# Patient Record
Sex: MALE | Race: WHITE | NOT HISPANIC OR LATINO | URBAN - METROPOLITAN AREA
[De-identification: names, ages, dates, MRNs, and addresses within clinical notes are randomized per-mention and may not be internally consistent; named-entity substitution may affect disease eponyms.]

---

## 2017-01-12 ENCOUNTER — EMERGENCY (EMERGENCY)
Facility: HOSPITAL | Age: 82
LOS: 0 days | Discharge: HOME | End: 2017-01-12
Attending: EMERGENCY MEDICINE
Payer: MEDICARE

## 2017-01-12 VITALS
HEART RATE: 74 BPM | OXYGEN SATURATION: 98 % | TEMPERATURE: 97 F | DIASTOLIC BLOOD PRESSURE: 77 MMHG | SYSTOLIC BLOOD PRESSURE: 137 MMHG | RESPIRATION RATE: 18 BRPM

## 2017-01-12 VITALS
RESPIRATION RATE: 16 BRPM | HEART RATE: 91 BPM | SYSTOLIC BLOOD PRESSURE: 163 MMHG | OXYGEN SATURATION: 98 % | WEIGHT: 190.04 LBS | DIASTOLIC BLOOD PRESSURE: 77 MMHG | HEIGHT: 67 IN | TEMPERATURE: 97 F
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
## 2017-01-12 DIAGNOSIS — S49.91XA UNSPECIFIED INJURY OF RIGHT SHOULDER AND UPPER ARM, INITIAL ENCOUNTER: ICD-10-CM

## 2017-01-12 DIAGNOSIS — S42.291A OTHER DISPLACED FRACTURE OF UPPER END OF RIGHT HUMERUS, INITIAL ENCOUNTER FOR CLOSED FRACTURE: ICD-10-CM

## 2017-01-12 DIAGNOSIS — Z85.46 PERSONAL HISTORY OF MALIGNANT NEOPLASM OF PROSTATE: ICD-10-CM
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
## 2017-01-12 DIAGNOSIS — C61 MALIGNANT NEOPLASM OF PROSTATE: Chronic | ICD-10-CM

## 2017-01-12 DIAGNOSIS — E87.6 HYPOKALEMIA: ICD-10-CM

## 2017-01-12 PROCEDURE — 73030 X-RAY EXAM OF SHOULDER: CPT | Mod: 26,RT

## 2017-01-12 PROCEDURE — 23600 CLTX PROX HUMRL FX W/O MNPJ: CPT | Mod: 54

## 2017-01-12 PROCEDURE — 73060 X-RAY EXAM OF HUMERUS: CPT | Mod: 26,RT

## 2017-01-12 PROCEDURE — 99284 EMERGENCY DEPT VISIT MOD MDM: CPT | Mod: 57,25

## 2017-01-12 RX ORDER — IBUPROFEN 200 MG
1 TABLET ORAL
Qty: 9 | Refills: 0
Start: 2017-01-12 | End: 2017-01-15

## 2017-01-12 RX ORDER — IBUPROFEN 200 MG
600 TABLET ORAL ONCE
Qty: 0 | Refills: 0 | Status: COMPLETED | OUTPATIENT
Start: 2017-01-12 | End: 2017-01-12

## 2017-01-12 RX ORDER — ONDANSETRON 8 MG/1
4 TABLET, FILM COATED ORAL ONCE
Qty: 0 | Refills: 0 | Status: COMPLETED | OUTPATIENT
Start: 2017-01-12 | End: 2017-01-12

## 2017-01-12 RX ADMIN — ONDANSETRON 4 MILLIGRAM(S): 8 TABLET, FILM COATED ORAL at 09:10

## 2017-01-12 RX ADMIN — Medication 600 MILLIGRAM(S): at 08:05

## 2017-01-12 NOTE — ED PROVIDER NOTE - EYES, MLM
Clear bilaterally, pupils equal, round and reactive to light. No photophobia no nystagmus bilaterally

## 2017-01-12 NOTE — ED PROVIDER NOTE - PROGRESS NOTE DETAILS
Ortho is notified right arm sling is placed, pt's neuro-vascular is intact right upper extremity. Ortho is notified sts they are in the OR will eval pt ortho Dr. Wyman was here eval and cleared pt sts pt can be discharged with an arm sling and follow up with Dr. Bhatti orthopedist. Pt and wife are given and explained the report of right shoulder xray and advised to follow up with orthopedist. Pt's right upper extremity's neuro-vascular are intact.

## 2017-01-12 NOTE — ED PROCEDURE NOTE - CPROC ED POST PROC CARE GUIDE1
Verbal/written post procedure instructions were given to patient/caregiver./Instructed patient/caregiver to follow-up with primary care physician./Elevate the injured extremity as instructed.

## 2017-01-12 NOTE — ED PROVIDER NOTE - UPPER EXTREMITY EXAM, RIGHT
LIMITED ROM/increases tender to abduct the right shoulder but able actively flex and extend right elbow and right wrist/TENDERNESS

## 2017-01-12 NOTE — ED ADULT TRIAGE NOTE - CHIEF COMPLAINT QUOTE
trip and fall pain to right shoulder denies loc  small abrasion to bridge of nose and left hand second digit trip and fall pain to right shoulder denies loc, unable to lift arm   small abrasion to bridge of nose and left hand second digit

## 2017-01-12 NOTE — ED PROVIDER NOTE - CONSTITUTIONAL, MLM
normal... Well appearing, well nourished, awake, alert, oriented to person, place, time/situation and in no apparent distress. Speaking in clear full sentences sitting up at the edge of the stretcher appears comfortable in a bright light room

## 2017-01-12 NOTE — ED ADULT NURSE NOTE - OBJECTIVE STATEMENT
" [place] the mall,I tripped on something and fell.I have pain in my right shoulder and elbow"

## 2017-01-12 NOTE — CONSULT NOTE ADULT - SUBJECTIVE AND OBJECTIVE BOX
82M presents to S ED c/o R shoulder pain s.p mechanical fall. RHD. Denies HS/LOC, Numbness, tingling, apresthesia, loss of distal motor/sensation. Denies any other injuries at this time.     VS: Avss  Gen: NAD  RUE: no gross deformity, skin intact, no ecchymosis/edema/erythema, AIN/PIN/R/U/M/A intact, SILT C5-T1, +RP, CR Brisk, no ttp elbow wrist, ROM limited 2/2 pain.  LUE/BLLE: no ttp bony prominences, SILT, palpable pulses, full/painless ROM, able to SLR BL

## 2017-01-12 NOTE — ED ADULT NURSE NOTE - CHIEF COMPLAINT QUOTE
trip and fall pain to right shoulder denies loc, unable to lift arm   small abrasion to bridge of nose and left hand second digit

## 2017-01-12 NOTE — ED PROVIDER NOTE - OBJECTIVE STATEMENT
82 years old male walked in with his wife c/o pain 7/10 to right shoulder after he slipped and fell fore [place] this morning. Pt denies headache, loc, neck/back/hips pain, blurred visions, light sensitivities, focal/distal weakness or numbness, cough, sob, chest pain, nausea, vomiting, fever, chills, abd pain. Pt sts the pain in right shoulder increases with lifting right arm.

## 2017-01-12 NOTE — ED PROCEDURE NOTE - NS ED PERI VASCULAR NEG
no paresthesia/no swelling/fingers/toes warm to touch/capillary refill time < 2 seconds/no cyanosis of extremity

## 2017-01-12 NOTE — ED PROCEDURE NOTE - NS ED PERI NEURO NEG
Post-application: Motor, sensory, and vascular responses intact in the injured extremity./The patient/caregiver verbalized understanding of how to care for the injured extremity with splint/Pre-application: Motor, sensory, and vascular responses intact in the injured extremity.

## 2017-01-12 NOTE — CONSULT NOTE ADULT - ASSESSMENT
A/P 82M with right proximal humerus fracture  pain control  nwb in sling  dw pt no need for acute orthopedi surgical intervention  fu w Dr. Monroe 1-2 weeks  [place]

## 2017-02-09 ENCOUNTER — APPOINTMENT (OUTPATIENT)
Dept: FAMILY MEDICINE | Facility: CLINIC | Age: 82
End: 2017-02-09

## 2017-06-26 ENCOUNTER — NON-APPOINTMENT (OUTPATIENT)
Age: 82
End: 2017-06-26

## 2017-06-26 ENCOUNTER — APPOINTMENT (OUTPATIENT)
Dept: FAMILY MEDICINE | Facility: CLINIC | Age: 82
End: 2017-06-26

## 2017-06-26 VITALS
BODY MASS INDEX: 29.82 KG/M2 | WEIGHT: 190 LBS | OXYGEN SATURATION: 97 % | HEIGHT: 67 IN | RESPIRATION RATE: 15 BRPM | SYSTOLIC BLOOD PRESSURE: 140 MMHG | DIASTOLIC BLOOD PRESSURE: 80 MMHG | TEMPERATURE: 97.7 F | HEART RATE: 92 BPM

## 2017-06-26 DIAGNOSIS — G56.01 CARPAL TUNNEL SYNDROME, RIGHT UPPER LIMB: ICD-10-CM

## 2017-06-26 DIAGNOSIS — Z01.818 ENCOUNTER FOR OTHER PREPROCEDURAL EXAMINATION: ICD-10-CM

## 2017-06-26 RX ORDER — ASPIRIN 81 MG/1
81 TABLET ORAL
Qty: 30 | Refills: 0 | Status: ACTIVE | COMMUNITY
Start: 2017-06-26

## 2017-07-05 DIAGNOSIS — N39.0 URINARY TRACT INFECTION, SITE NOT SPECIFIED: ICD-10-CM

## 2017-07-05 LAB
ALBUMIN SERPL ELPH-MCNC: 4.4 G/DL
ALP BLD-CCNC: 103 U/L
ALT SERPL-CCNC: 19 U/L
ANION GAP SERPL CALC-SCNC: 18 MMOL/L
APTT BLD: 36.2 SEC
AST SERPL-CCNC: 24 U/L
BASOPHILS # BLD AUTO: 0.07 K/UL
BASOPHILS NFR BLD AUTO: 0.5 %
BILIRUB SERPL-MCNC: 0.6 MG/DL
BUN SERPL-MCNC: 26 MG/DL
CALCIUM SERPL-MCNC: 10.3 MG/DL
CHLORIDE SERPL-SCNC: 98 MMOL/L
CO2 SERPL-SCNC: 25 MMOL/L
CREAT SERPL-MCNC: 1.36 MG/DL
EOSINOPHIL # BLD AUTO: 0.29 K/UL
EOSINOPHIL NFR BLD AUTO: 2.2 %
GLUCOSE SERPL-MCNC: 101 MG/DL
HCT VFR BLD CALC: 46.5 %
HGB BLD-MCNC: 15 G/DL
IMM GRANULOCYTES NFR BLD AUTO: 0.2 %
INR PPP: 1.04 RATIO
LYMPHOCYTES # BLD AUTO: 3.05 K/UL
LYMPHOCYTES NFR BLD AUTO: 23.3 %
MAN DIFF?: NORMAL
MCHC RBC-ENTMCNC: 29.4 PG
MCHC RBC-ENTMCNC: 32.3 GM/DL
MCV RBC AUTO: 91 FL
MONOCYTES # BLD AUTO: 0.94 K/UL
MONOCYTES NFR BLD AUTO: 7.2 %
NEUTROPHILS # BLD AUTO: 8.71 K/UL
NEUTROPHILS NFR BLD AUTO: 66.6 %
PLATELET # BLD AUTO: 292 K/UL
POTASSIUM SERPL-SCNC: 4.7 MMOL/L
PROT SERPL-MCNC: 8.5 G/DL
PT BLD: 11.8 SEC
RBC # BLD: 5.11 M/UL
RBC # FLD: 13.6 %
SODIUM SERPL-SCNC: 141 MMOL/L
WBC # FLD AUTO: 13.09 K/UL

## 2017-07-17 ENCOUNTER — NON-APPOINTMENT (OUTPATIENT)
Age: 82
End: 2017-07-17

## 2017-07-17 ENCOUNTER — APPOINTMENT (OUTPATIENT)
Dept: FAMILY MEDICINE | Facility: CLINIC | Age: 82
End: 2017-07-17

## 2017-07-17 VITALS
HEIGHT: 67 IN | SYSTOLIC BLOOD PRESSURE: 140 MMHG | RESPIRATION RATE: 14 BRPM | BODY MASS INDEX: 29.35 KG/M2 | HEART RATE: 92 BPM | DIASTOLIC BLOOD PRESSURE: 80 MMHG | TEMPERATURE: 97.9 F | OXYGEN SATURATION: 97 % | WEIGHT: 187 LBS

## 2017-07-17 DIAGNOSIS — D72.829 ELEVATED WHITE BLOOD CELL COUNT, UNSPECIFIED: ICD-10-CM

## 2017-07-17 DIAGNOSIS — E78.5 HYPERLIPIDEMIA, UNSPECIFIED: ICD-10-CM

## 2017-07-17 DIAGNOSIS — R82.71 BACTERIURIA: ICD-10-CM

## 2017-11-09 ENCOUNTER — APPOINTMENT (OUTPATIENT)
Dept: FAMILY MEDICINE | Facility: CLINIC | Age: 82
End: 2017-11-09
Payer: MEDICARE

## 2017-11-09 VITALS — SYSTOLIC BLOOD PRESSURE: 134 MMHG | DIASTOLIC BLOOD PRESSURE: 80 MMHG | TEMPERATURE: 98.4 F

## 2017-11-09 PROCEDURE — 99213 OFFICE O/P EST LOW 20 MIN: CPT

## 2018-09-20 ENCOUNTER — NON-APPOINTMENT (OUTPATIENT)
Age: 83
End: 2018-09-20

## 2018-09-20 ENCOUNTER — APPOINTMENT (OUTPATIENT)
Dept: FAMILY MEDICINE | Facility: CLINIC | Age: 83
End: 2018-09-20
Payer: MEDICARE

## 2018-09-20 VITALS
TEMPERATURE: 98.6 F | OXYGEN SATURATION: 97 % | WEIGHT: 189 LBS | HEART RATE: 75 BPM | DIASTOLIC BLOOD PRESSURE: 80 MMHG | RESPIRATION RATE: 14 BRPM | SYSTOLIC BLOOD PRESSURE: 132 MMHG | BODY MASS INDEX: 29.66 KG/M2 | HEIGHT: 67 IN

## 2018-09-20 DIAGNOSIS — I10 ESSENTIAL (PRIMARY) HYPERTENSION: ICD-10-CM

## 2018-09-20 DIAGNOSIS — L98.9 DISORDER OF THE SKIN AND SUBCUTANEOUS TISSUE, UNSPECIFIED: ICD-10-CM

## 2018-09-20 DIAGNOSIS — E87.6 HYPOKALEMIA: ICD-10-CM

## 2018-09-20 DIAGNOSIS — Z00.00 ENCOUNTER FOR GENERAL ADULT MEDICAL EXAMINATION W/OUT ABNORMAL FINDINGS: ICD-10-CM

## 2018-09-20 PROCEDURE — G0439: CPT

## 2018-09-20 PROCEDURE — 90662 IIV NO PRSV INCREASED AG IM: CPT

## 2018-09-20 PROCEDURE — 93000 ELECTROCARDIOGRAM COMPLETE: CPT | Mod: 59

## 2018-09-20 PROCEDURE — G0008: CPT

## 2018-09-20 RX ORDER — CIPROFLOXACIN HYDROCHLORIDE 500 MG/1
500 TABLET, FILM COATED ORAL
Qty: 6 | Refills: 0 | Status: COMPLETED | COMMUNITY
Start: 2017-07-05 | End: 2018-09-20

## 2018-09-20 NOTE — HISTORY OF PRESENT ILLNESS
[de-identified] :   presents for routine physical exam. Offers no complaints today. Patient to see a nephrologist  twice a year for his hypokalemia.Pt had CTS 1 year ago now in the incision site he has an infection or foreign body.Wife tried to use a  pin to remove.

## 2018-09-20 NOTE — PHYSICAL EXAM

## 2018-09-20 NOTE — ASSESSMENT
[FreeTextEntry1] :   presents for routine physical exam. Offers no complaints today. Patient to see a nephrologist  twice a year for his hypokalemia.Pt had CTS 1 year ago now in the incision site he has an infection or foreign body.Wife tried to use a  pin to remove.\par Labs Normal\par EKG Nochange\par Pt to use Bacitracin to hand if no improvement to f/up with [facility] surgeon\par High-dose flu shot given today\par refer for Shingrix

## 2018-12-12 ENCOUNTER — APPOINTMENT (OUTPATIENT)
Dept: FAMILY MEDICINE | Facility: CLINIC | Age: 83
End: 2018-12-12
Payer: MEDICARE

## 2018-12-12 VITALS — TEMPERATURE: 97.5 F | SYSTOLIC BLOOD PRESSURE: 126 MMHG | DIASTOLIC BLOOD PRESSURE: 77 MMHG

## 2018-12-12 DIAGNOSIS — J06.9 ACUTE UPPER RESPIRATORY INFECTION, UNSPECIFIED: ICD-10-CM

## 2018-12-12 DIAGNOSIS — B97.89 ACUTE UPPER RESPIRATORY INFECTION, UNSPECIFIED: ICD-10-CM

## 2018-12-12 DIAGNOSIS — R09.82 POSTNASAL DRIP: ICD-10-CM

## 2018-12-12 PROCEDURE — 99214 OFFICE O/P EST MOD 30 MIN: CPT

## 2018-12-12 RX ORDER — FLUTICASONE PROPIONATE 50 UG/1
50 SPRAY, METERED NASAL
Qty: 1 | Refills: 0 | Status: ACTIVE | COMMUNITY
Start: 2018-12-12 | End: 1900-01-01

## 2018-12-12 RX ORDER — BENZONATATE 200 MG/1
200 CAPSULE ORAL 3 TIMES DAILY
Qty: 30 | Refills: 0 | Status: ACTIVE | COMMUNITY
Start: 2018-12-12 | End: 1900-01-01

## 2019-09-24 ENCOUNTER — INPATIENT (INPATIENT)
Facility: HOSPITAL | Age: 84
LOS: 28 days | Discharge: NURSING, REHAB OR LONG-TERM CARE | DRG: 97 | End: 2019-10-23
Attending: HOSPITALIST | Admitting: STUDENT IN AN ORGANIZED HEALTH CARE EDUCATION/TRAINING PROGRAM
Payer: MEDICARE

## 2019-09-24 VITALS
SYSTOLIC BLOOD PRESSURE: 135 MMHG | TEMPERATURE: 98 F | RESPIRATION RATE: 21 BRPM | HEIGHT: 70 IN | HEART RATE: 71 BPM | WEIGHT: 190.26 LBS | OXYGEN SATURATION: 100 % | DIASTOLIC BLOOD PRESSURE: 70 MMHG

## 2019-09-24 DIAGNOSIS — C61 MALIGNANT NEOPLASM OF PROSTATE: Chronic | ICD-10-CM

## 2019-09-24 DIAGNOSIS — G40.89 OTHER SEIZURES: ICD-10-CM

## 2019-09-24 RX ORDER — CHLORHEXIDINE GLUCONATE 213 G/1000ML
1 SOLUTION TOPICAL
Refills: 0 | Status: DISCONTINUED | OUTPATIENT
Start: 2019-09-24 | End: 2019-10-23

## 2019-09-24 RX ORDER — CHLORHEXIDINE GLUCONATE 213 G/1000ML
15 SOLUTION TOPICAL EVERY 12 HOURS
Refills: 0 | Status: DISCONTINUED | OUTPATIENT
Start: 2019-09-24 | End: 2019-10-01

## 2019-09-25 DIAGNOSIS — Z90.49 ACQUIRED ABSENCE OF OTHER SPECIFIED PARTS OF DIGESTIVE TRACT: Chronic | ICD-10-CM

## 2019-09-25 DIAGNOSIS — C61 MALIGNANT NEOPLASM OF PROSTATE: Chronic | ICD-10-CM

## 2019-09-25 LAB
ALBUMIN SERPL ELPH-MCNC: 2.9 G/DL — LOW (ref 3.3–5)
ALBUMIN SERPL ELPH-MCNC: 3.1 G/DL — LOW (ref 3.3–5)
ALP SERPL-CCNC: 108 U/L — SIGNIFICANT CHANGE UP (ref 40–120)
ALP SERPL-CCNC: 93 U/L — SIGNIFICANT CHANGE UP (ref 40–120)
ALT FLD-CCNC: 29 U/L — SIGNIFICANT CHANGE UP (ref 10–45)
ALT FLD-CCNC: 34 U/L — SIGNIFICANT CHANGE UP (ref 10–45)
ANION GAP SERPL CALC-SCNC: 11 MMOL/L — SIGNIFICANT CHANGE UP (ref 5–17)
ANION GAP SERPL CALC-SCNC: 13 MMOL/L — SIGNIFICANT CHANGE UP (ref 5–17)
APPEARANCE CSF: CLEAR — SIGNIFICANT CHANGE UP
APPEARANCE SPUN FLD: COLORLESS — SIGNIFICANT CHANGE UP
APPEARANCE UR: CLEAR — SIGNIFICANT CHANGE UP
APTT BLD: 27.6 SEC — SIGNIFICANT CHANGE UP (ref 27.5–36.3)
APTT BLD: 29.1 SEC — SIGNIFICANT CHANGE UP (ref 27.5–36.3)
AST SERPL-CCNC: 35 U/L — SIGNIFICANT CHANGE UP (ref 10–40)
AST SERPL-CCNC: 43 U/L — HIGH (ref 10–40)
BASOPHILS # BLD AUTO: 0 K/UL — SIGNIFICANT CHANGE UP (ref 0–0.2)
BASOPHILS # BLD AUTO: 0 K/UL — SIGNIFICANT CHANGE UP (ref 0–0.2)
BASOPHILS NFR BLD AUTO: 0.4 % — SIGNIFICANT CHANGE UP (ref 0–2)
BASOPHILS NFR BLD AUTO: 0.4 % — SIGNIFICANT CHANGE UP (ref 0–2)
BILIRUB SERPL-MCNC: 0.6 MG/DL — SIGNIFICANT CHANGE UP (ref 0.2–1.2)
BILIRUB SERPL-MCNC: 0.6 MG/DL — SIGNIFICANT CHANGE UP (ref 0.2–1.2)
BILIRUB UR-MCNC: NEGATIVE — SIGNIFICANT CHANGE UP
BUN SERPL-MCNC: 12 MG/DL — SIGNIFICANT CHANGE UP (ref 7–23)
BUN SERPL-MCNC: 13 MG/DL — SIGNIFICANT CHANGE UP (ref 7–23)
CALCIUM SERPL-MCNC: 8.3 MG/DL — LOW (ref 8.4–10.5)
CALCIUM SERPL-MCNC: 8.7 MG/DL — SIGNIFICANT CHANGE UP (ref 8.4–10.5)
CHLORIDE SERPL-SCNC: 102 MMOL/L — SIGNIFICANT CHANGE UP (ref 96–108)
CHLORIDE SERPL-SCNC: 105 MMOL/L — SIGNIFICANT CHANGE UP (ref 96–108)
CK SERPL-CCNC: 286 U/L — HIGH (ref 30–200)
CO2 SERPL-SCNC: 22 MMOL/L — SIGNIFICANT CHANGE UP (ref 22–31)
CO2 SERPL-SCNC: 24 MMOL/L — SIGNIFICANT CHANGE UP (ref 22–31)
COLOR CSF: SIGNIFICANT CHANGE UP
COLOR SPEC: SIGNIFICANT CHANGE UP
CREAT SERPL-MCNC: 0.9 MG/DL — SIGNIFICANT CHANGE UP (ref 0.5–1.3)
CREAT SERPL-MCNC: 0.95 MG/DL — SIGNIFICANT CHANGE UP (ref 0.5–1.3)
CRYPTOC AG CSF-ACNC: NEGATIVE — SIGNIFICANT CHANGE UP
DIFF PNL FLD: NEGATIVE — SIGNIFICANT CHANGE UP
EOSINOPHIL # BLD AUTO: 0.3 K/UL — SIGNIFICANT CHANGE UP (ref 0–0.5)
EOSINOPHIL # BLD AUTO: 0.4 K/UL — SIGNIFICANT CHANGE UP (ref 0–0.5)
EOSINOPHIL NFR BLD AUTO: 2.8 % — SIGNIFICANT CHANGE UP (ref 0–6)
EOSINOPHIL NFR BLD AUTO: 3.6 % — SIGNIFICANT CHANGE UP (ref 0–6)
GAS PNL BLDA: SIGNIFICANT CHANGE UP
GAS PNL BLDA: SIGNIFICANT CHANGE UP
GLUCOSE BLDC GLUCOMTR-MCNC: 105 MG/DL — HIGH (ref 70–99)
GLUCOSE CSF-MCNC: 75 MG/DL — HIGH (ref 40–70)
GLUCOSE SERPL-MCNC: 121 MG/DL — HIGH (ref 70–99)
GLUCOSE SERPL-MCNC: 133 MG/DL — HIGH (ref 70–99)
GLUCOSE UR QL: NEGATIVE — SIGNIFICANT CHANGE UP
GRAM STN FLD: SIGNIFICANT CHANGE UP
GRAM STN FLD: SIGNIFICANT CHANGE UP
HBA1C BLD-MCNC: 5.7 % — HIGH (ref 4–5.6)
HCT VFR BLD CALC: 41.5 % — SIGNIFICANT CHANGE UP (ref 39–50)
HCT VFR BLD CALC: 42.3 % — SIGNIFICANT CHANGE UP (ref 39–50)
HGB BLD-MCNC: 12.4 G/DL — LOW (ref 13–17)
HGB BLD-MCNC: 13.4 G/DL — SIGNIFICANT CHANGE UP (ref 13–17)
INR BLD: 1.13 RATIO — SIGNIFICANT CHANGE UP (ref 0.88–1.16)
INR BLD: 1.15 RATIO — SIGNIFICANT CHANGE UP (ref 0.88–1.16)
KETONES UR-MCNC: NEGATIVE — SIGNIFICANT CHANGE UP
LDH CSF L TO P-CCNC: 29 U/L — SIGNIFICANT CHANGE UP
LDH FLD-CCNC: 29 U/L — SIGNIFICANT CHANGE UP
LEUKOCYTE ESTERASE UR-ACNC: NEGATIVE — SIGNIFICANT CHANGE UP
LYMPHOCYTES # BLD AUTO: 1.4 K/UL — SIGNIFICANT CHANGE UP (ref 1–3.3)
LYMPHOCYTES # BLD AUTO: 10.9 % — LOW (ref 13–44)
LYMPHOCYTES # BLD AUTO: 19.6 % — SIGNIFICANT CHANGE UP (ref 13–44)
LYMPHOCYTES # BLD AUTO: 2 K/UL — SIGNIFICANT CHANGE UP (ref 1–3.3)
LYMPHOCYTES # CSF: 88 % — HIGH (ref 40–80)
MAGNESIUM SERPL-MCNC: 1.7 MG/DL — SIGNIFICANT CHANGE UP (ref 1.6–2.6)
MAGNESIUM SERPL-MCNC: 1.9 MG/DL — SIGNIFICANT CHANGE UP (ref 1.6–2.6)
MCHC RBC-ENTMCNC: 28 PG — SIGNIFICANT CHANGE UP (ref 27–34)
MCHC RBC-ENTMCNC: 29.5 PG — SIGNIFICANT CHANGE UP (ref 27–34)
MCHC RBC-ENTMCNC: 29.9 GM/DL — LOW (ref 32–36)
MCHC RBC-ENTMCNC: 31.6 GM/DL — LOW (ref 32–36)
MCV RBC AUTO: 93.2 FL — SIGNIFICANT CHANGE UP (ref 80–100)
MCV RBC AUTO: 93.8 FL — SIGNIFICANT CHANGE UP (ref 80–100)
MONOCYTES # BLD AUTO: 0.8 K/UL — SIGNIFICANT CHANGE UP (ref 0–0.9)
MONOCYTES # BLD AUTO: 1 K/UL — HIGH (ref 0–0.9)
MONOCYTES NFR BLD AUTO: 6.8 % — SIGNIFICANT CHANGE UP (ref 2–14)
MONOCYTES NFR BLD AUTO: 9.6 % — SIGNIFICANT CHANGE UP (ref 2–14)
MONOS+MACROS NFR CSF: 12 % — LOW (ref 15–45)
NEUTROPHILS # BLD AUTO: 7 K/UL — SIGNIFICANT CHANGE UP (ref 1.8–7.4)
NEUTROPHILS # BLD AUTO: 9.9 K/UL — HIGH (ref 1.8–7.4)
NEUTROPHILS # CSF: 0 % — SIGNIFICANT CHANGE UP (ref 0–6)
NEUTROPHILS NFR BLD AUTO: 66.7 % — SIGNIFICANT CHANGE UP (ref 43–77)
NEUTROPHILS NFR BLD AUTO: 79.2 % — HIGH (ref 43–77)
NITRITE UR-MCNC: NEGATIVE — SIGNIFICANT CHANGE UP
NRBC NFR CSF: 79 /UL — HIGH (ref 0–5)
PH UR: 6.5 — SIGNIFICANT CHANGE UP (ref 5–8)
PHOSPHATE SERPL-MCNC: 2.7 MG/DL — SIGNIFICANT CHANGE UP (ref 2.5–4.5)
PHOSPHATE SERPL-MCNC: 3.4 MG/DL — SIGNIFICANT CHANGE UP (ref 2.5–4.5)
PLATELET # BLD AUTO: 241 K/UL — SIGNIFICANT CHANGE UP (ref 150–400)
PLATELET # BLD AUTO: 241 K/UL — SIGNIFICANT CHANGE UP (ref 150–400)
POTASSIUM SERPL-MCNC: 3.3 MMOL/L — LOW (ref 3.5–5.3)
POTASSIUM SERPL-MCNC: 3.3 MMOL/L — LOW (ref 3.5–5.3)
POTASSIUM SERPL-SCNC: 3.3 MMOL/L — LOW (ref 3.5–5.3)
POTASSIUM SERPL-SCNC: 3.3 MMOL/L — LOW (ref 3.5–5.3)
PROLACTIN SERPL-MCNC: 21.9 NG/ML — HIGH (ref 4.1–18.4)
PROT CSF-MCNC: 75 MG/DL — HIGH (ref 15–45)
PROT SERPL-MCNC: 6.8 G/DL — SIGNIFICANT CHANGE UP (ref 6–8.3)
PROT SERPL-MCNC: 7 G/DL — SIGNIFICANT CHANGE UP (ref 6–8.3)
PROT UR-MCNC: NEGATIVE — SIGNIFICANT CHANGE UP
PROTHROM AB SERPL-ACNC: 12.9 SEC — SIGNIFICANT CHANGE UP (ref 10–12.9)
PROTHROM AB SERPL-ACNC: 13.3 SEC — HIGH (ref 10–12.9)
RBC # BLD: 4.42 M/UL — SIGNIFICANT CHANGE UP (ref 4.2–5.8)
RBC # BLD: 4.54 M/UL — SIGNIFICANT CHANGE UP (ref 4.2–5.8)
RBC # CSF: 0 /UL — SIGNIFICANT CHANGE UP (ref 0–0)
RBC # FLD: 12.6 % — SIGNIFICANT CHANGE UP (ref 10.3–14.5)
RBC # FLD: 12.6 % — SIGNIFICANT CHANGE UP (ref 10.3–14.5)
SODIUM SERPL-SCNC: 137 MMOL/L — SIGNIFICANT CHANGE UP (ref 135–145)
SODIUM SERPL-SCNC: 140 MMOL/L — SIGNIFICANT CHANGE UP (ref 135–145)
SP GR SPEC: 1.01 — SIGNIFICANT CHANGE UP (ref 1.01–1.02)
SPECIMEN SOURCE: SIGNIFICANT CHANGE UP
SPECIMEN SOURCE: SIGNIFICANT CHANGE UP
TUBE TYPE: SIGNIFICANT CHANGE UP
UROBILINOGEN FLD QL: NEGATIVE — SIGNIFICANT CHANGE UP
WBC # BLD: 10.4 K/UL — SIGNIFICANT CHANGE UP (ref 3.8–10.5)
WBC # BLD: 12.5 K/UL — HIGH (ref 3.8–10.5)
WBC # FLD AUTO: 10.4 K/UL — SIGNIFICANT CHANGE UP (ref 3.8–10.5)
WBC # FLD AUTO: 12.5 K/UL — HIGH (ref 3.8–10.5)

## 2019-09-25 PROCEDURE — 99291 CRITICAL CARE FIRST HOUR: CPT

## 2019-09-25 PROCEDURE — 70553 MRI BRAIN STEM W/O & W/DYE: CPT | Mod: 26

## 2019-09-25 PROCEDURE — 71045 X-RAY EXAM CHEST 1 VIEW: CPT | Mod: 26

## 2019-09-25 PROCEDURE — 70450 CT HEAD/BRAIN W/O DYE: CPT | Mod: 26

## 2019-09-25 PROCEDURE — 77003 FLUOROGUIDE FOR SPINE INJECT: CPT | Mod: 26

## 2019-09-25 PROCEDURE — 99223 1ST HOSP IP/OBS HIGH 75: CPT | Mod: GC

## 2019-09-25 PROCEDURE — 62270 DX LMBR SPI PNXR: CPT

## 2019-09-25 PROCEDURE — 95816 EEG AWAKE AND DROWSY: CPT | Mod: 26

## 2019-09-25 PROCEDURE — 95951: CPT | Mod: 26

## 2019-09-25 RX ORDER — LACOSAMIDE 50 MG/1
150 TABLET ORAL EVERY 12 HOURS
Refills: 0 | Status: DISCONTINUED | OUTPATIENT
Start: 2019-09-25 | End: 2019-09-30

## 2019-09-25 RX ORDER — MIDAZOLAM HYDROCHLORIDE 1 MG/ML
0.02 INJECTION, SOLUTION INTRAMUSCULAR; INTRAVENOUS
Qty: 100 | Refills: 0 | Status: DISCONTINUED | OUTPATIENT
Start: 2019-09-25 | End: 2019-09-26

## 2019-09-25 RX ORDER — PROPOFOL 10 MG/ML
30 INJECTION, EMULSION INTRAVENOUS
Qty: 500 | Refills: 0 | Status: DISCONTINUED | OUTPATIENT
Start: 2019-09-25 | End: 2019-09-26

## 2019-09-25 RX ORDER — HEPARIN SODIUM 5000 [USP'U]/ML
5000 INJECTION INTRAVENOUS; SUBCUTANEOUS ONCE
Refills: 0 | Status: DISCONTINUED | OUTPATIENT
Start: 2019-09-25 | End: 2019-09-25

## 2019-09-25 RX ORDER — LEVETIRACETAM 250 MG/1
1000 TABLET, FILM COATED ORAL EVERY 12 HOURS
Refills: 0 | Status: DISCONTINUED | OUTPATIENT
Start: 2019-09-25 | End: 2019-09-25

## 2019-09-25 RX ORDER — PROPOFOL 10 MG/ML
30 INJECTION, EMULSION INTRAVENOUS
Qty: 500 | Refills: 0 | Status: DISCONTINUED | OUTPATIENT
Start: 2019-09-25 | End: 2019-09-25

## 2019-09-25 RX ORDER — MIDAZOLAM HYDROCHLORIDE 1 MG/ML
0.05 INJECTION, SOLUTION INTRAMUSCULAR; INTRAVENOUS
Qty: 100 | Refills: 0 | Status: DISCONTINUED | OUTPATIENT
Start: 2019-09-25 | End: 2019-09-25

## 2019-09-25 RX ORDER — MIDAZOLAM HYDROCHLORIDE 1 MG/ML
0.02 INJECTION, SOLUTION INTRAMUSCULAR; INTRAVENOUS
Qty: 100 | Refills: 0 | Status: DISCONTINUED | OUTPATIENT
Start: 2019-09-25 | End: 2019-09-25

## 2019-09-25 RX ORDER — MAGNESIUM SULFATE 500 MG/ML
2 VIAL (ML) INJECTION ONCE
Refills: 0 | Status: COMPLETED | OUTPATIENT
Start: 2019-09-25 | End: 2019-09-25

## 2019-09-25 RX ORDER — LEVETIRACETAM 250 MG/1
2000 TABLET, FILM COATED ORAL EVERY 12 HOURS
Refills: 0 | Status: DISCONTINUED | OUTPATIENT
Start: 2019-09-25 | End: 2019-10-03

## 2019-09-25 RX ORDER — HEPARIN SODIUM 5000 [USP'U]/ML
5000 INJECTION INTRAVENOUS; SUBCUTANEOUS EVERY 12 HOURS
Refills: 0 | Status: DISCONTINUED | OUTPATIENT
Start: 2019-09-25 | End: 2019-09-25

## 2019-09-25 RX ORDER — ACYCLOVIR SODIUM 500 MG
850 VIAL (EA) INTRAVENOUS EVERY 8 HOURS
Refills: 0 | Status: DISCONTINUED | OUTPATIENT
Start: 2019-09-25 | End: 2019-10-03

## 2019-09-25 RX ORDER — HEPARIN SODIUM 5000 [USP'U]/ML
5000 INJECTION INTRAVENOUS; SUBCUTANEOUS EVERY 8 HOURS
Refills: 0 | Status: DISCONTINUED | OUTPATIENT
Start: 2019-09-25 | End: 2019-09-25

## 2019-09-25 RX ORDER — POTASSIUM CHLORIDE 20 MEQ
40 PACKET (EA) ORAL ONCE
Refills: 0 | Status: COMPLETED | OUTPATIENT
Start: 2019-09-25 | End: 2019-09-25

## 2019-09-25 RX ORDER — POTASSIUM CHLORIDE 20 MEQ
10 PACKET (EA) ORAL
Refills: 0 | Status: COMPLETED | OUTPATIENT
Start: 2019-09-25 | End: 2019-09-25

## 2019-09-25 RX ADMIN — HEPARIN SODIUM 5000 UNIT(S): 5000 INJECTION INTRAVENOUS; SUBCUTANEOUS at 05:29

## 2019-09-25 RX ADMIN — MIDAZOLAM HYDROCHLORIDE 4.32 MG/KG/HR: 1 INJECTION, SOLUTION INTRAMUSCULAR; INTRAVENOUS at 02:00

## 2019-09-25 RX ADMIN — Medication 40 MILLIEQUIVALENT(S): at 13:00

## 2019-09-25 RX ADMIN — Medication 100 MILLIEQUIVALENT(S): at 13:01

## 2019-09-25 RX ADMIN — Medication 50 GRAM(S): at 21:01

## 2019-09-25 RX ADMIN — LEVETIRACETAM 400 MILLIGRAM(S): 250 TABLET, FILM COATED ORAL at 09:38

## 2019-09-25 RX ADMIN — PROPOFOL 15.53 MICROGRAM(S)/KG/MIN: 10 INJECTION, EMULSION INTRAVENOUS at 09:37

## 2019-09-25 RX ADMIN — LEVETIRACETAM 400 MILLIGRAM(S): 250 TABLET, FILM COATED ORAL at 21:00

## 2019-09-25 RX ADMIN — MIDAZOLAM HYDROCHLORIDE 1.73 MG/KG/HR: 1 INJECTION, SOLUTION INTRAMUSCULAR; INTRAVENOUS at 09:34

## 2019-09-25 RX ADMIN — CHLORHEXIDINE GLUCONATE 15 MILLILITER(S): 213 SOLUTION TOPICAL at 18:38

## 2019-09-25 RX ADMIN — PROPOFOL 15.53 MICROGRAM(S)/KG/MIN: 10 INJECTION, EMULSION INTRAVENOUS at 02:33

## 2019-09-25 RX ADMIN — PROPOFOL 15.53 MICROGRAM(S)/KG/MIN: 10 INJECTION, EMULSION INTRAVENOUS at 21:02

## 2019-09-25 RX ADMIN — Medication 167 MILLIGRAM(S): at 05:29

## 2019-09-25 RX ADMIN — MIDAZOLAM HYDROCHLORIDE 1.73 MG/KG/HR: 1 INJECTION, SOLUTION INTRAMUSCULAR; INTRAVENOUS at 21:01

## 2019-09-25 RX ADMIN — LACOSAMIDE 130 MILLIGRAM(S): 50 TABLET ORAL at 10:27

## 2019-09-25 RX ADMIN — Medication 167 MILLIGRAM(S): at 18:38

## 2019-09-25 RX ADMIN — CHLORHEXIDINE GLUCONATE 15 MILLILITER(S): 213 SOLUTION TOPICAL at 05:28

## 2019-09-25 RX ADMIN — CHLORHEXIDINE GLUCONATE 1 APPLICATION(S): 213 SOLUTION TOPICAL at 05:30

## 2019-09-25 RX ADMIN — Medication 40 MILLIEQUIVALENT(S): at 02:32

## 2019-09-25 RX ADMIN — LACOSAMIDE 130 MILLIGRAM(S): 50 TABLET ORAL at 21:01

## 2019-09-25 RX ADMIN — Medication 100 MILLIEQUIVALENT(S): at 17:53

## 2019-09-25 RX ADMIN — Medication 100 MILLIEQUIVALENT(S): at 19:46

## 2019-09-25 NOTE — H&P ADULT - NSHPPHYSICALEXAM_GEN_ALL_CORE
ICU Vital Signs Last 24 Hrs  T(C): 36.9 (24 Sep 2019 23:32), Max: 36.9 (24 Sep 2019 23:32)  T(F): 98.5 (24 Sep 2019 23:32), Max: 98.5 (24 Sep 2019 23:32)  HR: 67 (25 Sep 2019 02:00) (66 - 71)  BP: 100/58 (25 Sep 2019 02:00) (99/56 - 141/69)  BP(mean): 74 (25 Sep 2019 02:00) (71 - 99)  ABP: --  ABP(mean): --  RR: 18 (25 Sep 2019 02:00) (18 - 21)  SpO2: 100% (25 Sep 2019 02:00) (100% - 100%)    PHYSICAL EXAM:    Constitutional: intubated and sedated  Eyes: pupils equal and reactive to light b/l  ENMT: normocephalic; MMM; healing wound on forehead  Respiratory: lungs clear to auscultation b/l  Cardiovascular: RRR; no murmurs rubs or gallops  Gastrointestinal: abdomen soft, nontender, nondistended; bowel sounds all 4 quadrants  Extremities: no peripheral edema  Vascular: 2+ peripheral pulses LE b/l  Neurological: AO x 0; no focal deficits  Skin: no rashes or lesions ICU Vital Signs Last 24 Hrs  T(C): 36.9 (24 Sep 2019 23:32), Max: 36.9 (24 Sep 2019 23:32)  T(F): 98.5 (24 Sep 2019 23:32), Max: 98.5 (24 Sep 2019 23:32)  HR: 67 (25 Sep 2019 02:00) (66 - 71)  BP: 100/58 (25 Sep 2019 02:00) (99/56 - 141/69)  BP(mean): 74 (25 Sep 2019 02:00) (71 - 99)  ABP: --  ABP(mean): --  RR: 18 (25 Sep 2019 02:00) (18 - 21)  SpO2: 100% (25 Sep 2019 02:00) (100% - 100%)    PHYSICAL EXAM:    Constitutional: intubated and sedated  Eyes: pupils equal and reactive to light b/l  ENMT: normocephalic; MMM; healing wound on forehead  Respiratory: lungs clear to auscultation b/l  Cardiovascular: RRR; no murmurs rubs or gallops  Gastrointestinal: abdomen soft, nontender, nondistended; bowel sounds all 4 quadrants  Extremities: no peripheral edema  Vascular: 2+ peripheral pulses LE b/l  Neurological: AOx0  Skin: no rashes or lesions

## 2019-09-25 NOTE — PROGRESS NOTE ADULT - SUBJECTIVE AND OBJECTIVE BOX
CHIEF COMPLAINT: Patient is a 84y old  Male who presents with a chief complaint of status epilepticus (25 Sep 2019 16:19)    Interval Events:    Pt has had no seizures while in MICU. EEG currently not showing epileptiform focus. Undergoing LP, MRI, CT for better characterization of etiology. Otherwise no other acute events.    REVIEW OF SYSTEMS:  Constitutional:   Eyes:  ENT:  CV:  Resp:  GI:  :  MSK:  Integumentary:  Neurological:  Psychiatric:  Endocrine:  Hematologic/Lymphatic:  Allergic/Immunologic:  [ ] All other systems negative  [x ] Unable to assess ROS because intubated sedated    OBJECTIVE:  ICU Vital Signs Last 24 Hrs  T(C): 36.4 (25 Sep 2019 12:00), Max: 36.9 (24 Sep 2019 23:32)  T(F): 97.5 (25 Sep 2019 12:00), Max: 98.5 (24 Sep 2019 23:32)  HR: 76 (25 Sep 2019 17:30) (66 - 107)  BP: 114/55 (25 Sep 2019 12:00) (96/51 - 141/69)  BP(mean): 79 (25 Sep 2019 12:00) (71 - 99)  ABP: --  ABP(mean): --  RR: 25 (25 Sep 2019 12:00) (14 - 25)  SpO2: 100% (25 Sep 2019 17:30) (96% - 100%)    Mode: AC/ CMV (Assist Control/ Continuous Mandatory Ventilation), RR (machine): 14, TV (machine): 400, FiO2: 30, PEEP: 5, ITime: 0.74, MAP: 9, PIP: 20     @ 07: @ 07:00  --------------------------------------------------------  IN: 347.6 mL / OUT: 1785 mL / NET: -1437.4 mL     @ 07:  -   @ 18:21  --------------------------------------------------------  IN: 236 mL / OUT: 420 mL / NET: -184 mL      CAPILLARY BLOOD GLUCOSE      POCT Blood Glucose.: 105 mg/dL (25 Sep 2019 18:10)      PHYSICAL EXAM:  	Constitutional: intubated and sedated  	Eyes: pupils equal and reactive to light b/l  	ENMT: normocephalic; MMM; healing wound on forehead  	Respiratory: lungs clear to auscultation b/l  	Cardiovascular: RRR; no murmurs rubs or gallops  	Gastrointestinal: abdomen soft, nontender, nondistended; bowel sounds all 4 quadrants  	Extremities: no peripheral edema  	Vascular: 2+ peripheral pulses LE b/l  	Neurological: AOx0  Skin: no rashes or lesion    HOSPITAL MEDICATIONS:  MEDICATIONS  (STANDING):  acyclovir IVPB 850 milliGRAM(s) IV Intermittent every 8 hours  chlorhexidine 0.12% Liquid 15 milliLiter(s) Oral Mucosa every 12 hours  chlorhexidine 4% Liquid 1 Application(s) Topical <User Schedule>  lacosamide IVPB 150 milliGRAM(s) IV Intermittent every 12 hours  levETIRAcetam  IVPB 2000 milliGRAM(s) IV Intermittent every 12 hours  magnesium sulfate  IVPB 2 Gram(s) IV Intermittent once  midazolam Infusion 0.02 mG/kG/Hr (1.726 mL/Hr) IV Continuous <Continuous>  potassium chloride  10 mEq/100 mL IVPB 10 milliEquivalent(s) IV Intermittent every 1 hour  propofol Infusion 30 MICROgram(s)/kG/Min (15.534 mL/Hr) IV Continuous <Continuous>    MEDICATIONS  (PRN):      LABS:  ( @ 12:06)                        12.4  12.5 )-----------( 241                 41.5    Neutrophils = 9.9 (79.2%)  Lymphocytes = 1.4 (10.9%)  Eosinophils = 0.3 (2.8%)  Basophils = 0.0 (0.4%)  Monocytes = 0.8 (6.8%)  Bands = --%    WBC Trend: 12.5<--, 10.4<--  Hb Trend: 12.4<--, 13.4<--  Plt Trend: 241<--, 241<--      137  |  102  |  13  ----------------------------<  133<H>  3.3<L>   |  24  |  0.90    Ca    8.3<L>      25 Sep 2019 12:06  Phos  3.4       Mg     1.7         TPro  6.8  /  Alb  2.9<L>  /  TBili  0.6  /  DBili  x   /  AST  35  /  ALT  29  /  AlkPhos  93      Creatinine Trend: 0.90<--, 0.95<--  PT/INR - ( 25 Sep 2019 12:06 )   PT: 12.9 sec;   INR: 1.13 ratio         PTT - ( 25 Sep 2019 12:06 )  PTT:29.1 sec  Urinalysis Basic - ( 25 Sep 2019 01:44 )    Color: Light Yellow / Appearance: Clear / S.015 / pH: x  Gluc: x / Ketone: Negative  / Bili: Negative / Urobili: Negative   Blood: x / Protein: Negative / Nitrite: Negative   Leuk Esterase: Negative / RBC: x / WBC x   Sq Epi: x / Non Sq Epi: x / Bacteria: x      Arterial Blood Gas:   @ 05:56  7.42/41/163/26/97/2.1  ABG lactate: --  Arterial Blood Gas:   @ 00:17  7.52/29/162/24/97/2.2  ABG lactate: --      CARDIAC MARKERS ( 25 Sep 2019 05:57 )  Trop x     /  U/L<H> / CKMB x

## 2019-09-25 NOTE — PROCEDURE NOTE - NSINFORMCONSENT_GEN_A_CORE
telephone consent/Benefits, risks, and possible complications of procedure explained to patient/caregiver who verbalized understanding and gave written consent.

## 2019-09-25 NOTE — H&P ADULT - HISTORY OF PRESENT ILLNESS
84 year old male Hx HTN, GERD, prostate cancer who presented with seizure.  On 9/20, patient fell out of bed and hit his head.  He subsequently developed seizure like activity.  Patient intubated by EMS for airway protection.  Patient given ativan and versed in ED.  Patient had CTH which did not show acute processes (including bleed).  Patient had brain MRI which showed R frontal enhancement (potential seizure foci).  Patient started on propofol gtt, locasamide and keppra.  Patient transferred to [facility] MICU for vEEG and management of status epilepticus. 84 year old male Hx HTN, GERD, prostate cancer who presented with seizure.  On 9/20, patient fell out of bed and hit his head.  He subsequently developed seizure like activity.  Patient intubated by EMS for airway protection.  Patient given ativan and versed in ED.  Patient had CTH which did not show acute processes (including bleed).  Patient had brain MRI which showed R frontal enhancement (potential seizure foci).  Patient started on propofol gtt, locasamide and keppra.  Patient started on acyclovir for meningitis suspicion.  Patient's labs were significant for WBC 13, lacate >10, BUN 29, and Cr 1.4.  Patient transferred to [facility] MICU for vEEG and management of status epilepticus. 84 year old male Hx HTN, GERD, prostate cancer who presented with seizure.  On 9/20, patient fell out of bed and hit his head.  He subsequently developed seizure like activity.  Patient intubated by EMS for airway protection.  Patient given ativan and versed in ED.  Patient had CTH which did not show acute processes (including bleed).  Patient had brain MRI which showed R frontal enhancement (potential seizure foci).  Patient started on propofol gtt, locasamide and keppra.  Patient started on acyclovir for meningitis suspicion.  Patient's labs were significant for WBC 13, lacate >10, BUN 29, and Cr 1.4.  Patient had spot EEG at OSH which showed R focal seizure activity on 9/23.  Patient was following commands and then attempted to wean off propofol, however unsuccessful (patient started having seizures again).  Resumed prop and increased keppra dose.  Patient transferred to [facility] MICU for vEEG and management of status epilepticus.

## 2019-09-25 NOTE — CONSULT NOTE ADULT - SUBJECTIVE AND OBJECTIVE BOX
HPI:  84M w/ PMH of prostate cancer (s/p seed implant and radiation ?), HTN, GERD, who presented to [facility] on  for seizure after falling out of bed.     Much of the history was obtained from the physical chart sent from [facility] - Patient was found by family "seizing" after following out of bed on . EMS was called, and patient was immediately intubated due to inability to protect his airway. He had CTH which did not show any acute intracranial abnormalities. MRI demonstrated ?R. frontal enhancement. He was started on AEDs Lacosamide and Keppra, in addition to the propofol gtt. 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 Labs on admission were notable for leukocytosis 13 (neutrophils 57%, Lymph 28%), Lactate > 10, BUN/Cr 29/1.4. Of note, patient treated w/ Zosyn for suspected coilitis given diffuse colonic thickening on CT a/p. The patient remained unresponsive during his stay. No report of being placed on EEG while there. No reported history of clinical seizures or convulsions during his stay. He was transferred to [facility] MICU for Veeg and management of status epilepticus.     During my examination, patient currently intubated on midazolam 1.7 ml/hr gtt & propofol 30mcg/kg/min gtt. No active convulsions noted. No head version, gaze deviation. Does not withdraw to noxious stimuli.     A 10-system ROS was performed and is negative except for those items noted above and/or in the HPI.    PAST MEDICAL & SURGICAL HISTORY:  History of gastroesophageal reflux (GERD)  Hypertension  Prostate cancer: had seed implant &amp; radiation (  )    FAMILY HISTORY:    SOCIAL HISTORY:   T/E/D: No smoke, drink, drugs     MEDICATIONS (HOME):  Home Medications:  amiloride 5 mg oral tablet: 1 tab(s) orally 3 times a day (2014 08:12)  ketorolac 10 mg oral tablet: 1 tab(s) orally 4 times a day (2014 11:25)  multivitamin: 1 tab(s) orally once a day (2014 08:12)  potassium chloride 10 mEq oral tablet, extended release: 3 tab(s) orally 2 times a day (2014 08:12)    Exam:   MS: intubated on midazolam 1.7 ml/hr gtt & propofol 30mcg/kg/min gtt.  CN: pupils 2mm, sluggishly responsive bilaterally. No BTT bilaterally. Oculocephalic intact. Corneal reflex intact. Gag intact. No facial asymmetry noted.   Motor: Does not withdraw to noxious stimuli in all 4 limbs.   Sensation: Does not withdraw to noxious stimuli in all 4 limbs.   Coordination: Unable to test   Gait: Deferred     STUDIES & IMAGIN-25 Na 140   K 3.3<L>   P 2.7   Mg 1.9   Ca 8.7   Cr 0.95

## 2019-09-25 NOTE — EEG REPORT - NS EEG TEXT BOX
[facility]   COMPREHENSIVE EPILEPSY CENTER   REPORT OF ROUTINE VIDEO EEG     [facility]: [address] [place] [address] , [address], [place] [address], Ph#: [phone]  [facility]: [id number] [place] [address], Ph#: [phone]  [facility]: [address] [place] [address]    Patient Name: SHIKHA MASON  Age and : 84y ([birth year])  MRN #: [id number]  Location: [address]  Referring Physician: Thang Bowles    Study Date: 19    _____________________________________________________________  TECHNICAL INFORMATION    Placement and Labeling of Electrodes:  The EEG was performed utilizing 20 channels referential EEG connections (coronal over temporal over parasagittal montage) using all standard 10-20 electrode placements with EKG.  Recording was at a sampling rate of 256 samples per second per channel.  Time synchronized digital video recording was done simultaneously with EEG recording.  A low light infrared camera was used for low light recording.  Breezy and seizure detection algorithms were utilized.    _____________________________________________________________  HISTORY    Patient is a 84y old  Male who presents with a chief complaint of status epilepticus (25 Sep 2019 03:42)      PERTINENT MEDICATION:  MEDICATIONS  (STANDING):  acyclovir IVPB 850 milliGRAM(s) IV Intermittent every 8 hours  chlorhexidine 0.12% Liquid 15 milliLiter(s) Oral Mucosa every 12 hours  chlorhexidine 4% Liquid 1 Application(s) Topical <User Schedule>  heparin  Injectable 5000 Unit(s) SubCutaneous once  lacosamide IVPB 150 milliGRAM(s) IV Intermittent every 12 hours  levETIRAcetam  IVPB 2000 milliGRAM(s) IV Intermittent every 12 hours  midazolam Infusion 0.02 mG/kG/Hr (1.726 mL/Hr) IV Continuous <Continuous>  propofol Infusion 30 MICROgram(s)/kG/Min (15.534 mL/Hr) IV Continuous <Continuous>    _____________________________________________________________  STUDY INTERPRETATION    Findings: The background was discontinuous, spontaneously variable and minimally reactive. Background predominantly consisted of theta, delta and faster activities for 2-7 seconds alternating between 1-2 seconds of suppression.       Focal Slowing:   None were present.    Sleep Background:  Stage II sleep transients were not recorded.    Other Non-Epileptiform Findings:  None were present.    Interictal Epileptiform Activity:   Abundant very brief fluctuating 1-2 hz right posterior quadrant (max P8/O2) periodic discharges.     Events:  Clinical events: None recorded.  Seizures: None recorded.    Activation Procedures:   Hyperventilation was not performed.    Photic stimulation was not performed.     Artifacts:  Intermittent myogenic and movement artifacts were noted.    ECG:  The heart rate on single channel ECG was predominantly between 60-70 BPM.    _____________________________________________________________  EEG SUMMARY/CLASSIFICATION    Abnormal EEG in an unresponsive patient.  - Abundant very brief fluctuating 1-2 hz right posterior quadrant ( max P8/O2) periodic discharges.   -Moderate to severe generalized slowing with a discontinuous pattern.    _____________________________________________________________  EEG IMPRESSION/CLINICAL CORRELATE  ***PRELIM REPORT***  Abnormal EEG study.  1. Potential epileptogenic focus in the right posterior quadrant.  2. Moderate to severe nonspecific diffuse or multifocal cerebral dysfunction.   3. No seizure seen.  _____________________________________________________________  Marjorie Alfaro DO  Epilepsy Fellow, [facility]    Rogers Quintero MD  Attending Physician, [facility]   COMPREHENSIVE EPILEPSY CENTER   REPORT OF ROUTINE VIDEO EEG     [facility]: [address] [place] [address] , [address], [place] [address], Ph#: [phone]  [facility]: [id number] [facility] [place] [address], Ph#: [phone]  [facility]: [address] [place] [address]    Patient Name: SHIKHA MASON  Age and : 84y ([birth year])  MRN #: [id number]  Location: [address]  Referring Physician: Thang Bowles    Study Date: 19    _____________________________________________________________  TECHNICAL INFORMATION    Placement and Labeling of Electrodes:  The EEG was performed utilizing 20 channels referential EEG connections (coronal over temporal over parasagittal montage) using all standard 10-20 electrode placements with EKG.  Recording was at a sampling rate of 256 samples per second per channel.  Time synchronized digital video recording was done simultaneously with EEG recording.  A low light infrared camera was used for low light recording.  Breezy and seizure detection algorithms were utilized.    _____________________________________________________________  HISTORY    Patient is a 84y old  Male who presents with a chief complaint of status epilepticus (25 Sep 2019 03:42)      PERTINENT MEDICATION:  MEDICATIONS  (STANDING):  acyclovir IVPB 850 milliGRAM(s) IV Intermittent every 8 hours  chlorhexidine 0.12% Liquid 15 milliLiter(s) Oral Mucosa every 12 hours  chlorhexidine 4% Liquid 1 Application(s) Topical <User Schedule>  heparin  Injectable 5000 Unit(s) SubCutaneous once  lacosamide IVPB 150 milliGRAM(s) IV Intermittent every 12 hours  levETIRAcetam  IVPB 2000 milliGRAM(s) IV Intermittent every 12 hours  midazolam Infusion 0.02 mG/kG/Hr (1.726 mL/Hr) IV Continuous <Continuous>  propofol Infusion 30 MICROgram(s)/kG/Min (15.534 mL/Hr) IV Continuous <Continuous>    _____________________________________________________________  STUDY INTERPRETATION    Findings: The background was discontinuous, spontaneously variable and non-reactive. Background predominantly consisted of theta, delta and faster activities for 2-7 seconds alternating between 1-2 seconds of suppression.     Generalized Slowing:  Burst-Suppression, Generalized, with amplitude asymmetry (higher amplitude over right hemisphere)    Focal Slowing:   None were present.    Sleep Background:  Stage II sleep transients were not recorded.    Other Non-Epileptiform Findings:  Asymmetry, Increased Amplitude in right hemisphere    Interictal Epileptiform Activity:   Abundant right posterior quadrant (max P8/O2) periodic sharp wave discharges, 1-2 hz.     Events:  Clinical events: None recorded.  Seizures: None recorded.    Activation Procedures:   Hyperventilation was not performed.    Photic stimulation was not performed.     Artifacts:  Intermittent myogenic and movement artifacts were noted.    ECG:  The heart rate on single channel ECG was predominantly between 60-70 BPM.    _____________________________________________________________  EEG SUMMARY/CLASSIFICATION    Abnormal EEG in an unresponsive patient.  - Abundant right posterior quadrant (max P8/O2) periodic sharp wave discharges, 1-2 hz.   - Severe generalized slowing with a discontinuous pattern (burst suppression, generalized)  - Asymmetry, Increased Amplitude in right hemisphere  _____________________________________________________________  EEG IMPRESSION/CLINICAL CORRELATE  Abnormal EEG study.  1. Potential epileptogenic focus in the right posterior quadrant.  2. Severe nonspecific diffuse or multifocal cerebral dysfunction.   3. No seizures were recorded.  _____________________________________________________________  Marjorie Alfaro DO  Epilepsy Fellow, [facility]    Rogers Quintero MD  Attending Physician, [facility] [place]

## 2019-09-25 NOTE — CHART NOTE - NSCHARTNOTEFT_GEN_A_CORE
IR guided LP to be done this afternoon.  Collaboration with Neuroradiology appreciated.   Discussed with Dr. Stroud.

## 2019-09-25 NOTE — PROGRESS NOTE ADULT - ATTENDING COMMENTS
New onset seizure in the setting of 'falling out of bed' per report. Transferred for Video EEG due to concern for continued sz. No EEG available on admission however empiric increased AED dose (Keppra and propofol increased). Current EEG without sz activity but epileptigenic focus rgt frontal c/w MRI abnormality seen.     - Awaiting MRI report to elucidate epileptogenic sz focus  - LP performed by IR today - high protein and glucose making a bacterial cause of encephalopathy less likely (also not c/w the history given).   - cont acyclovir  - d/w neuro re decreasing propofol while on video EEG New onset seizure in the setting of 'falling out of bed' per report. Transferred for Video EEG due to concern for continued sz. No EEG available on admission however empiric increased AED dose (Keppra and propofol increased). Current EEG without sz activity but epileptigenic focus rgt frontal c/w MRI abnormality seen.     - Awaiting MRI report to elucidate epileptogenic sz focus  - LP performed by IR today - high protein and glucose making a bacterial cause of encephalopathy less likely (also not c/w the history given).   - cont acyclovir  - d/w neuro re decreasing propofol while on video EEG; also discuss role of steroids given the CSF profile

## 2019-09-25 NOTE — PATIENT PROFILE ADULT - NSPROMEDSADMININFO_GEN_A_NUR
Partially impaired: cannot see medication labels or newsprint, but can see obstacles in path, and the surrounding layout; can count fingers at arm's length kaylah pt intubated sedated

## 2019-09-25 NOTE — H&P ADULT - ASSESSMENT
85 yo M Hx HTN, GERD, prostate cancer presenting with status epilepticus.  Transferred from OSH to [facility] ICU for vEEG monitoring.    Neuro  -status epilepticus at OSH, 2/2 infectious process vs traumatic induction   -c/w keppra 1 g bid  -c/w locasamide 150 mg bid  -c/w propofol  -c/w versed  -c/w acyclovir for HSV meningitis/encephalitis  -LP to r/o meningitis  -repeat CTH and brain MRI  -f/u prolactin levels  -f/u vEEG    CV    Resp    GI    Renal    ID    Endo    PPX  -HSQ    Med Rec  -needs OP med rec 83 yo M Hx HTN, GERD, prostate cancer presenting with status epilepticus.  Transferred from OSH to [facility] ICU for vEEG monitoring.    Neuro  -status epilepticus at OSH, 2/2 infectious process vs traumatic induction   -c/w keppra 1 g bid  -c/w locasamide 150 mg bid  -c/w propofol  -c/w versed  -c/w acyclovir for HSV meningitis/encephalitis  -LP to r/o meningitis  -repeat CTH and brain MRI  -f/u prolactin levels  -f/u vEEG  -neuro consult    CV  -no active issues  -hold home amlodipine    Resp  -intubated for airway protection in setting of seizure    GI  -OG tube in place, will resume tube feeds    Renal  -SANGITA on presentation at OSH  -resolved  -continue to monitor BMP    ID  -elevated WBC 13 and lactate > 10 at OSH, likely 2/2 seizure  -acyclovir for HSV encephalitis ppx  -LP to r/o meningitis/encephalitis    Endo  -no active issues    Heme  -elevated WBC likely 2/2 seizure  -continue to monitor CBC    PPX  -HSQ    Med Rec  -needs OP med rec 85 yo M Hx HTN, GERD, prostate cancer presenting with status epilepticus.  Transferred from OSH to [facility] ICU for vEEG monitoring.    Neuro  -status epilepticus at OSH, 2/2 infectious process vs traumatic induction   -c/w keppra 1 g bid  -c/w locasamide 150 mg bid  -c/w propofol  -c/w versed  -c/w acyclovir for HSV meningitis/encephalitis  -LP to r/o meningitis  -repeat CTH and brain MRI  -f/u prolactin levels  -f/u vEEG  -neuro consult    CV  -no active issues  -hold home amlodipine    Resp  -intubated for airway protection in setting of seizure    GI  -OG tube in place, will resume tube feeds    Renal  -SANGITA on presentation at OSH  -resolved, continue to monitor BMP  -f/u U/A to r/o infection  -retaining urine, continue to monitor I/O    ID  -elevated WBC 13 and lactate > 10 at OSH, likely 2/2 seizure  -acyclovir for HSV encephalitis ppx  -LP to r/o meningitis/encephalitis    Endo  -no active issues    Heme  -elevated WBC likely 2/2 seizure  -continue to monitor CBC    PPX  -HSQ    Med Rec  -needs OP med rec 83 yo M Hx HTN, GERD, prostate cancer presenting with status epilepticus.  Transferred from OSH to [facility] ICU for vEEG monitoring.    Neuro  -status epilepticus at OSH, 2/2 infectious process vs traumatic induction   -c/w keppra 1 g bid  -c/w locasamide 150 mg bid  -c/w propofol  -c/w versed  -c/w acyclovir for HSV meningitis/encephalitis  -LP to r/o meningitis  -repeat CTH and brain MRI  -f/u prolactin levels  -f/u vEEG  -neuro consult    CV  -no active issues  -hold home amlodipine    Resp  -intubated for airway protection in setting of seizure    GI  -OG tube in place, will resume tube feeds    Renal  -SANGITA on presentation at OSH  -resolved, continue to monitor BMP  -f/u U/A to r/o infection  -retaining urine, continue to monitor I/O    ID  -elevated WBC 13 and lactate > 10 at OSH, likely 2/2 seizure  -acyclovir for HSV encephalitis ppx  -LP to r/o meningitis/encephalitis  -f/u BCx and sputum Cx    Endo  -no active issues    Heme  -elevated WBC likely 2/2 seizure  -continue to monitor CBC    PPX  -HSQ    Med Rec  -needs OP med rec 85 yo M Hx HTN, GERD, prostate cancer presenting with status epilepticus.  Transferred from OSH to [facility] ICU for vEEG monitoring.    Neuro  -status epilepticus at OSH, 2/2 infectious process vs traumatic induction   -c/w keppra 1 g bid  -c/w locasamide 150 mg bid  -c/w propofol  -c/w versed  -c/w acyclovir for HSV meningitis/encephalitis  -LP to r/o meningitis  -repeat CTH and brain MRI  -f/u prolactin levels  -f/u vEEG  -neuro consult    CV  -no active issues  -hold home amlodipine    Resp  -intubated for airway protection in setting of seizure    GI  -OG tube in place, will resume tube feeds    Renal  -SANGITA on presentation at OSH  -resolved, continue to monitor BMP  -f/u U/A to r/o infection  -retaining urine, continue to monitor I/O    ID  -elevated WBC 13 and lactate > 10 at OSH, likely 2/2 seizure  -acyclovir for HSV encephalitis ppx  -LP to r/o meningitis/encephalitis  -f/u BCx and sputum Cx    Endo  -no active issues  -f/u A1c    Heme  -elevated WBC likely 2/2 seizure  -continue to monitor CBC    PPX  -HSQ    Med Rec  -needs OP med rec 83 yo M Hx HTN, GERD, prostate cancer presenting with status epilepticus.  Transferred from OSH to [facility] ICU for vEEG monitoring.    Neuro  -status epilepticus at OSH, 2/2 infectious process vs traumatic induction   -c/w keppra 1 g bid  -c/w locasamide 150 mg bid  -c/w propofol  -c/w versed  -c/w acyclovir for HSV meningitis/encephalitis  -LP to r/o meningitis  -repeat CTH and brain MRI  -f/u prolactin levels  -f/u vEEG  -neuro consult    CV  -no active issues  -hold home amlodipine    Resp  -intubated for airway protection in setting of seizure    GI  -OG tube in place, will resume tube feeds  -CT OSH showed possible colitis (colonic wall thickening)    Renal  -SANGITA on presentation at OSH  -resolved, continue to monitor BMP  -f/u U/A to r/o infection  -retaining urine, continue to monitor I/O    ID  -elevated WBC 13 and lactate > 10 at OSH, likely 2/2 seizure  -acyclovir for HSV encephalitis ppx  -LP to r/o meningitis/encephalitis  -f/u BCx and sputum Cx    Endo  -no active issues  -f/u A1c    Heme  -elevated WBC likely 2/2 seizure  -continue to monitor CBC  -f/u PT/INR for possible coagulopathy (although no brain hemorrhage on CT)    PPX  -HSQ    Med Rec  -needs OP med rec 83 yo M Hx HTN, GERD, prostate cancer presenting with status epilepticus.  Transferred from OSH to [facility] ICU for vEEG monitoring.    Neuro  -status epilepticus at OSH, 2/2 infectious process vs traumatic induction   -c/w keppra 1 g bid  -c/w locasamide 150 mg bid  -c/w propofol  -c/w versed  -c/w acyclovir for HSV meningitis/encephalitis  -LP to r/o meningitis  -repeat CTH and brain MRI  -f/u prolactin levels  -f/u vEEG  -neuro consult    CV  -no active issues  -hold home amlodipine    Resp  -intubated for airway protection in setting of seizure    GI  -OG tube in place, will resume tube feeds  -CT OSH showed possible colitis (colonic wall thickening)    Renal  -SANGITA on presentation at OSH  -resolved, continue to monitor BMP  -f/u U/A to r/o infection  -retaining urine, continue to monitor I/O    ID  -elevated WBC 13 and lactate > 10 at OSH, likely 2/2 seizure  -acyclovir for HSV encephalitis ppx  -LP to r/o meningitis/encephalitis  -f/u BCx and sputum Cx    Endo  -no active issues  -TSH and T4 WNL at OSH  -f/u A1c    Heme  -elevated WBC likely 2/2 seizure  -continue to monitor CBC  -f/u PT/INR for possible coagulopathy (although no brain hemorrhage on CT)    PPX  -HSQ    Med Rec  -needs OP med rec 85 yo M Hx HTN, GERD, prostate cancer presenting with status epilepticus.  Transferred from OSH to [facility] ICU for vEEG monitoring.    Neuro  -status epilepticus at OSH, 2/2 infectious process vs traumatic induction   -c/w keppra 2 g bid  -c/w locasamide 150 mg bid  -c/w propofol gtt  -c/w versed gtt  -c/w acyclovir for HSV meningitis/encephalitis  -LP to r/o meningitis  -repeat CTH and brain MRI  -f/u prolactin and CK levels  -f/u vEEG  -neuro consulted;, f/u recs    CV  -no active issues  -hold home amlodipine    Resp  -intubated for airway protection in setting of seizure    GI  -OG tube in place, will resume tube feeds  -CT OSH showed possible colitis (colonic wall thickening)    Renal  -SANGITA on presentation at OSH  -resolved, continue to monitor BMP  -f/u U/A to r/o infection  -retaining urine, continue to monitor I/O    ID  -elevated WBC 13 and lactate > 10 at OSH, likely 2/2 seizure  -acyclovir for HSV encephalitis ppx  -consider LP to r/o meningitis/encephalitis, however appears to have recently been afebrile with no leukocytosis and lactate 1.1  -f/u BCx and sputum Cx  -UA negative    Endo  -no active issues  -TSH and T4 WNL at OSH  -f/u A1c    Heme  -elevated WBC likely 2/2 seizure  -continue to monitor CBC    PPX  -HSQ    Med Rec  -needs OP med rec

## 2019-09-25 NOTE — PROGRESS NOTE ADULT - ASSESSMENT
85 yo M Hx HTN, GERD, prostate cancer presenting with status epilepticus.  Transferred from OSH to [facility] ICU for vEEG monitoring.     Neuro  -status epilepticus at OSH, 2/2 infectious process vs traumatic induction   -c/w keppra 2 g bid  -c/w locasamide 150 mg bid  -c/w propofol gtt  -c/w versed gtt  -c/w acyclovir for HSV meningitis/encephalitis  -LP to r/o meningitis today, f/u fluid studies  -repeat CTH and brain MRI  -f/u prolactin and CK levels  -f/u vEEG. No epileptiform focus seen today.  -appreciate neuro recs    CV  -no active issues  -hold home amlodipine    Resp  -intubated for airway protection in setting of seizure  -Saturating well on current settings    GI  -OG tube in place, will resume tube feeds  -CT OSH showed possible colitis (colonic wall thickening)    Renal  -SANGITA on presentation at OSH  -resolved, continue to monitor BMP  -f/u U/A to r/o infection  -retaining urine, continue to monitor I/O    ID  -elevated WBC 13 and lactate > 10 at OSH, likely 2/2 seizure  -acyclovir for HSV encephalitis ppx  -consider LP to r/o meningitis/encephalitis, however appears to have recently been afebrile with no leukocytosis and lactate 1.1  -f/u BCx and sputum Cx  -UA negative    Endo  -no active issues  -TSH and T4 WNL at OSH  -f/u A1c    Heme  -elevated WBC likely 2/2 seizure  -continue to monitor CBC    PPX  -HSQ    Med Rec  -needs OP med rec

## 2019-09-25 NOTE — CONSULT NOTE ADULT - ASSESSMENT
84M w/ PMH of prostate cancer (s/p seed implant and radiation ?2010), HTN, GERD, who presented to [facility] on 9/20 for reported "seizure" after falling out of bed later requiring intubation - Patient was found by family "seizing" after following out of bed on 9/20. EMS was called, and patient was immediately intubated due to inability to protect his airway. He had CTH which did not show any acute intracranial abnormalities. MRI demonstrated ?R. frontal enhancement. He was started on AEDs Lacosamide and Keppra, in addition to the propofol gtt. 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 Labs on admission were notable for leukocytosis 13 (neutrophils 57%, Lymph 28%), Lactate > 10, BUN/Cr 29/1.4. Of note, patient treated w/ Zosyn for suspected coilitis given diffuse colonic thickening on CT a/p. The patient remained unresponsive during his stay. No report of being placed on EEG while there. No reported history of clinical seizures or convulsions during his stay. He was transferred to [facility] MICU for VEEG and management of status epilepticus. During my examination, patient currently intubated on midazolam 1.7 ml/hr gtt & propofol 30mcg/kg/min gtt. No active convulsions noted. No head version, gaze deviation. Does not withdraw to noxious stimuli.     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  Differential remains broad - encephalitis 2/2 infectious or inflammatory cause is most probable given the duration of symptoms. Less likely that a structural cause (brain mets) would cause such prolonged seizures. Less likely acute symptomatic seizures 2/2 post-traumatic.     Plan:   [] VEEG monitoring   [] Agree with LP to evaluate for infectious vs. inflammatory etiology   [] ID consult to workup possible infectious encephalitis  [] Repeat MRI Brain w/ and w/o contrast to evaluate for any structural foci  [] Continue current AEDs  [] Agree w/ acyclovir   [] ?Restart Zosyn for suspected coilitis at OSH. Consider GI consult  [] Toxic/met enceph workup 84M w/ PMH of prostate cancer (s/p seed implant and radiation ?2010), HTN, GERD, who presented to [facility] on 9/20 for reported "seizure" after falling out of bed later requiring intubation - Patient was found by family "seizing" after following out of bed on 9/20. EMS was called, and patient was immediately intubated due to inability to protect his airway. He had CTH which did not show any acute intracranial abnormalities. MRI demonstrated ?R. frontal enhancement. He was started on AEDs Lacosamide and Keppra, in addition to the propofol gtt. 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 Labs on admission were notable for leukocytosis 13 (neutrophils 57%, Lymph 28%), Lactate > 10, BUN/Cr 29/1.4. Of note, patient treated w/ Zosyn for suspected coilitis given diffuse colonic thickening on CT a/p. The patient remained unresponsive during his stay. No report of being placed on EEG while there. No reported history of clinical seizures or convulsions during his stay. He was transferred to [facility] MICU for VEEG and management of status epilepticus. During my examination, patient currently intubated on midazolam 1.7 ml/hr gtt & propofol 30mcg/kg/min gtt. No active convulsions noted. No head version, gaze deviation. Does not withdraw to noxious stimuli.     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  Differential remains broad - encephalitis 2/2 infectious or inflammatory cause is most probable given the duration of symptoms. Less likely that a structural cause (brain mets) would cause such prolonged seizures. Less likely acute symptomatic seizures 2/2 post-traumatic.     Plan:   [] VEEG monitoring   [] Agree with LP to evaluate for infectious vs. inflammatory etiology. Defer autoimmune workup untill after LP. ID consult to assist in infectious encephalitis workup   [] Repeat MRI Brain w/ and w/o contrast to evaluate for any structural foci  [] Continue current AEDs  [] Agree w/ continuing acyclovir. If patient not improving on acyclovir, may suggest an inflammatory disease rather than infectious which would requiring steroids/IVIG/Plex.   [] ?Restart Zosyn for suspected coilitis at OSH. Consider GI consult  [] Toxic/met enceph workup - TSH, B12, Folate   [] Obtain further collateral from OSH and family regarding ?LP, ?seizure semiology, ?Hx of seizures 84M w/ PMH of prostate cancer (s/p seed implant and radiation ?2010), HTN, GERD, who presented to [facility] on 9/20 for reported "seizure" after falling out of bed later requiring intubation - Patient was found by family "seizing" after following out of bed on 9/20. EMS was called, and patient was immediately intubated due to inability to protect his airway. He had CTH which did not show any acute intracranial abnormalities. MRI demonstrated ?R. frontal enhancement. He was started on AEDs Lacosamide and Keppra, in addition to the propofol gtt. 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 Labs on admission were notable for leukocytosis 13 (neutrophils 57%, Lymph 28%), Lactate > 10, BUN/Cr 29/1.4. Of note, patient treated w/ Zosyn for suspected coilitis given diffuse colonic thickening on CT a/p. The patient remained unresponsive during his stay. No report of being placed on EEG while there. No reported history of clinical seizures or convulsions during his stay. He was transferred to [facility] MICU for VEEG and management of status epilepticus. During my examination, patient currently intubated on midazolam 1.7 ml/hr gtt & propofol 30mcg/kg/min gtt. No active convulsions noted. No head version, gaze deviation. Does not withdraw to noxious stimuli.     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  Differential remains broad - encephalitis 2/2 infectious or inflammatory cause is most probable given the duration of symptoms. Less likely that a structural cause (brain mets) would cause such prolonged seizures. Less likely acute symptomatic seizures 2/2 post-traumatic.     Plan:   [] VEEG monitoring   [] Agree with LP to evaluate for infectious vs. inflammatory etiology. Defer autoimmune workup untill after LP. ID consult to assist in infectious encephalitis workup   LP studies to obtain:  -HTLV1  -HSV6  -JEANETTE virus  -toxo  -cryptococcus  -14-3-3  -tau  -NSE  -CSF PCR infection  [] Repeat MRI Brain w/ and w/o contrast to evaluate for any structural foci  [] Continue current AEDs  [] Agree w/ continuing acyclovir. If patient not improving on acyclovir, may suggest an inflammatory disease rather than infectious which would requiring steroids/IVIG/Plex.   [] ?Restart Zosyn for suspected coilitis at OSH. Consider GI consult  [] Toxic/met enceph workup - TSH, B12, Folate   [] Obtain further collateral from OSH and family regarding ?LP, ?seizure semiology, ?Hx of seizures

## 2019-09-25 NOTE — H&P ADULT - ATTENDING COMMENTS
84M PMH HTN who presents from [facility] with focal status epilepticus. He initially presented there on 9/20, after falling out of bed and hitting his head with seizure activity. It is unclear if he had a seizure that led to his fall. He was intubated in the field and admitted to MICU. He was started on levetiracetam and lacosamide in addition to infusions of diprivan and midazolam, but continued to have seizures on spot EEG. He was transferred here for continuous EEG and seizure management. Of note, MRI showed R frontal enhancement that is likely the focus for seizure activity.    - Continue levetiracetam 2000 mg q12h + lacosamide 150 mg q12h  - Keep on diprivan and midazolam gtt  - Upload imaging to PACS (CT Head & MRI Brain)  - Continuous video EEG  - Neuro eval  - HD stable  - Lung protective ventilation  - Start OG tube feeds  - Stable kidney function and lytes  - May need LP r/o meningitis if remains in status, currently on acyclovir. No fevers or leukocytosis to suggest meningoencephalitis though  - DVT ppx with enoxaparin  - Prognosis guarded, full code  CC time spent: 35 min 84M PMH HTN who presents from [facility] with focal status epilepticus. He initially presented there on 9/20, after falling out of bed and hitting his head with seizure activity. It is unclear if he had a seizure that led to his fall. He was intubated in the field and admitted to MICU. He was started on levetiracetam and lacosamide in addition to infusions of diprivan and midazolam, but continued to have seizures on spot EEG. He was transferred here for continuous EEG and seizure management. Of note, MRI showed R frontal enhancement that is likely the focus for seizure activity.    - Continue levetiracetam 2000 mg q12h + lacosamide 150 mg q12h  - Keep on diprivan and midazolam gtt  - Upload imaging to PACS (CT Head & MRI Brain). R frontal enhancement of unclear etiology. Possible post-ictal changes vs. encephalitis vs. stroke. F/up neuro  - Continuous video EEG  - Neuro eval  - HD stable  - Lung protective ventilation  - Start OG tube feeds  - Stable kidney function and lytes  - May need LP r/o meningitis if remains in status, currently on acyclovir. No fevers or leukocytosis to suggest meningoencephalitis though  - DVT ppx with enoxaparin  - Prognosis guarded, full code  CC time spent: 35 min

## 2019-09-25 NOTE — H&P ADULT - NSICDXPASTMEDICALHX_GEN_ALL_CORE_FT
PAST MEDICAL HISTORY:  History of gastroesophageal reflux (GERD)     Hypertension     Prostate cancer had seed implant & radiation ( 2001 )

## 2019-09-25 NOTE — H&P ADULT - NSHPLABSRESULTS_GEN_ALL_CORE
.  LABS:                         13.4   10.4  )-----------( 241      ( 25 Sep 2019 00:00 )             42.3     09-25    140  |  105  |  12  ----------------------------<  121<H>  3.3<L>   |  22  |  0.95    Ca    8.7      25 Sep 2019 00:00  Phos  2.7     09-25  Mg     1.9     09-25    TPro  7.0  /  Alb  3.1<L>  /  TBili  0.6  /  DBili  x   /  AST  43<H>  /  ALT  34  /  AlkPhos  108  09-25    PT/INR - ( 25 Sep 2019 00:00 )   PT: 13.3 sec;   INR: 1.15 ratio         PTT - ( 25 Sep 2019 00:00 )  PTT:27.6 sec          RADIOLOGY, EKG & ADDITIONAL TESTS: Reviewed.

## 2019-09-25 NOTE — PROCEDURE NOTE - NSPROCDETAILS_GEN_ALL_CORE
area cleaned in sterile fashion/unable to access dural space/location identified, draped/prepped, sterile technique used, needle inserted/introduced/procedure aborted

## 2019-09-25 NOTE — PROGRESS NOTE ADULT - SUBJECTIVE AND OBJECTIVE BOX
CLINICAL INDICATION: AMS    Patient presents for fluoroscopically guided lumbar puncture.  Risks and benefits were discussed with the patient and/or patient health care proxy.  Risks discussed included bleeding, infection, nerve damage, and headache.     Status post lumbar puncture at the L4-L5 level utilizing a 20G spinal needle.      16cc of subtly straw colored cerebral spinal fluid was obtained.    No immediate complications.

## 2019-09-26 LAB
ALBUMIN SERPL ELPH-MCNC: 2.8 G/DL — LOW (ref 3.3–5)
ALP SERPL-CCNC: 93 U/L — SIGNIFICANT CHANGE UP (ref 40–120)
ALT FLD-CCNC: 28 U/L — SIGNIFICANT CHANGE UP (ref 10–45)
ANION GAP SERPL CALC-SCNC: 12 MMOL/L — SIGNIFICANT CHANGE UP (ref 5–17)
AST SERPL-CCNC: 34 U/L — SIGNIFICANT CHANGE UP (ref 10–40)
BASOPHILS # BLD AUTO: 0.1 K/UL — SIGNIFICANT CHANGE UP (ref 0–0.2)
BASOPHILS NFR BLD AUTO: 0.5 % — SIGNIFICANT CHANGE UP (ref 0–2)
BILIRUB SERPL-MCNC: 0.6 MG/DL — SIGNIFICANT CHANGE UP (ref 0.2–1.2)
BUN SERPL-MCNC: 14 MG/DL — SIGNIFICANT CHANGE UP (ref 7–23)
CALCIUM SERPL-MCNC: 8.5 MG/DL — SIGNIFICANT CHANGE UP (ref 8.4–10.5)
CHLORIDE SERPL-SCNC: 100 MMOL/L — SIGNIFICANT CHANGE UP (ref 96–108)
CO2 SERPL-SCNC: 25 MMOL/L — SIGNIFICANT CHANGE UP (ref 22–31)
CREAT SERPL-MCNC: 0.88 MG/DL — SIGNIFICANT CHANGE UP (ref 0.5–1.3)
CRYPTOC AG CSF-ACNC: NEGATIVE — SIGNIFICANT CHANGE UP
EOSINOPHIL # BLD AUTO: 0.3 K/UL — SIGNIFICANT CHANGE UP (ref 0–0.5)
EOSINOPHIL NFR BLD AUTO: 3.4 % — SIGNIFICANT CHANGE UP (ref 0–6)
GLUCOSE BLDC GLUCOMTR-MCNC: 105 MG/DL — HIGH (ref 70–99)
GLUCOSE BLDC GLUCOMTR-MCNC: 108 MG/DL — HIGH (ref 70–99)
GLUCOSE SERPL-MCNC: 110 MG/DL — HIGH (ref 70–99)
HCT VFR BLD CALC: 39.2 % — SIGNIFICANT CHANGE UP (ref 39–50)
HGB BLD-MCNC: 12.4 G/DL — LOW (ref 13–17)
LYMPHOCYTES # BLD AUTO: 1.7 K/UL — SIGNIFICANT CHANGE UP (ref 1–3.3)
LYMPHOCYTES # BLD AUTO: 17.2 % — SIGNIFICANT CHANGE UP (ref 13–44)
MAGNESIUM SERPL-MCNC: 2.1 MG/DL — SIGNIFICANT CHANGE UP (ref 1.6–2.6)
MCHC RBC-ENTMCNC: 29.6 PG — SIGNIFICANT CHANGE UP (ref 27–34)
MCHC RBC-ENTMCNC: 31.5 GM/DL — LOW (ref 32–36)
MCV RBC AUTO: 93.9 FL — SIGNIFICANT CHANGE UP (ref 80–100)
MONOCYTES # BLD AUTO: 0.9 K/UL — SIGNIFICANT CHANGE UP (ref 0–0.9)
MONOCYTES NFR BLD AUTO: 8.8 % — SIGNIFICANT CHANGE UP (ref 2–14)
NEUTROPHILS # BLD AUTO: 7 K/UL — SIGNIFICANT CHANGE UP (ref 1.8–7.4)
NEUTROPHILS NFR BLD AUTO: 70.1 % — SIGNIFICANT CHANGE UP (ref 43–77)
PHOSPHATE SERPL-MCNC: 3.6 MG/DL — SIGNIFICANT CHANGE UP (ref 2.5–4.5)
PLATELET # BLD AUTO: 178 K/UL — SIGNIFICANT CHANGE UP (ref 150–400)
POTASSIUM SERPL-MCNC: 3.6 MMOL/L — SIGNIFICANT CHANGE UP (ref 3.5–5.3)
POTASSIUM SERPL-SCNC: 3.6 MMOL/L — SIGNIFICANT CHANGE UP (ref 3.5–5.3)
PROT SERPL-MCNC: 6.8 G/DL — SIGNIFICANT CHANGE UP (ref 6–8.3)
RBC # BLD: 4.17 M/UL — LOW (ref 4.2–5.8)
RBC # FLD: 12.3 % — SIGNIFICANT CHANGE UP (ref 10.3–14.5)
SODIUM SERPL-SCNC: 137 MMOL/L — SIGNIFICANT CHANGE UP (ref 135–145)
WBC # BLD: 10 K/UL — SIGNIFICANT CHANGE UP (ref 3.8–10.5)
WBC # FLD AUTO: 10 K/UL — SIGNIFICANT CHANGE UP (ref 3.8–10.5)

## 2019-09-26 PROCEDURE — 95951: CPT | Mod: 26

## 2019-09-26 PROCEDURE — 99291 CRITICAL CARE FIRST HOUR: CPT

## 2019-09-26 PROCEDURE — 99233 SBSQ HOSP IP/OBS HIGH 50: CPT | Mod: GC

## 2019-09-26 RX ORDER — SODIUM CHLORIDE 9 MG/ML
1000 INJECTION, SOLUTION INTRAVENOUS
Refills: 0 | Status: COMPLETED | OUTPATIENT
Start: 2019-09-26 | End: 2019-09-27

## 2019-09-26 RX ADMIN — LEVETIRACETAM 400 MILLIGRAM(S): 250 TABLET, FILM COATED ORAL at 21:07

## 2019-09-26 RX ADMIN — Medication 167 MILLIGRAM(S): at 01:04

## 2019-09-26 RX ADMIN — CHLORHEXIDINE GLUCONATE 15 MILLILITER(S): 213 SOLUTION TOPICAL at 17:39

## 2019-09-26 RX ADMIN — CHLORHEXIDINE GLUCONATE 15 MILLILITER(S): 213 SOLUTION TOPICAL at 05:05

## 2019-09-26 RX ADMIN — CHLORHEXIDINE GLUCONATE 1 APPLICATION(S): 213 SOLUTION TOPICAL at 05:06

## 2019-09-26 RX ADMIN — LACOSAMIDE 130 MILLIGRAM(S): 50 TABLET ORAL at 10:27

## 2019-09-26 RX ADMIN — LACOSAMIDE 130 MILLIGRAM(S): 50 TABLET ORAL at 22:08

## 2019-09-26 RX ADMIN — Medication 167 MILLIGRAM(S): at 17:38

## 2019-09-26 RX ADMIN — Medication 167 MILLIGRAM(S): at 10:27

## 2019-09-26 RX ADMIN — LEVETIRACETAM 400 MILLIGRAM(S): 250 TABLET, FILM COATED ORAL at 10:27

## 2019-09-26 NOTE — DIETITIAN INITIAL EVALUATION ADULT. - OTHER INFO
Pt seen for: MICU Length Of Stay   Adm dx: status epilepticus    GI issues: no vomiting, +abd distention  Last BM: fecal incontinence 9/25    Food allergies/Intolerances: NKFA   Vit/supplement PTA: none noted    Diet PTA: unable to assess at this time      Subjective/Objective information: pt intubated, no visitors at bedside     Education: Not indicated at this time

## 2019-09-26 NOTE — PROGRESS NOTE ADULT - ASSESSMENT
83 y/o man with hx of prostate CA s/o seed implant and RT, HTN, p/w sudden onset AMS seizure brought to [facility] . Was found to be in status epilepticus and required intubation for airway control. Pt transferred to [facility] for further workup and EEG monitoring.     Pt has clear epileptogenic focus in the right parietal-occipital region.   Differential for cortical ribboning includes status epilepticus, infarct, HSV encephalitis, meningitis, hypoxic injury, CJD. CSF suggestive of viral etiology vs inflammatory. Less likely CJD given acuity of presentation.     In CSF, viral panel, cryptococcus, 14-3-3, tau, neuron specific enolase, HTLV-1, JEANETTE virus, HSV 6, paraneoplastic panel sent and pending results  He is currently on Keppra 200mg BID, lacosamide 150mg BID, Versed and Propofol  Will titrate off sedation to see what his mental status is, starting with versed.    Continue VEEG monitoring  seizure precautions  neuro checks Q2 .   Care per MICU

## 2019-09-26 NOTE — DIETITIAN INITIAL EVALUATION ADULT. - PERTINENT MEDS FT
MEDICATIONS  (STANDING):  acyclovir IVPB 850 milliGRAM(s) IV Intermittent every 8 hours  chlorhexidine 0.12% Liquid 15 milliLiter(s) Oral Mucosa every 12 hours  chlorhexidine 4% Liquid 1 Application(s) Topical <User Schedule>  lacosamide IVPB 150 milliGRAM(s) IV Intermittent every 12 hours  lactated ringers. 1000 milliLiter(s) (75 mL/Hr) IV Continuous <Continuous>  levETIRAcetam  IVPB 2000 milliGRAM(s) IV Intermittent every 12 hours  midazolam Infusion 0.02 mG/kG/Hr (1.726 mL/Hr) IV Continuous <Continuous>  propofol Infusion 30 MICROgram(s)/kG/Min (15.534 mL/Hr) IV Continuous <Continuous>    MEDICATIONS  (PRN):

## 2019-09-26 NOTE — DIETITIAN INITIAL EVALUATION ADULT. - ENERGY NEEDS
Estimated calorie needs for intubated pt per [employer] ([employer]) 2003  1597cal/day (18kcals/kg)   Ht: 70"  Wt: 190  BMI: 27.3 kg/m2   IBW: 166 (+/-10%)     114% IBW  Edema: 1+ B/L hand        Skin: no pressure injuries documented

## 2019-09-26 NOTE — DIETITIAN INITIAL EVALUATION ADULT. - ENTERAL
Vital 1.2 at 75cc/hr x 18 hrs provides 1620 kcals, 101 gm protein, 1095cc free water  meets 19 Kcal/Kg, 1.2Gm/kg dosing wt 86.3 kg. Adjust EN intake based on propofol requirements.

## 2019-09-26 NOTE — PROGRESS NOTE ADULT - SUBJECTIVE AND OBJECTIVE BOX
Neurology Follow up note    Patient is a 84y old  Male who presents with a chief complaint of status epilepticus (26 Sep 2019 07:52)      Subjective:Interval History - No events overnight. No noted seizure activity    Objective:   Vital Signs Last 24 Hrs  T(C): 36.2 (26 Sep 2019 08:00), Max: 36.8 (25 Sep 2019 13:00)  T(F): 97.2 (26 Sep 2019 08:00), Max: 98.3 (25 Sep 2019 13:00)  HR: 76 (26 Sep 2019 10:00) (67 - 107)  BP: 102/57 (26 Sep 2019 10:00) (100/57 - 140/75)  BP(mean): 74 (26 Sep 2019 10:00) (74 - 102)  RR: 17 (26 Sep 2019 10:00) (15 - 25)  SpO2: 98% (26 Sep 2019 10:00) (96% - 100%)    General Exam:   General appearance: No acute distress, intubated.   Skin: warm, dry  Cardiac: RRR  Resp: not overbreathing vent  : Hernandez cath in place                  Neurological Exam:  Mental Status: Unresponsive, sedated, not following any commands.  Cranial Nerves:  PERRL, 2 mm bilat, no oculocephalic, no corneal bilat, no gaze deviation. No facial asymmetry.    Motor: Slightly increased tone. No spontaneous movements.           Coord: Unable to examine  Tremor: No resting, postural or action tremor.  No myoclonus.  Sensation: no grimace or withdrawal to painful stimuli  Deep Tendon Reflexes: 1+ bilateral biceps, triceps, brachioradialis, knee and ankle. No Babinski present.  Gait: pt is bedbound      Other:    09-26    137  |  100  |  14  ----------------------------<  110<H>  3.6   |  25  |  0.88    Ca    8.5      26 Sep 2019 00:28  Phos  3.6     09-26  Mg     2.1     09-26    TPro  6.8  /  Alb  2.8<L>  /  TBili  0.6  /  DBili  x   /  AST  34  /  ALT  28  /  AlkPhos  93  09-26 09-26    137  |  100  |  14  ----------------------------<  110<H>  3.6   |  25  |  0.88    Ca    8.5      26 Sep 2019 00:28  Phos  3.6     09-26  Mg     2.1     09-26    TPro  6.8  /  Alb  2.8<L>  /  TBili  0.6  /  DBili  x   /  AST  34  /  ALT  28  /  AlkPhos  93  09-26    LIVER FUNCTIONS - ( 26 Sep 2019 00:28 )  Alb: 2.8 g/dL / Pro: 6.8 g/dL / ALK PHOS: 93 U/L / ALT: 28 U/L / AST: 34 U/L / GGT: x                                 12.4   10.0  )-----------( 178      ( 26 Sep 2019 00:28 )             39.2     Radiology  MRI from [facility] system and shows right parieto-occipital cortical ribboning with T2 hyperintensity also involving the posterior right thalamus. 	      MEDICATIONS  (STANDING):  acyclovir IVPB 850 milliGRAM(s) IV Intermittent every 8 hours  chlorhexidine 0.12% Liquid 15 milliLiter(s) Oral Mucosa every 12 hours  chlorhexidine 4% Liquid 1 Application(s) Topical <User Schedule>  lacosamide IVPB 150 milliGRAM(s) IV Intermittent every 12 hours  lactated ringers. 1000 milliLiter(s) (75 mL/Hr) IV Continuous <Continuous>  levETIRAcetam  IVPB 2000 milliGRAM(s) IV Intermittent every 12 hours  midazolam Infusion 0.02 mG/kG/Hr (1.726 mL/Hr) IV Continuous <Continuous>  propofol Infusion 30 MICROgram(s)/kG/Min (15.534 mL/Hr) IV Continuous <Continuous>    MEDICATIONS  (PRN):    9/26/19 EEG SUMMARY/CLASSIFICATION    Abnormal EEG in an unresponsive patient.  - Abundant right posterior quadrant (max P8/O2) periodic sharp wave discharges, 1-2 hz.   - Severe generalized slowing with a discontinuous pattern (burst suppression, generalized)  - Asymmetry, Increased Amplitude in right hemisphere  _____________________________________________________________  EEG IMPRESSION/CLINICAL CORRELATE  Abnormal EEG study.  1. Potential epileptogenic focus in the right posterior quadrant.  2. Severe nonspecific diffuse or multifocal cerebral dysfunction.   3. No seizures were recorded.     Cryptococcal Antigen - CSF: Negative (09.25.19 @ 18:45)  Culture - Fungal, CSF (09.25.19 @ 18:56)    Specimen Source: .CSF    Culture Results:   Testing in progress    Culture - CSF with Gram Stain . (09.25.19 @ 18:56)    Gram Stain:   No polymorphonuclear cells seen  No organisms seen  by cytocentrifuge    Specimen Source: .CSF    Lactate Dehydrogenase, CSF: 29:  Protein, CSF: 75 mg/dL (09.25.19 @ 15:47)   Glucose, CSF: 75 mg/dL (09.25.19 @ 15:47)    Cerebrospinal Fluid Cell Count-1 (09.25.19 @ 15:46)    CSF Appearance: Clear    CSF Lymphocytes: 88 %    CSF Monocytes/Macrophages: 12 %    CSF Segmented Neutrophils: 0 %    Total Nucleated Cell Count, CSF: 79 /uL    CSF Color: No Color

## 2019-09-26 NOTE — PROGRESS NOTE ADULT - ASSESSMENT
83 yo M Hx HTN, GERD, prostate cancer presenting with status epilepticus.  Transferred from OSH to [facility] ICU for vEEG monitoring.     Neuro  -status epilepticus at OSH, 2/2 infectious process vs traumatic induction   -c/w keppra 2 g bid  -c/w locasamide 150 mg bid  -c/w propofol gtt  -c/w versed gtt  -c/w acyclovir for HSV meningitis/encephalitis  -LP to r/o meningitis today, f/u fluid studies  -repeat CTH and brain MRI  -f/u prolactin and CK levels  -f/u vEEG. No epileptiform focus seen today.  -appreciate neuro recs    CV  -no active issues  -hold home amlodipine    Resp  -intubated for airway protection in setting of seizure  -Saturating well on current settings    GI  -OG tube in place, will resume tube feeds  -CT OSH showed possible colitis (colonic wall thickening)    Renal  -SANGITA on presentation at OSH  -resolved, continue to monitor BMP  -f/u U/A to r/o infection  -retaining urine, continue to monitor I/O    ID  -elevated WBC 13 and lactate > 10 at OSH, likely 2/2 seizure  -acyclovir for HSV encephalitis ppx  -consider LP to r/o meningitis/encephalitis, however appears to have recently been afebrile with no leukocytosis and lactate 1.1  -f/u BCx and sputum Cx  -UA negative    Endo  -no active issues  -TSH and T4 WNL at OSH  -f/u A1c    Heme  -elevated WBC likely 2/2 seizure  -continue to monitor CBC    PPX  -HSQ    Med Rec  -needs OP med rec 85 yo M Hx HTN, GERD, prostate cancer presenting with status epilepticus.  Transferred from OSH to [facility] ICU for vEEG monitoring in setting of seizure activity.     Neuro  Differential diagnosis includes status epilepticus, HSV encephalitis, meningitis.  -c/w keppra 2 g bid  -c/w locasamide 150 mg bid  -c/w propofol gtt, start weaning after versed is weaned  -c/w versed gtt, start weaning before propofol  -c/w acyclovir for HSV meningitis/encephalitis  -MRI brain 9/25 shows abnormal areas of cortical restricted diffusion (high signal on diffusion-weighted imaging and corresponding dropout on ADC map) are notable most pronounced within the right parietal and occipital cortices.  Abnormal cortical restricted diffusion is also noted within bilateral  cingulate gyri, right worse than left, right pulvinar of the thalamus, right medial temporal lobe, and hippocampus. There is associated T2/FLAIR prolongation in these areas along with mild gyral swelling.   -f/u CSF viral panel, cryptococcus, 14-3-3, tau, neuron specific enolase, HTLV-1, JEANETTE virus, HSV 6, paraneoplastic panel  -vEEG showing epileptiform focus. No callie seizure activity.  -appreciate neuro recs    CV  -no active issues  -hold home amlodipine    Resp  -intubated for airway protection in setting of seizure  -Saturating well on current settings    GI  -OG tube in place, will resume tube feeds  -CT OSH showed possible colitis (colonic wall thickening)    Renal  -SANGITA on presentation at OSH  -resolved, continue to monitor BMP  -UA negative  -continue to monitor I/O    ID  -elevated WBC 13 and lactate > 10 at OSH, likely 2/2 seizure  -acyclovir for HSV encephalitis ppx  -consider LP to r/o meningitis/encephalitis, however appears to have recently been afebrile with no leukocytosis   -BCx and sputum neg  -UA negative    Endo  -no active issues  -TSH and T4 WNL at OSH  -f/u A1c    Heme  -elevated WBC likely 2/2 seizure  -continue to monitor CBC    PPX  -HSQ 85 yo M Hx HTN, GERD, prostate cancer presenting with status epilepticus.  Transferred from OSH to [facility] ICU for vEEG monitoring in setting of seizure activity.     Neuro  Differential diagnosis includes status epilepticus, HSV encephalitis, meningitis.  -c/w keppra 2 g bid  -c/w locasamide 150 mg bid  -c/w propofol gtt, start weaning after versed is weaned  -c/w versed gtt, start weaning before propofol  -c/w acyclovir for HSV meningitis/encephalitis  -MRI brain 9/25 shows abnormal areas of cortical restricted diffusion (high signal on diffusion-weighted imaging and corresponding dropout on ADC map) are notable most pronounced within the right parietal and occipital cortices.  Abnormal cortical restricted diffusion is also noted within bilateral  cingulate gyri, right worse than left, right pulvinar of the thalamus, right medial temporal lobe, and hippocampus. There is associated T2/FLAIR prolongation in these areas along with mild gyral swelling.   -f/u CSF viral panel, cryptococcus, 14-3-3, tau, neuron specific enolase, HTLV-1, JEANETTE virus, HSV 6, paraneoplastic panel  -vEEG showing epileptiform focus. No callie seizure activity.  -appreciate neuro recs    CV  -no active issues  -hold home amlodipine    Resp  -intubated for airway protection in setting of seizure  -Saturating well on current settings    GI  -OG tube in place, will resume tube feeds  -CT OSH showed possible colitis (colonic wall thickening)    Renal  -SANGITA on presentation at OSH  -resolved, continue to monitor BMP  -UA negative  -continue to monitor I/O    ID  -elevated WBC 13 and lactate > 10 at OSH, likely 2/2 seizure  -acyclovir for HSV encephalitis ppx  -s/p LP 9/25, f/u CSF studies as above  -BCx and sputum neg  -UA negative    Endo  -no active issues  -TSH and T4 WNL at OSH  -f/u A1c    Heme  -elevated WBC likely 2/2 seizure  -continue to monitor CBC    PPX  -HSQ

## 2019-09-26 NOTE — PROGRESS NOTE ADULT - ATTENDING COMMENTS
New onset seizure in the setting of 'falling out of bed' per report. Transferred for Video EEG due to concern for continued sz. No EEG available on admission however empiric increased AED dose (Keppra and propofol increased). Current EEG without sz activity but epileptigenic focus rgt frontal c/w MRI abnormality seen. Stable overnight     - continue Keppra 200mg BID, lacosamide 150mg BID, -- dcd Versed and will taper Propofol to off while monitor VEEG today  - cont acyclovir  - awaiting LP panel results

## 2019-09-26 NOTE — PROGRESS NOTE ADULT - SUBJECTIVE AND OBJECTIVE BOX
CHIEF COMPLAINT: Patient is a 84y old  Male who presents with a chief complaint of status epilepticus (25 Sep 2019 18:20)    Interval Events:    No acute events overnight. Yesterday pt had potential seizure focus on EEG, no witnessed seizure activity. Ventilating well.     REVIEW OF SYSTEMS:  Constitutional:   Eyes:  ENT:  CV:  Resp:  GI:  :  MSK:  Integumentary:  Neurological:  Psychiatric:  Endocrine:  Hematologic/Lymphatic:  Allergic/Immunologic:  [ ] All other systems negative  [x ] Unable to assess ROS because intubated, sedated    OBJECTIVE:  ICU Vital Signs Last 24 Hrs  T(C): 36 (26 Sep 2019 04:00), Max: 36.8 (25 Sep 2019 13:00)  T(F): 96.8 (26 Sep 2019 04:00), Max: 98.3 (25 Sep 2019 13:00)  HR: 68 (26 Sep 2019 07:00) (67 - 107)  BP: 116/57 (26 Sep 2019 07:00) (96/51 - 140/75)  BP(mean): 81 (26 Sep 2019 07:00) (71 - 102)  ABP: --  ABP(mean): --  RR: 16 (26 Sep 2019 07:00) (15 - 25)  SpO2: 99% (26 Sep 2019 07:00) (96% - 100%)    Mode: AC/ CMV (Assist Control/ Continuous Mandatory Ventilation), RR (machine): 14, TV (machine): 400, FiO2: 30, PEEP: 5, ITime: 1, MAP: 11, PIP: 35    - @ 07:01  -   @ 07:00  --------------------------------------------------------  IN: 952.8 mL / OUT: 2510 mL / NET: -1557.2 mL      CAPILLARY BLOOD GLUCOSE      POCT Blood Glucose.: 108 mg/dL (26 Sep 2019 05:40)      PHYSICAL EXAM:  General:   HEENT:   Lymph Nodes:  Neck:   Respiratory:   Cardiovascular:   Abdomen:   Extremities:   Skin:   Neurological:  Psychiatry:    HOSPITAL MEDICATIONS:  MEDICATIONS  (STANDING):  acyclovir IVPB 850 milliGRAM(s) IV Intermittent every 8 hours  chlorhexidine 0.12% Liquid 15 milliLiter(s) Oral Mucosa every 12 hours  chlorhexidine 4% Liquid 1 Application(s) Topical <User Schedule>  lacosamide IVPB 150 milliGRAM(s) IV Intermittent every 12 hours  levETIRAcetam  IVPB 2000 milliGRAM(s) IV Intermittent every 12 hours  midazolam Infusion 0.02 mG/kG/Hr (1.726 mL/Hr) IV Continuous <Continuous>  propofol Infusion 30 MICROgram(s)/kG/Min (15.534 mL/Hr) IV Continuous <Continuous>    MEDICATIONS  (PRN):      LABS:  ( @ 00:28)                        12.4  10.0 )-----------( 178                 39.2    Neutrophils = 7.0 (70.1%)  Lymphocytes = 1.7 (17.2%)  Eosinophils = 0.3 (3.4%)  Basophils = 0.1 (0.5%)  Monocytes = 0.9 (8.8%)  Bands = --%    WBC Trend: 10.0<--, 12.5<--, 10.4<--  Hb Trend: 12.4<--, 12.4<--, 13.4<--  Plt Trend: 178<--, 241<--, 241<--      137  |  100  |  14  ----------------------------<  110<H>  3.6   |  25  |  0.88    Ca    8.5      26 Sep 2019 00:28  Phos  3.6       Mg     2.1         TPro  6.8  /  Alb  2.8<L>  /  TBili  0.6  /  DBili  x   /  AST  34  /  ALT  28  /  AlkPhos  93      Creatinine Trend: 0.88<--, 0.90<--, 0.95<--  PT/INR - ( 25 Sep 2019 12:06 )   PT: 12.9 sec;   INR: 1.13 ratio         PTT - ( 25 Sep 2019 12:06 )  PTT:29.1 sec  Urinalysis Basic - ( 25 Sep 2019 01:44 )    Color: Light Yellow / Appearance: Clear / S.015 / pH: x  Gluc: x / Ketone: Negative  / Bili: Negative / Urobili: Negative   Blood: x / Protein: Negative / Nitrite: Negative   Leuk Esterase: Negative / RBC: x / WBC x   Sq Epi: x / Non Sq Epi: x / Bacteria: x      Arterial Blood Gas:   @ 05:56  7.42/41/163/26/97/2.1  ABG lactate: --  Arterial Blood Gas:   @ 00:17  7.52/29/162/24/97/2.2  ABG lactate: --      CARDIAC MARKERS ( 25 Sep 2019 05:57 )  Trop x     /  U/L<H> / CKMB x             MICROBIOLOGY:   Blood Cx:  Urine Cx:  Sputum Cx:  Legionella:  RVP:    RADIOLOGY:  X Ray:  CT:  MRI:  Ultrasound:  [ ] Reviewed and interpreted by me    EKG: CHIEF COMPLAINT: Patient is a 84y old  Male who presents with a chief complaint of status epilepticus (25 Sep 2019 18:20)    Interval Events:    No acute events overnight. Yesterday pt had potential epileptiform focus on EEG without seizure activity, no witnessed seizure activity. Ventilating well.     REVIEW OF SYSTEMS:  Constitutional:   Eyes:  ENT:  CV:  Resp:  GI:  :  MSK:  Integumentary:  Neurological:  Psychiatric:  Endocrine:  Hematologic/Lymphatic:  Allergic/Immunologic:  [ ] All other systems negative  [x ] Unable to assess ROS because intubated, sedated    OBJECTIVE:  ICU Vital Signs Last 24 Hrs  T(C): 36 (26 Sep 2019 04:00), Max: 36.8 (25 Sep 2019 13:00)  T(F): 96.8 (26 Sep 2019 04:00), Max: 98.3 (25 Sep 2019 13:00)  HR: 68 (26 Sep 2019 07:00) (67 - 107)  BP: 116/57 (26 Sep 2019 07:00) (96/51 - 140/75)  BP(mean): 81 (26 Sep 2019 07:00) (71 - 102)  ABP: --  ABP(mean): --  RR: 16 (26 Sep 2019 07:00) (15 - 25)  SpO2: 99% (26 Sep 2019 07:00) (96% - 100%)    Mode: AC/ CMV (Assist Control/ Continuous Mandatory Ventilation), RR (machine): 14, TV (machine): 400, FiO2: 30, PEEP: 5, ITime: 1, MAP: 11, PIP: 35     @ 07:01  -   @ 07:00  --------------------------------------------------------  IN: 952.8 mL / OUT: 2510 mL / NET: -1557.2 mL      CAPILLARY BLOOD GLUCOSE      POCT Blood Glucose.: 108 mg/dL (26 Sep 2019 05:40)      PHYSICAL EXAM:  	Constitutional: intubated and sedated  	Eyes: pupils equal and reactive to light b/l  	ENMT: normocephalic; MMM; healing wound on forehead  	Respiratory: lungs clear to auscultation b/l  	Cardiovascular: RRR; no murmurs rubs or gallops  	Gastrointestinal: abdomen soft, nontender, nondistended; bowel sounds all 4 quadrants  	Extremities: no peripheral edema  	Vascular: 2+ peripheral pulses LE b/l  	Neurological: AOx0  Skin: no rashes or lesion    HOSPITAL MEDICATIONS:  MEDICATIONS  (STANDING):  acyclovir IVPB 850 milliGRAM(s) IV Intermittent every 8 hours  chlorhexidine 0.12% Liquid 15 milliLiter(s) Oral Mucosa every 12 hours  chlorhexidine 4% Liquid 1 Application(s) Topical <User Schedule>  lacosamide IVPB 150 milliGRAM(s) IV Intermittent every 12 hours  levETIRAcetam  IVPB 2000 milliGRAM(s) IV Intermittent every 12 hours  midazolam Infusion 0.02 mG/kG/Hr (1.726 mL/Hr) IV Continuous <Continuous>  propofol Infusion 30 MICROgram(s)/kG/Min (15.534 mL/Hr) IV Continuous <Continuous>    MEDICATIONS  (PRN):      LABS:  ( @ 00:28)                        12.4  10.0 )-----------( 178                 39.2    Neutrophils = 7.0 (70.1%)  Lymphocytes = 1.7 (17.2%)  Eosinophils = 0.3 (3.4%)  Basophils = 0.1 (0.5%)  Monocytes = 0.9 (8.8%)  Bands = --%    WBC Trend: 10.0<--, 12.5<--, 10.4<--  Hb Trend: 12.4<--, 12.4<--, 13.4<--  Plt Trend: 178<--, 241<--, 241<--      137  |  100  |  14  ----------------------------<  110<H>  3.6   |  25  |  0.88    Ca    8.5      26 Sep 2019 00:28  Phos  3.6       Mg     2.1         TPro  6.8  /  Alb  2.8<L>  /  TBili  0.6  /  DBili  x   /  AST  34  /  ALT  28  /  AlkPhos  93      Creatinine Trend: 0.88<--, 0.90<--, 0.95<--  PT/INR - ( 25 Sep 2019 12:06 )   PT: 12.9 sec;   INR: 1.13 ratio         PTT - ( 25 Sep 2019 12:06 )  PTT:29.1 sec  Urinalysis Basic - ( 25 Sep 2019 01:44 )    Color: Light Yellow / Appearance: Clear / S.015 / pH: x  Gluc: x / Ketone: Negative  / Bili: Negative / Urobili: Negative   Blood: x / Protein: Negative / Nitrite: Negative   Leuk Esterase: Negative / RBC: x / WBC x   Sq Epi: x / Non Sq Epi: x / Bacteria: x      Arterial Blood Gas:   @ 05:56  7.42/41/163/26/97/2.1  ABG lactate: --  Arterial Blood Gas:   @ 00:17  7.52/29/162/24/97/2.2  ABG lactate: --      CARDIAC MARKERS ( 25 Sep 2019 05:57 )  Trop x     /  U/L<H> / CKMB x

## 2019-09-26 NOTE — EEG REPORT - NS EEG TEXT BOX
[facility]   COMPREHENSIVE EPILEPSY CENTER   REPORT OF CONTINUOUS VIDEO EEG     [facility]: [address], [place] [address], Ph#: [phone]  [facility]: [id number] [place] [address], Ph#: [phone]  [facility]: [address] [place] [address]    Patient Name: SHIKHA MASON  Age and : 84y ([birth year])  MRN #: [id number]  Location: [address]  Referring Physician: Thang Bowles    Study Date: 19 18:37  End Date:   19 08:00    _____________________________________________________________  TECHNICAL INFORMATION    Placement and Labeling of Electrodes:  The EEG was performed utilizing 20 channels referential EEG connections (coronal over temporal over parasagittal montage) using all standard 10-20 electrode placements with EKG.  Recording was at a sampling rate of 256 samples per second per channel.  Time synchronized digital video recording was done simultaneously with EEG recording.  A low light infrared camera was used for low light recording.  Breezy and seizure detection algorithms were utilized.    _____________________________________________________________  HISTORY    Patient is a 84y old  Male who presents with a chief complaint of status epilepticus (25 Sep 2019 03:42)      PERTINENT MEDICATION:  lacosamide IVPB 150 milliGRAM(s) IV Intermittent every 12 hours  levETIRAcetam  IVPB 2000 milliGRAM(s) IV Intermittent every 12 hours  midazolam Infusion 0.02 mG/kG/Hr (1.726 mL/Hr) IV Continuous <Continuous>  propofol Infusion 30 MICROgram(s)/kG/Min (15.534 mL/Hr) IV Continuous <Continuous>    _____________________________________________________________  STUDY INTERPRETATION    Findings: The background was discontinuous, spontaneously variable and non-reactive. Background predominantly consisted of theta, delta and faster activities for 2-7 seconds alternating between 1-2 seconds of suppression.     Generalized Slowing:  Burst-Suppression, Generalized, with amplitude asymmetry (higher amplitude over right hemisphere)    Focal Slowing:   None were present.    Sleep Background:  Stage II sleep transients were not recorded.    Other Non-Epileptiform Findings:  Asymmetry, Increased Amplitude in right hemisphere    Interictal Epileptiform Activity:   Abundant right posterior quadrant (max P8/O2) periodic sharp wave discharges, 1-2 hz.     Events:  Clinical events: None recorded.  Seizures: None recorded.    Activation Procedures:   Hyperventilation was not performed.    Photic stimulation was not performed.     Artifacts:  Intermittent myogenic and movement artifacts were noted.    ECG:  The heart rate on single channel ECG was predominantly between 60-70 BPM.    _____________________________________________________________  EEG SUMMARY/CLASSIFICATION    Abnormal EEG in an unresponsive patient.  - Abundant right posterior quadrant (max P8/O2) periodic sharp wave discharges, 1-2 hz.   - Severe generalized slowing with a discontinuous pattern (burst suppression, generalized)  - Asymmetry, Increased Amplitude in right hemisphere  _____________________________________________________________  EEG IMPRESSION/CLINICAL CORRELATE  Abnormal EEG study.  1. Potential epileptogenic focus in the right posterior quadrant.  2. Severe nonspecific diffuse or multifocal cerebral dysfunction.   3. No seizures were recorded.  _____________________________________________________________  Marjorie Alfaro DO  Epilepsy Fellow, [facility]    Rogers Quintero MD  Attending Physician, [facility] [place]

## 2019-09-27 LAB
ALBUMIN SERPL ELPH-MCNC: 3.1 G/DL — LOW (ref 3.3–5)
ALBUMIN SERPL ELPH-MCNC: 3.3 G/DL — SIGNIFICANT CHANGE UP (ref 3.3–5)
ALP SERPL-CCNC: 115 U/L — SIGNIFICANT CHANGE UP (ref 40–120)
ALP SERPL-CCNC: 119 U/L — SIGNIFICANT CHANGE UP (ref 40–120)
ALT FLD-CCNC: 25 U/L — SIGNIFICANT CHANGE UP (ref 10–45)
ALT FLD-CCNC: 30 U/L — SIGNIFICANT CHANGE UP (ref 10–45)
ANION GAP SERPL CALC-SCNC: 10 MMOL/L — SIGNIFICANT CHANGE UP (ref 5–17)
ANION GAP SERPL CALC-SCNC: 11 MMOL/L — SIGNIFICANT CHANGE UP (ref 5–17)
ANION GAP SERPL CALC-SCNC: 14 MMOL/L — SIGNIFICANT CHANGE UP (ref 5–17)
AST SERPL-CCNC: 34 U/L — SIGNIFICANT CHANGE UP (ref 10–40)
AST SERPL-CCNC: 37 U/L — SIGNIFICANT CHANGE UP (ref 10–40)
BASOPHILS # BLD AUTO: 0 K/UL — SIGNIFICANT CHANGE UP (ref 0–0.2)
BASOPHILS NFR BLD AUTO: 0.1 % — SIGNIFICANT CHANGE UP (ref 0–2)
BILIRUB SERPL-MCNC: 0.6 MG/DL — SIGNIFICANT CHANGE UP (ref 0.2–1.2)
BILIRUB SERPL-MCNC: 0.6 MG/DL — SIGNIFICANT CHANGE UP (ref 0.2–1.2)
BUN SERPL-MCNC: 14 MG/DL — SIGNIFICANT CHANGE UP (ref 7–23)
BUN SERPL-MCNC: 14 MG/DL — SIGNIFICANT CHANGE UP (ref 7–23)
BUN SERPL-MCNC: 16 MG/DL — SIGNIFICANT CHANGE UP (ref 7–23)
CALCIUM SERPL-MCNC: 8.7 MG/DL — SIGNIFICANT CHANGE UP (ref 8.4–10.5)
CALCIUM SERPL-MCNC: 8.9 MG/DL — SIGNIFICANT CHANGE UP (ref 8.4–10.5)
CALCIUM SERPL-MCNC: 8.9 MG/DL — SIGNIFICANT CHANGE UP (ref 8.4–10.5)
CHLORIDE SERPL-SCNC: 92 MMOL/L — LOW (ref 96–108)
CHLORIDE SERPL-SCNC: 92 MMOL/L — LOW (ref 96–108)
CHLORIDE SERPL-SCNC: 93 MMOL/L — LOW (ref 96–108)
CO2 SERPL-SCNC: 28 MMOL/L — SIGNIFICANT CHANGE UP (ref 22–31)
CO2 SERPL-SCNC: 31 MMOL/L — SIGNIFICANT CHANGE UP (ref 22–31)
CO2 SERPL-SCNC: 33 MMOL/L — HIGH (ref 22–31)
CREAT SERPL-MCNC: 0.83 MG/DL — SIGNIFICANT CHANGE UP (ref 0.5–1.3)
CREAT SERPL-MCNC: 0.86 MG/DL — SIGNIFICANT CHANGE UP (ref 0.5–1.3)
CREAT SERPL-MCNC: 0.87 MG/DL — SIGNIFICANT CHANGE UP (ref 0.5–1.3)
EOSINOPHIL # BLD AUTO: 0.2 K/UL — SIGNIFICANT CHANGE UP (ref 0–0.5)
EOSINOPHIL NFR BLD AUTO: 1.5 % — SIGNIFICANT CHANGE UP (ref 0–6)
GLUCOSE SERPL-MCNC: 135 MG/DL — HIGH (ref 70–99)
GLUCOSE SERPL-MCNC: 190 MG/DL — HIGH (ref 70–99)
GLUCOSE SERPL-MCNC: 212 MG/DL — HIGH (ref 70–99)
HCT VFR BLD CALC: 40.3 % — SIGNIFICANT CHANGE UP (ref 39–50)
HGB BLD-MCNC: 13.3 G/DL — SIGNIFICANT CHANGE UP (ref 13–17)
LYMPHOCYTES # BLD AUTO: 1.5 K/UL — SIGNIFICANT CHANGE UP (ref 1–3.3)
LYMPHOCYTES # BLD AUTO: 11 % — LOW (ref 13–44)
MAGNESIUM SERPL-MCNC: 1.6 MG/DL — SIGNIFICANT CHANGE UP (ref 1.6–2.6)
MAGNESIUM SERPL-MCNC: 1.8 MG/DL — SIGNIFICANT CHANGE UP (ref 1.6–2.6)
MAGNESIUM SERPL-MCNC: 2.1 MG/DL — SIGNIFICANT CHANGE UP (ref 1.6–2.6)
MCHC RBC-ENTMCNC: 30.5 PG — SIGNIFICANT CHANGE UP (ref 27–34)
MCHC RBC-ENTMCNC: 32.9 GM/DL — SIGNIFICANT CHANGE UP (ref 32–36)
MCV RBC AUTO: 92.8 FL — SIGNIFICANT CHANGE UP (ref 80–100)
MONOCYTES # BLD AUTO: 1.1 K/UL — HIGH (ref 0–0.9)
MONOCYTES NFR BLD AUTO: 7.8 % — SIGNIFICANT CHANGE UP (ref 2–14)
NEUTROPHILS # BLD AUTO: 10.8 K/UL — HIGH (ref 1.8–7.4)
NEUTROPHILS NFR BLD AUTO: 79.7 % — HIGH (ref 43–77)
PHOSPHATE SERPL-MCNC: 2 MG/DL — LOW (ref 2.5–4.5)
PHOSPHATE SERPL-MCNC: 2.9 MG/DL — SIGNIFICANT CHANGE UP (ref 2.5–4.5)
PHOSPHATE SERPL-MCNC: 3.1 MG/DL — SIGNIFICANT CHANGE UP (ref 2.5–4.5)
PLATELET # BLD AUTO: 242 K/UL — SIGNIFICANT CHANGE UP (ref 150–400)
POTASSIUM SERPL-MCNC: 2.7 MMOL/L — CRITICAL LOW (ref 3.5–5.3)
POTASSIUM SERPL-MCNC: 3 MMOL/L — LOW (ref 3.5–5.3)
POTASSIUM SERPL-MCNC: 3.2 MMOL/L — LOW (ref 3.5–5.3)
POTASSIUM SERPL-SCNC: 2.7 MMOL/L — CRITICAL LOW (ref 3.5–5.3)
POTASSIUM SERPL-SCNC: 3 MMOL/L — LOW (ref 3.5–5.3)
POTASSIUM SERPL-SCNC: 3.2 MMOL/L — LOW (ref 3.5–5.3)
PROT SERPL-MCNC: 7.1 G/DL — SIGNIFICANT CHANGE UP (ref 6–8.3)
PROT SERPL-MCNC: 7.3 G/DL — SIGNIFICANT CHANGE UP (ref 6–8.3)
RBC # BLD: 4.34 M/UL — SIGNIFICANT CHANGE UP (ref 4.2–5.8)
RBC # FLD: 12.3 % — SIGNIFICANT CHANGE UP (ref 10.3–14.5)
SODIUM SERPL-SCNC: 134 MMOL/L — LOW (ref 135–145)
SODIUM SERPL-SCNC: 135 MMOL/L — SIGNIFICANT CHANGE UP (ref 135–145)
SODIUM SERPL-SCNC: 135 MMOL/L — SIGNIFICANT CHANGE UP (ref 135–145)
WBC # BLD: 13.6 K/UL — HIGH (ref 3.8–10.5)
WBC # FLD AUTO: 13.6 K/UL — HIGH (ref 3.8–10.5)
WNV IGG CSF IA-ACNC: NEGATIVE — SIGNIFICANT CHANGE UP
WNV IGM CSF IA-ACNC: NEGATIVE — SIGNIFICANT CHANGE UP

## 2019-09-27 PROCEDURE — 99291 CRITICAL CARE FIRST HOUR: CPT

## 2019-09-27 PROCEDURE — 99233 SBSQ HOSP IP/OBS HIGH 50: CPT | Mod: GC

## 2019-09-27 PROCEDURE — 95951: CPT | Mod: 26

## 2019-09-27 RX ORDER — POTASSIUM CHLORIDE 20 MEQ
20 PACKET (EA) ORAL
Refills: 0 | Status: COMPLETED | OUTPATIENT
Start: 2019-09-27 | End: 2019-09-27

## 2019-09-27 RX ORDER — POTASSIUM CHLORIDE 20 MEQ
20 PACKET (EA) ORAL ONCE
Refills: 0 | Status: COMPLETED | OUTPATIENT
Start: 2019-09-27 | End: 2019-09-27

## 2019-09-27 RX ORDER — POTASSIUM CHLORIDE 20 MEQ
40 PACKET (EA) ORAL ONCE
Refills: 0 | Status: COMPLETED | OUTPATIENT
Start: 2019-09-27 | End: 2019-09-27

## 2019-09-27 RX ORDER — PANTOPRAZOLE SODIUM 20 MG/1
40 TABLET, DELAYED RELEASE ORAL DAILY
Refills: 0 | Status: DISCONTINUED | OUTPATIENT
Start: 2019-09-27 | End: 2019-10-23

## 2019-09-27 RX ORDER — DEXAMETHASONE 0.5 MG/5ML
10 ELIXIR ORAL DAILY
Refills: 0 | Status: DISCONTINUED | OUTPATIENT
Start: 2019-09-27 | End: 2019-10-03

## 2019-09-27 RX ORDER — SODIUM CHLORIDE 9 MG/ML
1000 INJECTION, SOLUTION INTRAVENOUS
Refills: 0 | Status: DISCONTINUED | OUTPATIENT
Start: 2019-09-27 | End: 2019-09-28

## 2019-09-27 RX ORDER — HEPARIN SODIUM 5000 [USP'U]/ML
5000 INJECTION INTRAVENOUS; SUBCUTANEOUS EVERY 8 HOURS
Refills: 0 | Status: DISCONTINUED | OUTPATIENT
Start: 2019-09-27 | End: 2019-10-23

## 2019-09-27 RX ORDER — MAGNESIUM SULFATE 500 MG/ML
2 VIAL (ML) INJECTION ONCE
Refills: 0 | Status: COMPLETED | OUTPATIENT
Start: 2019-09-27 | End: 2019-09-27

## 2019-09-27 RX ORDER — HEPARIN SODIUM 5000 [USP'U]/ML
5000 INJECTION INTRAVENOUS; SUBCUTANEOUS EVERY 8 HOURS
Refills: 0 | Status: DISCONTINUED | OUTPATIENT
Start: 2019-09-27 | End: 2019-09-27

## 2019-09-27 RX ORDER — POTASSIUM PHOSPHATE, MONOBASIC POTASSIUM PHOSPHATE, DIBASIC 236; 224 MG/ML; MG/ML
30 INJECTION, SOLUTION INTRAVENOUS ONCE
Refills: 0 | Status: COMPLETED | OUTPATIENT
Start: 2019-09-27 | End: 2019-09-27

## 2019-09-27 RX ADMIN — CHLORHEXIDINE GLUCONATE 1 APPLICATION(S): 213 SOLUTION TOPICAL at 05:53

## 2019-09-27 RX ADMIN — Medication 167 MILLIGRAM(S): at 11:13

## 2019-09-27 RX ADMIN — LEVETIRACETAM 400 MILLIGRAM(S): 250 TABLET, FILM COATED ORAL at 21:26

## 2019-09-27 RX ADMIN — Medication 40 MILLIEQUIVALENT(S): at 11:14

## 2019-09-27 RX ADMIN — HEPARIN SODIUM 5000 UNIT(S): 5000 INJECTION INTRAVENOUS; SUBCUTANEOUS at 13:45

## 2019-09-27 RX ADMIN — Medication 167 MILLIGRAM(S): at 01:02

## 2019-09-27 RX ADMIN — LACOSAMIDE 130 MILLIGRAM(S): 50 TABLET ORAL at 10:22

## 2019-09-27 RX ADMIN — LEVETIRACETAM 400 MILLIGRAM(S): 250 TABLET, FILM COATED ORAL at 09:46

## 2019-09-27 RX ADMIN — Medication 50 GRAM(S): at 09:23

## 2019-09-27 RX ADMIN — Medication 50 MILLIEQUIVALENT(S): at 16:59

## 2019-09-27 RX ADMIN — Medication 50 GRAM(S): at 01:23

## 2019-09-27 RX ADMIN — CHLORHEXIDINE GLUCONATE 15 MILLILITER(S): 213 SOLUTION TOPICAL at 18:13

## 2019-09-27 RX ADMIN — Medication 50 MILLIEQUIVALENT(S): at 21:25

## 2019-09-27 RX ADMIN — HEPARIN SODIUM 5000 UNIT(S): 5000 INJECTION INTRAVENOUS; SUBCUTANEOUS at 21:26

## 2019-09-27 RX ADMIN — Medication 50 MILLIEQUIVALENT(S): at 03:27

## 2019-09-27 RX ADMIN — LACOSAMIDE 130 MILLIGRAM(S): 50 TABLET ORAL at 21:25

## 2019-09-27 RX ADMIN — Medication 167 MILLIGRAM(S): at 18:13

## 2019-09-27 RX ADMIN — CHLORHEXIDINE GLUCONATE 15 MILLILITER(S): 213 SOLUTION TOPICAL at 05:53

## 2019-09-27 RX ADMIN — SODIUM CHLORIDE 75 MILLILITER(S): 9 INJECTION, SOLUTION INTRAVENOUS at 01:02

## 2019-09-27 RX ADMIN — Medication 50 MILLIEQUIVALENT(S): at 19:11

## 2019-09-27 RX ADMIN — POTASSIUM PHOSPHATE, MONOBASIC POTASSIUM PHOSPHATE, DIBASIC 83.33 MILLIMOLE(S): 236; 224 INJECTION, SOLUTION INTRAVENOUS at 16:50

## 2019-09-27 RX ADMIN — Medication 40 MILLIEQUIVALENT(S): at 01:31

## 2019-09-27 RX ADMIN — Medication 50 MILLIEQUIVALENT(S): at 01:31

## 2019-09-27 RX ADMIN — Medication 50 MILLIEQUIVALENT(S): at 11:14

## 2019-09-27 RX ADMIN — SODIUM CHLORIDE 75 MILLILITER(S): 9 INJECTION, SOLUTION INTRAVENOUS at 13:45

## 2019-09-27 RX ADMIN — PANTOPRAZOLE SODIUM 40 MILLIGRAM(S): 20 TABLET, DELAYED RELEASE ORAL at 11:14

## 2019-09-27 NOTE — PROGRESS NOTE ADULT - ATTENDING COMMENTS
Pt seen and examined at bedside. There was some concern for unequal pupil size. Sedation is completely off since last night. On exam he has some spontaneous movements, his pupils are 3mm R, 4mm L but reactive, EOMI with roving eye movements, pt yawning, gag and cough intact, corneal intact, grimaces to pain in all extremities.     EEG 9/27/19  Abnormal EEG in an unresponsive patient.  - Lateralized Periodic Discharges (LPDs), right posterior quadrant (max P8/O2), 0.5-1 hz.   - Burst-Suppression, Generalized, with amplitude asymmetry (higher amplitude over right hemisphere) in the first half of the recording.   - Continuous diffuse delta and theta activity (second half of recording)  - Asymmetry, Increased Amplitude in right hemisphere  _____________________________________________________________  EEG IMPRESSION/CLINICAL CORRELATE  Abnormal EEG study.  1. Potential epileptogenic focus in the right posterior quadrant.  2. Moderate to severe nonspecific diffuse or multifocal cerebral dysfunction.  The EEG was more continuous during the second half of the recording (burst suppression pattern abated).  3. No seizures were recorded.    < from: MR Head w/wo IV Cont (09.25.19 @ 17:09) >    FINDINGS: Abnormal areas of cortical restricted diffusion (high signal on   diffusion-weighted imaging and corresponding dropout on ADC map) are   notable most pronounced within the right parietal and occipital cortices.   Abnormal cortical restricted diffusion is also noted within bilateral   cingulate gyri, right worse than left, right pulvinar of the thalamus,   right medial temporal lobe, and hippocampus. There is associated T2/FLAIR   prolongation in these areas along with mild gyral swelling. There is   associated sulcal effacement. There is no abnormal associated   enhancement. These findings were present on the outside MRI examination   from 9/23/2019.     Multiple additional patchy confluent nonspecific T2/FLAIR hyperintense   signal changes are noted throughout the bihemispheric white matter   without associated mass effect or restricted diffusion.    CSF studies still pending.    Impression:  Pt has an improved exam today completely off sedation and his EEG, while still showing an epileptic focus shows more continuous activity and out of burst suppression which at least reassuring. His MRI shows significant areas of cortical ribboning and T2 hyperintensities in the bilateral temporal lobes, thalamus and hemispheres, which is the same as what was seen on his outside MRI. Given his CSF findings the highest differential remains infectious or inflammatory encephalitis, less likely hypoxic encephalopathy, or CJD given the timing of events.   Will have to clarify history prior to hospitalization with family.   In the meantime would continue with acyclovir, until CSF PCR results.   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

## 2019-09-27 NOTE — PROGRESS NOTE ADULT - ATTENDING COMMENTS
New onset seizure in the setting of 'falling out of bed' per report. Transferred for Video EEG due to concern for continued sz. No EEG available on admission however empiric increased AED dose (Keppra and propofol increased). Current EEG without sz activity but epileptigenic focus rgt frontal c/w MRI abnormality seen. Stable overnight     - discuss further tapering of AEDs, -- dcd Versed and will taper Propofol yesterday with no seizure activity noted on EEG  - cont acyclovir and steroids started per neuro  - awaiting LP panel results  - unable to be extubated due to RASS -3 possibly due to combination of post-ictal and AEDs and the findings on MRI

## 2019-09-27 NOTE — EEG REPORT - NS EEG TEXT BOX
[facility]   COMPREHENSIVE EPILEPSY CENTER   REPORT OF CONTINUOUS VIDEO EEG     [facility]: [address], [place] [address], Ph#: [phone]  [facility]: [id number], [place] [address], Ph#: [phone]  [facility]: [address] [place] [address]    Patient Name: SHIKHA MASON  Age and : 84y ([birth year])  MRN #: [id number]  Location: [address]  Referring Physician: Thang Bowles    Study Date: 19 08:00  End Date:   19 08:00    _____________________________________________________________  HISTORY    Patient is a 84y old  Male who presents with a chief complaint of status epilepticus (25 Sep 2019 03:42)      PERTINENT MEDICATION:  lacosamide IVPB 150 milliGRAM(s) IV Intermittent every 12 hours  levETIRAcetam  IVPB 2000 milliGRAM(s) IV Intermittent every 12 hours    _____________________________________________________________  STUDY INTERPRETATION    Findings: The background was discontinuous in the first half of the recording, spontaneously variable and non-reactive. Background predominantly consisted of theta, delta and faster activities for 2-7 seconds alternating between 1-2 seconds of suppression during first half of the recording but became more continuous in the second half of the recording.     Generalized Slowing:  Burst-Suppression, Generalized, with amplitude asymmetry (higher amplitude over right hemisphere) in the first half of the recording.     Sleep Background:  Stage II sleep transients were not recorded.    Other Non-Epileptiform Findings:  Asymmetry, Increased Amplitude in right hemisphere    Interictal Epileptiform Activity:   Abundant right posterior quadrant (max P8/O2) periodic sharp wave discharges, 0.25 to 0.5 hz.     Events:  Clinical events: None recorded.  Seizures: None recorded.    Activation Procedures:   Hyperventilation was not performed.    Photic stimulation was not performed.     Artifacts:  Intermittent myogenic and movement artifacts were noted.    ECG:  The heart rate on single channel ECG was predominantly between 60-70 BPM.    _____________________________________________________________  EEG SUMMARY/CLASSIFICATION    Abnormal EEG in an unresponsive patient.  - Abundant right posterior quadrant (max P8/O2) periodic sharp wave discharges, 0.25 to 0.5 hz.   - moderate to severe generalized slowing with a discontinuous pattern (burst suppression, generalized)  - Asymmetry, Increased Amplitude in right hemisphere  _____________________________________________________________  EEG IMPRESSION/CLINICAL CORRELATE  Abnormal EEG study.  ***PRELIM REPORT****  1. Potential epileptogenic focus in the right posterior quadrant.  2. Moderate to severe nonspecific diffuse or multifocal cerebral dysfunction.   3. No seizures were recorded.    Slight improvement with periodic discharges now less frequent than previous 24hr recording.   _____________________________________________________________  Marjorie Alfaro DO  Epilepsy Fellow, [facility]    Rogesr Quintero MD  Attending Physician, [facility]   COMPREHENSIVE EPILEPSY CENTER   REPORT OF CONTINUOUS VIDEO EEG     [facility]: [address], [place] [address], Ph#: [phone]  [facility]: [phone], [place] [address], Ph#: [phone]  [facility]: [address] [place] [address]    Patient Name: SHIKHA MASON  Age and : 84y ([birth year])  MRN #: [id number]  Location: [address]  Referring Physician: Thang Bowles    Study Date: 19 08:00  End Date:   19 08:00    _____________________________________________________________  HISTORY    Patient is a 84y old  Male who presents with a chief complaint of status epilepticus (25 Sep 2019 03:42)      PERTINENT MEDICATION:  lacosamide IVPB 150 milliGRAM(s) IV Intermittent every 12 hours  levETIRAcetam  IVPB 2000 milliGRAM(s) IV Intermittent every 12 hours    _____________________________________________________________  STUDY INTERPRETATION    Findings: The background was discontinuous in the first half of the recording, spontaneously variable and non-reactive. Background predominantly consisted of theta, delta and faster activities for 2-7 seconds alternating between 1-2 seconds of suppression during first half of the recording but became more continuous in the second half of the recording.     Generalized Slowing:  Burst-Suppression, Generalized, with amplitude asymmetry (higher amplitude over right hemisphere) in the first half of the recording.   Continuous diffuse delta and theta activity (second half of recording)    Sleep Background:  Stage II sleep transients were not recorded.    Other Non-Epileptiform Findings:  Asymmetry, Increased Amplitude in right hemisphere    Interictal Epileptiform Activity:   Lateralized Periodic Discharges (LPDs), right posterior quadrant (max P8/O2), 0.5-1 hz.     Events:  Clinical events: None recorded.  Seizures: None recorded.    Activation Procedures:   Hyperventilation was not performed.    Photic stimulation was not performed.     Artifacts:  Intermittent myogenic and movement artifacts were noted.    ECG:  The heart rate on single channel ECG was predominantly between 60-70 BPM.    _____________________________________________________________  EEG SUMMARY/CLASSIFICATION    Abnormal EEG in an unresponsive patient.  - Lateralized Periodic Discharges (LPDs), right posterior quadrant (max P8/O2), 0.5-1 hz.   - Burst-Suppression, Generalized, with amplitude asymmetry (higher amplitude over right hemisphere) in the first half of the recording.   - Continuous diffuse delta and theta activity (second half of recording)  - Asymmetry, Increased Amplitude in right hemisphere  _____________________________________________________________  EEG IMPRESSION/CLINICAL CORRELATE  Abnormal EEG study.  1. Potential epileptogenic focus in the right posterior quadrant.  2. Moderate to severe nonspecific diffuse or multifocal cerebral dysfunction.  The EEG was more continuous during the second half of the recording (burst suppression pattern abated).  3. No seizures were recorded.    _____________________________________________________________  Marjorie Alfaro DO  Epilepsy Fellow, [facility] [place]    Rogers Quintero MD  Attending Physician, [facility] [place]

## 2019-09-27 NOTE — PROGRESS NOTE ADULT - SUBJECTIVE AND OBJECTIVE BOX
CHIEF COMPLAINT: Patient is a 84y old  Male who presents with a chief complaint of status epilepticus (26 Sep 2019 11:08)    Interval Events:    Pt was taken off sedation. He is opening eyes and responding to tactile stimuli this AM. Still with epileptiform activity on EEG without seizure activity.    REVIEW OF SYSTEMS:  Constitutional:   Eyes:  ENT:  CV:  Resp:  GI:  :  MSK:  Integumentary:  Neurological:  Psychiatric:  Endocrine:  Hematologic/Lymphatic:  Allergic/Immunologic:  [ ] All other systems negative  [ x] Unable to assess ROS because sedation wearing off    OBJECTIVE:  ICU Vital Signs Last 24 Hrs  T(C): 37.4 (27 Sep 2019 04:00), Max: 38.1 (27 Sep 2019 00:00)  T(F): 99.3 (27 Sep 2019 04:00), Max: 100.6 (27 Sep 2019 00:00)  HR: 88 (27 Sep 2019 07:00) (69 - 120)  BP: 138/72 (27 Sep 2019 07:00) (96/53 - 153/73)  BP(mean): 98 (27 Sep 2019 07:00) (71 - 105)  ABP: --  ABP(mean): --  RR: 17 (27 Sep 2019 07:00) (17 - 28)  SpO2: 99% (27 Sep 2019 07:00) (97% - 100%)    Mode: AC/ CMV (Assist Control/ Continuous Mandatory Ventilation), RR (machine): 14, TV (machine): 400, FiO2: 30, PEEP: 5, ITime: 1, MAP: 10, PIP: 22    09-26 @ 07:01  -  09-27 @ 07:00  --------------------------------------------------------  IN: 3788.6 mL / OUT: 3445 mL / NET: 343.6 mL      CAPILLARY BLOOD GLUCOSE      POCT Blood Glucose.: 108 mg/dL (26 Sep 2019 05:40)      PHYSICAL EXAM:  	Constitutional: intubated and obtunded  	Eyes: pupils equal and reactive to light b/l  	ENMT: normocephalic; MMM; healing wound on forehead  	Respiratory: lungs clear to auscultation b/l  	Cardiovascular: RRR; no murmurs rubs or gallops  	Gastrointestinal: abdomen soft, nontender, nondistended; bowel sounds all 4 quadrants  	Extremities: no peripheral edema  	Vascular: 2+ peripheral pulses LE b/l  	Neurological: AOx0  Skin: no rashes or lesion    HOSPITAL MEDICATIONS:  MEDICATIONS  (STANDING):  acyclovir IVPB 850 milliGRAM(s) IV Intermittent every 8 hours  chlorhexidine 0.12% Liquid 15 milliLiter(s) Oral Mucosa every 12 hours  chlorhexidine 4% Liquid 1 Application(s) Topical <User Schedule>  lacosamide IVPB 150 milliGRAM(s) IV Intermittent every 12 hours  lactated ringers. 1000 milliLiter(s) (75 mL/Hr) IV Continuous <Continuous>  levETIRAcetam  IVPB 2000 milliGRAM(s) IV Intermittent every 12 hours    MEDICATIONS  (PRN):      LABS:  (09-27 @ 00:29)                        13.3  13.6 )-----------( 242                 40.3    Neutrophils = 10.8 (79.7%)  Lymphocytes = 1.5 (11.0%)  Eosinophils = 0.2 (1.5%)  Basophils = 0.0 (0.1%)  Monocytes = 1.1 (7.8%)  Bands = --%    WBC Trend: 13.6<--, 10.0<--, 12.5<--  Hb Trend: 13.3<--, 12.4<--, 12.4<--, 13.4<--  Plt Trend: 242<--, 178<--, 241<--, 241<--  09-27    135  |  93<L>  |  16  ----------------------------<  190<H>  2.7<LL>   |  28  |  0.87    Ca    8.9      27 Sep 2019 00:29  Phos  2.9     09-27  Mg     1.6     09-27    TPro  7.3  /  Alb  3.1<L>  /  TBili  0.6  /  DBili  x   /  AST  34  /  ALT  25  /  AlkPhos  119  09-27    Creatinine Trend: 0.87<--, 0.88<--, 0.90<--, 0.95<--  PT/INR - ( 25 Sep 2019 12:06 )   PT: 12.9 sec;   INR: 1.13 ratio         PTT - ( 25 Sep 2019 12:06 )  PTT:29.1 sec CHIEF COMPLAINT: Patient is a 84y old  Male who presents with a chief complaint of status epilepticus (26 Sep 2019 11:08)    Interval Events:    Pt was taken off sedation. He is opening eyes and responding to tactile stimuli this AM. Still with epileptiform activity on EEG without seizure activity.    REVIEW OF SYSTEMS:  Constitutional:   Eyes:  ENT:  CV:  Resp:  GI:  :  MSK:  Integumentary:  Neurological:  Psychiatric:  Endocrine:  Hematologic/Lymphatic:  Allergic/Immunologic:  [ ] All other systems negative  [ x] Unable to assess ROS because sedation wearing off    OBJECTIVE:  ICU Vital Signs Last 24 Hrs  T(C): 37.4 (27 Sep 2019 04:00), Max: 38.1 (27 Sep 2019 00:00)  T(F): 99.3 (27 Sep 2019 04:00), Max: 100.6 (27 Sep 2019 00:00)  HR: 88 (27 Sep 2019 07:00) (69 - 120)  BP: 138/72 (27 Sep 2019 07:00) (96/53 - 153/73)  BP(mean): 98 (27 Sep 2019 07:00) (71 - 105)  ABP: --  ABP(mean): --  RR: 17 (27 Sep 2019 07:00) (17 - 28)  SpO2: 99% (27 Sep 2019 07:00) (97% - 100%)    Mode: AC/ CMV (Assist Control/ Continuous Mandatory Ventilation), RR (machine): 14, TV (machine): 400, FiO2: 30, PEEP: 5, ITime: 1, MAP: 10, PIP: 22    09-26 @ 07:01  -  09-27 @ 07:00  --------------------------------------------------------  IN: 3788.6 mL / OUT: 3445 mL / NET: 343.6 mL      CAPILLARY BLOOD GLUCOSE      POCT Blood Glucose.: 108 mg/dL (26 Sep 2019 05:40)      PHYSICAL EXAM:  	Constitutional: intubated and obtunded, eyes reacting to tactile stimulation  	Eyes: pupils equal and reactive to light b/l  	ENMT: normocephalic; MMM; healing wound on forehead  	Respiratory: lungs clear to auscultation b/l  	Cardiovascular: RRR; no murmurs rubs or gallops  	Gastrointestinal: abdomen soft, nontender, nondistended; bowel sounds all 4 quadrants  	Extremities: no peripheral edema  	Vascular: 2+ peripheral pulses LE b/l  	Neurological: AOx0  Skin: no rashes or lesion    HOSPITAL MEDICATIONS:  MEDICATIONS  (STANDING):  acyclovir IVPB 850 milliGRAM(s) IV Intermittent every 8 hours  chlorhexidine 0.12% Liquid 15 milliLiter(s) Oral Mucosa every 12 hours  chlorhexidine 4% Liquid 1 Application(s) Topical <User Schedule>  lacosamide IVPB 150 milliGRAM(s) IV Intermittent every 12 hours  lactated ringers. 1000 milliLiter(s) (75 mL/Hr) IV Continuous <Continuous>  levETIRAcetam  IVPB 2000 milliGRAM(s) IV Intermittent every 12 hours    MEDICATIONS  (PRN):      LABS:  (09-27 @ 00:29)                        13.3  13.6 )-----------( 242                 40.3    Neutrophils = 10.8 (79.7%)  Lymphocytes = 1.5 (11.0%)  Eosinophils = 0.2 (1.5%)  Basophils = 0.0 (0.1%)  Monocytes = 1.1 (7.8%)  Bands = --%    WBC Trend: 13.6<--, 10.0<--, 12.5<--  Hb Trend: 13.3<--, 12.4<--, 12.4<--, 13.4<--  Plt Trend: 242<--, 178<--, 241<--, 241<--  09-27    135  |  93<L>  |  16  ----------------------------<  190<H>  2.7<LL>   |  28  |  0.87    Ca    8.9      27 Sep 2019 00:29  Phos  2.9     09-27  Mg     1.6     09-27    TPro  7.3  /  Alb  3.1<L>  /  TBili  0.6  /  DBili  x   /  AST  34  /  ALT  25  /  AlkPhos  119  09-27    Creatinine Trend: 0.87<--, 0.88<--, 0.90<--, 0.95<--  PT/INR - ( 25 Sep 2019 12:06 )   PT: 12.9 sec;   INR: 1.13 ratio         PTT - ( 25 Sep 2019 12:06 )  PTT:29.1 sec

## 2019-09-27 NOTE — PROGRESS NOTE ADULT - ASSESSMENT
83 yo M Hx HTN, GERD, prostate cancer presenting with status epilepticus.  Transferred from OSH to [facility] ICU for vEEG monitoring in setting of seizure activity.     Neuro  Differential diagnosis includes status epilepticus, HSV encephalitis, meningitis.  -c/w keppra 2 g bid  -c/w locasamide 150 mg bid  -c/w propofol gtt, start weaning after versed is weaned  -c/w versed gtt, start weaning before propofol  -c/w acyclovir for HSV meningitis/encephalitis  -MRI brain 9/25 shows abnormal areas of cortical restricted diffusion (high signal on diffusion-weighted imaging and corresponding dropout on ADC map) are notable most pronounced within the right parietal and occipital cortices.  Abnormal cortical restricted diffusion is also noted within bilateral  cingulate gyri, right worse than left, right pulvinar of the thalamus, right medial temporal lobe, and hippocampus. There is associated T2/FLAIR prolongation in these areas along with mild gyral swelling.   -f/u CSF viral panel, cryptococcus, 14-3-3, tau, neuron specific enolase, HTLV-1, JEANETTE virus, HSV 6, paraneoplastic panel  -vEEG showing epileptiform focus. No callie seizure activity.  -appreciate neuro recs    CV  -no active issues  -hold home amlodipine    Resp  -intubated for airway protection in setting of seizure  -Saturating well on current settings    GI  -OG tube in place, will resume tube feeds  -CT OSH showed possible colitis (colonic wall thickening)    Renal  -SANGITA on presentation at OSH  -resolved, continue to monitor BMP  -UA negative  -continue to monitor I/O    ID  -elevated WBC 13 and lactate > 10 at OSH, likely 2/2 seizure  -acyclovir for HSV encephalitis ppx  -s/p LP 9/25, f/u CSF studies as above  -BCx and sputum neg  -UA negative    Endo  -no active issues  -TSH and T4 WNL at OSH  -f/u A1c    Heme  -elevated WBC likely 2/2 seizure  -continue to monitor CBC    PPX  -HSQ 83 yo M Hx HTN, GERD, prostate cancer presenting with status epilepticus.  Transferred from OSH to [facility] ICU for vEEG monitoring in setting of seizure activity.     Neuro  Differential diagnosis includes status epilepticus, HSV encephalitis, meningitis.  -c/w keppra 2 g bid  -c/w locasamide 150 mg bid  -all sedation off  -c/w acyclovir for HSV meningitis/encephalitis  -10mg decadron qd for HSV meningitis coverage  -MRI brain 9/25 shows abnormal areas of cortical restricted diffusion (high signal on diffusion-weighted imaging and corresponding dropout on ADC map) are notable most pronounced within the right parietal and occipital cortices.  Abnormal cortical restricted diffusion is also noted within bilateral  cingulate gyri, right worse than left, right pulvinar of the thalamus, right medial temporal lobe, and hippocampus. There is associated T2/FLAIR prolongation in these areas along with mild gyral swelling.   -f/u CSF viral panel, cryptococcus, 14-3-3, tau, neuron specific enolase, HTLV-1, JEANETTE virus, HSV 6, paraneoplastic panel  -vEEG showing epileptiform focus. No callie seizure activity.  -appreciate neuro recs    CV  -no active issues  -hold home amlodipine    Resp  -intubated for airway protection in setting of seizure. Weaned sedation and will try to extubate per clinical status.  -Saturating well on current settings    GI  -OG tube in place, will resume tube feeds  -CT OSH showed possible colitis (colonic wall thickening)    Renal  SANGITA on presentation at OSH  -resolved, continue to monitor BMP  -UA negative  -continue to monitor I/O    ID  r/o meningitis  -acyclovir for HSV encephalitis ppx  -s/p LP 9/25, f/u CSF studies as above  -BCx and sputum neg  -UA negative    Endo  -no active issues  -TSH and T4 WNL at OSH  -f/u A1c    Heme  -elevated WBC likely 2/2 seizure  -continue to monitor CBC    PPX  -HSQ

## 2019-09-27 NOTE — PROGRESS NOTE ADULT - SUBJECTIVE AND OBJECTIVE BOX
Subjective: patient seen and examined at bedside by Neurology Resident and Attending. patient off sedation, still intubated, and improving.    MEDICATIONS  (STANDING):  acyclovir IVPB 850 milliGRAM(s) IV Intermittent every 8 hours  chlorhexidine 0.12% Liquid 15 milliLiter(s) Oral Mucosa every 12 hours  chlorhexidine 4% Liquid 1 Application(s) Topical <User Schedule>  dexamethasone  IVPB 10 milliGRAM(s) IV Intermittent daily  heparin  Injectable 5000 Unit(s) SubCutaneous every 8 hours  lacosamide IVPB 150 milliGRAM(s) IV Intermittent every 12 hours  lactated ringers. 1000 milliLiter(s) (75 mL/Hr) IV Continuous <Continuous>  levETIRAcetam  IVPB 2000 milliGRAM(s) IV Intermittent every 12 hours  pantoprazole  Injectable 40 milliGRAM(s) IV Push daily    MEDICATIONS  (PRN):      Objective:   Vital Signs Last 24 Hrs  T(C): 37.3 (27 Sep 2019 11:00), Max: 38.1 (27 Sep 2019 00:00)  T(F): 99.1 (27 Sep 2019 11:00), Max: 100.6 (27 Sep 2019 00:00)  HR: 87 (27 Sep 2019 12:21) (77 - 120)  BP: 119/58 (27 Sep 2019 11:00) (106/64 - 153/73)  BP(mean): 84 (27 Sep 2019 11:00) (78 - 106)  RR: 14 (27 Sep 2019 11:00) (14 - 28)  SpO2: 98% (27 Sep 2019 12:21) (97% - 100%)    Neurological Exam:  Mental Status: off sedation, opens eyes to noxious stimuli  Cranial Nerves:  JOHN, 3 mm L, 2mm R, bilat, has corneal and gag   Motor: Slightly increased tone. No spontaneous movements.           Coord: Unable to examine  Tremor: No resting, postural or action tremor.  No myoclonus.  Sensation: no grimace or withdrawal to painful stimuli  Deep Tendon Reflexes: 1+ bilateral biceps, triceps, brachioradialis, knee and ankle. No Babinski present.  Gait: pt is bedbound      Other:    09-26    137  |  100  |  14  ----------------------------<  110<H>  3.6   |  25  |  0.88    Ca    8.5      26 Sep 2019 00:28  Phos  3.6     09-26  Mg     2.1     09-26    TPro  6.8  /  Alb  2.8<L>  /  TBili  0.6  /  DBili  x   /  AST  34  /  ALT  28  /  AlkPhos  93  09-26 09-26    137  |  100  |  14  ----------------------------<  110<H>  3.6   |  25  |  0.88    Ca    8.5      26 Sep 2019 00:28  Phos  3.6     09-26  Mg     2.1     09-26    TPro  6.8  /  Alb  2.8<L>  /  TBili  0.6  /  DBili  x   /  AST  34  /  ALT  28  /  AlkPhos  93  09-26    LIVER FUNCTIONS - ( 26 Sep 2019 00:28 )  Alb: 2.8 g/dL / Pro: 6.8 g/dL / ALK PHOS: 93 U/L / ALT: 28 U/L / AST: 34 U/L / GGT: x                                 12.4   10.0  )-----------( 178      ( 26 Sep 2019 00:28 )             39.2     Radiology  MRI from [facility] and shows right parieto-occipital cortical ribboning with T2 hyperintensity also involving the posterior right thalamus. 	    9/26/19 EEG SUMMARY/CLASSIFICATION    Abnormal EEG in an unresponsive patient.  - Abundant right posterior quadrant (max P8/O2) periodic sharp wave discharges, 1-2 hz.   - Severe generalized slowing with a discontinuous pattern (burst suppression, generalized)  - Asymmetry, Increased Amplitude in right hemisphere  _____________________________________________________________  EEG IMPRESSION/CLINICAL CORRELATE  Abnormal EEG study.  1. Potential epileptogenic focus in the right posterior quadrant.  2. Severe nonspecific diffuse or multifocal cerebral dysfunction.   3. No seizures were recorded.     Cryptococcal Antigen - CSF: Negative (09.25.19 @ 18:45)  Culture - Fungal, CSF (09.25.19 @ 18:56)    Specimen Source: .CSF    Culture Results:   Testing in progress    Culture - CSF with Gram Stain . (09.25.19 @ 18:56)    Gram Stain:   No polymorphonuclear cells seen  No organisms seen  by cytocentrifuge    Specimen Source: .CSF    Lactate Dehydrogenase, CSF: 29:  Protein, CSF: 75 mg/dL (09.25.19 @ 15:47)   Glucose, CSF: 75 mg/dL (09.25.19 @ 15:47)    Cerebrospinal Fluid Cell Count-1 (09.25.19 @ 15:46)    CSF Appearance: Clear    CSF Lymphocytes: 88 %    CSF Monocytes/Macrophages: 12 %    CSF Segmented Neutrophils: 0 %    Total Nucleated Cell Count, CSF: 79 /uL    CSF Color: No Color

## 2019-09-27 NOTE — PROGRESS NOTE ADULT - ASSESSMENT
85 y/o man with hx of prostate CA s/o seed implant and RT, HTN, p/w sudden onset AMS seizure brought to [facility] . Was found to be in status epilepticus and required intubation for airway control. Pt transferred to [facility] for further workup and EEG monitoring.     Pt has clear epileptogenic focus in the right parietal-occipital region.   Differential for cortical ribboning includes status epilepticus, infarct, HSV encephalitis, meningitis, hypoxic injury, CJD. CSF suggestive of viral etiology vs inflammatory. Less likely CJD given acuity of presentation.     Recommendations:    In CSF, viral panel, cryptococcus, 14-3-3, tau, neuron specific enolase, HTLV-1, JEANETTE virus, HSV 6, paraneoplastic panel sent and pending results  c/w 2g IV Keppra, 150mg vimpat q12h    Continue VEEG monitoring  seizure precautions  neuro checks Q2 .   Care per MICU    d/w Dr. Carmichael 83 y/o man with hx of prostate CA s/o seed implant and RT, HTN, p/w sudden onset AMS seizure brought to [facility] . Was found to be in status epilepticus and required intubation for airway control. Pt transferred to [facility] for further workup and EEG monitoring.     Pt has clear epileptogenic focus in the right parietal-occipital region.   Differential for cortical ribboning includes status epilepticus, infarct, HSV encephalitis, meningitis, hypoxic injury, CJD. CSF suggestive of viral etiology vs inflammatory. Less likely CJD given acuity of presentation.     Recommendations:    Start decadron 10mg daily  In CSF, viral panel, cryptococcus, 14-3-3, tau, neuron specific enolase, HTLV-1, JEANETTE virus, HSV 6, paraneoplastic panel sent and pending results  c/w 2g IV Keppra, 150mg vimpat q12h    Continue VEEG monitoring  seizure precautions  neuro checks Q2 .   Care per MICU    d/w Dr. Carmichael

## 2019-09-28 DIAGNOSIS — R83.9 UNSPECIFIED ABNORMAL FINDING IN CEREBROSPINAL FLUID: ICD-10-CM

## 2019-09-28 DIAGNOSIS — Z51.81 ENCOUNTER FOR THERAPEUTIC DRUG LEVEL MONITORING: ICD-10-CM

## 2019-09-28 DIAGNOSIS — R50.9 FEVER, UNSPECIFIED: ICD-10-CM

## 2019-09-28 DIAGNOSIS — G04.90 ENCEPHALITIS AND ENCEPHALOMYELITIS, UNSPECIFIED: ICD-10-CM

## 2019-09-28 DIAGNOSIS — J96.90 RESPIRATORY FAILURE, UNSPECIFIED, UNSPECIFIED WHETHER WITH HYPOXIA OR HYPERCAPNIA: ICD-10-CM

## 2019-09-28 LAB
ANION GAP SERPL CALC-SCNC: 11 MMOL/L — SIGNIFICANT CHANGE UP (ref 5–17)
ANION GAP SERPL CALC-SCNC: 13 MMOL/L — SIGNIFICANT CHANGE UP (ref 5–17)
APPEARANCE UR: ABNORMAL
B BURGDOR DNA SPEC QL NAA+PROBE: NEGATIVE — SIGNIFICANT CHANGE UP
BASE EXCESS BLDA CALC-SCNC: 9.6 MMOL/L — HIGH (ref -2–2)
BASOPHILS # BLD AUTO: 0 K/UL — SIGNIFICANT CHANGE UP (ref 0–0.2)
BASOPHILS NFR BLD AUTO: 0.4 % — SIGNIFICANT CHANGE UP (ref 0–2)
BILIRUB UR-MCNC: NEGATIVE — SIGNIFICANT CHANGE UP
BUN SERPL-MCNC: 15 MG/DL — SIGNIFICANT CHANGE UP (ref 7–23)
BUN SERPL-MCNC: 17 MG/DL — SIGNIFICANT CHANGE UP (ref 7–23)
CALCIUM SERPL-MCNC: 8.8 MG/DL — SIGNIFICANT CHANGE UP (ref 8.4–10.5)
CALCIUM SERPL-MCNC: 9.2 MG/DL — SIGNIFICANT CHANGE UP (ref 8.4–10.5)
CHLORIDE SERPL-SCNC: 91 MMOL/L — LOW (ref 96–108)
CHLORIDE SERPL-SCNC: 92 MMOL/L — LOW (ref 96–108)
CO2 BLDA-SCNC: 35 MMOL/L — HIGH (ref 22–30)
CO2 SERPL-SCNC: 30 MMOL/L — SIGNIFICANT CHANGE UP (ref 22–31)
CO2 SERPL-SCNC: 31 MMOL/L — SIGNIFICANT CHANGE UP (ref 22–31)
COLOR SPEC: YELLOW — SIGNIFICANT CHANGE UP
CREAT SERPL-MCNC: 0.78 MG/DL — SIGNIFICANT CHANGE UP (ref 0.5–1.3)
CREAT SERPL-MCNC: 0.85 MG/DL — SIGNIFICANT CHANGE UP (ref 0.5–1.3)
CRP SERPL-MCNC: 9.53 MG/DL — HIGH (ref 0–0.4)
CULTURE RESULTS: NO GROWTH — SIGNIFICANT CHANGE UP
DIFF PNL FLD: ABNORMAL
EOSINOPHIL # BLD AUTO: 0.2 K/UL — SIGNIFICANT CHANGE UP (ref 0–0.5)
EOSINOPHIL NFR BLD AUTO: 1.4 % — SIGNIFICANT CHANGE UP (ref 0–6)
ERYTHROCYTE [SEDIMENTATION RATE] IN BLOOD: 81 MM/HR — HIGH (ref 0–20)
GAS PNL BLDA: SIGNIFICANT CHANGE UP
GAS PNL BLDA: SIGNIFICANT CHANGE UP
GLUCOSE SERPL-MCNC: 190 MG/DL — HIGH (ref 70–99)
GLUCOSE SERPL-MCNC: 275 MG/DL — HIGH (ref 70–99)
GLUCOSE UR QL: NEGATIVE — SIGNIFICANT CHANGE UP
HCO3 BLDA-SCNC: 34 MMOL/L — HIGH (ref 21–29)
HCT VFR BLD CALC: 40.4 % — SIGNIFICANT CHANGE UP (ref 39–50)
HGB BLD-MCNC: 13.5 G/DL — SIGNIFICANT CHANGE UP (ref 13–17)
HOROWITZ INDEX BLDA+IHG-RTO: 30 — SIGNIFICANT CHANGE UP
INTUBATED: SIGNIFICANT CHANGE UP
KETONES UR-MCNC: NEGATIVE — SIGNIFICANT CHANGE UP
LEUKOCYTE ESTERASE UR-ACNC: ABNORMAL
LYMPHOCYTES # BLD AUTO: 1.5 K/UL — SIGNIFICANT CHANGE UP (ref 1–3.3)
LYMPHOCYTES # BLD AUTO: 11.4 % — LOW (ref 13–44)
MAGNESIUM SERPL-MCNC: 1.7 MG/DL — SIGNIFICANT CHANGE UP (ref 1.6–2.6)
MAGNESIUM SERPL-MCNC: 1.8 MG/DL — SIGNIFICANT CHANGE UP (ref 1.6–2.6)
MCHC RBC-ENTMCNC: 31.1 PG — SIGNIFICANT CHANGE UP (ref 27–34)
MCHC RBC-ENTMCNC: 33.4 GM/DL — SIGNIFICANT CHANGE UP (ref 32–36)
MCV RBC AUTO: 93.2 FL — SIGNIFICANT CHANGE UP (ref 80–100)
MONOCYTES # BLD AUTO: 1.4 K/UL — HIGH (ref 0–0.9)
MONOCYTES NFR BLD AUTO: 10.8 % — SIGNIFICANT CHANGE UP (ref 2–14)
NEUTROPHILS # BLD AUTO: 10 K/UL — HIGH (ref 1.8–7.4)
NEUTROPHILS NFR BLD AUTO: 76 % — SIGNIFICANT CHANGE UP (ref 43–77)
NITRITE UR-MCNC: POSITIVE
PCO2 BLDA: 43 MMHG — SIGNIFICANT CHANGE UP (ref 32–46)
PH BLDA: 7.51 — HIGH (ref 7.35–7.45)
PH UR: 7 — SIGNIFICANT CHANGE UP (ref 5–8)
PHOSPHATE SERPL-MCNC: 2.7 MG/DL — SIGNIFICANT CHANGE UP (ref 2.5–4.5)
PHOSPHATE SERPL-MCNC: 2.9 MG/DL — SIGNIFICANT CHANGE UP (ref 2.5–4.5)
PLATELET # BLD AUTO: 260 K/UL — SIGNIFICANT CHANGE UP (ref 150–400)
PO2 BLDA: 134 MMHG — HIGH (ref 74–108)
POTASSIUM SERPL-MCNC: 3.6 MMOL/L — SIGNIFICANT CHANGE UP (ref 3.5–5.3)
POTASSIUM SERPL-MCNC: 3.7 MMOL/L — SIGNIFICANT CHANGE UP (ref 3.5–5.3)
POTASSIUM SERPL-SCNC: 3.6 MMOL/L — SIGNIFICANT CHANGE UP (ref 3.5–5.3)
POTASSIUM SERPL-SCNC: 3.7 MMOL/L — SIGNIFICANT CHANGE UP (ref 3.5–5.3)
PROT UR-MCNC: ABNORMAL
RBC # BLD: 4.33 M/UL — SIGNIFICANT CHANGE UP (ref 4.2–5.8)
RBC # FLD: 12.6 % — SIGNIFICANT CHANGE UP (ref 10.3–14.5)
RHEUMATOID FACT SERPL-ACNC: <10 IU/ML — SIGNIFICANT CHANGE UP (ref 0–13)
SAO2 % BLDA: 97 % — HIGH (ref 92–96)
SODIUM SERPL-SCNC: 133 MMOL/L — LOW (ref 135–145)
SODIUM SERPL-SCNC: 135 MMOL/L — SIGNIFICANT CHANGE UP (ref 135–145)
SP GR SPEC: 1.01 — SIGNIFICANT CHANGE UP (ref 1.01–1.02)
SPECIMEN SOURCE: SIGNIFICANT CHANGE UP
T GONDII IGG CSF-ACNC: <0.9 — SIGNIFICANT CHANGE UP
T GONDII IGM CSF-ACNC: <0.8 — SIGNIFICANT CHANGE UP
UROBILINOGEN FLD QL: NEGATIVE — SIGNIFICANT CHANGE UP
WBC # BLD: 13.1 K/UL — HIGH (ref 3.8–10.5)
WBC # FLD AUTO: 13.1 K/UL — HIGH (ref 3.8–10.5)

## 2019-09-28 PROCEDURE — 99223 1ST HOSP IP/OBS HIGH 75: CPT

## 2019-09-28 PROCEDURE — 99291 CRITICAL CARE FIRST HOUR: CPT

## 2019-09-28 PROCEDURE — 95951: CPT | Mod: 26

## 2019-09-28 PROCEDURE — 93010 ELECTROCARDIOGRAM REPORT: CPT

## 2019-09-28 PROCEDURE — 71045 X-RAY EXAM CHEST 1 VIEW: CPT | Mod: 26

## 2019-09-28 RX ORDER — METOPROLOL TARTRATE 50 MG
5 TABLET ORAL ONCE
Refills: 0 | Status: COMPLETED | OUTPATIENT
Start: 2019-09-28 | End: 2019-09-28

## 2019-09-28 RX ORDER — METOCLOPRAMIDE HCL 10 MG
5 TABLET ORAL ONCE
Refills: 0 | Status: DISCONTINUED | OUTPATIENT
Start: 2019-09-28 | End: 2019-09-28

## 2019-09-28 RX ORDER — CEFTRIAXONE 500 MG/1
1000 INJECTION, POWDER, FOR SOLUTION INTRAMUSCULAR; INTRAVENOUS EVERY 24 HOURS
Refills: 0 | Status: DISCONTINUED | OUTPATIENT
Start: 2019-09-28 | End: 2019-09-30

## 2019-09-28 RX ORDER — ACETAMINOPHEN 500 MG
650 TABLET ORAL ONCE
Refills: 0 | Status: COMPLETED | OUTPATIENT
Start: 2019-09-28 | End: 2019-09-28

## 2019-09-28 RX ORDER — METOPROLOL TARTRATE 50 MG
25 TABLET ORAL
Refills: 0 | Status: DISCONTINUED | OUTPATIENT
Start: 2019-09-28 | End: 2019-10-01

## 2019-09-28 RX ORDER — SENNA PLUS 8.6 MG/1
5 TABLET ORAL AT BEDTIME
Refills: 0 | Status: DISCONTINUED | OUTPATIENT
Start: 2019-09-28 | End: 2019-09-29

## 2019-09-28 RX ORDER — POLYETHYLENE GLYCOL 3350 17 G/17G
17 POWDER, FOR SOLUTION ORAL
Refills: 0 | Status: DISCONTINUED | OUTPATIENT
Start: 2019-09-28 | End: 2019-10-23

## 2019-09-28 RX ORDER — METOPROLOL TARTRATE 50 MG
25 TABLET ORAL
Refills: 0 | Status: DISCONTINUED | OUTPATIENT
Start: 2019-09-28 | End: 2019-09-28

## 2019-09-28 RX ORDER — SENNA PLUS 8.6 MG/1
2 TABLET ORAL AT BEDTIME
Refills: 0 | Status: DISCONTINUED | OUTPATIENT
Start: 2019-09-28 | End: 2019-09-28

## 2019-09-28 RX ADMIN — LEVETIRACETAM 400 MILLIGRAM(S): 250 TABLET, FILM COATED ORAL at 09:42

## 2019-09-28 RX ADMIN — CHLORHEXIDINE GLUCONATE 15 MILLILITER(S): 213 SOLUTION TOPICAL at 17:15

## 2019-09-28 RX ADMIN — CEFTRIAXONE 100 MILLIGRAM(S): 500 INJECTION, POWDER, FOR SOLUTION INTRAMUSCULAR; INTRAVENOUS at 03:02

## 2019-09-28 RX ADMIN — LEVETIRACETAM 400 MILLIGRAM(S): 250 TABLET, FILM COATED ORAL at 21:23

## 2019-09-28 RX ADMIN — PANTOPRAZOLE SODIUM 40 MILLIGRAM(S): 20 TABLET, DELAYED RELEASE ORAL at 11:01

## 2019-09-28 RX ADMIN — Medication 650 MILLIGRAM(S): at 02:00

## 2019-09-28 RX ADMIN — Medication 5 MILLIGRAM(S): at 22:15

## 2019-09-28 RX ADMIN — Medication 1 MILLIGRAM(S): at 00:05

## 2019-09-28 RX ADMIN — Medication 167 MILLIGRAM(S): at 09:52

## 2019-09-28 RX ADMIN — HEPARIN SODIUM 5000 UNIT(S): 5000 INJECTION INTRAVENOUS; SUBCUTANEOUS at 21:23

## 2019-09-28 RX ADMIN — Medication 25 MILLIGRAM(S): at 23:05

## 2019-09-28 RX ADMIN — CHLORHEXIDINE GLUCONATE 15 MILLILITER(S): 213 SOLUTION TOPICAL at 05:16

## 2019-09-28 RX ADMIN — Medication 167 MILLIGRAM(S): at 02:34

## 2019-09-28 RX ADMIN — POLYETHYLENE GLYCOL 3350 17 GRAM(S): 17 POWDER, FOR SOLUTION ORAL at 03:01

## 2019-09-28 RX ADMIN — HEPARIN SODIUM 5000 UNIT(S): 5000 INJECTION INTRAVENOUS; SUBCUTANEOUS at 13:43

## 2019-09-28 RX ADMIN — LACOSAMIDE 130 MILLIGRAM(S): 50 TABLET ORAL at 09:00

## 2019-09-28 RX ADMIN — HEPARIN SODIUM 5000 UNIT(S): 5000 INJECTION INTRAVENOUS; SUBCUTANEOUS at 05:16

## 2019-09-28 RX ADMIN — Medication 102 MILLIGRAM(S): at 05:15

## 2019-09-28 RX ADMIN — LACOSAMIDE 130 MILLIGRAM(S): 50 TABLET ORAL at 22:11

## 2019-09-28 RX ADMIN — Medication 167 MILLIGRAM(S): at 17:14

## 2019-09-28 RX ADMIN — CHLORHEXIDINE GLUCONATE 1 APPLICATION(S): 213 SOLUTION TOPICAL at 05:15

## 2019-09-28 RX ADMIN — Medication 650 MILLIGRAM(S): at 03:00

## 2019-09-28 RX ADMIN — SENNA PLUS 5 MILLILITER(S): 8.6 TABLET ORAL at 21:23

## 2019-09-28 RX ADMIN — POLYETHYLENE GLYCOL 3350 17 GRAM(S): 17 POWDER, FOR SOLUTION ORAL at 17:15

## 2019-09-28 NOTE — CONSULT NOTE ADULT - SUBJECTIVE AND OBJECTIVE BOX
SHIKHA MASON 84y Male  MRN-[id number]    Patient is a 84y old  Male who presents with a chief complaint of status epilepticus (28 Sep 2019 12:52)      HPI:  84 year old male Hx HTN, GERD, prostate cancer who presented with seizure.  On , patient fell out of bed and hit his head.  He subsequently developed seizure like activity.  Patient intubated by EMS for airway protection.  Patient given ativan and versed in ED.  Patient had CTH which did not show acute processes (including bleed).  Patient had brain MRI which showed R frontal enhancement (potential seizure foci).  Patient started on propofol gtt, locasamide and keppra.  Patient started on acyclovir for meningitis suspicion.  Patient's labs were significant for WBC 13, lacate >10, BUN 29, and Cr 1.4.  Patient had spot EEG at OSH which showed R focal seizure activity on .  Patient was following commands and then attempted to wean off propofol, however unsuccessful (patient started having seizures again).  Resumed prop and increased keppra dose.  Patient transferred to [facility] MICU for vEEG and management of status epilepticus. (25 Sep 2019 01:18)    Intubated, cannot get hx.    Wife and daughter at bedside. No bug bites. H/o travel to [place] 2 weekends ago.   No water exposure No pets. No sick contacts    PAST MEDICAL & SURGICAL HISTORY:  History of gastroesophageal reflux (GERD)  Hypertension  Prostate cancer: had seed implant &amp; radiation (  )  PC (prostate cancer)  S/P cholecystectomy      Allergies    No Known Allergies    Intolerances        ANTIMICROBIALS:  acyclovir IVPB 850 every 8 hours  cefTRIAXone   IVPB 1000 every 24 hours      MEDICATIONS  (STANDING):  acyclovir IVPB 850 milliGRAM(s) IV Intermittent every 8 hours  cefTRIAXone   IVPB 1000 milliGRAM(s) IV Intermittent every 24 hours  chlorhexidine 0.12% Liquid 15 milliLiter(s) Oral Mucosa every 12 hours  chlorhexidine 4% Liquid 1 Application(s) Topical <User Schedule>  dexamethasone  IVPB 10 milliGRAM(s) IV Intermittent daily  heparin  Injectable 5000 Unit(s) SubCutaneous every 8 hours  lacosamide IVPB 150 milliGRAM(s) IV Intermittent every 12 hours  levETIRAcetam  IVPB 2000 milliGRAM(s) IV Intermittent every 12 hours  pantoprazole  Injectable 40 milliGRAM(s) IV Push daily  polyethylene glycol 3350 17 Gram(s) Oral two times a day  senna 2 Tablet(s) Oral at bedtime      Social History  Smoking: no  Etoh: no  Drug use: no      FAMILY HISTORY:  No pertinent family history in first degree relatives      Vital Signs Last 24 Hrs  T(C): 37.8 (28 Sep 2019 16:00), Max: 38 (28 Sep 2019 00:00)  T(F): 100 (28 Sep 2019 16:00), Max: 100.4 (28 Sep 2019 00:00)  HR: 133 (28 Sep 2019 17:00) (79 - 133)  BP: 124/78 (28 Sep 2019 17:00) (117/75 - 177/128)  BP(mean): 96 (28 Sep 2019 17:00) (92 - 143)  RR: 22 (28 Sep 2019 17:00) (18 - 24)  SpO2: 98% (28 Sep 2019 17:00) (97% - 100%)    CBC Full  -  ( 28 Sep 2019 00:45 )  WBC Count : 13.1 K/uL  RBC Count : 4.33 M/uL  Hemoglobin : 13.5 g/dL  Hematocrit : 40.4 %  Platelet Count - Automated : 260 K/uL  Mean Cell Volume : 93.2 fl  Mean Cell Hemoglobin : 31.1 pg  Mean Cell Hemoglobin Concentration : 33.4 gm/dL  Auto Neutrophil # : 10.0 K/uL  Auto Lymphocyte # : 1.5 K/uL  Auto Monocyte # : 1.4 K/uL  Auto Eosinophil # : 0.2 K/uL  Auto Basophil # : 0.0 K/uL  Auto Neutrophil % : 76.0 %  Auto Lymphocyte % : 11.4 %  Auto Monocyte % : 10.8 %  Auto Eosinophil % : 1.4 %  Auto Basophil % : 0.4 %        135  |  92<L>  |  17  ----------------------------<  275<H>  3.7   |  30  |  0.85    Ca    9.2      28 Sep 2019 15:45  Phos  2.9       Mg     1.7         TPro  7.1  /  Alb  3.3  /  TBili  0.6  /  DBili  x   /  AST  37  /  ALT  30  /  AlkPhos  115      LIVER FUNCTIONS - ( 27 Sep 2019 15:10 )  Alb: 3.3 g/dL / Pro: 7.1 g/dL / ALK PHOS: 115 U/L / ALT: 30 U/L / AST: 37 U/L / GGT: x           Urinalysis Basic - ( 28 Sep 2019 02:40 )    Color: Yellow / Appearance: Slightly Turbid / S.013 / pH: x  Gluc: x / Ketone: Negative  / Bili: Negative / Urobili: Negative   Blood: x / Protein: 30 mg/dL / Nitrite: Positive   Leuk Esterase: Large / RBC: 122 /hpf / WBC 68 /HPF   Sq Epi: x / Non Sq Epi: 0 /hpf / Bacteria: Many        MICROBIOLOGY:  .CSF  19   No growth  --    No polymorphonuclear cells seen  No organisms seen  by cytocentrifuge      .Sputum  19   Normal Respiratory Domenica present  --    Numerous polymorphonuclear leukocytes per low power field  Rare Squamous epithelial cells per low power field  No organisms seen per oil power field      .Blood  19   No growth to date.  --  --    West Nile Virus IgG/IgM Antibody, CSF (19 @ 21:41)    West Nile Virus IgG - CSF: Negative: No detectable West Nile Virus IgG Antibody. If a recent  infection is suspected, another specimen should be  submitted for testing within 7-14 days.    West Nile Virus IgM - CSF: Negative: No detectable West Nile Virus IgM Antibody. If a recent  infection is suspected, another specimen should be  submitted for testing within 7-14 days.  Performed At: [address] [id number]  Nav Mireles MD Ph:[phone]    Borrelia burgdorferi DNA by PCR (19 @ 21:41)    Lyme PCR, Result: Negative: This test was developed and its performance characteristics  determined by [facility].  It has not been cleared or approved  by the Food and Drug Administration.  The FDA has  determined that such clearance or approval is not  necessary.  Performed At: [address] [id number]  Nav Mireles MD Ph:[phone]    Rheumatoid Factor Quant, Serum or Plasma (19 @ 14:42)    Rheumatoid Factor Quant, Serum or Plasma: <10: Test Repeated IU/mL    Cryptococcal Antigen - CSF (19 @ 20:37)    Cryptococcal Antigen - CSF: Negative    < from: CT Head No Cont (19 @ 15:54) >  No acute hemorrhage, mass effect or extra-axial collections.   Age-appropriate involutional changes and mild microvascular ischemic   change.    < end of copied text >      RADIOLOGY  < from: Xray Chest 1 View- PORTABLE-Urgent (19 @ 03:13) >  The heart is normal in size. The lungs are clear. No radiographic   evidence for pneumonia. Endotracheal tube is in good position. An NG tube   is in place however its tip is not seen on the current study. The overall   appearance of the chest remain unchanged when compared to previous study   done 2019.      < from: MR Head w/wo IV Cont (19 @ 17:09) >  Imaging findings compatible with status epilepticus   concordant with the patient's history. Please see discussion in the body   of the report.    Underlying etiology includes but is not limited to hypoxia, metabolic   derangement, drug toxicity, alcohol intoxication or withdrawal, or   encephalitis.    No abnormal intracranial enhancement.

## 2019-09-28 NOTE — CONSULT NOTE ADULT - ASSESSMENT
85 yo M Hx HTN, GERD, prostate cancer (treated with radioactive seed implantation in 2001 no active treatment) presenting with status epilepticus.  Transferred from OSH to [facility] ICU for vEEG monitoring in setting of seizure activity with abnormal CSF with lymphocyte predominance

## 2019-09-28 NOTE — PROGRESS NOTE ADULT - SUBJECTIVE AND OBJECTIVE BOX
Patient is  an 84 year old man transferred from [facility] where presented with status epilepticus on 9/24/19. Sedation was discontinued yesterday. No seizure activity. Daughter states has noted slight movements of right upper and right and left lower extremities.                   SH: No allergy    Exam: Laying in bed. Intubated, Eyes closed. No vocalizations. Follows no commands            Pupils 2.5 mm, resists eye opening            No facial asymmetry            Motor tone and strength-flaccid    RY/CLASSIFICATION    Abnormal EEG in an unresponsive patient.  - Lateralized Periodic Discharges (LPDs), right posterior quadrant (max P8/O2), 0.5-1 hz.   - Moderate to severe generalized slowing, right worse than left   _____________________________________________________________  EEG IMPRESSION/CLINICAL CORRELATE  Abnormal EEG study.  1. Potential epileptogenic focus in the right posterior quadrant.  2. Moderate to severe nonspecific diffuse or multifocal cerebral dysfunction, right worse than left  3. No seizures were recorded.      Cerebrospinal Fluid Cell Count-1 (09.25.19 @ 15:46)    CSF Appearance: Clear    CSF Lymphocytes: 88 %    CSF Monocytes/Macrophages: 12 %    CSF Segmented Neutrophils: 0 %    Total Nucleated Cell Count, CSF: 79 /uL    CSF Color: No Color    Glucose, CSF: 75 mg/dL (09.25.19 @ 15:47)    Protein, CSF: 75 mg/dL (09.25.19 @ 15:47)    < from: MR Head w/wo IV Cont (09.25.19 @ 17:09) >    FINDINGS: Abnormal areas of cortical restricted diffusion (high signal on   diffusion-weighted imaging and corresponding dropout on ADC map) are   notable most pronounced within the right parietal and occipital cortices.   Abnormal cortical restricted diffusion is also noted within bilateral   cingulate gyri, right worse than left, right pulvinar of the thalamus,   right medial temporal lobe, and hippocampus. There is associated T2/FLAIR   prolongation in these areas along with mild gyral swelling. There is   associated sulcal effacement. There is no abnormal associated   enhancement. These findings were present on the outside MRI examination   from 9/23/2019.     Multiple additional patchy confluent nonspecific T2/FLAIR hyperintense   signal changes are noted throughout the bihemispheric white matter   without associated mass effect or restricted diffusion.    Ventricular size and configuration is unremarkable. No abnormal extra   axial fluidcollections are noted. Flow-voids are noted throughout the   major intracranial vessels, on the T2 weighted images, consistent with   their patency. The sella turcica and posterior fossa are unremarkable.    Secretions and mucosal thickening are noted within the right maxillary   sinus. Minimal scattered mucosal thickening throughout the ethmoid   labyrinth. Trace fluid signal is notable within the mastoid air cells   which is nonspecific. Layering secretions are noted within the   nasopharynx. Calvarial signal is within normal limits. The orbits appear   unremarkable.    IMPRESSION: Imaging findings compatible with status epilepticus   concordant with the patient's history. Please see discussion in the body   of the report.    Underlying etiology includes but is not limited to hypoxia, metabolic   derangement, drug toxicity, alcohol intoxication or withdrawal, or   encephalitis.    No abnormal intracranial enhancement

## 2019-09-28 NOTE — PROGRESS NOTE ADULT - ASSESSMENT
85 yo M Hx HTN, GERD, prostate cancer (treated with radioactive seed implantation in 2001 no active treatment) presenting with status epilepticus.  Transferred from OSH to [facility] ICU for vEEG monitoring in setting of seizure activity.     Neuro  Differential diagnosis includes status epilepticus, HSV encephalitis, meningitis.  -c/w Keppra 2 g bid  -c/w locasamide 150 mg bid  -all sedation off  -c/w acyclovir for HSV meningitis/encephalitis  -10mg decadron qd for HSV meningitis coverage  -MRI brain 9/25 shows abnormal areas of cortical restricted diffusion (high signal on diffusion-weighted imaging and corresponding dropout on ADC map) are notable most pronounced within the right parietal and occipital cortices.  Abnormal cortical restricted diffusion is also noted within bilateral  cingulate gyri, right worse than left, right pulvinar of the thalamus, right medial temporal lobe, and hippocampus. There is associated T2/FLAIR prolongation in these areas along with mild gyral swelling.   -f/u CSF viral panel, cryptococcus, 14-3-3, tau, neuron specific enolase, HTLV-1, JEANETTE virus, HSV 6, paraneoplastic panel  - vEEG from overnight with potential epileptogenic focus in the right posterior quadrant, Moderate to severe nonspecific diffuse or multifocal cerebral dysfunction with right worse than left however no seizures were recorded.    CV  -no active issues  -hold home amlodipine    Resp  -intubated for airway protection in setting of seizure. Weaned sedation and will try to extubate per clinical status.  -Saturating well on current settings    GI  -OG tube in place, will resume tube feeds  -CT OSH showed possible colitis (colonic wall thickening)    Renal  SANGITA on presentation at OSH  -resolved, continue to monitor BMP  -UA positive for nitrate and WBC  -Rocephin 1GM q 24h x 7 days for UTI  -continue to monitor I/O  -pending urine cultures result    ID  r/o meningitis  -acyclovir for HSV encephalitis ppx  -s/p LP 9/25, f/u CSF studies as above  -BCx and sputum neg  -UA positive for UTI, treatment with Rocephin 1G daily x 7 days    Endo  -no active issues  -TSH and T4 WNL at OSH  - A1c 5.7 on 09/25/19    Heme  -elevated WBC likely UTI  -continue to monitor CBC  -pending blood culture results    PPX  -HSQ 83 yo M Hx HTN, GERD, prostate cancer (treated with radioactive seed implantation in 2001 no active treatment) presenting with status epilepticus.  Transferred from OSH to [facility] ICU for vEEG monitoring in setting of seizure activity.     Neuro  Differential diagnosis includes status epilepticus, HSV encephalitis, meningitis.  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  Abnormal cortical restricted diffusion is also noted within bilateral  cingulate gyri, right worse than left, right pulvinar of the thalamus, right medial temporal lobe, and hippocampus. There is associated T2/FLAIR prolongation in these areas along with mild gyral swelling.   -f/u CSF viral panel, cryptococcus, 14-3-3, tau, neuron specific enolase, HTLV-1, JEANETTE virus, HSV 6, paraneoplastic panel  - vEEG from overnight 9/27-28 with potential epileptogenic focus in the right posterior quadrant, Moderate to severe nonspecific diffuse or multifocal cerebral dysfunction with right worse than left however no seizures were recorded.    CV  -no active issues  -hold home amlodipine    Resp  -intubated for airway protection in setting of seizure. Weaned sedation and will try to extubate per clinical status when mental status improved  -Saturating well on current settings  -pt able to CPAP well in the AM on 9/28    GI  -OG tube in place, on tube feeds  -CT OSH showed possible colitis (colonic wall thickening)    Renal  SANGITA on presentation at OSH  -resolved, continue to monitor BMP  -UA positive for nitrate and WBC on 9/28  -Rocephin 1GM q 24h x 7 days for UTI  -continue to monitor I/O  -pending urine cultures result  -will have to keep pt on IVF while on acyclovir    RHEUM  - will check YANDEL, ESR, CRP, DSDNA to ensure MRI findings not indicative of autoimmune disease    ID  r/o meningitis  -acyclovir for HSV encephalitis ppx  -s/p LP 9/25, f/u CSF studies as above  -BCx and sputum neg  -UA positive for UTI, treatment with Rocephin 1G daily x 7 days  -Will plan to order 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

## 2019-09-28 NOTE — PROGRESS NOTE ADULT - ASSESSMENT
Patient is  an 84 year old man transferred from [facility] where presented with status epilepticus on 9/24/19. Sedation was discontinued yesterday. No seizure activity. Daughter states has noted slight movements of right upper and right and left lower extremities.    1) Continue Lacosamide 150mg q 12 H  2) Continued Keppra 2000mg q 12 H  3 Continue EEG. EEG today as above no seizures, potential epileptogenic focus, moderate to severe nonspecific diffuse or mutlifocal dysfunction right worse than left  4) Await results CSF .  5) Acyclovir begun 9/25

## 2019-09-28 NOTE — EEG REPORT - NS EEG TEXT BOX
[facility]   COMPREHENSIVE EPILEPSY CENTER   REPORT OF CONTINUOUS VIDEO EEG     [facility]: [address], [place] [address], Ph#: [phone]  [facility]: [address], [place] [address], Ph#: [phone]  [facility]: [address] [place] [address]    Patient Name: SHIKHA MASON  Age and : 84y ([birth year])  MRN #: [id number]  Location: [address]  Referring Physician: Thang Bowles    Study Date: 19 08:00  End Date:   19 08:00    _____________________________________________________________  HISTORY    Patient is a 84y old  Male who presents with a chief complaint of status epilepticus (25 Sep 2019 03:42)      _____________________________________________________________  STUDY INTERPRETATION    Findings: The background was spontaneously variable and non clearly reactive. Background predominantly consisted of theta, delta with intermixed faster frequencies and 1-2 second periods of discontinuity.    Generalized Slowing:  Continuous diffuse delta and theta activity     Sleep Background:  Stage II sleep transients were not recorded.    Other Non-Epileptiform Findings:  Asymmetry, Increased Amplitude in right hemisphere    Interictal Epileptiform Activity:   Lateralized Periodic Discharges (LPDs), right posterior quadrant (max P8/O2), 0.5-1 hz.     Events:  Clinical events: None recorded.  Seizures: None recorded.    Activation Procedures:   Hyperventilation was not performed.    Photic stimulation was not performed.     Artifacts:  Intermittent myogenic and movement artifacts were noted.    ECG:  The heart rate on single channel ECG was predominantly between 60-70 BPM.    _____________________________________________________________  EEG SUMMARY/CLASSIFICATION    Abnormal EEG in an unresponsive patient.  - Lateralized Periodic Discharges (LPDs), right posterior quadrant (max P8/O2), 0.5-1 hz.   - Moderate to severe generalized slowing, right worse than left   _____________________________________________________________  EEG IMPRESSION/CLINICAL CORRELATE  Abnormal EEG study.  1. Potential epileptogenic focus in the right posterior quadrant.  2. Moderate to severe nonspecific diffuse or multifocal cerebral dysfunction, right worse than left  3. No seizures were recorded.    Jan Roque MD  Attending Physician, [facility] Epilepsy [place]

## 2019-09-28 NOTE — PROGRESS NOTE ADULT - ATTENDING COMMENTS
Agree with above.  Patient seen and examined. Chart reviewed. Case discussed with MICU team.    84 year old man with new onset seizures and encephalopathy with abnormalities on MRI and UTI    - continue EEG and AED  - follow up neurology results  - awaiting CSF PCR results  - continue anti viral and steroids for encephalitis  - ABx for UTI  - SANGITA improving  - continue vent support  - serum studies to rule out auto-immune disease

## 2019-09-28 NOTE — PROGRESS NOTE ADULT - SUBJECTIVE AND OBJECTIVE BOX
INTERVAL HPI/OVERNIGHT EVENTS:    SUBJECTIVE: Patient intubated on ventilator unable to obtain information, Patient arausable to verbal stimuli unable to speak.     HPI: Patient is 84 year old male who presents with c/o status epilepticus 19. Patient transfered from OSH to [facility] MICU for continuos EEG monitoring.    Overnight events:  Tremors to left arm around midnight for which patient was medicated with ativan 1mg IVP. Patient on continuos EEG monitoring no seizure activity recorded. Patient on ventilator tolerating well.     CONSTITUTIONAL: No weakness, fevers or chills  EYES/ENT: No visual changes;  No vertigo or throat pain   NECK: No pain or stiffness  RESPIRATORY: No cough, wheezing, hemoptysis; No shortness of breath  CARDIOVASCULAR: No chest pain or palpitations  GASTROINTESTINAL: No abdominal or epigastric pain. No nausea, vomiting, or hematemesis; No diarrhea or constipation. No melena or hematochezia.  GENITOURINARY: No dysuria, frequency or hematuria  NEUROLOGICAL: No numbness or weakness  SKIN: No itching, rashes    OBJECTIVE:    VITAL SIGNS:  ICU Vital Signs Last 24 Hrs  T(C): 37.7 (28 Sep 2019 12:00), Max: 38 (28 Sep 2019 00:00)  T(F): 99.9 (28 Sep 2019 12:00), Max: 100.4 (28 Sep 2019 00:00)  HR: 103 (28 Sep 2019 12:00) (78 - 113)  BP: 128/76 (28 Sep 2019 12:00) (128/76 - 177/128)  BP(mean): 96 (28 Sep 2019 12:00) (91 - 143)  ABP: --  ABP(mean): --  RR: 23 (28 Sep 2019 12:00) (18 - 24)  SpO2: 98% (28 Sep 2019 12:00) (97% - 100%)    Mode: CPAP with PS, FiO2: 30, PEEP: 5, PS: 5, MAP: 7, PIP: 11     @ 07:01  -   @ 07:00  --------------------------------------------------------  IN: 3271.6 mL / OUT: 2090 mL / NET: 1181.6 mL     @ 07:  -   @ 12:52  --------------------------------------------------------  IN: 810 mL / OUT: 1600 mL / NET: -790 mL      CAPILLARY BLOOD GLUCOSE          PHYSICAL EXAM:  	Constitutional: intubated and obtunded, eyes reacting to tactile stimulation  	Eyes: pupils equal and reactive to light b/l  	ENMT: normocephalic; MMM; healing wound on forehead  	Respiratory: lungs clear to auscultation b/l  	Cardiovascular: RRR; no murmurs rubs or gallops  	Gastrointestinal: abdomen soft, nontender, nondistended; bowel sounds all 4 quadrants  	Extremities: no peripheral edema  	Vascular: 2+ peripheral pulses LE b/l  	Neurological: AOx0, intubated    Skin: no rashes or lesion    MEDICATIONS:  MEDICATIONS  (STANDING):  acyclovir IVPB 850 milliGRAM(s) IV Intermittent every 8 hours  cefTRIAXone   IVPB 1000 milliGRAM(s) IV Intermittent every 24 hours  chlorhexidine 0.12% Liquid 15 milliLiter(s) Oral Mucosa every 12 hours  chlorhexidine 4% Liquid 1 Application(s) Topical <User Schedule>  dexamethasone  IVPB 10 milliGRAM(s) IV Intermittent daily  heparin  Injectable 5000 Unit(s) SubCutaneous every 8 hours  lacosamide IVPB 150 milliGRAM(s) IV Intermittent every 12 hours  lactated ringers. 1000 milliLiter(s) (75 mL/Hr) IV Continuous <Continuous>  levETIRAcetam  IVPB 2000 milliGRAM(s) IV Intermittent every 12 hours  pantoprazole  Injectable 40 milliGRAM(s) IV Push daily  polyethylene glycol 3350 17 Gram(s) Oral two times a day  senna 2 Tablet(s) Oral at bedtime    MEDICATIONS  (PRN):      ALLERGIES:  Allergies    No Known Allergies    Intolerances        LABS:                        13.5   13.1  )-----------( 260      ( 28 Sep 2019 00:45 )             40.4         133<L>  |  91<L>  |  15  ----------------------------<  190<H>  3.6   |  31  |  0.78    Ca    8.8      28 Sep 2019 00:45  Phos  2.7       Mg     1.8         TPro  7.1  /  Alb  3.3  /  TBili  0.6  /  DBili  x   /  AST  37  /  ALT  30  /  AlkPhos  115        Urinalysis Basic - ( 28 Sep 2019 02:40 )    Color: Yellow / Appearance: Slightly Turbid / S.013 / pH: x  Gluc: x / Ketone: Negative  / Bili: Negative / Urobili: Negative   Blood: x / Protein: 30 mg/dL / Nitrite: Positive   Leuk Esterase: Large / RBC: 122 /hpf / WBC 68 /HPF   Sq Epi: x / Non Sq Epi: 0 /hpf / Bacteria: Many        RADIOLOGY & ADDITIONAL TESTS: Reviewed. INTERVAL HPI/OVERNIGHT EVENTS:    SUBJECTIVE: Patient intubated on ventilator unable to obtain information, Patient arausable to verbal stimuli unable to speak.     HPI: Patient is 84 year old male who presents with c/o status epilepticus 19. Patient transfered from OS to [facility] MICU for continuos EEG monitoring.    Overnight events:  Tremors to left arm around midnight for which patient was medicated with ativan 1mg IVP. Patient on continuos EEG monitoring no seizure activity recorded. Patient on ventilator tolerating well.       OBJECTIVE:    VITAL SIGNS:  ICU Vital Signs Last 24 Hrs  T(C): 37.7 (28 Sep 2019 12:00), Max: 38 (28 Sep 2019 00:00)  T(F): 99.9 (28 Sep 2019 12:00), Max: 100.4 (28 Sep 2019 00:00)  HR: 103 (28 Sep 2019 12:00) (78 - 113)  BP: 128/76 (28 Sep 2019 12:00) (128/76 - 177/128)  BP(mean): 96 (28 Sep 2019 12:00) (91 - 143)  ABP: --  ABP(mean): --  RR: 23 (28 Sep 2019 12:00) (18 - 24)  SpO2: 98% (28 Sep 2019 12:00) (97% - 100%)    Mode: CPAP with PS, FiO2: 30, PEEP: 5, PS: 5, MAP: 7, PIP: 11     @ 07: @ 07:00  --------------------------------------------------------  IN: 3271.6 mL / OUT: 2090 mL / NET: 1181.6 mL     @ 07: @ 12:52  --------------------------------------------------------  IN: 810 mL / OUT: 1600 mL / NET: -790 mL      CAPILLARY BLOOD GLUCOSE          PHYSICAL EXAM:  	Constitutional: intubated and obtunded, eyes reacting to tactile stimulation  	Eyes: pupils equal and reactive to light b/l  	ENMT: normocephalic; MMM; healing wound on forehead  	Respiratory: lungs clear to auscultation b/l  	Cardiovascular: RRR; no murmurs rubs or gallops  	Gastrointestinal: abdomen soft, nontender, nondistended; bowel sounds all 4 quadrants  	Extremities: no peripheral edema  	Vascular: 2+ peripheral pulses LE b/l  	Neurological: AOx0, intubated    Skin: erythematous rash L palm    MEDICATIONS:  MEDICATIONS  (STANDING):  acyclovir IVPB 850 milliGRAM(s) IV Intermittent every 8 hours  cefTRIAXone   IVPB 1000 milliGRAM(s) IV Intermittent every 24 hours  chlorhexidine 0.12% Liquid 15 milliLiter(s) Oral Mucosa every 12 hours  chlorhexidine 4% Liquid 1 Application(s) Topical <User Schedule>  dexamethasone  IVPB 10 milliGRAM(s) IV Intermittent daily  heparin  Injectable 5000 Unit(s) SubCutaneous every 8 hours  lacosamide IVPB 150 milliGRAM(s) IV Intermittent every 12 hours  lactated ringers. 1000 milliLiter(s) (75 mL/Hr) IV Continuous <Continuous>  levETIRAcetam  IVPB 2000 milliGRAM(s) IV Intermittent every 12 hours  pantoprazole  Injectable 40 milliGRAM(s) IV Push daily  polyethylene glycol 3350 17 Gram(s) Oral two times a day  senna 2 Tablet(s) Oral at bedtime    MEDICATIONS  (PRN):      ALLERGIES:  Allergies    No Known Allergies    Intolerances        LABS:                        13.5   13.1  )-----------( 260      ( 28 Sep 2019 00:45 )             40.4         133<L>  |  91<L>  |  15  ----------------------------<  190<H>  3.6   |  31  |  0.78    Ca    8.8      28 Sep 2019 00:45  Phos  2.7       Mg     1.8         TPro  7.1  /  Alb  3.3  /  TBili  0.6  /  DBili  x   /  AST  37  /  ALT  30  /  AlkPhos  115        Urinalysis Basic - ( 28 Sep 2019 02:40 )    Color: Yellow / Appearance: Slightly Turbid / S.013 / pH: x  Gluc: x / Ketone: Negative  / Bili: Negative / Urobili: Negative   Blood: x / Protein: 30 mg/dL / Nitrite: Positive   Leuk Esterase: Large / RBC: 122 /hpf / WBC 68 /HPF   Sq Epi: x / Non Sq Epi: 0 /hpf / Bacteria: Many        RADIOLOGY & ADDITIONAL TESTS: Reviewed.

## 2019-09-29 LAB
ANION GAP SERPL CALC-SCNC: 14 MMOL/L — SIGNIFICANT CHANGE UP (ref 5–17)
BASE EXCESS BLDA CALC-SCNC: 9.9 MMOL/L — HIGH (ref -2–2)
BASOPHILS # BLD AUTO: 0 K/UL — SIGNIFICANT CHANGE UP (ref 0–0.2)
BASOPHILS NFR BLD AUTO: 0.1 % — SIGNIFICANT CHANGE UP (ref 0–2)
BUN SERPL-MCNC: 22 MG/DL — SIGNIFICANT CHANGE UP (ref 7–23)
CALCIUM SERPL-MCNC: 9.3 MG/DL — SIGNIFICANT CHANGE UP (ref 8.4–10.5)
CHLORIDE SERPL-SCNC: 93 MMOL/L — LOW (ref 96–108)
CK SERPL-CCNC: 114 U/L — SIGNIFICANT CHANGE UP (ref 30–200)
CO2 BLDA-SCNC: 35 MMOL/L — HIGH (ref 22–30)
CO2 SERPL-SCNC: 30 MMOL/L — SIGNIFICANT CHANGE UP (ref 22–31)
CREAT SERPL-MCNC: 0.9 MG/DL — SIGNIFICANT CHANGE UP (ref 0.5–1.3)
CULTURE RESULTS: SIGNIFICANT CHANGE UP
EOSINOPHIL # BLD AUTO: 0 K/UL — SIGNIFICANT CHANGE UP (ref 0–0.5)
EOSINOPHIL NFR BLD AUTO: 0.2 % — SIGNIFICANT CHANGE UP (ref 0–6)
GAS PNL BLDA: SIGNIFICANT CHANGE UP
GLUCOSE BLDC GLUCOMTR-MCNC: 162 MG/DL — HIGH (ref 70–99)
GLUCOSE BLDC GLUCOMTR-MCNC: 187 MG/DL — HIGH (ref 70–99)
GLUCOSE BLDC GLUCOMTR-MCNC: 273 MG/DL — HIGH (ref 70–99)
GLUCOSE SERPL-MCNC: 228 MG/DL — HIGH (ref 70–99)
HCO3 BLDA-SCNC: 34 MMOL/L — HIGH (ref 21–29)
HCT VFR BLD CALC: 42.4 % — SIGNIFICANT CHANGE UP (ref 39–50)
HGB BLD-MCNC: 13.2 G/DL — SIGNIFICANT CHANGE UP (ref 13–17)
INTUBATED: YES — SIGNIFICANT CHANGE UP
LABORATORY COMMENT REPORT: SIGNIFICANT CHANGE UP
LYMPHOCYTES # BLD AUTO: 1.9 K/UL — SIGNIFICANT CHANGE UP (ref 1–3.3)
LYMPHOCYTES # BLD AUTO: 11.1 % — LOW (ref 13–44)
MAGNESIUM SERPL-MCNC: 1.8 MG/DL — SIGNIFICANT CHANGE UP (ref 1.6–2.6)
MCHC RBC-ENTMCNC: 29.2 PG — SIGNIFICANT CHANGE UP (ref 27–34)
MCHC RBC-ENTMCNC: 31.2 GM/DL — LOW (ref 32–36)
MCV RBC AUTO: 93.6 FL — SIGNIFICANT CHANGE UP (ref 80–100)
MONOCYTES # BLD AUTO: 1.7 K/UL — HIGH (ref 0–0.9)
MONOCYTES NFR BLD AUTO: 9.7 % — SIGNIFICANT CHANGE UP (ref 2–14)
NEUTROPHILS # BLD AUTO: 13.5 K/UL — HIGH (ref 1.8–7.4)
NEUTROPHILS NFR BLD AUTO: 78.9 % — HIGH (ref 43–77)
PCO2 BLDA: 44 MMHG — SIGNIFICANT CHANGE UP (ref 32–46)
PH BLDA: 7.5 — HIGH (ref 7.35–7.45)
PHOSPHATE SERPL-MCNC: 2.4 MG/DL — LOW (ref 2.5–4.5)
PLATELET # BLD AUTO: 308 K/UL — SIGNIFICANT CHANGE UP (ref 150–400)
PO2 BLDA: 132 MMHG — HIGH (ref 74–108)
POTASSIUM SERPL-MCNC: 3.4 MMOL/L — LOW (ref 3.5–5.3)
POTASSIUM SERPL-SCNC: 3.4 MMOL/L — LOW (ref 3.5–5.3)
RBC # BLD: 4.53 M/UL — SIGNIFICANT CHANGE UP (ref 4.2–5.8)
RBC # FLD: 12.9 % — SIGNIFICANT CHANGE UP (ref 10.3–14.5)
SAO2 % BLDA: 97 % — HIGH (ref 92–96)
SODIUM SERPL-SCNC: 137 MMOL/L — SIGNIFICANT CHANGE UP (ref 135–145)
SOURCE HSV 1/2: SIGNIFICANT CHANGE UP
SPECIMEN SOURCE: SIGNIFICANT CHANGE UP
T PALLIDUM AB TITR SER: NEGATIVE — SIGNIFICANT CHANGE UP
WBC # BLD: 17.1 K/UL — HIGH (ref 3.8–10.5)
WBC # FLD AUTO: 17.1 K/UL — HIGH (ref 3.8–10.5)

## 2019-09-29 PROCEDURE — 93010 ELECTROCARDIOGRAM REPORT: CPT

## 2019-09-29 PROCEDURE — 99291 CRITICAL CARE FIRST HOUR: CPT

## 2019-09-29 PROCEDURE — 95951: CPT | Mod: 26

## 2019-09-29 PROCEDURE — 99232 SBSQ HOSP IP/OBS MODERATE 35: CPT

## 2019-09-29 RX ORDER — POTASSIUM CHLORIDE 20 MEQ
40 PACKET (EA) ORAL ONCE
Refills: 0 | Status: COMPLETED | OUTPATIENT
Start: 2019-09-29 | End: 2019-09-29

## 2019-09-29 RX ORDER — HUMAN INSULIN 100 [IU]/ML
10 INJECTION, SUSPENSION SUBCUTANEOUS EVERY 6 HOURS
Refills: 0 | Status: DISCONTINUED | OUTPATIENT
Start: 2019-09-29 | End: 2019-10-04

## 2019-09-29 RX ORDER — SENNA PLUS 8.6 MG/1
10 TABLET ORAL AT BEDTIME
Refills: 0 | Status: DISCONTINUED | OUTPATIENT
Start: 2019-09-29 | End: 2019-10-23

## 2019-09-29 RX ORDER — INSULIN LISPRO 100/ML
VIAL (ML) SUBCUTANEOUS EVERY 6 HOURS
Refills: 0 | Status: DISCONTINUED | OUTPATIENT
Start: 2019-09-29 | End: 2019-10-23

## 2019-09-29 RX ORDER — SODIUM CHLORIDE 9 MG/ML
1000 INJECTION, SOLUTION INTRAVENOUS
Refills: 0 | Status: DISCONTINUED | OUTPATIENT
Start: 2019-09-29 | End: 2019-09-30

## 2019-09-29 RX ORDER — POTASSIUM PHOSPHATE, MONOBASIC POTASSIUM PHOSPHATE, DIBASIC 236; 224 MG/ML; MG/ML
15 INJECTION, SOLUTION INTRAVENOUS ONCE
Refills: 0 | Status: COMPLETED | OUTPATIENT
Start: 2019-09-29 | End: 2019-09-29

## 2019-09-29 RX ADMIN — LEVETIRACETAM 400 MILLIGRAM(S): 250 TABLET, FILM COATED ORAL at 10:41

## 2019-09-29 RX ADMIN — Medication 1: at 05:18

## 2019-09-29 RX ADMIN — POTASSIUM PHOSPHATE, MONOBASIC POTASSIUM PHOSPHATE, DIBASIC 62.5 MILLIMOLE(S): 236; 224 INJECTION, SOLUTION INTRAVENOUS at 02:42

## 2019-09-29 RX ADMIN — CHLORHEXIDINE GLUCONATE 1 APPLICATION(S): 213 SOLUTION TOPICAL at 05:17

## 2019-09-29 RX ADMIN — HEPARIN SODIUM 5000 UNIT(S): 5000 INJECTION INTRAVENOUS; SUBCUTANEOUS at 21:00

## 2019-09-29 RX ADMIN — PANTOPRAZOLE SODIUM 40 MILLIGRAM(S): 20 TABLET, DELAYED RELEASE ORAL at 11:28

## 2019-09-29 RX ADMIN — Medication 167 MILLIGRAM(S): at 18:19

## 2019-09-29 RX ADMIN — Medication 167 MILLIGRAM(S): at 09:21

## 2019-09-29 RX ADMIN — HEPARIN SODIUM 5000 UNIT(S): 5000 INJECTION INTRAVENOUS; SUBCUTANEOUS at 14:00

## 2019-09-29 RX ADMIN — LEVETIRACETAM 400 MILLIGRAM(S): 250 TABLET, FILM COATED ORAL at 21:00

## 2019-09-29 RX ADMIN — CHLORHEXIDINE GLUCONATE 15 MILLILITER(S): 213 SOLUTION TOPICAL at 18:08

## 2019-09-29 RX ADMIN — Medication 167 MILLIGRAM(S): at 01:53

## 2019-09-29 RX ADMIN — POLYETHYLENE GLYCOL 3350 17 GRAM(S): 17 POWDER, FOR SOLUTION ORAL at 18:08

## 2019-09-29 RX ADMIN — Medication 1: at 11:28

## 2019-09-29 RX ADMIN — HUMAN INSULIN 10 UNIT(S): 100 INJECTION, SUSPENSION SUBCUTANEOUS at 05:17

## 2019-09-29 RX ADMIN — Medication 10 MILLIGRAM(S): at 21:21

## 2019-09-29 RX ADMIN — LACOSAMIDE 130 MILLIGRAM(S): 50 TABLET ORAL at 22:01

## 2019-09-29 RX ADMIN — HEPARIN SODIUM 5000 UNIT(S): 5000 INJECTION INTRAVENOUS; SUBCUTANEOUS at 05:16

## 2019-09-29 RX ADMIN — Medication 25 MILLIGRAM(S): at 21:00

## 2019-09-29 RX ADMIN — Medication 40 MILLIEQUIVALENT(S): at 02:42

## 2019-09-29 RX ADMIN — HUMAN INSULIN 10 UNIT(S): 100 INJECTION, SUSPENSION SUBCUTANEOUS at 18:08

## 2019-09-29 RX ADMIN — SENNA PLUS 10 MILLILITER(S): 8.6 TABLET ORAL at 22:01

## 2019-09-29 RX ADMIN — Medication 102 MILLIGRAM(S): at 05:16

## 2019-09-29 RX ADMIN — POLYETHYLENE GLYCOL 3350 17 GRAM(S): 17 POWDER, FOR SOLUTION ORAL at 05:17

## 2019-09-29 RX ADMIN — Medication 3: at 18:08

## 2019-09-29 RX ADMIN — CHLORHEXIDINE GLUCONATE 15 MILLILITER(S): 213 SOLUTION TOPICAL at 05:17

## 2019-09-29 RX ADMIN — SODIUM CHLORIDE 75 MILLILITER(S): 9 INJECTION, SOLUTION INTRAVENOUS at 14:20

## 2019-09-29 RX ADMIN — HUMAN INSULIN 10 UNIT(S): 100 INJECTION, SUSPENSION SUBCUTANEOUS at 11:29

## 2019-09-29 RX ADMIN — LACOSAMIDE 130 MILLIGRAM(S): 50 TABLET ORAL at 09:52

## 2019-09-29 RX ADMIN — CEFTRIAXONE 100 MILLIGRAM(S): 500 INJECTION, POWDER, FOR SOLUTION INTRAMUSCULAR; INTRAVENOUS at 01:56

## 2019-09-29 NOTE — EEG REPORT - NS EEG TEXT BOX
[facility]   COMPREHENSIVE EPILEPSY CENTER   REPORT OF CONTINUOUS VIDEO EEG     [facility]: [address], [place] [address], Ph#: [phone]  [facility]: [address], [place] [address], Ph#: [phone]  [facility]: [address] [place] [address]    Patient Name: SHIKHA MASON  Age and : 84y ([birth year])  MRN #: [id number]  Location: [address]  Referring Physician: Thang Bowles    Study Date: 19 08:00  End Date:   19 08:00    _____________________________________________________________  HISTORY    Patient is a 84y old  Male who presents with a chief complaint of status epilepticus (25 Sep 2019 03:42)      _____________________________________________________________  STUDY INTERPRETATION    Findings: The background was spontaneously variable and non clearly reactive. Background predominantly consisted of theta, delta with intermixed faster frequencies and 1-2 second periods of discontinuity.    Generalized Slowing:  Continuous diffuse delta and theta activity     Sleep Background:  Stage II sleep transients were not recorded.    Other Non-Epileptiform Findings:  Asymmetry, Increased Amplitude in right hemisphere    Interictal Epileptiform Activity:   Lateralized Periodic Discharges (LPDs), right posterior quadrant (max P8/O2), 0.5-1 hz.     Events:  Clinical events: None recorded.  Seizures: None recorded.    Activation Procedures:   Hyperventilation was not performed.    Photic stimulation was not performed.     Artifacts:  Intermittent myogenic and movement artifacts were noted.    ECG:  The heart rate on single channel ECG was predominantly between 60-70 BPM.    _____________________________________________________________  EEG SUMMARY/CLASSIFICATION    Abnormal EEG in an unresponsive patient.  - Lateralized Periodic Discharges (LPDs), right posterior quadrant (max P8/O2), 0.5-1 hz.   - Moderate to severe generalized slowing, right worse than left   _____________________________________________________________  EEG IMPRESSION/CLINICAL CORRELATE  Abnormal EEG study.  1. Potential epileptogenic focus in the right posterior quadrant.  2. Moderate to severe nonspecific diffuse or multifocal cerebral dysfunction, right worse than left  3. No seizures were recorded.    Jan Roque MD  Attending Physician, [facility] Epilepsy [place]

## 2019-09-29 NOTE — PROGRESS NOTE ADULT - SUBJECTIVE AND OBJECTIVE BOX
SHIKHA MASON 84y [id number]    Patient is a 84y old  Male who presents with a chief complaint of status epilepticus (28 Sep 2019 15:51)      Follow Up/CC:  ID following for encephalitis    Interval History/ROS: intubated, in micu    Allergies    No Known Allergies    Intolerances        ANTIMICROBIALS:  acyclovir IVPB 850 every 8 hours  cefTRIAXone   IVPB 1000 every 24 hours      MEDICATIONS  (STANDING):  acyclovir IVPB 850 milliGRAM(s) IV Intermittent every 8 hours  cefTRIAXone   IVPB 1000 milliGRAM(s) IV Intermittent every 24 hours  chlorhexidine 0.12% Liquid 15 milliLiter(s) Oral Mucosa every 12 hours  chlorhexidine 4% Liquid 1 Application(s) Topical <User Schedule>  dexamethasone  IVPB 10 milliGRAM(s) IV Intermittent daily  heparin  Injectable 5000 Unit(s) SubCutaneous every 8 hours  insulin lispro (HumaLOG) corrective regimen sliding scale   SubCutaneous every 6 hours  insulin NPH human recombinant 10 Unit(s) SubCutaneous every 6 hours  lacosamide IVPB 150 milliGRAM(s) IV Intermittent every 12 hours  levETIRAcetam  IVPB 2000 milliGRAM(s) IV Intermittent every 12 hours  metoprolol tartrate 25 milliGRAM(s) Oral two times a day  pantoprazole  Injectable 40 milliGRAM(s) IV Push daily  polyethylene glycol 3350 17 Gram(s) Oral two times a day  senna Syrup 5 milliLiter(s) Oral at bedtime    MEDICATIONS  (PRN):        Vital Signs Last 24 Hrs  T(C): 37.3 (29 Sep 2019 04:00), Max: 37.9 (28 Sep 2019 20:00)  T(F): 99.1 (29 Sep 2019 04:00), Max: 100.2 (28 Sep 2019 20:00)  HR: 82 (29 Sep 2019 07:00) (75 - 136)  BP: 110/58 (29 Sep 2019 07:00) (93/62 - 145/84)  BP(mean): 77 (29 Sep 2019 07:00) (73 - 108)  RR: 19 (29 Sep 2019 07:00) (18 - 24)  SpO2: 99% (29 Sep 2019 07:00) (97% - 100%)    CBC Full  -  ( 29 Sep 2019 00:49 )  WBC Count : 17.1 K/uL  RBC Count : 4.53 M/uL  Hemoglobin : 13.2 g/dL  Hematocrit : 42.4 %  Platelet Count - Automated : 308 K/uL  Mean Cell Volume : 93.6 fl  Mean Cell Hemoglobin : 29.2 pg  Mean Cell Hemoglobin Concentration : 31.2 gm/dL  Auto Neutrophil # : 13.5 K/uL  Auto Lymphocyte # : 1.9 K/uL  Auto Monocyte # : 1.7 K/uL  Auto Eosinophil # : 0.0 K/uL  Auto Basophil # : 0.0 K/uL  Auto Neutrophil % : 78.9 %  Auto Lymphocyte % : 11.1 %  Auto Monocyte % : 9.7 %  Auto Eosinophil % : 0.2 %  Auto Basophil % : 0.1 %        137  |  93<L>  |  22  ----------------------------<  228<H>  3.4<L>   |  30  |  0.90    Ca    9.3      29 Sep 2019 00:49  Phos  2.4       Mg     1.8         TPro  7.1  /  Alb  3.3  /  TBili  0.6  /  DBili  x   /  AST  37  /  ALT  30  /  AlkPhos  115      LIVER FUNCTIONS - ( 27 Sep 2019 15:10 )  Alb: 3.3 g/dL / Pro: 7.1 g/dL / ALK PHOS: 115 U/L / ALT: 30 U/L / AST: 37 U/L / GGT: x           Urinalysis Basic - ( 28 Sep 2019 02:40 )    Color: Yellow / Appearance: Slightly Turbid / S.013 / pH: x  Gluc: x / Ketone: Negative  / Bili: Negative / Urobili: Negative   Blood: x / Protein: 30 mg/dL / Nitrite: Positive   Leuk Esterase: Large / RBC: 122 /hpf / WBC 68 /HPF   Sq Epi: x / Non Sq Epi: 0 /hpf / Bacteria: Many        MICROBIOLOGY:  .Blood  19   No growth to date.  --  --      .CSF  19   No growth  --    No polymorphonuclear cells seen  No organisms seen  by cytocentrifuge      .Sputum  19   Normal Respiratory Domenica present  --    Numerous polymorphonuclear leukocytes per low power field  Rare Squamous epithelial cells per low power field  No organisms seen per oil power field      .Blood  19   No growth to date.  --  --      Syphilis Screen (19 @ 04:17)    Treponema Pallidum Antibody Interpretation: Negative: A negative treponemal  antibody screen indicates no serologic evidence of  syphilis (early infection cannot be excluded) and no additional testing  is required.   A positive screen is followed by testing for RPR.  Positive RPR indicates serologic evidence of new, inadequately treated,  or persistent syphilis infection and titer will be performed.  A negative  RPR following a positive treponemal screen may indicate adequately  treated or resolved past syphilis infection.  A negative RPR will trigger  a specific treponemal  antibody test, TPPA (treponema pallidum passive  particle agglutination).   A positive TPPA confirms previous or current  syphilis infection.   A negative TPPA suggests that the original  treponemal antibody screen was a false positive, but clinical assessment  of the patient is recommended.    West Nile Virus IgG/IgM Antibody, CSF (19 @ 21:41)    West Nile Virus IgG - CSF: Negative: No detectable West Nile Virus IgG Antibody. If a recent  infection is suspected, another specimen should be  submitted for testing within 7-14 days.    West Nile Virus IgM - CSF: Negative: No detectable West Nile Virus IgM Antibody. If a recent  infection is suspected, another specimen should be  submitted for testing within 7-14 days.  Performed At: [address] [id number]  Nav Mireles MD Ph:[phone]        Toxoplasma Gondii IgG/IgM, CSF (19 @ 20:58)    Toxoplasma Gondii IgG - CSF: <0.90: REFERENCE RANGE:              IgG: <0.90              IgM: <0.80  INTERPRETIVE CRITERIA:                 IgG:                   <0.90   ANTIBODY NOT DETECTED               0.90-1.09   EQUIVOCAL             > OR = 1.10   ANTIBODY DETECTED    IgM:                  <0.80    ANTIBODY NOT DETECTED              0.80-0.99    EQUIVOCAL            > OR = 1.00    ANTIBODY DETECTED  Diagnosis of central nervous system infections can  be accomplished by demonstrating the presence of  intrathecally-produced specific antibody.  Interpreting results may be complicated by low  antibody levels found in CSF, passive transfer of  antibody from blood, and contamination via bloody  taps. The interpretation of CSF results must  consider CSF-serum antibody ratios to the  infectious agent.    Toxoplasma Gondii IgM - CSF: <0.80: REFERENCE RANGE:              IgG: <0.90              IgM: <0.80  INTERPRETIVE CRITERIA:                 IgG:                   <0.90   ANTIBODY NOT DETECTED               0.90-1.09   EQUIVOCAL             > OR = 1.10   ANTIBODY DETECTED    IgM:                  <0.80    ANTIBODY NOT DETECTED              0.80-0.99    EQUIVOCAL            > OR = 1.00    ANTIBODY DETECTED  Diagnosis of central nervous system infections can  be accomplished by demonstrating the presence of  intrathecally-produced specific antibody.  Interpreting results may be complicated by low  antibody levels found in CSF, passive transfer of  antibody from blood, and contamination via bloody  taps. The interpretation of CSF results must  consider CSF-serum antibody ratios to the  infectious agent.  Test Performed by:  [facility]  [address] [place] [address]      Cryptococcal Antigen - CSF (19 @ 20:37)    Cryptococcal Antigen - CSF: Negative    < from: MR Head w/wo IV Cont (19 @ 17:09) >  Imaging findings compatible with status epilepticus   concordant with the patient's history. Please see discussion in the body   of the report.    Underlying etiology includes but is not limited to hypoxia, metabolic   derangement, drug toxicity, alcohol intoxication or withdrawal, or   encephalitis.    No abnormal intracranial enhancement    < end of copied text >

## 2019-09-29 NOTE — PROGRESS NOTE ADULT - ASSESSMENT
83 yo M Hx HTN, GERD, prostate cancer (treated with radioactive seed implantation in 2001 no active treatment) presenting with status epilepticus.  Transferred from OSH to [facility] ICU for vEEG monitoring in setting of seizure activity.     Neuro  Differential diagnosis includes status epilepticus, HSV encephalitis, meningitis.  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  Abnormal cortical restricted diffusion is also noted within bilateral  cingulate gyri, right worse than left, right pulvinar of the thalamus, right medial temporal lobe, and hippocampus. There is associated T2/FLAIR prolongation in these areas along with mild gyral swelling.   -f/u CSF viral panel, cryptococcus, 14-3-3, tau, neuron specific enolase, HTLV-1, JEANETTE virus, HSV 6, paraneoplastic panel  - vEEG from overnight 9/27-28 with potential epileptogenic focus in the right posterior quadrant, Moderate to severe nonspecific diffuse or multifocal cerebral dysfunction with right worse than left however no seizures were recorded.    CV  -no active issues  -hold home amlodipine    Resp  -intubated for airway protection in setting of seizure. Weaned sedation and will try to extubate per clinical status when mental status improved  -Saturating well on current settings  -pt able to CPAP well in the AM on 9/28    GI  -OG tube in place, on tube feeds  -CT OSH showed possible colitis (colonic wall thickening)    Renal  SANGITA on presentation at OSH  -resolved, continue to monitor BMP  -UA positive for nitrate and WBC on 9/28  -Rocephin 1GM q 24h x 7 days for UTI  -continue to monitor I/O  -pending urine cultures result  -will have to keep pt on IVF while on acyclovir    RHEUM  - will check YANDEL, ESR, CRP, DSDNA to ensure MRI findings not indicative of autoimmune disease    ID  r/o meningitis  -acyclovir for HSV encephalitis ppx  -s/p LP 9/25, f/u CSF studies as above  -BCx and sputum neg  -UA positive for UTI, treatment with Rocephin 1G daily x 7 days  -Will plan to order 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 83 yo M Hx HTN, GERD, prostate cancer (treated with radioactive seed implantation in 2001 no active treatment) presenting with status epilepticus.  Transferred from OSH to [facility] ICU for vEEG monitoring in setting of seizure activity.     Neuro  Differential diagnosis includes status epilepticus, HSV encephalitis, meningitis.  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  Abnormal cortical restricted diffusion is also noted within bilateral  cingulate gyri, right worse than left, right pulvinar of the thalamus, right medial temporal lobe, and hippocampus. There is associated T2/FLAIR prolongation in these areas along with mild gyral swelling.   -f/u CSF viral panel, 14-3-3, tau, neuron specific enolase, HTLV-1, JEANETTE virus, HSV 6, paraneoplastic panel  	*Negative CSF studies so far:  Bacterial cx, WNV, Toxo, Cryptococcus  - vEEG from overnight 9/27-28 with potential epileptogenic focus in the right posterior quadrant, Moderate to severe nonspecific diffuse or multifocal cerebral dysfunction with right worse than left however no seizures were recorded.    CV  -no active issues  -hold home amlodipine    Resp  -intubated for airway protection in setting of seizure. Weaned sedation and will try to extubate per clinical status when mental status improved  -Saturating well on current settings  -pt able to CPAP well in the AM on 9/28    GI  -OG tube in place, on tube feeds  -CT OSH showed possible colitis (colonic wall thickening)    Renal  SANGITA on presentation at OSH  -resolved, continue to monitor BMP  -UA positive for nitrate and WBC on 9/28  -Rocephin 1GM q 24h x 7 days for UTI  -continue to monitor I/O  -pending urine cultures result  -will have to keep pt on IVF while on acyclovir    RHEUM  - will check YNADEL, ESR, CRP, DSDNA to ensure MRI findings not indicative of autoimmune disease    ID  r/o meningitis  -acyclovir for HSV encephalitis ppx  -s/p LP 9/25, f/u CSF studies as above  -BCx and sputum neg  -UA positive for UTI, treatment with Rocephin 1G daily x 7 days  -Will plan to order 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 85 yo M Hx HTN, GERD, prostate cancer (treated with radioactive seed implantation in 2001 no active treatment) presenting with status epilepticus.  Transferred from OSH to [facility] ICU for vEEG monitoring in setting of seizure activity.     Neuro  Differential diagnosis includes status epilepticus, HSV encephalitis, meningitis.  -c/w Keppra 2 g bid  -c/w locasamide 150 mg bid  -all sedation off and pt still minimally responsive  -HSV PCR negative- per neuro, will c/w acyclovir today and reassess tomorrow  -10mg decadron qd for HSV meningitis/inflammatory encephalopathy coverage  -MRI brain 9/25 shows abnormal areas of cortical restricted diffusion (high signal on diffusion-weighted imaging and corresponding dropout on ADC map) are notable most pronounced within the right parietal and occipital cortices.  Abnormal cortical restricted diffusion is also noted within bilateral  cingulate gyri, right worse than left, right pulvinar of the thalamus, right medial temporal lobe, and hippocampus. There is associated T2/FLAIR prolongation in these areas along with mild gyral swelling.   -f/u CSF viral panel, 14-3-3, tau, neuron specific enolase, HTLV-1, JEANETTE virus, HSV 6, paraneoplastic panel  	*Negative CSF studies so far:  Bacterial cx, WNV, Toxo, Cryptococcus, HSV  - vEEG from overnight 9/27-28 with potential epileptogenic focus in the right posterior quadrant, Moderate to severe nonspecific diffuse or multifocal cerebral dysfunction with right worse than left however no seizures were recorded.    CV  -Started lopressor 25mg BID 9/29 overnight for rapid HR  -hold home amlodipine    Resp  -intubated for airway protection in setting of seizure. Weaned sedation and will try to extubate per clinical status when mental status improved  -Saturating well on current settings  -pt able to CPAP well in the AM on 9/28, continue today    GI  -OG tube in place, on tube feeds  -CT OSH showed possible colitis (colonic wall thickening)    Renal  SANGITA on presentation at OSH  -resolved, continue to monitor BMP  -UA positive for nitrate and WBC on 9/28  -Rocephin 1GM q 24h x 7 days for UTI  -continue to monitor I/O  -pending urine cultures result  -will have to keep pt on IVF while on acyclovir    RHEUM  - will check YANDEL, ESR, CRP, DSDNA to ensure MRI findings not indicative of autoimmune disease    ID  r/o meningitis  -acyclovir as above  -s/p LP 9/25, f/u CSF studies as above  -BCx and sputum neg  -UA positive for UTI, treatment with Rocephin 1G daily x 7 days  -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

## 2019-09-29 NOTE — PROGRESS NOTE ADULT - SUBJECTIVE AND OBJECTIVE BOX
Patient is  an 84 year old man transferred from [facility] where presented with status epilepticus on 9/24/19. Sedation was discontinued day before yesterday. No seizure activity. Daughter states has noted slight movements of right upper and right and left lower extremities.                   SH: No allergy    Exam: Laying in bed. Intubated, Eyes closed. No vocalizations. Follows no commands            Pupils 2.5 mm, resists eye opening            No facial asymmetry            Motor tone and strength-flaccid    RY/CLASSIFICATION    Abnormal EEG in an unresponsive patient.  - Lateralized Periodic Discharges (LPDs), right posterior quadrant (max P8/O2), 0.5-1 hz.   - Moderate to severe generalized slowing, right worse than left   _____________________________________________________________  EEG IMPRESSION/CLINICAL CORRELATE  Abnormal EEG study.  1. Potential epileptogenic focus in the right posterior quadrant.  2. Moderate to severe nonspecific diffuse or multifocal cerebral dysfunction, right worse than left  3. No seizures were recorded.      Cerebrospinal Fluid Cell Count-1 (09.25.19 @ 15:46)    CSF Appearance: Clear    CSF Lymphocytes: 88 %    CSF Monocytes/Macrophages: 12 %    CSF Segmented Neutrophils: 0 %    Total Nucleated Cell Count, CSF: 79 /uL    CSF Color: No Color    Glucose, CSF: 75 mg/dL (09.25.19 @ 15:47)    Protein, CSF: 75 mg/dL (09.25.19 @ 15:47)    < from: MR Head w/wo IV Cont (09.25.19 @ 17:09) >    FINDINGS: Abnormal areas of cortical restricted diffusion (high signal on   diffusion-weighted imaging and corresponding dropout on ADC map) are   notable most pronounced within the right parietal and occipital cortices.   Abnormal cortical restricted diffusion is also noted within bilateral   cingulate gyri, right worse than left, right pulvinar of the thalamus,   right medial temporal lobe, and hippocampus. There is associated T2/FLAIR   prolongation in these areas along with mild gyral swelling. There is   associated sulcal effacement. There is no abnormal associated   enhancement. These findings were present on the outside MRI examination   from 9/23/2019.     Multiple additional patchy confluent nonspecific T2/FLAIR hyperintense   signal changes are noted throughout the bihemispheric white matter   without associated mass effect or restricted diffusion.    Ventricular size and configuration is unremarkable. No abnormal extra   axial fluidcollections are noted. Flow-voids are noted throughout the   major intracranial vessels, on the T2 weighted images, consistent with   their patency. The sella turcica and posterior fossa are unremarkable.    Secretions and mucosal thickening are noted within the right maxillary   sinus. Minimal scattered mucosal thickening throughout the ethmoid   labyrinth. Trace fluid signal is notable within the mastoid air cells   which is nonspecific. Layering secretions are noted within the   nasopharynx. Calvarial signal is within normal limits. The orbits appear   unremarkable.    IMPRESSION: Imaging findings compatible with status epilepticus   concordant with the patient's history. Please see discussion in the body   of the report.    Underlying etiology includes but is not limited to hypoxia, metabolic   derangement, drug toxicity, alcohol intoxication or withdrawal, or   encephalitis.    No abnormal intracranial enhancement

## 2019-09-29 NOTE — PROGRESS NOTE ADULT - PROBLEM SELECTOR PLAN 1
-f/u hsv  -cont acyclovir  -CSF w/u negative so far - bacterial cx, toxo, wnv, crypt  -check HIV test

## 2019-09-29 NOTE — PROGRESS NOTE ADULT - SUBJECTIVE AND OBJECTIVE BOX
CHIEF COMPLAINT: Patient is a 84y old  Male who presents with a chief complaint of status epilepticus (29 Sep 2019 07:30)    Interval Events:    REVIEW OF SYSTEMS:  Constitutional:   Eyes:  ENT:  CV:  Resp:  GI:  :  MSK:  Integumentary:  Neurological:  Psychiatric:  Endocrine:  Hematologic/Lymphatic:  Allergic/Immunologic:  [ ] All other systems negative  [ ] Unable to assess ROS because ________    OBJECTIVE:  ICU Vital Signs Last 24 Hrs  T(C): 37.3 (29 Sep 2019 04:00), Max: 37.9 (28 Sep 2019 20:00)  T(F): 99.1 (29 Sep 2019 04:00), Max: 100.2 (28 Sep 2019 20:00)  HR: 68 (29 Sep 2019 07:30) (68 - 136)  BP: 110/58 (29 Sep 2019 07:00) (93/62 - 145/84)  BP(mean): 77 (29 Sep 2019 07:00) (73 - 108)  ABP: --  ABP(mean): --  RR: 19 (29 Sep 2019 07:00) (18 - 24)  SpO2: 98% (29 Sep 2019 07:30) (97% - 100%)    Mode: AC/ CMV (Assist Control/ Continuous Mandatory Ventilation), RR (machine): 14, TV (machine): 400, FiO2: 30, PEEP: 5, ITime: 1, MAP: 8, PIP: 14     @ 07:01  -   @ 07:00  --------------------------------------------------------  IN: 3003 mL / OUT: 3200 mL / NET: -197 mL      CAPILLARY BLOOD GLUCOSE      POCT Blood Glucose.: 187 mg/dL (29 Sep 2019 05:10)      PHYSICAL EXAM:  General:   HEENT:   Lymph Nodes:  Neck:   Respiratory:   Cardiovascular:   Abdomen:   Extremities:   Skin:   Neurological:  Psychiatry:    HOSPITAL MEDICATIONS:  MEDICATIONS  (STANDING):  acyclovir IVPB 850 milliGRAM(s) IV Intermittent every 8 hours  cefTRIAXone   IVPB 1000 milliGRAM(s) IV Intermittent every 24 hours  chlorhexidine 0.12% Liquid 15 milliLiter(s) Oral Mucosa every 12 hours  chlorhexidine 4% Liquid 1 Application(s) Topical <User Schedule>  dexamethasone  IVPB 10 milliGRAM(s) IV Intermittent daily  heparin  Injectable 5000 Unit(s) SubCutaneous every 8 hours  insulin lispro (HumaLOG) corrective regimen sliding scale   SubCutaneous every 6 hours  insulin NPH human recombinant 10 Unit(s) SubCutaneous every 6 hours  lacosamide IVPB 150 milliGRAM(s) IV Intermittent every 12 hours  levETIRAcetam  IVPB 2000 milliGRAM(s) IV Intermittent every 12 hours  metoprolol tartrate 25 milliGRAM(s) Oral two times a day  pantoprazole  Injectable 40 milliGRAM(s) IV Push daily  polyethylene glycol 3350 17 Gram(s) Oral two times a day  senna Syrup 5 milliLiter(s) Oral at bedtime    MEDICATIONS  (PRN):      LABS:  ( @ 00:49)                        13.2  17.1 )-----------( 308                 42.4    Neutrophils = 13.5 (78.9%)  Lymphocytes = 1.9 (11.1%)  Eosinophils = 0.0 (0.2%)  Basophils = 0.0 (0.1%)  Monocytes = 1.7 (9.7%)  Bands = --%    WBC Trend: 17.1<--, 13.1<--, 13.6<--  Hb Trend: 13.2<--, 13.5<--, 13.3<--, 12.4<--, 12.4<--  Plt Trend: 308<--, 260<--, 242<--, 178<--, 241<--      137  |  93<L>  |  22  ----------------------------<  228<H>  3.4<L>   |  30  |  0.90    Ca    9.3      29 Sep 2019 00:49  Phos  2.4       Mg     1.8         TPro  7.1  /  Alb  3.3  /  TBili  0.6  /  DBili  x   /  AST  37  /  ALT  30  /  AlkPhos  115      Creatinine Trend: 0.90<--, 0.85<--, 0.78<--, 0.86<--, 0.83<--, 0.87<--    Urinalysis Basic - ( 28 Sep 2019 02:40 )    Color: Yellow / Appearance: Slightly Turbid / S.013 / pH: x  Gluc: x / Ketone: Negative  / Bili: Negative / Urobili: Negative   Blood: x / Protein: 30 mg/dL / Nitrite: Positive   Leuk Esterase: Large / RBC: 122 /hpf / WBC 68 /HPF   Sq Epi: x / Non Sq Epi: 0 /hpf / Bacteria: Many      Arterial Blood Gas:   @ 11:44  7.52/40/128/32/97/8.8  ABG lactate: --  Arterial Blood Gas:   @ 00:31  7.51/43/134/34/97/9.6  ABG lactate: --      CARDIAC MARKERS ( 29 Sep 2019 00:49 )  Trop x     /  U/L / CKMB x             MICROBIOLOGY:   Blood Cx:  Urine Cx:  Sputum Cx:  Legionella:  RVP:    RADIOLOGY:  X Ray:  CT:  MRI:  Ultrasound:  [ ] Reviewed and interpreted by me    EKG: CHIEF COMPLAINT: Patient is a 84y old  Male who presents with a chief complaint of status epilepticus (29 Sep 2019 07:30)    Interval Events:    Pt had HR into 130s, s/p 5mg lopressor IVP. Started on lopressor 25mg BID. Otherwise no acute events.    REVIEW OF SYSTEMS:  Constitutional:   Eyes:  ENT:  CV:  Resp:  GI:  :  MSK:  Integumentary:  Neurological:  Psychiatric:  Endocrine:  Hematologic/Lymphatic:  Allergic/Immunologic:  [ ] All other systems negative  [ ] Unable to assess ROS because ________    OBJECTIVE:  ICU Vital Signs Last 24 Hrs  T(C): 37.3 (29 Sep 2019 04:00), Max: 37.9 (28 Sep 2019 20:00)  T(F): 99.1 (29 Sep 2019 04:00), Max: 100.2 (28 Sep 2019 20:00)  HR: 68 (29 Sep 2019 07:30) (68 - 136)  BP: 110/58 (29 Sep 2019 07:00) (93/62 - 145/84)  BP(mean): 77 (29 Sep 2019 07:00) (73 - 108)  ABP: --  ABP(mean): --  RR: 19 (29 Sep 2019 07:00) (18 - 24)  SpO2: 98% (29 Sep 2019 07:30) (97% - 100%)    Mode: AC/ CMV (Assist Control/ Continuous Mandatory Ventilation), RR (machine): 14, TV (machine): 400, FiO2: 30, PEEP: 5, ITime: 1, MAP: 8, PIP: 14     @ 07:01  -   @ 07:00  --------------------------------------------------------  IN: 3003 mL / OUT: 3200 mL / NET: -197 mL      CAPILLARY BLOOD GLUCOSE      POCT Blood Glucose.: 187 mg/dL (29 Sep 2019 05:10)      PHYSICAL EXAM:  General:   HEENT:   Lymph Nodes:  Neck:   Respiratory:   Cardiovascular:   Abdomen:   Extremities:   Skin:   Neurological:  Psychiatry:    HOSPITAL MEDICATIONS:  MEDICATIONS  (STANDING):  acyclovir IVPB 850 milliGRAM(s) IV Intermittent every 8 hours  cefTRIAXone   IVPB 1000 milliGRAM(s) IV Intermittent every 24 hours  chlorhexidine 0.12% Liquid 15 milliLiter(s) Oral Mucosa every 12 hours  chlorhexidine 4% Liquid 1 Application(s) Topical <User Schedule>  dexamethasone  IVPB 10 milliGRAM(s) IV Intermittent daily  heparin  Injectable 5000 Unit(s) SubCutaneous every 8 hours  insulin lispro (HumaLOG) corrective regimen sliding scale   SubCutaneous every 6 hours  insulin NPH human recombinant 10 Unit(s) SubCutaneous every 6 hours  lacosamide IVPB 150 milliGRAM(s) IV Intermittent every 12 hours  levETIRAcetam  IVPB 2000 milliGRAM(s) IV Intermittent every 12 hours  metoprolol tartrate 25 milliGRAM(s) Oral two times a day  pantoprazole  Injectable 40 milliGRAM(s) IV Push daily  polyethylene glycol 3350 17 Gram(s) Oral two times a day  senna Syrup 5 milliLiter(s) Oral at bedtime    MEDICATIONS  (PRN):      LABS:  ( @ 00:49)                        13.2  17.1 )-----------( 308                 42.4    Neutrophils = 13.5 (78.9%)  Lymphocytes = 1.9 (11.1%)  Eosinophils = 0.0 (0.2%)  Basophils = 0.0 (0.1%)  Monocytes = 1.7 (9.7%)  Bands = --%    WBC Trend: 17.1<--, 13.1<--, 13.6<--  Hb Trend: 13.2<--, 13.5<--, 13.3<--, 12.4<--, 12.4<--  Plt Trend: 308<--, 260<--, 242<--, 178<--, 241<--      137  |  93<L>  |  22  ----------------------------<  228<H>  3.4<L>   |  30  |  0.90    Ca    9.3      29 Sep 2019 00:49  Phos  2.4       Mg     1.8         TPro  7.1  /  Alb  3.3  /  TBili  0.6  /  DBili  x   /  AST  37  /  ALT  30  /  AlkPhos  115      Creatinine Trend: 0.90<--, 0.85<--, 0.78<--, 0.86<--, 0.83<--, 0.87<--    Urinalysis Basic - ( 28 Sep 2019 02:40 )    Color: Yellow / Appearance: Slightly Turbid / S.013 / pH: x  Gluc: x / Ketone: Negative  / Bili: Negative / Urobili: Negative   Blood: x / Protein: 30 mg/dL / Nitrite: Positive   Leuk Esterase: Large / RBC: 122 /hpf / WBC 68 /HPF   Sq Epi: x / Non Sq Epi: 0 /hpf / Bacteria: Many      Arterial Blood Gas:   @ 11:44  7.52/40/128/32/97/8.8  ABG lactate: --  Arterial Blood Gas:   @ 00:31  7.51/43/134/34/97/9.6  ABG lactate: --      CARDIAC MARKERS ( 29 Sep 2019 00:49 )  Trop x     /  U/L / CKMB x             MICROBIOLOGY:   Blood Cx:  Urine Cx:  Sputum Cx:  Legionella:  RVP:    RADIOLOGY:  X Ray:  CT:  MRI:  Ultrasound:  [ ] Reviewed and interpreted by me    EKG:

## 2019-09-29 NOTE — PROGRESS NOTE ADULT - ATTENDING COMMENTS
Tomas Marcos  Attending Physician   Division of Infectious Disease  Pager #[phone]  After 5pm/weekend or no response, call #[phone]

## 2019-09-29 NOTE — PROGRESS NOTE ADULT - ATTENDING COMMENTS
Agree with above.  Patient seen and examined. Chart reviewed. Case discussed with MICU team.    84 year old man with new onset seizures and encephalopathy with abnormalities on MRI and UTI    - no seizures on EEG   - continue AED  - follow up neurology recommendations  - awaiting CSF PCR results  - continue anti viral and steroids for encephalitis  - ABx for UTI

## 2019-09-29 NOTE — PROGRESS NOTE ADULT - ASSESSMENT
Patient is  an 84 year old man transferred from [facility] where presented with status epilepticus on 9/24/19. Sedation was discontinued day before yesterday. . No seizure activity. Daughter states has noted slight movements of right upper and right and left lower extremities.    1) Continue Lacosamide 150mg q 12 H  2) Continue Keppra 2000mg q 12 H  3 Continue EEG. EEG report pending from today.  4) Await results CSF .  5) Acyclovir begun 9/25

## 2019-09-29 NOTE — PROGRESS NOTE ADULT - ASSESSMENT
83 yo M Hx HTN, GERD, prostate cancer (treated with radioactive seed implantation in 2001 no active treatment) presenting with status epilepticus.  Transferred from OSH to [facility] ICU for vEEG monitoring in setting of seizure activity with abnormal CSF with lymphocyte predominance

## 2019-09-30 LAB
ALBUMIN SERPL ELPH-MCNC: 3 G/DL — LOW (ref 3.3–5)
ALP SERPL-CCNC: 146 U/L — HIGH (ref 40–120)
ALT FLD-CCNC: 80 U/L — HIGH (ref 10–45)
ANA TITR SER: NEGATIVE — SIGNIFICANT CHANGE UP
ANION GAP SERPL CALC-SCNC: 11 MMOL/L — SIGNIFICANT CHANGE UP (ref 5–17)
AST SERPL-CCNC: 72 U/L — HIGH (ref 10–40)
BASOPHILS # BLD AUTO: 0 K/UL — SIGNIFICANT CHANGE UP (ref 0–0.2)
BASOPHILS NFR BLD AUTO: 0.1 % — SIGNIFICANT CHANGE UP (ref 0–2)
BILIRUB SERPL-MCNC: 0.6 MG/DL — SIGNIFICANT CHANGE UP (ref 0.2–1.2)
BUN SERPL-MCNC: 29 MG/DL — HIGH (ref 7–23)
CALCIUM SERPL-MCNC: 8.7 MG/DL — SIGNIFICANT CHANGE UP (ref 8.4–10.5)
CHLORIDE SERPL-SCNC: 93 MMOL/L — LOW (ref 96–108)
CO2 SERPL-SCNC: 31 MMOL/L — SIGNIFICANT CHANGE UP (ref 22–31)
CREAT SERPL-MCNC: 0.84 MG/DL — SIGNIFICANT CHANGE UP (ref 0.5–1.3)
CULTURE RESULTS: SIGNIFICANT CHANGE UP
CULTURE RESULTS: SIGNIFICANT CHANGE UP
EOSINOPHIL # BLD AUTO: 0.1 K/UL — SIGNIFICANT CHANGE UP (ref 0–0.5)
EOSINOPHIL NFR BLD AUTO: 0.5 % — SIGNIFICANT CHANGE UP (ref 0–6)
GLUCOSE BLDC GLUCOMTR-MCNC: 126 MG/DL — HIGH (ref 70–99)
GLUCOSE BLDC GLUCOMTR-MCNC: 171 MG/DL — HIGH (ref 70–99)
GLUCOSE BLDC GLUCOMTR-MCNC: 192 MG/DL — HIGH (ref 70–99)
GLUCOSE BLDC GLUCOMTR-MCNC: 198 MG/DL — HIGH (ref 70–99)
GLUCOSE SERPL-MCNC: 187 MG/DL — HIGH (ref 70–99)
HCT VFR BLD CALC: 37.6 % — LOW (ref 39–50)
HGB BLD-MCNC: 12.6 G/DL — LOW (ref 13–17)
JCPYV DNA # CSF NAA+PROBE: SIGNIFICANT CHANGE UP COPIES/ML
LYMPHOCYTES # BLD AUTO: 1.6 K/UL — SIGNIFICANT CHANGE UP (ref 1–3.3)
LYMPHOCYTES # BLD AUTO: 10 % — LOW (ref 13–44)
MAGNESIUM SERPL-MCNC: 1.6 MG/DL — SIGNIFICANT CHANGE UP (ref 1.6–2.6)
MCHC RBC-ENTMCNC: 31.5 PG — SIGNIFICANT CHANGE UP (ref 27–34)
MCHC RBC-ENTMCNC: 33.4 GM/DL — SIGNIFICANT CHANGE UP (ref 32–36)
MCV RBC AUTO: 94.5 FL — SIGNIFICANT CHANGE UP (ref 80–100)
MONOCYTES # BLD AUTO: 1.4 K/UL — HIGH (ref 0–0.9)
MONOCYTES NFR BLD AUTO: 9 % — SIGNIFICANT CHANGE UP (ref 2–14)
NEUTROPHILS # BLD AUTO: 12.5 K/UL — HIGH (ref 1.8–7.4)
NEUTROPHILS NFR BLD AUTO: 80.3 % — HIGH (ref 43–77)
PHOSPHATE SERPL-MCNC: 2 MG/DL — LOW (ref 2.5–4.5)
PLATELET # BLD AUTO: 278 K/UL — SIGNIFICANT CHANGE UP (ref 150–400)
POTASSIUM SERPL-MCNC: 3.2 MMOL/L — LOW (ref 3.5–5.3)
POTASSIUM SERPL-SCNC: 3.2 MMOL/L — LOW (ref 3.5–5.3)
PROT SERPL-MCNC: 7.2 G/DL — SIGNIFICANT CHANGE UP (ref 6–8.3)
RAPID RVP RESULT: SIGNIFICANT CHANGE UP
RBC # BLD: 3.98 M/UL — LOW (ref 4.2–5.8)
RBC # FLD: 13 % — SIGNIFICANT CHANGE UP (ref 10.3–14.5)
SODIUM SERPL-SCNC: 135 MMOL/L — SIGNIFICANT CHANGE UP (ref 135–145)
SPECIMEN SOURCE: SIGNIFICANT CHANGE UP
SPECIMEN SOURCE: SIGNIFICANT CHANGE UP
WBC # BLD: 15.5 K/UL — HIGH (ref 3.8–10.5)
WBC # FLD AUTO: 15.5 K/UL — HIGH (ref 3.8–10.5)

## 2019-09-30 PROCEDURE — 93971 EXTREMITY STUDY: CPT | Mod: 26

## 2019-09-30 PROCEDURE — 99233 SBSQ HOSP IP/OBS HIGH 50: CPT | Mod: GC

## 2019-09-30 PROCEDURE — 95951: CPT | Mod: 26

## 2019-09-30 PROCEDURE — 99291 CRITICAL CARE FIRST HOUR: CPT

## 2019-09-30 PROCEDURE — 93926 LOWER EXTREMITY STUDY: CPT | Mod: 26,LT

## 2019-09-30 RX ORDER — LACOSAMIDE 50 MG/1
150 TABLET ORAL EVERY 12 HOURS
Refills: 0 | Status: DISCONTINUED | OUTPATIENT
Start: 2019-09-30 | End: 2019-10-07

## 2019-09-30 RX ORDER — MAGNESIUM SULFATE 500 MG/ML
2 VIAL (ML) INJECTION ONCE
Refills: 0 | Status: COMPLETED | OUTPATIENT
Start: 2019-09-30 | End: 2019-09-30

## 2019-09-30 RX ORDER — POTASSIUM CHLORIDE 20 MEQ
40 PACKET (EA) ORAL EVERY 4 HOURS
Refills: 0 | Status: COMPLETED | OUTPATIENT
Start: 2019-09-30 | End: 2019-09-30

## 2019-09-30 RX ORDER — SODIUM CHLORIDE 9 MG/ML
1000 INJECTION, SOLUTION INTRAVENOUS
Refills: 0 | Status: DISCONTINUED | OUTPATIENT
Start: 2019-09-30 | End: 2019-10-01

## 2019-09-30 RX ADMIN — Medication 50 GRAM(S): at 04:49

## 2019-09-30 RX ADMIN — SENNA PLUS 10 MILLILITER(S): 8.6 TABLET ORAL at 22:02

## 2019-09-30 RX ADMIN — Medication 1: at 00:07

## 2019-09-30 RX ADMIN — HEPARIN SODIUM 5000 UNIT(S): 5000 INJECTION INTRAVENOUS; SUBCUTANEOUS at 15:03

## 2019-09-30 RX ADMIN — LACOSAMIDE 130 MILLIGRAM(S): 50 TABLET ORAL at 22:00

## 2019-09-30 RX ADMIN — Medication 62.5 MILLIMOLE(S): at 03:59

## 2019-09-30 RX ADMIN — LACOSAMIDE 130 MILLIGRAM(S): 50 TABLET ORAL at 09:12

## 2019-09-30 RX ADMIN — Medication 102 MILLIGRAM(S): at 06:00

## 2019-09-30 RX ADMIN — POLYETHYLENE GLYCOL 3350 17 GRAM(S): 17 POWDER, FOR SOLUTION ORAL at 05:37

## 2019-09-30 RX ADMIN — HUMAN INSULIN 10 UNIT(S): 100 INJECTION, SUSPENSION SUBCUTANEOUS at 00:07

## 2019-09-30 RX ADMIN — Medication 167 MILLIGRAM(S): at 10:38

## 2019-09-30 RX ADMIN — Medication 1: at 17:45

## 2019-09-30 RX ADMIN — Medication 25 MILLIGRAM(S): at 22:02

## 2019-09-30 RX ADMIN — LEVETIRACETAM 400 MILLIGRAM(S): 250 TABLET, FILM COATED ORAL at 09:14

## 2019-09-30 RX ADMIN — Medication 40 MILLIEQUIVALENT(S): at 09:13

## 2019-09-30 RX ADMIN — Medication 167 MILLIGRAM(S): at 01:08

## 2019-09-30 RX ADMIN — HUMAN INSULIN 10 UNIT(S): 100 INJECTION, SUSPENSION SUBCUTANEOUS at 17:45

## 2019-09-30 RX ADMIN — HEPARIN SODIUM 5000 UNIT(S): 5000 INJECTION INTRAVENOUS; SUBCUTANEOUS at 05:27

## 2019-09-30 RX ADMIN — Medication 25 MILLIGRAM(S): at 09:13

## 2019-09-30 RX ADMIN — CEFTRIAXONE 100 MILLIGRAM(S): 500 INJECTION, POWDER, FOR SOLUTION INTRAMUSCULAR; INTRAVENOUS at 02:57

## 2019-09-30 RX ADMIN — POLYETHYLENE GLYCOL 3350 17 GRAM(S): 17 POWDER, FOR SOLUTION ORAL at 17:45

## 2019-09-30 RX ADMIN — CHLORHEXIDINE GLUCONATE 1 APPLICATION(S): 213 SOLUTION TOPICAL at 05:27

## 2019-09-30 RX ADMIN — Medication 1: at 12:07

## 2019-09-30 RX ADMIN — SODIUM CHLORIDE 75 MILLILITER(S): 9 INJECTION, SOLUTION INTRAVENOUS at 15:03

## 2019-09-30 RX ADMIN — Medication 167 MILLIGRAM(S): at 17:45

## 2019-09-30 RX ADMIN — PANTOPRAZOLE SODIUM 40 MILLIGRAM(S): 20 TABLET, DELAYED RELEASE ORAL at 12:03

## 2019-09-30 RX ADMIN — HEPARIN SODIUM 5000 UNIT(S): 5000 INJECTION INTRAVENOUS; SUBCUTANEOUS at 22:02

## 2019-09-30 RX ADMIN — Medication 40 MILLIEQUIVALENT(S): at 05:27

## 2019-09-30 RX ADMIN — LEVETIRACETAM 400 MILLIGRAM(S): 250 TABLET, FILM COATED ORAL at 22:02

## 2019-09-30 RX ADMIN — CHLORHEXIDINE GLUCONATE 15 MILLILITER(S): 213 SOLUTION TOPICAL at 05:27

## 2019-09-30 NOTE — PROGRESS NOTE ADULT - ASSESSMENT
85 yo M Hx HTN, GERD, prostate cancer (treated with radioactive seed implantation in 2001 no active treatment) presenting with status epilepticus.  Transferred from OSH to [facility] ICU for vEEG monitoring in setting of seizure activity.     Neuro  Differential diagnosis includes status epilepticus, HSV encephalitis, meningitis.  -c/w Keppra 2 g bid  -c/w locasamide 150 mg bid  -all sedation off and pt still minimally responsive  -HSV PCR negative- per neuro, will c/w acyclovir today and reassess tomorrow  -10mg decadron qd for HSV meningitis/inflammatory encephalopathy coverage  -MRI brain 9/25 shows abnormal areas of cortical restricted diffusion (high signal on diffusion-weighted imaging and corresponding dropout on ADC map) are notable most pronounced within the right parietal and occipital cortices.  Abnormal cortical restricted diffusion is also noted within bilateral  cingulate gyri, right worse than left, right pulvinar of the thalamus, right medial temporal lobe, and hippocampus. There is associated T2/FLAIR prolongation in these areas along with mild gyral swelling.   -f/u CSF viral panel, 14-3-3, tau, neuron specific enolase, HTLV-1, JEANETTE virus, HSV 6, paraneoplastic panel  	*Negative CSF studies so far:  Bacterial cx, WNV, Toxo, Cryptococcus, HSV  - vEEG from overnight 9/27-28 with potential epileptogenic focus in the right posterior quadrant, Moderate to severe nonspecific diffuse or multifocal cerebral dysfunction with right worse than left however no seizures were recorded.    CV  -Started lopressor 25mg BID 9/29 overnight for rapid HR  -hold home amlodipine    Resp  -intubated for airway protection in setting of seizure. Weaned sedation and will try to extubate per clinical status when mental status improved  -Saturating well on current settings  -pt able to CPAP well in the AM on 9/28, continue today    GI  -OG tube in place, on tube feeds  -CT OSH showed possible colitis (colonic wall thickening)    Renal  SANGITA on presentation at OSH  -resolved, continue to monitor BMP  -UA positive for nitrate and WBC on 9/28  -Rocephin 1GM q 24h x 7 days for UTI  -continue to monitor I/O  -pending urine cultures result  -will have to keep pt on IVF while on acyclovir    RHEUM  - will check YANDEL, ESR, CRP, DSDNA to ensure MRI findings not indicative of autoimmune disease    ID  r/o meningitis  -acyclovir as above  -s/p LP 9/25, f/u CSF studies as above  -BCx and sputum neg  -UA positive for UTI, treatment with Rocephin 1G daily x 7 days  -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

## 2019-09-30 NOTE — EEG REPORT - PLACEMENT AND LABELING OF ELECTRODES:
Statement Selected
cardiac cath with possible intervention

## 2019-09-30 NOTE — PROGRESS NOTE ADULT - ATTENDING COMMENTS
Off sedation, stable. Has some mental status. EEG stable.   9/30/19 continuous EEG  Abnormal EEG in an unresponsive patient.  - Lateralized Periodic Discharges (LPDs), right posterior quadrant (max P8/O2), 0.5-1 hz.   - Moderate to severe generalized slowing, right worse than left   _____________________________________________________________  EEG IMPRESSION/CLINICAL CORRELATE  Abnormal EEG study.  1. Potential epileptogenic focus in the right posterior quadrant.  2. Moderate to severe nonspecific diffuse or multifocal cerebral dysfunction, right worse than left    Differential still includes viral encephalitis, autoimmune or inflammatory encephalitis, vasculitis, neurosarcoid, stroke.     -Would obtain CTA head and neck if stable to evaluate for vasculitis or occlusion.   -Please send YANDEL, p-ANCA, c-ANCA, Sjogren's ab, Compliment C3 & C4, anti-endothelial antibodies, anti-histone, myeloperoxidase ab, TPO ab, quantitative immunoglobulins  - If no seizure activity overnight, can decrease keppra to 1500mg BID

## 2019-09-30 NOTE — PROGRESS NOTE ADULT - ASSESSMENT
85 y/o man with hx of prostate CA s/o seed implant and RT, HTN, p/w sudden onset AMS seizure brought to [facility] . Was found to be in status epilepticus and required intubation for airway control. Pt transferred to [facility] for further workup and EEG monitoring.     Pt has clear epileptogenic focus in the right parietal-occipital region.   Differential for cortical ribboning includes status epilepticus, infarct, HSV encephalitis, meningitis, hypoxic injury, CJD. CSF suggestive of viral etiology vs inflammatory. Less likely CJD given acuity of presentation.     Recommendations:    C/w decadron 10mg daily  In CSF, viral panel, cryptococcus, 14-3-3, tau, neuron specific enolase, HTLV-1, JEANETTE virus, HSV 6, paraneoplastic panel sent and pending results  c/w 2g IV Keppra, 150mg vimpat q12h    Continue VEEG monitoring  seizure precautions  neuro checks Q2 .   Care per MICU    d/w Dr. Carmichael 85 y/o man with hx of prostate CA s/o seed implant and RT, HTN, p/w sudden onset AMS seizure brought to [facility] . Was found to be in status epilepticus and required intubation for airway control. Pt transferred to [facility] for further workup and EEG monitoring.     Pt has clear epileptogenic focus in the right parietal-occipital region.   Differential for cortical ribboning includes status epilepticus, infarct, HSV encephalitis, meningitis, hypoxic injury, CJD. CSF suggestive of viral etiology vs inflammatory. Less likely CJD given acuity of presentation.     Recommendations:    C/w decadron 10mg daily  In CSF, viral panel, cryptococcus, 14-3-3, tau, neuron specific enolase, HTLV-1, JEANETTE virus, HSV 6, paraneoplastic panel sent and pending results  c/w 2g IV Keppra, 150mg vimpat q12h    VEEG monitoring  seizure precautions  neuro checks Q2 .   Care per MICU

## 2019-09-30 NOTE — PROGRESS NOTE ADULT - SUBJECTIVE AND OBJECTIVE BOX
Neurology Progress Note      Subjective: patient seen and examined at bedside. Patient off sedation, still intubated, and slightly improving.    MEDICATIONS  (STANDING):  acyclovir IVPB 850 milliGRAM(s) IV Intermittent every 8 hours  chlorhexidine 0.12% Liquid 15 milliLiter(s) Oral Mucosa every 12 hours  chlorhexidine 4% Liquid 1 Application(s) Topical <User Schedule>  dexamethasone  IVPB 10 milliGRAM(s) IV Intermittent daily  heparin  Injectable 5000 Unit(s) SubCutaneous every 8 hours  insulin lispro (HumaLOG) corrective regimen sliding scale   SubCutaneous every 6 hours  insulin NPH human recombinant 10 Unit(s) SubCutaneous every 6 hours  lacosamide IVPB 150 milliGRAM(s) IV Intermittent every 12 hours  lactated ringers. 1000 milliLiter(s) (75 mL/Hr) IV Continuous <Continuous>  levETIRAcetam  IVPB 2000 milliGRAM(s) IV Intermittent every 12 hours  metoprolol tartrate 25 milliGRAM(s) Oral two times a day  pantoprazole  Injectable 40 milliGRAM(s) IV Push daily  polyethylene glycol 3350 17 Gram(s) Oral two times a day  senna Syrup 10 milliLiter(s) Oral at bedtime    MEDICATIONS  (PRN):    Objective:   Vital Signs Last 24 Hrs  T(C): 37.3 (27 Sep 2019 11:00), Max: 38.1 (27 Sep 2019 00:00)  T(F): 99.1 (27 Sep 2019 11:00), Max: 100.6 (27 Sep 2019 00:00)  HR: 87 (27 Sep 2019 12:21) (77 - 120)  BP: 119/58 (27 Sep 2019 11:00) (106/64 - 153/73)  BP(mean): 84 (27 Sep 2019 11:00) (78 - 106)  RR: 14 (27 Sep 2019 11:00) (14 - 28)  SpO2: 98% (27 Sep 2019 12:21) (97% - 100%)    Neurological Exam:  Mental Status: off sedation, opens eyes to noxious stimuli  Cranial Nerves:  JOHN, 3 mm L, 2mm R, bilat, has corneal and gag   Motor: Slightly increased tone. No spontaneous movements.           Coord: Unable to examine  Tremor: No resting, postural or action tremor.  No myoclonus.  Sensation: no grimace or withdrawal to painful stimuli  Deep Tendon Reflexes: 1+ bilateral biceps, triceps, brachioradialis, knee and ankle. No Babinski present.  Gait: pt is bedbound      Other:                          12.6   15.5  )-----------( 278      ( 30 Sep 2019 00:28 )             37.6   09-30    135  |  93<L>  |  29<H>  ----------------------------<  187<H>  3.2<L>   |  31  |  0.84    Ca    8.7      30 Sep 2019 00:28  Phos  2.0     09-30  Mg     1.6     09-30    TPro  7.2  /  Alb  3.0<L>  /  TBili  0.6  /  DBili  x   /  AST  72<H>  /  ALT  80<H>  /  AlkPhos  146<H>  09-30    Cryptococcal Antigen - CSF: Negative (09.25.19 @ 18:45)  Culture - Fungal, CSF (09.25.19 @ 18:56)    Specimen Source: .CSF    Culture Results:   Testing in progress    Culture - CSF with Gram Stain . (09.25.19 @ 18:56)    Gram Stain:   No polymorphonuclear cells seen  No organisms seen  by cytocentrifuge    Specimen Source: .CSF    Lactate Dehydrogenase, CSF: 29:  Protein, CSF: 75 mg/dL (09.25.19 @ 15:47)   Glucose, CSF: 75 mg/dL (09.25.19 @ 15:47)    Cerebrospinal Fluid Cell Count-1 (09.25.19 @ 15:46)    CSF Appearance: Clear    CSF Lymphocytes: 88 %    CSF Monocytes/Macrophages: 12 %    CSF Segmented Neutrophils: 0 %    Total Nucleated Cell Count, CSF: 79 /uL    CSF Color: No Color    Radiology    < from: MR Head w/wo IV Cont (09.25.19 @ 17:09) >  Abnormal areas of cortical restricted diffusion (high signal on   diffusion-weighted imaging and corresponding dropout on ADC map) are   notable most pronounced within the right parietal and occipital cortices.   Abnormal cortical restricted diffusion is also noted within bilateral   cingulate gyri, right worse than left, right pulvinar of the thalamus,   right medial temporal lobe, and hippocampus. There is associated T2/FLAIR   prolongation in these areas along with mild gyral swelling. There is   associated sulcal effacement. There is no abnormal associated   enhancement. These findings were present on the outside MRI examination   from 9/23/2019.     Multiple additional patchy confluent nonspecific T2/FLAIR hyperintense   signal changes are noted throughout the bihemispheric white matter   without associated mass effect or restricted diffusion.    Ventricular size and configuration is unremarkable. No abnormal extra   axial fluidcollections are noted. Flow-voids are noted throughout the   major intracranial vessels, on the T2 weighted images, consistent with   their patency. The sella turcica and posterior fossa are unremarkable.    Secretions and mucosal thickening are noted within the right maxillary   sinus. Minimal scattered mucosal thickening throughout the ethmoid   labyrinth. Trace fluid signal is notable within the mastoid air cells   which is nonspecific. Layering secretions are noted within the   nasopharynx. Calvarial signal is within normal limits. The orbits appear   unremarkable.    IMPRESSION: Imaging findings compatible with status epilepticus   concordant with the patient's history. Please see discussion in the body   of the report.    Underlying etiology includes but is not limited to hypoxia, metabolic   derangement, drug toxicity, alcohol intoxication or withdrawal, or   encephalitis.    No abnormal intracranial enhancement.    < end of copied text >      MRI from [place] uploaded into system and shows right parieto-occipital cortical ribboning with T2 hyperintensity also involving the posterior right thalamus. 	    EEG IMPRESSION/CLINICAL CORRELATE  Abnormal EEG study.  1. Potential epileptogenic focus in the right posterior quadrant.  2. Moderate to severe nonspecific diffuse or multifocal cerebral dysfunction, right worse than left  3. No seizures were recorded.  ____________________________________________________________

## 2019-09-30 NOTE — PROGRESS NOTE ADULT - SUBJECTIVE AND OBJECTIVE BOX
CHIEF COMPLAINT: Patient is a 84y old  Male who presents with a chief complaint of status epilepticus (29 Sep 2019 07:39)    Interval Events:    No acute events overnight. Pt remains off all sedation.    REVIEW OF SYSTEMS:  Constitutional:   Eyes:  ENT:  CV:  Resp:  GI:  :  MSK:  Integumentary:  Neurological:  Psychiatric:  Endocrine:  Hematologic/Lymphatic:  Allergic/Immunologic:  [ ] All other systems negative  [x ] Unable to assess ROS because A&Ox0    OBJECTIVE:  ICU Vital Signs Last 24 Hrs  T(C): 37.3 (30 Sep 2019 04:00), Max: 37.5 (30 Sep 2019 00:00)  T(F): 99.2 (30 Sep 2019 04:00), Max: 99.5 (30 Sep 2019 00:00)  HR: 79 (30 Sep 2019 06:00) (65 - 98)  BP: 155/78 (30 Sep 2019 06:00) (103/55 - 159/72)  BP(mean): 108 (30 Sep 2019 06:00) (75 - 110)  ABP: --  ABP(mean): --  RR: 19 (30 Sep 2019 06:00) (14 - 20)  SpO2: 99% (30 Sep 2019 06:00) (97% - 100%)    Mode: AC/ CMV (Assist Control/ Continuous Mandatory Ventilation), RR (machine): 14, TV (machine): 400, FiO2: 30, PS: 5, ITime: 1, MAP: 16, PIP: 23    09-29 @ 07:01  -  09-30 @ 07:00  --------------------------------------------------------  IN: 3896.5 mL / OUT: 1200 mL / NET: 2696.5 mL      CAPILLARY BLOOD GLUCOSE      POCT Blood Glucose.: 126 mg/dL (30 Sep 2019 05:30)      PHYSICAL EXAM:  	Constitutional: intubated and obtunded  	Eyes: pupils equal and reactive to light b/l  	ENMT: normocephalic; MMM; healing wound on forehead  	Respiratory: lungs clear to auscultation b/l  	Cardiovascular: RRR; no murmurs rubs or gallops  	Gastrointestinal: abdomen soft, nontender, nondistended; bowel sounds all 4 quadrants  	Extremities: no peripheral edema  	Vascular: 2+ peripheral pulses LE b/l  	Neurological: AOx0, intubated. Spontaneously moving UE and LE non purposefully.     Skin: erythematous rash L palm    HOSPITAL MEDICATIONS:  MEDICATIONS  (STANDING):  acyclovir IVPB 850 milliGRAM(s) IV Intermittent every 8 hours  cefTRIAXone   IVPB 1000 milliGRAM(s) IV Intermittent every 24 hours  chlorhexidine 0.12% Liquid 15 milliLiter(s) Oral Mucosa every 12 hours  chlorhexidine 4% Liquid 1 Application(s) Topical <User Schedule>  dexamethasone  IVPB 10 milliGRAM(s) IV Intermittent daily  heparin  Injectable 5000 Unit(s) SubCutaneous every 8 hours  insulin lispro (HumaLOG) corrective regimen sliding scale   SubCutaneous every 6 hours  insulin NPH human recombinant 10 Unit(s) SubCutaneous every 6 hours  lacosamide IVPB 150 milliGRAM(s) IV Intermittent every 12 hours  lactated ringers. 1000 milliLiter(s) (75 mL/Hr) IV Continuous <Continuous>  levETIRAcetam  IVPB 2000 milliGRAM(s) IV Intermittent every 12 hours  metoprolol tartrate 25 milliGRAM(s) Oral two times a day  pantoprazole  Injectable 40 milliGRAM(s) IV Push daily  polyethylene glycol 3350 17 Gram(s) Oral two times a day  potassium chloride   Solution 40 milliEquivalent(s) Oral every 4 hours  senna Syrup 10 milliLiter(s) Oral at bedtime    MEDICATIONS  (PRN):      LABS:  (09-30 @ 00:28)                        12.6  15.5 )-----------( 278                 37.6    Neutrophils = 12.5 (80.3%)  Lymphocytes = 1.6 (10.0%)  Eosinophils = 0.1 (0.5%)  Basophils = 0.0 (0.1%)  Monocytes = 1.4 (9.0%)  Bands = --%    WBC Trend: 15.5<--, 17.1<--, 13.1<--  Hb Trend: 12.6<--, 13.2<--, 13.5<--, 13.3<--, 12.4<--  Plt Trend: 278<--, 308<--, 260<--, 242<--, 178<--  09-30    135  |  93<L>  |  29<H>  ----------------------------<  187<H>  3.2<L>   |  31  |  0.84    Ca    8.7      30 Sep 2019 00:28  Phos  2.0     09-30  Mg     1.6     09-30    TPro  7.2  /  Alb  3.0<L>  /  TBili  0.6  /  DBili  x   /  AST  72<H>  /  ALT  80<H>  /  AlkPhos  146<H>  09-30    Creatinine Trend: 0.84<--, 0.90<--, 0.85<--, 0.78<--, 0.86<--, 0.83<--      Arterial Blood Gas:  09-29 @ 14:07  7.50/44/132/34/97/9.9  ABG lactate: --  Arterial Blood Gas:  09-28 @ 11:44  7.52/40/128/32/97/8.8  ABG lactate: --      CARDIAC MARKERS ( 29 Sep 2019 00:49 )  Trop x     /  U/L / CKMB x           EKG: CHIEF COMPLAINT: Patient is a 84y old  Male who presents with a chief complaint of status epilepticus (29 Sep 2019 07:39)    Interval Events:    No acute events overnight. Pt remains off all sedation. This AM on physical pt had cool LLE.    REVIEW OF SYSTEMS:  Constitutional:   Eyes:  ENT:  CV:  Resp:  GI:  :  MSK:  Integumentary:  Neurological:  Psychiatric:  Endocrine:  Hematologic/Lymphatic:  Allergic/Immunologic:  [ ] All other systems negative  [x ] Unable to assess ROS because A&Ox0    OBJECTIVE:  ICU Vital Signs Last 24 Hrs  T(C): 37.3 (30 Sep 2019 04:00), Max: 37.5 (30 Sep 2019 00:00)  T(F): 99.2 (30 Sep 2019 04:00), Max: 99.5 (30 Sep 2019 00:00)  HR: 79 (30 Sep 2019 06:00) (65 - 98)  BP: 155/78 (30 Sep 2019 06:00) (103/55 - 159/72)  BP(mean): 108 (30 Sep 2019 06:00) (75 - 110)  ABP: --  ABP(mean): --  RR: 19 (30 Sep 2019 06:00) (14 - 20)  SpO2: 99% (30 Sep 2019 06:00) (97% - 100%)    Mode: AC/ CMV (Assist Control/ Continuous Mandatory Ventilation), RR (machine): 14, TV (machine): 400, FiO2: 30, PS: 5, ITime: 1, MAP: 16, PIP: 23    09-29 @ 07:01  -  09-30 @ 07:00  --------------------------------------------------------  IN: 3896.5 mL / OUT: 1200 mL / NET: 2696.5 mL      CAPILLARY BLOOD GLUCOSE      POCT Blood Glucose.: 126 mg/dL (30 Sep 2019 05:30)      PHYSICAL EXAM:  	Constitutional: intubated and obtunded  	Eyes: pupils equal and reactive to light b/l  	ENMT: normocephalic; MMM; healing wound on forehead  	Respiratory: lungs clear to auscultation b/l  	Cardiovascular: RRR; no murmurs rubs or gallops  	Gastrointestinal: abdomen soft, nontender, nondistended; bowel sounds all 4 quadrants  	Extremities: no peripheral edema  	Vascular: 2+ peripheral pulses LE b/l  	Neurological: AOx0, intubated. Following commands.    Skin: erythematous rash L palm    HOSPITAL MEDICATIONS:  MEDICATIONS  (STANDING):  acyclovir IVPB 850 milliGRAM(s) IV Intermittent every 8 hours  cefTRIAXone   IVPB 1000 milliGRAM(s) IV Intermittent every 24 hours  chlorhexidine 0.12% Liquid 15 milliLiter(s) Oral Mucosa every 12 hours  chlorhexidine 4% Liquid 1 Application(s) Topical <User Schedule>  dexamethasone  IVPB 10 milliGRAM(s) IV Intermittent daily  heparin  Injectable 5000 Unit(s) SubCutaneous every 8 hours  insulin lispro (HumaLOG) corrective regimen sliding scale   SubCutaneous every 6 hours  insulin NPH human recombinant 10 Unit(s) SubCutaneous every 6 hours  lacosamide IVPB 150 milliGRAM(s) IV Intermittent every 12 hours  lactated ringers. 1000 milliLiter(s) (75 mL/Hr) IV Continuous <Continuous>  levETIRAcetam  IVPB 2000 milliGRAM(s) IV Intermittent every 12 hours  metoprolol tartrate 25 milliGRAM(s) Oral two times a day  pantoprazole  Injectable 40 milliGRAM(s) IV Push daily  polyethylene glycol 3350 17 Gram(s) Oral two times a day  potassium chloride   Solution 40 milliEquivalent(s) Oral every 4 hours  senna Syrup 10 milliLiter(s) Oral at bedtime    MEDICATIONS  (PRN):      LABS:  (09-30 @ 00:28)                        12.6  15.5 )-----------( 278                 37.6    Neutrophils = 12.5 (80.3%)  Lymphocytes = 1.6 (10.0%)  Eosinophils = 0.1 (0.5%)  Basophils = 0.0 (0.1%)  Monocytes = 1.4 (9.0%)  Bands = --%    WBC Trend: 15.5<--, 17.1<--, 13.1<--  Hb Trend: 12.6<--, 13.2<--, 13.5<--, 13.3<--, 12.4<--  Plt Trend: 278<--, 308<--, 260<--, 242<--, 178<--  09-30    135  |  93<L>  |  29<H>  ----------------------------<  187<H>  3.2<L>   |  31  |  0.84    Ca    8.7      30 Sep 2019 00:28  Phos  2.0     09-30  Mg     1.6     09-30    TPro  7.2  /  Alb  3.0<L>  /  TBili  0.6  /  DBili  x   /  AST  72<H>  /  ALT  80<H>  /  AlkPhos  146<H>  09-30    Creatinine Trend: 0.84<--, 0.90<--, 0.85<--, 0.78<--, 0.86<--, 0.83<--      Arterial Blood Gas:  09-29 @ 14:07  7.50/44/132/34/97/9.9  ABG lactate: --  Arterial Blood Gas:  09-28 @ 11:44  7.52/40/128/32/97/8.8  ABG lactate: --      CARDIAC MARKERS ( 29 Sep 2019 00:49 )  Trop x     /  U/L / CKMB x           EKG:

## 2019-09-30 NOTE — EEG REPORT - NS EEG TEXT BOX
[facility]   COMPREHENSIVE EPILEPSY CENTER   REPORT OF CONTINUOUS VIDEO EEG     [facility]: [address], [place] [address], Ph#: [phone]  [facility]: [address], [place] [address], Ph#: [phone]  [facility]: [address] [place] [address]    Patient Name: SHIKHA MASON  Age and : 84y ([birth year])  MRN #: [id number]  Location: [address]  Referring Physician: Thang Bowles    Study Date: 19 08:00  End Date:   19 08:00    _____________________________________________________________  HISTORY    Patient is a 84y old  Male who presents with a chief complaint of status epilepticus (25 Sep 2019 03:42)      _____________________________________________________________  STUDY INTERPRETATION    Findings: The background was spontaneously variable and non clearly reactive. Background predominantly consisted of theta, delta with intermixed faster frequencies and 1-2 second periods of discontinuity.    Generalized Slowing:  Continuous diffuse delta and theta activity     Sleep Background:  Stage II sleep transients were not recorded.    Other Non-Epileptiform Findings:  Asymmetry, Increased Amplitude in right hemisphere    Interictal Epileptiform Activity:   Lateralized Periodic Discharges (LPDs), right posterior quadrant (max P8/O2), 0.5-1 hz.     Events:  Clinical events: None recorded.  Seizures: None recorded.    Activation Procedures:   Hyperventilation was not performed.    Photic stimulation was not performed.     Artifacts:  Intermittent myogenic and movement artifacts were noted.    ECG:  The heart rate on single channel ECG was predominantly between 60-70 BPM.    _____________________________________________________________  EEG SUMMARY/CLASSIFICATION    Abnormal EEG in an unresponsive patient.  - Lateralized Periodic Discharges (LPDs), right posterior quadrant (max P8/O2), 0.5-1 hz.   - Moderate to severe generalized slowing, right worse than left   _____________________________________________________________  EEG IMPRESSION/CLINICAL CORRELATE  Abnormal EEG study.  1. Potential epileptogenic focus in the right posterior quadrant.  2. Moderate to severe nonspecific diffuse or multifocal cerebral dysfunction, right worse than left  3. No seizures were recorded.  _____________________________________________________________  Marjorie Alfaro DO  Epilepsy Fellow, [facility] Epilepsy [place]    Jan Roque MD  Attending Physician, [facility] Epilepsy [place] [facility]   COMPREHENSIVE EPILEPSY CENTER   REPORT OF CONTINUOUS VIDEO EEG     [facility]: [address], [place] [address], Ph#: [phone]  [facility]: [id number], [place] [address], Ph#: [phone]  [facility]: [address] [place] [address]    Patient Name: SHIKHA MASON  Age and : 84y ([birth year])  MRN #: [id number]  Location: [address]  Referring Physician: Thang Bowles    Study Date: 19 08:00  End Date:   19 08:00    _____________________________________________________________  HISTORY    Patient is a 84y old  Male who presents with a chief complaint of status epilepticus (25 Sep 2019 03:42)      _____________________________________________________________  STUDY INTERPRETATION    Findings: The background was spontaneously variable and poorly reactive. Background predominantly consisted of theta, delta with intermixed faster frequencies and 1-2 second periods of discontinuity.    Generalized Slowing:  Continuous diffuse delta and theta activity     Sleep Background:  Stage II sleep transients were not recorded.    Other Non-Epileptiform Findings:  Asymmetry, Increased Amplitude in right hemisphere    Interictal Epileptiform Activity:   Lateralized Periodic Discharges (LPDs), right posterior quadrant (max P8/O2), 0.5-1 hz.     Events:  Clinical events: None recorded.  Seizures: None recorded.    Activation Procedures:   Hyperventilation was not performed.    Photic stimulation was not performed.     Artifacts:  Intermittent myogenic and movement artifacts were noted.    ECG:  The heart rate on single channel ECG was predominantly between 60-70 BPM.    _____________________________________________________________  EEG SUMMARY/CLASSIFICATION    Abnormal EEG in an unresponsive patient.  - Lateralized Periodic Discharges (LPDs), right posterior quadrant (max P8/O2), 0.5-1 hz.   - Moderate to severe generalized slowing, right worse than left   _____________________________________________________________  EEG IMPRESSION/CLINICAL CORRELATE  Abnormal EEG study.  1. Potential epileptogenic focus in the right posterior quadrant.  2. Moderate to severe nonspecific diffuse or multifocal cerebral dysfunction, right worse than left  3. No seizures were recorded.  _____________________________________________________________  Marjorie Alfaro DO  Epilepsy Fellow, [facility] Epilepsy [place]    Jan Roque MD  Attending Physician, [facility] [place]

## 2019-09-30 NOTE — PROGRESS NOTE ADULT - ATTENDING COMMENTS
1. Acute hypoxemic respiratory failure s/p seizure.  Tolerating PS wean . Still not wake enough for extubation. Continue PS wean as tolerated then back to AC mode.  2.Neuro: Seizures of unclear etiology. Continue Keppra and Vimpat. Continue EEG monitoring. CSF with lymphocytosis. HSV  PCR negative . Still on Acyclovir. Await further csf infectious studies and paraneoplastic studies  to result.

## 2019-10-01 LAB
ALBUMIN SERPL ELPH-MCNC: 3.3 G/DL — SIGNIFICANT CHANGE UP (ref 3.3–5)
ALP SERPL-CCNC: 160 U/L — HIGH (ref 40–120)
ALT FLD-CCNC: 92 U/L — HIGH (ref 10–45)
ANION GAP SERPL CALC-SCNC: 11 MMOL/L — SIGNIFICANT CHANGE UP (ref 5–17)
AST SERPL-CCNC: 63 U/L — HIGH (ref 10–40)
BILIRUB SERPL-MCNC: 0.6 MG/DL — SIGNIFICANT CHANGE UP (ref 0.2–1.2)
BUN SERPL-MCNC: 27 MG/DL — HIGH (ref 7–23)
CALCIUM SERPL-MCNC: 9.2 MG/DL — SIGNIFICANT CHANGE UP (ref 8.4–10.5)
CHLORIDE SERPL-SCNC: 91 MMOL/L — LOW (ref 96–108)
CK SERPL-CCNC: 31 U/L — SIGNIFICANT CHANGE UP (ref 30–200)
CO2 SERPL-SCNC: 30 MMOL/L — SIGNIFICANT CHANGE UP (ref 22–31)
CREAT SERPL-MCNC: 0.82 MG/DL — SIGNIFICANT CHANGE UP (ref 0.5–1.3)
DSDNA AB SER-ACNC: <12 IU/ML — SIGNIFICANT CHANGE UP
GAS PNL BLDA: SIGNIFICANT CHANGE UP
GLUCOSE BLDC GLUCOMTR-MCNC: 103 MG/DL — HIGH (ref 70–99)
GLUCOSE BLDC GLUCOMTR-MCNC: 120 MG/DL — HIGH (ref 70–99)
GLUCOSE BLDC GLUCOMTR-MCNC: 146 MG/DL — HIGH (ref 70–99)
GLUCOSE BLDC GLUCOMTR-MCNC: 185 MG/DL — HIGH (ref 70–99)
GLUCOSE SERPL-MCNC: 202 MG/DL — HIGH (ref 70–99)
HCT VFR BLD CALC: 40.5 % — SIGNIFICANT CHANGE UP (ref 39–50)
HGB BLD-MCNC: 13.4 G/DL — SIGNIFICANT CHANGE UP (ref 13–17)
MAGNESIUM SERPL-MCNC: 1.9 MG/DL — SIGNIFICANT CHANGE UP (ref 1.6–2.6)
MCHC RBC-ENTMCNC: 31.2 PG — SIGNIFICANT CHANGE UP (ref 27–34)
MCHC RBC-ENTMCNC: 33.1 GM/DL — SIGNIFICANT CHANGE UP (ref 32–36)
MCV RBC AUTO: 94.4 FL — SIGNIFICANT CHANGE UP (ref 80–100)
PHOSPHATE SERPL-MCNC: 2.5 MG/DL — SIGNIFICANT CHANGE UP (ref 2.5–4.5)
PLATELET # BLD AUTO: 351 K/UL — SIGNIFICANT CHANGE UP (ref 150–400)
POTASSIUM SERPL-MCNC: 3.3 MMOL/L — LOW (ref 3.5–5.3)
POTASSIUM SERPL-SCNC: 3.3 MMOL/L — LOW (ref 3.5–5.3)
PROT SERPL-MCNC: 7.7 G/DL — SIGNIFICANT CHANGE UP (ref 6–8.3)
RBC # BLD: 4.29 M/UL — SIGNIFICANT CHANGE UP (ref 4.2–5.8)
RBC # FLD: 13.3 % — SIGNIFICANT CHANGE UP (ref 10.3–14.5)
SODIUM SERPL-SCNC: 132 MMOL/L — LOW (ref 135–145)
WBC # BLD: 15.2 K/UL — HIGH (ref 3.8–10.5)
WBC # FLD AUTO: 15.2 K/UL — HIGH (ref 3.8–10.5)

## 2019-10-01 PROCEDURE — 99233 SBSQ HOSP IP/OBS HIGH 50: CPT | Mod: GC

## 2019-10-01 PROCEDURE — 99232 SBSQ HOSP IP/OBS MODERATE 35: CPT

## 2019-10-01 PROCEDURE — 99291 CRITICAL CARE FIRST HOUR: CPT

## 2019-10-01 PROCEDURE — 70496 CT ANGIOGRAPHY HEAD: CPT | Mod: 26

## 2019-10-01 PROCEDURE — 71045 X-RAY EXAM CHEST 1 VIEW: CPT | Mod: 26

## 2019-10-01 PROCEDURE — 70498 CT ANGIOGRAPHY NECK: CPT | Mod: 26

## 2019-10-01 RX ORDER — LABETALOL HCL 100 MG
100 TABLET ORAL EVERY 12 HOURS
Refills: 0 | Status: DISCONTINUED | OUTPATIENT
Start: 2019-10-01 | End: 2019-10-02

## 2019-10-01 RX ORDER — LABETALOL HCL 100 MG
10 TABLET ORAL ONCE
Refills: 0 | Status: COMPLETED | OUTPATIENT
Start: 2019-10-01 | End: 2019-10-01

## 2019-10-01 RX ORDER — SODIUM CHLORIDE 9 MG/ML
1000 INJECTION INTRAMUSCULAR; INTRAVENOUS; SUBCUTANEOUS
Refills: 0 | Status: DISCONTINUED | OUTPATIENT
Start: 2019-10-01 | End: 2019-10-02

## 2019-10-01 RX ORDER — POTASSIUM CHLORIDE 20 MEQ
10 PACKET (EA) ORAL
Refills: 0 | Status: COMPLETED | OUTPATIENT
Start: 2019-10-01 | End: 2019-10-01

## 2019-10-01 RX ADMIN — LEVETIRACETAM 400 MILLIGRAM(S): 250 TABLET, FILM COATED ORAL at 09:26

## 2019-10-01 RX ADMIN — HEPARIN SODIUM 5000 UNIT(S): 5000 INJECTION INTRAVENOUS; SUBCUTANEOUS at 21:08

## 2019-10-01 RX ADMIN — Medication 102 MILLIGRAM(S): at 05:15

## 2019-10-01 RX ADMIN — HUMAN INSULIN 10 UNIT(S): 100 INJECTION, SUSPENSION SUBCUTANEOUS at 05:24

## 2019-10-01 RX ADMIN — LEVETIRACETAM 400 MILLIGRAM(S): 250 TABLET, FILM COATED ORAL at 21:08

## 2019-10-01 RX ADMIN — HEPARIN SODIUM 5000 UNIT(S): 5000 INJECTION INTRAVENOUS; SUBCUTANEOUS at 14:18

## 2019-10-01 RX ADMIN — Medication 167 MILLIGRAM(S): at 09:53

## 2019-10-01 RX ADMIN — Medication 10 MILLIGRAM(S): at 15:30

## 2019-10-01 RX ADMIN — CHLORHEXIDINE GLUCONATE 1 APPLICATION(S): 213 SOLUTION TOPICAL at 05:15

## 2019-10-01 RX ADMIN — Medication 167 MILLIGRAM(S): at 02:28

## 2019-10-01 RX ADMIN — Medication 25 MILLIGRAM(S): at 09:25

## 2019-10-01 RX ADMIN — Medication 100 MILLIEQUIVALENT(S): at 04:50

## 2019-10-01 RX ADMIN — Medication 100 MILLIEQUIVALENT(S): at 03:12

## 2019-10-01 RX ADMIN — LACOSAMIDE 130 MILLIGRAM(S): 50 TABLET ORAL at 21:00

## 2019-10-01 RX ADMIN — Medication 167 MILLIGRAM(S): at 17:38

## 2019-10-01 RX ADMIN — Medication 2: at 00:28

## 2019-10-01 RX ADMIN — PANTOPRAZOLE SODIUM 40 MILLIGRAM(S): 20 TABLET, DELAYED RELEASE ORAL at 11:36

## 2019-10-01 RX ADMIN — Medication 100 MILLIEQUIVALENT(S): at 05:30

## 2019-10-01 RX ADMIN — HUMAN INSULIN 10 UNIT(S): 100 INJECTION, SUSPENSION SUBCUTANEOUS at 00:29

## 2019-10-01 RX ADMIN — LACOSAMIDE 130 MILLIGRAM(S): 50 TABLET ORAL at 09:25

## 2019-10-01 RX ADMIN — HEPARIN SODIUM 5000 UNIT(S): 5000 INJECTION INTRAVENOUS; SUBCUTANEOUS at 05:15

## 2019-10-01 RX ADMIN — Medication 1: at 11:36

## 2019-10-01 NOTE — PHYSICAL THERAPY INITIAL EVALUATION ADULT - ADDITIONAL COMMENTS
Pt lives in a private house with spouse  with ~ 5 steps to enter and  one flight of steps inside,. Pt was Ind with all ADLs and amb without AD.

## 2019-10-01 NOTE — AIRWAY REMOVAL NOTE  ADULT & PEDS - ARTIFICAL AIRWAY REMOVAL COMMENTS
written order for extubation verified. The patient was identified by full name and birth date compared to the identification band.  Present during the procedure was JUAN C Castillo

## 2019-10-01 NOTE — PROGRESS NOTE ADULT - SUBJECTIVE AND OBJECTIVE BOX
Neurology Progress Note    Subjective: Patient seen and examined at bedside. Pt extubated and off sedation     ROS - unable to obtain     Vital Signs Last 24 Hrs  T(C): 37.2 (01 Oct 2019 12:00), Max: 37.3 (30 Sep 2019 20:00)  T(F): 99 (01 Oct 2019 12:00), Max: 99.2 (30 Sep 2019 20:00)  HR: 66 (01 Oct 2019 14:00) (66 - 98)  BP: 145/81 (01 Oct 2019 14:00) (139/69 - 167/77)  BP(mean): 105 (01 Oct 2019 14:00) (95 - 121)  RR: 15 (01 Oct 2019 14:00) (15 - 21)  SpO2: 97% (01 Oct 2019 14:00) (95% - 99%)    Neurological Exam:  Mental Status: off sedation, opens eyes to noxious stimuli  Cranial Nerves:  JOHN, 3 mm L, 3 mm R, bilat. left facial droop  Motor: no movement of the left UE, slight shoulder movement of the right UE, very subtle movements of the LE more brisk on the left.       Coordination: Unable to examine  Tremor: No resting, postural or action tremor.  No myoclonus.  Sensation: Grimaces to noxious on the right exam  Deep Tendon Reflexes: 1+ bilateral biceps, triceps, brachioradialis, knee and ankle. No Babinski present.  Gait: pt is bedbound      Other:                          12.6   15.5  )-----------( 278      ( 30 Sep 2019 00:28 )             37.6   09-30    135  |  93<L>  |  29<H>  ----------------------------<  187<H>  3.2<L>   |  31  |  0.84    Ca    8.7      30 Sep 2019 00:28  Phos  2.0     09-30  Mg     1.6     09-30    TPro  7.2  /  Alb  3.0<L>  /  TBili  0.6  /  DBili  x   /  AST  72<H>  /  ALT  80<H>  /  AlkPhos  146<H>  09-30    Cryptococcal Antigen - CSF: Negative (09.25.19 @ 18:45)  Culture - Fungal, CSF (09.25.19 @ 18:56)    Specimen Source: .CSF    Culture Results:   Testing in progress    Culture - CSF with Gram Stain . (09.25.19 @ 18:56)    Gram Stain:   No polymorphonuclear cells seen  No organisms seen  by cytocentrifuge    Specimen Source: .CSF    Lactate Dehydrogenase, CSF: 29:  Protein, CSF: 75 mg/dL (09.25.19 @ 15:47)   Glucose, CSF: 75 mg/dL (09.25.19 @ 15:47)    Cerebrospinal Fluid Cell Count-1 (09.25.19 @ 15:46)    CSF Appearance: Clear    CSF Lymphocytes: 88 %    CSF Monocytes/Macrophages: 12 %    CSF Segmented Neutrophils: 0 %    Total Nucleated Cell Count, CSF: 79 /uL    CSF Color: No Color    Radiology    < from: MR Head w/wo IV Cont (09.25.19 @ 17:09) >  Abnormal areas of cortical restricted diffusion (high signal on   diffusion-weighted imaging and corresponding dropout on ADC map) are   notable most pronounced within the right parietal and occipital cortices.   Abnormal cortical restricted diffusion is also noted within bilateral   cingulate gyri, right worse than left, right pulvinar of the thalamus,   right medial temporal lobe, and hippocampus. There is associated T2/FLAIR   prolongation in these areas along with mild gyral swelling. There is   associated sulcal effacement. There is no abnormal associated   enhancement. These findings were present on the outside MRI examination   from 9/23/2019.     Multiple additional patchy confluent nonspecific T2/FLAIR hyperintense   signal changes are noted throughout the bihemispheric white matter   without associated mass effect or restricted diffusion.    Ventricular size and configuration is unremarkable. No abnormal extra   axial fluidcollections are noted. Flow-voids are noted throughout the   major intracranial vessels, on the T2 weighted images, consistent with   their patency. The sella turcica and posterior fossa are unremarkable.    Secretions and mucosal thickening are noted within the right maxillary   sinus. Minimal scattered mucosal thickening throughout the ethmoid   labyrinth. Trace fluid signal is notable within the mastoid air cells   which is nonspecific. Layering secretions are noted within the   nasopharynx. Calvarial signal is within normal limits. The orbits appear   unremarkable.    IMPRESSION: Imaging findings compatible with status epilepticus   concordant with the patient's history. Please see discussion in the body   of the report.    Underlying etiology includes but is not limited to hypoxia, metabolic   derangement, drug toxicity, alcohol intoxication or withdrawal, or   encephalitis.    No abnormal intracranial enhancement.    < end of copied text >      MRI from [place] uploaded into system and shows right parieto-occipital cortical ribboning with T2 hyperintensity also involving the posterior right thalamus. 	    EEG IMPRESSION/CLINICAL CORRELATE  Abnormal EEG study.  1. Potential epileptogenic focus in the right posterior quadrant.  2. Moderate to severe nonspecific diffuse or multifocal cerebral dysfunction, right worse than left  3. No seizures were recorded.  ____________________________________________________________

## 2019-10-01 NOTE — PROGRESS NOTE ADULT - ATTENDING COMMENTS
Pt stable today. S/p extubation by MICU team.   Evidence of eye opening apraxia, LUE weakness, ? left sided neglect on exam, which would fit with MRI lesion.   Still pending CSF studies.   vasculitis workup.  Will consider biopsy if workup negative or pt's condition deteriorates.

## 2019-10-01 NOTE — PROGRESS NOTE ADULT - PROBLEM SELECTOR PLAN 1
-hsv pcr negative  -if csf pcr negative - dc acyclovir  -cont acyclovir  -CSF w/u negative so far - bacterial cx, toxo, wnv, crypt  -check HIV test

## 2019-10-01 NOTE — PROGRESS NOTE ADULT - SUBJECTIVE AND OBJECTIVE BOX
SHIKHA MASON 84y MRN-[id number]    Patient is a 84y old  Male who presents with a chief complaint of status epilepticus (01 Oct 2019 07:20)      Follow Up/CC:  ID following for encephalitis    Interval History/ROS: no fever, intubated     Allergies    No Known Allergies    Intolerances        ANTIMICROBIALS:  acyclovir IVPB 850 every 8 hours      MEDICATIONS  (STANDING):  acyclovir IVPB 850 milliGRAM(s) IV Intermittent every 8 hours  chlorhexidine 4% Liquid 1 Application(s) Topical <User Schedule>  dexamethasone  IVPB 10 milliGRAM(s) IV Intermittent daily  heparin  Injectable 5000 Unit(s) SubCutaneous every 8 hours  insulin lispro (HumaLOG) corrective regimen sliding scale   SubCutaneous every 6 hours  insulin NPH human recombinant 10 Unit(s) SubCutaneous every 6 hours  lacosamide IVPB 150 milliGRAM(s) IV Intermittent every 12 hours  levETIRAcetam  IVPB 2000 milliGRAM(s) IV Intermittent every 12 hours  metoprolol tartrate 25 milliGRAM(s) Oral two times a day  pantoprazole  Injectable 40 milliGRAM(s) IV Push daily  polyethylene glycol 3350 17 Gram(s) Oral two times a day  senna Syrup 10 milliLiter(s) Oral at bedtime  sodium chloride 0.9%. 1000 milliLiter(s) (75 mL/Hr) IV Continuous <Continuous>    MEDICATIONS  (PRN):        Vital Signs Last 24 Hrs  T(C): 37.2 (01 Oct 2019 12:00), Max: 37.3 (30 Sep 2019 20:00)  T(F): 99 (01 Oct 2019 12:00), Max: 99.2 (30 Sep 2019 20:00)  HR: 68 (01 Oct 2019 12:00) (67 - 122)  BP: 158/77 (01 Oct 2019 12:00) (124/76 - 167/77)  BP(mean): 110 (01 Oct 2019 12:00) (92 - 121)  RR: 18 (01 Oct 2019 12:00) (18 - 21)  SpO2: 96% (01 Oct 2019 12:00) (95% - 99%)    CBC Full  -  ( 01 Oct 2019 01:10 )  WBC Count : 15.2 K/uL  RBC Count : 4.29 M/uL  Hemoglobin : 13.4 g/dL  Hematocrit : 40.5 %  Platelet Count - Automated : 351 K/uL  Mean Cell Volume : 94.4 fl  Mean Cell Hemoglobin : 31.2 pg  Mean Cell Hemoglobin Concentration : 33.1 gm/dL  Auto Neutrophil # : x  Auto Lymphocyte # : x  Auto Monocyte # : x  Auto Eosinophil # : x  Auto Basophil # : x  Auto Neutrophil % : x  Auto Lymphocyte % : x  Auto Monocyte % : x  Auto Eosinophil % : x  Auto Basophil % : x    10-01    132<L>  |  91<L>  |  27<H>  ----------------------------<  202<H>  3.3<L>   |  30  |  0.82    Ca    9.2      01 Oct 2019 01:10  Phos  2.5     10-01  Mg     1.9     10-01    TPro  7.7  /  Alb  3.3  /  TBili  0.6  /  DBili  x   /  AST  63<H>  /  ALT  92<H>  /  AlkPhos  160<H>  10-01    LIVER FUNCTIONS - ( 01 Oct 2019 01:10 )  Alb: 3.3 g/dL / Pro: 7.7 g/dL / ALK PHOS: 160 U/L / ALT: 92 U/L / AST: 63 U/L / GGT: x               MICROBIOLOGY:  .Urine  09-28-19   >=3 organisms. Probable collection contamination.  --  --      .Blood  09-28-19   No growth to date.  --  --      .CSF  09-25-19   No growth  --    No polymorphonuclear cells seen  No organisms seen  by cytocentrifuge      .Sputum  09-25-19   Normal Respiratory Domenica present  --    Numerous polymorphonuclear leukocytes per low power field  Rare Squamous epithelial cells per low power field  No organisms seen per oil power field      .Blood  09-25-19   No growth at 5 days.  --  --        Rapid RVP Result: NotDetec (09-30 @ 12:01)  HSV 1/2 PCR: NotDetec (09-28 @ 23:34)          RADIOLOGY    < from: [facility] Duplex Low Ext Arterial, Ltd, Left (09.30.19 @ 17:48) >  There is a greater than 50% stenosis of the anterior tibial artery in the   mid calf.      < end of copied text >

## 2019-10-01 NOTE — PROGRESS NOTE ADULT - ASSESSMENT
83 yo M Hx HTN, GERD, prostate cancer (treated with radioactive seed implantation in 2001 no active treatment) presenting with status epilepticus.  Transferred from OSH to [facility] ICU for vEEG monitoring in setting of seizure activity.     Neuro  Differential diagnosis includes status epilepticus, HSV encephalitis, meningitis, autoimmune encephalitis  -c/w Keppra 2 g bid. Consider decreasing to 1500mg BID today given no seizure activity.  -c/w locasamide 150 mg bid  -all sedation off and pt still minimally responsive  -HSV PCR negative- will continue acyclovir for now for other viral etiologies  -10mg decadron qd for inflammatory encephalopathy coverage  -MRI brain 9/25 shows abnormal areas of cortical restricted diffusion (high signal on diffusion-weighted imaging and corresponding dropout on ADC map) are notable most pronounced within the right parietal and occipital cortices.  Abnormal cortical restricted diffusion is also noted within bilateral  cingulate gyri, right worse than left, right pulvinar of the thalamus, right medial temporal lobe, and hippocampus. There is associated T2/FLAIR prolongation in these areas along with mild gyral swelling.   -f/u CSF viral panel, 14-3-3, tau, neuron specific enolase, HTLV-1, JEANETTE virus, HSV 6, paraneoplastic panel  	*Negative CSF studies so far:  Bacterial cx, WNV, Toxo, Cryptococcus, HSV  - vEEG has consistently shown foci of epileptiform activity without seizure (~5 days)    CV  -Started lopressor 25mg BID 9/29 overnight for rapid HR  -hold home amlodipine    Resp  -intubated for airway protection in setting of seizure. Weaned sedation and will try to extubate per clinical status when mental status improved  -Saturating well on current settings  -pt CPAPing well    GI  -OG tube in place, on tube feeds  -CT OSH showed possible colitis (colonic wall thickening)    Renal  SANGITA on presentation at OSH  -resolved, continue to monitor BMP  -continue to monitor I/O  -will have to keep pt on IVF while on acyclovir    RHEUM  - will check YANDEL, ESR, CRP, DSDNA to ensure MRI findings not indicative of autoimmune disease    ID  r/o meningitis  -acyclovir as above  -s/p LP 9/25, f/u CSF studies as above  -BCx and sputum neg  -UA contaminated, d/c CTX (total received 3 day course)  -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

## 2019-10-01 NOTE — PHYSICAL THERAPY INITIAL EVALUATION ADULT - STRENGTHENING, PT EVAL
GOAL: Pt will improve bilateral LE strength to 3/5, for increased limb stability, to improve gait and transfers in 4 weeks.

## 2019-10-01 NOTE — PHYSICAL THERAPY INITIAL EVALUATION ADULT - PERTINENT HX OF CURRENT PROBLEM, REHAB EVAL
Pt is a 85 y/o male admitted to [facility] on 9/24/19 Hx HTN, GERD, prostate cancer who presented with seizure.  On 9/20, patient fell out of bed and hit his head.  He subsequently developed seizure like activity.  Patient intubated by EMS for airway protection.  Patient given ativan and versed in ED.  Patient had CTH which did not show acute processes (including bleed).  Patient had brain MRI which showed R frontal enhancement (potential seizure foci).

## 2019-10-01 NOTE — PROGRESS NOTE ADULT - SUBJECTIVE AND OBJECTIVE BOX
CHIEF COMPLAINT: Patient is a 84y old  Male who presents with a chief complaint of status epilepticus (30 Sep 2019 14:56)    Interval Events:    No acute events overnight. Pt mildly hyponatremic on LR maintenance, switched to NS. No seizure activity.    REVIEW OF SYSTEMS:  Constitutional:   Eyes:  ENT:  CV:  Resp:  GI:  :  MSK:  Integumentary:  Neurological:  Psychiatric:  Endocrine:  Hematologic/Lymphatic:  Allergic/Immunologic:  [ ] All other systems negative  [x ] Unable to assess ROS because intubated, A&Ox0    OBJECTIVE:  ICU Vital Signs Last 24 Hrs  T(C): 37.1 (01 Oct 2019 04:00), Max: 37.4 (30 Sep 2019 08:00)  T(F): 98.8 (01 Oct 2019 04:00), Max: 99.4 (30 Sep 2019 08:00)  HR: 78 (01 Oct 2019 07:00) (68 - 122)  BP: 156/84 (01 Oct 2019 07:00) (110/74 - 168/89)  BP(mean): 112 (01 Oct 2019 07:00) (87 - 123)  ABP: --  ABP(mean): --  RR: 20 (01 Oct 2019 07:00) (18 - 21)  SpO2: 98% (01 Oct 2019 07:00) (95% - 99%)    Mode: CPAP with PS, FiO2: 30, PEEP: 5, PS: 5, MAP: 8    09-30 @ 07:01  -  10-01 @ 07:00  --------------------------------------------------------  IN: 4230 mL / OUT: 2800 mL / NET: 1430 mL      CAPILLARY BLOOD GLUCOSE      POCT Blood Glucose.: 146 mg/dL (01 Oct 2019 05:12)      PHYSICAL EXAM:  	Constitutional: intubated and obtunded  	Eyes: pupils equal and reactive to light b/l  	ENMT: normocephalic; MMM; healing wound on forehead  	Respiratory: lungs clear to auscultation b/l  	Cardiovascular: RRR; no murmurs rubs or gallops  	Gastrointestinal: abdomen soft, nontender, nondistended; bowel sounds all 4 quadrants  	Extremities: no peripheral edema  	Vascular: 1+ LLE dorsalis pedis pulse, 2+ R side  	Neurological: AOx0, intubated. Following commands.    Skin: erythematous rash L palm      HOSPITAL MEDICATIONS:  MEDICATIONS  (STANDING):  acyclovir IVPB 850 milliGRAM(s) IV Intermittent every 8 hours  chlorhexidine 0.12% Liquid 15 milliLiter(s) Oral Mucosa every 12 hours  chlorhexidine 4% Liquid 1 Application(s) Topical <User Schedule>  dexamethasone  IVPB 10 milliGRAM(s) IV Intermittent daily  heparin  Injectable 5000 Unit(s) SubCutaneous every 8 hours  insulin lispro (HumaLOG) corrective regimen sliding scale   SubCutaneous every 6 hours  insulin NPH human recombinant 10 Unit(s) SubCutaneous every 6 hours  lacosamide IVPB 150 milliGRAM(s) IV Intermittent every 12 hours  levETIRAcetam  IVPB 2000 milliGRAM(s) IV Intermittent every 12 hours  metoprolol tartrate 25 milliGRAM(s) Oral two times a day  pantoprazole  Injectable 40 milliGRAM(s) IV Push daily  polyethylene glycol 3350 17 Gram(s) Oral two times a day  senna Syrup 10 milliLiter(s) Oral at bedtime  sodium chloride 0.9%. 1000 milliLiter(s) (75 mL/Hr) IV Continuous <Continuous>    MEDICATIONS  (PRN):      LABS:  (10-01 @ 01:10)                        13.4  15.2 )-----------( 351                 40.5    Neutrophils = -- (--%)  Lymphocytes = -- (--%)  Eosinophils = -- (--%)  Basophils = -- (--%)  Monocytes = -- (--%)  Bands = --%    WBC Trend: 15.2<--, 15.5<--, 17.1<--  Hb Trend: 13.4<--, 12.6<--, 13.2<--, 13.5<--, 13.3<--  Plt Trend: 351<--, 278<--, 308<--, 260<--, 242<--  10-01    132<L>  |  91<L>  |  27<H>  ----------------------------<  202<H>  3.3<L>   |  30  |  0.82    Ca    9.2      01 Oct 2019 01:10  Phos  2.5     10-01  Mg     1.9     10-01    TPro  7.7  /  Alb  3.3  /  TBili  0.6  /  DBili  x   /  AST  63<H>  /  ALT  92<H>  /  AlkPhos  160<H>  10-01    Creatinine Trend: 0.82<--, 0.84<--, 0.90<--, 0.85<--, 0.78<--, 0.86<--      Arterial Blood Gas:  10-01 @ 00:14  7.52/41/161/33/98/9.6  ABG lactate: --  Arterial Blood Gas:  09-29 @ 14:07  7.50/44/132/34/97/9.9  ABG lactate: --      CARDIAC MARKERS ( 01 Oct 2019 01:10 )  Trop x     / CK 31 U/L / CKMB x           IMAGING:  < from: [facility] Duplex Low Ext Arterial, Ltd, Left (09.30.19 @ 17:48) >  EXAM:  DUPLEX LOW ARTERIES UNI LTD LT                            PROCEDURE DATE:  09/30/2019            INTERPRETATION:  Clinical information: Cold left foot    Technique: Grayscale, color and spectral Doppler imaging was performed at   the arteries of the Left lower extremity.    Findings:    There is an intermittently irregular heart rate.    Calcified atheromatous plaques affecting the trifurcation arteries and   the left calf.    The peak systolic velocities of the Left lower extremity are as follows:     Distal external iliac artery: 133 cm/s   Common femoral artery: 98 cm/s   Deep femoral artery: 35 cm/s  Superficial femoral artery: 90 cm/s proximal,  75 cm/s mid, 79 cm/s distal  Popliteal artery: 62 cm/s  Tibioperoneal trunk: 58 cm/s  Posterior tibial artery: 54 cm/s proximal,  54 cm/s mid, 43 cm/s distal  Peroneal artery: 80 cm/s proximal,  72 cm/s mid, 51 cm/s distal  Anterior tibial artery: 46 cm/s proximal,  263 cm/s mid, 12 cm/s distal  Dorsalis pedis artery: 22 cm/s     Impression:    There is a greater than 50% stenosis of the anterior tibial artery in the   mid calf.      < end of copied text >

## 2019-10-01 NOTE — PROGRESS NOTE ADULT - ASSESSMENT
85 yo M Hx HTN, GERD, prostate cancer (treated with radioactive seed implantation in 2001 no active treatment) presenting with status epilepticus.  Transferred from OSH to [facility] ICU for vEEG monitoring in setting of seizure activity with abnormal CSF with lymphocyte predominance

## 2019-10-01 NOTE — PROGRESS NOTE ADULT - ATTENDING COMMENTS
1. Acute hypoxemic respiratory failure s/p seizure.  Tolerating PS wean . More alert today with good cough. Pt given trial of extubation.  2.Neuro: Seizures of unclear etiology. Continue Keppra and Vimpat.  CSF with lymphocytosis. HSV  PCR negative . Still on Acyclovir. Await further csf infectious studies and paraneoplastic studies  to result.

## 2019-10-01 NOTE — PHYSICAL THERAPY INITIAL EVALUATION ADULT - PRECAUTIONS/LIMITATIONS, REHAB EVAL
Patient started on propofol gtt, locasamide and keppra.  Patient started on acyclovir for meningitis suspicion.  Patient's labs were significant for WBC 13, lacate >10, BUN 29, and Cr 1.4.  Patient had spot EEG at OSH which showed R focal seizure activity on 9/23.  Patient was following commands and then attempted to wean off propofol, however unsuccessful (patient started having seizures again).   Resumed prop and increased keppra dose.  Patient transferred to [facility] MICU for vEEG and management of status epilepticus.

## 2019-10-01 NOTE — PROGRESS NOTE ADULT - ATTENDING COMMENTS
Tomas Marcos  Attending Physician   Division of Infectious Disease  Pager #[phone]  After 5pm/weekend or no response, call #[phone]

## 2019-10-01 NOTE — PROGRESS NOTE ADULT - ASSESSMENT
83 y/o man with hx of prostate CA s/o seed implant and RT, HTN, p/w sudden onset AMS seizure brought to [facility] . Was found to be in status epilepticus and required intubation for airway control. Pt transferred to [facility] for further workup and EEG monitoring.     Pt has clear epileptogenic focus in the right parietal-occipital region.   Differential for cortical ribboning includes status epilepticus, infarct, HSV encephalitis, meningitis, hypoxic injury, CJD. CSF suggestive of viral etiology vs inflammatory. Less likely CJD given acuity of presentation.     Recommendations:  C/w decadron 10mg daily  In CSF, awaiting PCR viral panel, cryptococcus, 14-3-3, tau, neuron specific enolase, HTLV-1, JEANETTE virus, HSV 6, paraneoplastic panel sent and pending results  c/w 2g IV Keppra, 150mg vimpat q12h    Can DC VEEG  seizure precautions  neuro checks Q2.   Care per MICU

## 2019-10-01 NOTE — PHYSICAL THERAPY INITIAL EVALUATION ADULT - MANUAL MUSCLE TESTING RESULTS, REHAB EVAL
R knee extension 3/5, R hip flexion 3-/5, R ankle 3/5, LLE 2-/5, RUE 3-/5, LUE 2/5, L hand/wrist 0/5/grossly assessed due to

## 2019-10-02 LAB
ALBUMIN SERPL ELPH-MCNC: 3.2 G/DL — LOW (ref 3.3–5)
ALP SERPL-CCNC: 149 U/L — HIGH (ref 40–120)
ALT FLD-CCNC: 76 U/L — HIGH (ref 10–45)
ANION GAP SERPL CALC-SCNC: 14 MMOL/L — SIGNIFICANT CHANGE UP (ref 5–17)
AST SERPL-CCNC: 41 U/L — HIGH (ref 10–40)
BASOPHILS # BLD AUTO: 0.03 K/UL — SIGNIFICANT CHANGE UP (ref 0–0.2)
BASOPHILS NFR BLD AUTO: 0.2 % — SIGNIFICANT CHANGE UP (ref 0–2)
BILIRUB SERPL-MCNC: 0.9 MG/DL — SIGNIFICANT CHANGE UP (ref 0.2–1.2)
BUN SERPL-MCNC: 26 MG/DL — HIGH (ref 7–23)
CALCIUM SERPL-MCNC: 9.3 MG/DL — SIGNIFICANT CHANGE UP (ref 8.4–10.5)
CHLORIDE SERPL-SCNC: 96 MMOL/L — SIGNIFICANT CHANGE UP (ref 96–108)
CO2 SERPL-SCNC: 27 MMOL/L — SIGNIFICANT CHANGE UP (ref 22–31)
CREAT SERPL-MCNC: 0.8 MG/DL — SIGNIFICANT CHANGE UP (ref 0.5–1.3)
CSF PCR RESULT: SIGNIFICANT CHANGE UP
EOSINOPHIL # BLD AUTO: 0.05 K/UL — SIGNIFICANT CHANGE UP (ref 0–0.5)
EOSINOPHIL NFR BLD AUTO: 0.3 % — SIGNIFICANT CHANGE UP (ref 0–6)
GLUCOSE BLDC GLUCOMTR-MCNC: 147 MG/DL — HIGH (ref 70–99)
GLUCOSE BLDC GLUCOMTR-MCNC: 199 MG/DL — HIGH (ref 70–99)
GLUCOSE BLDC GLUCOMTR-MCNC: 244 MG/DL — HIGH (ref 70–99)
GLUCOSE SERPL-MCNC: 120 MG/DL — HIGH (ref 70–99)
HCT VFR BLD CALC: 41.1 % — SIGNIFICANT CHANGE UP (ref 39–50)
HGB BLD-MCNC: 13.9 G/DL — SIGNIFICANT CHANGE UP (ref 13–17)
IMM GRANULOCYTES NFR BLD AUTO: 0.5 % — SIGNIFICANT CHANGE UP (ref 0–1.5)
LYMPHOCYTES # BLD AUTO: 17.3 % — SIGNIFICANT CHANGE UP (ref 13–44)
LYMPHOCYTES # BLD AUTO: 2.54 K/UL — SIGNIFICANT CHANGE UP (ref 1–3.3)
MAGNESIUM SERPL-MCNC: 1.8 MG/DL — SIGNIFICANT CHANGE UP (ref 1.6–2.6)
MCHC RBC-ENTMCNC: 30.5 PG — SIGNIFICANT CHANGE UP (ref 27–34)
MCHC RBC-ENTMCNC: 33.8 GM/DL — SIGNIFICANT CHANGE UP (ref 32–36)
MCV RBC AUTO: 90.1 FL — SIGNIFICANT CHANGE UP (ref 80–100)
MONOCYTES # BLD AUTO: 1.24 K/UL — HIGH (ref 0–0.9)
MONOCYTES NFR BLD AUTO: 8.4 % — SIGNIFICANT CHANGE UP (ref 2–14)
NEUTROPHILS # BLD AUTO: 10.76 K/UL — HIGH (ref 1.8–7.4)
NEUTROPHILS NFR BLD AUTO: 73.3 % — SIGNIFICANT CHANGE UP (ref 43–77)
NRBC # BLD: 0 /100 WBCS — SIGNIFICANT CHANGE UP (ref 0–0)
PHOSPHATE SERPL-MCNC: 3.4 MG/DL — SIGNIFICANT CHANGE UP (ref 2.5–4.5)
PLATELET # BLD AUTO: 350 K/UL — SIGNIFICANT CHANGE UP (ref 150–400)
POTASSIUM SERPL-MCNC: 3.3 MMOL/L — LOW (ref 3.5–5.3)
POTASSIUM SERPL-SCNC: 3.3 MMOL/L — LOW (ref 3.5–5.3)
PROT SERPL-MCNC: 7.6 G/DL — SIGNIFICANT CHANGE UP (ref 6–8.3)
RBC # BLD: 4.56 M/UL — SIGNIFICANT CHANGE UP (ref 4.2–5.8)
RBC # FLD: 13.6 % — SIGNIFICANT CHANGE UP (ref 10.3–14.5)
SODIUM SERPL-SCNC: 137 MMOL/L — SIGNIFICANT CHANGE UP (ref 135–145)
WBC # BLD: 14.69 K/UL — HIGH (ref 3.8–10.5)
WBC # FLD AUTO: 14.69 K/UL — HIGH (ref 3.8–10.5)
WNV AB SPEC QL: SIGNIFICANT CHANGE UP
WNV IGG TITR FLD: NEGATIVE — SIGNIFICANT CHANGE UP
WNV IGM SPEC QL: NEGATIVE — SIGNIFICANT CHANGE UP

## 2019-10-02 PROCEDURE — 99233 SBSQ HOSP IP/OBS HIGH 50: CPT | Mod: GC

## 2019-10-02 PROCEDURE — 99232 SBSQ HOSP IP/OBS MODERATE 35: CPT

## 2019-10-02 RX ORDER — POTASSIUM CHLORIDE 20 MEQ
40 PACKET (EA) ORAL ONCE
Refills: 0 | Status: COMPLETED | OUTPATIENT
Start: 2019-10-02 | End: 2019-10-02

## 2019-10-02 RX ORDER — POTASSIUM CHLORIDE 20 MEQ
10 PACKET (EA) ORAL
Refills: 0 | Status: COMPLETED | OUTPATIENT
Start: 2019-10-02 | End: 2019-10-02

## 2019-10-02 RX ORDER — LABETALOL HCL 100 MG
100 TABLET ORAL EVERY 8 HOURS
Refills: 0 | Status: DISCONTINUED | OUTPATIENT
Start: 2019-10-02 | End: 2019-10-23

## 2019-10-02 RX ORDER — MAGNESIUM SULFATE 500 MG/ML
1 VIAL (ML) INJECTION ONCE
Refills: 0 | Status: COMPLETED | OUTPATIENT
Start: 2019-10-02 | End: 2019-10-02

## 2019-10-02 RX ORDER — SODIUM CHLORIDE 9 MG/ML
1000 INJECTION INTRAMUSCULAR; INTRAVENOUS; SUBCUTANEOUS
Refills: 0 | Status: DISCONTINUED | OUTPATIENT
Start: 2019-10-02 | End: 2019-10-03

## 2019-10-02 RX ADMIN — SODIUM CHLORIDE 75 MILLILITER(S): 9 INJECTION INTRAMUSCULAR; INTRAVENOUS; SUBCUTANEOUS at 10:23

## 2019-10-02 RX ADMIN — Medication 1: at 17:47

## 2019-10-02 RX ADMIN — Medication 100 MILLIEQUIVALENT(S): at 04:05

## 2019-10-02 RX ADMIN — HEPARIN SODIUM 5000 UNIT(S): 5000 INJECTION INTRAVENOUS; SUBCUTANEOUS at 21:38

## 2019-10-02 RX ADMIN — HUMAN INSULIN 10 UNIT(S): 100 INJECTION, SUSPENSION SUBCUTANEOUS at 17:48

## 2019-10-02 RX ADMIN — LACOSAMIDE 130 MILLIGRAM(S): 50 TABLET ORAL at 10:23

## 2019-10-02 RX ADMIN — PANTOPRAZOLE SODIUM 40 MILLIGRAM(S): 20 TABLET, DELAYED RELEASE ORAL at 11:16

## 2019-10-02 RX ADMIN — SODIUM CHLORIDE 75 MILLILITER(S): 9 INJECTION INTRAMUSCULAR; INTRAVENOUS; SUBCUTANEOUS at 21:39

## 2019-10-02 RX ADMIN — Medication 167 MILLIGRAM(S): at 17:08

## 2019-10-02 RX ADMIN — Medication 167 MILLIGRAM(S): at 09:32

## 2019-10-02 RX ADMIN — Medication 102 MILLIGRAM(S): at 05:05

## 2019-10-02 RX ADMIN — HUMAN INSULIN 10 UNIT(S): 100 INJECTION, SUSPENSION SUBCUTANEOUS at 11:16

## 2019-10-02 RX ADMIN — Medication 100 MILLIGRAM(S): at 21:38

## 2019-10-02 RX ADMIN — HEPARIN SODIUM 5000 UNIT(S): 5000 INJECTION INTRAVENOUS; SUBCUTANEOUS at 14:15

## 2019-10-02 RX ADMIN — LEVETIRACETAM 600 MILLIGRAM(S): 250 TABLET, FILM COATED ORAL at 10:23

## 2019-10-02 RX ADMIN — Medication 100 MILLIEQUIVALENT(S): at 03:01

## 2019-10-02 RX ADMIN — Medication 100 MILLIEQUIVALENT(S): at 01:40

## 2019-10-02 RX ADMIN — Medication 100 GRAM(S): at 01:54

## 2019-10-02 RX ADMIN — LEVETIRACETAM 600 MILLIGRAM(S): 250 TABLET, FILM COATED ORAL at 21:39

## 2019-10-02 RX ADMIN — Medication 40 MILLIEQUIVALENT(S): at 01:40

## 2019-10-02 RX ADMIN — POLYETHYLENE GLYCOL 3350 17 GRAM(S): 17 POWDER, FOR SOLUTION ORAL at 17:47

## 2019-10-02 RX ADMIN — HEPARIN SODIUM 5000 UNIT(S): 5000 INJECTION INTRAVENOUS; SUBCUTANEOUS at 05:05

## 2019-10-02 RX ADMIN — Medication 2: at 11:16

## 2019-10-02 RX ADMIN — LACOSAMIDE 130 MILLIGRAM(S): 50 TABLET ORAL at 21:38

## 2019-10-02 RX ADMIN — Medication 167 MILLIGRAM(S): at 01:40

## 2019-10-02 RX ADMIN — Medication 100 MILLIGRAM(S): at 14:15

## 2019-10-02 RX ADMIN — CHLORHEXIDINE GLUCONATE 1 APPLICATION(S): 213 SOLUTION TOPICAL at 05:06

## 2019-10-02 RX ADMIN — Medication 100 MILLIGRAM(S): at 06:47

## 2019-10-02 NOTE — PROGRESS NOTE ADULT - SUBJECTIVE AND OBJECTIVE BOX
SHIKHA MASON 84y MRN-[id number]    Patient is a 84y old  Male who presents with a chief complaint of status epilepticus (02 Oct 2019 07:37)      Follow Up/CC:  ID following for encephaliits    Interval History/ROS: extubated, lethargic, poor historian     Allergies    No Known Allergies    Intolerances        ANTIMICROBIALS:  acyclovir IVPB 850 every 8 hours      MEDICATIONS  (STANDING):  acyclovir IVPB 850 milliGRAM(s) IV Intermittent every 8 hours  chlorhexidine 4% Liquid 1 Application(s) Topical <User Schedule>  dexamethasone  IVPB 10 milliGRAM(s) IV Intermittent daily  heparin  Injectable 5000 Unit(s) SubCutaneous every 8 hours  insulin lispro (HumaLOG) corrective regimen sliding scale   SubCutaneous every 6 hours  insulin NPH human recombinant 10 Unit(s) SubCutaneous every 6 hours  labetalol 100 milliGRAM(s) Oral every 8 hours  lacosamide IVPB 150 milliGRAM(s) IV Intermittent every 12 hours  levETIRAcetam  IVPB 2000 milliGRAM(s) IV Intermittent every 12 hours  pantoprazole  Injectable 40 milliGRAM(s) IV Push daily  polyethylene glycol 3350 17 Gram(s) Oral two times a day  senna Syrup 10 milliLiter(s) Oral at bedtime  sodium chloride 0.9%. 1000 milliLiter(s) (75 mL/Hr) IV Continuous <Continuous>    MEDICATIONS  (PRN):        Vital Signs Last 24 Hrs  T(C): 36.8 (02 Oct 2019 08:00), Max: 37.2 (01 Oct 2019 12:00)  T(F): 98.2 (02 Oct 2019 08:00), Max: 99 (01 Oct 2019 12:00)  HR: 73 (02 Oct 2019 09:00) (61 - 85)  BP: 153/81 (02 Oct 2019 09:00) (113/62 - 182/94)  BP(mean): 110 (02 Oct 2019 09:00) (82 - 129)  RR: 16 (02 Oct 2019 09:00) (13 - 26)  SpO2: 95% (02 Oct 2019 09:00) (95% - 100%)    CBC Full  -  ( 02 Oct 2019 00:27 )  WBC Count : 14.69 K/uL  RBC Count : 4.56 M/uL  Hemoglobin : 13.9 g/dL  Hematocrit : 41.1 %  Platelet Count - Automated : 350 K/uL  Mean Cell Volume : 90.1 fl  Mean Cell Hemoglobin : 30.5 pg  Mean Cell Hemoglobin Concentration : 33.8 gm/dL  Auto Neutrophil # : 10.76 K/uL  Auto Lymphocyte # : 2.54 K/uL  Auto Monocyte # : 1.24 K/uL  Auto Eosinophil # : 0.05 K/uL  Auto Basophil # : 0.03 K/uL  Auto Neutrophil % : 73.3 %  Auto Lymphocyte % : 17.3 %  Auto Monocyte % : 8.4 %  Auto Eosinophil % : 0.3 %  Auto Basophil % : 0.2 %    10-02    137  |  96  |  26<H>  ----------------------------<  120<H>  3.3<L>   |  27  |  0.80    Ca    9.3      02 Oct 2019 00:27  Phos  3.4     10-02  Mg     1.8     10-02    TPro  7.6  /  Alb  3.2<L>  /  TBili  0.9  /  DBili  x   /  AST  41<H>  /  ALT  76<H>  /  AlkPhos  149<H>  10-02    LIVER FUNCTIONS - ( 02 Oct 2019 00:27 )  Alb: 3.2 g/dL / Pro: 7.6 g/dL / ALK PHOS: 149 U/L / ALT: 76 U/L / AST: 41 U/L / GGT: x               MICROBIOLOGY:  .Urine  09-28-19   >=3 organisms. Probable collection contamination.  --  --      .Blood  09-28-19   No growth to date.  --  --      .CSF  09-25-19   No growth  --    No polymorphonuclear cells seen  No organisms seen  by cytocentrifuge      .Sputum  09-25-19   Normal Respiratory Domenica present  --    Numerous polymorphonuclear leukocytes per low power field  Rare Squamous epithelial cells per low power field  No organisms seen per oil power field      .Blood  09-25-19   No growth at 5 days.  --  --    Rapid RVP Result: NotDetec (09-30 @ 12:01)  HSV 1/2 PCR: NotDetec (09-28 @ 23:34)      RADIOLOGY  < from: Xray Chest 1 View- PORTABLE-Urgent (10.01.19 @ 23:31) >  The heart is slightly enlarged. The lungs appear to be clear. An NG tube   is within the stomach. Calcified aortic knob.    < end of copied text >

## 2019-10-02 NOTE — PROGRESS NOTE ADULT - ASSESSMENT
Right IJV tunneled HD catheter placement  Procedure Note    PATIENT NAME: Shawna De La Cruz  : 1946  MRN: [id number]     85 yo M Hx HTN, GERD, prostate cancer (treated with radioactive seed implantation in 2001 no active treatment) presenting with status epilepticus.  Transferred from OSH to [facility] ICU for vEEG monitoring in setting of seizure activity.     Neuro  Differential diagnosis includes status epilepticus, HSV encephalitis, meningitis, autoimmune encephalitis  -c/w Keppra 2 g bid. Consider decreasing to 1500mg BID today given no seizure activity.  -c/w locasamide 150 mg bid  -all sedation off and pt still minimally responsive  -HSV PCR negative- will continue acyclovir for now for other viral etiologies  -10mg decadron qd for inflammatory encephalopathy coverage  -MRI brain 9/25 shows abnormal areas of cortical restricted diffusion (high signal on diffusion-weighted imaging and corresponding dropout on ADC map) are notable most pronounced within the right parietal and occipital cortices.  Abnormal cortical restricted diffusion is also noted within bilateral  cingulate gyri, right worse than left, right pulvinar of the thalamus, right medial temporal lobe, and hippocampus. There is associated T2/FLAIR prolongation in these areas along with mild gyral swelling.   -f/u CSF viral panel, 14-3-3, tau, neuron specific enolase, HTLV-1, JEANETTE virus, HSV 6, paraneoplastic panel  	*Negative CSF studies so far:  Bacterial cx, WNV, Toxo, Cryptococcus, HSV  - vEEG has consistently shown foci of epileptiform activity without seizure (~5 days)    CV  -Started lopressor 25mg BID 9/29 overnight for rapid HR  -hold home amlodipine    Resp  -intubated for airway protection in setting of seizure. Weaned sedation and will try to extubate per clinical status when mental status improved  -Saturating well on current settings  -pt CPAPing well    GI  -NG tube in place, on tube feeds  -CT OSH showed possible colitis (colonic wall thickening)    Renal  SANGITA on presentation at OSH  -resolved, continue to monitor BMP  -continue to monitor I/O  -will have to keep pt on IVF while on acyclovir    RHEUM  - will check YANDEL, ESR, CRP, DSDNA to ensure MRI findings not indicative of autoimmune disease    ID  r/o meningitis  -acyclovir as above  -s/p LP 9/25, f/u CSF studies as above  -BCx and sputum neg  -UA contaminated, d/c CTX (total received 3 day course)  -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 85 yo M Hx HTN, GERD, prostate cancer (treated with radioactive seed implantation in 2001 no active treatment) presenting with status epilepticus.  Transferred from OSH to [facility] ICU for vEEG monitoring in setting of seizure activity.     Neuro  Differential diagnosis includes status epilepticus, HSV encephalitis, meningitis, autoimmune encephalitis  -c/w Keppra 2 g bid. Consider decreasing to 1500mg BID today given no seizure activity.  -c/w locasamide 150 mg bid  -all sedation off and pt still minimally responsive  -HSV PCR negative- will continue acyclovir for now for other viral etiologies  -10mg decadron qd for inflammatory encephalopathy coverage  -MRI brain 9/25 shows abnormal areas of cortical restricted diffusion (high signal on diffusion-weighted imaging and corresponding dropout on ADC map) are notable most pronounced within the right parietal and occipital cortices.  Abnormal cortical restricted diffusion is also noted within bilateral  cingulate gyri, right worse than left, right pulvinar of the thalamus, right medial temporal lobe, and hippocampus. There is associated T2/FLAIR prolongation in these areas along with mild gyral swelling.   -f/u CSF viral panel, 14-3-3, tau, neuron specific enolase, HTLV-1, JEANETTE virus, HSV 6, paraneoplastic panel  	*Negative CSF studies so far:  Bacterial cx, WNV, Toxo, Cryptococcus, HSV  - vEEG has consistently shown foci of epileptiform activity without seizure (~5 days)    CV  -Started lopressor 25mg BID 9/29 overnight for rapid HR  -d/c lopressor and start labetalol 10/1 for HTN. Increased to 100mg q8.  -hold home amlodipine    Resp  -intubated for airway protection in setting of seizure. Weaned sedation and will try to extubate per clinical status when mental status improved  -Saturating well on current settings  -pt CPAPing well    GI  -NG tube in place, on tube feeds  -CT OSH showed possible colitis (colonic wall thickening)    Renal  SANGITA on presentation at OSH  -resolved, continue to monitor BMP  -continue to monitor I/O  -will have to keep pt on IVF while on acyclovir    RHEUM  - will check YANDEL, ESR, CRP, DSDNA to ensure MRI findings not indicative of autoimmune disease    ID  r/o meningitis  -acyclovir as above  -s/p LP 9/25, f/u CSF studies as above  -BCx and sputum neg  -UA contaminated, d/c CTX (total received 3 day course)  -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 83 yo M Hx HTN, GERD, prostate cancer (treated with radioactive seed implantation in 2001 no active treatment) presenting with status epilepticus.  Transferred from OSH to [facility] ICU for vEEG monitoring in setting of seizure activity.     Neuro  Differential diagnosis includes status epilepticus, HSV encephalitis, meningitis, autoimmune encephalitis  -c/w Keppra 2 g bid. Consider decreasing to 1500mg BID today given no seizure activity.  -c/w locasamide 150 mg bid  -all sedation off and pt still minimally responsive  -HSV PCR negative- will continue acyclovir for now for other viral etiologies  -10mg decadron qd for inflammatory encephalopathy coverage  -MRI brain 9/25 shows abnormal areas of cortical restricted diffusion (high signal on diffusion-weighted imaging and corresponding dropout on ADC map) are notable most pronounced within the right parietal and occipital cortices.  Abnormal cortical restricted diffusion is also noted within bilateral  cingulate gyri, right worse than left, right pulvinar of the thalamus, right medial temporal lobe, and hippocampus. There is associated T2/FLAIR prolongation in these areas along with mild gyral swelling.   -f/u CSF viral panel, 14-3-3, tau, neuron specific enolase, HTLV-1, JEANETTE virus, HSV 6, paraneoplastic panel  	*Negative CSF studies so far:  Bacterial cx, WNV, Toxo, Cryptococcus, HSV  - vEEG has consistently shown foci of epileptiform activity without seizure (~5 days)    CV  -Started lopressor 25mg BID 9/29 overnight for rapid HR  -d/c lopressor and start labetalol 10/1 for HTN. Increased to 100mg q8.  -hold home amlodipine    Resp  -extubated 10/1 without complication    GI  -NG tube in place, on tube feeds  -CT OSH showed possible colitis (colonic wall thickening)    Renal  SANGITA on presentation at OSH  -resolved, continue to monitor BMP  -continue to monitor I/O  -will have to keep pt on IVF while on acyclovir    RHEUM  - will check YANDEL, ESR, CRP, DSDNA to ensure MRI findings not indicative of autoimmune disease    ID  r/o meningitis  -acyclovir as above  -s/p LP 9/25, f/u CSF studies as above  -BCx and sputum neg  -UA contaminated, d/c CTX (total received 3 day course)  -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

## 2019-10-02 NOTE — PROGRESS NOTE ADULT - ASSESSMENT
83 y/o man with hx of prostate CA s/o seed implant and RT, HTN, p/w sudden onset AMS seizure brought to [facility] . Was found to be in status epilepticus and required intubation for airway control. Pt transferred to [facility] for further workup and EEG monitoring.     Pt has clear epileptogenic focus in the right parietal-occipital region.   Differential for cortical ribboning includes status epilepticus, infarct, HSV encephalitis, meningitis, hypoxic injury, CJD. CSF suggestive of viral etiology vs inflammatory. Less likely CJD given acuity of presentation.     Recommendations:  C/w decadron 10mg daily  In CSF, awaiting PCR (if negative would DC acylovir) viral panel (called lab and said the result will be in within 24 hours), cryptococcus, 14-3-3, tau, neuron specific enolase, HTLV-1, JEANETTE virus, HSV 6, paraneoplastic panel sent and pending results  c/w 2g IV Keppra, 150mg Vimpat q12h    Repeat MRI brain w/wo near the end of this week.   Seizure precautions

## 2019-10-02 NOTE — CHART NOTE - NSCHARTNOTEFT_GEN_A_CORE
MICU Transfer Note    Transfer from: MICU  Transfer to:  (  ) Medicine    (  ) Telemetry    (  ) RCU    (  ) Palliative    (  ) Stroke Unit    (  ) _______________  Accepting physician:       MICU COURSE:    84 year old male Hx HTN, GERD, prostate cancer who presented with seizure.  On 9/20, patient fell out of bed and hit his head.  He subsequently developed seizure like activity.  Patient intubated by EMS for airway protection.  Patient given ativan and versed in ED.  Patient had CTH which did not show acute processes (including bleed).  Patient had brain MRI which showed R frontal enhancement (potential seizure foci).  Patient started on propofol gtt, locasamide and keppra.  Patient started on acyclovir for meningitis suspicion.  Patient's labs were significant for WBC 13, lacate >10, BUN 29, and Cr 1.4.  Patient had spot EEG at OSH which showed R focal seizure activity on 9/23.  Patient was following commands and then attempted to wean off propofol, however unsuccessful (patient started having seizures again).  Resumed prop and increased keppra dose.  Patient transferred to [facility] MICU for vEEG and management of status epilepticus.    In the MICU, pt underwent vEEG monitoring (9/25-9/30) while being treated with keppra 2g BID and vimpat 150mg BID. He was also continued on IV acyclovir and decadron 10g IV qd. for herpes encephalitis/meningitis as well. His respiratory status remained stable while intubated for airway protection. Sedated with propofol and versed. He had a lumbar puncture on 9/25 for CSF assessment. Eastern equine encephalitis panel also sent during MICU stay. Pt's vEEG consistently showed epileptiform focus, but no seizure activity. MRI brain on 9/25 shows abnormal areas of cortical restricted diffusion (high signal on diffusion-weighted imaging and corresponding dropout on ADC map) are notable most pronounced within the right parietal and occipital cortices.  Abnormal cortical restricted diffusion is also noted within bilateral  cingulate gyri, right worse than left, right pulvinar of the thalamus, right medial temporal lobe, and hippocampus. There is associated T2/FLAIR prolongation in these areas along with mild gyral swelling. Pt was taken off sedation 9/27.     He continued to slowly become more awake through the next few days. NGT placed 10/1 to help with meds and feeds. He has complete L-sided neglect. He was noticed to have a weakened L dorsalis pedis pulse and the foot was colder than the right. 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  Pt extubated on 10/1 without complications. Since 10/1, is able to open eyes, phonate, and properly answer questions. Still lethargic upon wakening in the AM.     So far, infectious workup includes:  -Negative CSF studies so far:  Bacterial cx, WNV, Toxo, Cryptococcus, HSV, encephalitis panel  -Need to f/u 14-3-3, tau, neuron specific enolase, HTLV-1, JEANETTE virus, HSV 6, paraneoplastic panel      ASSESSMENT & PLAN:     85 yo M Hx HTN, GERD, prostate cancer (treated with radioactive seed implantation in 2001 no active treatment) presenting with status epilepticus.  Transferred from OSH to [facility] ICU for vEEG monitoring in setting of seizure activity.     Neuro  Differential diagnosis includes status epilepticus, HSV encephalitis, meningitis, autoimmune encephalitis  -c/w Keppra 2 g bid. Consider decreasing to 1500mg BID today given no seizure activity.  -c/w locasamide 150 mg bid  -all sedation off and pt still minimally responsive  -HSV PCR negative- will continue acyclovir for now for other viral etiologies  -10mg decadron qd for inflammatory encephalopathy coverage  -MRI brain 9/25 shows abnormal areas of cortical restricted diffusion (high signal on diffusion-weighted imaging and corresponding dropout on ADC map) are notable most pronounced within the right parietal and occipital cortices.  Abnormal cortical restricted diffusion is also noted within bilateral  cingulate gyri, right worse than left, right pulvinar of the thalamus, right medial temporal lobe, and hippocampus. There is associated T2/FLAIR prolongation in these areas along with mild gyral swelling.   -f/u CSF viral panel, 14-3-3, tau, neuron specific enolase, HTLV-1, JEANETTE virus, HSV 6, paraneoplastic panel  	*Negative CSF studies so far:  Bacterial cx, WNV, Toxo, Cryptococcus, HSV  - vEEG has consistently shown foci of epileptiform activity without seizure (~5 days)    CV  -Started lopressor 25mg BID 9/29 overnight for rapid HR  -d/c lopressor and start labetalol 10/1 for HTN. Increased to 100mg q8.  -hold home amlodipine    Resp  -intubated for airway protection in setting of seizure. Weaned sedation and will try to extubate per clinical status when mental status improved  -Saturating well on current settings  -pt CPAPing well    GI  -NG tube in place, on tube feeds  -CT OSH showed possible colitis (colonic wall thickening)    Renal  SANGITA on presentation at OSH  -resolved, continue to monitor BMP  -continue to monitor I/O  -will have to keep pt on IVF while on acyclovir    RHEUM  - will check YANDEL, ESR, CRP, DSDNA to ensure MRI findings not indicative of autoimmune disease    ID  r/o meningitis  -acyclovir as above  -s/p LP 9/25, f/u CSF studies as above  -BCx and sputum neg  -UA contaminated, d/c CTX (total received 3 day course)  -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 f/u further CSF/autoimmune studies including paraneoplastic panel, including eastern equine encephalitis send-out.   2. further titration of AEDs  3. neurology recs      Vital Signs Last 24 Hrs  T(C): 36.9 (02 Oct 2019 12:00), Max: 37 (02 Oct 2019 04:00)  T(F): 98.5 (02 Oct 2019 12:00), Max: 98.6 (02 Oct 2019 04:00)  HR: 79 (02 Oct 2019 14:05) (61 - 104)  BP: 142/81 (02 Oct 2019 14:05) (113/62 - 173/86)  BP(mean): 106 (02 Oct 2019 14:05) (82 - 120)  RR: 18 (02 Oct 2019 14:05) (13 - 26)  SpO2: 97% (02 Oct 2019 14:05) (95% - 100%)  I&O's Summary    01 Oct 2019 07:01  -  02 Oct 2019 07:00  --------------------------------------------------------  IN: 2865 mL / OUT: 1870 mL / NET: 995 mL    02 Oct 2019 07:01  -  02 Oct 2019 17:55  --------------------------------------------------------  IN: 1410 mL / OUT: 0 mL / NET: 1410 mL          MEDICATIONS  (STANDING):  acyclovir IVPB 850 milliGRAM(s) IV Intermittent every 8 hours  chlorhexidine 4% Liquid 1 Application(s) Topical <User Schedule>  dexamethasone  IVPB 10 milliGRAM(s) IV Intermittent daily  heparin  Injectable 5000 Unit(s) SubCutaneous every 8 hours  insulin lispro (HumaLOG) corrective regimen sliding scale   SubCutaneous every 6 hours  insulin NPH human recombinant 10 Unit(s) SubCutaneous every 6 hours  labetalol 100 milliGRAM(s) Oral every 8 hours  lacosamide IVPB 150 milliGRAM(s) IV Intermittent every 12 hours  levETIRAcetam  IVPB 2000 milliGRAM(s) IV Intermittent every 12 hours  pantoprazole  Injectable 40 milliGRAM(s) IV Push daily  polyethylene glycol 3350 17 Gram(s) Oral two times a day  senna Syrup 10 milliLiter(s) Oral at bedtime  sodium chloride 0.9%. 1000 milliLiter(s) (75 mL/Hr) IV Continuous <Continuous>    MEDICATIONS  (PRN):        LABS                                            13.9                  Neurophils% (auto):   73.3   (10-02 @ 00:27):    14.69)-----------(350          Lymphocytes% (auto):  17.3                                          41.1                   Eosinphils% (auto):   0.3      Manual%: Neutrophils x    ; Lymphocytes x    ; Eosinophils x    ; Bands%: x    ; Blasts x                                    137    |  96     |  26                  Calcium: 9.3   / iCa: x      (10-02 @ 00:27)    ----------------------------<  120       Magnesium: 1.8                              3.3     |  27     |  0.80             Phosphorous: 3.4      TPro  7.6    /  Alb  3.2    /  TBili  0.9    /  DBili  x      /  AST  41     /  ALT  76     /  AlkPhos  149    02 Oct 2019 00:27 MICU Transfer Note    Transfer from: MICU  Transfer to:  (  ) Medicine    (  ) Telemetry    (  ) RCU    (  ) Palliative    (  ) Stroke Unit    (  ) _______________  Accepting physician:       MICU COURSE:    84 year old male Hx HTN, GERD, prostate cancer who presented with seizure.  On 9/20, patient fell out of bed and hit his head.  He subsequently developed seizure like activity.  Patient intubated by EMS for airway protection.  Patient given ativan and versed in ED.  Patient had CTH which did not show acute processes (including bleed).  Patient had brain MRI which showed R frontal enhancement (potential seizure foci).  Patient started on propofol gtt, locasamide and keppra.  Patient started on acyclovir for meningitis suspicion.  Patient's labs were significant for WBC 13, lacate >10, BUN 29, and Cr 1.4.  Patient had spot EEG at OSH which showed R focal seizure activity on 9/23.  Patient was following commands and then attempted to wean off propofol, however unsuccessful (patient started having seizures again).  Resumed prop and increased keppra dose.  Patient transferred to [facility] MICU for vEEG and management of status epilepticus.    In the MICU, pt underwent vEEG monitoring (9/25-9/30) while being treated with keppra 2g BID and vimpat 150mg BID. He was also continued on IV acyclovir and decadron 10g IV qd. for herpes encephalitis/meningitis as well. His respiratory status remained stable while intubated for airway protection. Sedated with propofol and versed. He had a lumbar puncture on 9/25 for CSF assessment. Eastern equine encephalitis panel also sent during MICU stay. Pt's vEEG consistently showed epileptiform focus, but no seizure activity. MRI brain on 9/25 shows abnormal areas of cortical restricted diffusion (high signal on diffusion-weighted imaging and corresponding dropout on ADC map) are notable most pronounced within the right parietal and occipital cortices.  Abnormal cortical restricted diffusion is also noted within bilateral  cingulate gyri, right worse than left, right pulvinar of the thalamus, right medial temporal lobe, and hippocampus. There is associated T2/FLAIR prolongation in these areas along with mild gyral swelling. Pt was taken off sedation 9/27.     He continued to slowly become more awake through the next few days. NGT placed 10/1 to help with meds and feeds. He has complete L-sided neglect. He was noticed to have a weakened L dorsalis pedis pulse and the foot was colder than the right. 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  Pt extubated on 10/1 without complications. Since 10/1, is able to open eyes, phonate, and properly answer questions. Still lethargic upon wakening in the AM.     So far, infectious workup includes:  -Negative CSF studies so far:  Bacterial cx, WNV, Toxo, Cryptococcus, HSV, encephalitis panel  -Need to f/u 14-3-3, tau, neuron specific enolase, HTLV-1, JEANETTE virus, HSV 6, paraneoplastic panel      ASSESSMENT & PLAN:     85 yo M Hx HTN, GERD, prostate cancer (treated with radioactive seed implantation in 2001 no active treatment) presenting with status epilepticus.  Transferred from OSH to [facility] ICU for vEEG monitoring in setting of seizure activity.     Neuro  Differential diagnosis includes status epilepticus, HSV encephalitis, meningitis, autoimmune encephalitis  -c/w Keppra 2 g bid. Consider decreasing to 1500mg BID today given no seizure activity.  -c/w locasamide 150 mg bid  -all sedation off and pt still minimally responsive  -HSV PCR negative- will continue acyclovir for now for other viral etiologies  -10mg decadron qd for inflammatory encephalopathy coverage  -MRI brain 9/25 shows abnormal areas of cortical restricted diffusion (high signal on diffusion-weighted imaging and corresponding dropout on ADC map) are notable most pronounced within the right parietal and occipital cortices.  Abnormal cortical restricted diffusion is also noted within bilateral  cingulate gyri, right worse than left, right pulvinar of the thalamus, right medial temporal lobe, and hippocampus. There is associated T2/FLAIR prolongation in these areas along with mild gyral swelling.   -f/u CSF viral panel, 14-3-3, tau, neuron specific enolase, HTLV-1, JEANETTE virus, HSV 6, paraneoplastic panel  	*Negative CSF studies so far:  Bacterial cx, WNV, Toxo, Cryptococcus, HSV  - vEEG has consistently shown foci of epileptiform activity without seizure (~5 days)    CV  -Started lopressor 25mg BID 9/29 overnight for rapid HR  -d/c lopressor and start labetalol 10/1 for HTN. Increased to 100mg q8.  -hold home amlodipine    Resp  -intubated for airway protection in setting of seizure. Weaned sedation and will try to extubate per clinical status when mental status improved  -Saturating well on current settings  -pt CPAPing well    GI  -NG tube in place, on tube feeds  -CT OSH showed possible colitis (colonic wall thickening)    Renal  SAGNITA on presentation at OSH  -resolved, continue to monitor BMP  -continue to monitor I/O  -will have to keep pt on IVF while on acyclovir    RHEUM  - will check YANDEL, ESR, CRP, DSDNA to ensure MRI findings not indicative of autoimmune disease    ID  r/o meningitis  -acyclovir as above  -s/p LP 9/25, f/u CSF studies as above  -BCx and sputum neg  -UA contaminated, d/c CTX (total received 3 day course)  -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 Per ID note, [place] encephalitis panel NOT received by lab although in sunrise saying specimen received, if additional samples warranted by ID will need IR guided LP due to spinal abnormality.   2. further titration of AEDs  3. neurology recs      Vital Signs Last 24 Hrs  T(C): 36.9 (02 Oct 2019 12:00), Max: 37 (02 Oct 2019 04:00)  T(F): 98.5 (02 Oct 2019 12:00), Max: 98.6 (02 Oct 2019 04:00)  HR: 79 (02 Oct 2019 14:05) (61 - 104)  BP: 142/81 (02 Oct 2019 14:05) (113/62 - 173/86)  BP(mean): 106 (02 Oct 2019 14:05) (82 - 120)  RR: 18 (02 Oct 2019 14:05) (13 - 26)  SpO2: 97% (02 Oct 2019 14:05) (95% - 100%)  I&O's Summary    01 Oct 2019 07:01  -  02 Oct 2019 07:00  --------------------------------------------------------  IN: 2865 mL / OUT: 1870 mL / NET: 995 mL    02 Oct 2019 07:01  -  02 Oct 2019 17:55  --------------------------------------------------------  IN: 1410 mL / OUT: 0 mL / NET: 1410 mL          MEDICATIONS  (STANDING):  acyclovir IVPB 850 milliGRAM(s) IV Intermittent every 8 hours  chlorhexidine 4% Liquid 1 Application(s) Topical <User Schedule>  dexamethasone  IVPB 10 milliGRAM(s) IV Intermittent daily  heparin  Injectable 5000 Unit(s) SubCutaneous every 8 hours  insulin lispro (HumaLOG) corrective regimen sliding scale   SubCutaneous every 6 hours  insulin NPH human recombinant 10 Unit(s) SubCutaneous every 6 hours  labetalol 100 milliGRAM(s) Oral every 8 hours  lacosamide IVPB 150 milliGRAM(s) IV Intermittent every 12 hours  levETIRAcetam  IVPB 2000 milliGRAM(s) IV Intermittent every 12 hours  pantoprazole  Injectable 40 milliGRAM(s) IV Push daily  polyethylene glycol 3350 17 Gram(s) Oral two times a day  senna Syrup 10 milliLiter(s) Oral at bedtime  sodium chloride 0.9%. 1000 milliLiter(s) (75 mL/Hr) IV Continuous <Continuous>    MEDICATIONS  (PRN):        LABS                                            13.9                  Neurophils% (auto):   73.3   (10-02 @ 00:27):    14.69)-----------(350          Lymphocytes% (auto):  17.3                                          41.1                   Eosinphils% (auto):   0.3      Manual%: Neutrophils x    ; Lymphocytes x    ; Eosinophils x    ; Bands%: x    ; Blasts x                                    137    |  96     |  26                  Calcium: 9.3   / iCa: x      (10-02 @ 00:27)    ----------------------------<  120       Magnesium: 1.8                              3.3     |  27     |  0.80             Phosphorous: 3.4      TPro  7.6    /  Alb  3.2    /  TBili  0.9    /  DBili  x      /  AST  41     /  ALT  76     /  AlkPhos  149    02 Oct 2019 00:27 MICU Transfer Note    Transfer from: MICU  Transfer to:  (  ) Medicine    (  ) Telemetry    (  ) RCU    (  ) Palliative    (  ) Stroke Unit    (  ) _______________  Accepting physician:       MICU COURSE:    84 year old male Hx HTN, GERD, prostate cancer who presented with seizure.  On 9/20, patient fell out of bed and hit his head.  He subsequently developed seizure like activity.  Patient intubated by EMS for airway protection.  Patient given ativan and versed in ED.  Patient had CTH which did not show acute processes (including bleed).  Patient had brain MRI which showed R frontal enhancement (potential seizure foci).  Patient started on propofol gtt, locasamide and keppra.  Patient started on acyclovir for meningitis suspicion.  Patient's labs were significant for WBC 13, lacate >10, BUN 29, and Cr 1.4.  Patient had spot EEG at OSH which showed R focal seizure activity on 9/23.  Patient was following commands and then attempted to wean off propofol, however unsuccessful (patient started having seizures again).  Resumed prop and increased keppra dose.  Patient transferred to [facility] MICU for vEEG and management of status epilepticus.    In the MICU, pt underwent vEEG monitoring (9/25-9/30) while being treated with keppra 2g BID and vimpat 150mg BID. He was also continued on IV acyclovir and decadron 10g IV qd. for herpes encephalitis/meningitis as well. His respiratory status remained stable while intubated for airway protection. Sedated with propofol and versed. He had a lumbar puncture on 9/25 for CSF assessment. Eastern equine encephalitis panel also sent during MICU stay. Pt's vEEG consistently showed epileptiform focus, but no seizure activity. MRI brain on 9/25 shows abnormal areas of cortical restricted diffusion (high signal on diffusion-weighted imaging and corresponding dropout on ADC map) are notable most pronounced within the right parietal and occipital cortices.  Abnormal cortical restricted diffusion is also noted within bilateral  cingulate gyri, right worse than left, right pulvinar of the thalamus, right medial temporal lobe, and hippocampus. There is associated T2/FLAIR prolongation in these areas along with mild gyral swelling. Pt was taken off sedation 9/27.     He continued to slowly become more awake through the next few days. NGT placed 10/1 to help with meds and feeds. He has complete L-sided neglect. He was noticed to have a weakened L dorsalis pedis pulse and the foot was colder than the right. 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  Pt extubated on 10/1 without complications. Since 10/1, is able to open eyes, phonate, and properly answer questions. Still lethargic upon wakening in the AM.     So far, infectious workup includes:  -Negative CSF studies so far:  Bacterial cx, WNV, Toxo, Cryptococcus, HSV, encephalitis panel  -Need to f/u 14-3-3, tau, neuron specific enolase, HTLV-1, JEANETTE virus, HSV 6, paraneoplastic panel      ASSESSMENT & PLAN:     85 yo M Hx HTN, GERD, prostate cancer (treated with radioactive seed implantation in 2001 no active treatment) presenting with status epilepticus.  Transferred from OSH to [facility] ICU for vEEG monitoring in setting of seizure activity.     Neuro  Differential diagnosis includes status epilepticus, HSV encephalitis, meningitis, autoimmune encephalitis  -c/w Keppra 2 g bid. Consider decreasing to 1500mg BID today given no seizure activity.  -c/w locasamide 150 mg bid  -all sedation off and pt still minimally responsive  -HSV PCR negative- will continue acyclovir for now for other viral etiologies  -10mg decadron qd for inflammatory encephalopathy coverage  -MRI brain 9/25 shows abnormal areas of cortical restricted diffusion (high signal on diffusion-weighted imaging and corresponding dropout on ADC map) are notable most pronounced within the right parietal and occipital cortices.  Abnormal cortical restricted diffusion is also noted within bilateral  cingulate gyri, right worse than left, right pulvinar of the thalamus, right medial temporal lobe, and hippocampus. There is associated T2/FLAIR prolongation in these areas along with mild gyral swelling.   -f/u CSF viral panel, 14-3-3, tau, neuron specific enolase, HTLV-1, JEANETTE virus, HSV 6, paraneoplastic panel  	*Negative CSF studies so far:  Bacterial cx, WNV, Toxo, Cryptococcus, HSV  - vEEG has consistently shown foci of epileptiform activity without seizure (~5 days)    CV  -Started lopressor 25mg BID 9/29 overnight for rapid HR  -d/c lopressor and start labetalol 10/1 for HTN. Increased to 100mg q8.  -hold home amlodipine    Resp  -intubated for airway protection in setting of seizure. Weaned sedation and will try to extubate per clinical status when mental status improved  -Saturating well on current settings  -pt CPAPing well    GI  -NG tube in place, on tube feeds  -CT OSH showed possible colitis (colonic wall thickening)    Renal  SANGITA on presentation at OSH  -resolved, continue to monitor BMP  -continue to monitor I/O  -will have to keep pt on IVF while on acyclovir    RHEUM  - will check YANDEL, ESR, CRP, DSDNA to ensure MRI findings not indicative of autoimmune disease    ID  r/o meningitis  -acyclovir as above  -s/p LP 9/25, f/u CSF studies as above  -BCx and sputum neg  -UA contaminated, d/c CTX (total received 3 day course)  -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 Per ID note, [place] encephalitis panel NOT received by lab although in sunrise saying specimen received, if additional samples warranted by ID will need IR guided LP due to spinal abnormality.   2. further titration of AEDs  3. neurology recs  4. f/u CSF viral panel, 14-3-3, tau, neuron specific enolase, HTLV-1, JEANETTE virus, HSV 6, paraneoplastic panel  5. follow up YANDEL, ESR, CRP, DSDNA to ensure MRI findings not indicative of autoimmune disease      Vital Signs Last 24 Hrs  T(C): 36.9 (02 Oct 2019 12:00), Max: 37 (02 Oct 2019 04:00)  T(F): 98.5 (02 Oct 2019 12:00), Max: 98.6 (02 Oct 2019 04:00)  HR: 79 (02 Oct 2019 14:05) (61 - 104)  BP: 142/81 (02 Oct 2019 14:05) (113/62 - 173/86)  BP(mean): 106 (02 Oct 2019 14:05) (82 - 120)  RR: 18 (02 Oct 2019 14:05) (13 - 26)  SpO2: 97% (02 Oct 2019 14:05) (95% - 100%)  I&O's Summary    01 Oct 2019 07:01  -  02 Oct 2019 07:00  --------------------------------------------------------  IN: 2865 mL / OUT: 1870 mL / NET: 995 mL    02 Oct 2019 07:01  -  02 Oct 2019 17:55  --------------------------------------------------------  IN: 1410 mL / OUT: 0 mL / NET: 1410 mL          MEDICATIONS  (STANDING):  acyclovir IVPB 850 milliGRAM(s) IV Intermittent every 8 hours  chlorhexidine 4% Liquid 1 Application(s) Topical <User Schedule>  dexamethasone  IVPB 10 milliGRAM(s) IV Intermittent daily  heparin  Injectable 5000 Unit(s) SubCutaneous every 8 hours  insulin lispro (HumaLOG) corrective regimen sliding scale   SubCutaneous every 6 hours  insulin NPH human recombinant 10 Unit(s) SubCutaneous every 6 hours  labetalol 100 milliGRAM(s) Oral every 8 hours  lacosamide IVPB 150 milliGRAM(s) IV Intermittent every 12 hours  levETIRAcetam  IVPB 2000 milliGRAM(s) IV Intermittent every 12 hours  pantoprazole  Injectable 40 milliGRAM(s) IV Push daily  polyethylene glycol 3350 17 Gram(s) Oral two times a day  senna Syrup 10 milliLiter(s) Oral at bedtime  sodium chloride 0.9%. 1000 milliLiter(s) (75 mL/Hr) IV Continuous <Continuous>    MEDICATIONS  (PRN):        LABS                                            13.9                  Neurophils% (auto):   73.3   (10-02 @ 00:27):    14.69)-----------(350          Lymphocytes% (auto):  17.3                                          41.1                   Eosinphils% (auto):   0.3      Manual%: Neutrophils x    ; Lymphocytes x    ; Eosinophils x    ; Bands%: x    ; Blasts x                                    137    |  96     |  26                  Calcium: 9.3   / iCa: x      (10-02 @ 00:27)    ----------------------------<  120       Magnesium: 1.8                              3.3     |  27     |  0.80             Phosphorous: 3.4      TPro  7.6    /  Alb  3.2    /  TBili  0.9    /  DBili  x      /  AST  41     /  ALT  76     /  AlkPhos  149    02 Oct 2019 00:27 MICU Transfer Note    Transfer from: MICU  Transfer to:  ( x ) Medicine    (  ) Telemetry    (  ) RCU    (  ) Palliative    (  ) Stroke Unit    (  ) _______________  Accepting physician: Dr. Adwoa Hernandez      MICU COURSE:    84 year old male Hx HTN, GERD, prostate cancer who presented with seizure.  On 9/20, patient fell out of bed and hit his head.  He subsequently developed seizure like activity.  Patient intubated by EMS for airway protection.  Patient given ativan and versed in ED.  Patient had CTH which did not show acute processes (including bleed).  Patient had brain MRI which showed R frontal enhancement (potential seizure foci).  Patient started on propofol gtt, locasamide and keppra.  Patient started on acyclovir for meningitis suspicion.  Patient's labs were significant for WBC 13, lacate >10, BUN 29, and Cr 1.4.  Patient had spot EEG at OSH which showed R focal seizure activity on 9/23.  Patient was following commands and then attempted to wean off propofol, however unsuccessful (patient started having seizures again).  Resumed prop and increased keppra dose.  Patient transferred to [facility] MICU for vEEG and management of status epilepticus.    In the MICU, pt underwent vEEG monitoring (9/25-9/30) while being treated with keppra 2g BID and vimpat 150mg BID. He was also continued on IV acyclovir and decadron 10g IV qd. for herpes encephalitis/meningitis as well. His respiratory status remained stable while intubated for airway protection. Sedated with propofol and versed. He had a lumbar puncture on 9/25 for CSF assessment. Eastern equine encephalitis panel also sent during MICU stay. Pt's vEEG consistently showed epileptiform focus, but no seizure activity. MRI brain on 9/25 shows abnormal areas of cortical restricted diffusion (high signal on diffusion-weighted imaging and corresponding dropout on ADC map) are notable most pronounced within the right parietal and occipital cortices.  Abnormal cortical restricted diffusion is also noted within bilateral  cingulate gyri, right worse than left, right pulvinar of the thalamus, right medial temporal lobe, and hippocampus. There is associated T2/FLAIR prolongation in these areas along with mild gyral swelling. Pt was taken off sedation 9/27.     He continued to slowly become more awake through the next few days. NGT placed 10/1 to help with meds and feeds. He has complete L-sided neglect. He was noticed to have a weakened L dorsalis pedis pulse and the foot was colder than the right. 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  Pt extubated on 10/1 without complications. Since 10/1, is able to open eyes, phonate, and properly answer questions. Still lethargic upon wakening in the AM.     So far, infectious workup includes:  -Negative CSF studies so far:  Bacterial cx, WNV, Toxo, Cryptococcus, HSV, encephalitis panel  -Need to f/u 14-3-3, tau, neuron specific enolase, HTLV-1, JEANETTE virus, HSV 6, paraneoplastic panel      ASSESSMENT & PLAN:     83 yo M Hx HTN, GERD, prostate cancer (treated with radioactive seed implantation in 2001 no active treatment) presenting with status epilepticus.  Transferred from OSH to [facility] ICU for vEEG monitoring in setting of seizure activity.     Neuro  Differential diagnosis includes status epilepticus, HSV encephalitis, meningitis, autoimmune encephalitis  -c/w Keppra 2 g bid. Consider decreasing to 1500mg BID today given no seizure activity.  -c/w locasamide 150 mg bid  -all sedation off and pt still minimally responsive  -HSV PCR negative- will continue acyclovir for now for other viral etiologies  -10mg decadron qd for inflammatory encephalopathy coverage  -MRI brain 9/25 shows abnormal areas of cortical restricted diffusion (high signal on diffusion-weighted imaging and corresponding dropout on ADC map) are notable most pronounced within the right parietal and occipital cortices.  Abnormal cortical restricted diffusion is also noted within bilateral  cingulate gyri, right worse than left, right pulvinar of the thalamus, right medial temporal lobe, and hippocampus. There is associated T2/FLAIR prolongation in these areas along with mild gyral swelling.   -f/u CSF viral panel, 14-3-3, tau, neuron specific enolase, HTLV-1, JEANETTE virus, HSV 6, paraneoplastic panel  	*Negative CSF studies so far:  Bacterial cx, WNV, Toxo, Cryptococcus, HSV  - vEEG has consistently shown foci of epileptiform activity without seizure (~5 days)    CV  -Started lopressor 25mg BID 9/29 overnight for rapid HR  -d/c lopressor and start labetalol 10/1 for HTN. Increased to 100mg q8.  -hold home amlodipine    Resp  -intubated for airway protection in setting of seizure. Weaned sedation and will try to extubate per clinical status when mental status improved  -Saturating well on current settings  -pt CPAPing well    GI  -NG tube in place, on tube feeds  -CT OSH showed possible colitis (colonic wall thickening)    Renal  SANGITA on presentation at OSH  -resolved, continue to monitor BMP  -continue to monitor I/O  -will have to keep pt on IVF while on acyclovir    RHEUM  - will check YANDEL, ESR, CRP, DSDNA to ensure MRI findings not indicative of autoimmune disease    ID  r/o meningitis  -acyclovir as above  -s/p LP 9/25, f/u CSF studies as above  -BCx and sputum neg  -UA contaminated, d/c CTX (total received 3 day course)  -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 Per ID note, [place] encephalitis panel NOT received by lab although in [facility] saying specimen received, if additional samples warranted by ID will need IR guided LP due to spinal abnormality.   2. further titration of AEDs  3. neurology recs  4. f/u CSF viral panel, 14-3-3, tau, neuron specific enolase, HTLV-1, JEANETTE virus, HSV 6, paraneoplastic panel  5. follow up YANDEL, ESR, CRP, DSDNA to ensure MRI findings not indicative of autoimmune disease      Vital Signs Last 24 Hrs  T(C): 36.9 (02 Oct 2019 12:00), Max: 37 (02 Oct 2019 04:00)  T(F): 98.5 (02 Oct 2019 12:00), Max: 98.6 (02 Oct 2019 04:00)  HR: 79 (02 Oct 2019 14:05) (61 - 104)  BP: 142/81 (02 Oct 2019 14:05) (113/62 - 173/86)  BP(mean): 106 (02 Oct 2019 14:05) (82 - 120)  RR: 18 (02 Oct 2019 14:05) (13 - 26)  SpO2: 97% (02 Oct 2019 14:05) (95% - 100%)  I&O's Summary    01 Oct 2019 07:01  -  02 Oct 2019 07:00  --------------------------------------------------------  IN: 2865 mL / OUT: 1870 mL / NET: 995 mL    02 Oct 2019 07:01  -  02 Oct 2019 17:55  --------------------------------------------------------  IN: 1410 mL / OUT: 0 mL / NET: 1410 mL          MEDICATIONS  (STANDING):  acyclovir IVPB 850 milliGRAM(s) IV Intermittent every 8 hours  chlorhexidine 4% Liquid 1 Application(s) Topical <User Schedule>  dexamethasone  IVPB 10 milliGRAM(s) IV Intermittent daily  heparin  Injectable 5000 Unit(s) SubCutaneous every 8 hours  insulin lispro (HumaLOG) corrective regimen sliding scale   SubCutaneous every 6 hours  insulin NPH human recombinant 10 Unit(s) SubCutaneous every 6 hours  labetalol 100 milliGRAM(s) Oral every 8 hours  lacosamide IVPB 150 milliGRAM(s) IV Intermittent every 12 hours  levETIRAcetam  IVPB 2000 milliGRAM(s) IV Intermittent every 12 hours  pantoprazole  Injectable 40 milliGRAM(s) IV Push daily  polyethylene glycol 3350 17 Gram(s) Oral two times a day  senna Syrup 10 milliLiter(s) Oral at bedtime  sodium chloride 0.9%. 1000 milliLiter(s) (75 mL/Hr) IV Continuous <Continuous>    MEDICATIONS  (PRN):        LABS                                            13.9                  Neurophils% (auto):   73.3   (10-02 @ 00:27):    14.69)-----------(350          Lymphocytes% (auto):  17.3                                          41.1                   Eosinphils% (auto):   0.3      Manual%: Neutrophils x    ; Lymphocytes x    ; Eosinophils x    ; Bands%: x    ; Blasts x                                    137    |  96     |  26                  Calcium: 9.3   / iCa: x      (10-02 @ 00:27)    ----------------------------<  120       Magnesium: 1.8                              3.3     |  27     |  0.80             Phosphorous: 3.4      TPro  7.6    /  Alb  3.2    /  TBili  0.9    /  DBili  x      /  AST  41     /  ALT  76     /  AlkPhos  149    02 Oct 2019 00:27

## 2019-10-02 NOTE — PROGRESS NOTE ADULT - PROBLEM SELECTOR PLAN 1
-hsv pcr negative  -cont acyclovir  -CSF w/u negative so far - bacterial cx, toxo, wnv, crypt  -check HIV test  -D/w micro lab - CSF PCR never received or done despite [facility] saying lab testing  -can consider repeat LP for more CSF for csf pcr, encephalitis panel, repeat hsv PCR, etc.

## 2019-10-02 NOTE — PROGRESS NOTE ADULT - ATTENDING COMMENTS
1. Acute hypoxemic respiratory resolved.  Tolerating  extubation.  2.Neuro: Seizures of unclear etiology. Continue Keppra and Vimpat.  CSF with lymphocytosis. HSV  PCR negative . Still on Acyclovir. Await further csf infectious studies and paraneoplastic studies  to result. Repeat MRI as per Neuro. Still with L hemiparesis and L neglect.

## 2019-10-02 NOTE — PROGRESS NOTE ADULT - ASSESSMENT
83 yo M Hx HTN, GERD, prostate cancer (treated with radioactive seed implantation in 2001 no active treatment) presenting with status epilepticus.  Transferred from OSH to [facility] ICU for vEEG monitoring in setting of seizure activity with abnormal CSF with lymphocyte predominance

## 2019-10-02 NOTE — PROGRESS NOTE ADULT - SUBJECTIVE AND OBJECTIVE BOX
Neurology Progress Note    Subjective: Patient seen and examined at bedside. Pt extubated and off sedation. Doing much better.     Vital Signs Last 24 Hrs  T(C): 37.2 (02 Oct 2019 16:00), Max: 37.2 (02 Oct 2019 16:00)  T(F): 98.9 (02 Oct 2019 16:00), Max: 98.9 (02 Oct 2019 16:00)  HR: 76 (02 Oct 2019 19:00) (60 - 104)  BP: 122/64 (02 Oct 2019 19:00) (111/61 - 173/86)  BP(mean): 87 (02 Oct 2019 19:00) (81 - 120)  RR: 33 (02 Oct 2019 19:00) (13 - 33)  SpO2: 99% (02 Oct 2019 19:00) (95% - 100%)    Neurological Exam:  Mental Status: off sedation, opens eyes to noxious stimuli, more interactive today  Cranial Nerves:  JOHN, 3 mm L, 3 mm R, bilat. left facial droop  Motor: no movement of the left UE, slight shoulder movement of the right UE, very subtle movements of the LE more brisk on the left.       Coordination: Unable to examine  Tremor: No resting, postural or action tremor.  No myoclonus.  Sensation: Grimaces to noxious on the right exam  Deep Tendon Reflexes: 1+ bilateral biceps, triceps, brachioradialis, knee and ankle. No Babinski present.  Gait: pt is bedbound      Other:                                  13.9   14.69 )-----------( 350      ( 02 Oct 2019 00:27 )             41.1   10-02    137  |  96  |  26<H>  ----------------------------<  120<H>  3.3<L>   |  27  |  0.80    Ca    9.3      02 Oct 2019 00:27  Phos  3.4     10-02  Mg     1.8     10-02    TPro  7.6  /  Alb  3.2<L>  /  TBili  0.9  /  DBili  x   /  AST  41<H>  /  ALT  76<H>  /  AlkPhos  149<H>  10-02      Cryptococcal Antigen - < from: CT Angio Neck w/ IV Cont (10.01.19 @ 15:40) >  Neck CTA:    The aortic arch is not well visualized on this examination.    The great vessels are within normal limits.    Both vertebral arteries are patent. There is a dominant left vertebral   artery.    The common carotid, internal carotid and external carotid arteries are   patent. Nonobstructive calcifications are noted at the carotid bulbs   bilaterally.    Right thyroid calcification is noted.    Brain CTA:    No large vessel occlusion or stenosis is visualized. The basilar artery   is intact. No vascular aneurysm is identified. No vessel irregularity   indicate vasculitis is identified.    Impression:    Neck CTA: No evidence for a dynamically significant stenosis.    Brain CTA: Patent vessels. No evidence for vascular stenosis. Vasculitis   may be below the level of sensitivity achieved on CTA    < end of copied text >  CSF: Negative (09.25.19 @ 18:45)  Culture - Fungal, CSF (09.25.19 @ 18:56)    Specimen Source: .CSF    Culture Results:   Testing in progress    Culture - CSF with Gram Stain . (09.25.19 @ 18:56)    Gram Stain:   No polymorphonuclear cells seen  No organisms seen  by cytocentrifuge    Specimen Source: .CSF    Lactate Dehydrogenase, CSF: 29:  Protein, CSF: 75 mg/dL (09.25.19 @ 15:47)   Glucose, CSF: 75 mg/dL (09.25.19 @ 15:47)    Cerebrospinal Fluid Cell Count-1 (09.25.19 @ 15:46)    CSF Appearance: Clear    CSF Lymphocytes: 88 %    CSF Monocytes/Macrophages: 12 %    CSF Segmented Neutrophils: 0 %    Total Nucleated Cell Count, CSF: 79 /uL    CSF Color: No Color    Radiology    < from: MR Head w/wo IV Cont (09.25.19 @ 17:09) >  Abnormal areas of cortical restricted diffusion (high signal on   diffusion-weighted imaging and corresponding dropout on ADC map) are   notable most pronounced within the right parietal and occipital cortices.   Abnormal cortical restricted diffusion is also noted within bilateral   cingulate gyri, right worse than left, right pulvinar of the thalamus,   right medial temporal lobe, and hippocampus. There is associated T2/FLAIR   prolongation in these areas along with mild gyral swelling. There is   associated sulcal effacement. There is no abnormal associated   enhancement. These findings were present on the outside MRI examination   from 9/23/2019.     Multiple additional patchy confluent nonspecific T2/FLAIR hyperintense   signal changes are noted throughout the bihemispheric white matter   without associated mass effect or restricted diffusion.    Ventricular size and configuration is unremarkable. No abnormal extra   axial fluidcollections are noted. Flow-voids are noted throughout the   major intracranial vessels, on the T2 weighted images, consistent with   their patency. The sella turcica and posterior fossa are unremarkable.    Secretions and mucosal thickening are noted within the right maxillary   sinus. Minimal scattered mucosal thickening throughout the ethmoid   labyrinth. Trace fluid signal is notable within the mastoid air cells   which is nonspecific. Layering secretions are noted within the   nasopharynx. Calvarial signal is within normal limits. The orbits appear   unremarkable.    IMPRESSION: Imaging findings compatible with status epilepticus   concordant with the patient's history. Please see discussion in the body   of the report.    Underlying etiology includes but is not limited to hypoxia, metabolic   derangement, drug toxicity, alcohol intoxication or withdrawal, or   encephalitis.    No abnormal intracranial enhancement.    < end of copied text >      MRI from [place] uploaded into system and shows right parieto-occipital cortical ribboning with T2 hyperintensity also involving the posterior right thalamus. 	    EEG IMPRESSION/CLINICAL CORRELATE  Abnormal EEG study.  1. Potential epileptogenic focus in the right posterior quadrant.  2. Moderate to severe nonspecific diffuse or multifocal cerebral dysfunction, right worse than left  3. No seizures were recorded.  ____________________________________________________________ Neurology Progress Note    Subjective: Patient seen and examined at bedside. Pt improving clinically    Vital Signs Last 24 Hrs  T(C): 37.2 (02 Oct 2019 16:00), Max: 37.2 (02 Oct 2019 16:00)  T(F): 98.9 (02 Oct 2019 16:00), Max: 98.9 (02 Oct 2019 16:00)  HR: 76 (02 Oct 2019 19:00) (60 - 104)  BP: 122/64 (02 Oct 2019 19:00) (111/61 - 173/86)  BP(mean): 87 (02 Oct 2019 19:00) (81 - 120)  RR: 33 (02 Oct 2019 19:00) (13 - 33)  SpO2: 99% (02 Oct 2019 19:00) (95% - 100%)    Neurological Exam:  Mental Status: off sedation, opens eyes to noxious stimuli, more interactive today  Cranial Nerves:  JOHN, 3 mm L, 3 mm R, bilat. left facial droop  Motor: no movement of the left UE, slight shoulder movement of the right UE, very subtle movements of the LE more brisk on the left.       Coordination: Unable to examine  Tremor: No resting, postural or action tremor.  No myoclonus.  Sensation: Grimaces to noxious on the right exam  Deep Tendon Reflexes: 1+ bilateral biceps, triceps, brachioradialis, knee and ankle. No Babinski present.  Gait: pt is bedbound      Other:                                  13.9   14.69 )-----------( 350      ( 02 Oct 2019 00:27 )             41.1   10-02    137  |  96  |  26<H>  ----------------------------<  120<H>  3.3<L>   |  27  |  0.80    Ca    9.3      02 Oct 2019 00:27  Phos  3.4     10-02  Mg     1.8     10-02    TPro  7.6  /  Alb  3.2<L>  /  TBili  0.9  /  DBili  x   /  AST  41<H>  /  ALT  76<H>  /  AlkPhos  149<H>  10-02      Cryptococcal Antigen - < from: CT Angio Neck w/ IV Cont (10.01.19 @ 15:40) >  Neck CTA:    The aortic arch is not well visualized on this examination.    The great vessels are within normal limits.    Both vertebral arteries are patent. There is a dominant left vertebral   artery.    The common carotid, internal carotid and external carotid arteries are   patent. Nonobstructive calcifications are noted at the carotid bulbs   bilaterally.    Right thyroid calcification is noted.    Brain CTA:    No large vessel occlusion or stenosis is visualized. The basilar artery   is intact. No vascular aneurysm is identified. No vessel irregularity   indicate vasculitis is identified.    Impression:    Neck CTA: No evidence for a dynamically significant stenosis.    Brain CTA: Patent vessels. No evidence for vascular stenosis. Vasculitis   may be below the level of sensitivity achieved on CTA    < end of copied text >  CSF: Negative (09.25.19 @ 18:45)  Culture - Fungal, CSF (09.25.19 @ 18:56)    Specimen Source: .CSF    Culture Results:   Testing in progress    Culture - CSF with Gram Stain . (09.25.19 @ 18:56)    Gram Stain:   No polymorphonuclear cells seen  No organisms seen  by cytocentrifuge    Specimen Source: .CSF    Lactate Dehydrogenase, CSF: 29:  Protein, CSF: 75 mg/dL (09.25.19 @ 15:47)   Glucose, CSF: 75 mg/dL (09.25.19 @ 15:47)    Cerebrospinal Fluid Cell Count-1 (09.25.19 @ 15:46)    CSF Appearance: Clear    CSF Lymphocytes: 88 %    CSF Monocytes/Macrophages: 12 %    CSF Segmented Neutrophils: 0 %    Total Nucleated Cell Count, CSF: 79 /uL    CSF Color: No Color    Radiology    < from: MR Head w/wo IV Cont (09.25.19 @ 17:09) >  Abnormal areas of cortical restricted diffusion (high signal on   diffusion-weighted imaging and corresponding dropout on ADC map) are   notable most pronounced within the right parietal and occipital cortices.   Abnormal cortical restricted diffusion is also noted within bilateral   cingulate gyri, right worse than left, right pulvinar of the thalamus,   right medial temporal lobe, and hippocampus. There is associated T2/FLAIR   prolongation in these areas along with mild gyral swelling. There is   associated sulcal effacement. There is no abnormal associated   enhancement. These findings were present on the outside MRI examination   from 9/23/2019.     Multiple additional patchy confluent nonspecific T2/FLAIR hyperintense   signal changes are noted throughout the bihemispheric white matter   without associated mass effect or restricted diffusion.    Ventricular size and configuration is unremarkable. No abnormal extra   axial fluidcollections are noted. Flow-voids are noted throughout the   major intracranial vessels, on the T2 weighted images, consistent with   their patency. The sella turcica and posterior fossa are unremarkable.    Secretions and mucosal thickening are noted within the right maxillary   sinus. Minimal scattered mucosal thickening throughout the ethmoid   labyrinth. Trace fluid signal is notable within the mastoid air cells   which is nonspecific. Layering secretions are noted within the   nasopharynx. Calvarial signal is within normal limits. The orbits appear   unremarkable.    IMPRESSION: Imaging findings compatible with status epilepticus   concordant with the patient's history. Please see discussion in the body   of the report.    Underlying etiology includes but is not limited to hypoxia, metabolic   derangement, drug toxicity, alcohol intoxication or withdrawal, or   encephalitis.    No abnormal intracranial enhancement.    < end of copied text >      MRI from [place] uploaded into system and shows right parieto-occipital cortical ribboning with T2 hyperintensity also involving the posterior right thalamus. 	    EEG IMPRESSION/CLINICAL CORRELATE  Abnormal EEG study.  1. Potential epileptogenic focus in the right posterior quadrant.  2. Moderate to severe nonspecific diffuse or multifocal cerebral dysfunction, right worse than left  3. No seizures were recorded.  ____________________________________________________________    MEDICATIONS  (STANDING):  acyclovir IVPB 850 milliGRAM(s) IV Intermittent every 8 hours  chlorhexidine 4% Liquid 1 Application(s) Topical <User Schedule>  dexamethasone  IVPB 10 milliGRAM(s) IV Intermittent daily  heparin  Injectable 5000 Unit(s) SubCutaneous every 8 hours  insulin lispro (HumaLOG) corrective regimen sliding scale   SubCutaneous every 6 hours  insulin NPH human recombinant 10 Unit(s) SubCutaneous every 6 hours  labetalol 100 milliGRAM(s) Oral every 8 hours  lacosamide IVPB 150 milliGRAM(s) IV Intermittent every 12 hours  levETIRAcetam  IVPB 2000 milliGRAM(s) IV Intermittent every 12 hours  pantoprazole  Injectable 40 milliGRAM(s) IV Push daily  polyethylene glycol 3350 17 Gram(s) Oral two times a day  senna Syrup 10 milliLiter(s) Oral at bedtime  sodium chloride 0.9%. 1000 milliLiter(s) (75 mL/Hr) IV Continuous <Continuous>    MEDICATIONS  (PRN):

## 2019-10-02 NOTE — PROGRESS NOTE ADULT - SUBJECTIVE AND OBJECTIVE BOX
CHIEF COMPLAINT: Patient is a 84y old  Male who presents with a chief complaint of status epilepticus (01 Oct 2019 15:30)    Interval Events:    No acute events overnight. Pt responding to commands appropriately this AM.    REVIEW OF SYSTEMS:  ROS limited 2.2 AMS and nonverbal. Able to answer yes/no with hand squeezing. Denies pain. Endorses exhaustion and weakness.    OBJECTIVE:  ICU Vital Signs Last 24 Hrs  T(C): 37 (02 Oct 2019 04:00), Max: 37.2 (01 Oct 2019 12:00)  T(F): 98.6 (02 Oct 2019 04:00), Max: 99 (01 Oct 2019 12:00)  HR: 61 (02 Oct 2019 07:00) (61 - 85)  BP: 115/61 (02 Oct 2019 07:00) (113/62 - 182/94)  BP(mean): 83 (02 Oct 2019 07:00) (82 - 129)  ABP: --  ABP(mean): --  RR: 13 (02 Oct 2019 07:00) (13 - 26)  SpO2: 97% (02 Oct 2019 07:00) (96% - 100%)    Mode: off, FiO2: 30    10-01 @ 07:01  -  10-02 @ 07:00  --------------------------------------------------------  IN: 2865 mL / OUT: 1870 mL / NET: 995 mL      CAPILLARY BLOOD GLUCOSE      POCT Blood Glucose.: 147 mg/dL (02 Oct 2019 05:16)      PHYSICAL EXAM:  Constitutional: intubated and obtunded  Eyes: pupils equal and reactive to light b/l  ENMT: normocephalic; MMM; healing wound on forehead  Respiratory: lungs clear to auscultation b/l  Cardiovascular: RRR; no murmurs rubs or gallops  Gastrointestinal: abdomen soft, nontender, nondistended; bowel sounds all 4 quadrants  Extremities: no peripheral edema  Vascular: 1+ LLE dorsalis pedis pulse, 2+ R side  Neurological: AOx0, intubated. Following commands.   Skin: erythematous rash L palm    HOSPITAL MEDICATIONS:  MEDICATIONS  (STANDING):  acyclovir IVPB 850 milliGRAM(s) IV Intermittent every 8 hours  chlorhexidine 4% Liquid 1 Application(s) Topical <User Schedule>  dexamethasone  IVPB 10 milliGRAM(s) IV Intermittent daily  heparin  Injectable 5000 Unit(s) SubCutaneous every 8 hours  insulin lispro (HumaLOG) corrective regimen sliding scale   SubCutaneous every 6 hours  insulin NPH human recombinant 10 Unit(s) SubCutaneous every 6 hours  labetalol 100 milliGRAM(s) Oral every 8 hours  lacosamide IVPB 150 milliGRAM(s) IV Intermittent every 12 hours  levETIRAcetam  IVPB 2000 milliGRAM(s) IV Intermittent every 12 hours  pantoprazole  Injectable 40 milliGRAM(s) IV Push daily  polyethylene glycol 3350 17 Gram(s) Oral two times a day  senna Syrup 10 milliLiter(s) Oral at bedtime  sodium chloride 0.9%. 1000 milliLiter(s) (75 mL/Hr) IV Continuous <Continuous>    MEDICATIONS  (PRN):      LABS:  (10-02 @ 00:27)                        13.9  14.69 )-----------( 350                 41.1    Neutrophils = 10.76 (73.3%)  Lymphocytes = 2.54 (17.3%)  Eosinophils = 0.05 (0.3%)  Basophils = 0.03 (0.2%)  Monocytes = 1.24 (8.4%)  Bands = --%    WBC Trend: 14.69<--, 15.2<--, 15.5<--  Hb Trend: 13.9<--, 13.4<--, 12.6<--, 13.2<--, 13.5<--  Plt Trend: 350<--, 351<--, 278<--, 308<--, 260<--  10-02    137  |  96  |  26<H>  ----------------------------<  120<H>  3.3<L>   |  27  |  0.80    Ca    9.3      02 Oct 2019 00:27  Phos  3.4     10-02  Mg     1.8     10-02    TPro  7.6  /  Alb  3.2<L>  /  TBili  0.9  /  DBili  x   /  AST  41<H>  /  ALT  76<H>  /  AlkPhos  149<H>  10-02    Creatinine Trend: 0.80<--, 0.82<--, 0.84<--, 0.90<--, 0.85<--, 0.78<--      Arterial Blood Gas:  10-01 @ 00:14  7.52/41/161/33/98/9.6  ABG lactate: --      CARDIAC MARKERS ( 01 Oct 2019 01:10 )  Trop x     / CK 31 U/L / CKMB x            RADIOLOGY:  < from: CT Angio Neck w/ IV Cont (10.01.19 @ 15:40) >    EXAM:  CT ANGIO BRAIN (W)AW IC                          EXAM:  CT ANGIO NECK (W)AW IC                            PROCEDURE DATE:  10/01/2019            INTERPRETATION:  CT angiography of the [place] and neck    CLINICAL INDICATION: 84M s/pseizure activity w/ inflammatory changes on   MRI, AMS    TECHNIQUE: Direct axial CT scanning of the [place] and neck was   obtained from the vertex to the level of the clavicular heads after the   dynamic intravenous injection of 70 cc Omnipaque 300. Sagittal and   coronal maximum intensity projection reformats were provided.    Three-dimensional reconstructions were performed by the radiologist using   the [employer] workstation.    COMPARISON: Brain MRI dated 9/23/2019 and head CT dated 9/25/2019.        FINDINGS:     Brain CT:    No acute hemorrhage, hydrocephalus, midline shift or extra-axial   collections are identified. Age-appropriate involutional changes and   moderate microvascular ischemic changes are unchanged in appearance.    The orbits, paranasal sinuses and tympanomastoid cavities remain   unremarkable in appearance.      Neck CTA:    The aortic arch is not well visualized on this examination.    The great vessels are within normal limits.    Both vertebral arteries are patent. There is a dominant left vertebral   artery.    The common carotid, internal carotid and external carotid arteries are   patent. Nonobstructive calcifications are noted at the carotid bulbs   bilaterally.    Right thyroid calcification is noted.    Brain CTA:    No large vessel occlusion or stenosis is visualized. The basilar artery   is intact. No vascular aneurysm is identified. No vessel irregularity   indicate vasculitis is identified.    Impression:    Neck CTA: No evidence for a dynamically significant stenosis.    Brain CTA: Patent vessels. No evidence for vascular stenosis. Vasculitis   may be below the level of sensitivity achieved on CTA    < end of copied text > CHIEF COMPLAINT: Patient is a 84y old  Male who presents with a chief complaint of status epilepticus (01 Oct 2019 15:30)    Interval Events:    No acute events overnight. Pt responding to commands appropriately this AM.    REVIEW OF SYSTEMS:  ROS limited 2.2 AMS and nonverbal. Able to answer yes/no with hand squeezing. Denies pain. Endorses exhaustion and weakness. More lethargic compared to yesterday AM.    OBJECTIVE:  ICU Vital Signs Last 24 Hrs  T(C): 37 (02 Oct 2019 04:00), Max: 37.2 (01 Oct 2019 12:00)  T(F): 98.6 (02 Oct 2019 04:00), Max: 99 (01 Oct 2019 12:00)  HR: 61 (02 Oct 2019 07:00) (61 - 85)  BP: 115/61 (02 Oct 2019 07:00) (113/62 - 182/94)  BP(mean): 83 (02 Oct 2019 07:00) (82 - 129)  ABP: --  ABP(mean): --  RR: 13 (02 Oct 2019 07:00) (13 - 26)  SpO2: 97% (02 Oct 2019 07:00) (96% - 100%)    Mode: off, FiO2: 30    10-01 @ 07:01  -  10-02 @ 07:00  --------------------------------------------------------  IN: 2865 mL / OUT: 1870 mL / NET: 995 mL      CAPILLARY BLOOD GLUCOSE      POCT Blood Glucose.: 147 mg/dL (02 Oct 2019 05:16)      PHYSICAL EXAM:  Constitutional: intubated and obtunded  Eyes: pupils equal and reactive to light b/l  ENMT: normocephalic; MMM; healing wound on forehead  Respiratory: lungs clear to auscultation b/l  Cardiovascular: RRR; no murmurs rubs or gallops  Gastrointestinal: abdomen soft, nontender, nondistended; bowel sounds all 4 quadrants  Extremities: no peripheral edema  Vascular: 1+ LLE dorsalis pedis pulse, 2+ R side  Neurological: AOx0, intubated. Following commands.   Skin: erythematous rash L palm    HOSPITAL MEDICATIONS:  MEDICATIONS  (STANDING):  acyclovir IVPB 850 milliGRAM(s) IV Intermittent every 8 hours  chlorhexidine 4% Liquid 1 Application(s) Topical <User Schedule>  dexamethasone  IVPB 10 milliGRAM(s) IV Intermittent daily  heparin  Injectable 5000 Unit(s) SubCutaneous every 8 hours  insulin lispro (HumaLOG) corrective regimen sliding scale   SubCutaneous every 6 hours  insulin NPH human recombinant 10 Unit(s) SubCutaneous every 6 hours  labetalol 100 milliGRAM(s) Oral every 8 hours  lacosamide IVPB 150 milliGRAM(s) IV Intermittent every 12 hours  levETIRAcetam  IVPB 2000 milliGRAM(s) IV Intermittent every 12 hours  pantoprazole  Injectable 40 milliGRAM(s) IV Push daily  polyethylene glycol 3350 17 Gram(s) Oral two times a day  senna Syrup 10 milliLiter(s) Oral at bedtime  sodium chloride 0.9%. 1000 milliLiter(s) (75 mL/Hr) IV Continuous <Continuous>    MEDICATIONS  (PRN):      LABS:  (10-02 @ 00:27)                        13.9  14.69 )-----------( 350                 41.1    Neutrophils = 10.76 (73.3%)  Lymphocytes = 2.54 (17.3%)  Eosinophils = 0.05 (0.3%)  Basophils = 0.03 (0.2%)  Monocytes = 1.24 (8.4%)  Bands = --%    WBC Trend: 14.69<--, 15.2<--, 15.5<--  Hb Trend: 13.9<--, 13.4<--, 12.6<--, 13.2<--, 13.5<--  Plt Trend: 350<--, 351<--, 278<--, 308<--, 260<--  10-02    137  |  96  |  26<H>  ----------------------------<  120<H>  3.3<L>   |  27  |  0.80    Ca    9.3      02 Oct 2019 00:27  Phos  3.4     10-02  Mg     1.8     10-02    TPro  7.6  /  Alb  3.2<L>  /  TBili  0.9  /  DBili  x   /  AST  41<H>  /  ALT  76<H>  /  AlkPhos  149<H>  10-02    Creatinine Trend: 0.80<--, 0.82<--, 0.84<--, 0.90<--, 0.85<--, 0.78<--      Arterial Blood Gas:  10-01 @ 00:14  7.52/41/161/33/98/9.6  ABG lactate: --      CARDIAC MARKERS ( 01 Oct 2019 01:10 )  Trop x     / CK 31 U/L / CKMB x            RADIOLOGY:  < from: CT Angio Neck w/ IV Cont (10.01.19 @ 15:40) >    EXAM:  CT ANGIO BRAIN (W)AW IC                          EXAM:  CT ANGIO NECK (W)AW IC                            PROCEDURE DATE:  10/01/2019            INTERPRETATION:  CT angiography of the [place] and neck    CLINICAL INDICATION: 84M s/pseizure activity w/ inflammatory changes on   MRI, AMS    TECHNIQUE: Direct axial CT scanning of the [place] of [place] and neck was   obtained from the vertex to the level of the clavicular heads after the   dynamic intravenous injection of 70 cc Omnipaque 300. Sagittal and   coronal maximum intensity projection reformats were provided.    Three-dimensional reconstructions were performed by the radiologist using   the [employer] workstation.    COMPARISON: Brain MRI dated 9/23/2019 and head CT dated 9/25/2019.        FINDINGS:     Brain CT:    No acute hemorrhage, hydrocephalus, midline shift or extra-axial   collections are identified. Age-appropriate involutional changes and   moderate microvascular ischemic changes are unchanged in appearance.    The orbits, paranasal sinuses and tympanomastoid cavities remain   unremarkable in appearance.      Neck CTA:    The aortic arch is not well visualized on this examination.    The great vessels are within normal limits.    Both vertebral arteries are patent. There is a dominant left vertebral   artery.    The common carotid, internal carotid and external carotid arteries are   patent. Nonobstructive calcifications are noted at the carotid bulbs   bilaterally.    Right thyroid calcification is noted.    Brain CTA:    No large vessel occlusion or stenosis is visualized. The basilar artery   is intact. No vascular aneurysm is identified. No vessel irregularity   indicate vasculitis is identified.    Impression:    Neck CTA: No evidence for a dynamically significant stenosis.    Brain CTA: Patent vessels. No evidence for vascular stenosis. Vasculitis   may be below the level of sensitivity achieved on CTA    < end of copied text > CHIEF COMPLAINT: Patient is a 84y old  Male who presents with a chief complaint of status epilepticus (01 Oct 2019 15:30)    Interval Events:    No acute events overnight. Pt responding to commands appropriately this AM.  More lethargic compared to yesterday AM. Labetalol 100mg q12 increased to q8 2/2 high systolics ~180 overnight.    REVIEW OF SYSTEMS:  ROS limited 2.2 AMS and nonverbal. Able to answer yes/no with hand squeezing. Denies pain. Endorses exhaustion and weakness.    OBJECTIVE:  ICU Vital Signs Last 24 Hrs  T(C): 37 (02 Oct 2019 04:00), Max: 37.2 (01 Oct 2019 12:00)  T(F): 98.6 (02 Oct 2019 04:00), Max: 99 (01 Oct 2019 12:00)  HR: 61 (02 Oct 2019 07:00) (61 - 85)  BP: 115/61 (02 Oct 2019 07:00) (113/62 - 182/94)  BP(mean): 83 (02 Oct 2019 07:00) (82 - 129)  ABP: --  ABP(mean): --  RR: 13 (02 Oct 2019 07:00) (13 - 26)  SpO2: 97% (02 Oct 2019 07:00) (96% - 100%)    Mode: off, FiO2: 30    10-01 @ 07:01  -  10-02 @ 07:00  --------------------------------------------------------  IN: 2865 mL / OUT: 1870 mL / NET: 995 mL      CAPILLARY BLOOD GLUCOSE      POCT Blood Glucose.: 147 mg/dL (02 Oct 2019 05:16)      PHYSICAL EXAM:  Constitutional: intubated and obtunded  Eyes: pupils equal and reactive to light b/l  ENMT: normocephalic; MMM; healing wound on forehead  Respiratory: lungs clear to auscultation b/l  Cardiovascular: RRR; no murmurs rubs or gallops  Gastrointestinal: abdomen soft, nontender, nondistended; bowel sounds all 4 quadrants  Extremities: no peripheral edema  Vascular: 1+ LLE dorsalis pedis pulse, 2+ R side  Neurological: AOx0, intubated. Following commands.   Skin: erythematous rash L palm    HOSPITAL MEDICATIONS:  MEDICATIONS  (STANDING):  acyclovir IVPB 850 milliGRAM(s) IV Intermittent every 8 hours  chlorhexidine 4% Liquid 1 Application(s) Topical <User Schedule>  dexamethasone  IVPB 10 milliGRAM(s) IV Intermittent daily  heparin  Injectable 5000 Unit(s) SubCutaneous every 8 hours  insulin lispro (HumaLOG) corrective regimen sliding scale   SubCutaneous every 6 hours  insulin NPH human recombinant 10 Unit(s) SubCutaneous every 6 hours  labetalol 100 milliGRAM(s) Oral every 8 hours  lacosamide IVPB 150 milliGRAM(s) IV Intermittent every 12 hours  levETIRAcetam  IVPB 2000 milliGRAM(s) IV Intermittent every 12 hours  pantoprazole  Injectable 40 milliGRAM(s) IV Push daily  polyethylene glycol 3350 17 Gram(s) Oral two times a day  senna Syrup 10 milliLiter(s) Oral at bedtime  sodium chloride 0.9%. 1000 milliLiter(s) (75 mL/Hr) IV Continuous <Continuous>    MEDICATIONS  (PRN):      LABS:  (10-02 @ 00:27)                        13.9  14.69 )-----------( 350                 41.1    Neutrophils = 10.76 (73.3%)  Lymphocytes = 2.54 (17.3%)  Eosinophils = 0.05 (0.3%)  Basophils = 0.03 (0.2%)  Monocytes = 1.24 (8.4%)  Bands = --%    WBC Trend: 14.69<--, 15.2<--, 15.5<--  Hb Trend: 13.9<--, 13.4<--, 12.6<--, 13.2<--, 13.5<--  Plt Trend: 350<--, 351<--, 278<--, 308<--, 260<--  10-02    137  |  96  |  26<H>  ----------------------------<  120<H>  3.3<L>   |  27  |  0.80    Ca    9.3      02 Oct 2019 00:27  Phos  3.4     10-02  Mg     1.8     10-02    TPro  7.6  /  Alb  3.2<L>  /  TBili  0.9  /  DBili  x   /  AST  41<H>  /  ALT  76<H>  /  AlkPhos  149<H>  10-02    Creatinine Trend: 0.80<--, 0.82<--, 0.84<--, 0.90<--, 0.85<--, 0.78<--      Arterial Blood Gas:  10-01 @ 00:14  7.52/41/161/33/98/9.6  ABG lactate: --      CARDIAC MARKERS ( 01 Oct 2019 01:10 )  Trop x     / CK 31 U/L / CKMB x            RADIOLOGY:  < from: CT Angio Neck w/ IV Cont (10.01.19 @ 15:40) >    EXAM:  CT ANGIO BRAIN (W)AW IC                          EXAM:  CT ANGIO NECK (W)AW IC                            PROCEDURE DATE:  10/01/2019            INTERPRETATION:  CT angiography of the [place] of [place] and neck    CLINICAL INDICATION: 84M s/pseizure activity w/ inflammatory changes on   MRI, AMS    TECHNIQUE: Direct axial CT scanning of the [place] of [place] and neck was   obtained from the vertex to the level of the clavicular heads after the   dynamic intravenous injection of 70 cc Omnipaque 300. Sagittal and   coronal maximum intensity projection reformats were provided.    Three-dimensional reconstructions were performed by the radiologist using   the [employer] workstation.    COMPARISON: Brain MRI dated 9/23/2019 and head CT dated 9/25/2019.        FINDINGS:     Brain CT:    No acute hemorrhage, hydrocephalus, midline shift or extra-axial   collections are identified. Age-appropriate involutional changes and   moderate microvascular ischemic changes are unchanged in appearance.    The orbits, paranasal sinuses and tympanomastoid cavities remain   unremarkable in appearance.      Neck CTA:    The aortic arch is not well visualized on this examination.    The great vessels are within normal limits.    Both vertebral arteries are patent. There is a dominant left vertebral   artery.    The common carotid, internal carotid and external carotid arteries are   patent. Nonobstructive calcifications are noted at the carotid bulbs   bilaterally.    Right thyroid calcification is noted.    Brain CTA:    No large vessel occlusion or stenosis is visualized. The basilar artery   is intact. No vascular aneurysm is identified. No vessel irregularity   indicate vasculitis is identified.    Impression:    Neck CTA: No evidence for a dynamically significant stenosis.    Brain CTA: Patent vessels. No evidence for vascular stenosis. Vasculitis   may be below the level of sensitivity achieved on CTA    < end of copied text > CHIEF COMPLAINT: Patient is a 84y old  Male who presents with a chief complaint of status epilepticus (01 Oct 2019 15:30)    Interval Events:    No acute events overnight. Pt responding to commands appropriately this AM.  More lethargic compared to yesterday AM when waking up. Afterwards, keeping eyes open, verbalizing appropriately. Still with some encephalopathic/delirium confusion. Labetalol 100mg q12 increased to q8 2/2 high systolics ~180 overnight.    REVIEW OF SYSTEMS:  ROS limited 2.2 AMS and nonverbal. Able to answer yes/no with hand squeezing. Denies pain. Endorses exhaustion and weakness.    OBJECTIVE:  ICU Vital Signs Last 24 Hrs  T(C): 37 (02 Oct 2019 04:00), Max: 37.2 (01 Oct 2019 12:00)  T(F): 98.6 (02 Oct 2019 04:00), Max: 99 (01 Oct 2019 12:00)  HR: 61 (02 Oct 2019 07:00) (61 - 85)  BP: 115/61 (02 Oct 2019 07:00) (113/62 - 182/94)  BP(mean): 83 (02 Oct 2019 07:00) (82 - 129)  ABP: --  ABP(mean): --  RR: 13 (02 Oct 2019 07:00) (13 - 26)  SpO2: 97% (02 Oct 2019 07:00) (96% - 100%)    Mode: off, FiO2: 30    10-01 @ 07:01  -  10-02 @ 07:00  --------------------------------------------------------  IN: 2865 mL / OUT: 1870 mL / NET: 995 mL      CAPILLARY BLOOD GLUCOSE      POCT Blood Glucose.: 147 mg/dL (02 Oct 2019 05:16)      PHYSICAL EXAM:  Constitutional: intubated and obtunded  Eyes: pupils equal and reactive to light b/l  ENMT: normocephalic; MMM; healing wound on forehead  Respiratory: lungs clear to auscultation b/l  Cardiovascular: RRR; no murmurs rubs or gallops  Gastrointestinal: abdomen soft, nontender, nondistended; bowel sounds all 4 quadrants  Extremities: no peripheral edema  Vascular: 1+ LLE dorsalis pedis pulse, 2+ R side  Neurological: AOx0, intubated. Following commands.   Skin: erythematous rash L palm    HOSPITAL MEDICATIONS:  MEDICATIONS  (STANDING):  acyclovir IVPB 850 milliGRAM(s) IV Intermittent every 8 hours  chlorhexidine 4% Liquid 1 Application(s) Topical <User Schedule>  dexamethasone  IVPB 10 milliGRAM(s) IV Intermittent daily  heparin  Injectable 5000 Unit(s) SubCutaneous every 8 hours  insulin lispro (HumaLOG) corrective regimen sliding scale   SubCutaneous every 6 hours  insulin NPH human recombinant 10 Unit(s) SubCutaneous every 6 hours  labetalol 100 milliGRAM(s) Oral every 8 hours  lacosamide IVPB 150 milliGRAM(s) IV Intermittent every 12 hours  levETIRAcetam  IVPB 2000 milliGRAM(s) IV Intermittent every 12 hours  pantoprazole  Injectable 40 milliGRAM(s) IV Push daily  polyethylene glycol 3350 17 Gram(s) Oral two times a day  senna Syrup 10 milliLiter(s) Oral at bedtime  sodium chloride 0.9%. 1000 milliLiter(s) (75 mL/Hr) IV Continuous <Continuous>    MEDICATIONS  (PRN):      LABS:  (10-02 @ 00:27)                        13.9  14.69 )-----------( 350                 41.1    Neutrophils = 10.76 (73.3%)  Lymphocytes = 2.54 (17.3%)  Eosinophils = 0.05 (0.3%)  Basophils = 0.03 (0.2%)  Monocytes = 1.24 (8.4%)  Bands = --%    WBC Trend: 14.69<--, 15.2<--, 15.5<--  Hb Trend: 13.9<--, 13.4<--, 12.6<--, 13.2<--, 13.5<--  Plt Trend: 350<--, 351<--, 278<--, 308<--, 260<--  10-02    137  |  96  |  26<H>  ----------------------------<  120<H>  3.3<L>   |  27  |  0.80    Ca    9.3      02 Oct 2019 00:27  Phos  3.4     10-02  Mg     1.8     10-02    TPro  7.6  /  Alb  3.2<L>  /  TBili  0.9  /  DBili  x   /  AST  41<H>  /  ALT  76<H>  /  AlkPhos  149<H>  10-02    Creatinine Trend: 0.80<--, 0.82<--, 0.84<--, 0.90<--, 0.85<--, 0.78<--      Arterial Blood Gas:  10-01 @ 00:14  7.52/41/161/33/98/9.6  ABG lactate: --      CARDIAC MARKERS ( 01 Oct 2019 01:10 )  Trop x     / CK 31 U/L / CKMB x            RADIOLOGY:  < from: CT Angio Neck w/ IV Cont (10.01.19 @ 15:40) >    EXAM:  CT ANGIO BRAIN (W)AW IC                          EXAM:  CT ANGIO NECK (W)AW IC                            PROCEDURE DATE:  10/01/2019            INTERPRETATION:  CT angiography of the [place] and neck    CLINICAL INDICATION: 84M s/pseizure activity w/ inflammatory changes on   MRI, AMS    TECHNIQUE: Direct axial CT scanning of the [place] and neck was   obtained from the vertex to the level of the clavicular heads after the   dynamic intravenous injection of 70 cc Omnipaque 300. Sagittal and   coronal maximum intensity projection reformats were provided.    Three-dimensional reconstructions were performed by the radiologist using   the [employer] workstation.    COMPARISON: Brain MRI dated 9/23/2019 and head CT dated 9/25/2019.        FINDINGS:     Brain CT:    No acute hemorrhage, hydrocephalus, midline shift or extra-axial   collections are identified. Age-appropriate involutional changes and   moderate microvascular ischemic changes are unchanged in appearance.    The orbits, paranasal sinuses and tympanomastoid cavities remain   unremarkable in appearance.      Neck CTA:    The aortic arch is not well visualized on this examination.    The great vessels are within normal limits.    Both vertebral arteries are patent. There is a dominant left vertebral   artery.    The common carotid, internal carotid and external carotid arteries are   patent. Nonobstructive calcifications are noted at the carotid bulbs   bilaterally.    Right thyroid calcification is noted.    Brain CTA:    No large vessel occlusion or stenosis is visualized. The basilar artery   is intact. No vascular aneurysm is identified. No vessel irregularity   indicate vasculitis is identified.    Impression:    Neck CTA: No evidence for a dynamically significant stenosis.    Brain CTA: Patent vessels. No evidence for vascular stenosis. Vasculitis   may be below the level of sensitivity achieved on CTA    < end of copied text > CHIEF COMPLAINT: Patient is a 84y old  Male who presents with a chief complaint of status epilepticus (01 Oct 2019 15:30)    Interval Events:    No acute events overnight. Pt responding to commands appropriately this AM.  More lethargic compared to yesterday AM when waking up. Afterwards, keeping eyes open, verbalizing appropriately. Still with some encephalopathic/delirium confusion. Labetalol 100mg q12 increased to q8 2/2 high systolics ~180 overnight.    REVIEW OF SYSTEMS:  ROS limited 2.2 AMS and nonverbal. Able to answer yes/no with hand squeezing. Denies pain. Endorses exhaustion and weakness.    OBJECTIVE:  ICU Vital Signs Last 24 Hrs  T(C): 37 (02 Oct 2019 04:00), Max: 37.2 (01 Oct 2019 12:00)  T(F): 98.6 (02 Oct 2019 04:00), Max: 99 (01 Oct 2019 12:00)  HR: 61 (02 Oct 2019 07:00) (61 - 85)  BP: 115/61 (02 Oct 2019 07:00) (113/62 - 182/94)  BP(mean): 83 (02 Oct 2019 07:00) (82 - 129)  ABP: --  ABP(mean): --  RR: 13 (02 Oct 2019 07:00) (13 - 26)  SpO2: 97% (02 Oct 2019 07:00) (96% - 100%)    Mode: off, FiO2: 30    10-01 @ 07:01  -  10-02 @ 07:00  --------------------------------------------------------  IN: 2865 mL / OUT: 1870 mL / NET: 995 mL      CAPILLARY BLOOD GLUCOSE      POCT Blood Glucose.: 147 mg/dL (02 Oct 2019 05:16)      PHYSICAL EXAM:  Constitutional: intubated and obtunded  Eyes: pupils equal and reactive to light b/l  ENMT: normocephalic; MMM; healing wound on forehead  Respiratory: lungs clear to auscultation b/l  Cardiovascular: RRR; no murmurs rubs or gallops  Gastrointestinal: abdomen soft, nontender, nondistended; bowel sounds all 4 quadrants  Extremities: no peripheral edema  Vascular: 1+ LLE dorsalis pedis pulse, 2+ R side  Neurological: AOx1 to name. Complete L-sided neglect.    Skin: erythematous rash L palm    HOSPITAL MEDICATIONS:  MEDICATIONS  (STANDING):  acyclovir IVPB 850 milliGRAM(s) IV Intermittent every 8 hours  chlorhexidine 4% Liquid 1 Application(s) Topical <User Schedule>  dexamethasone  IVPB 10 milliGRAM(s) IV Intermittent daily  heparin  Injectable 5000 Unit(s) SubCutaneous every 8 hours  insulin lispro (HumaLOG) corrective regimen sliding scale   SubCutaneous every 6 hours  insulin NPH human recombinant 10 Unit(s) SubCutaneous every 6 hours  labetalol 100 milliGRAM(s) Oral every 8 hours  lacosamide IVPB 150 milliGRAM(s) IV Intermittent every 12 hours  levETIRAcetam  IVPB 2000 milliGRAM(s) IV Intermittent every 12 hours  pantoprazole  Injectable 40 milliGRAM(s) IV Push daily  polyethylene glycol 3350 17 Gram(s) Oral two times a day  senna Syrup 10 milliLiter(s) Oral at bedtime  sodium chloride 0.9%. 1000 milliLiter(s) (75 mL/Hr) IV Continuous <Continuous>    MEDICATIONS  (PRN):      LABS:  (10-02 @ 00:27)                        13.9  14.69 )-----------( 350                 41.1    Neutrophils = 10.76 (73.3%)  Lymphocytes = 2.54 (17.3%)  Eosinophils = 0.05 (0.3%)  Basophils = 0.03 (0.2%)  Monocytes = 1.24 (8.4%)  Bands = --%    WBC Trend: 14.69<--, 15.2<--, 15.5<--  Hb Trend: 13.9<--, 13.4<--, 12.6<--, 13.2<--, 13.5<--  Plt Trend: 350<--, 351<--, 278<--, 308<--, 260<--  10-02    137  |  96  |  26<H>  ----------------------------<  120<H>  3.3<L>   |  27  |  0.80    Ca    9.3      02 Oct 2019 00:27  Phos  3.4     10-02  Mg     1.8     10-02    TPro  7.6  /  Alb  3.2<L>  /  TBili  0.9  /  DBili  x   /  AST  41<H>  /  ALT  76<H>  /  AlkPhos  149<H>  10-02    Creatinine Trend: 0.80<--, 0.82<--, 0.84<--, 0.90<--, 0.85<--, 0.78<--      Arterial Blood Gas:  10-01 @ 00:14  7.52/41/161/33/98/9.6  ABG lactate: --      CARDIAC MARKERS ( 01 Oct 2019 01:10 )  Trop x     / CK 31 U/L / CKMB x            RADIOLOGY:  < from: CT Angio Neck w/ IV Cont (10.01.19 @ 15:40) >    EXAM:  CT ANGIO BRAIN (W)AW IC                          EXAM:  CT ANGIO NECK (W)AW IC                            PROCEDURE DATE:  10/01/2019            INTERPRETATION:  CT angiography of the [place] and neck    CLINICAL INDICATION: 84M s/pseizure activity w/ inflammatory changes on   MRI, AMS    TECHNIQUE: Direct axial CT scanning of the [place] and neck was   obtained from the vertex to the level of the clavicular heads after the   dynamic intravenous injection of 70 cc Omnipaque 300. Sagittal and   coronal maximum intensity projection reformats were provided.    Three-dimensional reconstructions were performed by the radiologist using   the [employer] workstation.    COMPARISON: Brain MRI dated 9/23/2019 and head CT dated 9/25/2019.        FINDINGS:     Brain CT:    No acute hemorrhage, hydrocephalus, midline shift or extra-axial   collections are identified. Age-appropriate involutional changes and   moderate microvascular ischemic changes are unchanged in appearance.    The orbits, paranasal sinuses and tympanomastoid cavities remain   unremarkable in appearance.      Neck CTA:    The aortic arch is not well visualized on this examination.    The great vessels are within normal limits.    Both vertebral arteries are patent. There is a dominant left vertebral   artery.    The common carotid, internal carotid and external carotid arteries are   patent. Nonobstructive calcifications are noted at the carotid bulbs   bilaterally.    Right thyroid calcification is noted.    Brain CTA:    No large vessel occlusion or stenosis is visualized. The basilar artery   is intact. No vascular aneurysm is identified. No vessel irregularity   indicate vasculitis is identified.    Impression:    Neck CTA: No evidence for a dynamically significant stenosis.    Brain CTA: Patent vessels. No evidence for vascular stenosis. Vasculitis   may be below the level of sensitivity achieved on CTA    < end of copied text >

## 2019-10-02 NOTE — PROGRESS NOTE ADULT - ATTENDING COMMENTS
Tomas Marcos  Attending Physician   Division of Infectious Disease  Pager #[phone]  After 5pm/weekend or no response, call #[phone]

## 2019-10-03 DIAGNOSIS — Z29.9 ENCOUNTER FOR PROPHYLACTIC MEASURES, UNSPECIFIED: ICD-10-CM

## 2019-10-03 DIAGNOSIS — R56.9 UNSPECIFIED CONVULSIONS: ICD-10-CM

## 2019-10-03 DIAGNOSIS — C61 MALIGNANT NEOPLASM OF PROSTATE: ICD-10-CM

## 2019-10-03 DIAGNOSIS — Z87.19 PERSONAL HISTORY OF OTHER DISEASES OF THE DIGESTIVE SYSTEM: ICD-10-CM

## 2019-10-03 DIAGNOSIS — I10 ESSENTIAL (PRIMARY) HYPERTENSION: ICD-10-CM

## 2019-10-03 LAB
ALBUMIN SERPL ELPH-MCNC: 3 G/DL — LOW (ref 3.3–5)
ALP SERPL-CCNC: 135 U/L — HIGH (ref 40–120)
ALT FLD-CCNC: 60 U/L — HIGH (ref 10–45)
ANION GAP SERPL CALC-SCNC: 13 MMOL/L — SIGNIFICANT CHANGE UP (ref 5–17)
ANION GAP SERPL CALC-SCNC: 9 MMOL/L — SIGNIFICANT CHANGE UP (ref 5–17)
AST SERPL-CCNC: 43 U/L — HIGH (ref 10–40)
BASOPHILS # BLD AUTO: 0.03 K/UL — SIGNIFICANT CHANGE UP (ref 0–0.2)
BASOPHILS NFR BLD AUTO: 0.2 % — SIGNIFICANT CHANGE UP (ref 0–2)
BILIRUB SERPL-MCNC: 0.7 MG/DL — SIGNIFICANT CHANGE UP (ref 0.2–1.2)
BUN SERPL-MCNC: 27 MG/DL — HIGH (ref 7–23)
BUN SERPL-MCNC: 32 MG/DL — HIGH (ref 7–23)
CALCIUM SERPL-MCNC: 8.7 MG/DL — SIGNIFICANT CHANGE UP (ref 8.4–10.5)
CALCIUM SERPL-MCNC: 8.7 MG/DL — SIGNIFICANT CHANGE UP (ref 8.4–10.5)
CHLORIDE SERPL-SCNC: 96 MMOL/L — SIGNIFICANT CHANGE UP (ref 96–108)
CHLORIDE SERPL-SCNC: 97 MMOL/L — SIGNIFICANT CHANGE UP (ref 96–108)
CK SERPL-CCNC: 56 U/L — SIGNIFICANT CHANGE UP (ref 30–200)
CO2 SERPL-SCNC: 26 MMOL/L — SIGNIFICANT CHANGE UP (ref 22–31)
CO2 SERPL-SCNC: 27 MMOL/L — SIGNIFICANT CHANGE UP (ref 22–31)
CREAT SERPL-MCNC: 0.81 MG/DL — SIGNIFICANT CHANGE UP (ref 0.5–1.3)
CREAT SERPL-MCNC: 0.85 MG/DL — SIGNIFICANT CHANGE UP (ref 0.5–1.3)
CULTURE RESULTS: SIGNIFICANT CHANGE UP
EOSINOPHIL # BLD AUTO: 0.12 K/UL — SIGNIFICANT CHANGE UP (ref 0–0.5)
EOSINOPHIL NFR BLD AUTO: 0.9 % — SIGNIFICANT CHANGE UP (ref 0–6)
GLUCOSE BLDC GLUCOMTR-MCNC: 139 MG/DL — HIGH (ref 70–99)
GLUCOSE BLDC GLUCOMTR-MCNC: 143 MG/DL — HIGH (ref 70–99)
GLUCOSE BLDC GLUCOMTR-MCNC: 146 MG/DL — HIGH (ref 70–99)
GLUCOSE BLDC GLUCOMTR-MCNC: 187 MG/DL — HIGH (ref 70–99)
GLUCOSE SERPL-MCNC: 163 MG/DL — HIGH (ref 70–99)
GLUCOSE SERPL-MCNC: 167 MG/DL — HIGH (ref 70–99)
HCT VFR BLD CALC: 36.4 % — LOW (ref 39–50)
HGB BLD-MCNC: 12.5 G/DL — LOW (ref 13–17)
IMM GRANULOCYTES NFR BLD AUTO: 0.5 % — SIGNIFICANT CHANGE UP (ref 0–1.5)
LYMPHOCYTES # BLD AUTO: 1.95 K/UL — SIGNIFICANT CHANGE UP (ref 1–3.3)
LYMPHOCYTES # BLD AUTO: 15.3 % — SIGNIFICANT CHANGE UP (ref 13–44)
MAGNESIUM SERPL-MCNC: 1.7 MG/DL — SIGNIFICANT CHANGE UP (ref 1.6–2.6)
MCHC RBC-ENTMCNC: 30.9 PG — SIGNIFICANT CHANGE UP (ref 27–34)
MCHC RBC-ENTMCNC: 34.3 GM/DL — SIGNIFICANT CHANGE UP (ref 32–36)
MCV RBC AUTO: 90.1 FL — SIGNIFICANT CHANGE UP (ref 80–100)
MONOCYTES # BLD AUTO: 1.2 K/UL — HIGH (ref 0–0.9)
MONOCYTES NFR BLD AUTO: 9.4 % — SIGNIFICANT CHANGE UP (ref 2–14)
NEUTROPHILS # BLD AUTO: 9.36 K/UL — HIGH (ref 1.8–7.4)
NEUTROPHILS NFR BLD AUTO: 73.7 % — SIGNIFICANT CHANGE UP (ref 43–77)
NRBC # BLD: 0 /100 WBCS — SIGNIFICANT CHANGE UP (ref 0–0)
PHOSPHATE SERPL-MCNC: 2.3 MG/DL — LOW (ref 2.5–4.5)
PLATELET # BLD AUTO: 353 K/UL — SIGNIFICANT CHANGE UP (ref 150–400)
POTASSIUM SERPL-MCNC: 3.1 MMOL/L — LOW (ref 3.5–5.3)
POTASSIUM SERPL-MCNC: 3.9 MMOL/L — SIGNIFICANT CHANGE UP (ref 3.5–5.3)
POTASSIUM SERPL-SCNC: 3.1 MMOL/L — LOW (ref 3.5–5.3)
POTASSIUM SERPL-SCNC: 3.9 MMOL/L — SIGNIFICANT CHANGE UP (ref 3.5–5.3)
POTASSIUM UR-SCNC: 44 MMOL/L — SIGNIFICANT CHANGE UP
PROT SERPL-MCNC: 6.7 G/DL — SIGNIFICANT CHANGE UP (ref 6–8.3)
RBC # BLD: 4.04 M/UL — LOW (ref 4.2–5.8)
RBC # FLD: 13.5 % — SIGNIFICANT CHANGE UP (ref 10.3–14.5)
SODIUM SERPL-SCNC: 132 MMOL/L — LOW (ref 135–145)
SODIUM SERPL-SCNC: 136 MMOL/L — SIGNIFICANT CHANGE UP (ref 135–145)
SODIUM UR-SCNC: 101 MMOL/L — SIGNIFICANT CHANGE UP
SPECIMEN SOURCE: SIGNIFICANT CHANGE UP
WBC # BLD: 12.73 K/UL — HIGH (ref 3.8–10.5)
WBC # FLD AUTO: 12.73 K/UL — HIGH (ref 3.8–10.5)

## 2019-10-03 PROCEDURE — 99232 SBSQ HOSP IP/OBS MODERATE 35: CPT

## 2019-10-03 PROCEDURE — 99233 SBSQ HOSP IP/OBS HIGH 50: CPT

## 2019-10-03 PROCEDURE — 99232 SBSQ HOSP IP/OBS MODERATE 35: CPT | Mod: GC

## 2019-10-03 RX ORDER — LEVETIRACETAM 250 MG/1
1500 TABLET, FILM COATED ORAL EVERY 12 HOURS
Refills: 0 | Status: DISCONTINUED | OUTPATIENT
Start: 2019-10-03 | End: 2019-10-22

## 2019-10-03 RX ORDER — POTASSIUM CHLORIDE 20 MEQ
40 PACKET (EA) ORAL EVERY 4 HOURS
Refills: 0 | Status: COMPLETED | OUTPATIENT
Start: 2019-10-03 | End: 2019-10-03

## 2019-10-03 RX ORDER — POTASSIUM CHLORIDE 20 MEQ
10 PACKET (EA) ORAL
Refills: 0 | Status: DISCONTINUED | OUTPATIENT
Start: 2019-10-03 | End: 2019-10-03

## 2019-10-03 RX ORDER — POTASSIUM PHOSPHATE, MONOBASIC POTASSIUM PHOSPHATE, DIBASIC 236; 224 MG/ML; MG/ML
15 INJECTION, SOLUTION INTRAVENOUS ONCE
Refills: 0 | Status: COMPLETED | OUTPATIENT
Start: 2019-10-03 | End: 2019-10-03

## 2019-10-03 RX ORDER — MAGNESIUM SULFATE 500 MG/ML
2 VIAL (ML) INJECTION ONCE
Refills: 0 | Status: COMPLETED | OUTPATIENT
Start: 2019-10-03 | End: 2019-10-03

## 2019-10-03 RX ORDER — POTASSIUM CHLORIDE 20 MEQ
40 PACKET (EA) ORAL EVERY 4 HOURS
Refills: 0 | Status: DISCONTINUED | OUTPATIENT
Start: 2019-10-03 | End: 2019-10-03

## 2019-10-03 RX ADMIN — CHLORHEXIDINE GLUCONATE 1 APPLICATION(S): 213 SOLUTION TOPICAL at 05:33

## 2019-10-03 RX ADMIN — Medication 40 MILLIEQUIVALENT(S): at 03:05

## 2019-10-03 RX ADMIN — POTASSIUM PHOSPHATE, MONOBASIC POTASSIUM PHOSPHATE, DIBASIC 62.5 MILLIMOLE(S): 236; 224 INJECTION, SOLUTION INTRAVENOUS at 13:39

## 2019-10-03 RX ADMIN — HEPARIN SODIUM 5000 UNIT(S): 5000 INJECTION INTRAVENOUS; SUBCUTANEOUS at 13:51

## 2019-10-03 RX ADMIN — LEVETIRACETAM 400 MILLIGRAM(S): 250 TABLET, FILM COATED ORAL at 20:34

## 2019-10-03 RX ADMIN — HUMAN INSULIN 10 UNIT(S): 100 INJECTION, SUSPENSION SUBCUTANEOUS at 00:20

## 2019-10-03 RX ADMIN — Medication 100 MILLIGRAM(S): at 05:34

## 2019-10-03 RX ADMIN — LACOSAMIDE 130 MILLIGRAM(S): 50 TABLET ORAL at 09:26

## 2019-10-03 RX ADMIN — LACOSAMIDE 130 MILLIGRAM(S): 50 TABLET ORAL at 21:17

## 2019-10-03 RX ADMIN — Medication 167 MILLIGRAM(S): at 03:04

## 2019-10-03 RX ADMIN — PANTOPRAZOLE SODIUM 40 MILLIGRAM(S): 20 TABLET, DELAYED RELEASE ORAL at 13:50

## 2019-10-03 RX ADMIN — LEVETIRACETAM 600 MILLIGRAM(S): 250 TABLET, FILM COATED ORAL at 10:02

## 2019-10-03 RX ADMIN — Medication 50 GRAM(S): at 13:37

## 2019-10-03 RX ADMIN — HEPARIN SODIUM 5000 UNIT(S): 5000 INJECTION INTRAVENOUS; SUBCUTANEOUS at 21:24

## 2019-10-03 RX ADMIN — Medication 1: at 13:32

## 2019-10-03 RX ADMIN — HUMAN INSULIN 10 UNIT(S): 100 INJECTION, SUSPENSION SUBCUTANEOUS at 05:34

## 2019-10-03 RX ADMIN — Medication 102 MILLIGRAM(S): at 05:33

## 2019-10-03 RX ADMIN — HEPARIN SODIUM 5000 UNIT(S): 5000 INJECTION INTRAVENOUS; SUBCUTANEOUS at 05:34

## 2019-10-03 RX ADMIN — SENNA PLUS 10 MILLILITER(S): 8.6 TABLET ORAL at 21:17

## 2019-10-03 RX ADMIN — Medication 40 MILLIEQUIVALENT(S): at 08:04

## 2019-10-03 RX ADMIN — Medication 167 MILLIGRAM(S): at 09:26

## 2019-10-03 NOTE — CHART NOTE - NSCHARTNOTEFT_GEN_A_CORE
MICU transfer note    84 year old male Hx HTN, GERD, prostate cancer who presented with seizure.  On 9/20, patient fell out of bed and hit his head.  He subsequently developed seizure like activity.  Patient intubated by EMS for airway protection.  Patient given ativan and versed in ED.  Patient had CTH which did not show acute processes (including bleed).  Patient had brain MRI which showed R frontal enhancement (potential seizure foci).  Patient started on propofol gtt, locasamide and keppra.  Patient started on acyclovir for aseptic meningitis suspicion.  Patient's labs were significant for WBC 13, lacate >10, BUN 29, and Cr 1.4.  Patient had spot EEG at OSH which showed R focal seizure activity on 9/23.  Patient was following commands and then attempted to wean off propofol, however unsuccessful (patient started having seizures again).  Resumed prop and increased keppra dose.  Patient transferred to [facility] MICU for vEEG and management of status epilepticus.    In the MICU, pt underwent vEEG monitoring (9/25-9/30) while being treated with keppra 2g BID and vimpat 150mg BID. He was also continued on IV acyclovir and decadron 10g IV qd. for herpes encephalitis/meningitis as well. His respiratory status remained stable while intubated for airway protection. Sedated with propofol and versed. He had a lumbar puncture on 9/25 for CSF assessment. Eastern equine encephalitis panel also sent during MICU stay. Pt's vEEG consistently showed epileptiform focus, but no seizure activity. MRI brain on 9/25 shows abnormal areas of cortical restricted diffusion (high signal on diffusion-weighted imaging and corresponding dropout on ADC map) are notable most pronounced within the right parietal and occipital cortices.  Abnormal cortical restricted diffusion is also noted within bilateral  cingulate gyri, right worse than left, right pulvinar of the thalamus, right medial temporal lobe, and hippocampus. There is associated T2/FLAIR prolongation in these areas along with mild gyral swelling. Pt was taken off sedation 9/27.     He continued to slowly become more awake through the next few days. NGT placed 10/1 to help with meds and feeds. He has complete L-sided neglect. He was noticed to have a weakened L dorsalis pedis pulse and the foot was colder than the right. 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  Pt extubated on 10/1 without complications. Since 10/1, is able to open eyes, phonate, and properly answer questions. Still lethargic upon wakening in the AM.     So far, infectious workup includes:  -Negative CSF studies so far:  Bacterial cx, WNV, Toxo, Cryptococcus, HSV, encephalitis panel  -Need to f/u 14-3-3, tau, neuron specific enolase, HTLV-1, JEANETTE virus, HSV 6, paraneoplastic panel      ASSESSMENT & PLAN:     85 yo M Hx HTN, GERD, prostate cancer (treated with radioactive seed implantation in 2001 no active treatment) presenting with status epilepticus.  Transferred from OSH to [facility] ICU for vEEG monitoring in setting of seizure activity.     Neuro  Differential diagnosis includes status epilepticus, HSV encephalitis, meningitis, autoimmune encephalitis  -c/w Keppra 2 g bid. Consider decreasing to 1500mg BID today given no seizure activity.  -c/w locasamide 150 mg bid  -all sedation off and pt still minimally responsive  -HSV PCR negative- will continue acyclovir for now for other viral etiologies  -10mg decadron qd for inflammatory encephalopathy coverage  -MRI brain 9/25 shows abnormal areas of cortical restricted diffusion (high signal on diffusion-weighted imaging and corresponding dropout on ADC map) are notable most pronounced within the right parietal and occipital cortices.  Abnormal cortical restricted diffusion is also noted within bilateral  cingulate gyri, right worse than left, right pulvinar of the thalamus, right medial temporal lobe, and hippocampus. There is associated T2/FLAIR prolongation in these areas along with mild gyral swelling.   -f/u CSF viral panel, 14-3-3, tau, neuron specific enolase, HTLV-1, JEANETTE virus, HSV 6, paraneoplastic panel  	*Negative CSF studies so far:  Bacterial cx, WNV, Toxo, Cryptococcus, HSV  - vEEG has consistently shown foci of epileptiform activity without seizure (~5 days)    CV  -Started lopressor 25mg BID 9/29 overnight for rapid HR  -d/c lopressor and start labetalol 10/1 for HTN. Increased to 100mg q8.  -hold home amlodipine    Resp  -intubated for airway protection in setting of seizure. Weaned sedation and will try to extubate per clinical status when mental status improved  -Saturating well on current settings  -pt CPAPing well    GI  -NG tube in place, on tube feeds  -CT OSH showed possible colitis (colonic wall thickening)    Renal  SANGITA on presentation at OSH  -resolved, continue to monitor BMP  -continue to monitor I/O  -will have to keep pt on IVF while on acyclovir    RHEUM  - will check YANDEL, ESR, CRP, DSDNA to ensure MRI findings not indicative of autoimmune disease    ID  r/o meningitis  -acyclovir as above  -s/p LP 9/25, f/u CSF studies as above  -BCx and sputum neg  -UA contaminated, d/c CTX (total received 3 day course)  -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 Per ID note, [place] encephalitis panel NOT received by lab although in sunrise saying specimen received, if additional samples warranted by ID will need IR guided LP due to spinal abnormality.   2. further titration of AEDs  3. neurology recs  4. f/u CSF viral panel, 14-3-3, tau, neuron specific enolase, HTLV-1, JEANETTE virus, HSV 6, paraneoplastic panel  5. follow up YANDEL, ESR, CRP, DSDNA to ensure MRI findings not indicative of autoimmune disease  6. Will need repeat MRI brain tomorrow 10/4 per MICU recs

## 2019-10-03 NOTE — OCCUPATIONAL THERAPY INITIAL EVALUATION ADULT - IMPAIRMENTS CONTRIBUTING IMPAIRED BED MOBILITY, REHAB EVAL
impaired balance/decreased ROM/abnormal muscle tone/decreased sensation/impaired coordination/cognition/decreased strength

## 2019-10-03 NOTE — OCCUPATIONAL THERAPY INITIAL EVALUATION ADULT - ADDITIONAL COMMENTS
CT Head 9/25- No acute hemorrhage, mass effect or extra-axial collections. Age-appropriate involutional changes and mild microvascular ischemic change.
no sports/gym/no exercise/no heavy lifting

## 2019-10-03 NOTE — PROGRESS NOTE ADULT - PROBLEM SELECTOR PLAN 1
ddx included status epilepticus, infarct HSV encephalitis, meningitis, hypoxic injury and CJD. likely viral vs. inflammatory based on CSF studies thus far.  - vEEG has consistently shown foci of epileptiform activity without seizure (~5 days)  - Neurology and ID following, recs appreciated  - c/w keppra 2g q12h  - c/w vimpat 150mg q12h  - c/w decadron 10mg daily for inflammatory encephalopathy coverage  - f/u CSF 14-3-3, tau, neuron specific enolase, HTLV-1, JEANETTE virus, HSV 6, paraneoplastic panel      *Negative CSF studies so far:  CSF PCR, Bacterial cx, WNV, Toxo, Cryptococcus, HSV  - CSF PCR returned negative 10/2, will d/c acyclovir today  - per ID, [place] encephalitis panel not received by lab. if additional samples needed, will need IR guided LP due to spinal abnormalities  - ESR/CRP 81/9.53, YANDEL and dsDNa negative - were checked to r/o autoimmune disease in setting of above MRI findings  - repeat MRI brain tomorrow 10/4 as per Neurology       * will need to address downtitrating AEDs pending MRI findings tomorrow  - patient will need swallow eval for dispo planning ddx included status epilepticus, infarct HSV encephalitis, meningitis, hypoxic injury and CJD. likely viral vs. inflammatory based on CSF studies thus far.  - vEEG has consistently shown foci of epileptiform activity without seizure (~5 days)  - Neurology and ID following, recs appreciated  - keppra 2g q12h decreased to 1.5g q12h as per neuro recs  - c/w vimpat 150mg q12h  - was on decadron 10mg daily for inflammatory encephalopathy coverage - will start steroid taper as per neurology, prednisone 60mg starting tomorrow and decrease by 20mg every 3 days  - f/u CSF 14-3-3, tau, neuron specific enolase, HTLV-1, JEANETTE virus, HSV 6, paraneoplastic panel      *Negative CSF studies so far:  CSF PCR, Bacterial cx, WNV, Toxo, Cryptococcus, HSV  - CSF PCR returned negative 10/2, will d/c acyclovir today  - per ID, [place] encephalitis panel not received by lab. if additional samples needed, will need IR guided LP due to spinal abnormalities  - ESR/CRP 81/9.53, YANDEL and dsDNa negative - were checked to r/o autoimmune disease in setting of above MRI findings  - repeat MRI brain tomorrow 10/4 as per Neurology  - patient will need swallow eval for dispo planning

## 2019-10-03 NOTE — PROGRESS NOTE ADULT - NSHPATTENDINGPLANDISCUSS_GEN_ALL_CORE
patient, patient's wife daughter and niece, medicine NP patient, patient's wife daughter and niece, medicine NP, Neurology

## 2019-10-03 NOTE — OCCUPATIONAL THERAPY INITIAL EVALUATION ADULT - GENERAL OBSERVATIONS, REHAB EVAL
Patient received semi-supine in bed, +tele, BP cuff, pulse ox, +IV, NGT, bradley, sequentials donned

## 2019-10-03 NOTE — PROGRESS NOTE ADULT - PROBLEM SELECTOR PLAN 3
stable.  - home amlodipine held  - continue labetalol 100mg q8h (new medication started this admission)

## 2019-10-03 NOTE — PROGRESS NOTE ADULT - SUBJECTIVE AND OBJECTIVE BOX
HOSPITALIST ACCEPT NOTE    Kalani Hager MD ([facility] Hospitalist)  Pager: [phone]  (During off hours please page [phone])      Patient is a 84y old  Male who presents with a chief complaint of status epilepticus (03 Oct 2019 08:01)      SUBJECTIVE / OVERNIGHT EVENTS: No acute events overnight. Patient seen and examined bedside this AM. Lethargic but easily arousable. able to state full name and answer some questions appropriately, but some answers garbled and not making sense. Denies fevers, chills, chest pain, SOB, abd pain, n/v, diarrhea, nor constipation. Does endorse mild headache but unable to verbalize quality or severity.     HPI/Hospital Course:  84 year old male Hx HTN, GERD, prostate cancer who presented with seizure.  On 9/20, patient fell out of bed and hit his head.  He subsequently developed seizure like activity.  Patient intubated by EMS for airway protection.  Patient given ativan and versed in ED.  Patient had CTH which did not show acute processes (including bleed).  Patient had brain MRI which showed R frontal enhancement (potential seizure foci).  Patient started on propofol gtt, locasamide and keppra.  Patient started on acyclovir for aseptic meningitis suspicion.  Patient's labs were significant for WBC 13, lacate >10, BUN 29, and Cr 1.4.  Patient had spot EEG at OSH which showed R focal seizure activity on 9/23.  Patient was following commands and then attempted to wean off propofol, however unsuccessful (patient started having seizures again).  Resumed prop and increased keppra dose.  Patient transferred to [facility] MICU for vEEG and management of status epilepticus.    In the MICU, pt underwent vEEG monitoring (9/25-9/30) while being treated with keppra 2g BID and vimpat 150mg BID. He was also continued on IV acyclovir and decadron 10g IV qd. for herpes encephalitis/meningitis as well. His respiratory status remained stable while intubated for airway protection. Sedated with propofol and versed. He had a lumbar puncture on 9/25 for CSF assessment. Eastern equine encephalitis panel also sent during MICU stay. Pt's vEEG consistently showed epileptiform focus, but no seizure activity. MRI brain on 9/25 shows abnormal areas of cortical restricted diffusion (high signal on diffusion-weighted imaging and corresponding dropout on ADC map) are notable most pronounced within the right parietal and occipital cortices.  Abnormal cortical restricted diffusion is also noted within bilateral  cingulate gyri, right worse than left, right pulvinar of the thalamus, right medial temporal lobe, and hippocampus. There is associated T2/FLAIR prolongation in these areas along with mild gyral swelling. Pt was taken off sedation 9/27.     He continued to slowly become more awake through the next few days. NGT placed 10/1 to help with meds and feeds. He has complete L-sided neglect. He was noticed to have a weakened L dorsalis pedis pulse and the foot was colder than the right. 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  Pt extubated on 10/1 without complications. Since 10/1, is able to open eyes, phonate, and properly answer questions. Still lethargic upon wakening in the AM.     So far, infectious workup includes:  -Negative CSF studies so far:  Bacterial cx, WNV, Toxo, Cryptococcus, HSV, encephalitis panel  -Need to f/u 14-3-3, tau, neuron specific enolase, HTLV-1, JEANETTE virus, HSV 6, paraneoplastic panel        MEDICATIONS  (STANDING):  acyclovir IVPB 850 milliGRAM(s) IV Intermittent every 8 hours  chlorhexidine 4% Liquid 1 Application(s) Topical <User Schedule>  dexamethasone  IVPB 10 milliGRAM(s) IV Intermittent daily  heparin  Injectable 5000 Unit(s) SubCutaneous every 8 hours  insulin lispro (HumaLOG) corrective regimen sliding scale   SubCutaneous every 6 hours  insulin NPH human recombinant 10 Unit(s) SubCutaneous every 6 hours  labetalol 100 milliGRAM(s) Oral every 8 hours  lacosamide IVPB 150 milliGRAM(s) IV Intermittent every 12 hours  levETIRAcetam  IVPB 2000 milliGRAM(s) IV Intermittent every 12 hours  magnesium sulfate  IVPB 2 Gram(s) IV Intermittent once  pantoprazole  Injectable 40 milliGRAM(s) IV Push daily  polyethylene glycol 3350 17 Gram(s) Oral two times a day  potassium phosphate IVPB 15 milliMole(s) IV Intermittent once  senna Syrup 10 milliLiter(s) Oral at bedtime    MEDICATIONS  (PRN):      Vital Signs Last 24 Hrs  T(C): 36.4 (03 Oct 2019 10:50), Max: 37.2 (02 Oct 2019 16:00)  T(F): 97.6 (03 Oct 2019 10:50), Max: 98.9 (02 Oct 2019 16:00)  HR: 80 (03 Oct 2019 10:50) (60 - 86)  BP: 122/68 (03 Oct 2019 10:50) (107/59 - 166/79)  BP(mean): 90 (03 Oct 2019 10:00) (77 - 111)  RR: 18 (03 Oct 2019 10:50) (14 - 50)  SpO2: 97% (03 Oct 2019 10:50) (96% - 100%)  CAPILLARY BLOOD GLUCOSE      POCT Blood Glucose.: 139 mg/dL (03 Oct 2019 05:27)  POCT Blood Glucose.: 143 mg/dL (03 Oct 2019 00:16)  POCT Blood Glucose.: 199 mg/dL (02 Oct 2019 17:45)    I&O's Summary    02 Oct 2019 07:01  -  03 Oct 2019 07:00  --------------------------------------------------------  IN: 4850 mL / OUT: 700 mL / NET: 4150 mL    03 Oct 2019 07:01  -  03 Oct 2019 13:10  --------------------------------------------------------  IN: 727 mL / OUT: 550 mL / NET: 177 mL        PHYSICAL EXAM:  GENERAL: lethargic but easily arousable with name-calling  HEAD:  Atraumatic, Normocephalic, left facial droop, +NGT in left nare  EYES: EOMI, PERRLA, conjunctiva and sclera clear  NECK: Supple, No JVD  CHEST/LUNG: Clear to auscultation bilaterally; No wheeze, rales, nor crackles  HEART: Regular rate and rhythm; No murmurs, rubs, or gallops  ABDOMEN: Soft, Nontender, Nondistended; Bowel sounds present  EXTREMITIES:  2+ Peripheral Pulses, No clubbing, cyanosis, or edema  PSYCH: Cooperative, Normal mood and affect, Normal judgment  NEUROLOGY: left-sided neglect, unable to move LUE at all, RUE with minimal movement, able to squeeze hands and raise slighltly of bed, but exam limited due to patient with difficulty following commands  SKIN: No rashes or lesions      LABS:                        12.5   12.73 )-----------( 353      ( 03 Oct 2019 00:27 )             36.4     10-03    132<L>  |  96  |  32<H>  ----------------------------<  163<H>  3.1<L>   |  27  |  0.85    Ca    8.7      03 Oct 2019 00:27  Phos  2.3     10-03  Mg     1.7     10-03    TPro  6.7  /  Alb  3.0<L>  /  TBili  0.7  /  DBili  x   /  AST  43<H>  /  ALT  60<H>  /  AlkPhos  135<H>  10-03      CARDIAC MARKERS ( 03 Oct 2019 00:27 )  x     / x     / 56 U/L / x     / x              RADIOLOGY & ADDITIONAL TESTS:    Imaging Personally Reviewed:   EEG IMPRESSION/CLINICAL CORRELATE  Abnormal EEG study.  1. Potential epileptogenic focus in the right posterior quadrant.  2. Moderate to severe nonspecific diffuse or multifocal cerebral dysfunction, right worse than left  3. No seizures were recorded.    9/25/19 MRI Brain:  IMPRESSION: Imaging findings compatible with status epilepticus   concordant with the patient's history. Please see discussion in the body   of the report.    Underlying etiology includes but is not limited to hypoxia, metabolic   derangement, drug toxicity, alcohol intoxication or withdrawal, or   encephalitis.    No abnormal intracranial enhancement.  10/1/19 CT Angio Head+Neck:  Impression:    Neck CTA: No evidence for a dynamically significant stenosis.    Brain CTA: Patent vessels. No evidence for vascular stenosis. Vasculitis   may be below the level of sensitivity achieved on CTA    CXR:  Impression:    The heart is slightly enlarged. The lungs appear to be clear. An NG tube   is within the stomach. Calcified aortic knob.    Consultant(s) Notes Reviewed: Neurology, Infectious Disease, MICU, OT      Care Discussed with Consultants/Other Providers:

## 2019-10-03 NOTE — PROGRESS NOTE ADULT - ATTENDING COMMENTS
Dr. Kalani Hager  Division of Hospital Medicine  [facility]   Pager: [phone]

## 2019-10-03 NOTE — OCCUPATIONAL THERAPY INITIAL EVALUATION ADULT - RANGE OF MOTION EXAMINATION, UPPER EXTREMITY
Right UE Passive ROM was WFL  (within functional limits)/Left UE Active Assistive ROM was WFL  (within functional limits)/except R shoulder 0-90

## 2019-10-03 NOTE — PROGRESS NOTE ADULT - ASSESSMENT
85 y/o man with hx of prostate CA s/o seed implant and RT, HTN, p/w sudden onset AMS seizure brought to [facility] . Was found to be in status epilepticus and required intubation for airway control. Pt transferred to [facility] for further workup and EEG monitoring with no further seizure activity extubated on 10/1 downgraded to medicine on 10/3 pending repeat MRI brain tomorrow 10/4.

## 2019-10-03 NOTE — PROGRESS NOTE ADULT - PROBLEM SELECTOR PLAN 1
-hsv pcr and CSF PCR negative  -off acyclovir  -CSF w/u negative so far - bacterial cx, toxo, wnv, crypt  -check HIV test  -on steroids per neuro

## 2019-10-03 NOTE — OCCUPATIONAL THERAPY INITIAL EVALUATION ADULT - MANUAL MUSCLE TESTING RESULTS, REHAB EVAL
poor command follow; 0/5 LUE/LE; R AROM: 2-/5 shoulder, 3/5 elbow through digits, 2-/5 R hip/knee/ankle/grossly assessed due to

## 2019-10-03 NOTE — PROGRESS NOTE ADULT - SUBJECTIVE AND OBJECTIVE BOX
CHIEF COMPLAINT: Patient is a 84y old  Male who presents with a chief complaint of status epilepticus (02 Oct 2019 19:26)    Interval Events:    REVIEW OF SYSTEMS:  Constitutional:   Eyes:  ENT:  CV:  Resp:  GI:  :  MSK:  Integumentary:  Neurological:  Psychiatric:  Endocrine:  Hematologic/Lymphatic:  Allergic/Immunologic:  [ ] All other systems negative  [ ] Unable to assess ROS because ________    OBJECTIVE:  ICU Vital Signs Last 24 Hrs  T(C): 36.6 (03 Oct 2019 04:00), Max: 37.2 (02 Oct 2019 16:00)  T(F): 97.9 (03 Oct 2019 04:00), Max: 98.9 (02 Oct 2019 16:00)  HR: 69 (03 Oct 2019 07:00) (60 - 104)  BP: 107/59 (03 Oct 2019 07:00) (107/59 - 166/79)  BP(mean): 80 (03 Oct 2019 07:00) (79 - 114)  ABP: --  ABP(mean): --  RR: 21 (03 Oct 2019 07:00) (14 - 50)  SpO2: 100% (03 Oct 2019 07:00) (95% - 100%)        10-02 @ 07:01  -  10-03 @ 07:00  --------------------------------------------------------  IN: 4850 mL / OUT: 700 mL / NET: 4150 mL      CAPILLARY BLOOD GLUCOSE      POCT Blood Glucose.: 139 mg/dL (03 Oct 2019 05:27)      PHYSICAL EXAM:  General:   HEENT:   Lymph Nodes:  Neck:   Respiratory:   Cardiovascular:   Abdomen:   Extremities:   Skin:   Neurological:  Psychiatry:    HOSPITAL MEDICATIONS:  MEDICATIONS  (STANDING):  acyclovir IVPB 850 milliGRAM(s) IV Intermittent every 8 hours  chlorhexidine 4% Liquid 1 Application(s) Topical <User Schedule>  dexamethasone  IVPB 10 milliGRAM(s) IV Intermittent daily  heparin  Injectable 5000 Unit(s) SubCutaneous every 8 hours  insulin lispro (HumaLOG) corrective regimen sliding scale   SubCutaneous every 6 hours  insulin NPH human recombinant 10 Unit(s) SubCutaneous every 6 hours  labetalol 100 milliGRAM(s) Oral every 8 hours  lacosamide IVPB 150 milliGRAM(s) IV Intermittent every 12 hours  levETIRAcetam  IVPB 2000 milliGRAM(s) IV Intermittent every 12 hours  pantoprazole  Injectable 40 milliGRAM(s) IV Push daily  polyethylene glycol 3350 17 Gram(s) Oral two times a day  potassium chloride   Solution 40 milliEquivalent(s) Oral every 4 hours  senna Syrup 10 milliLiter(s) Oral at bedtime  sodium chloride 0.9%. 1000 milliLiter(s) (75 mL/Hr) IV Continuous <Continuous>    MEDICATIONS  (PRN):      LABS:  (10-03 @ 00:27)                        12.5  12.73 )-----------( 353                 36.4    Neutrophils = 9.36 (73.7%)  Lymphocytes = 1.95 (15.3%)  Eosinophils = 0.12 (0.9%)  Basophils = 0.03 (0.2%)  Monocytes = 1.20 (9.4%)  Bands = --%    WBC Trend: 12.73<--, 14.69<--, 15.2<--  Hb Trend: 12.5<--, 13.9<--, 13.4<--, 12.6<--, 13.2<--  Plt Trend: 353<--, 350<--, 351<--, 278<--, 308<--  10-03    132<L>  |  96  |  32<H>  ----------------------------<  163<H>  3.1<L>   |  27  |  0.85    Ca    8.7      03 Oct 2019 00:27  Phos  2.3     10-03  Mg     1.7     10-03    TPro  6.7  /  Alb  3.0<L>  /  TBili  0.7  /  DBili  x   /  AST  43<H>  /  ALT  60<H>  /  AlkPhos  135<H>  10-03    Creatinine Trend: 0.85<--, 0.80<--, 0.82<--, 0.84<--, 0.90<--, 0.85<--          CARDIAC MARKERS ( 03 Oct 2019 00:27 )  Trop x     / CK 56 U/L / CKMB x             MICROBIOLOGY:   Blood Cx:  Urine Cx:  Sputum Cx:  Legionella:  RVP:    RADIOLOGY:  X Ray:  CT:  MRI:  Ultrasound:  [ ] Reviewed and interpreted by me    EKG: CHIEF COMPLAINT: Patient is a 84y old  Male who presents with a chief complaint of status epilepticus (02 Oct 2019 19:26)    Interval Events:    No acute events overnight. Pt opening eyes, lethargic this AM. Attempting to vocalize answers during interview, at times incomprehensible.     REVIEW OF SYSTEMS:  Limited ROS 2/2 lethargy. Denying n/v, diarrhea, fever, pain.    OBJECTIVE:  ICU Vital Signs Last 24 Hrs  T(C): 36.6 (03 Oct 2019 04:00), Max: 37.2 (02 Oct 2019 16:00)  T(F): 97.9 (03 Oct 2019 04:00), Max: 98.9 (02 Oct 2019 16:00)  HR: 69 (03 Oct 2019 07:00) (60 - 104)  BP: 107/59 (03 Oct 2019 07:00) (107/59 - 166/79)  BP(mean): 80 (03 Oct 2019 07:00) (79 - 114)  ABP: --  ABP(mean): --  RR: 21 (03 Oct 2019 07:00) (14 - 50)  SpO2: 100% (03 Oct 2019 07:00) (95% - 100%)        10-02 @ 07:01  -  10-03 @ 07:00  --------------------------------------------------------  IN: 4850 mL / OUT: 700 mL / NET: 4150 mL      CAPILLARY BLOOD GLUCOSE      POCT Blood Glucose.: 139 mg/dL (03 Oct 2019 05:27)      PHYSICAL EXAM:  Constitutional: intubated and obtunded  Eyes: pupils equal and reactive to light b/l  ENMT: normocephalic; MMM; healing wound on forehead  Respiratory: lungs clear to auscultation b/l  Cardiovascular: RRR; no murmurs rubs or gallops  Gastrointestinal: abdomen soft, nontender, nondistended; bowel sounds all 4 quadrants  Extremities: no peripheral edema  Vascular: 1+ LLE dorsalis pedis pulse, 2+ R side  Neurological: AOx1 to name. Complete L-sided neglect.    Skin: erythematous rash L palm    HOSPITAL MEDICATIONS:  MEDICATIONS  (STANDING):  acyclovir IVPB 850 milliGRAM(s) IV Intermittent every 8 hours  chlorhexidine 4% Liquid 1 Application(s) Topical <User Schedule>  dexamethasone  IVPB 10 milliGRAM(s) IV Intermittent daily  heparin  Injectable 5000 Unit(s) SubCutaneous every 8 hours  insulin lispro (HumaLOG) corrective regimen sliding scale   SubCutaneous every 6 hours  insulin NPH human recombinant 10 Unit(s) SubCutaneous every 6 hours  labetalol 100 milliGRAM(s) Oral every 8 hours  lacosamide IVPB 150 milliGRAM(s) IV Intermittent every 12 hours  levETIRAcetam  IVPB 2000 milliGRAM(s) IV Intermittent every 12 hours  pantoprazole  Injectable 40 milliGRAM(s) IV Push daily  polyethylene glycol 3350 17 Gram(s) Oral two times a day  potassium chloride   Solution 40 milliEquivalent(s) Oral every 4 hours  senna Syrup 10 milliLiter(s) Oral at bedtime  sodium chloride 0.9%. 1000 milliLiter(s) (75 mL/Hr) IV Continuous <Continuous>    MEDICATIONS  (PRN):      LABS:  (10-03 @ 00:27)                        12.5  12.73 )-----------( 353                 36.4    Neutrophils = 9.36 (73.7%)  Lymphocytes = 1.95 (15.3%)  Eosinophils = 0.12 (0.9%)  Basophils = 0.03 (0.2%)  Monocytes = 1.20 (9.4%)  Bands = --%    WBC Trend: 12.73<--, 14.69<--, 15.2<--  Hb Trend: 12.5<--, 13.9<--, 13.4<--, 12.6<--, 13.2<--  Plt Trend: 353<--, 350<--, 351<--, 278<--, 308<--  10-03    132<L>  |  96  |  32<H>  ----------------------------<  163<H>  3.1<L>   |  27  |  0.85    Ca    8.7      03 Oct 2019 00:27  Phos  2.3     10-03  Mg     1.7     10-03    TPro  6.7  /  Alb  3.0<L>  /  TBili  0.7  /  DBili  x   /  AST  43<H>  /  ALT  60<H>  /  AlkPhos  135<H>  10-03    Creatinine Trend: 0.85<--, 0.80<--, 0.82<--, 0.84<--, 0.90<--, 0.85<--          CARDIAC MARKERS ( 03 Oct 2019 00:27 )  Trop x     / CK 56 U/L / CKMB x

## 2019-10-03 NOTE — OCCUPATIONAL THERAPY INITIAL EVALUATION ADULT - PLANNED THERAPY INTERVENTIONS, OT EVAL
bed mobility training/strengthening/transfer training/balance training/ROM/ADL retraining/cognitive, visual perceptual/neuromuscular re-education

## 2019-10-03 NOTE — PROGRESS NOTE ADULT - ASSESSMENT
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 pt improved with decadron and acyclovir. CSF PCF panel finally returned as negative. Pt with MRI abnormalities in the right parietal lobe along with EEG epileptic focus. 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     Discussed with ID, acyclovir no longer needed and can be discontinued.   Will start prednisone taper tomorrow , starting with 60mg x3 days, then decreased by 20mg every 3 days, then end on 10mg for 3 days.   Will taper down AEDS slowly starting with Keppra, decreased to 1500mg BID. Monitor for seizure activity.   C/w Vimpat 150mg BID.   Seizure precautions  neuro checks q4

## 2019-10-03 NOTE — OCCUPATIONAL THERAPY INITIAL EVALUATION ADULT - RANGE OF MOTION EXAMINATION, LOWER EXTREMITY
Left LE Active Assistive ROM was WFL (within functional limits)/Right LE Passive ROM was WFL  (within functional limits)

## 2019-10-03 NOTE — PROGRESS NOTE ADULT - ATTENDING COMMENTS
Tomas Marcos  Attending Physician   Division of Infectious Disease  Pager #[phone]  After 5pm/weekend or no response, call #[phone]

## 2019-10-03 NOTE — OCCUPATIONAL THERAPY INITIAL EVALUATION ADULT - PERTINENT HX OF CURRENT PROBLEM, REHAB EVAL
83 yo M who presented with seizure.  On 9/20, patient fell out of bed and hit his head.  He subsequently developed seizure like activity.  Patient intubated by EMS for airway protection.  Patient given ativan and versed in ED.

## 2019-10-03 NOTE — OCCUPATIONAL THERAPY INITIAL EVALUATION ADULT - ADL RETRAINING, OT EVAL
Patient will dress lower body with mod assistance, AE as needed in 4 weeks. Patient will dress upper body with mod assistance in 4 weeks

## 2019-10-03 NOTE — PROGRESS NOTE ADULT - ASSESSMENT
85 yo M Hx HTN, GERD, prostate cancer (treated with radioactive seed implantation in 2001 no active treatment) presenting with status epilepticus.  Transferred from OSH to [facility] ICU for vEEG monitoring in setting of seizure activity with abnormal CSF with lymphocyte predominance

## 2019-10-03 NOTE — PROGRESS NOTE ADULT - SUBJECTIVE AND OBJECTIVE BOX
Neurology Follow up note    Patient is a 84y old  Male who presents with a chief complaint of status epilepticus (03 Oct 2019 13:10)      Subjective:Interval History - No events overnight. Pt's wife and niece at bedside. At questions answered.     Objective:   Vital Signs Last 24 Hrs  T(C): 36.7 (03 Oct 2019 16:06), Max: 36.7 (03 Oct 2019 08:00)  T(F): 98.1 (03 Oct 2019 16:06), Max: 98.1 (03 Oct 2019 16:06)  HR: 69 (03 Oct 2019 16:06) (67 - 86)  BP: 148/74 (03 Oct 2019 16:06) (107/59 - 166/79)  BP(mean): 90 (03 Oct 2019 10:00) (77 - 111)  RR: 18 (03 Oct 2019 16:06) (16 - 50)  SpO2: 96% (03 Oct 2019 16:06) (96% - 100%)    General Exam:   General appearance: No acute distress, NGT in place. LUE splint                Neurological Exam:  Mental Status: drowsy, Orientated to self, date and place.  Attention poor.  Moderate dysarthria, Follows simple commands, to the right , has difficulty crossing the midline, visual and auditory neglect of the left side, eye opening apraxia (improved), right left confusion.     Cranial Nerves:  PERRL, EOMI, VFF to confrontation, no nystagmus.    CN V1-3 intact to light touch. left facial droop.  Hearing intact bilaterally.  Tongue midline.  Shoulder shrug decreased on the left.     Motor: Flaccid tone LUE. has difficulty attending to LUE, possibly 2/5 at the elbow, otherwise flaccid paralysis. in antigravity in the LLE, 5/5 on the left side.     Coord: unable to assess as pt was very drowsy    Tremor: No resting, postural or action tremor.  No myoclonus.    Sensation: intact to light touch, right left confusion    Deep Tendon Reflexes: 1+ bilateral biceps, triceps, brachioradialis, knee and ankle. No Babinski present.    Gait: bedbound    Other:    10-03    136  |  97  |  27<H>  ----------------------------<  167<H>  3.9   |  26  |  0.81    Ca    8.7      03 Oct 2019 16:50  Phos  2.3     10-03  Mg     1.7     10-03    TPro  6.7  /  Alb  3.0<L>  /  TBili  0.7  /  DBili  x   /  AST  43<H>  /  ALT  60<H>  /  AlkPhos  135<H>  10-03    10-03    136  |  97  |  27<H>  ----------------------------<  167<H>  3.9   |  26  |  0.81    Ca    8.7      03 Oct 2019 16:50  Phos  2.3     10-03  Mg     1.7     10-03    TPro  6.7  /  Alb  3.0<L>  /  TBili  0.7  /  DBili  x   /  AST  43<H>  /  ALT  60<H>  /  AlkPhos  135<H>  10-03    LIVER FUNCTIONS - ( 03 Oct 2019 00:27 )  Alb: 3.0 g/dL / Pro: 6.7 g/dL / ALK PHOS: 135 U/L / ALT: 60 U/L / AST: 43 U/L / GGT: x                                 12.5   12.73 )-----------( 353      ( 03 Oct 2019 00:27 )             36.4     Radiology    < from: MR Head w/wo IV Cont (09.25.19 @ 17:09) >  FINDINGS: Abnormal areas of cortical restricted diffusion (high signal on   diffusion-weighted imaging and corresponding dropout on ADC map) are   notable most pronounced within the right parietal and occipital cortices.   Abnormal cortical restricted diffusion is also noted within bilateral   cingulate gyri, right worse than left, right pulvinar of the thalamus,   right medial temporal lobe, and hippocampus. There is associated T2/FLAIR   prolongation in these areas along with mild gyral swelling. There is   associated sulcal effacement. There is no abnormal associated   enhancement. These findings were present on the outside MRI examination   from 9/23/2019.     Multiple additional patchy confluent nonspecific T2/FLAIR hyperintense   signal changes are noted throughout the bihemispheric white matter   without associated mass effect or restricted diffusion.      MEDICATIONS  (STANDING):  chlorhexidine 4% Liquid 1 Application(s) Topical <User Schedule>  heparin  Injectable 5000 Unit(s) SubCutaneous every 8 hours  insulin lispro (HumaLOG) corrective regimen sliding scale   SubCutaneous every 6 hours  insulin NPH human recombinant 10 Unit(s) SubCutaneous every 6 hours  labetalol 100 milliGRAM(s) Oral every 8 hours  lacosamide IVPB 150 milliGRAM(s) IV Intermittent every 12 hours  levETIRAcetam  IVPB 1500 milliGRAM(s) IV Intermittent every 12 hours  pantoprazole  Injectable 40 milliGRAM(s) IV Push daily  polyethylene glycol 3350 17 Gram(s) Oral two times a day  predniSONE   Tablet   Oral   predniSONE   Tablet 60 milliGRAM(s) Oral daily  senna Syrup 10 milliLiter(s) Oral at bedtime    MEDICATIONS  (PRN):

## 2019-10-03 NOTE — OCCUPATIONAL THERAPY INITIAL EVALUATION ADULT - NS ASR OT EQUIP NEEDS DISCH
Orthopedic Discharge Summary      Patient: Zoey Rich      YOB: 1959    Medical Record Number: [id number]    Attending Physician: Gerson Oliveira MD  Consulting Physician(s):   Date of Admission: 2018  9:55 AM  Date of Discharge: 2018      Patient Active Problem List   Diagnosis   • Closed fracture of medial plateau of left tibia     TIBIAL PLATEAU OPEN REDUCTION INTERNAL FIXATION      Allergies   Allergen Reactions   • Zithromax [Azithromycin] Hives            Discharge Medications      Changes to Medications      Instructions Start Date   oxyCODONE-acetaminophen 7.5-325 MG per tablet  Commonly known as:  PERCOCET  What changed:  reasons to take this   1 tablet, Oral, Every 4 Hours PRN         Continue These Medications      Instructions Start Date   ADDERALL XR PO   30 mg, Oral, Daily      CORTISOL PO   10 mg, Oral, 2 Times Daily      cyclobenzaprine 5 MG tablet  Commonly known as:  FLEXERIL   5 mg, Oral, 2 Times Daily PRN      fentaNYL 12 MCG/HR  Commonly known as:  DURAGESIC   1 patch, Transdermal, Every 72 Hours      hydrocortisone 10 MG tablet  Commonly known as:  CORTEF   10 mg, Oral, 2 Times Daily      ibuprofen 200 MG tablet  Commonly known as:  ADVIL,MOTRIN   200 mg, Oral, Every 6 Hours PRN                  Past Medical History:   Diagnosis Date   • Noam disease (CMS/HCC)    • Chest pain    • COPD (chronic obstructive pulmonary disease) (CMS/HCC)    • Depression    • Fibromyalgia    • Gastric ulcer    • High cholesterol    • Legally blind in left eye, as defined in [place]    • Osteoarthritis    • Pneumonia    • PONV (postoperative nausea and vomiting)    • Retinal micro-aneurysm of right eye    • Spinal headache    • Spondylolysis         Past Surgical History:   Procedure Laterality Date   •  SECTION     • CHOLECYSTECTOMY     • DILATION AND CURETTAGE, DIAGNOSTIC / THERAPEUTIC     • HYSTERECTOMY     • LAPAROSCOPIC GASTRIC BANDING          Social History      Occupational History   • Not on file   Tobacco Use   • Smoking status: Current Every Day Smoker     Packs/day: 1.00     Years: 20.00     Pack years: 20.00     Types: Cigarettes   • Smokeless tobacco: Never Used   Substance and Sexual Activity   • Alcohol use: No     Frequency: Never   • Drug use: Yes     Types: Oxycodone   • Sexual activity: Defer      Social History     Social History Narrative   • Not on file      History reviewed. No pertinent family history.      Physical Exam: 59 y.o. female  General Appearance:    Alert, cooperative, in no acute distress                      Vitals:    12/07/18 0318 12/07/18 0725 12/07/18 1120 12/07/18 1506   BP: 126/66 111/58 133/74 145/75   BP Location: Left arm Left arm Left arm Right arm   Patient Position: Lying Lying Lying Lying   Pulse: 89 83 88 91   Resp: 16 16 16 16   Temp: 97.7 °F (36.5 °C) 98.1 °F (36.7 °C) 99 °F (37.2 °C) 98 °F (36.7 °C)   TempSrc: Oral Oral Oral Oral   SpO2: 100% 99% 100% 100%   Weight:       Height:            Hospital Course:  59 y.o. female admitted to [facility] to services of Gerson Oliveira MD with Closed fracture of medial portion of left tibial plateau, initial encounter [S82.132A]  Closed fracture of medial portion of left tibial plateau, initial encounter [S82.132A]  Closed fracture of medial portion of left tibial plateau, initial encounter [S82.132A] on 12/6/2018 and underwent Procedure(s): LEFT TIBIAL PLATEAU OPEN REDUCTION INTERNAL FIXATION  per Gerson Oliveira MD. Antibiotic Kefzol 2 gm every 8 hours  and VTE prophylaxis Eliquis were per SCIP protocols. Post-operatively the patient transferred to the post-operative floor where the patient underwent mobilization therapy NWB. Opioids were titrated to achieve appropriate pain management to allow for participation in mobilization exercises. Vital signs are now stable. The incision is intact without signs or symptoms of infection. Operative extremity  neurovascular status remains intact.     Appropriate education re: incision care, activity levels, medications, hip dislocation precautions, and follow up visits was completed and all questions were answered. The patient is now deemed stable for discharge.      DISCHARGE DISPOSITION AND PLAN:  The  Patient is being discharged home with home health for PT /OT 2-3 X per week for 2-3 weeks and nursing care as needed.     DIAGNOSTIC TESTS:     Admission on 12/06/2018, Discharged on 12/07/2018   Component Date Value Ref Range Status   • WBC 12/06/2018 8.00  4.50 - 10.70 10*3/mm3 Final   • RBC 12/06/2018 4.56  3.90 - 5.20 10*6/mm3 Final   • Hemoglobin 12/06/2018 13.4  11.9 - 15.5 g/dL Final   • Hematocrit 12/06/2018 41.7  35.6 - 45.5 % Final   • MCV 12/06/2018 91.4  80.5 - 98.2 fL Final   • MCH 12/06/2018 29.4  26.9 - 32.0 pg Final   • MCHC 12/06/2018 32.1* 32.4 - 36.3 g/dL Final   • RDW 12/06/2018 14.2* 11.7 - 13.0 % Final   • RDW-SD 12/06/2018 47.3  37.0 - 54.0 fl Final   • MPV 12/06/2018 10.4  6.0 - 12.0 fL Final   • Platelets 12/06/2018 470  140 - 500 10*3/mm3 Final   • Neutrophil % 12/06/2018 49.2  42.7 - 76.0 % Final   • Lymphocyte % 12/06/2018 41.0  19.6 - 45.3 % Final   • Monocyte % 12/06/2018 5.9  5.0 - 12.0 % Final   • Eosinophil % 12/06/2018 3.3  0.3 - 6.2 % Final   • Basophil % 12/06/2018 0.6  0.0 - 1.5 % Final   • Immature Grans % 12/06/2018 0.0  0.0 - 0.5 % Final   • Neutrophils, Absolute 12/06/2018 3.94  1.90 - 8.10 10*3/mm3 Final   • Lymphocytes, Absolute 12/06/2018 3.28  0.90 - 4.80 10*3/mm3 Final   • Monocytes, Absolute 12/06/2018 0.47  0.20 - 1.20 10*3/mm3 Final   • Eosinophils, Absolute 12/06/2018 0.26  0.00 - 0.70 10*3/mm3 Final   • Basophils, Absolute 12/06/2018 0.05  0.00 - 0.20 10*3/mm3 Final   • Immature Grans, Absolute 12/06/2018 0.00  0.00 - 0.03 10*3/mm3 Final       No results found.    Discharge and Follow up Instructions:     I. ACTIVITIES:  1. Exercises:  ? Complete exercise program as  taught by the hospital physical therapist 2 times per day  ? Exercise program will be advanced by the physical therapist  ? During the day be up ambulating every 2 hours (while awake) for short distances  ? Complete the ankle pump exercises at least 10 times per hour (while awake)  ? Elevate legs when in bed and for at least 30 minutes during the day.Use cold packs 20-30 minutes approximately 5 times per day. This should be done before and after completing your exercises and at any time you are experiencing pain/ stiffness in your operative extremity.      2. Activities of Daily Living:  ? No tub baths, hot tubs, or swimming pools for 4 weeks  ? May shower and let water run over the incision on post-operative day #5 if no drainage. Do not scrub or rub the incision. Simply let the water run over the incision and pat dry.    II. Restrictions    ? Your surgeon will discuss with you when you will be able to drive again. Usual guidelines are you are to be off pain medications prior to driving.  ? Weight bearing is NWB  ? First week stay inside on even terrain. May go up and down stairs one stair at a time utilizing the hand rail once cleared by physical therapy to do so.  ? After one week, you may venture outside (if cleared to do so by physical therapist).    III. Precautions:  ? Everyone that comes near you should wash their hands  ? No elective dental, genital-urinary, or colon procedures or surgical procedures for 12 weeks after surgery unless absolutely necessary.  ?  If dental work or surgical procedure is deemed absolutely necessary, you will need to contact your surgeon as you will need to take antibiotics 1 hour prior to any dental work (including teeth cleanings).  ? Please discuss with your surgeon prophylactic antibiotics as the length of time this intervention will be necessary for you varies with each patient’s health history and situation.  ? Avoid sick people. If you must be around someone who is ill,  they should wear a mask.  ? Avoid visits to the Emergency Room or Urgent Care. If you feel you need to go to the Emergency Room, please notify your Surgeon's office.  ? Stockings are to be worn for one week after surgery and are to be placed on in the morning and removed at night. Observe your skin when stocking is removed for any problems. Monitor the stockings to ensure that any swelling is not causing the stockings to become too tight. In this case, remove stockings immediately.      IV. INCISION CARE:  ? Wash your hands prior to dressing changes  ? Change the dressing as needed to keep incision clean and dry. Utilize dry gauze and paper tape. Avoid touching the side of the gauze that goes against the incision with your hands.  ? No creams or ointments to the incision  ? May remove dressing once the incision is free of drainage  ? Do not touch or pick at the incision  ? Check incision every day and notify surgeon immediately if any of the following signs or symptoms are noted:  o Increase in redness  o Increase in swelling around the incision and of the entire extremity  o Increase in pain  o Drainage oozing from the incision  o Pulling apart of the edges of the incision  o Increase in overall body temperature (greater than 100.5 degrees)  ? Your Staples will be removed between 10-14 days postoperation.  This may be done by either the home health nurse, rehabilitation nurse or during your return visit to Dr. Oliveira's office.  You will then be instructed on how to care for the incision.  (Please call the office if your staples have not been removed within 14 days after surgery).    V. Medications:   1. Anticoagulants: You will be discharged on an anticoagulant. This is a prophylactic medication that helps prevent blood clots during your post-operative period.  You will be on Eliquis for 14 days.   ? While taking the anticoagulant, you should avoid taking any additional aspirin, ibuprofen (Advil or Motrin), Aleve  (Naprosyn) or other non-steroidal anti-inflammatory medications.   ? Notify surgeon immediately if any gina bleeding is noted in the urine, stool, emesis, or from the nose or the incision. Blood in the stool will often appear as black rather than red. Blood in urine may appear as pink. Blood in emesis may appear as brown/black like coffee grounds.  ? You will need to apply pressure for longer periods of time to any cuts or abrasions to stop bleeding  ? Avoid alcohol while taking anticoagulants    2. Stool Softeners: You will be at greater risk of constipation after surgery due to being less mobile and the pain medications.   ? Take stool softeners as instructed by your surgeon while on pain medications. Over the counter Colace 100 mg 1-2 capsules twice daily.   ? If stools become too loose or too frequent, please decreases the dosage or stop the stool softener.  ? If constipation occurs despite use of stool softeners, you are to continue the stool softeners and add a laxative (Milk of Magnesia 1 ounce daily as needed).  ? Dulcolax oral tabs or suppository, or a fleets enema can also be utilized for constipation and can be obtained over the counter.   ? If above interventions are unsuccessful in inducing bowel movements, please contact your surgeon's office / family physician's office.  ? Drink plenty of fluids, and eat fruits and vegetables during your recovery time    3. Pain Medications utilized after surgery are narcotics and the law requires that the following information be given to all patients that are prescribed narcotics:  ? CLASSIFICATION: Pain medications are called Opioids and are narcotics  ? LEGALITIES: It is illegal to share narcotics with others and to drive within 24 hours of taking narcotics  ? POTENTIAL SIDE EFFECTS: Potential side effects of opioids include: nausea, vomiting, itching, dizziness, drowsiness, dry mouth, constipation, and difficulty urinating.  ? POTENTIAL ADVERSE EFFECTS:    o Opioid tolerance can develop with use of pain medications and this simply means that it requires more and more of the medication to control pain; however, this is seen more in patients that use opioids for longer periods of time.  o Opioid dependence can develop with use of Opioids and this simply means that to stop the medication can cause withdrawal symptoms; however, this is seen with patients that use Opioids for longer periods of time.  o Opioid addiction can develop with use of Opioids and the incidence of this is very unlikely in patients who take the medications as ordered and stop the medications as instructed.  o Opioid overdose can be dangerous, but is unlikely when the medication is taken as ordered and stopped when ordered. It is important not to mix opioids with alcohol or with and type of sedative such as Benadryl as this can lead to over sedation and respiratory difficulty.  ? DOSAGE:   o Pain medications will need to be taken consistently for the first week to decrease pain and promote adequate pain relief and participation in physical therapy.  o After the initial surgical pain begins to resolve, you may begin to decrease the pain medication. By the end of 6 weeks, you should be off of pain medications.  o Refills will not be given by the office during evening hours, on weekends, or after 6 weeks post-op.  o To seek refills on pain medications during the initial 6 week post-operative period, you must call the office 48 hours in advance to request the refill. The office will then notify you when to  the prescription. DO NOT wait until you are out of the medication to request a refill.    V. FOLLOW-UP VISITS:  ? You will need to follow up in the office with your surgeon in  2 weeks Dec 17, 2018. Please call this number [phone]  to schedule this appointment.  If you have any concerns or suspected complications prior to your follow up visit, please call your surgeons office. Do not wait  until your appointment time if you suspect complications. These will need to be addressed in the office promptly.        Date: 12/9/2018    Gerson Oliveira MD    CC: Provider, No Known; Gerson Oliveira MD             [Nl] : Hematologic/Lymphatic TBD in rehab

## 2019-10-03 NOTE — OCCUPATIONAL THERAPY INITIAL EVALUATION ADULT - PRECAUTIONS/LIMITATIONS, REHAB EVAL
fall precautions/Patient had spot EEG at OSH which showed R focal seizure activity on 9/23.  Patient was following commands and then attempted to wean off propofol, however unsuccessful (patient started having seizures again).  Resumed prop and increased keppra dose.  Patient transferred to [facility] MICU for vEEG and management of status epilepticus.

## 2019-10-03 NOTE — PROGRESS NOTE ADULT - ATTENDING COMMENTS
1. Acute hypoxemic respiratory resolved.  Tolerating  extubation.  2.Neuro: Seizures of unclear etiology. Continue Keppra and Vimpat.  CSF with lymphocytosis. HSV  PCR negative . Still on Acyclovir. Await further csf infectious studies and paraneoplastic studies  to result. Repeat MRI as per tomorrow. Still with L hemiparesis and L neglect. Mental status improving.

## 2019-10-03 NOTE — PROGRESS NOTE ADULT - ASSESSMENT
85 yo M Hx HTN, GERD, prostate cancer (treated with radioactive seed implantation in 2001 no active treatment) presenting with status epilepticus.  Transferred from OSH to [facility] ICU for vEEG monitoring in setting of seizure activity.     Neuro  Differential diagnosis includes status epilepticus, HSV encephalitis, meningitis, autoimmune encephalitis  -c/w Keppra 2 g bid. Consider decreasing to 1500mg BID today given no seizure activity.  -c/w locasamide 150 mg bid  -all sedation off and pt still minimally responsive  -HSV PCR negative- will continue acyclovir for now for other viral etiologies  -10mg decadron qd for inflammatory encephalopathy coverage  -MRI brain 9/25 shows abnormal areas of cortical restricted diffusion (high signal on diffusion-weighted imaging and corresponding dropout on ADC map) are notable most pronounced within the right parietal and occipital cortices.  Abnormal cortical restricted diffusion is also noted within bilateral  cingulate gyri, right worse than left, right pulvinar of the thalamus, right medial temporal lobe, and hippocampus. There is associated T2/FLAIR prolongation in these areas along with mild gyral swelling.   -f/u CSF viral panel, 14-3-3, tau, neuron specific enolase, HTLV-1, JEANETTE virus, HSV 6, paraneoplastic panel  	*Negative CSF studies so far:  Bacterial cx, WNV, Toxo, Cryptococcus, HSV  - vEEG has consistently shown foci of epileptiform activity without seizure (~5 days)    CV  -Started lopressor 25mg BID 9/29 overnight for rapid HR  -d/c lopressor and start labetalol 10/1 for HTN. Increased to 100mg q8.  -hold home amlodipine    Resp  -extubated 10/1 without complication    GI  -NG tube in place, on tube feeds  -CT OSH showed possible colitis (colonic wall thickening)    Renal  SANGITA on presentation at OSH  -resolved, continue to monitor BMP  -continue to monitor I/O  -will have to keep pt on IVF while on acyclovir    RHEUM  - will check YANDEL, ESR, CRP, DSDNA to ensure MRI findings not indicative of autoimmune disease    ID  r/o meningitis  -acyclovir as above  -s/p LP 9/25, f/u CSF studies as above  -BCx and sputum neg  -UA contaminated, d/c CTX (total received 3 day course)  -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

## 2019-10-03 NOTE — OCCUPATIONAL THERAPY INITIAL EVALUATION ADULT - IMPAIRED TRANSFERS: SIT/STAND, REHAB EVAL
impaired coordination/abnormal muscle tone/impaired balance/decreased ROM/decreased sensation/cognition/decreased strength

## 2019-10-03 NOTE — PROGRESS NOTE ADULT - SUBJECTIVE AND OBJECTIVE BOX
SHIKHA MASON 84y [id number]    Patient is a 84y old  Male who presents with a chief complaint of status epilepticus (03 Oct 2019 13:10)      Follow Up/CC:  ID following for encephalitis    Interval History/ROS: no fever, more awake    Allergies    No Known Allergies    Intolerances        ANTIMICROBIALS:      MEDICATIONS  (STANDING):  chlorhexidine 4% Liquid 1 Application(s) Topical <User Schedule>  dexamethasone  IVPB 10 milliGRAM(s) IV Intermittent daily  heparin  Injectable 5000 Unit(s) SubCutaneous every 8 hours  insulin lispro (HumaLOG) corrective regimen sliding scale   SubCutaneous every 6 hours  insulin NPH human recombinant 10 Unit(s) SubCutaneous every 6 hours  labetalol 100 milliGRAM(s) Oral every 8 hours  lacosamide IVPB 150 milliGRAM(s) IV Intermittent every 12 hours  levETIRAcetam  IVPB 2000 milliGRAM(s) IV Intermittent every 12 hours  pantoprazole  Injectable 40 milliGRAM(s) IV Push daily  polyethylene glycol 3350 17 Gram(s) Oral two times a day  senna Syrup 10 milliLiter(s) Oral at bedtime    MEDICATIONS  (PRN):        Vital Signs Last 24 Hrs  T(C): 36.4 (03 Oct 2019 10:50), Max: 37.2 (02 Oct 2019 16:00)  T(F): 97.6 (03 Oct 2019 10:50), Max: 98.9 (02 Oct 2019 16:00)  HR: 80 (03 Oct 2019 10:50) (60 - 86)  BP: 122/68 (03 Oct 2019 10:50) (107/59 - 166/79)  BP(mean): 90 (03 Oct 2019 10:00) (77 - 111)  RR: 18 (03 Oct 2019 10:50) (14 - 50)  SpO2: 97% (03 Oct 2019 10:50) (96% - 100%)    CBC Full  -  ( 03 Oct 2019 00:27 )  WBC Count : 12.73 K/uL  RBC Count : 4.04 M/uL  Hemoglobin : 12.5 g/dL  Hematocrit : 36.4 %  Platelet Count - Automated : 353 K/uL  Mean Cell Volume : 90.1 fl  Mean Cell Hemoglobin : 30.9 pg  Mean Cell Hemoglobin Concentration : 34.3 gm/dL  Auto Neutrophil # : 9.36 K/uL  Auto Lymphocyte # : 1.95 K/uL  Auto Monocyte # : 1.20 K/uL  Auto Eosinophil # : 0.12 K/uL  Auto Basophil # : 0.03 K/uL  Auto Neutrophil % : 73.7 %  Auto Lymphocyte % : 15.3 %  Auto Monocyte % : 9.4 %  Auto Eosinophil % : 0.9 %  Auto Basophil % : 0.2 %    10-03    132<L>  |  96  |  32<H>  ----------------------------<  163<H>  3.1<L>   |  27  |  0.85    Ca    8.7      03 Oct 2019 00:27  Phos  2.3     10-03  Mg     1.7     10-03    TPro  6.7  /  Alb  3.0<L>  /  TBili  0.7  /  DBili  x   /  AST  43<H>  /  ALT  60<H>  /  AlkPhos  135<H>  10-03    LIVER FUNCTIONS - ( 03 Oct 2019 00:27 )  Alb: 3.0 g/dL / Pro: 6.7 g/dL / ALK PHOS: 135 U/L / ALT: 60 U/L / AST: 43 U/L / GGT: x               MICROBIOLOGY:  .Urine  09-28-19   >=3 organisms. Probable collection contamination.  --  --      .Blood  09-28-19   No growth at 5 days.  --  --      .CSF  09-25-19   No growth  --    No polymorphonuclear cells seen  No organisms seen  by cytocentrifuge      .Sputum  09-25-19   Normal Respiratory Domenica present  Rare Aspergillus fumigatus  --    Numerous polymorphonuclear leukocytes per low power field  Rare Squamous epithelial cells per low power field  No organisms seen per oil power field      .Blood  09-25-19   No growth at 5 days.  --  --      CSF PCR Panel (10.02.19 @ 12:45)    CSF PCR Result: NotDetec: The meningitis/encephalitis (ME) panel (CSFPCR) is a PCR based assay that  screens for: Escherichia coli; Haemophilus influenzae; Listeria  monocytogenes; Neisseria meningitidis; Streptococcus agalactiae;  Streptococcus pneumoniae; CMV; Enterovirus; HSV-1; HSV-2; Human  herpesvirus 6; Parechovirus; VZV and Cryptococcus. Result should be  interpreted in context of clinical presentation, imaging and other lab  tests. Positive predictive value may be lower in patients with normal CSF  chemistry and cell count.        Rapid RVP Result: NotDetec (09-30 @ 12:01)  HSV 1/2 PCR: NotDetec (09-28 @ 23:34)          RADIOLOGY    < from: Xray Chest 1 View- PORTABLE-Urgent (10.01.19 @ 23:31) >  The heart is slightly enlarged. The lungs appear to be clear. An NG tube   is within the stomach. Calcified aortic knob.    < end of copied text >

## 2019-10-03 NOTE — OCCUPATIONAL THERAPY INITIAL EVALUATION ADULT - BALANCE DISTURBANCE, IDENTIFIED IMPAIRMENT CONTRIBUTE, REHAB EVAL
impaired postural control/abnormal muscle tone/decreased strength/impaired coordination/decreased ROM/decreased sensation

## 2019-10-03 NOTE — OCCUPATIONAL THERAPY INITIAL EVALUATION ADULT - NS ASR FOLLOW COMMAND OT EVAL
dysarthric, primary language is Cameroonian- not appropriate for SBT/Cognitive eval/able to follow single-step instructions/50% of the time

## 2019-10-04 DIAGNOSIS — D72.829 ELEVATED WHITE BLOOD CELL COUNT, UNSPECIFIED: ICD-10-CM

## 2019-10-04 LAB
ALBUMIN SERPL ELPH-MCNC: 3.3 G/DL — SIGNIFICANT CHANGE UP (ref 3.3–5)
ALP SERPL-CCNC: 136 U/L — HIGH (ref 40–120)
ALT FLD-CCNC: 52 U/L — HIGH (ref 10–45)
ANION GAP SERPL CALC-SCNC: 13 MMOL/L — SIGNIFICANT CHANGE UP (ref 5–17)
AST SERPL-CCNC: 34 U/L — SIGNIFICANT CHANGE UP (ref 10–40)
BASOPHILS # BLD AUTO: 0.03 K/UL — SIGNIFICANT CHANGE UP (ref 0–0.2)
BASOPHILS NFR BLD AUTO: 0.2 % — SIGNIFICANT CHANGE UP (ref 0–2)
BILIRUB SERPL-MCNC: 0.7 MG/DL — SIGNIFICANT CHANGE UP (ref 0.2–1.2)
BUN SERPL-MCNC: 28 MG/DL — HIGH (ref 7–23)
CALCIUM SERPL-MCNC: 9 MG/DL — SIGNIFICANT CHANGE UP (ref 8.4–10.5)
CHLORIDE SERPL-SCNC: 95 MMOL/L — LOW (ref 96–108)
CO2 SERPL-SCNC: 27 MMOL/L — SIGNIFICANT CHANGE UP (ref 22–31)
CREAT SERPL-MCNC: 0.89 MG/DL — SIGNIFICANT CHANGE UP (ref 0.5–1.3)
EOSINOPHIL # BLD AUTO: 0.24 K/UL — SIGNIFICANT CHANGE UP (ref 0–0.5)
EOSINOPHIL NFR BLD AUTO: 1.9 % — SIGNIFICANT CHANGE UP (ref 0–6)
GLUCOSE BLDC GLUCOMTR-MCNC: 122 MG/DL — HIGH (ref 70–99)
GLUCOSE BLDC GLUCOMTR-MCNC: 124 MG/DL — HIGH (ref 70–99)
GLUCOSE BLDC GLUCOMTR-MCNC: 144 MG/DL — HIGH (ref 70–99)
GLUCOSE BLDC GLUCOMTR-MCNC: 199 MG/DL — HIGH (ref 70–99)
GLUCOSE SERPL-MCNC: 151 MG/DL — HIGH (ref 70–99)
HCT VFR BLD CALC: 39.5 % — SIGNIFICANT CHANGE UP (ref 39–50)
HGB BLD-MCNC: 12.9 G/DL — LOW (ref 13–17)
IMM GRANULOCYTES NFR BLD AUTO: 0.5 % — SIGNIFICANT CHANGE UP (ref 0–1.5)
LYMPHOCYTES # BLD AUTO: 2.63 K/UL — SIGNIFICANT CHANGE UP (ref 1–3.3)
LYMPHOCYTES # BLD AUTO: 20.8 % — SIGNIFICANT CHANGE UP (ref 13–44)
MAGNESIUM SERPL-MCNC: 1.8 MG/DL — SIGNIFICANT CHANGE UP (ref 1.6–2.6)
MCHC RBC-ENTMCNC: 30.4 PG — SIGNIFICANT CHANGE UP (ref 27–34)
MCHC RBC-ENTMCNC: 32.7 GM/DL — SIGNIFICANT CHANGE UP (ref 32–36)
MCV RBC AUTO: 92.9 FL — SIGNIFICANT CHANGE UP (ref 80–100)
MONOCYTES # BLD AUTO: 1.18 K/UL — HIGH (ref 0–0.9)
MONOCYTES NFR BLD AUTO: 9.3 % — SIGNIFICANT CHANGE UP (ref 2–14)
NEUTROPHILS # BLD AUTO: 8.52 K/UL — HIGH (ref 1.8–7.4)
NEUTROPHILS NFR BLD AUTO: 67.3 % — SIGNIFICANT CHANGE UP (ref 43–77)
PHOSPHATE SERPL-MCNC: 2.3 MG/DL — LOW (ref 2.5–4.5)
PLATELET # BLD AUTO: 411 K/UL — HIGH (ref 150–400)
POTASSIUM SERPL-MCNC: 3.3 MMOL/L — LOW (ref 3.5–5.3)
POTASSIUM SERPL-SCNC: 3.3 MMOL/L — LOW (ref 3.5–5.3)
PROT SERPL-MCNC: 7.3 G/DL — SIGNIFICANT CHANGE UP (ref 6–8.3)
RBC # BLD: 4.25 M/UL — SIGNIFICANT CHANGE UP (ref 4.2–5.8)
RBC # FLD: 14.5 % — SIGNIFICANT CHANGE UP (ref 10.3–14.5)
SODIUM SERPL-SCNC: 135 MMOL/L — SIGNIFICANT CHANGE UP (ref 135–145)
WBC # BLD: 12.66 K/UL — HIGH (ref 3.8–10.5)
WBC # FLD AUTO: 12.66 K/UL — HIGH (ref 3.8–10.5)

## 2019-10-04 PROCEDURE — 71045 X-RAY EXAM CHEST 1 VIEW: CPT | Mod: 26,77

## 2019-10-04 PROCEDURE — 71045 X-RAY EXAM CHEST 1 VIEW: CPT | Mod: 26

## 2019-10-04 PROCEDURE — 70553 MRI BRAIN STEM W/O & W/DYE: CPT | Mod: 26

## 2019-10-04 PROCEDURE — 99233 SBSQ HOSP IP/OBS HIGH 50: CPT

## 2019-10-04 PROCEDURE — 99232 SBSQ HOSP IP/OBS MODERATE 35: CPT

## 2019-10-04 RX ORDER — POTASSIUM CHLORIDE 20 MEQ
40 PACKET (EA) ORAL EVERY 4 HOURS
Refills: 0 | Status: COMPLETED | OUTPATIENT
Start: 2019-10-04 | End: 2019-10-05

## 2019-10-04 RX ADMIN — HEPARIN SODIUM 5000 UNIT(S): 5000 INJECTION INTRAVENOUS; SUBCUTANEOUS at 13:13

## 2019-10-04 RX ADMIN — Medication 60 MILLIGRAM(S): at 06:35

## 2019-10-04 RX ADMIN — Medication 40 MILLIEQUIVALENT(S): at 22:05

## 2019-10-04 RX ADMIN — LEVETIRACETAM 400 MILLIGRAM(S): 250 TABLET, FILM COATED ORAL at 10:10

## 2019-10-04 RX ADMIN — Medication 1: at 12:09

## 2019-10-04 RX ADMIN — HEPARIN SODIUM 5000 UNIT(S): 5000 INJECTION INTRAVENOUS; SUBCUTANEOUS at 22:01

## 2019-10-04 RX ADMIN — LACOSAMIDE 130 MILLIGRAM(S): 50 TABLET ORAL at 22:41

## 2019-10-04 RX ADMIN — CHLORHEXIDINE GLUCONATE 1 APPLICATION(S): 213 SOLUTION TOPICAL at 08:30

## 2019-10-04 RX ADMIN — PANTOPRAZOLE SODIUM 40 MILLIGRAM(S): 20 TABLET, DELAYED RELEASE ORAL at 12:09

## 2019-10-04 RX ADMIN — SENNA PLUS 10 MILLILITER(S): 8.6 TABLET ORAL at 22:02

## 2019-10-04 RX ADMIN — LEVETIRACETAM 400 MILLIGRAM(S): 250 TABLET, FILM COATED ORAL at 22:01

## 2019-10-04 RX ADMIN — POLYETHYLENE GLYCOL 3350 17 GRAM(S): 17 POWDER, FOR SOLUTION ORAL at 06:35

## 2019-10-04 RX ADMIN — LACOSAMIDE 130 MILLIGRAM(S): 50 TABLET ORAL at 10:35

## 2019-10-04 RX ADMIN — HEPARIN SODIUM 5000 UNIT(S): 5000 INJECTION INTRAVENOUS; SUBCUTANEOUS at 06:35

## 2019-10-04 NOTE — PROGRESS NOTE ADULT - ATTENDING COMMENTS
Tomas Marcos  Attending Physician   Division of Infectious Disease  Pager #[phone]  After 5pm/weekend or no response, call #[phone]    Will sign off, recall ID if needed #[phone].

## 2019-10-04 NOTE — PROGRESS NOTE ADULT - SUBJECTIVE AND OBJECTIVE BOX
Patient is a 84y old  Male who presents with a chief complaint of status epilepticus (03 Oct 2019 19:03)    SUBJECTIVE / OVERNIGHT EVENTS: NGT was pulled out overnight, replaced today, pending X-ray. Daughter is at bedside. Patient denies any pain or discomfort.     MEDICATIONS  (STANDING):  chlorhexidine 4% Liquid 1 Application(s) Topical <User Schedule>  heparin  Injectable 5000 Unit(s) SubCutaneous every 8 hours  insulin lispro (HumaLOG) corrective regimen sliding scale   SubCutaneous every 6 hours  labetalol 100 milliGRAM(s) Oral every 8 hours  lacosamide IVPB 150 milliGRAM(s) IV Intermittent every 12 hours  levETIRAcetam  IVPB 1500 milliGRAM(s) IV Intermittent every 12 hours  pantoprazole  Injectable 40 milliGRAM(s) IV Push daily  polyethylene glycol 3350 17 Gram(s) Oral two times a day  predniSONE   Tablet 60  Oral   predniSONE   Tablet 60 milliGRAM(s) Oral daily  senna Syrup 10 milliLiter(s) Oral at bedtime    MEDICATIONS  (PRN):    Vital Signs Last 24 Hrs  T(C): 36.8 (04 Oct 2019 09:15), Max: 36.8 (04 Oct 2019 09:15)  T(F): 98.3 (04 Oct 2019 09:15), Max: 98.3 (04 Oct 2019 09:15)  HR: 67 (04 Oct 2019 09:15) (64 - 80)  BP: 125/67 (04 Oct 2019 09:15) (120/72 - 148/74)  BP(mean): --  RR: 18 (04 Oct 2019 09:15) (18 - 18)  SpO2: 93% (04 Oct 2019 09:15) (93% - 98%)  CAPILLARY BLOOD GLUCOSE      POCT Blood Glucose.: 144 mg/dL (04 Oct 2019 06:04)  POCT Blood Glucose.: 124 mg/dL (04 Oct 2019 00:19)  POCT Blood Glucose.: 146 mg/dL (03 Oct 2019 18:47)  POCT Blood Glucose.: 187 mg/dL (03 Oct 2019 13:10)    I&O's Summary    03 Oct 2019 07:01  -  04 Oct 2019 07:00  --------------------------------------------------------  IN: 2177 mL / OUT: 2550 mL / NET: -373 mL        PHYSICAL EXAM:  GENERAL: NAD  HEAD: NGT in place   EYES:  conjunctiva and sclera clear  NECK: Supple, No JVD  CHEST/LUNG: Clear to auscultation bilaterally; No wheeze  HEART: +S1/S2, reg   ABDOMEN: Soft, Nontender, Nondistended; Bowel sounds present  EXTREMITIES:  trace edema present in the LE b/l   PSYCH: Alert and awake, answering questions appropriately     LABS:                        12.9   12.66 )-----------( 411      ( 04 Oct 2019 09:46 )             39.5     10-04    135  |  95<L>  |  28<H>  ----------------------------<  151<H>  3.3<L>   |  27  |  0.89    Ca    9.0      04 Oct 2019 07:41  Phos  2.3     10-04  Mg     1.8     10-04    TPro  7.3  /  Alb  3.3  /  TBili  0.7  /  DBili  x   /  AST  34  /  ALT  52<H>  /  AlkPhos  136<H>  10-04      CARDIAC MARKERS ( 03 Oct 2019 00:27 )  x     / x     / 56 U/L / x     / x

## 2019-10-04 NOTE — PROGRESS NOTE ADULT - PROBLEM SELECTOR PLAN 1
-improving slowly   -hsv pcr and CSF PCR negative  -off acyclovir  -CSF w/u negative so far - bacterial cx, toxo, wnv, crypt  -on steroids per neuro

## 2019-10-04 NOTE — PROGRESS NOTE ADULT - ASSESSMENT
85 yo M Hx HTN, GERD, prostate cancer (treated with radioactive seed implantation in 2001 no active treatment) presenting with status epilepticus.  Transferred from OSH to [facility] ICU for vEEG monitoring in setting of seizure activity with abnormal CSF with lymphocyte predominance

## 2019-10-04 NOTE — PROGRESS NOTE ADULT - ASSESSMENT
83 y/o man with hx of prostate CA s/o seed implant and RT, HTN, p/w sudden onset AMS seizure brought to [facility] . Was found to be in status epilepticus and required intubation for airway control. Pt transferred to [facility] for further workup and EEG monitoring with no further seizure activity extubated on 10/1 downgraded to medicine on 10/3 pending repeat MRI brain tomorrow 10/4.

## 2019-10-04 NOTE — PROGRESS NOTE ADULT - SUBJECTIVE AND OBJECTIVE BOX
SHIKHA MASON 84y MRN-[id number]    Patient is a 84y old  Male who presents with a chief complaint of status epilepticus (04 Oct 2019 10:14)      Follow Up/CC:  ID following for encephalitis    Interval History/ROS: no fever, answers some ?s    Allergies    No Known Allergies    Intolerances        ANTIMICROBIALS:      MEDICATIONS  (STANDING):  chlorhexidine 4% Liquid 1 Application(s) Topical <User Schedule>  heparin  Injectable 5000 Unit(s) SubCutaneous every 8 hours  insulin lispro (HumaLOG) corrective regimen sliding scale   SubCutaneous every 6 hours  labetalol 100 milliGRAM(s) Oral every 8 hours  lacosamide IVPB 150 milliGRAM(s) IV Intermittent every 12 hours  levETIRAcetam  IVPB 1500 milliGRAM(s) IV Intermittent every 12 hours  pantoprazole  Injectable 40 milliGRAM(s) IV Push daily  polyethylene glycol 3350 17 Gram(s) Oral two times a day  predniSONE   Tablet 60  Oral   predniSONE   Tablet 60 milliGRAM(s) Oral daily  senna Syrup 10 milliLiter(s) Oral at bedtime    MEDICATIONS  (PRN):        Vital Signs Last 24 Hrs  T(C): 36.8 (04 Oct 2019 09:15), Max: 36.8 (04 Oct 2019 09:15)  T(F): 98.3 (04 Oct 2019 09:15), Max: 98.3 (04 Oct 2019 09:15)  HR: 67 (04 Oct 2019 09:15) (64 - 69)  BP: 125/67 (04 Oct 2019 09:15) (120/72 - 148/74)  BP(mean): --  RR: 18 (04 Oct 2019 09:15) (18 - 18)  SpO2: 93% (04 Oct 2019 09:15) (93% - 98%)    CBC Full  -  ( 04 Oct 2019 09:46 )  WBC Count : 12.66 K/uL  RBC Count : 4.25 M/uL  Hemoglobin : 12.9 g/dL  Hematocrit : 39.5 %  Platelet Count - Automated : 411 K/uL  Mean Cell Volume : 92.9 fl  Mean Cell Hemoglobin : 30.4 pg  Mean Cell Hemoglobin Concentration : 32.7 gm/dL  Auto Neutrophil # : 8.52 K/uL  Auto Lymphocyte # : 2.63 K/uL  Auto Monocyte # : 1.18 K/uL  Auto Eosinophil # : 0.24 K/uL  Auto Basophil # : 0.03 K/uL  Auto Neutrophil % : 67.3 %  Auto Lymphocyte % : 20.8 %  Auto Monocyte % : 9.3 %  Auto Eosinophil % : 1.9 %  Auto Basophil % : 0.2 %    10-04    135  |  95<L>  |  28<H>  ----------------------------<  151<H>  3.3<L>   |  27  |  0.89    Ca    9.0      04 Oct 2019 07:41  Phos  2.3     10-04  Mg     1.8     10-04    TPro  7.3  /  Alb  3.3  /  TBili  0.7  /  DBili  x   /  AST  34  /  ALT  52<H>  /  AlkPhos  136<H>  10-04    LIVER FUNCTIONS - ( 04 Oct 2019 07:41 )  Alb: 3.3 g/dL / Pro: 7.3 g/dL / ALK PHOS: 136 U/L / ALT: 52 U/L / AST: 34 U/L / GGT: x               MICROBIOLOGY:  .Urine  09-28-19   >=3 organisms. Probable collection contamination.  --  --      .Blood  09-28-19   No growth at 5 days.  --  --      .CSF  09-25-19   No growth  --    No polymorphonuclear cells seen  No organisms seen  by cytocentrifuge      .Sputum  09-25-19   Normal Respiratory Domenica present  Rare Aspergillus fumigatus  --    Numerous polymorphonuclear leukocytes per low power field  Rare Squamous epithelial cells per low power field  No organisms seen per oil power field      .Blood  09-25-19   No growth at 5 days.  --  --        Rapid RVP Result: NotDetec (09-30 @ 12:01)  HSV 1/2 PCR: NotDetec (09-28 @ 23:34)          RADIOLOGY    < from: Xray Chest 1 View- PORTABLE-Urgent (10.01.19 @ 23:31) >  The heart is slightly enlarged. The lungs appear to be clear. An NG tube   is within the stomach. Calcified aortic knob.      < end of copied text >

## 2019-10-04 NOTE — PROGRESS NOTE ADULT - PROBLEM SELECTOR PLAN 1
-ddx included status epilepticus, infarct HSV encephalitis, meningitis, hypoxic injury and CJD. likely viral vs. inflammatory based on CSF studies thus far.  - vEEG has consistently shown foci of epileptiform activity without seizure (~5 days)  - Neurology and ID following, recs appreciated  - keppra 2g q12h decreased to 1.5g q12h as per neuro recs  - c/w vimpat 150mg q12h  - was on decadron 10mg daily for inflammatory encephalopathy coverage, now on steroid taper, starting with  prednisone 60mg starting today and decrease by 20mg every 3 days  - f/u CSF 14-3-3, tau, neuron specific enolase, HTLV-1, JEANETTE virus, HSV 6, paraneoplastic panel      *Negative CSF studies so far:  CSF PCR, Bacterial cx, WNV, Toxo, Cryptococcus, HSV  - CSF PCR returned negative 10/2, will d/c acyclovir today  - per ID, [place] encephalitis panel not received by lab. if additional samples needed, will need IR guided LP due to spinal abnormalities  - ESR/CRP 81/9.53, YANDEL and dsDNa negative - were checked to r/o autoimmune disease in setting of above MRI findings  - speech and swallow eval pending   - MRI brain to be repeated today

## 2019-10-05 LAB
ANION GAP SERPL CALC-SCNC: 15 MMOL/L — SIGNIFICANT CHANGE UP (ref 5–17)
BUN SERPL-MCNC: 30 MG/DL — HIGH (ref 7–23)
CALCIUM SERPL-MCNC: 9.6 MG/DL — SIGNIFICANT CHANGE UP (ref 8.4–10.5)
CHLORIDE SERPL-SCNC: 95 MMOL/L — LOW (ref 96–108)
CO2 SERPL-SCNC: 26 MMOL/L — SIGNIFICANT CHANGE UP (ref 22–31)
CREAT SERPL-MCNC: 0.89 MG/DL — SIGNIFICANT CHANGE UP (ref 0.5–1.3)
GLUCOSE BLDC GLUCOMTR-MCNC: 101 MG/DL — HIGH (ref 70–99)
GLUCOSE BLDC GLUCOMTR-MCNC: 127 MG/DL — HIGH (ref 70–99)
GLUCOSE BLDC GLUCOMTR-MCNC: 141 MG/DL — HIGH (ref 70–99)
GLUCOSE BLDC GLUCOMTR-MCNC: 174 MG/DL — HIGH (ref 70–99)
GLUCOSE BLDC GLUCOMTR-MCNC: 187 MG/DL — HIGH (ref 70–99)
GLUCOSE SERPL-MCNC: 192 MG/DL — HIGH (ref 70–99)
HCT VFR BLD CALC: 42.5 % — SIGNIFICANT CHANGE UP (ref 39–50)
HGB BLD-MCNC: 14 G/DL — SIGNIFICANT CHANGE UP (ref 13–17)
MCHC RBC-ENTMCNC: 31 PG — SIGNIFICANT CHANGE UP (ref 27–34)
MCHC RBC-ENTMCNC: 32.9 GM/DL — SIGNIFICANT CHANGE UP (ref 32–36)
MCV RBC AUTO: 94.2 FL — SIGNIFICANT CHANGE UP (ref 80–100)
PLATELET # BLD AUTO: 439 K/UL — HIGH (ref 150–400)
POTASSIUM SERPL-MCNC: 3.3 MMOL/L — LOW (ref 3.5–5.3)
POTASSIUM SERPL-SCNC: 3.3 MMOL/L — LOW (ref 3.5–5.3)
RBC # BLD: 4.51 M/UL — SIGNIFICANT CHANGE UP (ref 4.2–5.8)
RBC # FLD: 14.8 % — HIGH (ref 10.3–14.5)
SODIUM SERPL-SCNC: 136 MMOL/L — SIGNIFICANT CHANGE UP (ref 135–145)
WBC # BLD: 14.32 K/UL — HIGH (ref 3.8–10.5)
WBC # FLD AUTO: 14.32 K/UL — HIGH (ref 3.8–10.5)

## 2019-10-05 PROCEDURE — 99233 SBSQ HOSP IP/OBS HIGH 50: CPT

## 2019-10-05 PROCEDURE — 71045 X-RAY EXAM CHEST 1 VIEW: CPT | Mod: 26

## 2019-10-05 RX ORDER — POTASSIUM CHLORIDE 20 MEQ
10 PACKET (EA) ORAL
Refills: 0 | Status: COMPLETED | OUTPATIENT
Start: 2019-10-05 | End: 2019-10-05

## 2019-10-05 RX ADMIN — POLYETHYLENE GLYCOL 3350 17 GRAM(S): 17 POWDER, FOR SOLUTION ORAL at 05:49

## 2019-10-05 RX ADMIN — LEVETIRACETAM 400 MILLIGRAM(S): 250 TABLET, FILM COATED ORAL at 20:41

## 2019-10-05 RX ADMIN — Medication 100 MILLIGRAM(S): at 05:49

## 2019-10-05 RX ADMIN — LACOSAMIDE 130 MILLIGRAM(S): 50 TABLET ORAL at 10:21

## 2019-10-05 RX ADMIN — HEPARIN SODIUM 5000 UNIT(S): 5000 INJECTION INTRAVENOUS; SUBCUTANEOUS at 21:54

## 2019-10-05 RX ADMIN — HEPARIN SODIUM 5000 UNIT(S): 5000 INJECTION INTRAVENOUS; SUBCUTANEOUS at 05:49

## 2019-10-05 RX ADMIN — Medication 100 MILLIEQUIVALENT(S): at 21:55

## 2019-10-05 RX ADMIN — CHLORHEXIDINE GLUCONATE 1 APPLICATION(S): 213 SOLUTION TOPICAL at 08:57

## 2019-10-05 RX ADMIN — HEPARIN SODIUM 5000 UNIT(S): 5000 INJECTION INTRAVENOUS; SUBCUTANEOUS at 18:21

## 2019-10-05 RX ADMIN — Medication 100 MILLIEQUIVALENT(S): at 20:53

## 2019-10-05 RX ADMIN — LEVETIRACETAM 400 MILLIGRAM(S): 250 TABLET, FILM COATED ORAL at 08:46

## 2019-10-05 RX ADMIN — LACOSAMIDE 130 MILLIGRAM(S): 50 TABLET ORAL at 21:54

## 2019-10-05 RX ADMIN — Medication 40 MILLIEQUIVALENT(S): at 03:56

## 2019-10-05 RX ADMIN — Medication 60 MILLIGRAM(S): at 05:48

## 2019-10-05 RX ADMIN — Medication 1: at 06:35

## 2019-10-05 RX ADMIN — Medication 1: at 12:33

## 2019-10-05 RX ADMIN — Medication 100 MILLIEQUIVALENT(S): at 20:41

## 2019-10-05 RX ADMIN — PANTOPRAZOLE SODIUM 40 MILLIGRAM(S): 20 TABLET, DELAYED RELEASE ORAL at 12:34

## 2019-10-05 NOTE — SWALLOW BEDSIDE ASSESSMENT ADULT - SLP PERTINENT HISTORY OF CURRENT PROBLEM
84 year old male Hx HTN, GERD, prostate cancer who presented with seizure.  On 9/20, pt fell out of bed and hit his head. He subsequently developed seizure like activity. Pt intubated by EMS for airway protection. Brought to OSH. Pt given ativan and versed in ED. CTH did not show acute processes (including bleed). MRI brain showed R frontal enhancement (potential seizure foci). 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 Labs were significant for WBC 13, lacate >10, BUN 29, and Cr 1.4. Spot EEG at OSH showed R focal seizure activity on 9/23. Pt was following commands and attempted to wean off propofol, however unsuccessful (pt started having seizures again). Pt transferred to [facility] MICU for vEEG and management of status epilepticus. 84 year old male Hx HTN, GERD, prostate cancer who presented with seizure. On 9/20, pt fell out of bed and hit his head. He subsequently developed seizure like activity. Pt intubated by EMS for airway protection. Brought to OSH. Pt given ativan and versed in ED. CTH did not show acute processes (including bleed). MRI brain showed R frontal enhancement (potential seizure foci). 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 Labs were significant for WBC 13, lacate >10, BUN 29, and Cr 1.4. Spot EEG at OSH showed R focal seizure activity on 9/23. Pt was following commands and attempted to wean off propofol, however unsuccessful (pt started having seizures again). Pt transferred to [facility] MICU for vEEG and management of status epilepticus.

## 2019-10-05 NOTE — SWALLOW BEDSIDE ASSESSMENT ADULT - SWALLOW EVAL: DIAGNOSIS
85 yo male admitted s/p fall with status epilepticus, requiring intubation, with recurrent Sz activity, currently presents with evidence of oropharyngeal dysphagia superimposed upon reduced mental status. Pt with fluctuating alertness with evidence of reduced secretion management with wet/gurgly vocal quality, that he was eventually able to clear with cued coughs, and overt s/s laryngeal penetration/aspiration with minimal trials of conservative textures.

## 2019-10-05 NOTE — PROGRESS NOTE ADULT - ASSESSMENT
85 y/o man with hx of prostate CA s/o seed implant and RT, HTN, p/w sudden onset AMS seizure brought to [facility] . Was found to be in status epilepticus and required intubation for airway control. Pt transferred to [facility] for further workup and EEG monitoring with no further seizure activity extubated on 10/1 downgraded to medicine on 10/3 pending repeat MRI brain tomorrow 10/4.

## 2019-10-05 NOTE — PROGRESS NOTE ADULT - PROBLEM SELECTOR PLAN 1
-ddx included status epilepticus, infarct HSV encephalitis, meningitis, hypoxic injury and CJD. likely viral vs. inflammatory based on CSF studies thus far.  - vEEG has consistently shown foci of epileptiform activity without seizure (~5 days)  - Neurology and ID following, recs appreciated  - keppra 2g q12h decreased to 1.5g q12h as per neuro recs  - c/w vimpat 150mg q12h  - was on decadron 10mg daily for inflammatory encephalopathy coverage, now on steroid taper, starting with  prednisone 60mg starting today and decrease by 20mg every 3 days  - f/u CSF 14-3-3, tau, neuron specific enolase, HTLV-1, JEANETTE virus, HSV 6, paraneoplastic panel      *Negative CSF studies so far:  CSF PCR, Bacterial cx, WNV, Toxo, Cryptococcus, HSV  - CSF PCR returned negative 10/2, will d/c acyclovir today  - per ID, [place] encephalitis panel not received by lab. if additional samples needed, will need IR guided LP due to spinal abnormalities  - ESR/CRP 81/9.53, YANDEL and dsDNa negative - were checked to r/o autoimmune disease in setting of above MRI findings  -repeat MRI brain consistent with status   -discussed with neuro, to see patient and f/u today  -speech and swallow in progress

## 2019-10-05 NOTE — SWALLOW BEDSIDE ASSESSMENT ADULT - SWALLOW EVAL: SECRETION MANAGEMENT
Pt with evidence of reduced secretion management notable for wet/gurgly vocal quality at baseline, which he was eventually able to clear with cued cough, given repeated cues and models.

## 2019-10-05 NOTE — SWALLOW BEDSIDE ASSESSMENT ADULT - PHARYNGEAL PHASE
inconsistently required cues to swallow./Wet vocal quality post oral intake/Cough post oral intake/Decreased laryngeal elevation/Throat clear post oral intake/Delayed pharyngeal swallow

## 2019-10-05 NOTE — PROGRESS NOTE ADULT - ATTENDING COMMENTS
Discussed with daughter who was at bedside today    If pt fails speech and swallow eval, will need NGT replaced and family to let us know if we can use mittens to prevent him from pulling out tube.

## 2019-10-05 NOTE — SWALLOW BEDSIDE ASSESSMENT ADULT - ADDITIONAL RECOMMENDATIONS
Maintain good oral hygiene.   This service will continue to follow. Will attempt reassessment when Pt more alert.

## 2019-10-05 NOTE — SWALLOW BEDSIDE ASSESSMENT ADULT - SLP GENERAL OBSERVATIONS
Pt seen at bedside, asleep, lethargic, arousable with difficulty. Pt maintaining alertness for brief periods with cues. Pt intermittently verbally responsive, intermittently following directions for the purposes of the exam. As per d/w RN and Pt's daughter, Pt was much more alert and interactive this morning, until shortly before my arrival.

## 2019-10-05 NOTE — PROGRESS NOTE ADULT - SUBJECTIVE AND OBJECTIVE BOX
Patient is a 84y old  Male who presents with a chief complaint of status epilepticus (04 Oct 2019 13:21)    SUBJECTIVE / OVERNIGHT EVENTS: pt pulled out NGT again. Daughter is at bedside.     MEDICATIONS  (STANDING):  chlorhexidine 4% Liquid 1 Application(s) Topical <User Schedule>  heparin  Injectable 5000 Unit(s) SubCutaneous every 8 hours  insulin lispro (HumaLOG) corrective regimen sliding scale   SubCutaneous every 6 hours  labetalol 100 milliGRAM(s) Oral every 8 hours  lacosamide IVPB 150 milliGRAM(s) IV Intermittent every 12 hours  levETIRAcetam  IVPB 1500 milliGRAM(s) IV Intermittent every 12 hours  pantoprazole  Injectable 40 milliGRAM(s) IV Push daily  polyethylene glycol 3350 17 Gram(s) Oral two times a day  predniSONE   Tablet 60  Oral   predniSONE   Tablet 60 milliGRAM(s) Oral daily  senna Syrup 10 milliLiter(s) Oral at bedtime    MEDICATIONS  (PRN):      Vital Signs Last 24 Hrs  T(C): 36.6 (05 Oct 2019 06:00), Max: 36.9 (04 Oct 2019 20:00)  T(F): 97.9 (05 Oct 2019 06:00), Max: 98.5 (04 Oct 2019 20:00)  HR: 73 (05 Oct 2019 06:00) (65 - 87)  BP: 155/82 (05 Oct 2019 05:59) (125/71 - 165/82)  BP(mean): --  RR: 18 (05 Oct 2019 06:00) (18 - 18)  SpO2: 99% (05 Oct 2019 06:00) (95% - 99%)  CAPILLARY BLOOD GLUCOSE      POCT Blood Glucose.: 187 mg/dL (05 Oct 2019 11:59)  POCT Blood Glucose.: 174 mg/dL (05 Oct 2019 06:12)  POCT Blood Glucose.: 127 mg/dL (05 Oct 2019 00:32)  POCT Blood Glucose.: 122 mg/dL (04 Oct 2019 18:08)    I&O's Summary    04 Oct 2019 07:01  -  05 Oct 2019 07:00  --------------------------------------------------------  IN: 0 mL / OUT: 1350 mL / NET: -1350 mL      PHYSICAL EXAM:  GENERAL: NAD  EYES: conjunctiva and sclera clear  NECK: Supple, No JVD  CHEST/LUNG: Clear to auscultation bilaterally; No wheeze  HEART: +S1/S2, reg   ABDOMEN: Soft, Nontender, Nondistended; Bowel sounds present  EXTREMITIES: trace edema noted   PSYCH: alert and awake, confused at times     LABS:                        14.0   14.32 )-----------( 439      ( 05 Oct 2019 10:25 )             42.5     10-05    136  |  95<L>  |  30<H>  ----------------------------<  192<H>  3.3<L>   |  26  |  0.89    Ca    9.6      05 Oct 2019 07:21  Phos  2.3     10-04  Mg     1.8     10-04    TPro  7.3  /  Alb  3.3  /  TBili  0.7  /  DBili  x   /  AST  34  /  ALT  52<H>  /  AlkPhos  136<H>  10-04

## 2019-10-06 LAB
ANION GAP SERPL CALC-SCNC: 17 MMOL/L — SIGNIFICANT CHANGE UP (ref 5–17)
BUN SERPL-MCNC: 27 MG/DL — HIGH (ref 7–23)
CALCIUM SERPL-MCNC: 9.3 MG/DL — SIGNIFICANT CHANGE UP (ref 8.4–10.5)
CHLORIDE SERPL-SCNC: 95 MMOL/L — LOW (ref 96–108)
CO2 SERPL-SCNC: 25 MMOL/L — SIGNIFICANT CHANGE UP (ref 22–31)
CREAT SERPL-MCNC: 0.94 MG/DL — SIGNIFICANT CHANGE UP (ref 0.5–1.3)
GLUCOSE BLDC GLUCOMTR-MCNC: 162 MG/DL — HIGH (ref 70–99)
GLUCOSE BLDC GLUCOMTR-MCNC: 166 MG/DL — HIGH (ref 70–99)
GLUCOSE BLDC GLUCOMTR-MCNC: 230 MG/DL — HIGH (ref 70–99)
GLUCOSE SERPL-MCNC: 169 MG/DL — HIGH (ref 70–99)
HCT VFR BLD CALC: 45.5 % — SIGNIFICANT CHANGE UP (ref 39–50)
HGB BLD-MCNC: 15 G/DL — SIGNIFICANT CHANGE UP (ref 13–17)
MCHC RBC-ENTMCNC: 31.3 PG — SIGNIFICANT CHANGE UP (ref 27–34)
MCHC RBC-ENTMCNC: 33 GM/DL — SIGNIFICANT CHANGE UP (ref 32–36)
MCV RBC AUTO: 95 FL — SIGNIFICANT CHANGE UP (ref 80–100)
PLATELET # BLD AUTO: 398 K/UL — SIGNIFICANT CHANGE UP (ref 150–400)
POTASSIUM SERPL-MCNC: 3.4 MMOL/L — LOW (ref 3.5–5.3)
POTASSIUM SERPL-SCNC: 3.4 MMOL/L — LOW (ref 3.5–5.3)
RBC # BLD: 4.79 M/UL — SIGNIFICANT CHANGE UP (ref 4.2–5.8)
RBC # FLD: 15.1 % — HIGH (ref 10.3–14.5)
SODIUM SERPL-SCNC: 137 MMOL/L — SIGNIFICANT CHANGE UP (ref 135–145)
WBC # BLD: 14.14 K/UL — HIGH (ref 3.8–10.5)
WBC # FLD AUTO: 14.14 K/UL — HIGH (ref 3.8–10.5)

## 2019-10-06 PROCEDURE — 99233 SBSQ HOSP IP/OBS HIGH 50: CPT

## 2019-10-06 PROCEDURE — 71045 X-RAY EXAM CHEST 1 VIEW: CPT | Mod: 26

## 2019-10-06 PROCEDURE — 71045 X-RAY EXAM CHEST 1 VIEW: CPT | Mod: 26,77

## 2019-10-06 RX ORDER — POTASSIUM CHLORIDE 20 MEQ
10 PACKET (EA) ORAL
Refills: 0 | Status: COMPLETED | OUTPATIENT
Start: 2019-10-06 | End: 2019-10-06

## 2019-10-06 RX ADMIN — Medication 100 MILLIEQUIVALENT(S): at 15:32

## 2019-10-06 RX ADMIN — LACOSAMIDE 130 MILLIGRAM(S): 50 TABLET ORAL at 10:14

## 2019-10-06 RX ADMIN — HEPARIN SODIUM 5000 UNIT(S): 5000 INJECTION INTRAVENOUS; SUBCUTANEOUS at 06:11

## 2019-10-06 RX ADMIN — LEVETIRACETAM 400 MILLIGRAM(S): 250 TABLET, FILM COATED ORAL at 21:23

## 2019-10-06 RX ADMIN — PANTOPRAZOLE SODIUM 40 MILLIGRAM(S): 20 TABLET, DELAYED RELEASE ORAL at 11:37

## 2019-10-06 RX ADMIN — Medication 100 MILLIEQUIVALENT(S): at 17:45

## 2019-10-06 RX ADMIN — Medication 60 MILLIGRAM(S): at 06:11

## 2019-10-06 RX ADMIN — Medication 100 MILLIEQUIVALENT(S): at 16:40

## 2019-10-06 RX ADMIN — Medication 1: at 07:03

## 2019-10-06 RX ADMIN — CHLORHEXIDINE GLUCONATE 1 APPLICATION(S): 213 SOLUTION TOPICAL at 07:50

## 2019-10-06 RX ADMIN — HEPARIN SODIUM 5000 UNIT(S): 5000 INJECTION INTRAVENOUS; SUBCUTANEOUS at 21:26

## 2019-10-06 RX ADMIN — LEVETIRACETAM 400 MILLIGRAM(S): 250 TABLET, FILM COATED ORAL at 07:50

## 2019-10-06 RX ADMIN — HEPARIN SODIUM 5000 UNIT(S): 5000 INJECTION INTRAVENOUS; SUBCUTANEOUS at 12:43

## 2019-10-06 RX ADMIN — Medication 2: at 12:43

## 2019-10-06 RX ADMIN — LACOSAMIDE 130 MILLIGRAM(S): 50 TABLET ORAL at 21:24

## 2019-10-06 RX ADMIN — Medication 100 MILLIGRAM(S): at 00:52

## 2019-10-06 RX ADMIN — Medication 1: at 18:06

## 2019-10-06 NOTE — CHART NOTE - NSCHARTNOTEFT_GEN_A_CORE
Enteric tube has its tip in the proximal stomach immediately   past the gastroesophageal junction. Recommend advancement by about 4 to 5   cm---tube advanced as recommended, repeat CXR pending.

## 2019-10-06 NOTE — PROGRESS NOTE ADULT - ASSESSMENT
83 y/o man with hx of prostate CA s/o seed implant and RT, HTN, p/w sudden onset AMS seizure brought to [facility] . Was found to be in status epilepticus and required intubation for airway control. Pt transferred to [facility] for further workup and EEG monitoring with no further seizure activity extubated on 10/1 downgraded to medicine on 10/3 pending repeat MRI brain tomorrow 10/4.

## 2019-10-06 NOTE — PROGRESS NOTE ADULT - PROBLEM SELECTOR PLAN 1
-ddx included status epilepticus, infarct HSV encephalitis, meningitis, hypoxic injury and CJD. likely viral vs. inflammatory based on CSF studies thus far.  - vEEG has consistently shown foci of epileptiform activity without seizure (~5 days)  - Neurology and ID following, recs appreciated  - keppra 2g q12h decreased to 1.5g q12h as per neuro recs  - c/w vimpat 150mg q12h  - was on decadron 10mg daily for inflammatory encephalopathy coverage, now on steroid taper, starting with  prednisone 60mg and decreasing by 20mg every 3 days  - f/u CSF 14-3-3, tau, neuron specific enolase, HTLV-1, JEANETTE virus, HSV 6, paraneoplastic panel      *Negative CSF studies so far:  CSF PCR, Bacterial cx, WNV, Toxo, Cryptococcus, HSV  - CSF PCR returned negative 10/2, will d/c acyclovir today  - per ID, [place] encephalitis panel not received by lab. if additional samples needed, will need IR guided LP due to spinal abnormalities  - ESR/CRP 81/9.53, YANDEL and dsDNa negative - were checked to r/o autoimmune disease in setting of above MRI findings  -repeat MRI brain consistent with status   -f/u neuro recs  -failed speech and swallow eval x2, will keep NGT for now

## 2019-10-06 NOTE — SWALLOW BEDSIDE ASSESSMENT ADULT - SLP GENERAL OBSERVATIONS
Pt found asleep; tongue protruding slightly from mouth; unsustained arousal with verbal/tactile stimulation; oral care provided; min drooling of thin secretions. Able to follow limited commands (open mouth, stick out tongue) with repetition and modeling. non verbal throughout exam.

## 2019-10-06 NOTE — PROGRESS NOTE ADULT - ATTENDING COMMENTS
Can reattempt speech and swallow later this week, however if he fails - may need to consider PEG placement.

## 2019-10-06 NOTE — SWALLOW BEDSIDE ASSESSMENT ADULT - SLP PERTINENT HISTORY OF CURRENT PROBLEM
84 year old male Hx HTN, GERD, prostate cancer who presented with seizure. On 9/20, pt fell out of bed and hit his head. He subsequently developed seizure like activity. Pt intubated by EMS for airway protection. Brought to OSH. Pt given ativan and versed in ED. CTH did not show acute processes (including bleed). MRI brain showed R frontal enhancement (potential seizure foci). 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 Labs were significant for WBC 13, lacate >10, BUN 29, and Cr 1.4. Spot EEG at OSH showed R focal seizure activity on 9/23. Pt was following commands and attempted to wean off propofol, however unsuccessful (pt started having seizures again). Pt transferred to [facility] MICU for vEEG and management of status epilepticus.

## 2019-10-06 NOTE — SWALLOW BEDSIDE ASSESSMENT ADULT - SWALLOW EVAL: DIAGNOSIS
83 yo male admitted s/p fall with status epilepticus, requiring intubation, with recurrent Sz activity, currently presents with evidence of oropharyngeal dysphagia superimposed upon reduced mental status. Pooling and anterior loss of thin secretions as well as subtle wet breath sounds suggestive of poor secretion management. Patient provided with verbal/tactile stimulation and oral care however with minimally sustained arousal, absent oral response to ice chip. Mental status does not support po trials at this time.

## 2019-10-06 NOTE — PROGRESS NOTE ADULT - SUBJECTIVE AND OBJECTIVE BOX
Patient is a 84y old  Male who presents with a chief complaint of status epilepticus (05 Oct 2019 15:29)    SUBJECTIVE / OVERNIGHT EVENTS: NGT replaced yesterday, mittens placed on hands.     MEDICATIONS  (STANDING):  chlorhexidine 4% Liquid 1 Application(s) Topical <User Schedule>  heparin  Injectable 5000 Unit(s) SubCutaneous every 8 hours  insulin lispro (HumaLOG) corrective regimen sliding scale   SubCutaneous every 6 hours  labetalol 100 milliGRAM(s) Oral every 8 hours  lacosamide IVPB 150 milliGRAM(s) IV Intermittent every 12 hours  levETIRAcetam  IVPB 1500 milliGRAM(s) IV Intermittent every 12 hours  pantoprazole  Injectable 40 milliGRAM(s) IV Push daily  polyethylene glycol 3350 17 Gram(s) Oral two times a day  predniSONE   Tablet 60  Oral   senna Syrup 10 milliLiter(s) Oral at bedtime    MEDICATIONS  (PRN):      Vital Signs Last 24 Hrs  T(C): 36.7 (06 Oct 2019 08:28), Max: 37 (05 Oct 2019 23:54)  T(F): 98 (06 Oct 2019 08:28), Max: 98.6 (05 Oct 2019 23:54)  HR: 84 (06 Oct 2019 12:41) (73 - 93)  BP: 134/75 (06 Oct 2019 12:41) (122/75 - 152/88)  BP(mean): --  RR: 18 (06 Oct 2019 08:28) (18 - 18)  SpO2: 99% (06 Oct 2019 08:28) (95% - 100%)  CAPILLARY BLOOD GLUCOSE      POCT Blood Glucose.: 230 mg/dL (06 Oct 2019 12:10)  POCT Blood Glucose.: 162 mg/dL (06 Oct 2019 06:55)  POCT Blood Glucose.: 101 mg/dL (05 Oct 2019 23:57)  POCT Blood Glucose.: 141 mg/dL (05 Oct 2019 17:59)    I&O's Summary    05 Oct 2019 07:01  -  06 Oct 2019 07:00  --------------------------------------------------------  IN: 0 mL / OUT: 600 mL / NET: -600 mL        PHYSICAL EXAM:  GENERAL: NAD  EYES:  conjunctiva and sclera clear  NECK: Supple, No JVD  CHEST/LUNG: Clear to auscultation bilaterally; No wheeze  HEART: +S1/S2, reg   ABDOMEN: Soft, Nontender, Nondistended; Bowel sounds present  EXTREMITIES:  trace edema present   PSYCH: lethargic but arousable     LABS:                        15.0   14.14 )-----------( 398      ( 06 Oct 2019 09:39 )             45.5     10-06    137  |  95<L>  |  27<H>  ----------------------------<  169<H>  3.4<L>   |  25  |  0.94    Ca    9.3      06 Oct 2019 07:22

## 2019-10-07 LAB
ANION GAP SERPL CALC-SCNC: 17 MMOL/L — SIGNIFICANT CHANGE UP (ref 5–17)
BUN SERPL-MCNC: 30 MG/DL — HIGH (ref 7–23)
CALCIUM SERPL-MCNC: 9 MG/DL — SIGNIFICANT CHANGE UP (ref 8.4–10.5)
CHLORIDE SERPL-SCNC: 95 MMOL/L — LOW (ref 96–108)
CO2 SERPL-SCNC: 25 MMOL/L — SIGNIFICANT CHANGE UP (ref 22–31)
CREAT SERPL-MCNC: 0.95 MG/DL — SIGNIFICANT CHANGE UP (ref 0.5–1.3)
GLUCOSE BLDC GLUCOMTR-MCNC: 157 MG/DL — HIGH (ref 70–99)
GLUCOSE BLDC GLUCOMTR-MCNC: 175 MG/DL — HIGH (ref 70–99)
GLUCOSE BLDC GLUCOMTR-MCNC: 189 MG/DL — HIGH (ref 70–99)
GLUCOSE BLDC GLUCOMTR-MCNC: 264 MG/DL — HIGH (ref 70–99)
GLUCOSE SERPL-MCNC: 186 MG/DL — HIGH (ref 70–99)
HCT VFR BLD CALC: 44.2 % — SIGNIFICANT CHANGE UP (ref 39–50)
HGB BLD-MCNC: 14.4 G/DL — SIGNIFICANT CHANGE UP (ref 13–17)
MAGNESIUM SERPL-MCNC: 1.5 MG/DL — LOW (ref 1.6–2.6)
MCHC RBC-ENTMCNC: 30.6 PG — SIGNIFICANT CHANGE UP (ref 27–34)
MCHC RBC-ENTMCNC: 32.6 GM/DL — SIGNIFICANT CHANGE UP (ref 32–36)
MCV RBC AUTO: 93.8 FL — SIGNIFICANT CHANGE UP (ref 80–100)
MISCELLANEOUS TEST NAME: SIGNIFICANT CHANGE UP
MISCELLANEOUS, NORMALS: SIGNIFICANT CHANGE UP
MISCELLANEOUS, RESULT: SIGNIFICANT CHANGE UP
PLATELET # BLD AUTO: 401 K/UL — HIGH (ref 150–400)
POTASSIUM SERPL-MCNC: 3.4 MMOL/L — LOW (ref 3.5–5.3)
POTASSIUM SERPL-SCNC: 3.4 MMOL/L — LOW (ref 3.5–5.3)
RBC # BLD: 4.71 M/UL — SIGNIFICANT CHANGE UP (ref 4.2–5.8)
RBC # FLD: 15.6 % — HIGH (ref 10.3–14.5)
SODIUM SERPL-SCNC: 137 MMOL/L — SIGNIFICANT CHANGE UP (ref 135–145)
WBC # BLD: 14.1 K/UL — HIGH (ref 3.8–10.5)
WBC # FLD AUTO: 14.1 K/UL — HIGH (ref 3.8–10.5)

## 2019-10-07 PROCEDURE — 99233 SBSQ HOSP IP/OBS HIGH 50: CPT

## 2019-10-07 PROCEDURE — 99232 SBSQ HOSP IP/OBS MODERATE 35: CPT

## 2019-10-07 RX ORDER — POTASSIUM CHLORIDE 20 MEQ
40 PACKET (EA) ORAL EVERY 4 HOURS
Refills: 0 | Status: COMPLETED | OUTPATIENT
Start: 2019-10-07 | End: 2019-10-08

## 2019-10-07 RX ADMIN — Medication 100 MILLIGRAM(S): at 06:04

## 2019-10-07 RX ADMIN — LEVETIRACETAM 400 MILLIGRAM(S): 250 TABLET, FILM COATED ORAL at 19:45

## 2019-10-07 RX ADMIN — CHLORHEXIDINE GLUCONATE 1 APPLICATION(S): 213 SOLUTION TOPICAL at 10:00

## 2019-10-07 RX ADMIN — HEPARIN SODIUM 5000 UNIT(S): 5000 INJECTION INTRAVENOUS; SUBCUTANEOUS at 22:45

## 2019-10-07 RX ADMIN — PANTOPRAZOLE SODIUM 40 MILLIGRAM(S): 20 TABLET, DELAYED RELEASE ORAL at 13:37

## 2019-10-07 RX ADMIN — HEPARIN SODIUM 5000 UNIT(S): 5000 INJECTION INTRAVENOUS; SUBCUTANEOUS at 13:36

## 2019-10-07 RX ADMIN — POLYETHYLENE GLYCOL 3350 17 GRAM(S): 17 POWDER, FOR SOLUTION ORAL at 06:04

## 2019-10-07 RX ADMIN — Medication 1: at 00:16

## 2019-10-07 RX ADMIN — Medication 40 MILLIGRAM(S): at 06:03

## 2019-10-07 RX ADMIN — Medication 3: at 13:36

## 2019-10-07 RX ADMIN — HEPARIN SODIUM 5000 UNIT(S): 5000 INJECTION INTRAVENOUS; SUBCUTANEOUS at 06:03

## 2019-10-07 RX ADMIN — Medication 40 MILLIEQUIVALENT(S): at 22:44

## 2019-10-07 RX ADMIN — LEVETIRACETAM 400 MILLIGRAM(S): 250 TABLET, FILM COATED ORAL at 09:15

## 2019-10-07 RX ADMIN — LACOSAMIDE 130 MILLIGRAM(S): 50 TABLET ORAL at 09:44

## 2019-10-07 RX ADMIN — Medication 1: at 06:14

## 2019-10-07 RX ADMIN — LACOSAMIDE 130 MILLIGRAM(S): 50 TABLET ORAL at 22:44

## 2019-10-07 RX ADMIN — POLYETHYLENE GLYCOL 3350 17 GRAM(S): 17 POWDER, FOR SOLUTION ORAL at 18:30

## 2019-10-07 RX ADMIN — SENNA PLUS 10 MILLILITER(S): 8.6 TABLET ORAL at 22:45

## 2019-10-07 RX ADMIN — Medication 1: at 19:44

## 2019-10-07 NOTE — PROGRESS NOTE ADULT - ASSESSMENT
83 y/o man with hx of prostate CA s/o seed implant and RT, HTN, p/w sudden onset AMS seizure brought to [facility] . Was found to be in status epilepticus and required intubation for airway control. Pt transferred to [facility] for further workup and EEG monitoring with no further seizure activity extubated on 10/1 downgraded to medicine on 10/3:

## 2019-10-07 NOTE — PROGRESS NOTE ADULT - CONSTITUTIONAL DETAILS
no distress
no distress/fatigued
intubated, in micu
no distress
no distress
intubated, in MICU/no distress

## 2019-10-07 NOTE — PROGRESS NOTE ADULT - CARDIOVASCULAR DETAILS
positive S2/positive S1
positive S2/positive S1
positive S1/positive S2
positive S2/positive S1

## 2019-10-07 NOTE — SWALLOW BEDSIDE ASSESSMENT ADULT - SWALLOW EVAL: DIAGNOSIS
83 yo male admitted s/p fall with status epilepticus, requiring intubation, with recurrent Sz activity, currently presents with evidence of oropharyngeal dysphagia. Tongue continues to be slightly protruded at rest although patient is able to lateralize and retract in response to verbal command. Subtle wet vocal quality noted, suggestive of reduced secretion management, cleared with cued cough. Delayed oral transit and overt signs of laryngeal penetration/aspiration noted on conservative textures. Pt is AA+Ox2 with improved arousal from previous exam, +ability to follow simple commands. Verbal output is fluent although latent in response. Possible left neglect.

## 2019-10-07 NOTE — PROGRESS NOTE ADULT - NEUROLOGICAL DETAILS
no spontaneous movement
disoriented
responds to verbal commands
Strong peripheral pulses
no spontaneous movement
responds to verbal commands
responds to verbal commands

## 2019-10-07 NOTE — PROGRESS NOTE ADULT - SUBJECTIVE AND OBJECTIVE BOX
SHIKHA MASON 84y [id number]    Patient is a 84y old  Male who presents with a chief complaint of status epilepticus (07 Oct 2019 14:17)      Follow Up/CC:  ID following for encephalitis    Interval History/ROS: no fever, answers ?s     Allergies    No Known Allergies    Intolerances        ANTIMICROBIALS:      MEDICATIONS  (STANDING):  chlorhexidine 4% Liquid 1 Application(s) Topical <User Schedule>  heparin  Injectable 5000 Unit(s) SubCutaneous every 8 hours  insulin lispro (HumaLOG) corrective regimen sliding scale   SubCutaneous every 6 hours  labetalol 100 milliGRAM(s) Oral every 8 hours  lacosamide IVPB 150 milliGRAM(s) IV Intermittent every 12 hours  levETIRAcetam  IVPB 1500 milliGRAM(s) IV Intermittent every 12 hours  pantoprazole  Injectable 40 milliGRAM(s) IV Push daily  polyethylene glycol 3350 17 Gram(s) Oral two times a day  predniSONE   Tablet 60  Oral   predniSONE   Tablet 40 milliGRAM(s) Oral daily  senna Syrup 10 milliLiter(s) Oral at bedtime    MEDICATIONS  (PRN):        Vital Signs Last 24 Hrs  T(C): 37 (07 Oct 2019 04:39), Max: 37.2 (06 Oct 2019 16:27)  T(F): 98.6 (07 Oct 2019 04:39), Max: 99 (06 Oct 2019 16:27)  HR: 81 (07 Oct 2019 04:39) (70 - 92)  BP: 157/81 (07 Oct 2019 06:04) (133/76 - 157/81)  BP(mean): --  RR: 18 (07 Oct 2019 04:39) (18 - 18)  SpO2: 98% (07 Oct 2019 04:39) (94% - 98%)    CBC Full  -  ( 07 Oct 2019 09:40 )  WBC Count : 14.10 K/uL  RBC Count : 4.71 M/uL  Hemoglobin : 14.4 g/dL  Hematocrit : 44.2 %  Platelet Count - Automated : 401 K/uL  Mean Cell Volume : 93.8 fl  Mean Cell Hemoglobin : 30.6 pg  Mean Cell Hemoglobin Concentration : 32.6 gm/dL  Auto Neutrophil # : x  Auto Lymphocyte # : x  Auto Monocyte # : x  Auto Eosinophil # : x  Auto Basophil # : x  Auto Neutrophil % : x  Auto Lymphocyte % : x  Auto Monocyte % : x  Auto Eosinophil % : x  Auto Basophil % : x    10-07    137  |  95<L>  |  30<H>  ----------------------------<  186<H>  3.4<L>   |  25  |  0.95    Ca    9.0      07 Oct 2019 07:30  Mg     1.5     10-07            MICROBIOLOGY:  .Urine  09-28-19   >=3 organisms. Probable collection contamination.  --  --      .Blood  09-28-19   No growth at 5 days.  --  --      .CSF  09-25-19   No growth  --    No polymorphonuclear cells seen  No organisms seen  by cytocentrifuge      .Sputum  09-25-19   Normal Respiratory Domenica present  Rare Aspergillus fumigatus  --    Numerous polymorphonuclear leukocytes per low power field  Rare Squamous epithelial cells per low power field  No organisms seen per oil power field      .Blood  09-25-19   No growth at 5 days.  --  --      RADIOLOGY    < from: Xray Chest 1 View- PORTABLE-Urgent (10.06.19 @ 16:52) >  Enteric tube courses below the diaphragm with the tip in the stomach.    Clear lungs.     < end of copied text >

## 2019-10-07 NOTE — PROGRESS NOTE ADULT - ASSESSMENT
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 pt improved with decadron and acyclovir. CSF PCF panel finally returned as negative. Pt with MRI abnormalities in the right parietal lobe along with EEG epileptic focus. 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     Discussed with ID, acyclovir no longer needed and can be discontinued.   On prednisone taper , starting with 60mg x3 days, then decreased by 20mg every 3 days, then end on 10mg for 3 days.   Will continue to taper AEDs - final plan to be discussed with attending, will update later in the afternoon  C/w Vimpat 150mg BID.   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 pt improved with decadron and acyclovir. CSF PCF panel finally returned as negative. Pt with MRI abnormalities in the right parietal lobe along with EEG epileptic focus. 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     Discussed with ID, acyclovir no longer needed and can be discontinued.   Awaiting final results of CSF lab studies including 14-3-3  Will not taper AEDS any further  C/w Vimpat 150mg BID and keppra at current dose   Above plan discussed with the family.

## 2019-10-07 NOTE — SWALLOW BEDSIDE ASSESSMENT ADULT - SLP GENERAL OBSERVATIONS
Pt AA+Ox2 to person and "hospital". Voice is strong; no e/o dysarthria although subtle wet quality at baseline suggestive of reduced secretion management;Patient's wife and daughter present throughout exam. Daughter states patient was gurgly and drooling this morning.

## 2019-10-07 NOTE — PROGRESS NOTE ADULT - RS GEN PE MLT RESP DETAILS PC
clear to auscultation bilaterally/respirations non-labored
respirations non-labored/clear to auscultation bilaterally
respirations non-labored/clear to auscultation bilaterally/airway patent
clear to auscultation bilaterally/airway patent
respirations non-labored/clear to auscultation bilaterally
respirations non-labored/clear to auscultation bilaterally

## 2019-10-07 NOTE — PROGRESS NOTE ADULT - SUBJECTIVE AND OBJECTIVE BOX
Neurology Follow up note    Interval history - no overnight events     Vital Signs Last 24 Hrs  T(C): 37 (07 Oct 2019 04:39), Max: 37.2 (06 Oct 2019 16:27)  T(F): 98.6 (07 Oct 2019 04:39), Max: 99 (06 Oct 2019 16:27)  HR: 81 (07 Oct 2019 04:39) (70 - 92)  BP: 157/81 (07 Oct 2019 06:04) (133/76 - 157/81)  BP(mean): --  RR: 18 (07 Oct 2019 04:39) (18 - 18)  SpO2: 98% (07 Oct 2019 04:39) (94% - 98%)  General Exam:   General appearance: No acute distress, NGT in place. LUE splint                Neurological Exam:  Mental Status: drowsy, Orientated to self, date and place.  Attention poor.  Moderate dysarthria, Follows simple commands, to the right , has difficulty crossing the midline, visual and auditory neglect of the left side, eye opening apraxia (improved), right left confusion.     Cranial Nerves:  PERRL, EOMI, VFF to confrontation, no nystagmus.    CN V1-3 intact to light touch. left facial droop.  Hearing intact bilaterally.  Tongue midline.  Shoulder shrug decreased on the left.     Motor: Flaccid tone LUE. has difficulty attending to LUE, possibly 2/5 at the elbow, otherwise flaccid paralysis. in antigravity in the LLE, 5/5 on the left side.     Coord: unable to assess as pt was very drowsy    Tremor: No resting, postural or action tremor.  No myoclonus.    Sensation: intact to light touch, right left confusion    Deep Tendon Reflexes: 1+ bilateral biceps, triceps, brachioradialis, knee and ankle. No Babinski present.    Gait: bedbound                          14.4   14.10 )-----------( 401      ( 07 Oct 2019 09:40 )             44.2   10-07    137  |  95<L>  |  30<H>  ----------------------------<  186<H>  3.4<L>   |  25  |  0.95    Ca    9.0      07 Oct 2019 07:30  Mg     1.5     10-07      Radiology    < from: MR Head w/wo IV Cont (09.25.19 @ 17:09) >  FINDINGS: Abnormal areas of cortical restricted diffusion (high signal on   diffusion-weighted imaging and corresponding dropout on ADC map) are   notable most pronounced within the right parietal and occipital cortices.   Abnormal cortical restricted diffusion is also noted within bilateral   cingulate gyri, right worse than left, right pulvinar of the thalamus,   right medial temporal lobe, and hippocampus. There is associated T2/FLAIR   prolongation in these areas along with mild gyral swelling. There is   associated sulcal effacement. There is no abnormal associated   enhancement. These findings were present on the outside MRI examination   from 9/23/2019.     Multiple additional patchy confluent nonspecific T2/FLAIR hyperintense   signal changes are noted throughout the bihemispheric white matter   without associated mass effect or restricted diffusion.      MEDICATIONS  (STANDING):  chlorhexidine 4% Liquid 1 Application(s) Topical <User Schedule>  heparin  Injectable 5000 Unit(s) SubCutaneous every 8 hours  insulin lispro (HumaLOG) corrective regimen sliding scale   SubCutaneous every 6 hours  insulin NPH human recombinant 10 Unit(s) SubCutaneous every 6 hours  labetalol 100 milliGRAM(s) Oral every 8 hours  lacosamide IVPB 150 milliGRAM(s) IV Intermittent every 12 hours  levETIRAcetam  IVPB 1500 milliGRAM(s) IV Intermittent every 12 hours  pantoprazole  Injectable 40 milliGRAM(s) IV Push daily  polyethylene glycol 3350 17 Gram(s) Oral two times a day  predniSONE   Tablet   Oral   predniSONE   Tablet 60 milliGRAM(s) Oral daily  senna Syrup 10 milliLiter(s) Oral at bedtime    MEDICATIONS  (PRN):

## 2019-10-07 NOTE — SWALLOW BEDSIDE ASSESSMENT ADULT - SLP PERTINENT HISTORY OF CURRENT PROBLEM
84 year old male Hx HTN, GERD, prostate cancer who presented with seizure. On 9/20, pt fell out of bed and hit his head. He subsequently developed seizure like activity. Pt intubated by EMS for airway protection. Brought to OSH. Pt given ativan and versed in ED. CTH did not show acute processes (including bleed). MRI brain showed R frontal enhancement (potential seizure foci). 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 Labs were significant for WBC 13, lacate >10, BUN 29, and Cr 1.4. Spot EEG at OSH showed R focal seizure activity on 9/23. Pt was following commands and attempted to wean off propofol, however unsuccessful (pt started having seizures again). Pt transferred to [facility] MICU for vEEG and management of status epilepticus.

## 2019-10-07 NOTE — PROGRESS NOTE ADULT - GASTROINTESTINAL DETAILS
no distention/soft/no guarding/nontender/no rigidity
no rebound tenderness/no rigidity/nontender/no distention/no guarding/soft
no guarding/no rebound tenderness/no rigidity/soft/nontender/no distention
no distention/no guarding/normal/soft/no rebound tenderness/no rigidity/nontender
no guarding/soft/no rebound tenderness/no rigidity/no distention/nontender
nontender/no distention/no guarding/soft/no rigidity

## 2019-10-07 NOTE — PROGRESS NOTE ADULT - PROBLEM SELECTOR PLAN 1
-improving slowly   -hsv pcr and CSF PCR negative  -off acyclovir  -CSF w/u negative so far - bacterial cx, toxo, wnv, crypt  -on steroids per neuro  -easter equine encephalitis panel negative

## 2019-10-07 NOTE — SWALLOW BEDSIDE ASSESSMENT ADULT - PHARYNGEAL PHASE
Wet vocal quality post oral intake/Delayed pharyngeal swallow/Decreased laryngeal elevation Delayed pharyngeal swallow/prolonged compression phase; delayed weak cough/Wet vocal quality post oral intake/Decreased laryngeal elevation/Delayed cough post oral intake

## 2019-10-07 NOTE — PROGRESS NOTE ADULT - ASSESSMENT
83 yo M Hx HTN, GERD, prostate cancer (treated with radioactive seed implantation in 2001 no active treatment) presenting with status epilepticus.  Transferred from OSH to [facility] ICU for vEEG monitoring in setting of seizure activity with abnormal CSF with lymphocyte predominance

## 2019-10-07 NOTE — PROGRESS NOTE ADULT - PROBLEM SELECTOR PLAN 1
-ddx included status epilepticus, infarct HSV encephalitis, meningitis, hypoxic injury and CJD. likely viral vs. inflammatory based on CSF studies thus far.  - vEEG has consistently shown foci of epileptiform activity without seizure (~5 days)  - Neurology and ID following, recs appreciated  - keppra 2g q12h decreased to 1.5g q12h as per neuro recs  - c/w vimpat 150mg q12h  - was on decadron 10mg daily for inflammatory encephalopathy coverage, now on steroid taper, starting with  prednisone 60mg and decreasing by 20mg every 3 days  - f/u CSF 14-3-3, tau, neuron specific enolase, HTLV-1, JEANETTE virus, HSV 6, paraneoplastic panel      *Negative CSF studies so far:  CSF PCR, Bacterial cx, WNV, Toxo, Cryptococcus, HSV  - per ID, [place] encephalitis panel not received by lab. if additional samples needed, will need IR guided LP due to spinal abnormalities  - ESR/CRP 81/9.53, YANDEL and dsDNa negative - were checked to r/o autoimmune disease in setting of above MRI findings  -repeat MRI brain consistent with status   -failed speech and swallow eval x2, will keep NGT for now. Family is considering PEG.   -continue to taper AEDs as per neuro recommendations

## 2019-10-07 NOTE — PROGRESS NOTE ADULT - SUBJECTIVE AND OBJECTIVE BOX
Patient is a 84y old  Male who presents with a chief complaint of status epilepticus (07 Oct 2019 11:24)    SUBJECTIVE / OVERNIGHT EVENTS: no acute events overnight     MEDICATIONS  (STANDING):  chlorhexidine 4% Liquid 1 Application(s) Topical <User Schedule>  heparin  Injectable 5000 Unit(s) SubCutaneous every 8 hours  insulin lispro (HumaLOG) corrective regimen sliding scale   SubCutaneous every 6 hours  labetalol 100 milliGRAM(s) Oral every 8 hours  lacosamide IVPB 150 milliGRAM(s) IV Intermittent every 12 hours  levETIRAcetam  IVPB 1500 milliGRAM(s) IV Intermittent every 12 hours  pantoprazole  Injectable 40 milliGRAM(s) IV Push daily  polyethylene glycol 3350 17 Gram(s) Oral two times a day  predniSONE   Tablet 60  Oral   predniSONE   Tablet 40 milliGRAM(s) Oral daily  senna Syrup 10 milliLiter(s) Oral at bedtime    MEDICATIONS  (PRN):      Vital Signs Last 24 Hrs  T(C): 37 (07 Oct 2019 04:39), Max: 37.2 (06 Oct 2019 16:27)  T(F): 98.6 (07 Oct 2019 04:39), Max: 99 (06 Oct 2019 16:27)  HR: 81 (07 Oct 2019 04:39) (70 - 92)  BP: 157/81 (07 Oct 2019 06:04) (133/76 - 157/81)  BP(mean): --  RR: 18 (07 Oct 2019 04:39) (18 - 18)  SpO2: 98% (07 Oct 2019 04:39) (94% - 98%)  CAPILLARY BLOOD GLUCOSE      POCT Blood Glucose.: 264 mg/dL (07 Oct 2019 13:07)  POCT Blood Glucose.: 175 mg/dL (07 Oct 2019 06:10)  POCT Blood Glucose.: 157 mg/dL (07 Oct 2019 00:10)  POCT Blood Glucose.: 166 mg/dL (06 Oct 2019 18:01)    I&O's Summary    06 Oct 2019 07:01  -  07 Oct 2019 07:00  --------------------------------------------------------  IN: 1866 mL / OUT: 1250 mL / NET: 616 mL      PHYSICAL EXAM:  GENERAL: NAD  EYES:  conjunctiva and sclera clear  HEAD:  NGT in place   CHEST/LUNG: Clear to auscultation bilaterally; No wheeze  HEART: +S1/S2, reg   ABDOMEN: Soft, Nontender, Nondistended; Bowel sounds present  EXTREMITIES: trace edema noted   PSYCH: somnolent     LABS:                        14.4   14.10 )-----------( 401      ( 07 Oct 2019 09:40 )             44.2     10-07    137  |  95<L>  |  30<H>  ----------------------------<  186<H>  3.4<L>   |  25  |  0.95    Ca    9.0      07 Oct 2019 07:30  Mg     1.5     10-07

## 2019-10-07 NOTE — PROGRESS NOTE ADULT - ATTENDING COMMENTS
Tomas Marcos  Attending Physician   Division of Infectious Disease  Pager #[phone]  After 5pm/weekend or no response, call #[phone]    Will sign off, recall ID if needed #[phone].

## 2019-10-08 LAB
AMPHIPHYSIN AB TITR CSF: NEGATIVE TITER — SIGNIFICANT CHANGE UP
ANION GAP SERPL CALC-SCNC: 15 MMOL/L — SIGNIFICANT CHANGE UP (ref 5–17)
BUN SERPL-MCNC: 32 MG/DL — HIGH (ref 7–23)
CALCIUM SERPL-MCNC: 9.1 MG/DL — SIGNIFICANT CHANGE UP (ref 8.4–10.5)
CHLORIDE SERPL-SCNC: 93 MMOL/L — LOW (ref 96–108)
CO2 SERPL-SCNC: 27 MMOL/L — SIGNIFICANT CHANGE UP (ref 22–31)
CREAT SERPL-MCNC: 0.83 MG/DL — SIGNIFICANT CHANGE UP (ref 0.5–1.3)
CV2 IGG TITR CSF: NEGATIVE TITER — SIGNIFICANT CHANGE UP
GLIAL NUC TYPE 1 AB TITR CSF: NEGATIVE TITER — SIGNIFICANT CHANGE UP
GLUCOSE BLDC GLUCOMTR-MCNC: 115 MG/DL — HIGH (ref 70–99)
GLUCOSE BLDC GLUCOMTR-MCNC: 158 MG/DL — HIGH (ref 70–99)
GLUCOSE BLDC GLUCOMTR-MCNC: 207 MG/DL — HIGH (ref 70–99)
GLUCOSE BLDC GLUCOMTR-MCNC: 245 MG/DL — HIGH (ref 70–99)
GLUCOSE SERPL-MCNC: 237 MG/DL — HIGH (ref 70–99)
HCT VFR BLD CALC: 42 % — SIGNIFICANT CHANGE UP (ref 39–50)
HGB BLD-MCNC: 13.9 G/DL — SIGNIFICANT CHANGE UP (ref 13–17)
HU1 AB TITR CSF IF: NEGATIVE TITER — SIGNIFICANT CHANGE UP
HU2 AB TITR CSF IF: NEGATIVE TITER — SIGNIFICANT CHANGE UP
HU3 AB TITR CSF: NEGATIVE TITER — SIGNIFICANT CHANGE UP
MCHC RBC-ENTMCNC: 31.4 PG — SIGNIFICANT CHANGE UP (ref 27–34)
MCHC RBC-ENTMCNC: 33.1 GM/DL — SIGNIFICANT CHANGE UP (ref 32–36)
MCV RBC AUTO: 95 FL — SIGNIFICANT CHANGE UP (ref 80–100)
PARANEOPLASTIC INTERPRETATION, CSF: SIGNIFICANT CHANGE UP
PCA-TR AB TITR CSF: NEGATIVE TITER — SIGNIFICANT CHANGE UP
PLATELET # BLD AUTO: 354 K/UL — SIGNIFICANT CHANGE UP (ref 150–400)
POTASSIUM SERPL-MCNC: 3.4 MMOL/L — LOW (ref 3.5–5.3)
POTASSIUM SERPL-SCNC: 3.4 MMOL/L — LOW (ref 3.5–5.3)
PURKINJE CELL CYTOPLASMIC AB TYPE 2: NEGATIVE TITER — SIGNIFICANT CHANGE UP
PURKINJE CELLS AB TITR CSF IF: NEGATIVE TITER — SIGNIFICANT CHANGE UP
RBC # BLD: 4.42 M/UL — SIGNIFICANT CHANGE UP (ref 4.2–5.8)
RBC # FLD: 16 % — HIGH (ref 10.3–14.5)
REFLEX ADDED: SIGNIFICANT CHANGE UP
SODIUM SERPL-SCNC: 135 MMOL/L — SIGNIFICANT CHANGE UP (ref 135–145)
VDRL CSF-TITR: NEGATIVE — SIGNIFICANT CHANGE UP
WBC # BLD: 14.83 K/UL — HIGH (ref 3.8–10.5)
WBC # FLD AUTO: 14.83 K/UL — HIGH (ref 3.8–10.5)

## 2019-10-08 PROCEDURE — 99233 SBSQ HOSP IP/OBS HIGH 50: CPT

## 2019-10-08 RX ORDER — POTASSIUM CHLORIDE 20 MEQ
40 PACKET (EA) ORAL EVERY 4 HOURS
Refills: 0 | Status: COMPLETED | OUTPATIENT
Start: 2019-10-08 | End: 2019-10-08

## 2019-10-08 RX ORDER — POTASSIUM CHLORIDE 20 MEQ
40 PACKET (EA) ORAL EVERY 4 HOURS
Refills: 0 | Status: DISCONTINUED | OUTPATIENT
Start: 2019-10-08 | End: 2019-10-08

## 2019-10-08 RX ORDER — LACOSAMIDE 50 MG/1
150 TABLET ORAL EVERY 12 HOURS
Refills: 0 | Status: DISCONTINUED | OUTPATIENT
Start: 2019-10-08 | End: 2019-10-15

## 2019-10-08 RX ADMIN — POLYETHYLENE GLYCOL 3350 17 GRAM(S): 17 POWDER, FOR SOLUTION ORAL at 06:31

## 2019-10-08 RX ADMIN — Medication 40 MILLIEQUIVALENT(S): at 02:31

## 2019-10-08 RX ADMIN — Medication 40 MILLIEQUIVALENT(S): at 22:29

## 2019-10-08 RX ADMIN — Medication 2: at 18:04

## 2019-10-08 RX ADMIN — Medication 2: at 13:36

## 2019-10-08 RX ADMIN — HEPARIN SODIUM 5000 UNIT(S): 5000 INJECTION INTRAVENOUS; SUBCUTANEOUS at 22:29

## 2019-10-08 RX ADMIN — CHLORHEXIDINE GLUCONATE 1 APPLICATION(S): 213 SOLUTION TOPICAL at 13:34

## 2019-10-08 RX ADMIN — LEVETIRACETAM 400 MILLIGRAM(S): 250 TABLET, FILM COATED ORAL at 11:12

## 2019-10-08 RX ADMIN — Medication 100 MILLIGRAM(S): at 22:30

## 2019-10-08 RX ADMIN — Medication 40 MILLIEQUIVALENT(S): at 17:50

## 2019-10-08 RX ADMIN — POLYETHYLENE GLYCOL 3350 17 GRAM(S): 17 POWDER, FOR SOLUTION ORAL at 17:51

## 2019-10-08 RX ADMIN — HEPARIN SODIUM 5000 UNIT(S): 5000 INJECTION INTRAVENOUS; SUBCUTANEOUS at 06:30

## 2019-10-08 RX ADMIN — Medication 1: at 06:32

## 2019-10-08 RX ADMIN — HEPARIN SODIUM 5000 UNIT(S): 5000 INJECTION INTRAVENOUS; SUBCUTANEOUS at 13:41

## 2019-10-08 RX ADMIN — LACOSAMIDE 130 MILLIGRAM(S): 50 TABLET ORAL at 17:54

## 2019-10-08 RX ADMIN — SENNA PLUS 10 MILLILITER(S): 8.6 TABLET ORAL at 22:30

## 2019-10-08 RX ADMIN — Medication 100 MILLIGRAM(S): at 06:31

## 2019-10-08 RX ADMIN — PANTOPRAZOLE SODIUM 40 MILLIGRAM(S): 20 TABLET, DELAYED RELEASE ORAL at 13:41

## 2019-10-08 RX ADMIN — LEVETIRACETAM 400 MILLIGRAM(S): 250 TABLET, FILM COATED ORAL at 20:40

## 2019-10-08 RX ADMIN — Medication 40 MILLIGRAM(S): at 06:31

## 2019-10-08 NOTE — SWALLOW BEDSIDE ASSESSMENT ADULT - NS SPL SWALLOW CLINIC TRIAL FT
absent response to utensil; absent response to ice chip-->  further trials deferred
additional trials deferred due to high risk for aspiration

## 2019-10-08 NOTE — PROGRESS NOTE ADULT - SUBJECTIVE AND OBJECTIVE BOX
Patient is a 84y old  Male who presents with a chief complaint of status epilepticus (07 Oct 2019 14:17)    SUBJECTIVE / OVERNIGHT EVENTS: no acute events overnight     MEDICATIONS  (STANDING):  chlorhexidine 4% Liquid 1 Application(s) Topical <User Schedule>  heparin  Injectable 5000 Unit(s) SubCutaneous every 8 hours  insulin lispro (HumaLOG) corrective regimen sliding scale   SubCutaneous every 6 hours  labetalol 100 milliGRAM(s) Oral every 8 hours  lacosamide IVPB 150 milliGRAM(s) IV Intermittent every 12 hours  levETIRAcetam  IVPB 1500 milliGRAM(s) IV Intermittent every 12 hours  pantoprazole  Injectable 40 milliGRAM(s) IV Push daily  polyethylene glycol 3350 17 Gram(s) Oral two times a day  predniSONE   Tablet 60  Oral   predniSONE   Tablet 40 milliGRAM(s) Oral daily  senna Syrup 10 milliLiter(s) Oral at bedtime    MEDICATIONS  (PRN):      Vital Signs Last 24 Hrs  T(C): 36.9 (08 Oct 2019 08:45), Max: 36.9 (08 Oct 2019 06:00)  T(F): 98.4 (08 Oct 2019 08:45), Max: 98.4 (08 Oct 2019 06:00)  HR: 72 (08 Oct 2019 08:45) (72 - 90)  BP: 112/71 (08 Oct 2019 08:45) (112/71 - 157/76)  BP(mean): --  RR: 18 (08 Oct 2019 08:45) (18 - 18)  SpO2: 100% (08 Oct 2019 08:45) (94% - 100%)  CAPILLARY BLOOD GLUCOSE      POCT Blood Glucose.: 245 mg/dL (08 Oct 2019 12:51)  POCT Blood Glucose.: 158 mg/dL (08 Oct 2019 06:06)  POCT Blood Glucose.: 115 mg/dL (08 Oct 2019 00:02)  POCT Blood Glucose.: 189 mg/dL (07 Oct 2019 19:03)    I&O's Summary    07 Oct 2019 07:01  -  08 Oct 2019 07:00  --------------------------------------------------------  IN: 0 mL / OUT: 800 mL / NET: -800 mL      PHYSICAL EXAM:  GENERAL: NAD  HEENT: NGT in place   EYES: conjunctiva and sclera clear  NECK: Supple, No JVD  CHEST/LUNG: Clear to auscultation bilaterally; No wheeze  HEART: +S1/S2, reg   ABDOMEN: Soft, Nontender, Nondistended  EXTREMITIES:  trace edema noted   PSYCH: Alert and awake, following commands     LABS:                        14.4   14.10 )-----------( 401      ( 07 Oct 2019 09:40 )             44.2     10-08    135  |  93<L>  |  32<H>  ----------------------------<  237<H>  3.4<L>   |  27  |  0.83    Ca    9.1      08 Oct 2019 09:53  Mg     1.5     10-07

## 2019-10-08 NOTE — SWALLOW BEDSIDE ASSESSMENT ADULT - SWALLOW EVAL: FUNCTIONAL LEVEL AT TIME OF EVAL
AA+Ox2 to person and 'hospital'; more verbal output today; fluent, latent responses and mild dysarthria
Lethargic; non verbal during this evaluation; inconsistently following simple commands; +NGT; +b/l hand mitts for safety

## 2019-10-08 NOTE — PROGRESS NOTE ADULT - PROBLEM SELECTOR PLAN 1
-ddx included status epilepticus, infarct HSV encephalitis, meningitis, hypoxic injury and CJD. likely viral vs. inflammatory based on CSF studies thus far.  - vEEG has consistently shown foci of epileptiform activity without seizure (~5 days)  - Neurology and ID following, recs appreciated  - keppra 2g q12h decreased to 1.5g q12h as per neuro recs  - c/w vimpat 150mg q12h  - was on decadron 10mg daily for inflammatory encephalopathy coverage, now on steroid taper, starting with  prednisone 60mg and decreasing by 20mg every 3 days  - f/u CSF 14-3-3, tau, neuron specific enolase, HTLV-1, JEANETTE virus, HSV 6, paraneoplastic panel      *Negative CSF studies so far:  CSF PCR, Bacterial cx, WNV, Toxo, Cryptococcus, HSV  - ESR/CRP 81/9.53, YANDEL and dsDNa negative - were checked to r/o autoimmune disease in setting of above MRI findings  -repeat MRI brain consistent with status   -failed speech and swallow eval x3, will keep NGT for now. Family is considering PEG.   -continue with current dose of AEDs   -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

## 2019-10-08 NOTE — SWALLOW BEDSIDE ASSESSMENT ADULT - SLP PERTINENT HISTORY OF CURRENT PROBLEM
84 year old male Hx HTN, GERD, prostate cancer who presented with seizure. On 9/20, pt fell out of bed and hit his head. He subsequently developed seizure like activity. Pt intubated by EMS for airway protection. Brought to OSH. Pt given ativan and versed in ED. CTH did not show acute processes (including bleed). MRI brain showed R frontal enhancement (potential seizure foci). 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 Labs were significant for WBC 13, lacate >10, BUN 29, and Cr 1.4. Spot EEG at OSH showed R focal seizure activity on 9/23. Pt was following commands and attempted to wean off propofol, however unsuccessful (pt started having seizures again). Pt transferred to [facility] MICU for vEEG and management of status epilepticus.

## 2019-10-08 NOTE — PROGRESS NOTE ADULT - ASSESSMENT
83 y/o man with hx of prostate CA s/o seed implant and RT, HTN, p/w sudden onset AMS seizure brought to [facility] . Was found to be in status epilepticus and required intubation for airway control. Pt transferred to [facility] for further workup and EEG monitoring with no further seizure activity extubated on 10/1 downgraded to medicine on 10/3:

## 2019-10-08 NOTE — SWALLOW BEDSIDE ASSESSMENT ADULT - SWALLOW EVAL: DIAGNOSIS
Pt continues to present with oropharyngeal dysphagia. Clear breath/vocal sounds at baseline however min pooling of saliva in oral cavity. Reduced ability to trigger volitional swallow. Multiple swallows suggestive of pharyngeal stasis and cough suggestive of laryngeal penetration/aspiration on tsp honey.

## 2019-10-08 NOTE — SWALLOW BEDSIDE ASSESSMENT ADULT - SLP GENERAL OBSERVATIONS
Pt is AA+Ox2; positioned upright in bed. Left arm in brace; right hand mitt; +NGT. Vocal quality/breath sounds clear.

## 2019-10-09 DIAGNOSIS — R13.10 DYSPHAGIA, UNSPECIFIED: ICD-10-CM

## 2019-10-09 LAB
ANION GAP SERPL CALC-SCNC: 15 MMOL/L — SIGNIFICANT CHANGE UP (ref 5–17)
BUN SERPL-MCNC: 30 MG/DL — HIGH (ref 7–23)
CALCIUM SERPL-MCNC: 8.9 MG/DL — SIGNIFICANT CHANGE UP (ref 8.4–10.5)
CHLORIDE SERPL-SCNC: 95 MMOL/L — LOW (ref 96–108)
CO2 SERPL-SCNC: 27 MMOL/L — SIGNIFICANT CHANGE UP (ref 22–31)
CREAT SERPL-MCNC: 0.8 MG/DL — SIGNIFICANT CHANGE UP (ref 0.5–1.3)
GLUCOSE BLDC GLUCOMTR-MCNC: 134 MG/DL — HIGH (ref 70–99)
GLUCOSE BLDC GLUCOMTR-MCNC: 161 MG/DL — HIGH (ref 70–99)
GLUCOSE BLDC GLUCOMTR-MCNC: 162 MG/DL — HIGH (ref 70–99)
GLUCOSE BLDC GLUCOMTR-MCNC: 190 MG/DL — HIGH (ref 70–99)
GLUCOSE BLDC GLUCOMTR-MCNC: 219 MG/DL — HIGH (ref 70–99)
GLUCOSE SERPL-MCNC: 180 MG/DL — HIGH (ref 70–99)
HCT VFR BLD CALC: 44.5 % — SIGNIFICANT CHANGE UP (ref 39–50)
HGB BLD-MCNC: 14.4 G/DL — SIGNIFICANT CHANGE UP (ref 13–17)
MCHC RBC-ENTMCNC: 30.8 PG — SIGNIFICANT CHANGE UP (ref 27–34)
MCHC RBC-ENTMCNC: 32.4 GM/DL — SIGNIFICANT CHANGE UP (ref 32–36)
MCV RBC AUTO: 95.3 FL — SIGNIFICANT CHANGE UP (ref 80–100)
PLATELET # BLD AUTO: 334 K/UL — SIGNIFICANT CHANGE UP (ref 150–400)
POTASSIUM SERPL-MCNC: 3.2 MMOL/L — LOW (ref 3.5–5.3)
POTASSIUM SERPL-SCNC: 3.2 MMOL/L — LOW (ref 3.5–5.3)
RBC # BLD: 4.67 M/UL — SIGNIFICANT CHANGE UP (ref 4.2–5.8)
RBC # FLD: 15.6 % — HIGH (ref 10.3–14.5)
SODIUM SERPL-SCNC: 137 MMOL/L — SIGNIFICANT CHANGE UP (ref 135–145)
WBC # BLD: 14.5 K/UL — HIGH (ref 3.8–10.5)
WBC # FLD AUTO: 14.5 K/UL — HIGH (ref 3.8–10.5)

## 2019-10-09 PROCEDURE — 99233 SBSQ HOSP IP/OBS HIGH 50: CPT

## 2019-10-09 PROCEDURE — 99222 1ST HOSP IP/OBS MODERATE 55: CPT

## 2019-10-09 RX ORDER — POTASSIUM CHLORIDE 20 MEQ
40 PACKET (EA) ORAL EVERY 4 HOURS
Refills: 0 | Status: COMPLETED | OUTPATIENT
Start: 2019-10-09 | End: 2019-10-09

## 2019-10-09 RX ORDER — LACTULOSE 10 G/15ML
10 SOLUTION ORAL
Refills: 0 | Status: COMPLETED | OUTPATIENT
Start: 2019-10-09 | End: 2019-10-09

## 2019-10-09 RX ADMIN — CHLORHEXIDINE GLUCONATE 1 APPLICATION(S): 213 SOLUTION TOPICAL at 10:01

## 2019-10-09 RX ADMIN — Medication 100 MILLIGRAM(S): at 22:22

## 2019-10-09 RX ADMIN — HEPARIN SODIUM 5000 UNIT(S): 5000 INJECTION INTRAVENOUS; SUBCUTANEOUS at 13:13

## 2019-10-09 RX ADMIN — LACTULOSE 10 GRAM(S): 10 SOLUTION ORAL at 13:22

## 2019-10-09 RX ADMIN — LEVETIRACETAM 400 MILLIGRAM(S): 250 TABLET, FILM COATED ORAL at 22:16

## 2019-10-09 RX ADMIN — SENNA PLUS 10 MILLILITER(S): 8.6 TABLET ORAL at 22:20

## 2019-10-09 RX ADMIN — HEPARIN SODIUM 5000 UNIT(S): 5000 INJECTION INTRAVENOUS; SUBCUTANEOUS at 22:22

## 2019-10-09 RX ADMIN — PANTOPRAZOLE SODIUM 40 MILLIGRAM(S): 20 TABLET, DELAYED RELEASE ORAL at 13:17

## 2019-10-09 RX ADMIN — HEPARIN SODIUM 5000 UNIT(S): 5000 INJECTION INTRAVENOUS; SUBCUTANEOUS at 06:11

## 2019-10-09 RX ADMIN — POLYETHYLENE GLYCOL 3350 17 GRAM(S): 17 POWDER, FOR SOLUTION ORAL at 06:11

## 2019-10-09 RX ADMIN — Medication 2: at 13:13

## 2019-10-09 RX ADMIN — Medication 40 MILLIEQUIVALENT(S): at 19:12

## 2019-10-09 RX ADMIN — LACTULOSE 10 GRAM(S): 10 SOLUTION ORAL at 22:21

## 2019-10-09 RX ADMIN — Medication 40 MILLIEQUIVALENT(S): at 13:30

## 2019-10-09 RX ADMIN — POLYETHYLENE GLYCOL 3350 17 GRAM(S): 17 POWDER, FOR SOLUTION ORAL at 19:12

## 2019-10-09 RX ADMIN — LACTULOSE 10 GRAM(S): 10 SOLUTION ORAL at 19:12

## 2019-10-09 RX ADMIN — Medication 1: at 06:21

## 2019-10-09 RX ADMIN — Medication 1: at 19:10

## 2019-10-09 RX ADMIN — LEVETIRACETAM 400 MILLIGRAM(S): 250 TABLET, FILM COATED ORAL at 10:02

## 2019-10-09 RX ADMIN — Medication 40 MILLIGRAM(S): at 06:11

## 2019-10-09 RX ADMIN — Medication 100 MILLIGRAM(S): at 06:11

## 2019-10-09 RX ADMIN — LACOSAMIDE 130 MILLIGRAM(S): 50 TABLET ORAL at 06:10

## 2019-10-09 RX ADMIN — LACOSAMIDE 130 MILLIGRAM(S): 50 TABLET ORAL at 18:54

## 2019-10-09 NOTE — PROGRESS NOTE ADULT - SUBJECTIVE AND OBJECTIVE BOX
Patient is a 84y old  Male who presents with a chief complaint of status epilepticus (08 Oct 2019 13:50)    SUBJECTIVE / OVERNIGHT EVENTS: no acute events overnight. Daughter is at bedside today. Patient denies any pain. Has been more alert when family is present.     MEDICATIONS  (STANDING):  chlorhexidine 4% Liquid 1 Application(s) Topical <User Schedule>  heparin  Injectable 5000 Unit(s) SubCutaneous every 8 hours  insulin lispro (HumaLOG) corrective regimen sliding scale   SubCutaneous every 6 hours  labetalol 100 milliGRAM(s) Oral every 8 hours  lacosamide IVPB 150 milliGRAM(s) IV Intermittent every 12 hours  levETIRAcetam  IVPB 1500 milliGRAM(s) IV Intermittent every 12 hours  pantoprazole  Injectable 40 milliGRAM(s) IV Push daily  polyethylene glycol 3350 17 Gram(s) Oral two times a day  predniSONE   Tablet 60  Oral   senna Syrup 10 milliLiter(s) Oral at bedtime    MEDICATIONS  (PRN):      Vital Signs Last 24 Hrs  T(C): 37 (09 Oct 2019 07:42), Max: 37 (09 Oct 2019 07:42)  T(F): 98.6 (09 Oct 2019 07:42), Max: 98.6 (09 Oct 2019 07:42)  HR: 71 (09 Oct 2019 07:42) (71 - 84)  BP: 114/70 (09 Oct 2019 07:42) (114/70 - 167/92)  BP(mean): --  RR: 18 (09 Oct 2019 07:42) (18 - 18)  SpO2: 98% (09 Oct 2019 07:42) (96% - 98%)  CAPILLARY BLOOD GLUCOSE      POCT Blood Glucose.: 161 mg/dL (09 Oct 2019 06:16)  POCT Blood Glucose.: 134 mg/dL (09 Oct 2019 00:00)  POCT Blood Glucose.: 207 mg/dL (08 Oct 2019 18:01)  POCT Blood Glucose.: 245 mg/dL (08 Oct 2019 12:51)    I&O's Summary    08 Oct 2019 07:01  -  09 Oct 2019 07:00  --------------------------------------------------------  IN: 0 mL / OUT: 300 mL / NET: -300 mL        PHYSICAL EXAM:  GENERAL: NAD  HEENT: NGT in place   EYES: conjunctiva and sclera clear  NECK: Supple, No JVD  CHEST/LUNG: Clear to auscultation bilaterally; No wheeze  HEART: +S1/S2, reg   ABDOMEN: Soft, Nontender, mildly distended   EXTREMITIES: trace edema present   PSYCH: AAOx1    LABS:                        14.4   14.50 )-----------( 334      ( 09 Oct 2019 08:15 )             44.5     10-09    137  |  95<L>  |  30<H>  ----------------------------<  180<H>  3.2<L>   |  27  |  0.80    Ca    8.9      09 Oct 2019 06:34

## 2019-10-09 NOTE — PROGRESS NOTE ADULT - PROBLEM SELECTOR PLAN 1
-failed speech and swallow eval x3, will keep NGT for now  -discussed with speech and swallow therapist today, pt has difficulty swallowing secretions as well  -to re-eval tomorrow  -family considering PEG

## 2019-10-09 NOTE — PROGRESS NOTE ADULT - PROBLEM SELECTOR PLAN 2
-ddx included status epilepticus, infarct HSV encephalitis, meningitis, hypoxic injury and CJD. likely viral vs. inflammatory based on CSF studies thus far.  - vEEG has consistently shown foci of epileptiform activity without seizure (~5 days)  - Neurology and ID following, recs appreciated  - keppra 2g q12h decreased to 1.5g q12h as per neuro recs  - c/w vimpat 150mg q12h  - was on decadron 10mg daily for inflammatory encephalopathy coverage, now on steroid taper, starting with  prednisone 60mg and decreasing by 20mg every 3 days  - f/u CSF 14-3-3, tau, neuron specific enolase, HTLV-1, JEANETTE virus, HSV 6, paraneoplastic panel      *Negative CSF studies so far:  CSF PCR, Bacterial cx, WNV, Toxo, Cryptococcus, HSV  - ESR/CRP 81/9.53, YANDEL and dsDNa negative - were checked to r/o autoimmune disease in setting of above MRI findings  -repeat MRI brain consistent with status   -continue with current dose of AEDs   -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

## 2019-10-09 NOTE — CONSULT NOTE ADULT - SUBJECTIVE AND OBJECTIVE BOX
HPI:  84 year old male Hx HTN, GERD, prostate cancer who presented with seizure.  On 9/20, patient fell out of bed and hit his head.  He subsequently developed seizure like activity.  Patient intubated by EMS for airway protection.  Patient given ativan and versed in ED.  Patient had CTH which did not show acute processes (including bleed).  Patient had brain MRI which showed R frontal enhancement (potential seizure foci).  Patient started on propofol gtt, locasamide and keppra.  Patient started on acyclovir for meningitis suspicion.  Patient's labs were significant for WBC 13, lacate >10, BUN 29, and Cr 1.4.  Patient had spot EEG at OSH which showed R focal seizure activity on 9/23.  Patient was following commands and then attempted to wean off propofol, however unsuccessful (patient started having seizures again).  Resumed prop and increased keppra dose.  Patient transferred to [facility] MICU for vEEG and management of status epilepticus. 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  Failed bedside swallow evaluation currently NPO.  Per spouse and daughter patient was independent with functional mobility and ADLs prior - enjoyed walking the mall and park.  He lives with wife in private house with stairs.        REVIEW OF SYSTEMS  mild headache  difficulty swallowing  unsteady  All other review of systems negative    PAST MEDICAL & SURGICAL HISTORY  Other seizure  No pertinent family history in first degree relatives  Handoff  MEWS Score  History of gastroesophageal reflux (GERD)  Hypertension  Prostate cancer  Hypokalemia  Dysphagia  Leukocytosis  Prophylactic measure  Prostate cancer  Hypertension  History of gastroesophageal reflux (GERD)  Seizure  Medication monitoring encounter  Fever  Respiratory failure  Abnormal cerebrospinal fluid  Encephalitis  PC (prostate cancer)  S/P cholecystectomy  Prostate cancer  OTHER SEIZURES      SOCIAL HISTORY  Smoking - Denied  EtOH - Denied   Drugs - Denied    FUNCTIONAL HISTORY  lives with spouse in  with 8 stairs to enter   Independent in ambulation, ADL's, transfers prior to hospitalization    CURRENT FUNCTIONAL STATUS  Max Assist     FAMILY HISTORY   Reviewed and non-contributory    ALLERGIES  No Known Allergies    VITALS  T(C): 37 (10-09-19 @ 07:42)  T(F): 98.6 (10-09-19 @ 07:42), Max: 98.6 (10-09-19 @ 07:42)  HR: 84 (10-09-19 @ 13:33) (71 - 84)  BP: 133/80 (10-09-19 @ 13:33) (114/70 - 167/92)  RR:  (18 - 18)  SpO2:  (96% - 98%)  Wt(kg): --    PHYSICAL EXAM  Constitutional - NAD, comfortable   HEENT - NCAT +NG tube  Neck - Supple  Chest - good respiratory effort   Cardiovascular - RRR, S1S2  Abdomen - BS+, Soft, NTND  Extremities - No C/C/E, No calf tenderness   Neurologic Exam -                    Cognitive - Awake, Alert, Oriented to self not to date or place or condition      Communication - Fluent     Cranial Nerves - CN 2-12 grossly intact     Motor -                     LEFT    UE - 0/5                    RIGHT UE - 4+/5                    LEFT    LE - 2/5                    RIGHT LE -   4+/5     Sensory - Intact to light touch diffusely     Reflexes - DTR intact and symmetrical,   Psychiatric - Affect WNL    RECENT LABS/IMAGING                        14.4   14.50 )-----------( 334      ( 09 Oct 2019 08:15 )             44.5     10-09    137  |  95<L>  |  30<H>  ----------------------------<  180<H>  3.2<L>   |  27  |  0.80    Ca    8.9      09 Oct 2019 06:34        MEDICATIONS   MEDICATIONS  (STANDING):  chlorhexidine 4% Liquid 1 Application(s) Topical <User Schedule>  heparin  Injectable 5000 Unit(s) SubCutaneous every 8 hours  insulin lispro (HumaLOG) corrective regimen sliding scale   SubCutaneous every 6 hours  labetalol 100 milliGRAM(s) Oral every 8 hours  lacosamide IVPB 150 milliGRAM(s) IV Intermittent every 12 hours  lactulose Syrup 10 Gram(s) Oral every 3 hours  levETIRAcetam  IVPB 1500 milliGRAM(s) IV Intermittent every 12 hours  pantoprazole  Injectable 40 milliGRAM(s) IV Push daily  polyethylene glycol 3350 17 Gram(s) Oral two times a day  potassium chloride   Solution 40 milliEquivalent(s) Oral every 4 hours  predniSONE   Tablet 60  Oral   senna Syrup 10 milliLiter(s) Oral at bedtime    MEDICATIONS  (PRN):      ASSESSMENT/PLAN  83 y/o M with functional, gait, 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    PT - ROM, Bed mobility, Transfers, Ambulation with assistive device  OT - ADLs, ROM  SLP - Dysphagia eval and treat  Precautions - Falls, Cardiac HPI:  84 year old male Hx HTN, GERD, prostate cancer who presented with seizure.  On 9/20, patient fell out of bed and hit his head.  He subsequently developed seizure like activity.  Patient intubated by EMS for airway protection.  Patient given ativan and versed in ED.  Patient had CTH which did not show acute processes (including bleed).  Patient had brain MRI which showed R frontal enhancement (potential seizure foci).  Patient started on propofol gtt, locasamide and keppra.  Patient started on acyclovir for meningitis suspicion.  Patient's labs were significant for WBC 13, lacate >10, BUN 29, and Cr 1.4.  Patient had spot EEG at OSH which showed R focal seizure activity on 9/23.  Patient was following commands and then attempted to wean off propofol, however unsuccessful (patient started having seizures again).  Resumed prop and increased keppra dose.  Patient transferred to [facility] MICU for vEEG and management of status epilepticus. 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  Failed bedside swallow evaluation currently NPO.  Per spouse and daughter patient was independent with functional mobility and ADLs prior - enjoyed walking the mall and park.  He lives with wife in private house with stairs.        REVIEW OF SYSTEMS  mild headache  difficulty swallowing  unsteady  All other review of systems negative    PAST MEDICAL & SURGICAL HISTORY  Other seizure  No pertinent family history in first degree relatives  Handoff  MEWS Score  History of gastroesophageal reflux (GERD)  Hypertension  Prostate cancer  Hypokalemia  Dysphagia  Leukocytosis  Prophylactic measure  Prostate cancer  Hypertension  History of gastroesophageal reflux (GERD)  Seizure  Medication monitoring encounter  Fever  Respiratory failure  Abnormal cerebrospinal fluid  Encephalitis  PC (prostate cancer)  S/P cholecystectomy  Prostate cancer  OTHER SEIZURES      SOCIAL HISTORY  Smoking - Denied  EtOH - Denied   Drugs - Denied    FUNCTIONAL HISTORY  lives with spouse in  with 8 stairs to enter   Independent in ambulation, ADL's, transfers prior to hospitalization    CURRENT FUNCTIONAL STATUS  Max Assist     FAMILY HISTORY   Reviewed and non-contributory    ALLERGIES  No Known Allergies    VITALS  T(C): 37 (10-09-19 @ 07:42)  T(F): 98.6 (10-09-19 @ 07:42), Max: 98.6 (10-09-19 @ 07:42)  HR: 84 (10-09-19 @ 13:33) (71 - 84)  BP: 133/80 (10-09-19 @ 13:33) (114/70 - 167/92)  RR:  (18 - 18)  SpO2:  (96% - 98%)  Wt(kg): --    PHYSICAL EXAM  Constitutional - NAD, comfortable   HEENT - NCAT +NG tube  Neck - Supple  Chest - good respiratory effort   Cardiovascular - RRR, S1S2  Abdomen - BS+, Soft, NTND  Extremities - No C/C/E, No calf tenderness   Neurologic Exam -                    Cognitive - Awake, Alert, Oriented to self not to date or place or condition      Communication - Fluent     Cranial Nerves - CN 2-12 grossly intact     Motor -                     LEFT    UE - 0/5                    RIGHT UE - 4+/5                    LEFT    LE - 2/5                    RIGHT LE -   4+/5     Sensory - Intact to light touch diffusely     Reflexes - DTR intact and symmetrical,   Psychiatric - Affect WNL    RECENT LABS/IMAGING                        14.4   14.50 )-----------( 334      ( 09 Oct 2019 08:15 )             44.5     10-09    137  |  95<L>  |  30<H>  ----------------------------<  180<H>  3.2<L>   |  27  |  0.80    Ca    8.9      09 Oct 2019 06:34        MEDICATIONS   MEDICATIONS  (STANDING):  chlorhexidine 4% Liquid 1 Application(s) Topical <User Schedule>  heparin  Injectable 5000 Unit(s) SubCutaneous every 8 hours  insulin lispro (HumaLOG) corrective regimen sliding scale   SubCutaneous every 6 hours  labetalol 100 milliGRAM(s) Oral every 8 hours  lacosamide IVPB 150 milliGRAM(s) IV Intermittent every 12 hours  lactulose Syrup 10 Gram(s) Oral every 3 hours  levETIRAcetam  IVPB 1500 milliGRAM(s) IV Intermittent every 12 hours  pantoprazole  Injectable 40 milliGRAM(s) IV Push daily  polyethylene glycol 3350 17 Gram(s) Oral two times a day  potassium chloride   Solution 40 milliEquivalent(s) Oral every 4 hours  predniSONE   Tablet 60  Oral   senna Syrup 10 milliLiter(s) Oral at bedtime    MEDICATIONS  (PRN):      ASSESSMENT/PLAN  85 y/o M with functional, gait, ADL impairments related to encephalitis, possible viral, and seizures    Disposition - we had a discussion regarding the family regarding disposition. Currently pending medically stability and has more permanent form of nutrition   - at this time, recommend SUBACUTE REHAB to address current functional deficit, consider re-evaluation in 2-3 days if not discharged at that time  PT - ROM, Bed mobility, Transfers, Ambulation with assistive device  OT - ADLs, ROM  SLP - Dysphagia eval and treat  Precautions - Falls, Cardiac

## 2019-10-09 NOTE — PROGRESS NOTE ADULT - ASSESSMENT
85 y/o man with hx of prostate CA s/o seed implant and RT, HTN, p/w sudden onset AMS seizure brought to [facility] . Was found to be in status epilepticus and required intubation for airway control. Pt transferred to [facility] for further workup and EEG monitoring with no further seizure activity extubated on 10/1 downgraded to medicine on 10/3:

## 2019-10-10 LAB
ANION GAP SERPL CALC-SCNC: 12 MMOL/L — SIGNIFICANT CHANGE UP (ref 5–17)
ANION GAP SERPL CALC-SCNC: 15 MMOL/L — SIGNIFICANT CHANGE UP (ref 5–17)
ANION GAP SERPL CALC-SCNC: 15 MMOL/L — SIGNIFICANT CHANGE UP (ref 5–17)
BUN SERPL-MCNC: 29 MG/DL — HIGH (ref 7–23)
BUN SERPL-MCNC: 30 MG/DL — HIGH (ref 7–23)
BUN SERPL-MCNC: 30 MG/DL — HIGH (ref 7–23)
CALCIUM SERPL-MCNC: 9 MG/DL — SIGNIFICANT CHANGE UP (ref 8.4–10.5)
CALCIUM SERPL-MCNC: 9.3 MG/DL — SIGNIFICANT CHANGE UP (ref 8.4–10.5)
CALCIUM SERPL-MCNC: 9.4 MG/DL — SIGNIFICANT CHANGE UP (ref 8.4–10.5)
CHLORIDE SERPL-SCNC: 94 MMOL/L — LOW (ref 96–108)
CHLORIDE SERPL-SCNC: 95 MMOL/L — LOW (ref 96–108)
CHLORIDE SERPL-SCNC: 97 MMOL/L — SIGNIFICANT CHANGE UP (ref 96–108)
CO2 SERPL-SCNC: 26 MMOL/L — SIGNIFICANT CHANGE UP (ref 22–31)
CO2 SERPL-SCNC: 27 MMOL/L — SIGNIFICANT CHANGE UP (ref 22–31)
CO2 SERPL-SCNC: 27 MMOL/L — SIGNIFICANT CHANGE UP (ref 22–31)
CREAT SERPL-MCNC: 0.81 MG/DL — SIGNIFICANT CHANGE UP (ref 0.5–1.3)
CREAT SERPL-MCNC: 0.83 MG/DL — SIGNIFICANT CHANGE UP (ref 0.5–1.3)
CREAT SERPL-MCNC: 0.84 MG/DL — SIGNIFICANT CHANGE UP (ref 0.5–1.3)
GLUCOSE BLDC GLUCOMTR-MCNC: 156 MG/DL — HIGH (ref 70–99)
GLUCOSE BLDC GLUCOMTR-MCNC: 158 MG/DL — HIGH (ref 70–99)
GLUCOSE BLDC GLUCOMTR-MCNC: 168 MG/DL — HIGH (ref 70–99)
GLUCOSE BLDC GLUCOMTR-MCNC: 182 MG/DL — HIGH (ref 70–99)
GLUCOSE BLDC GLUCOMTR-MCNC: 219 MG/DL — HIGH (ref 70–99)
GLUCOSE SERPL-MCNC: 172 MG/DL — HIGH (ref 70–99)
GLUCOSE SERPL-MCNC: 186 MG/DL — HIGH (ref 70–99)
GLUCOSE SERPL-MCNC: 219 MG/DL — HIGH (ref 70–99)
HCT VFR BLD CALC: 40.6 % — SIGNIFICANT CHANGE UP (ref 39–50)
HGB BLD-MCNC: 13.6 G/DL — SIGNIFICANT CHANGE UP (ref 13–17)
MCHC RBC-ENTMCNC: 31.9 PG — SIGNIFICANT CHANGE UP (ref 27–34)
MCHC RBC-ENTMCNC: 33.5 GM/DL — SIGNIFICANT CHANGE UP (ref 32–36)
MCV RBC AUTO: 95.1 FL — SIGNIFICANT CHANGE UP (ref 80–100)
PLATELET # BLD AUTO: 305 K/UL — SIGNIFICANT CHANGE UP (ref 150–400)
POTASSIUM SERPL-MCNC: 2.7 MMOL/L — CRITICAL LOW (ref 3.5–5.3)
POTASSIUM SERPL-MCNC: 2.8 MMOL/L — CRITICAL LOW (ref 3.5–5.3)
POTASSIUM SERPL-MCNC: 4.4 MMOL/L — SIGNIFICANT CHANGE UP (ref 3.5–5.3)
POTASSIUM SERPL-SCNC: 2.7 MMOL/L — CRITICAL LOW (ref 3.5–5.3)
POTASSIUM SERPL-SCNC: 2.8 MMOL/L — CRITICAL LOW (ref 3.5–5.3)
POTASSIUM SERPL-SCNC: 4.4 MMOL/L — SIGNIFICANT CHANGE UP (ref 3.5–5.3)
RBC # BLD: 4.27 M/UL — SIGNIFICANT CHANGE UP (ref 4.2–5.8)
RBC # FLD: 15.8 % — HIGH (ref 10.3–14.5)
SODIUM SERPL-SCNC: 135 MMOL/L — SIGNIFICANT CHANGE UP (ref 135–145)
SODIUM SERPL-SCNC: 136 MMOL/L — SIGNIFICANT CHANGE UP (ref 135–145)
SODIUM SERPL-SCNC: 137 MMOL/L — SIGNIFICANT CHANGE UP (ref 135–145)
WBC # BLD: 14.67 K/UL — HIGH (ref 3.8–10.5)
WBC # FLD AUTO: 14.67 K/UL — HIGH (ref 3.8–10.5)

## 2019-10-10 PROCEDURE — 99233 SBSQ HOSP IP/OBS HIGH 50: CPT

## 2019-10-10 RX ORDER — POTASSIUM CHLORIDE 20 MEQ
40 PACKET (EA) ORAL EVERY 4 HOURS
Refills: 0 | Status: COMPLETED | OUTPATIENT
Start: 2019-10-10 | End: 2019-10-10

## 2019-10-10 RX ADMIN — Medication 1: at 18:58

## 2019-10-10 RX ADMIN — Medication 40 MILLIEQUIVALENT(S): at 17:19

## 2019-10-10 RX ADMIN — HEPARIN SODIUM 5000 UNIT(S): 5000 INJECTION INTRAVENOUS; SUBCUTANEOUS at 21:56

## 2019-10-10 RX ADMIN — LACOSAMIDE 130 MILLIGRAM(S): 50 TABLET ORAL at 06:34

## 2019-10-10 RX ADMIN — Medication 1: at 06:50

## 2019-10-10 RX ADMIN — CHLORHEXIDINE GLUCONATE 1 APPLICATION(S): 213 SOLUTION TOPICAL at 12:05

## 2019-10-10 RX ADMIN — Medication 1: at 00:43

## 2019-10-10 RX ADMIN — Medication 40 MILLIEQUIVALENT(S): at 11:31

## 2019-10-10 RX ADMIN — HEPARIN SODIUM 5000 UNIT(S): 5000 INJECTION INTRAVENOUS; SUBCUTANEOUS at 06:35

## 2019-10-10 RX ADMIN — Medication 2: at 13:13

## 2019-10-10 RX ADMIN — Medication 20 MILLIGRAM(S): at 06:05

## 2019-10-10 RX ADMIN — SENNA PLUS 10 MILLILITER(S): 8.6 TABLET ORAL at 21:55

## 2019-10-10 RX ADMIN — HEPARIN SODIUM 5000 UNIT(S): 5000 INJECTION INTRAVENOUS; SUBCUTANEOUS at 13:14

## 2019-10-10 RX ADMIN — LACOSAMIDE 130 MILLIGRAM(S): 50 TABLET ORAL at 17:19

## 2019-10-10 RX ADMIN — POLYETHYLENE GLYCOL 3350 17 GRAM(S): 17 POWDER, FOR SOLUTION ORAL at 06:04

## 2019-10-10 RX ADMIN — LEVETIRACETAM 400 MILLIGRAM(S): 250 TABLET, FILM COATED ORAL at 13:14

## 2019-10-10 RX ADMIN — PANTOPRAZOLE SODIUM 40 MILLIGRAM(S): 20 TABLET, DELAYED RELEASE ORAL at 11:35

## 2019-10-10 RX ADMIN — Medication 100 MILLIGRAM(S): at 06:05

## 2019-10-10 NOTE — CONSULT NOTE ADULT - ASSESSMENT
84y with dysphagia, pending indirect laryngoscopy 84y with dysphagia, decreased sensation, pooling of secretions and aspiration noted on indirect laryngoscopy

## 2019-10-10 NOTE — SWALLOW BEDSIDE ASSESSMENT ADULT - SLP GENERAL OBSERVATIONS
Pt seen at bedside, asleep, lethargic, arousable with difficulty. Pt maintaining alertness for brief periods with cues. Pt intermittently verbally responsive, intermittently following directions for the purposes of the exam. As per d/w RN and Pt's daughter, Pt was much more alert and interactive this morning, until shortly before my arrival. Pt seen at bedside, awake and alert, OOB in chair. Pt verbally responsive, mostly following directions for the purposes of the exam. Persistent intermittent wet/gurgly vocal quality at baseline.

## 2019-10-10 NOTE — SWALLOW BEDSIDE ASSESSMENT ADULT - PHARYNGEAL PHASE
Throat clear post oral intake/Delayed pharyngeal swallow/Decreased laryngeal elevation/Wet vocal quality post oral intake/Cough post oral intake/inconsistently required cues to swallow.

## 2019-10-10 NOTE — CONSULT NOTE ADULT - SUBJECTIVE AND OBJECTIVE BOX
CC: dysphagia     HPI: 85 y/o man with hx of prostate CA s/o seed implant and RT, HTN, p/w sudden onset AMS seizure brought to [facility] . Was found to be in status epilepticus and required intubation for airway control. Pt transferred to [facility] for further workup and EEG monitoring with no further seizure activity extubated on 10/1 downgraded to medicine on 10/3: ENT called for dysphagia and failed MBS. Pt denies any odynophagia, dysphonia, sob, dyspnea, changes in voice or inability to tolerated secretions       PAST MEDICAL & SURGICAL HISTORY:  History of gastroesophageal reflux (GERD)  Hypertension  Prostate cancer: had seed implant &amp; radiation ( 2001 )  PC (prostate cancer)  S/P cholecystectomy    Allergies    No Known Allergies    Intolerances      MEDICATIONS  (STANDING):  chlorhexidine 4% Liquid 1 Application(s) Topical <User Schedule>  heparin  Injectable 5000 Unit(s) SubCutaneous every 8 hours  insulin lispro (HumaLOG) corrective regimen sliding scale   SubCutaneous every 6 hours  labetalol 100 milliGRAM(s) Oral every 8 hours  lacosamide IVPB 150 milliGRAM(s) IV Intermittent every 12 hours  levETIRAcetam  IVPB 1500 milliGRAM(s) IV Intermittent every 12 hours  pantoprazole  Injectable 40 milliGRAM(s) IV Push daily  polyethylene glycol 3350 17 Gram(s) Oral two times a day  predniSONE   Tablet 60  Oral   predniSONE   Tablet 20 milliGRAM(s) Oral daily  senna Syrup 10 milliLiter(s) Oral at bedtime    MEDICATIONS  (PRN):      Social History: no tobacco, no etoh     Family history: Pt denies any sign FHx    ROS:   ENT: all negative except as noted in HPI   CV: denies palpitations  Pulm: denies SOB, cough, hemoptysis  GI: denies change in apetite, indigestion, n/v  : denies pertinent urinary symptoms, urgency  Neuro: denies numbness/tingling, loss of sensation  Psych: denies anxiety  MS: denies muscle weakness, instability  Heme: denies easy bruising or bleeding  Endo: denies heat/cold intolerance, excessive sweating  Vascular: denies LE edema    Vital Signs Last 24 Hrs  T(C): 36.3 (10 Oct 2019 13:30), Max: 36.6 (10 Oct 2019 08:53)  T(F): 97.3 (10 Oct 2019 13:30), Max: 97.9 (10 Oct 2019 08:53)  HR: 70 (10 Oct 2019 13:30) (70 - 100)  BP: 132/72 (10 Oct 2019 13:30) (104/71 - 132/72)  BP(mean): --  RR: 18 (10 Oct 2019 13:30) (18 - 18)  SpO2: 95% (10 Oct 2019 13:30) (93% - 96%)                          13.6   14.67 )-----------( 305      ( 10 Oct 2019 08:47 )             40.6    10-10    135  |  94<L>  |  30<H>  ----------------------------<  219<H>  2.7<LL>   |  26  |  0.84    Ca    9.0      10 Oct 2019 09:58         PHYSICAL EXAM:  Gen: NAD  Skin: No rashes, bruises, or lesions  Head: Normocephalic, Atraumatic  Face: no edema, erythema, or fluctuance. Parotid glands soft without mass  Eyes: no scleral injection  Nose: Nares bilaterally patent, no discharge  Mouth: No Stridor / Drooling / Trismus.  Mucosa moist, tongue/uvula midline, oropharynx clear  Neck: Flat, supple, no lymphadenopathy, trachea midline, no masses  Lymphatic: No lymphadenopathy  Resp: breathing easily, no stridor  CV: no peripheral edema/cyanosis  GI: nondistended   Peripheral vascular: no JVD or edema  Neuro: facial nerve intact, no facial droop      Fiberoptic Indirect laryngoscopy:  (Scope #2 used) pending CC: dysphagia     HPI: 85 y/o man with hx of prostate CA s/o seed implant and RT, HTN, p/w sudden onset AMS seizure brought to [facility] . Was found to be in status epilepticus and required intubation for airway control. Pt transferred to [facility] for further workup and EEG monitoring with no further seizure activity extubated on 10/1 downgraded to medicine on 10/3: ENT called for dysphagia and failed MBS. Pt denies any odynophagia, dysphonia, sob, dyspnea, changes in voice or inability to tolerated secretions       PAST MEDICAL & SURGICAL HISTORY:  History of gastroesophageal reflux (GERD)  Hypertension  Prostate cancer: had seed implant &amp; radiation ( 2001 )  PC (prostate cancer)  S/P cholecystectomy    Allergies    No Known Allergies    Intolerances      MEDICATIONS  (STANDING):  chlorhexidine 4% Liquid 1 Application(s) Topical <User Schedule>  heparin  Injectable 5000 Unit(s) SubCutaneous every 8 hours  insulin lispro (HumaLOG) corrective regimen sliding scale   SubCutaneous every 6 hours  labetalol 100 milliGRAM(s) Oral every 8 hours  lacosamide IVPB 150 milliGRAM(s) IV Intermittent every 12 hours  levETIRAcetam  IVPB 1500 milliGRAM(s) IV Intermittent every 12 hours  pantoprazole  Injectable 40 milliGRAM(s) IV Push daily  polyethylene glycol 3350 17 Gram(s) Oral two times a day  predniSONE   Tablet 60  Oral   predniSONE   Tablet 20 milliGRAM(s) Oral daily  senna Syrup 10 milliLiter(s) Oral at bedtime    MEDICATIONS  (PRN):      Social History: no tobacco, no etoh     Family history: Pt denies any sign FHx    ROS:   ENT: all negative except as noted in HPI   CV: denies palpitations  Pulm: denies SOB, cough, hemoptysis  GI: denies change in apetite, indigestion, n/v  : denies pertinent urinary symptoms, urgency  Neuro: denies numbness/tingling, loss of sensation  Psych: denies anxiety  MS: denies muscle weakness, instability  Heme: denies easy bruising or bleeding  Endo: denies heat/cold intolerance, excessive sweating  Vascular: denies LE edema    Vital Signs Last 24 Hrs  T(C): 36.3 (10 Oct 2019 13:30), Max: 36.6 (10 Oct 2019 08:53)  T(F): 97.3 (10 Oct 2019 13:30), Max: 97.9 (10 Oct 2019 08:53)  HR: 70 (10 Oct 2019 13:30) (70 - 100)  BP: 132/72 (10 Oct 2019 13:30) (104/71 - 132/72)  BP(mean): --  RR: 18 (10 Oct 2019 13:30) (18 - 18)  SpO2: 95% (10 Oct 2019 13:30) (93% - 96%)                          13.6   14.67 )-----------( 305      ( 10 Oct 2019 08:47 )             40.6    10-10    135  |  94<L>  |  30<H>  ----------------------------<  219<H>  2.7<LL>   |  26  |  0.84    Ca    9.0      10 Oct 2019 09:58         PHYSICAL EXAM:  Gen: NAD  Skin: No rashes, bruises, or lesions  Head: Normocephalic, Atraumatic  Face: no edema, erythema, or fluctuance. Parotid glands soft without mass  Eyes: no scleral injection  Nose: Nares bilaterally patent, no discharge  Mouth: No Stridor / Drooling / Trismus.  Mucosa moist, tongue/uvula midline, oropharynx clear  Neck: Flat, supple, no lymphadenopathy, trachea midline, no masses  Lymphatic: No lymphadenopathy  Resp: breathing easily, no stridor  CV: no peripheral edema/cyanosis  GI: nondistended   Peripheral vascular: no JVD or edema  Neuro: facial nerve intact, no facial droop      Fiberoptic Indirect laryngoscopy:  (Scope #2 used) Reason for Laryngoscopy:    Patient was unable to cooperate with mirror.  Nasopharynx, oropharynx, and hypopharynx clear, no bleeding. Tongue base, posterior pharyngeal wall, vallecula, epiglottis, and subglottis appear normal. No erythema, edema, masses or lesions. Airway patent, no foreign body visualized. No glottic/supraglottic edema. True vocal cords, arytenoids, vestibular folds, ventricles, pyriform sinuses, and aryepiglottic folds appear normal bilaterally. Vocal cords mobile with good contact b/l. .decreased sensation, pooling of secretions and aspiration noted. CC: dysphagia     HPI: 83 y/o man with hx of prostate CA s/o seed implant and RT, HTN, p/w sudden onset AMS seizure brought to [facility] . Was found to be in status epilepticus and required intubation for airway control. Pt transferred to [facility] for further workup and EEG monitoring with no further seizure activity extubated on 10/1 downgraded to medicine on 10/3: ENT called for dysphagia and failed MBS. Pt denies any odynophagia, dysphonia, sob, dyspnea, changes in voice or inability to tolerated secretions       PAST MEDICAL & SURGICAL HISTORY:  History of gastroesophageal reflux (GERD)  Hypertension  Prostate cancer: had seed implant &amp; radiation ( 2001 )  PC (prostate cancer)  S/P cholecystectomy    Allergies    No Known Allergies    Intolerances      MEDICATIONS  (STANDING):  chlorhexidine 4% Liquid 1 Application(s) Topical <User Schedule>  heparin  Injectable 5000 Unit(s) SubCutaneous every 8 hours  insulin lispro (HumaLOG) corrective regimen sliding scale   SubCutaneous every 6 hours  labetalol 100 milliGRAM(s) Oral every 8 hours  lacosamide IVPB 150 milliGRAM(s) IV Intermittent every 12 hours  levETIRAcetam  IVPB 1500 milliGRAM(s) IV Intermittent every 12 hours  pantoprazole  Injectable 40 milliGRAM(s) IV Push daily  polyethylene glycol 3350 17 Gram(s) Oral two times a day  predniSONE   Tablet 60  Oral   predniSONE   Tablet 20 milliGRAM(s) Oral daily  senna Syrup 10 milliLiter(s) Oral at bedtime    MEDICATIONS  (PRN):      Social History: no tobacco, no etoh     Family history: Pt denies any sign FHx    ROS:   ENT: all negative except as noted in HPI   CV: denies palpitations  Pulm: denies SOB, cough, hemoptysis  GI: denies change in apetite, indigestion, n/v  : denies pertinent urinary symptoms, urgency  Neuro: denies numbness/tingling, loss of sensation  Psych: denies anxiety  MS: denies muscle weakness, instability  Heme: denies easy bruising or bleeding  Endo: denies heat/cold intolerance, excessive sweating  Vascular: denies LE edema    Vital Signs Last 24 Hrs  T(C): 36.3 (10 Oct 2019 13:30), Max: 36.6 (10 Oct 2019 08:53)  T(F): 97.3 (10 Oct 2019 13:30), Max: 97.9 (10 Oct 2019 08:53)  HR: 70 (10 Oct 2019 13:30) (70 - 100)  BP: 132/72 (10 Oct 2019 13:30) (104/71 - 132/72)  BP(mean): --  RR: 18 (10 Oct 2019 13:30) (18 - 18)  SpO2: 95% (10 Oct 2019 13:30) (93% - 96%)                          13.6   14.67 )-----------( 305      ( 10 Oct 2019 08:47 )             40.6    10-10    135  |  94<L>  |  30<H>  ----------------------------<  219<H>  2.7<LL>   |  26  |  0.84    Ca    9.0      10 Oct 2019 09:58         PHYSICAL EXAM:  Gen: NAD  Skin: No rashes, bruises, or lesions  Head: Normocephalic, Atraumatic  Face: no edema, erythema, or fluctuance. Parotid glands soft without mass  Eyes: no scleral injection  Nose: Nares bilaterally patent, no discharge, R NG tube in place   Mouth: No Stridor / Drooling / Trismus.  Mucosa moist, tongue/uvula midline, oropharynx clear  Neck: Flat, supple, no lymphadenopathy, trachea midline, no masses  Lymphatic: No lymphadenopathy  Resp: breathing easily, no stridor  CV: no peripheral edema/cyanosis  GI: nondistended   Peripheral vascular: no JVD or edema  Neuro: facial nerve intact, no facial droop      Fiberoptic Indirect laryngoscopy:  (Scope #2 used) Reason for Laryngoscopy:    Patient was unable to cooperate with mirror.  Nasopharynx, oropharynx, and hypopharynx clear, no bleeding. Tongue base, posterior pharyngeal wall, vallecula, epiglottis, and subglottis appear normal. No erythema, edema, masses or lesions. Airway patent, no foreign body visualized. No glottic/supraglottic edema. True vocal cords, arytenoids, vestibular folds, ventricles, pyriform sinuses, and aryepiglottic folds appear normal bilaterally. Vocal cords mobile with good contact b/l. .decreased sensation, pooling of secretions and aspiration noted.

## 2019-10-10 NOTE — SWALLOW BEDSIDE ASSESSMENT ADULT - SWALLOW EVAL: PATIENT/FAMILY GOALS STATEMENT
Pt unable to provide. Pt's daughter denies prior h/o dysphagia. From prior exam: Pt unable to provide. Pt's daughter denies prior h/o dysphagia.

## 2019-10-10 NOTE — CONSULT NOTE ADULT - PROBLEM SELECTOR RECOMMENDATION 9
- fu S&S recs  - no further ent intervention at this time.
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

## 2019-10-10 NOTE — SWALLOW BEDSIDE ASSESSMENT ADULT - SLP PERTINENT HISTORY OF CURRENT PROBLEM
84 year old male Hx HTN, GERD, prostate cancer who presented with seizure. On 9/20, pt fell out of bed and hit his head. He subsequently developed seizure like activity. Pt intubated by EMS for airway protection. Brought to OSH. Pt given ativan and versed in ED. CTH did not show acute processes (including bleed). MRI brain showed R frontal enhancement (potential seizure foci). 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 Labs were significant for WBC 13, lacate >10, BUN 29, and Cr 1.4. Spot EEG at OSH showed R focal seizure activity on 9/23. Pt was following commands and attempted to wean off propofol, however unsuccessful (pt started having seizures again). Pt transferred to [facility] MICU for vEEG and management of status epilepticus.

## 2019-10-10 NOTE — SWALLOW BEDSIDE ASSESSMENT ADULT - ADDITIONAL RECOMMENDATIONS
Maintain good oral hygiene.   This service will continue to follow. Will attempt reassessment when Pt more alert. Maintain good oral hygiene.   This service will remain available for reassessment pending further Neuro workup and improvement in secretion management.

## 2019-10-10 NOTE — CHART NOTE - NSCHARTNOTEFT_GEN_A_CORE
Would repeat MRI brain w/wo to assess resolution of enhancement of MRI to further clarify diagnosis. awaiting 14-3-3 results. Discussed with primary team. We will follow with you.

## 2019-10-10 NOTE — SWALLOW BEDSIDE ASSESSMENT ADULT - SWALLOW EVAL: DIAGNOSIS
85 yo male admitted s/p fall with status epilepticus, requiring intubation, with recurrent Sz activity, currently presents with evidence of oropharyngeal dysphagia superimposed upon reduced mental status. Pt with fluctuating alertness with evidence of reduced secretion management with wet/gurgly vocal quality, that he was eventually able to clear with cued coughs, and overt s/s laryngeal penetration/aspiration with minimal trials of conservative textures. 85 yo male admitted s/p fall with status epilepticus, requiring intubation, with recurrent Sz activity, undergoing neuro and infectious work-up, currently presents with evidence of oropharyngeal dysphagia superimposed upon reduced mental status. Pt with fluctuating alertness with evidence of reduced secretion management with wet/gurgly vocal quality, with difficulty clearing with cued coughs, and overt s/s laryngeal penetration/aspiration with minimal trials of conservative textures.

## 2019-10-10 NOTE — PROGRESS NOTE ADULT - PROBLEM SELECTOR PLAN 1
-failed speech and swallow eval x3, will keep NGT for now  -discussed with speech and swallow therapist , pt has difficulty swallowing secretions as well  -to re-eval today  -family considering PEG

## 2019-10-10 NOTE — SWALLOW BEDSIDE ASSESSMENT ADULT - NS ASR SWALLOW FINDINGS DISCUS
Nursing/RN Eden; NP Viktoriya; Pt's daughter at bedside/Patient/Family RN Mundo; NP Jenelle; Pt's wife at bedside/Nursing/Patient/Family

## 2019-10-10 NOTE — SWALLOW BEDSIDE ASSESSMENT ADULT - SWALLOW EVAL: SECRETION MANAGEMENT
Pt with evidence of reduced secretion management notable for wet/gurgly vocal quality at baseline, which he was eventually able to clear with cued cough, given repeated cues and models. Pt with evidence of reduced secretion management notable for wet/gurgly vocal quality at baseline, with difficulty clearing via cued cough.

## 2019-10-10 NOTE — PROGRESS NOTE ADULT - ASSESSMENT
85 y/o man with hx of prostate CA s/o seed implant and RT, HTN, p/w sudden onset AMS seizure brought to [facility] . Was found to be in status epilepticus and required intubation for airway control. Pt transferred to [facility] for further workup and EEG monitoring with no further seizure activity extubated on 10/1 downgraded to medicine on 10/3:

## 2019-10-10 NOTE — PROGRESS NOTE ADULT - SUBJECTIVE AND OBJECTIVE BOX
Patient is a 84y old  Male who presents with a chief complaint of status epilepticus (09 Oct 2019 14:19)    SUBJECTIVE / OVERNIGHT EVENTS: no acute events overnight     MEDICATIONS  (STANDING):  chlorhexidine 4% Liquid 1 Application(s) Topical <User Schedule>  heparin  Injectable 5000 Unit(s) SubCutaneous every 8 hours  insulin lispro (HumaLOG) corrective regimen sliding scale   SubCutaneous every 6 hours  labetalol 100 milliGRAM(s) Oral every 8 hours  lacosamide IVPB 150 milliGRAM(s) IV Intermittent every 12 hours  levETIRAcetam  IVPB 1500 milliGRAM(s) IV Intermittent every 12 hours  pantoprazole  Injectable 40 milliGRAM(s) IV Push daily  polyethylene glycol 3350 17 Gram(s) Oral two times a day  potassium chloride   Solution 40 milliEquivalent(s) Oral every 4 hours  predniSONE   Tablet 60  Oral   predniSONE   Tablet 20 milliGRAM(s) Oral daily  senna Syrup 10 milliLiter(s) Oral at bedtime    Vital Signs Last 24 Hrs  T(C): 36.6 (10 Oct 2019 08:53), Max: 36.6 (10 Oct 2019 08:53)  T(F): 97.9 (10 Oct 2019 08:53), Max: 97.9 (10 Oct 2019 08:53)  HR: 100 (10 Oct 2019 00:43) (81 - 100)  BP: 117/69 (10 Oct 2019 08:53) (104/71 - 138/89)  BP(mean): --  RR: 18 (10 Oct 2019 08:53) (18 - 18)  SpO2: 93% (10 Oct 2019 08:53) (93% - 96%)  CAPILLARY BLOOD GLUCOSE      POCT Blood Glucose.: 182 mg/dL (10 Oct 2019 06:39)  POCT Blood Glucose.: 168 mg/dL (10 Oct 2019 00:40)  POCT Blood Glucose.: 162 mg/dL (09 Oct 2019 19:07)  POCT Blood Glucose.: 190 mg/dL (09 Oct 2019 17:58)  POCT Blood Glucose.: 219 mg/dL (09 Oct 2019 12:42)    I&O's Summary    09 Oct 2019 07:01  -  10 Oct 2019 07:00  --------------------------------------------------------  IN: 0 mL / OUT: 300 mL / NET: -300 mL    PHYSICAL EXAM:  GENERAL: NAD  EYES: conjunctiva and sclera clear  NECK: Supple, No JVD  CHEST/LUNG: Clear to auscultation bilaterally; No wheeze  HEART: +S1/S2, reg   ABDOMEN: Soft, Nontender, Nondistended; Bowel sounds present  EXTREMITIES: no edema   PSYCH: AAOx2    LABS:                        13.6   14.67 )-----------( 305      ( 10 Oct 2019 08:47 )             40.6     10-10    135  |  94<L>  |  30<H>  ----------------------------<  219<H>  2.7<LL>   |  26  |  0.84    Ca    9.0      10 Oct 2019 09:58

## 2019-10-11 DIAGNOSIS — E87.6 HYPOKALEMIA: ICD-10-CM

## 2019-10-11 LAB
ANION GAP SERPL CALC-SCNC: 14 MMOL/L — SIGNIFICANT CHANGE UP (ref 5–17)
ANION GAP SERPL CALC-SCNC: 15 MMOL/L — SIGNIFICANT CHANGE UP (ref 5–17)
BUN SERPL-MCNC: 25 MG/DL — HIGH (ref 7–23)
BUN SERPL-MCNC: 28 MG/DL — HIGH (ref 7–23)
CALCIUM SERPL-MCNC: 8.7 MG/DL — SIGNIFICANT CHANGE UP (ref 8.4–10.5)
CALCIUM SERPL-MCNC: 9.1 MG/DL — SIGNIFICANT CHANGE UP (ref 8.4–10.5)
CHLORIDE SERPL-SCNC: 95 MMOL/L — LOW (ref 96–108)
CHLORIDE SERPL-SCNC: 95 MMOL/L — LOW (ref 96–108)
CO2 SERPL-SCNC: 27 MMOL/L — SIGNIFICANT CHANGE UP (ref 22–31)
CO2 SERPL-SCNC: 28 MMOL/L — SIGNIFICANT CHANGE UP (ref 22–31)
CREAT ?TM UR-MCNC: 56 MG/DL — SIGNIFICANT CHANGE UP
CREAT SERPL-MCNC: 0.78 MG/DL — SIGNIFICANT CHANGE UP (ref 0.5–1.3)
CREAT SERPL-MCNC: 0.8 MG/DL — SIGNIFICANT CHANGE UP (ref 0.5–1.3)
GLUCOSE BLDC GLUCOMTR-MCNC: 175 MG/DL — HIGH (ref 70–99)
GLUCOSE BLDC GLUCOMTR-MCNC: 179 MG/DL — HIGH (ref 70–99)
GLUCOSE BLDC GLUCOMTR-MCNC: 232 MG/DL — HIGH (ref 70–99)
GLUCOSE SERPL-MCNC: 125 MG/DL — HIGH (ref 70–99)
GLUCOSE SERPL-MCNC: 191 MG/DL — HIGH (ref 70–99)
HCT VFR BLD CALC: 42.7 % — SIGNIFICANT CHANGE UP (ref 39–50)
HGB BLD-MCNC: 13.8 G/DL — SIGNIFICANT CHANGE UP (ref 13–17)
MCHC RBC-ENTMCNC: 30.3 PG — SIGNIFICANT CHANGE UP (ref 27–34)
MCHC RBC-ENTMCNC: 32.3 GM/DL — SIGNIFICANT CHANGE UP (ref 32–36)
MCV RBC AUTO: 93.6 FL — SIGNIFICANT CHANGE UP (ref 80–100)
OSMOLALITY UR: 690 MOS/KG — SIGNIFICANT CHANGE UP (ref 300–900)
PLATELET # BLD AUTO: 308 K/UL — SIGNIFICANT CHANGE UP (ref 150–400)
POTASSIUM SERPL-MCNC: 2.7 MMOL/L — CRITICAL LOW (ref 3.5–5.3)
POTASSIUM SERPL-MCNC: 3.6 MMOL/L — SIGNIFICANT CHANGE UP (ref 3.5–5.3)
POTASSIUM SERPL-SCNC: 2.7 MMOL/L — CRITICAL LOW (ref 3.5–5.3)
POTASSIUM SERPL-SCNC: 3.6 MMOL/L — SIGNIFICANT CHANGE UP (ref 3.5–5.3)
POTASSIUM UR-SCNC: >100 MMOL/L — SIGNIFICANT CHANGE UP
RBC # BLD: 4.56 M/UL — SIGNIFICANT CHANGE UP (ref 4.2–5.8)
RBC # FLD: 15.6 % — HIGH (ref 10.3–14.5)
SODIUM SERPL-SCNC: 137 MMOL/L — SIGNIFICANT CHANGE UP (ref 135–145)
SODIUM SERPL-SCNC: 137 MMOL/L — SIGNIFICANT CHANGE UP (ref 135–145)
SODIUM UR-SCNC: 78 MMOL/L — SIGNIFICANT CHANGE UP
WBC # BLD: 13.62 K/UL — HIGH (ref 3.8–10.5)
WBC # FLD AUTO: 13.62 K/UL — HIGH (ref 3.8–10.5)

## 2019-10-11 PROCEDURE — 99233 SBSQ HOSP IP/OBS HIGH 50: CPT

## 2019-10-11 PROCEDURE — 99223 1ST HOSP IP/OBS HIGH 75: CPT | Mod: 25

## 2019-10-11 PROCEDURE — 31575 DIAGNOSTIC LARYNGOSCOPY: CPT

## 2019-10-11 PROCEDURE — 70553 MRI BRAIN STEM W/O & W/DYE: CPT | Mod: 26

## 2019-10-11 RX ORDER — AMILORIDE HCL 5 MG
5 TABLET ORAL
Refills: 0 | Status: DISCONTINUED | OUTPATIENT
Start: 2019-10-11 | End: 2019-10-12

## 2019-10-11 RX ORDER — POTASSIUM CHLORIDE 20 MEQ
40 PACKET (EA) ORAL EVERY 4 HOURS
Refills: 0 | Status: COMPLETED | OUTPATIENT
Start: 2019-10-11 | End: 2019-10-11

## 2019-10-11 RX ORDER — AMILORIDE HCL 5 MG
5 TABLET ORAL DAILY
Refills: 0 | Status: DISCONTINUED | OUTPATIENT
Start: 2019-10-11 | End: 2019-10-11

## 2019-10-11 RX ADMIN — Medication 5 MILLIGRAM(S): at 18:02

## 2019-10-11 RX ADMIN — Medication 40 MILLIEQUIVALENT(S): at 18:03

## 2019-10-11 RX ADMIN — LACOSAMIDE 130 MILLIGRAM(S): 50 TABLET ORAL at 18:02

## 2019-10-11 RX ADMIN — CHLORHEXIDINE GLUCONATE 1 APPLICATION(S): 213 SOLUTION TOPICAL at 10:40

## 2019-10-11 RX ADMIN — Medication 100 MILLIGRAM(S): at 06:32

## 2019-10-11 RX ADMIN — HEPARIN SODIUM 5000 UNIT(S): 5000 INJECTION INTRAVENOUS; SUBCUTANEOUS at 22:16

## 2019-10-11 RX ADMIN — POLYETHYLENE GLYCOL 3350 17 GRAM(S): 17 POWDER, FOR SOLUTION ORAL at 18:02

## 2019-10-11 RX ADMIN — LEVETIRACETAM 400 MILLIGRAM(S): 250 TABLET, FILM COATED ORAL at 00:19

## 2019-10-11 RX ADMIN — LACOSAMIDE 130 MILLIGRAM(S): 50 TABLET ORAL at 07:42

## 2019-10-11 RX ADMIN — POLYETHYLENE GLYCOL 3350 17 GRAM(S): 17 POWDER, FOR SOLUTION ORAL at 06:32

## 2019-10-11 RX ADMIN — SENNA PLUS 10 MILLILITER(S): 8.6 TABLET ORAL at 22:16

## 2019-10-11 RX ADMIN — Medication 2: at 14:04

## 2019-10-11 RX ADMIN — HEPARIN SODIUM 5000 UNIT(S): 5000 INJECTION INTRAVENOUS; SUBCUTANEOUS at 06:32

## 2019-10-11 RX ADMIN — Medication 1: at 06:33

## 2019-10-11 RX ADMIN — Medication 1: at 00:19

## 2019-10-11 RX ADMIN — LEVETIRACETAM 400 MILLIGRAM(S): 250 TABLET, FILM COATED ORAL at 14:04

## 2019-10-11 RX ADMIN — PANTOPRAZOLE SODIUM 40 MILLIGRAM(S): 20 TABLET, DELAYED RELEASE ORAL at 14:05

## 2019-10-11 RX ADMIN — Medication 1: at 18:03

## 2019-10-11 RX ADMIN — HEPARIN SODIUM 5000 UNIT(S): 5000 INJECTION INTRAVENOUS; SUBCUTANEOUS at 14:06

## 2019-10-11 RX ADMIN — Medication 20 MILLIGRAM(S): at 06:32

## 2019-10-11 RX ADMIN — Medication 40 MILLIEQUIVALENT(S): at 14:06

## 2019-10-11 NOTE — CHART NOTE - NSCHARTNOTEFT_GEN_A_CORE
Called central lab for 14-3-3 result. Lab is still pending at [facility]. Will discuss results once back from lab. Will follow up after MRI brain w/wo,

## 2019-10-11 NOTE — PROGRESS NOTE ADULT - PROBLEM SELECTOR PLAN 2
-ddx included status epilepticus, infarct HSV encephalitis, meningitis, hypoxic injury and CJD. likely viral vs. inflammatory based on CSF studies thus far.  - vEEG has consistently shown foci of epileptiform activity without seizure (~5 days)  - Neurology and ID following, recs appreciated  - keppra 2g q12h decreased to 1.5g q12h as per neuro recs  - c/w vimpat 150mg q12h  - was on decadron 10mg daily for inflammatory encephalopathy coverage, now on steroid taper, starting with  prednisone 60mg and decreasing by 20mg every 3 days  - f/u CSF 14-3-3, tau, neuron specific enolase, HTLV-1, JEANETTE virus, HSV 6, paraneoplastic panel      *Negative CSF studies so far:  CSF PCR, Bacterial cx, WNV, Toxo, Cryptococcus, HSV  - ESR/CRP 81/9.53, YANDEL and dsDNa negative - were checked to r/o autoimmune disease in setting of above MRI findings  -repeat MRI brain consistent with status   -continue with current dose of AEDs   -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  -repeat MRI pending, f/u further neuro recs

## 2019-10-11 NOTE — PROGRESS NOTE ADULT - SUBJECTIVE AND OBJECTIVE BOX
Patient is a 84y old  Male who presents with a chief complaint of status epilepticus (11 Oct 2019 09:46)    SUBJECTIVE / OVERNIGHT EVENTS: no acute events overnight, pt is getting out of bed and into chair with PT assist this morning     MEDICATIONS  (STANDING):  chlorhexidine 4% Liquid 1 Application(s) Topical <User Schedule>  heparin  Injectable 5000 Unit(s) SubCutaneous every 8 hours  insulin lispro (HumaLOG) corrective regimen sliding scale   SubCutaneous every 6 hours  labetalol 100 milliGRAM(s) Oral every 8 hours  lacosamide IVPB 150 milliGRAM(s) IV Intermittent every 12 hours  levETIRAcetam  IVPB 1500 milliGRAM(s) IV Intermittent every 12 hours  pantoprazole  Injectable 40 milliGRAM(s) IV Push daily  polyethylene glycol 3350 17 Gram(s) Oral two times a day  potassium chloride   Solution 40 milliEquivalent(s) Oral every 4 hours  predniSONE   Tablet 60  Oral   predniSONE   Tablet 20 milliGRAM(s) Oral daily  senna Syrup 10 milliLiter(s) Oral at bedtime    MEDICATIONS  (PRN):      Vital Signs Last 24 Hrs  T(C): 36.9 (11 Oct 2019 08:27), Max: 36.9 (10 Oct 2019 21:51)  T(F): 98.5 (11 Oct 2019 08:27), Max: 98.5 (11 Oct 2019 08:27)  HR: 73 (11 Oct 2019 08:27) (71 - 74)  BP: 112/70 (11 Oct 2019 08:27) (112/70 - 152/80)  BP(mean): --  RR: 18 (11 Oct 2019 08:27) (18 - 18)  SpO2: 96% (11 Oct 2019 08:27) (94% - 96%)  CAPILLARY BLOOD GLUCOSE      POCT Blood Glucose.: 232 mg/dL (11 Oct 2019 13:15)  POCT Blood Glucose.: 175 mg/dL (11 Oct 2019 06:08)  POCT Blood Glucose.: 158 mg/dL (10 Oct 2019 23:53)  POCT Blood Glucose.: 156 mg/dL (10 Oct 2019 18:48)    I&O's Summary    10 Oct 2019 07:01  -  11 Oct 2019 07:00  --------------------------------------------------------  IN: 1265 mL / OUT: 500 mL / NET: 765 mL    11 Oct 2019 07:01  -  11 Oct 2019 14:06  --------------------------------------------------------  IN: 65 mL / OUT: 0 mL / NET: 65 mL        PHYSICAL EXAM:  GENERAL: NAD  EYES: conjunctiva and sclera clear  NOSE: NGT in place   NECK: Supple, No JVD  CHEST/LUNG: Clear to auscultation bilaterally; No wheeze  HEART: +S1/S2, reg   ABDOMEN: Soft, Nontender, Nondistended; Bowel sounds present  EXTREMITIES:  trace edema present   PSYCH: AAOx1    LABS:                        13.8   13.62 )-----------( 308      ( 11 Oct 2019 10:05 )             42.7     10-11    137  |  95<L>  |  28<H>  ----------------------------<  191<H>  2.7<LL>   |  27  |  0.78    Ca    9.1      11 Oct 2019 06:58

## 2019-10-11 NOTE — PROGRESS NOTE ADULT - ATTENDING COMMENTS
gross aspiration seen on exam today.  Would not recommend any diet at this point given aspiration seen but can work with SLP and potentially be retested in the future if he appears less deconditioned/more awake in the future.

## 2019-10-11 NOTE — PROGRESS NOTE ADULT - ASSESSMENT
83 y/o man with hx of prostate CA s/o seed implant and RT, HTN, p/w sudden onset AMS seizure brought to [facility] . Was found to be in status epilepticus and required intubation for airway control. Pt transferred to [facility] for further workup and EEG monitoring with no further seizure activity extubated on 10/1 downgraded to medicine on 10/3:

## 2019-10-11 NOTE — PROGRESS NOTE ADULT - PROBLEM SELECTOR PLAN 3
-persistent hypokalemia despite aggressive repletion   -will check urine pot and creatinine  -renal consult

## 2019-10-11 NOTE — PROGRESS NOTE ADULT - PROBLEM SELECTOR PLAN 1
- fu S&S recs  - no further ent intervention at this time. - diet per S&S  - no further ent intervention at this time.  - reconsult PRN x [phone]

## 2019-10-11 NOTE — PROGRESS NOTE ADULT - ATTENDING COMMENTS
Addendum: hypokalemia discussed with Dr. Jenna Corley (pt's nephrologist), pt has had this as a chronic problem in the past, was on amiloride before, asked to restart per Dr. Corley. F/U official renal recs.

## 2019-10-11 NOTE — CONSULT NOTE ADULT - SUBJECTIVE AND OBJECTIVE BOX
[facility]  [phone]  NEPHROLOGY      INITIAL CONSULT NOTE  --------------------------------------------------------------------------------  HPI:    83 y/o man with hx of prostate CA s/o seed implant and RT, HTN, p/w sudden onset AMS seizure brought to [facility] . Was found to be in status epilepticus and required intubation for airway control. Pt transferred to [facility] for further workup and EEG monitoring with no further seizure activity extubated on 10/1 downgraded to medicine on 10/3: he has required ngt. is being followed by neuro as well   he was noticed with persistent hypokalemia renal consult called.   he and his brother has had hypokalemia and hypomagnesemia as output.     PAST HISTORY  --------------------------------------------------------------------------------  PAST MEDICAL & SURGICAL HISTORY:  History of gastroesophageal reflux (GERD)  Hypertension  Prostate cancer: had seed implant &amp; radiation ( 2001 )  PC (prostate cancer)  S/P cholecystectomy    FAMILY HISTORY:  No pertinent family history in first degree relatives    PAST SOCIAL HISTORY: no tobacco use     ALLERGIES & MEDICATIONS  --------------------------------------------------------------------------------  Allergies    No Known Allergies    Intolerances      Standing Inpatient Medications  aMILoride 5 milliGRAM(s) Oral daily  chlorhexidine 4% Liquid 1 Application(s) Topical <User Schedule>  heparin  Injectable 5000 Unit(s) SubCutaneous every 8 hours  insulin lispro (HumaLOG) corrective regimen sliding scale   SubCutaneous every 6 hours  labetalol 100 milliGRAM(s) Oral every 8 hours  lacosamide IVPB 150 milliGRAM(s) IV Intermittent every 12 hours  levETIRAcetam  IVPB 1500 milliGRAM(s) IV Intermittent every 12 hours  pantoprazole  Injectable 40 milliGRAM(s) IV Push daily  polyethylene glycol 3350 17 Gram(s) Oral two times a day  predniSONE   Tablet 60  Oral   predniSONE   Tablet 20 milliGRAM(s) Oral daily  senna Syrup 10 milliLiter(s) Oral at bedtime    PRN Inpatient Medications      REVIEW OF SYSTEMS  --------------------------------------------------------------------------------  pt unable when seen     VITALS/PHYSICAL EXAM  --------------------------------------------------------------------------------  T(C): 36.5 (10-11-19 @ 15:57), Max: 36.9 (10-11-19 @ 08:27)  HR: 78 (10-11-19 @ 21:10) (73 - 87)  BP: 122/82 (10-11-19 @ 22:16) (112/70 - 152/80)  RR: 19 (10-11-19 @ 21:10) (18 - 19)  SpO2: 100% (10-11-19 @ 21:10) (96% - 100%)  Wt(kg): --        10-10-19 @ 07:01  -  10-11-19 @ 07:00  --------------------------------------------------------  IN: 1265 mL / OUT: 500 mL / NET: 765 mL    10-11-19 @ 07:01  -  10-11-19 @ 22:57  --------------------------------------------------------  IN: 65 mL / OUT: 0 mL / NET: 65 mL      Physical Exam:  	Gen: NGT non communicative when seen   	no jvd ,  	Pulm: decrease bs  no rales or ronchi or wheezing  	CV: RRR, S1S2; no rub  	Abd: +BS, soft, nontender/nondistended  	: No suprapubic tenderness  	UE: Warm, no cyanosis  no clubbing,  no edema;  	LE: Warm, no cyanosis  no clubbing, no edema  	Neuro:  remains lethargic    	Skin: Warm, no decrease skin turgor       LABS/STUDIES  --------------------------------------------------------------------------------              13.8   13.62 >-----------<  308      [10-11-19 @ 10:05]              42.7     137  |  95  |  25  ----------------------------<  125      [10-11-19 @ 21:59]  3.6   |  28  |  0.80        Ca     8.7     [10-11-19 @ 21:59]            Creatinine Trend:  SCr 0.80 [10-11 @ 21:59]  SCr 0.78 [10-11 @ 06:58]  SCr 0.83 [10-10 @ 20:21]  SCr 0.84 [10-10 @ 09:58]  SCr 0.81 [10-10 @ 06:19]    Urinalysis - [09-28-19 @ 02:40]      Color Yellow / Appearance Slightly Turbid / SG 1.013 / pH 7.0      Gluc Negative / Ketone Negative  / Bili Negative / Urobili Negative       Blood Moderate / Protein 30 mg/dL / Leuk Est Large / Nitrite Positive       / WBC 68 / Hyaline 0 / Gran  / Sq Epi  / Non Sq Epi 0 / Bacteria Many    Urine Creatinine 56      [10-11-19 @ 14:21]  Urine Sodium 78      [10-11-19 @ 14:21]  Urine Potassium >100      [10-11-19 @ 14:21]  Urine Osmolality 690      [10-11-19 @ 15:08]    HbA1c 5.7      [09-25-19 @ 05:57]      YANDEL: titer Negative, pattern --      [09-28-19 @ 14:46]  dsDNA <12      [09-28-19 @ 14:46]  Rheumatoid Factor <10      [09-28-19 @ 14:42]  Syphilis Screen (Treponema Pallidum Ab) Negative      [09-29-19 @ 04:17]

## 2019-10-11 NOTE — PROGRESS NOTE ADULT - ASSESSMENT
84y with dysphagia, Laryngoscopy yesterday showed decreased sensation, pooling of secretions and aspiration.

## 2019-10-11 NOTE — PROGRESS NOTE ADULT - SUBJECTIVE AND OBJECTIVE BOX
ENT ISSUE/POD: dysphagia    HPI: 83yo male with dysphagia. Pt seen and examined at bedside. No acute events overnight. Pt denies any odynophagia, dysphonia, sob, dyspnea, changes in voice or inability to tolerated secretions         PAST MEDICAL & SURGICAL HISTORY:  History of gastroesophageal reflux (GERD)  Hypertension  Prostate cancer: had seed implant &amp; radiation ( 2001 )  PC (prostate cancer)  S/P cholecystectomy    Allergies    No Known Allergies    Intolerances      MEDICATIONS  (STANDING):  chlorhexidine 4% Liquid 1 Application(s) Topical <User Schedule>  heparin  Injectable 5000 Unit(s) SubCutaneous every 8 hours  insulin lispro (HumaLOG) corrective regimen sliding scale   SubCutaneous every 6 hours  labetalol 100 milliGRAM(s) Oral every 8 hours  lacosamide IVPB 150 milliGRAM(s) IV Intermittent every 12 hours  levETIRAcetam  IVPB 1500 milliGRAM(s) IV Intermittent every 12 hours  pantoprazole  Injectable 40 milliGRAM(s) IV Push daily  polyethylene glycol 3350 17 Gram(s) Oral two times a day  predniSONE   Tablet 60  Oral   predniSONE   Tablet 20 milliGRAM(s) Oral daily  senna Syrup 10 milliLiter(s) Oral at bedtime    MEDICATIONS  (PRN):      Social History: see consult note    Family history: see consult note    ROS:   ENT: all negative except as noted in HPI   Pulm: denies SOB, cough, hemoptysis  Neuro: denies numbness/tingling, loss of sensation  Endo: denies heat/cold intolerance, excessive sweating      Vital Signs Last 24 Hrs  T(C): 36.9 (11 Oct 2019 08:27), Max: 36.9 (10 Oct 2019 21:51)  T(F): 98.5 (11 Oct 2019 08:27), Max: 98.5 (11 Oct 2019 08:27)  HR: 73 (11 Oct 2019 08:27) (70 - 74)  BP: 112/70 (11 Oct 2019 08:27) (112/70 - 152/80)  BP(mean): --  RR: 18 (11 Oct 2019 08:27) (18 - 18)  SpO2: 96% (11 Oct 2019 08:27) (94% - 96%)                          13.6   14.67 )-----------( 305      ( 10 Oct 2019 08:47 )             40.6    10-11    137  |  95<L>  |  28<H>  ----------------------------<  191<H>  2.7<LL>   |  27  |  0.78    Ca    9.1      11 Oct 2019 06:58         PHYSICAL EXAM:  Gen: NAD  Skin: No rashes, bruises, or lesions  Head: Normocephalic, Atraumatic  Face: no edema, erythema, or fluctuance. Parotid glands soft without mass  Eyes: no scleral injection  Nose: Nares bilaterally patent, no discharge, R NG tube in place   Mouth: No Stridor / Drooling / Trismus.  Mucosa moist, tongue/uvula midline, oropharynx clear  Neck: Flat, supple, no lymphadenopathy, trachea midline, no masses  Lymphatic: No lymphadenopathy  Resp: breathing easily, no stridor  CV: no peripheral edema/cyanosis  GI: nondistended   Peripheral vascular: no JVD or edema  Neuro: facial nerve intact, no facial droop

## 2019-10-11 NOTE — CONSULT NOTE ADULT - ASSESSMENT
85 y/o man with hx of prostate CA s/o seed implant and RT, HTN, p/w sudden onset AMS seizure on anti - sz meds.  he has required ngt. is being followed by neuro as well. he was noticed with persistent hypokalemia. he and his brother has had hypokalemia and hypomagnesemia as output.     1- hypokalemia  2- hypomagnesemia hx  3- htn recently     he has hx of gitelman's syndrome.  to increase amiloride to 10 mg am and 5 mg pm with kcl 20 meq tid along with mag ox 400 mg bid

## 2019-10-11 NOTE — PROGRESS NOTE ADULT - PROBLEM SELECTOR PLAN 1
-failed speech and swallow eval x3, will keep NGT for now  -discussed with speech and swallow therapist , pt has difficulty swallowing secretions as well  -failed re-eval as well  -ENT recs appreciated  -continue with NGT for now

## 2019-10-12 LAB
ANION GAP SERPL CALC-SCNC: 15 MMOL/L — SIGNIFICANT CHANGE UP (ref 5–17)
APPEARANCE UR: CLEAR — SIGNIFICANT CHANGE UP
BILIRUB UR-MCNC: NEGATIVE — SIGNIFICANT CHANGE UP
BUN SERPL-MCNC: 26 MG/DL — HIGH (ref 7–23)
CALCIUM SERPL-MCNC: 8.8 MG/DL — SIGNIFICANT CHANGE UP (ref 8.4–10.5)
CHLORIDE SERPL-SCNC: 95 MMOL/L — LOW (ref 96–108)
CO2 SERPL-SCNC: 27 MMOL/L — SIGNIFICANT CHANGE UP (ref 22–31)
COLOR SPEC: YELLOW — SIGNIFICANT CHANGE UP
CREAT SERPL-MCNC: 0.78 MG/DL — SIGNIFICANT CHANGE UP (ref 0.5–1.3)
DIFF PNL FLD: NEGATIVE — SIGNIFICANT CHANGE UP
GLUCOSE BLDC GLUCOMTR-MCNC: 176 MG/DL — HIGH (ref 70–99)
GLUCOSE BLDC GLUCOMTR-MCNC: 179 MG/DL — HIGH (ref 70–99)
GLUCOSE BLDC GLUCOMTR-MCNC: 183 MG/DL — HIGH (ref 70–99)
GLUCOSE BLDC GLUCOMTR-MCNC: 191 MG/DL — HIGH (ref 70–99)
GLUCOSE BLDC GLUCOMTR-MCNC: 218 MG/DL — HIGH (ref 70–99)
GLUCOSE SERPL-MCNC: 203 MG/DL — HIGH (ref 70–99)
GLUCOSE UR QL: NEGATIVE — SIGNIFICANT CHANGE UP
HCT VFR BLD CALC: 45.2 % — SIGNIFICANT CHANGE UP (ref 39–50)
HGB BLD-MCNC: 15 G/DL — SIGNIFICANT CHANGE UP (ref 13–17)
KETONES UR-MCNC: NEGATIVE — SIGNIFICANT CHANGE UP
LEUKOCYTE ESTERASE UR-ACNC: NEGATIVE — SIGNIFICANT CHANGE UP
MAGNESIUM SERPL-MCNC: 1.3 MG/DL — LOW (ref 1.6–2.6)
MCHC RBC-ENTMCNC: 31.3 PG — SIGNIFICANT CHANGE UP (ref 27–34)
MCHC RBC-ENTMCNC: 33.2 GM/DL — SIGNIFICANT CHANGE UP (ref 32–36)
MCV RBC AUTO: 94.4 FL — SIGNIFICANT CHANGE UP (ref 80–100)
NITRITE UR-MCNC: NEGATIVE — SIGNIFICANT CHANGE UP
PH UR: 8 — SIGNIFICANT CHANGE UP (ref 5–8)
PLATELET # BLD AUTO: 318 K/UL — SIGNIFICANT CHANGE UP (ref 150–400)
POTASSIUM SERPL-MCNC: 3.4 MMOL/L — LOW (ref 3.5–5.3)
POTASSIUM SERPL-SCNC: 3.4 MMOL/L — LOW (ref 3.5–5.3)
PROT UR-MCNC: SIGNIFICANT CHANGE UP
RBC # BLD: 4.79 M/UL — SIGNIFICANT CHANGE UP (ref 4.2–5.8)
RBC # FLD: 15.9 % — HIGH (ref 10.3–14.5)
SODIUM SERPL-SCNC: 137 MMOL/L — SIGNIFICANT CHANGE UP (ref 135–145)
SP GR SPEC: 1.01 — SIGNIFICANT CHANGE UP (ref 1.01–1.02)
UROBILINOGEN FLD QL: SIGNIFICANT CHANGE UP
WBC # BLD: 13.69 K/UL — HIGH (ref 3.8–10.5)
WBC # FLD AUTO: 13.69 K/UL — HIGH (ref 3.8–10.5)

## 2019-10-12 PROCEDURE — 99233 SBSQ HOSP IP/OBS HIGH 50: CPT

## 2019-10-12 RX ORDER — MAGNESIUM SULFATE 500 MG/ML
2 VIAL (ML) INJECTION ONCE
Refills: 0 | Status: COMPLETED | OUTPATIENT
Start: 2019-10-12 | End: 2019-10-12

## 2019-10-12 RX ORDER — POTASSIUM CHLORIDE 20 MEQ
10 PACKET (EA) ORAL ONCE
Refills: 0 | Status: COMPLETED | OUTPATIENT
Start: 2019-10-12 | End: 2019-10-12

## 2019-10-12 RX ORDER — MAGNESIUM OXIDE 400 MG ORAL TABLET 241.3 MG
400 TABLET ORAL
Refills: 0 | Status: DISCONTINUED | OUTPATIENT
Start: 2019-10-12 | End: 2019-10-23

## 2019-10-12 RX ORDER — AMILORIDE HCL 5 MG
5 TABLET ORAL DAILY
Refills: 0 | Status: DISCONTINUED | OUTPATIENT
Start: 2019-10-12 | End: 2019-10-15

## 2019-10-12 RX ORDER — POTASSIUM CHLORIDE 20 MEQ
20 PACKET (EA) ORAL THREE TIMES A DAY
Refills: 0 | Status: DISCONTINUED | OUTPATIENT
Start: 2019-10-12 | End: 2019-10-15

## 2019-10-12 RX ORDER — AMILORIDE HCL 5 MG
10 TABLET ORAL DAILY
Refills: 0 | Status: DISCONTINUED | OUTPATIENT
Start: 2019-10-12 | End: 2019-10-15

## 2019-10-12 RX ADMIN — LEVETIRACETAM 400 MILLIGRAM(S): 250 TABLET, FILM COATED ORAL at 00:39

## 2019-10-12 RX ADMIN — Medication 100 MILLIGRAM(S): at 06:19

## 2019-10-12 RX ADMIN — HEPARIN SODIUM 5000 UNIT(S): 5000 INJECTION INTRAVENOUS; SUBCUTANEOUS at 06:19

## 2019-10-12 RX ADMIN — LEVETIRACETAM 400 MILLIGRAM(S): 250 TABLET, FILM COATED ORAL at 23:34

## 2019-10-12 RX ADMIN — HEPARIN SODIUM 5000 UNIT(S): 5000 INJECTION INTRAVENOUS; SUBCUTANEOUS at 23:34

## 2019-10-12 RX ADMIN — CHLORHEXIDINE GLUCONATE 1 APPLICATION(S): 213 SOLUTION TOPICAL at 13:19

## 2019-10-12 RX ADMIN — Medication 5 MILLIGRAM(S): at 06:19

## 2019-10-12 RX ADMIN — Medication 1: at 06:23

## 2019-10-12 RX ADMIN — Medication 20 MILLIEQUIVALENT(S): at 13:27

## 2019-10-12 RX ADMIN — MAGNESIUM OXIDE 400 MG ORAL TABLET 400 MILLIGRAM(S): 241.3 TABLET ORAL at 18:14

## 2019-10-12 RX ADMIN — MAGNESIUM OXIDE 400 MG ORAL TABLET 400 MILLIGRAM(S): 241.3 TABLET ORAL at 13:19

## 2019-10-12 RX ADMIN — LACOSAMIDE 130 MILLIGRAM(S): 50 TABLET ORAL at 17:58

## 2019-10-12 RX ADMIN — PANTOPRAZOLE SODIUM 40 MILLIGRAM(S): 20 TABLET, DELAYED RELEASE ORAL at 13:19

## 2019-10-12 RX ADMIN — Medication 5 MILLIGRAM(S): at 18:08

## 2019-10-12 RX ADMIN — Medication 20 MILLIGRAM(S): at 06:19

## 2019-10-12 RX ADMIN — Medication 100 MILLIGRAM(S): at 13:19

## 2019-10-12 RX ADMIN — Medication 10 MILLIGRAM(S): at 13:18

## 2019-10-12 RX ADMIN — Medication 1: at 00:39

## 2019-10-12 RX ADMIN — HEPARIN SODIUM 5000 UNIT(S): 5000 INJECTION INTRAVENOUS; SUBCUTANEOUS at 13:18

## 2019-10-12 RX ADMIN — LEVETIRACETAM 400 MILLIGRAM(S): 250 TABLET, FILM COATED ORAL at 13:27

## 2019-10-12 RX ADMIN — Medication 1: at 18:45

## 2019-10-12 RX ADMIN — Medication 2: at 13:17

## 2019-10-12 RX ADMIN — LACOSAMIDE 130 MILLIGRAM(S): 50 TABLET ORAL at 06:34

## 2019-10-12 NOTE — PROGRESS NOTE ADULT - PROBLEM SELECTOR PLAN 1
-failed speech and swallow eval x3, will keep NGT for now  -discussed with speech and swallow therapist , pt has difficulty swallowing secretions as well  -failed re-eval as well  -ENT recs appreciated  -continue with NGT for now  -eventual PEG placement if no improvement

## 2019-10-12 NOTE — PROGRESS NOTE ADULT - PROBLEM SELECTOR PLAN 3
-appreciate renal recs  -uptitrate amiloride and start standing potassium and mag  -monitor K levels daily

## 2019-10-12 NOTE — PROGRESS NOTE ADULT - SUBJECTIVE AND OBJECTIVE BOX
Patient is a 84y old  Male who presents with a chief complaint of status epilepticus (11 Oct 2019 22:54)      SUBJECTIVE / OVERNIGHT EVENTS: no acute events overnight. Spent a good deal of yesterday in the chair. MRI done this morning.     MEDICATIONS  (STANDING):  aMILoride 5 milliGRAM(s) Oral <User Schedule>  chlorhexidine 4% Liquid 1 Application(s) Topical <User Schedule>  heparin  Injectable 5000 Unit(s) SubCutaneous every 8 hours  insulin lispro (HumaLOG) corrective regimen sliding scale   SubCutaneous every 6 hours  labetalol 100 milliGRAM(s) Oral every 8 hours  lacosamide IVPB 150 milliGRAM(s) IV Intermittent every 12 hours  levETIRAcetam  IVPB 1500 milliGRAM(s) IV Intermittent every 12 hours  pantoprazole  Injectable 40 milliGRAM(s) IV Push daily  polyethylene glycol 3350 17 Gram(s) Oral two times a day  predniSONE   Tablet 60  Oral   senna Syrup 10 milliLiter(s) Oral at bedtime    MEDICATIONS  (PRN):      Vital Signs Last 24 Hrs  T(C): 36.7 (12 Oct 2019 07:50), Max: 37.1 (11 Oct 2019 23:37)  T(F): 98.1 (12 Oct 2019 07:50), Max: 98.7 (11 Oct 2019 23:37)  HR: 84 (12 Oct 2019 07:50) (78 - 97)  BP: 117/77 (12 Oct 2019 07:50) (117/77 - 153/82)  BP(mean): --  RR: 18 (12 Oct 2019 07:50) (18 - 19)  SpO2: 95% (12 Oct 2019 07:50) (95% - 100%)  CAPILLARY BLOOD GLUCOSE      POCT Blood Glucose.: 218 mg/dL (12 Oct 2019 12:20)  POCT Blood Glucose.: 179 mg/dL (12 Oct 2019 06:21)  POCT Blood Glucose.: 183 mg/dL (12 Oct 2019 00:25)  POCT Blood Glucose.: 179 mg/dL (11 Oct 2019 17:54)  POCT Blood Glucose.: 232 mg/dL (11 Oct 2019 13:15)    I&O's Summary    11 Oct 2019 07:01  -  12 Oct 2019 07:00  --------------------------------------------------------  IN: 65 mL / OUT: 450 mL / NET: -385 mL        PHYSICAL EXAM:  GENERAL: NAD  EYES:  conjunctiva and sclera clear  NOSE: NGT in place   NECK: Supple, No JVD  CHEST/LUNG: Clear to auscultation bilaterally; No wheeze  HEART: +S1/S2, reg   ABDOMEN: Soft, Nontender, Nondistended; Bowel sounds present  EXTREMITIES:  trace edema in the LE b/l  PSYCH: AAOx2    LABS:                        15.0   13.69 )-----------( 318      ( 12 Oct 2019 11:06 )             45.2     10-12    137  |  95<L>  |  26<H>  ----------------------------<  203<H>  3.4<L>   |  27  |  0.78    Ca    8.8      12 Oct 2019 07:19  Mg     1.3     10-12

## 2019-10-12 NOTE — PROGRESS NOTE ADULT - PROBLEM SELECTOR PLAN 2
-ddx included status epilepticus, infarct HSV encephalitis, meningitis, hypoxic injury and CJD. likely viral vs. inflammatory based on CSF studies thus far.  - vEEG has consistently shown foci of epileptiform activity without seizure (~5 days)  - Neurology and ID following, recs appreciated  - keppra 2g q12h decreased to 1.5g q12h as per neuro recs  - c/w vimpat 150mg q12h  - was on decadron 10mg daily for inflammatory encephalopathy coverage, now on steroid taper, starting with  prednisone 60mg and decreasing by 20mg every 3 days  - f/u CSF 14-3-3, tau, neuron specific enolase, HTLV-1, JEANETTE virus, HSV 6, paraneoplastic panel      *Negative CSF studies so far:  CSF PCR, Bacterial cx, WNV, Toxo, Cryptococcus, HSV  - ESR/CRP 81/9.53, YANDEL and dsDNa negative - were checked to r/o autoimmune disease in setting of above MRI findings  -repeat MRI brain consistent with status   -continue with current dose of AEDs   -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  -repeat MRI done, f/u read

## 2019-10-12 NOTE — PROGRESS NOTE ADULT - ASSESSMENT
83 y/o man with hx of prostate CA s/o seed implant and RT, HTN, p/w sudden onset AMS seizure brought to [facility] . Was found to be in status epilepticus and required intubation for airway control. Pt transferred to [facility] for further workup and EEG monitoring with no further seizure activity extubated on 10/1 downgraded to medicine on 10/3:

## 2019-10-13 LAB
ANION GAP SERPL CALC-SCNC: 14 MMOL/L — SIGNIFICANT CHANGE UP (ref 5–17)
BUN SERPL-MCNC: 28 MG/DL — HIGH (ref 7–23)
CALCIUM SERPL-MCNC: 9.7 MG/DL — SIGNIFICANT CHANGE UP (ref 8.4–10.5)
CHLORIDE SERPL-SCNC: 90 MMOL/L — LOW (ref 96–108)
CO2 SERPL-SCNC: 25 MMOL/L — SIGNIFICANT CHANGE UP (ref 22–31)
CREAT SERPL-MCNC: 0.8 MG/DL — SIGNIFICANT CHANGE UP (ref 0.5–1.3)
GLUCOSE BLDC GLUCOMTR-MCNC: 132 MG/DL — HIGH (ref 70–99)
GLUCOSE BLDC GLUCOMTR-MCNC: 149 MG/DL — HIGH (ref 70–99)
GLUCOSE BLDC GLUCOMTR-MCNC: 150 MG/DL — HIGH (ref 70–99)
GLUCOSE SERPL-MCNC: 151 MG/DL — HIGH (ref 70–99)
POTASSIUM SERPL-MCNC: 3.8 MMOL/L — SIGNIFICANT CHANGE UP (ref 3.5–5.3)
POTASSIUM SERPL-SCNC: 3.8 MMOL/L — SIGNIFICANT CHANGE UP (ref 3.5–5.3)
SODIUM SERPL-SCNC: 129 MMOL/L — LOW (ref 135–145)

## 2019-10-13 PROCEDURE — 71045 X-RAY EXAM CHEST 1 VIEW: CPT | Mod: 26,77

## 2019-10-13 PROCEDURE — 99233 SBSQ HOSP IP/OBS HIGH 50: CPT

## 2019-10-13 PROCEDURE — 71045 X-RAY EXAM CHEST 1 VIEW: CPT | Mod: 26

## 2019-10-13 RX ADMIN — LACOSAMIDE 130 MILLIGRAM(S): 50 TABLET ORAL at 18:00

## 2019-10-13 RX ADMIN — LACOSAMIDE 130 MILLIGRAM(S): 50 TABLET ORAL at 06:05

## 2019-10-13 RX ADMIN — CHLORHEXIDINE GLUCONATE 1 APPLICATION(S): 213 SOLUTION TOPICAL at 11:13

## 2019-10-13 RX ADMIN — Medication 1: at 00:27

## 2019-10-13 RX ADMIN — Medication 50 GRAM(S): at 00:26

## 2019-10-13 RX ADMIN — HEPARIN SODIUM 5000 UNIT(S): 5000 INJECTION INTRAVENOUS; SUBCUTANEOUS at 22:56

## 2019-10-13 RX ADMIN — Medication 100 MILLIEQUIVALENT(S): at 00:27

## 2019-10-13 RX ADMIN — PANTOPRAZOLE SODIUM 40 MILLIGRAM(S): 20 TABLET, DELAYED RELEASE ORAL at 11:20

## 2019-10-13 RX ADMIN — HEPARIN SODIUM 5000 UNIT(S): 5000 INJECTION INTRAVENOUS; SUBCUTANEOUS at 14:32

## 2019-10-13 RX ADMIN — HEPARIN SODIUM 5000 UNIT(S): 5000 INJECTION INTRAVENOUS; SUBCUTANEOUS at 06:04

## 2019-10-13 RX ADMIN — LEVETIRACETAM 400 MILLIGRAM(S): 250 TABLET, FILM COATED ORAL at 11:11

## 2019-10-13 NOTE — CHART NOTE - NSCHARTNOTEFT_GEN_A_CORE
SHIKHA MASON    Notified by RN patient pulled NGT out and family requests put NGT back around 8am, then restart NGT feeding and mitten restraint.       Interventions taken   Seen pt at bed side in NAD. Pt oriented to name only, not able to follow verbal commands.   hold NGT feeding over night.  f/u with am team.                    Rosalba Sloan ANP-BC  Spectralink #[id number]

## 2019-10-13 NOTE — PROGRESS NOTE ADULT - PROBLEM SELECTOR PLAN 1
-failed speech and swallow eval x3, will keep NGT for now  -discussed with speech and swallow therapist , pt has difficulty swallowing secretions as well  -failed re-eval as well  -ENT recs appreciated  -continue with NGT for now  -eventual PEG placement if no improvement  -will ask speech and swallow to re-eval this week

## 2019-10-13 NOTE — PROGRESS NOTE ADULT - ASSESSMENT
85 y/o man with hx of prostate CA s/o seed implant and RT, HTN, p/w sudden onset AMS seizure brought to [facility] . Was found to be in status epilepticus and required intubation for airway control. Pt transferred to [facility] for further workup and EEG monitoring with no further seizure activity extubated on 10/1 downgraded to medicine on 10/3:

## 2019-10-13 NOTE — PROGRESS NOTE ADULT - ATTENDING COMMENTS
Agitation: offered starting antipsychotic in the evenings for sundowning and agitation, however pt's daughter declined. Continue with reorientation for now.

## 2019-10-13 NOTE — PROGRESS NOTE ADULT - SUBJECTIVE AND OBJECTIVE BOX
Patient is a 84y old  Male who presents with a chief complaint of status epilepticus (12 Oct 2019 12:21)      SUBJECTIVE / OVERNIGHT EVENTS: pt pulled out NGT overnight, was replaced this morning. Noted to be agitated in the evening yesterday.     MEDICATIONS  (STANDING):  aMILoride 5 milliGRAM(s) Oral daily  aMILoride 10 milliGRAM(s) Oral daily  chlorhexidine 4% Liquid 1 Application(s) Topical <User Schedule>  heparin  Injectable 5000 Unit(s) SubCutaneous every 8 hours  insulin lispro (HumaLOG) corrective regimen sliding scale   SubCutaneous every 6 hours  labetalol 100 milliGRAM(s) Oral every 8 hours  lacosamide IVPB 150 milliGRAM(s) IV Intermittent every 12 hours  levETIRAcetam  IVPB 1500 milliGRAM(s) IV Intermittent every 12 hours  magnesium oxide 400 milliGRAM(s) Oral two times a day with meals  pantoprazole  Injectable 40 milliGRAM(s) IV Push daily  polyethylene glycol 3350 17 Gram(s) Oral two times a day  potassium chloride   Solution 20 milliEquivalent(s) Oral three times a day  predniSONE   Tablet 60  Oral   predniSONE   Tablet 10 milliGRAM(s) Oral daily  senna Syrup 10 milliLiter(s) Oral at bedtime    MEDICATIONS  (PRN):      Vital Signs Last 24 Hrs  T(C): 36.3 (13 Oct 2019 07:31), Max: 37.1 (12 Oct 2019 23:31)  T(F): 97.4 (13 Oct 2019 07:31), Max: 98.8 (12 Oct 2019 23:31)  HR: 88 (13 Oct 2019 07:31) (74 - 99)  BP: 118/74 (13 Oct 2019 07:31) (118/74 - 151/82)  BP(mean): --  RR: 18 (13 Oct 2019 07:31) (18 - 18)  SpO2: 99% (13 Oct 2019 07:31) (96% - 99%)  CAPILLARY BLOOD GLUCOSE      POCT Blood Glucose.: 150 mg/dL (13 Oct 2019 11:10)  POCT Blood Glucose.: 132 mg/dL (13 Oct 2019 06:08)  POCT Blood Glucose.: 176 mg/dL (12 Oct 2019 23:53)  POCT Blood Glucose.: 191 mg/dL (12 Oct 2019 18:28)  POCT Blood Glucose.: 218 mg/dL (12 Oct 2019 12:20)    I&O's Summary    12 Oct 2019 07:01  -  13 Oct 2019 07:00  --------------------------------------------------------  IN: 1300 mL / OUT: 300 mL / NET: 1000 mL        PHYSICAL EXAM:  GENERAL: NAD  EYES: conjunctiva and sclera clear  NECK: Supple, No JVD  CHEST/LUNG: Clear to auscultation bilaterally; No wheeze  HEART: +S1/S2, reg   ABDOMEN: Soft, Nontender, Nondistended; Bowel sounds present  EXTREMITIES: trace edema noted   PSYCH: AAOx0    LABS:                        15.0   13.69 )-----------( 318      ( 12 Oct 2019 11:06 )             45.2     10-13    129<L>  |  90<L>  |  28<H>  ----------------------------<  151<H>  3.8   |  25  |  0.80    Ca    9.7      13 Oct 2019 06:48  Mg     1.3     10-12            Urinalysis Basic - ( 12 Oct 2019 10:58 )    Color: Yellow / Appearance: Clear / S.015 / pH: x  Gluc: x / Ketone: Negative  / Bili: Negative / Urobili: <2 mg/dL   Blood: x / Protein: Trace / Nitrite: Negative   Leuk Esterase: Negative / RBC: x / WBC x   Sq Epi: x / Non Sq Epi: x / Bacteria: x

## 2019-10-13 NOTE — CHART NOTE - NSCHARTNOTEFT_GEN_A_CORE
Discussed with Radiology, reinserted NGT, as pt pulled IT out last night., confirmed with radiology as tip of tube is below the diaphragm. As per Radiology NGT is in good position to use. Will instruct RN to restart tube feeds.   Delfino TALBERT   [id number]

## 2019-10-13 NOTE — PROGRESS NOTE ADULT - PROBLEM SELECTOR PLAN 2
-ddx included status epilepticus, infarct HSV encephalitis, meningitis, hypoxic injury and CJD. likely viral vs. inflammatory based on CSF studies thus far.  - vEEG has consistently shown foci of epileptiform activity without seizure (~5 days)  - Neurology and ID following, recs appreciated  - keppra 2g q12h decreased to 1.5g q12h as per neuro recs  - c/w vimpat 150mg q12h  - was on decadron 10mg daily for inflammatory encephalopathy coverage, now on steroid taper, starting with  prednisone 60mg and decreasing by 20mg every 3 days  - f/u CSF 14-3-3, tau, neuron specific enolase, HTLV-1, JEANETTE virus, HSV 6, paraneoplastic panel      *Negative CSF studies so far:  CSF PCR, Bacterial cx, WNV, Toxo, Cryptococcus, HSV  - ESR/CRP 81/9.53, YANDEL and dsDNa negative - were checked to r/o autoimmune disease in setting of above MRI findings  -repeat MRI brain consistent with status   -continue with current dose of AEDs   -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 F/U additional neuro recs.

## 2019-10-14 LAB
ANION GAP SERPL CALC-SCNC: 17 MMOL/L — SIGNIFICANT CHANGE UP (ref 5–17)
BUN SERPL-MCNC: 35 MG/DL — HIGH (ref 7–23)
CALCIUM SERPL-MCNC: 9.2 MG/DL — SIGNIFICANT CHANGE UP (ref 8.4–10.5)
CHLORIDE SERPL-SCNC: 92 MMOL/L — LOW (ref 96–108)
CO2 SERPL-SCNC: 25 MMOL/L — SIGNIFICANT CHANGE UP (ref 22–31)
CREAT SERPL-MCNC: 0.9 MG/DL — SIGNIFICANT CHANGE UP (ref 0.5–1.3)
GLUCOSE BLDC GLUCOMTR-MCNC: 146 MG/DL — HIGH (ref 70–99)
GLUCOSE BLDC GLUCOMTR-MCNC: 190 MG/DL — HIGH (ref 70–99)
GLUCOSE BLDC GLUCOMTR-MCNC: 197 MG/DL — HIGH (ref 70–99)
GLUCOSE BLDC GLUCOMTR-MCNC: 209 MG/DL — HIGH (ref 70–99)
GLUCOSE BLDC GLUCOMTR-MCNC: 251 MG/DL — HIGH (ref 70–99)
GLUCOSE SERPL-MCNC: 215 MG/DL — HIGH (ref 70–99)
HCT VFR BLD CALC: 44.4 % — SIGNIFICANT CHANGE UP (ref 39–50)
HGB BLD-MCNC: 14.6 G/DL — SIGNIFICANT CHANGE UP (ref 13–17)
MCHC RBC-ENTMCNC: 31.1 PG — SIGNIFICANT CHANGE UP (ref 27–34)
MCHC RBC-ENTMCNC: 32.9 GM/DL — SIGNIFICANT CHANGE UP (ref 32–36)
MCV RBC AUTO: 94.7 FL — SIGNIFICANT CHANGE UP (ref 80–100)
MISCELLANEOUS TEST NAME: SIGNIFICANT CHANGE UP
MISCELLANEOUS TEST NAME: SIGNIFICANT CHANGE UP
MISCELLANEOUS, NORMALS: SIGNIFICANT CHANGE UP
MISCELLANEOUS, RESULT: SIGNIFICANT CHANGE UP
PLATELET # BLD AUTO: 312 K/UL — SIGNIFICANT CHANGE UP (ref 150–400)
POTASSIUM SERPL-MCNC: 3.6 MMOL/L — SIGNIFICANT CHANGE UP (ref 3.5–5.3)
POTASSIUM SERPL-SCNC: 3.6 MMOL/L — SIGNIFICANT CHANGE UP (ref 3.5–5.3)
RBC # BLD: 4.69 M/UL — SIGNIFICANT CHANGE UP (ref 4.2–5.8)
RBC # FLD: 15.4 % — HIGH (ref 10.3–14.5)
SODIUM SERPL-SCNC: 134 MMOL/L — LOW (ref 135–145)
WBC # BLD: 12.8 K/UL — HIGH (ref 3.8–10.5)
WBC # FLD AUTO: 12.8 K/UL — HIGH (ref 3.8–10.5)

## 2019-10-14 PROCEDURE — 71045 X-RAY EXAM CHEST 1 VIEW: CPT | Mod: 26

## 2019-10-14 PROCEDURE — 99232 SBSQ HOSP IP/OBS MODERATE 35: CPT

## 2019-10-14 RX ADMIN — Medication 20 MILLIEQUIVALENT(S): at 06:20

## 2019-10-14 RX ADMIN — Medication 20 MILLIEQUIVALENT(S): at 23:18

## 2019-10-14 RX ADMIN — MAGNESIUM OXIDE 400 MG ORAL TABLET 400 MILLIGRAM(S): 241.3 TABLET ORAL at 02:07

## 2019-10-14 RX ADMIN — PANTOPRAZOLE SODIUM 40 MILLIGRAM(S): 20 TABLET, DELAYED RELEASE ORAL at 12:32

## 2019-10-14 RX ADMIN — LEVETIRACETAM 400 MILLIGRAM(S): 250 TABLET, FILM COATED ORAL at 12:32

## 2019-10-14 RX ADMIN — MAGNESIUM OXIDE 400 MG ORAL TABLET 400 MILLIGRAM(S): 241.3 TABLET ORAL at 17:10

## 2019-10-14 RX ADMIN — CHLORHEXIDINE GLUCONATE 1 APPLICATION(S): 213 SOLUTION TOPICAL at 08:05

## 2019-10-14 RX ADMIN — HEPARIN SODIUM 5000 UNIT(S): 5000 INJECTION INTRAVENOUS; SUBCUTANEOUS at 12:32

## 2019-10-14 RX ADMIN — Medication 3: at 18:31

## 2019-10-14 RX ADMIN — Medication 10 MILLIGRAM(S): at 06:19

## 2019-10-14 RX ADMIN — HEPARIN SODIUM 5000 UNIT(S): 5000 INJECTION INTRAVENOUS; SUBCUTANEOUS at 23:18

## 2019-10-14 RX ADMIN — HEPARIN SODIUM 5000 UNIT(S): 5000 INJECTION INTRAVENOUS; SUBCUTANEOUS at 06:19

## 2019-10-14 RX ADMIN — Medication 100 MILLIGRAM(S): at 23:16

## 2019-10-14 RX ADMIN — Medication 100 MILLIGRAM(S): at 12:33

## 2019-10-14 RX ADMIN — MAGNESIUM OXIDE 400 MG ORAL TABLET 400 MILLIGRAM(S): 241.3 TABLET ORAL at 08:04

## 2019-10-14 RX ADMIN — Medication 2: at 12:47

## 2019-10-14 RX ADMIN — Medication 1: at 06:37

## 2019-10-14 RX ADMIN — LACOSAMIDE 130 MILLIGRAM(S): 50 TABLET ORAL at 17:10

## 2019-10-14 RX ADMIN — LEVETIRACETAM 400 MILLIGRAM(S): 250 TABLET, FILM COATED ORAL at 23:22

## 2019-10-14 RX ADMIN — Medication 20 MILLIEQUIVALENT(S): at 12:33

## 2019-10-14 RX ADMIN — SENNA PLUS 10 MILLILITER(S): 8.6 TABLET ORAL at 23:17

## 2019-10-14 RX ADMIN — LEVETIRACETAM 400 MILLIGRAM(S): 250 TABLET, FILM COATED ORAL at 01:17

## 2019-10-14 RX ADMIN — Medication 20 MILLIEQUIVALENT(S): at 02:06

## 2019-10-14 RX ADMIN — Medication 5 MILLIGRAM(S): at 17:10

## 2019-10-14 RX ADMIN — LACOSAMIDE 130 MILLIGRAM(S): 50 TABLET ORAL at 06:20

## 2019-10-14 NOTE — PROGRESS NOTE ADULT - PROBLEM SELECTOR PLAN 1
-failed speech and swallow eval x3, will keep NGT for now  -discussed with speech and swallow therapist , pt has difficulty swallowing secretions as well  -failed re-eval as well  -ENT recs appreciated  -continue with NGT for now  -eventual PEG placement if no improvement  -awaiting callback from speech therapist

## 2019-10-14 NOTE — PROGRESS NOTE ADULT - ASSESSMENT
85 y/o man with hx of prostate CA s/o seed implant and RT, HTN, p/w sudden onset AMS seizure on anti - sz meds.  he has required ngt. is being followed by neuro as well. he was noticed with persistent hypokalemia. he and his brother has had hypokalemia and hypomagnesemia as output.     1- hypokalemia  2- hypomagnesemia hx  3- htn recently     he has hx of gitelman's syndrome.  increase  amiloride to 15 mg am and 15 mg pm and dc kcl    cont with mag ox 400 mg bid   trend k   labetalol 100 mg bid to cont

## 2019-10-14 NOTE — PROGRESS NOTE ADULT - SUBJECTIVE AND OBJECTIVE BOX
[facility]   [phone]  RENAL FOLLOW UP NOTE  --------------------------------------------------------------------------------  Chief Complaint:    24 hour events/subjective:    seen earlier family at bedside. states he has been intermittently communicative     PAST HISTORY  --------------------------------------------------------------------------------  No significant changes to PMH, PSH, FHx, SHx, unless otherwise noted    ALLERGIES & MEDICATIONS  --------------------------------------------------------------------------------  Allergies    No Known Allergies    Intolerances      Standing Inpatient Medications  aMILoride 5 milliGRAM(s) Oral daily  aMILoride 10 milliGRAM(s) Oral daily  chlorhexidine 4% Liquid 1 Application(s) Topical <User Schedule>  heparin  Injectable 5000 Unit(s) SubCutaneous every 8 hours  insulin lispro (HumaLOG) corrective regimen sliding scale   SubCutaneous every 6 hours  labetalol 100 milliGRAM(s) Oral every 8 hours  lacosamide IVPB 150 milliGRAM(s) IV Intermittent every 12 hours  levETIRAcetam  IVPB 1500 milliGRAM(s) IV Intermittent every 12 hours  magnesium oxide 400 milliGRAM(s) Oral two times a day with meals  pantoprazole  Injectable 40 milliGRAM(s) IV Push daily  polyethylene glycol 3350 17 Gram(s) Oral two times a day  potassium chloride   Solution 20 milliEquivalent(s) Oral three times a day  predniSONE   Tablet 60  Oral   predniSONE   Tablet 10 milliGRAM(s) Oral daily  senna Syrup 10 milliLiter(s) Oral at bedtime    PRN Inpatient Medications      REVIEW OF SYSTEMS  --------------------------------------------------------------------------------    pt unable     VITALS/PHYSICAL EXAM  --------------------------------------------------------------------------------  T(C): 37.3 (10-14-19 @ 15:33), Max: 37.3 (10-14-19 @ 15:33)  HR: 124 (10-14-19 @ 15:33) (84 - 124)  BP: 124/85 (10-14-19 @ 15:33) (124/85 - 138/90)  RR: 18 (10-14-19 @ 15:33) (18 - 18)  SpO2: 96% (10-14-19 @ 15:33) (95% - 96%)  Wt(kg): --        10-13-19 @ 07:01  -  10-14-19 @ 07:00  --------------------------------------------------------  IN: 65 mL / OUT: 700 mL / NET: -635 mL      Physical Exam:  Gen: NGT non communicative when seen   	no jvd ,  	Pulm: decrease bs  no rales or ronchi or wheezing  	CV: RRR, S1S2; no rub  	Abd: +BS, soft, nontender/nondistended  	: No suprapubic tenderness  	UE: Warm, no cyanosis  no clubbing,  no edema;  	LE: Warm, no cyanosis  no clubbing, no edema  	Neuro:  remains lethargic    	Skin: Warm, no decrease skin turgor 	    	      LABS/STUDIES  --------------------------------------------------------------------------------              14.6   12.80 >-----------<  312      [10-14-19 @ 09:33]              44.4     134  |  92  |  35  ----------------------------<  215      [10-14-19 @ 07:30]  3.6   |  25  |  0.90        Ca     9.2     [10-14-19 @ 07:30]            Creatinine Trend:  SCr 0.90 [10-14 @ 07:30]  SCr 0.80 [10-13 @ 06:48]  SCr 0.78 [10-12 @ 07:19]  SCr 0.80 [10-11 @ 21:59]  SCr 0.78 [10-11 @ 06:58]              Urinalysis - [10-12-19 @ 10:58]      Color Yellow / Appearance Clear / SG 1.015 / pH 8.0      Gluc Negative / Ketone Negative  / Bili Negative / Urobili <2 mg/dL       Blood Negative / Protein Trace / Leuk Est Negative / Nitrite Negative      RBC  / WBC  / Hyaline  / Gran  / Sq Epi  / Non Sq Epi  / Bacteria     Urine Creatinine 56      [10-11-19 @ 14:21]  Urine Sodium 78      [10-11-19 @ 14:21]  Urine Potassium >100      [10-11-19 @ 14:21]  Urine Osmolality 690      [10-11-19 @ 15:08]    HbA1c 5.7      [09-25-19 @ 05:57]    YANDEL: titer Negative, pattern --      [09-28-19 @ 14:46]  dsDNA <12      [09-28-19 @ 14:46]  Rheumatoid Factor <10      [09-28-19 @ 14:42]  Syphilis Screen (Treponema Pallidum Ab) Negative      [09-29-19 @ 04:17]

## 2019-10-14 NOTE — PROGRESS NOTE ADULT - SUBJECTIVE AND OBJECTIVE BOX
Patient is a 84y old  Male who presents with a chief complaint of status epilepticus (13 Oct 2019 11:54)      SUBJECTIVE / OVERNIGHT EVENTS:  NGT replaced, now on 1:1 sit     MEDICATIONS  (STANDING):  aMILoride 5 milliGRAM(s) Oral daily  aMILoride 10 milliGRAM(s) Oral daily  chlorhexidine 4% Liquid 1 Application(s) Topical <User Schedule>  heparin  Injectable 5000 Unit(s) SubCutaneous every 8 hours  insulin lispro (HumaLOG) corrective regimen sliding scale   SubCutaneous every 6 hours  labetalol 100 milliGRAM(s) Oral every 8 hours  lacosamide IVPB 150 milliGRAM(s) IV Intermittent every 12 hours  levETIRAcetam  IVPB 1500 milliGRAM(s) IV Intermittent every 12 hours  magnesium oxide 400 milliGRAM(s) Oral two times a day with meals  pantoprazole  Injectable 40 milliGRAM(s) IV Push daily  polyethylene glycol 3350 17 Gram(s) Oral two times a day  potassium chloride   Solution 20 milliEquivalent(s) Oral three times a day  predniSONE   Tablet 60  Oral   predniSONE   Tablet 10 milliGRAM(s) Oral daily  senna Syrup 10 milliLiter(s) Oral at bedtime    MEDICATIONS  (PRN):      Vital Signs Last 24 Hrs  T(C): 36.8 (14 Oct 2019 09:00), Max: 36.8 (14 Oct 2019 09:00)  T(F): 98.2 (14 Oct 2019 09:00), Max: 98.2 (14 Oct 2019 09:00)  HR: 91 (14 Oct 2019 09:00) (84 - 91)  BP: 138/87 (14 Oct 2019 09:00) (138/87 - 138/90)  BP(mean): --  RR: 18 (14 Oct 2019 09:00) (18 - 18)  SpO2: 95% (14 Oct 2019 09:00) (95% - 95%)  CAPILLARY BLOOD GLUCOSE      POCT Blood Glucose.: 209 mg/dL (14 Oct 2019 12:38)  POCT Blood Glucose.: 190 mg/dL (14 Oct 2019 06:32)  POCT Blood Glucose.: 146 mg/dL (14 Oct 2019 01:16)  POCT Blood Glucose.: 149 mg/dL (13 Oct 2019 21:22)    I&O's Summary    13 Oct 2019 07:01  -  14 Oct 2019 07:00  --------------------------------------------------------  IN: 65 mL / OUT: 700 mL / NET: -635 mL        PHYSICAL EXAM:  GENERAL: NAD  NOSE: NGT in place   NECK: Supple, No JVD  CHEST/LUNG: Clear to auscultation bilaterally; No wheeze  HEART: +s1/S2, reg  ABDOMEN: Soft, Nontender, Nondistended; Bowel sounds present  EXTREMITIES: trace edema present   PSYCH: AAOx1    LABS:                        14.6   12.80 )-----------( 312      ( 14 Oct 2019 09:33 )             44.4     10-14    134<L>  |  92<L>  |  35<H>  ----------------------------<  215<H>  3.6   |  25  |  0.90    Ca    9.2      14 Oct 2019 07:30

## 2019-10-14 NOTE — PROGRESS NOTE ADULT - ASSESSMENT
83 y/o man with hx of prostate CA s/o seed implant and RT, HTN, p/w sudden onset AMS seizure brought to [facility] . Was found to be in status epilepticus and required intubation for airway control. Pt transferred to [facility] for further workup and EEG monitoring with no further seizure activity extubated on 10/1 downgraded to medicine on 10/3:

## 2019-10-15 LAB
ANION GAP SERPL CALC-SCNC: 17 MMOL/L — SIGNIFICANT CHANGE UP (ref 5–17)
BASE EXCESS BLDA CALC-SCNC: 5.1 MMOL/L — HIGH (ref -2–2)
BUN SERPL-MCNC: 37 MG/DL — HIGH (ref 7–23)
CALCIUM SERPL-MCNC: 9.3 MG/DL — SIGNIFICANT CHANGE UP (ref 8.4–10.5)
CHLORIDE SERPL-SCNC: 95 MMOL/L — LOW (ref 96–108)
CO2 BLDA-SCNC: 29 MMOL/L — SIGNIFICANT CHANGE UP (ref 22–30)
CO2 SERPL-SCNC: 25 MMOL/L — SIGNIFICANT CHANGE UP (ref 22–31)
CREAT SERPL-MCNC: 0.95 MG/DL — SIGNIFICANT CHANGE UP (ref 0.5–1.3)
GLUCOSE BLDC GLUCOMTR-MCNC: 212 MG/DL — HIGH (ref 70–99)
GLUCOSE BLDC GLUCOMTR-MCNC: 227 MG/DL — HIGH (ref 70–99)
GLUCOSE BLDC GLUCOMTR-MCNC: 258 MG/DL — HIGH (ref 70–99)
GLUCOSE SERPL-MCNC: 263 MG/DL — HIGH (ref 70–99)
HCO3 BLDA-SCNC: 28 MMOL/L — SIGNIFICANT CHANGE UP (ref 21–29)
HCT VFR BLD CALC: 42.2 % — SIGNIFICANT CHANGE UP (ref 39–50)
HGB BLD-MCNC: 13.9 G/DL — SIGNIFICANT CHANGE UP (ref 13–17)
HOROWITZ INDEX BLDA+IHG-RTO: 21 — SIGNIFICANT CHANGE UP
MCHC RBC-ENTMCNC: 30.6 PG — SIGNIFICANT CHANGE UP (ref 27–34)
MCHC RBC-ENTMCNC: 32.9 GM/DL — SIGNIFICANT CHANGE UP (ref 32–36)
MCV RBC AUTO: 93 FL — SIGNIFICANT CHANGE UP (ref 80–100)
PCO2 BLDA: 37 MMHG — SIGNIFICANT CHANGE UP (ref 32–46)
PH BLDA: 7.5 — HIGH (ref 7.35–7.45)
PLATELET # BLD AUTO: 275 K/UL — SIGNIFICANT CHANGE UP (ref 150–400)
PO2 BLDA: 82 MMHG — SIGNIFICANT CHANGE UP (ref 74–108)
POTASSIUM SERPL-MCNC: 3.2 MMOL/L — LOW (ref 3.5–5.3)
POTASSIUM SERPL-SCNC: 3.2 MMOL/L — LOW (ref 3.5–5.3)
RBC # BLD: 4.54 M/UL — SIGNIFICANT CHANGE UP (ref 4.2–5.8)
RBC # FLD: 15.2 % — HIGH (ref 10.3–14.5)
SAO2 % BLDA: 96 % — SIGNIFICANT CHANGE UP (ref 92–96)
SODIUM SERPL-SCNC: 137 MMOL/L — SIGNIFICANT CHANGE UP (ref 135–145)
WBC # BLD: 11.62 K/UL — HIGH (ref 3.8–10.5)
WBC # FLD AUTO: 11.62 K/UL — HIGH (ref 3.8–10.5)

## 2019-10-15 PROCEDURE — 99232 SBSQ HOSP IP/OBS MODERATE 35: CPT | Mod: GC

## 2019-10-15 PROCEDURE — 99232 SBSQ HOSP IP/OBS MODERATE 35: CPT

## 2019-10-15 RX ORDER — AMILORIDE HCL 5 MG
10 TABLET ORAL
Refills: 0 | Status: DISCONTINUED | OUTPATIENT
Start: 2019-10-15 | End: 2019-10-23

## 2019-10-15 RX ORDER — LACOSAMIDE 50 MG/1
150 TABLET ORAL EVERY 12 HOURS
Refills: 0 | Status: DISCONTINUED | OUTPATIENT
Start: 2019-10-15 | End: 2019-10-22

## 2019-10-15 RX ADMIN — Medication 10 MILLIGRAM(S): at 06:07

## 2019-10-15 RX ADMIN — Medication 1: at 00:04

## 2019-10-15 RX ADMIN — PANTOPRAZOLE SODIUM 40 MILLIGRAM(S): 20 TABLET, DELAYED RELEASE ORAL at 11:38

## 2019-10-15 RX ADMIN — MAGNESIUM OXIDE 400 MG ORAL TABLET 400 MILLIGRAM(S): 241.3 TABLET ORAL at 09:21

## 2019-10-15 RX ADMIN — HEPARIN SODIUM 5000 UNIT(S): 5000 INJECTION INTRAVENOUS; SUBCUTANEOUS at 21:43

## 2019-10-15 RX ADMIN — LACOSAMIDE 130 MILLIGRAM(S): 50 TABLET ORAL at 06:02

## 2019-10-15 RX ADMIN — Medication 20 MILLIEQUIVALENT(S): at 11:39

## 2019-10-15 RX ADMIN — Medication 3: at 12:23

## 2019-10-15 RX ADMIN — Medication 20 MILLIEQUIVALENT(S): at 06:05

## 2019-10-15 RX ADMIN — Medication 2: at 17:41

## 2019-10-15 RX ADMIN — LEVETIRACETAM 400 MILLIGRAM(S): 250 TABLET, FILM COATED ORAL at 11:39

## 2019-10-15 RX ADMIN — MAGNESIUM OXIDE 400 MG ORAL TABLET 400 MILLIGRAM(S): 241.3 TABLET ORAL at 17:40

## 2019-10-15 RX ADMIN — LACOSAMIDE 130 MILLIGRAM(S): 50 TABLET ORAL at 17:40

## 2019-10-15 RX ADMIN — HEPARIN SODIUM 5000 UNIT(S): 5000 INJECTION INTRAVENOUS; SUBCUTANEOUS at 11:39

## 2019-10-15 RX ADMIN — Medication 10 MILLIGRAM(S): at 06:03

## 2019-10-15 RX ADMIN — POLYETHYLENE GLYCOL 3350 17 GRAM(S): 17 POWDER, FOR SOLUTION ORAL at 06:04

## 2019-10-15 RX ADMIN — SENNA PLUS 10 MILLILITER(S): 8.6 TABLET ORAL at 21:41

## 2019-10-15 RX ADMIN — Medication 2: at 06:03

## 2019-10-15 RX ADMIN — HEPARIN SODIUM 5000 UNIT(S): 5000 INJECTION INTRAVENOUS; SUBCUTANEOUS at 06:04

## 2019-10-15 RX ADMIN — POLYETHYLENE GLYCOL 3350 17 GRAM(S): 17 POWDER, FOR SOLUTION ORAL at 17:40

## 2019-10-15 RX ADMIN — CHLORHEXIDINE GLUCONATE 1 APPLICATION(S): 213 SOLUTION TOPICAL at 09:23

## 2019-10-15 RX ADMIN — Medication 10 MILLIGRAM(S): at 21:42

## 2019-10-15 RX ADMIN — Medication 100 MILLIGRAM(S): at 21:42

## 2019-10-15 NOTE — SWALLOW BEDSIDE ASSESSMENT ADULT - ORAL PHASE
Delayed oral transit time

## 2019-10-15 NOTE — SWALLOW BEDSIDE ASSESSMENT ADULT - SWALLOW EVAL: CRITERIA FOR SKILLED INTERVENTION MET
TBD
demonstrates skilled criteria for swallowing intervention
demonstrates skilled criteria for swallowing intervention
TBD
TBD
will follow

## 2019-10-15 NOTE — SWALLOW BEDSIDE ASSESSMENT ADULT - SWALLOW EVAL: SECRETION MANAGEMENT
Pt with evidence of reduced secretion management notable for wet/gurgly vocal quality at baseline, with difficulty clearing via cued cough.

## 2019-10-15 NOTE — SWALLOW BEDSIDE ASSESSMENT ADULT - DIET PRIOR TO ADMI
Regular texture diet, per family report

## 2019-10-15 NOTE — SWALLOW BEDSIDE ASSESSMENT ADULT - SLP GENERAL OBSERVATIONS
Pt seen at bedside, awake and alert, OOB in chair. Pt verbally responsive, inconsistently following directions for the purposes of the exam. Persistent intermittent wet/gurgly vocal quality at baseline.

## 2019-10-15 NOTE — PROGRESS NOTE ADULT - SUBJECTIVE AND OBJECTIVE BOX
Neurology Follow up note    Interval history - no overnight events     ROS - unable to obtain    Vital Signs Last 24 Hrs  T(C): 36.9 (15 Oct 2019 16:24), Max: 37.1 (15 Oct 2019 10:49)  T(F): 98.5 (15 Oct 2019 16:24), Max: 98.7 (15 Oct 2019 10:49)  HR: 85 (15 Oct 2019 16:24) (83 - 110)  BP: 128/76 (15 Oct 2019 16:24) (117/75 - 134/79)  BP(mean): --  RR: 18 (15 Oct 2019 16:24) (18 - 18)  SpO2: 97% (15 Oct 2019 16:24) (94% - 97%)      Genral Exam:   General appearance: No acute distress, NGT in place. LUE splint                Neurological Exam:  Mental Status: lethargic, no verbal output today.    Cranial Nerves:  PERRL, EOMI, VFF to confrontation, no nystagmus.    CN V1-3 intact to light touch. left facial droop.  Hearing intact bilaterally.  Tongue midline.  Shoulder shrug decreased on the left.     Motor: Flaccid tone LUE. has difficulty attending to LUE, possibly 2/5 at the elbow, otherwise flaccid paralysis. in antigravity in the LLE, 5/5 on the left side.     Coord: unable to assess as pt was very drowsy    Tremor: No resting, postural or action tremor.  No myoclonus.    Sensation: intact to light touch, right left confusion    Deep Tendon Reflexes: 1+ bilateral biceps, triceps, brachioradialis, knee and ankle. No Babinski present.    Gait: bedbound                          13.9   11.62 )-----------( 275      ( 15 Oct 2019 09:13 )             42.2   10-15    137  |  95<L>  |  37<H>  ----------------------------<  263<H>  3.2<L>   |  25  |  0.95    Ca    9.3      15 Oct 2019 07:18        Radiology    < from: MR Head w/wo IV Cont (09.25.19 @ 17:09) >  FINDINGS: Abnormal areas of cortical restricted diffusion (high signal on   diffusion-weighted imaging and corresponding dropout on ADC map) are   notable most pronounced within the right parietal and occipital cortices.   Abnormal cortical restricted diffusion is also noted within bilateral   cingulate gyri, right worse than left, right pulvinar of the thalamus,   right medial temporal lobe, and hippocampus. There is associated T2/FLAIR   prolongation in these areas along with mild gyral swelling. There is   associated sulcal effacement. There is no abnormal associated   enhancement. These findings were present on the outside MRI examination   from 9/23/2019.     Multiple additional patchy confluent nonspecific T2/FLAIR hyperintense   signal changes are noted throughout the bihemispheric white matter   without associated mass effect or restricted diffusion.      MEDICATIONS  (STANDING):  chlorhexidine 4% Liquid 1 Application(s) Topical <User Schedule>  heparin  Injectable 5000 Unit(s) SubCutaneous every 8 hours  insulin lispro (HumaLOG) corrective regimen sliding scale   SubCutaneous every 6 hours  insulin NPH human recombinant 10 Unit(s) SubCutaneous every 6 hours  labetalol 100 milliGRAM(s) Oral every 8 hours  lacosamide IVPB 150 milliGRAM(s) IV Intermittent every 12 hours  levETIRAcetam  IVPB 1500 milliGRAM(s) IV Intermittent every 12 hours  pantoprazole  Injectable 40 milliGRAM(s) IV Push daily  polyethylene glycol 3350 17 Gram(s) Oral two times a day  predniSONE   Tablet   Oral   predniSONE   Tablet 60 milliGRAM(s) Oral daily  senna Syrup 10 milliLiter(s) Oral at bedtime    MEDICATIONS  (PRN): Neurology Follow up note    Interval history - no overnight events     ROS - unable to obtain    Vital Signs Last 24 Hrs  T(C): 36.9 (15 Oct 2019 16:24), Max: 37.1 (15 Oct 2019 10:49)  T(F): 98.5 (15 Oct 2019 16:24), Max: 98.7 (15 Oct 2019 10:49)  HR: 85 (15 Oct 2019 16:24) (83 - 110)  BP: 128/76 (15 Oct 2019 16:24) (117/75 - 134/79)  BP(mean): --  RR: 18 (15 Oct 2019 16:24) (18 - 18)  SpO2: 97% (15 Oct 2019 16:24) (94% - 97%)      Genral Exam:   General appearance: No acute distress, NGT in place. LUE splint                Neurological Exam:  Mental Status: lethargic, no verbal output today. Eye opening apraxia    Cranial Nerves:  PERRL, EOMI, VFF to confrontation, no nystagmus.    CN V1-3 intact to light touch. left facial droop.  Hearing intact bilaterally.  Tongue midline.  Shoulder shrug decreased on the left.     Motor: Flaccid tone LUE. has difficulty attending to LUE, possibly 2/5 at the elbow, otherwise flaccid paralysis. in antigravity in the LLE, 5/5 on the left side.     Coord: unable to assess as pt was very drowsy    Tremor: No resting, postural or action tremor.  No myoclonus.    Sensation: intact to light touch, right left confusion    Deep Tendon Reflexes: 1+ bilateral biceps, triceps, brachioradialis, knee and ankle. No Babinski present.    Gait: bedbound                          13.9   11.62 )-----------( 275      ( 15 Oct 2019 09:13 )             42.2   10-15    137  |  95<L>  |  37<H>  ----------------------------<  263<H>  3.2<L>   |  25  |  0.95    Ca    9.3      15 Oct 2019 07:18        Radiology    < from: MR Head w/wo IV Cont (09.25.19 @ 17:09) >  FINDINGS: Abnormal areas of cortical restricted diffusion (high signal on   diffusion-weighted imaging and corresponding dropout on ADC map) are   notable most pronounced within the right parietal and occipital cortices.   Abnormal cortical restricted diffusion is also noted within bilateral   cingulate gyri, right worse than left, right pulvinar of the thalamus,   right medial temporal lobe, and hippocampus. There is associated T2/FLAIR   prolongation in these areas along with mild gyral swelling. There is   associated sulcal effacement. There is no abnormal associated   enhancement. These findings were present on the outside MRI examination   from 9/23/2019.     Multiple additional patchy confluent nonspecific T2/FLAIR hyperintense   signal changes are noted throughout the bihemispheric white matter   without associated mass effect or restricted diffusion.      MEDICATIONS  (STANDING):  chlorhexidine 4% Liquid 1 Application(s) Topical <User Schedule>  heparin  Injectable 5000 Unit(s) SubCutaneous every 8 hours  insulin lispro (HumaLOG) corrective regimen sliding scale   SubCutaneous every 6 hours  insulin NPH human recombinant 10 Unit(s) SubCutaneous every 6 hours  labetalol 100 milliGRAM(s) Oral every 8 hours  lacosamide IVPB 150 milliGRAM(s) IV Intermittent every 12 hours  levETIRAcetam  IVPB 1500 milliGRAM(s) IV Intermittent every 12 hours  pantoprazole  Injectable 40 milliGRAM(s) IV Push daily  polyethylene glycol 3350 17 Gram(s) Oral two times a day  predniSONE   Tablet   Oral   predniSONE   Tablet 60 milliGRAM(s) Oral daily  senna Syrup 10 milliLiter(s) Oral at bedtime    MEDICATIONS  (PRN):

## 2019-10-15 NOTE — PROGRESS NOTE ADULT - SUBJECTIVE AND OBJECTIVE BOX
Patient is a 84y old  Male who presents with a chief complaint of status epilepticus (14 Oct 2019 21:24)    SUBJECTIVE / OVERNIGHT EVENTS: patient has been more lethargic and sleeping for most of the day     MEDICATIONS  (STANDING):  aMILoride 5 milliGRAM(s) Oral daily  aMILoride 10 milliGRAM(s) Oral daily  chlorhexidine 4% Liquid 1 Application(s) Topical <User Schedule>  heparin  Injectable 5000 Unit(s) SubCutaneous every 8 hours  insulin lispro (HumaLOG) corrective regimen sliding scale   SubCutaneous every 6 hours  labetalol 100 milliGRAM(s) Oral every 8 hours  lacosamide IVPB 150 milliGRAM(s) IV Intermittent every 12 hours  levETIRAcetam  IVPB 1500 milliGRAM(s) IV Intermittent every 12 hours  magnesium oxide 400 milliGRAM(s) Oral two times a day with meals  pantoprazole  Injectable 40 milliGRAM(s) IV Push daily  polyethylene glycol 3350 17 Gram(s) Oral two times a day  potassium chloride   Solution 20 milliEquivalent(s) Oral three times a day  senna Syrup 10 milliLiter(s) Oral at bedtime    MEDICATIONS  (PRN):      Vital Signs Last 24 Hrs  T(C): 37.1 (15 Oct 2019 10:49), Max: 37.3 (14 Oct 2019 15:33)  T(F): 98.7 (15 Oct 2019 10:49), Max: 99.1 (14 Oct 2019 15:33)  HR: 83 (15 Oct 2019 10:49) (83 - 124)  BP: 134/79 (15 Oct 2019 10:49) (117/75 - 134/79)  BP(mean): --  RR: 18 (15 Oct 2019 10:49) (18 - 18)  SpO2: 94% (15 Oct 2019 10:49) (94% - 96%)  CAPILLARY BLOOD GLUCOSE      POCT Blood Glucose.: 258 mg/dL (15 Oct 2019 12:09)  POCT Blood Glucose.: 227 mg/dL (15 Oct 2019 05:56)  POCT Blood Glucose.: 197 mg/dL (14 Oct 2019 23:49)  POCT Blood Glucose.: 251 mg/dL (14 Oct 2019 18:25)    I&O's Summary    14 Oct 2019 07:01  -  15 Oct 2019 07:00  --------------------------------------------------------  IN: 0 mL / OUT: 500 mL / NET: -500 mL      PHYSICAL EXAM:  GENERAL: NAD  NOSE: NGT in place   CHEST/LUNG: Clear to auscultation bilaterally; No wheeze  HEART: +S1/S2, reg   ABDOMEN: Soft, Nontender, Nondistended; Bowel sounds present  EXTREMITIES: no LE edema   PSYCH: lethargic     LABS:                        13.9   11.62 )-----------( 275      ( 15 Oct 2019 09:13 )             42.2     10-15    137  |  95<L>  |  37<H>  ----------------------------<  263<H>  3.2<L>   |  25  |  0.95    Ca    9.3      15 Oct 2019 07:18

## 2019-10-15 NOTE — SWALLOW BEDSIDE ASSESSMENT ADULT - ASR SWALLOW LINGUAL MOBILITY
reduced coordination
impaired left lateral movement/impaired right lateral movement/impaired protrusion/impaired anterior elevation
impaired anterior elevation/reduced coordination
reduced coordination/impaired anterior elevation

## 2019-10-15 NOTE — SWALLOW BEDSIDE ASSESSMENT ADULT - SWALLOW EVAL: REHAB POTENTIAL
TBD
fair, will monitor progress closely
fair, will monitor progress closely
TBD
TBD
fair, will monitor progress closely

## 2019-10-15 NOTE — PROGRESS NOTE ADULT - ASSESSMENT
85 y/o man with hx of prostate CA s/o seed implant and RT, HTN, p/w sudden onset AMS seizure on anti - sz meds.  he has required ngt. is being followed by neuro as well. he was noticed with persistent hypokalemia. he and his brother has had hypokalemia and hypomagnesemia as output.     1- hypokalemia  2- hypomagnesemia hx  3- htn recently     he has hx of gitelman's syndrome.  increase  amiloride to 10 mg tid and dc kcl    cont with mag ox 400 mg bid   trend k and dc labetalol if bp lower

## 2019-10-15 NOTE — SWALLOW BEDSIDE ASSESSMENT ADULT - MODE OF PRESENTATION
spoon/fed by clinician
spoon/fed by clinician
fed by clinician
spoon/fed by clinician
fed by clinician/spoon
spoon/fed by clinician

## 2019-10-15 NOTE — PROGRESS NOTE ADULT - PROBLEM SELECTOR PLAN 1
-failed speech and swallow eval x3, will keep NGT for now  -discussed with speech and swallow therapist , pt has difficulty swallowing secretions as well  -failed re-eval as well  -ENT recs appreciated  -continue with NGT for now  -eventual PEG placement if no improvement  -discussed again with speech and swallow, to re-eval today

## 2019-10-15 NOTE — SWALLOW BEDSIDE ASSESSMENT ADULT - SWALLOW EVAL: DIAGNOSIS
85 yo male admitted s/p fall with status epilepticus, requiring intubation, with recurrent Sz activity, undergoing neuro and infectious work-up for encephalitis, all testing negative, with no definitive etiology determined. Pt continues to present with evidence of oropharyngeal dysphagia superimposed upon reduced mental status. Pt with fluctuating alertness, briefly alert during assessment,  presents with oropharyngeal swallow notable for impaired oral management and overt s/s laryngeal penetration/aspiration with trials of most conservative textures.

## 2019-10-15 NOTE — SWALLOW BEDSIDE ASSESSMENT ADULT - ASPIRATION PRECAUTIONS
yes/Strict aspiration and reflux precautions!
yes
for secretions and enteral feeds/yes
yes/Strict aspiration and reflux precautions!
yes/Strict aspiration and reflux precautions!
yes/for secretions and enteral feeds

## 2019-10-15 NOTE — SWALLOW BEDSIDE ASSESSMENT ADULT - COMMENTS
s/p LP 9/25 for CSF assessment. Pt's vEEG consistently showed epileptiform focus, but no seizure activity. MRI brain 9/25 showed abnormal areas of cortical restricted diffusion (high signal on diffusion-weighted imaging and corresponding dropout on ADC map) and notably most pronounced within the right parietal and occipital cortices.  Abnormal cortical restricted diffusion is also noted within bilateral  cingulate gyri, right worse than left, right pulvinar of the thalamus, right medial temporal lobe, and hippocampus. There is associated T2/FLAIR prolongation in these areas along with mild gyral swelling.  He has complete L-sided neglect. He was noticed to have a weakened L dorsalis pedis pulse and the foot was colder than the right. 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 No further seizure activity, extubated on 10/1 downgraded to medicine on 10/3. Negative CSF studies so far:  Bacterial cx, WNV, Toxo, Cryptococcus, HSV, encephalitis panel. Need to f/u 14-3-3, tau, neuron specific enolase, HTLV-1, JEANETTE virus, HSV 6, paraneoplastic panel.    Repeat MRI brain 10/4: Imaging findings compatible with status epilepticus concordant with the patient's history. Decreased conspicuity of abnormal diffusion with persistent T2/FLAIR prolongation within the brain as outlined in the report. Underlying etiology includes but is not limited to hypoxia, metabolic derangement, drug toxicity, alcohol intoxication or withdrawal, or encephalitis. Evaluation of postcontrast imaging is limited by motion artifact. No gross abnormal intracranial enhancement.

## 2019-10-15 NOTE — SWALLOW BEDSIDE ASSESSMENT ADULT - SWALLOW EVAL: ORAL MUSCULATURE
generally intact/slow, delayed response
unable to assess due to poor participation/comprehension
reduced left; reduced coordination
unable to assess due to poor participation/comprehension

## 2019-10-15 NOTE — PROGRESS NOTE ADULT - ASSESSMENT
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 pt improved with decadron and acyclovir. CSF PCF panel finally returned as negative. Pt with MRI abnormalities in the right parietal lobe along with EEG epileptic focus. 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     Positive 14-3-3 but neg RTquic - less likely CJD  Will not taper AEDS any further, possibly in the future, likely not the cause of pt's waxing and waning mental status   C/w Vimpat 150mg BID and keppra at current dose   Prognosis is unclear at this time.   Possible peg tube placement  Above plan discussed with the family. 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 pt improved with decadron and acyclovir. CSF PCF panel finally returned as negative. Pt with MRI abnormalities in the right parietal lobe along with EEG epileptic focus. 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 Now having waxing and waning mental status most likely hospital delirium.     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   Will not taper AEDS at this moment, likely not the cause of pt's waxing and waning mental status   can order EEG now to be done soon, will taper keppra based on this   C/w Vimpat 150mg BID and keppra at current dose   Prognosis is unclear at this time.   Possible peg tube placement  Seroquel at night for sleep and delirium.   Above plan discussed with the family. All Questions Answered.

## 2019-10-15 NOTE — SWALLOW BEDSIDE ASSESSMENT ADULT - ORAL PREPARATORY PHASE
Reduced oral grading
Reduced oral grading
absent response
min verbal cues to orient to utensil; suspect visual-spatial deficit
min verbal/tactile cues to orient to utensil; suspect visual perceptual deficit
Reduced oral grading

## 2019-10-15 NOTE — SWALLOW BEDSIDE ASSESSMENT ADULT - ASR SWALLOW LABIAL MOBILITY
reduced coordination
impaired seal/impaired coordination
appears grossly symmetrical, mildly reduced ROM, ? reduced effort
appears grossly symmetrical, mildly reduced ROM, ? reduced effort

## 2019-10-15 NOTE — SWALLOW BEDSIDE ASSESSMENT ADULT - SWALLOW EVAL: RECOMMENDED DIET
NPO, with non-oral nutrition/hydration/medications.
continue npo/ngt
NPO, with non-oral nutrition/hydration/medications.

## 2019-10-15 NOTE — SWALLOW BEDSIDE ASSESSMENT ADULT - PHARYNGEAL PHASE
Decreased laryngeal elevation/Throat clear post oral intake/inconsistently required cues to swallow./Cough post oral intake/Delayed pharyngeal swallow/Wet vocal quality post oral intake

## 2019-10-15 NOTE — SWALLOW BEDSIDE ASSESSMENT ADULT - ADDITIONAL RECOMMENDATIONS
Maintain good oral hygiene.   This service will remain available for reassessment pending further Neuro workup and improvement in secretion management.  Given lack of change in clinical exam, would suggest consider long term non-oral nutrition/hydration/medications, pending improvement in mental status.

## 2019-10-15 NOTE — SWALLOW BEDSIDE ASSESSMENT ADULT - ASR SWALLOW DENTITION
present and adequate
upper dentures
present and adequate
upper dentures

## 2019-10-15 NOTE — PROGRESS NOTE ADULT - PROBLEM SELECTOR PLAN 2
-ddx included status epilepticus, infarct HSV encephalitis, meningitis, hypoxic injury and CJD. likely viral vs. inflammatory based on CSF studies thus far.  - vEEG has consistently shown foci of epileptiform activity without seizure (~5 days)  - Neurology and ID following, recs appreciated  - keppra 2g q12h decreased to 1.5g q12h as per neuro recs  - c/w vimpat 150mg q12h  - was on decadron 10mg daily for inflammatory encephalopathy coverage, now on steroid taper, starting with  prednisone 60mg and decreasing by 20mg every 3 days  - f/u CSF 14-3-3, tau, neuron specific enolase, HTLV-1, JEANETTE virus, HSV 6, paraneoplastic panel      *Negative CSF studies so far:  CSF PCR, Bacterial cx, WNV, Toxo, Cryptococcus, HSV  - ESR/CRP 81/9.53, YANDEL and dsDNa negative - were checked to r/o autoimmune disease in setting of above MRI findings  -repeat MRI brain consistent with status   -continue with current dose of AEDs   -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   -neuro seeing pt today, asked them to comment on prognosis   -overall clinically pt has encephalitis, however workup for specific etiology has come back as negative

## 2019-10-15 NOTE — SWALLOW BEDSIDE ASSESSMENT ADULT - SLP PERTINENT HISTORY OF CURRENT PROBLEM
84 year old male Hx HTN, GERD, prostate cancer who presented with seizure. On 9/20, pt fell out of bed and hit his head. He subsequently developed seizure like activity. Pt intubated by EMS for airway protection. Brought to OSH. Pt given ativan and versed in ED. CTH did not show acute processes (including bleed). MRI brain showed R frontal enhancement (potential seizure foci). 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 Labs were significant for WBC 13, lacate >10, BUN 29, and Cr 1.4. Spot EEG at OSH showed R focal seizure activity on 9/23. Pt was following commands and attempted to wean off propofol, however unsuccessful (pt started having seizures again). Pt transferred to [facility] MICU for vEEG and management of status epilepticus.

## 2019-10-15 NOTE — PROGRESS NOTE ADULT - ASSESSMENT
83 y/o man with hx of prostate CA s/o seed implant and RT, HTN, p/w sudden onset AMS seizure brought to [facility] . Was found to be in status epilepticus and required intubation for airway control. Pt transferred to [facility] for further workup and EEG monitoring with no further seizure activity extubated on 10/1 downgraded to medicine on 10/3:

## 2019-10-15 NOTE — SWALLOW BEDSIDE ASSESSMENT ADULT - POSITIONING
upright (90 degrees)

## 2019-10-15 NOTE — SWALLOW BEDSIDE ASSESSMENT ADULT - ASR SWALLOW RECOMMEND DIAG
Deferred, as would not change clinical management at this time.
will re-evaluate at bedside 10/8
Deferred, as would not change clinical management at this time.
defer at this time as will not likely 
Deferred, as would not change clinical management at this time.
defer at this time as will not likely

## 2019-10-15 NOTE — SWALLOW BEDSIDE ASSESSMENT ADULT - ASR SWALLOW REFERRAL
Suggest ENT to further assess suspected reduced secretion management, r/o underlying laryngeal pathology.
Suggest ENT to further assess suspected reduced secretion management, r/o underlying laryngeal pathology.

## 2019-10-16 LAB
ALBUMIN SERPL ELPH-MCNC: 3 G/DL — LOW (ref 3.3–5)
ALP SERPL-CCNC: 203 U/L — HIGH (ref 40–120)
ALT FLD-CCNC: 57 U/L — HIGH (ref 10–45)
ANION GAP SERPL CALC-SCNC: 15 MMOL/L — SIGNIFICANT CHANGE UP (ref 5–17)
APPEARANCE UR: ABNORMAL
AST SERPL-CCNC: 48 U/L — HIGH (ref 10–40)
BILIRUB SERPL-MCNC: 0.8 MG/DL — SIGNIFICANT CHANGE UP (ref 0.2–1.2)
BILIRUB UR-MCNC: NEGATIVE — SIGNIFICANT CHANGE UP
BUN SERPL-MCNC: 38 MG/DL — HIGH (ref 7–23)
CALCIUM SERPL-MCNC: 9.6 MG/DL — SIGNIFICANT CHANGE UP (ref 8.4–10.5)
CHLORIDE SERPL-SCNC: 97 MMOL/L — SIGNIFICANT CHANGE UP (ref 96–108)
CO2 SERPL-SCNC: 26 MMOL/L — SIGNIFICANT CHANGE UP (ref 22–31)
COLOR SPEC: YELLOW — SIGNIFICANT CHANGE UP
CREAT SERPL-MCNC: 0.86 MG/DL — SIGNIFICANT CHANGE UP (ref 0.5–1.3)
DIFF PNL FLD: NEGATIVE — SIGNIFICANT CHANGE UP
GLUCOSE BLDC GLUCOMTR-MCNC: 184 MG/DL — HIGH (ref 70–99)
GLUCOSE BLDC GLUCOMTR-MCNC: 207 MG/DL — HIGH (ref 70–99)
GLUCOSE BLDC GLUCOMTR-MCNC: 222 MG/DL — HIGH (ref 70–99)
GLUCOSE BLDC GLUCOMTR-MCNC: 236 MG/DL — HIGH (ref 70–99)
GLUCOSE SERPL-MCNC: 202 MG/DL — HIGH (ref 70–99)
GLUCOSE UR QL: NEGATIVE — SIGNIFICANT CHANGE UP
HCT VFR BLD CALC: 43.6 % — SIGNIFICANT CHANGE UP (ref 39–50)
HGB BLD-MCNC: 14.2 G/DL — SIGNIFICANT CHANGE UP (ref 13–17)
KETONES UR-MCNC: NEGATIVE — SIGNIFICANT CHANGE UP
LEUKOCYTE ESTERASE UR-ACNC: NEGATIVE — SIGNIFICANT CHANGE UP
MCHC RBC-ENTMCNC: 31.3 PG — SIGNIFICANT CHANGE UP (ref 27–34)
MCHC RBC-ENTMCNC: 32.6 GM/DL — SIGNIFICANT CHANGE UP (ref 32–36)
MCV RBC AUTO: 96 FL — SIGNIFICANT CHANGE UP (ref 80–100)
NITRITE UR-MCNC: NEGATIVE — SIGNIFICANT CHANGE UP
PH UR: 6.5 — SIGNIFICANT CHANGE UP (ref 5–8)
PLATELET # BLD AUTO: 205 K/UL — SIGNIFICANT CHANGE UP (ref 150–400)
POTASSIUM SERPL-MCNC: 3.9 MMOL/L — SIGNIFICANT CHANGE UP (ref 3.5–5.3)
POTASSIUM SERPL-SCNC: 3.9 MMOL/L — SIGNIFICANT CHANGE UP (ref 3.5–5.3)
PROT SERPL-MCNC: 7.7 G/DL — SIGNIFICANT CHANGE UP (ref 6–8.3)
PROT UR-MCNC: ABNORMAL
RBC # BLD: 4.54 M/UL — SIGNIFICANT CHANGE UP (ref 4.2–5.8)
RBC # FLD: 15.5 % — HIGH (ref 10.3–14.5)
SODIUM SERPL-SCNC: 138 MMOL/L — SIGNIFICANT CHANGE UP (ref 135–145)
SP GR SPEC: 1.02 — SIGNIFICANT CHANGE UP (ref 1.01–1.02)
UROBILINOGEN FLD QL: ABNORMAL
WBC # BLD: 13.26 K/UL — HIGH (ref 3.8–10.5)
WBC # FLD AUTO: 13.26 K/UL — HIGH (ref 3.8–10.5)

## 2019-10-16 PROCEDURE — 99232 SBSQ HOSP IP/OBS MODERATE 35: CPT

## 2019-10-16 PROCEDURE — 93971 EXTREMITY STUDY: CPT | Mod: 26

## 2019-10-16 RX ADMIN — MAGNESIUM OXIDE 400 MG ORAL TABLET 400 MILLIGRAM(S): 241.3 TABLET ORAL at 17:27

## 2019-10-16 RX ADMIN — LACOSAMIDE 130 MILLIGRAM(S): 50 TABLET ORAL at 17:27

## 2019-10-16 RX ADMIN — PANTOPRAZOLE SODIUM 40 MILLIGRAM(S): 20 TABLET, DELAYED RELEASE ORAL at 11:32

## 2019-10-16 RX ADMIN — LEVETIRACETAM 400 MILLIGRAM(S): 250 TABLET, FILM COATED ORAL at 00:26

## 2019-10-16 RX ADMIN — CHLORHEXIDINE GLUCONATE 1 APPLICATION(S): 213 SOLUTION TOPICAL at 07:50

## 2019-10-16 RX ADMIN — Medication 10 MILLIGRAM(S): at 07:50

## 2019-10-16 RX ADMIN — LACOSAMIDE 130 MILLIGRAM(S): 50 TABLET ORAL at 06:57

## 2019-10-16 RX ADMIN — Medication 1: at 11:44

## 2019-10-16 RX ADMIN — Medication 10 MILLIGRAM(S): at 20:33

## 2019-10-16 RX ADMIN — POLYETHYLENE GLYCOL 3350 17 GRAM(S): 17 POWDER, FOR SOLUTION ORAL at 06:14

## 2019-10-16 RX ADMIN — SENNA PLUS 10 MILLILITER(S): 8.6 TABLET ORAL at 21:49

## 2019-10-16 RX ADMIN — Medication 100 MILLIGRAM(S): at 21:49

## 2019-10-16 RX ADMIN — HEPARIN SODIUM 5000 UNIT(S): 5000 INJECTION INTRAVENOUS; SUBCUTANEOUS at 11:46

## 2019-10-16 RX ADMIN — HEPARIN SODIUM 5000 UNIT(S): 5000 INJECTION INTRAVENOUS; SUBCUTANEOUS at 06:14

## 2019-10-16 RX ADMIN — HEPARIN SODIUM 5000 UNIT(S): 5000 INJECTION INTRAVENOUS; SUBCUTANEOUS at 21:49

## 2019-10-16 RX ADMIN — Medication 2: at 18:11

## 2019-10-16 RX ADMIN — LEVETIRACETAM 400 MILLIGRAM(S): 250 TABLET, FILM COATED ORAL at 23:06

## 2019-10-16 RX ADMIN — Medication 2: at 06:33

## 2019-10-16 RX ADMIN — MAGNESIUM OXIDE 400 MG ORAL TABLET 400 MILLIGRAM(S): 241.3 TABLET ORAL at 07:50

## 2019-10-16 RX ADMIN — LEVETIRACETAM 400 MILLIGRAM(S): 250 TABLET, FILM COATED ORAL at 11:32

## 2019-10-16 RX ADMIN — POLYETHYLENE GLYCOL 3350 17 GRAM(S): 17 POWDER, FOR SOLUTION ORAL at 17:27

## 2019-10-16 RX ADMIN — Medication 2: at 00:29

## 2019-10-16 RX ADMIN — Medication 10 MILLIGRAM(S): at 11:45

## 2019-10-16 NOTE — PROGRESS NOTE ADULT - SUBJECTIVE AND OBJECTIVE BOX
Patient is a 84y old  Male who presents with a chief complaint of status epilepticus (15 Oct 2019 21:20)      SUBJECTIVE / OVERNIGHT EVENTS: no acute events overnight     MEDICATIONS  (STANDING):  aMILoride 10 milliGRAM(s) Oral <User Schedule>  chlorhexidine 4% Liquid 1 Application(s) Topical <User Schedule>  heparin  Injectable 5000 Unit(s) SubCutaneous every 8 hours  insulin lispro (HumaLOG) corrective regimen sliding scale   SubCutaneous every 6 hours  labetalol 100 milliGRAM(s) Oral every 8 hours  lacosamide IVPB 150 milliGRAM(s) IV Intermittent every 12 hours  levETIRAcetam  IVPB 1500 milliGRAM(s) IV Intermittent every 12 hours  magnesium oxide 400 milliGRAM(s) Oral two times a day with meals  pantoprazole  Injectable 40 milliGRAM(s) IV Push daily  polyethylene glycol 3350 17 Gram(s) Oral two times a day  senna Syrup 10 milliLiter(s) Oral at bedtime    MEDICATIONS  (PRN):      Vital Signs Last 24 Hrs  T(C): 36.8 (16 Oct 2019 13:06), Max: 37.1 (16 Oct 2019 09:50)  T(F): 98.3 (16 Oct 2019 13:06), Max: 98.7 (16 Oct 2019 09:50)  HR: 82 (16 Oct 2019 13:06) (75 - 85)  BP: 122/74 (16 Oct 2019 13:06) (122/74 - 135/79)  BP(mean): --  RR: 18 (16 Oct 2019 13:06) (18 - 18)  SpO2: 97% (16 Oct 2019 13:06) (95% - 97%)  CAPILLARY BLOOD GLUCOSE      POCT Blood Glucose.: 184 mg/dL (16 Oct 2019 11:36)  POCT Blood Glucose.: 222 mg/dL (16 Oct 2019 06:21)  POCT Blood Glucose.: 236 mg/dL (16 Oct 2019 00:12)  POCT Blood Glucose.: 212 mg/dL (15 Oct 2019 17:41)    I&O's Summary    15 Oct 2019 07:01  -  16 Oct 2019 07:00  --------------------------------------------------------  IN: 365 mL / OUT: 0 mL / NET: 365 mL        PHYSICAL EXAM:  GENERAL: NAD  EYES:  conjunctiva and sclera clear  NECK: Supple, No JVD  CHEST/LUNG: Clear to auscultation bilaterally; No wheeze  HEART: +S1/S2, reg   ABDOMEN: Soft, Nontender, Nondistended; Bowel sounds present  EXTREMITIES:  no edema   PSYCH: AAOx1    LABS:                        14.2   13.26 )-----------( 205      ( 16 Oct 2019 10:48 )             43.6     10-    138  |  97  |  38<H>  ----------------------------<  202<H>  3.9   |  26  |  0.86    Ca    9.6      16 Oct 2019 08:35    TPro  7.7  /  Alb  3.0<L>  /  TBili  0.8  /  DBili  x   /  AST  48<H>  /  ALT  57<H>  /  AlkPhos  203<H>  10-16          Urinalysis Basic - ( 15 Oct 2019 23:15 )    Color: Yellow / Appearance: Slightly Turbid / S.019 / pH: x  Gluc: x / Ketone: Negative  / Bili: Negative / Urobili: 3 mg/dL   Blood: x / Protein: 30 mg/dL / Nitrite: Negative   Leuk Esterase: Negative / RBC: 1 /HPF / WBC 0 /HPF   Sq Epi: x / Non Sq Epi: 0 /HPF / Bacteria: Negative

## 2019-10-16 NOTE — PROGRESS NOTE ADULT - PROBLEM SELECTOR PLAN 2
-ddx included status epilepticus, infarct HSV encephalitis, meningitis, hypoxic injury and CJD. likely viral vs. inflammatory based on CSF studies thus far.  - vEEG has consistently shown foci of epileptiform activity without seizure (~5 days)  - Neurology and ID following, recs appreciated  - keppra 2g q12h decreased to 1.5g q12h as per neuro recs  - c/w vimpat 150mg q12h  - was on decadron 10mg daily for inflammatory encephalopathy coverage, now on steroid taper, starting with  prednisone 60mg and decreasing by 20mg every 3 days  - f/u CSF 14-3-3, tau, neuron specific enolase, HTLV-1, JEANETTE virus, HSV 6, paraneoplastic panel      *Negative CSF studies so far:  CSF PCR, Bacterial cx, WNV, Toxo, Cryptococcus, HSV  - ESR/CRP 81/9.53, YANDEL and dsDNa negative - were checked to r/o autoimmune disease in setting of above MRI findings  -repeat MRI brain consistent with status   -continue with current dose of AEDs   -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   -neuro seeing pt today, asked them to comment on prognosis   -overall clinically pt has encephalitis, however workup for specific etiology has come back as negative  -ABG does not show retention, sent off cultures to rule out any infection  -EEG is pending

## 2019-10-16 NOTE — PROGRESS NOTE ADULT - PROBLEM SELECTOR PLAN 1
-failed speech and swallow eval x3, will keep NGT for now  -discussed with speech and swallow therapist , pt has difficulty swallowing secretions as well  -failed re-eval as well  -ENT recs appreciated  -continue with NGT for now  -failed speech and swallow eval on 10/15, numerous attempts tried before  -prognosis overall is unclear  -will discuss GOC with family

## 2019-10-16 NOTE — PROGRESS NOTE ADULT - ASSESSMENT
85 y/o man with hx of prostate CA s/o seed implant and RT, HTN, p/w sudden onset AMS seizure brought to [facility] . Was found to be in status epilepticus and required intubation for airway control. Pt transferred to [facility] for further workup and EEG monitoring with no further seizure activity extubated on 10/1 downgraded to medicine on 10/3:

## 2019-10-17 LAB
GLUCOSE BLDC GLUCOMTR-MCNC: 149 MG/DL — HIGH (ref 70–99)
GLUCOSE BLDC GLUCOMTR-MCNC: 219 MG/DL — HIGH (ref 70–99)
GLUCOSE BLDC GLUCOMTR-MCNC: 228 MG/DL — HIGH (ref 70–99)
GLUCOSE BLDC GLUCOMTR-MCNC: 231 MG/DL — HIGH (ref 70–99)

## 2019-10-17 PROCEDURE — 95816 EEG AWAKE AND DROWSY: CPT | Mod: 26

## 2019-10-17 PROCEDURE — 74177 CT ABD & PELVIS W/CONTRAST: CPT | Mod: 26

## 2019-10-17 PROCEDURE — 99233 SBSQ HOSP IP/OBS HIGH 50: CPT | Mod: GC

## 2019-10-17 PROCEDURE — 99232 SBSQ HOSP IP/OBS MODERATE 35: CPT

## 2019-10-17 RX ADMIN — HEPARIN SODIUM 5000 UNIT(S): 5000 INJECTION INTRAVENOUS; SUBCUTANEOUS at 06:11

## 2019-10-17 RX ADMIN — Medication 2: at 01:00

## 2019-10-17 RX ADMIN — LACOSAMIDE 130 MILLIGRAM(S): 50 TABLET ORAL at 18:13

## 2019-10-17 RX ADMIN — HEPARIN SODIUM 5000 UNIT(S): 5000 INJECTION INTRAVENOUS; SUBCUTANEOUS at 23:07

## 2019-10-17 RX ADMIN — LEVETIRACETAM 400 MILLIGRAM(S): 250 TABLET, FILM COATED ORAL at 23:03

## 2019-10-17 RX ADMIN — PANTOPRAZOLE SODIUM 40 MILLIGRAM(S): 20 TABLET, DELAYED RELEASE ORAL at 12:19

## 2019-10-17 RX ADMIN — SENNA PLUS 10 MILLILITER(S): 8.6 TABLET ORAL at 23:09

## 2019-10-17 RX ADMIN — MAGNESIUM OXIDE 400 MG ORAL TABLET 400 MILLIGRAM(S): 241.3 TABLET ORAL at 18:12

## 2019-10-17 RX ADMIN — LEVETIRACETAM 400 MILLIGRAM(S): 250 TABLET, FILM COATED ORAL at 12:22

## 2019-10-17 RX ADMIN — MAGNESIUM OXIDE 400 MG ORAL TABLET 400 MILLIGRAM(S): 241.3 TABLET ORAL at 09:31

## 2019-10-17 RX ADMIN — Medication 2: at 12:17

## 2019-10-17 RX ADMIN — CHLORHEXIDINE GLUCONATE 1 APPLICATION(S): 213 SOLUTION TOPICAL at 09:30

## 2019-10-17 RX ADMIN — POLYETHYLENE GLYCOL 3350 17 GRAM(S): 17 POWDER, FOR SOLUTION ORAL at 06:11

## 2019-10-17 RX ADMIN — Medication 10 MILLIGRAM(S): at 23:08

## 2019-10-17 RX ADMIN — HEPARIN SODIUM 5000 UNIT(S): 5000 INJECTION INTRAVENOUS; SUBCUTANEOUS at 14:04

## 2019-10-17 RX ADMIN — Medication 10 MILLIGRAM(S): at 09:31

## 2019-10-17 RX ADMIN — Medication 10 MILLIGRAM(S): at 14:04

## 2019-10-17 RX ADMIN — LACOSAMIDE 130 MILLIGRAM(S): 50 TABLET ORAL at 06:10

## 2019-10-17 RX ADMIN — Medication 2: at 18:19

## 2019-10-17 RX ADMIN — POLYETHYLENE GLYCOL 3350 17 GRAM(S): 17 POWDER, FOR SOLUTION ORAL at 18:13

## 2019-10-17 NOTE — EEG REPORT - NS EEG TEXT BOX
[facility]   COMPREHENSIVE EPILEPSY CENTER   REPORT OF CONTINUOUS VIDEO EEG     [facility]: [address], [place] [address], Ph#: [phone]  [facility]: [id number], [place] [address], Ph#: [phone]  [facility]: [address] [place] [address], Ph#: [phone]    Patient Name: SHIKHA MASON  Age and : 84y ([birth year])  MRN #: [id number]  Location: [address]  Referring Physician: Dominique Kothari    Start Time/Date: x:x or 08:00 on 10--19  End Time/Date: 08:00 on 10-17-19    _____________________________________________________________  STUDY INFORMATION    EEG Recording Technique:  The patient underwent continuous Video-EEG monitoring, using Telemetry System hardware on the XLTek Digital System. EEG and video data were stored on a computer hard drive with important events saved in digital archive files. The material was reviewed by a physician (electroencephalographer / epileptologist) on a daily basis. Breezy and seizure detection algorithms were utilized and reviewed. An EEG Technician attended to the patient, and was available throughout daytime work hours.  The epilepsy center neurologist was available in person or on call 24-hours per day.    EEG Placement and Labeling of Electrodes:  The EEG was performed utilizing 20 channel referential EEG connections (coronal over temporal over parasagittal montage) using all standard 10-20 electrode placements with EKG, with additional electrodes placed in the inferior temporal region using the modified 10-10 montage electrode placements for elective admissions, or if deemed necessary. Recording was at a sampling rate of 256 samples per second per channel. Time synchronized digital video recording was done simultaneously with EEG recording. A low light infrared camera was used for low light recording.     _____________________________________________________________  HISTORY    Patient is a 84y old  Male who presents with a chief complaint of status epilepticus (17 Oct 2019 15:38)      PERTINENT MEDICATION:  lacosamide IVPB 150 milliGRAM(s) IV Intermittent every 12 hours  levETIRAcetam  IVPB 1500 milliGRAM(s) IV Intermittent every 12 hours  _____________________________________________________________  STUDY INTERPRETATION    Findings: The background was continuous, spontaneously variable and reactive. During wakefulness, the posterior dominant rhythm consisted of symmetric, poorly modulated 8 Hz activity, with amplitude to 30 uV, that attenuated to eye opening.  Low amplitude frontal beta was noted in wakefulness.      Background Slowing:  Background predominantly consisted of theta, delta and faster activities.    Focal Slowing:   None were present.    Sleep Background:  Drowsiness and stage II sleep transients were not recorded.    Other Non-Epileptiform Findings:  None were present.    Interictal Epileptiform Activity:   None were present.    Events:  Clinical events: None recorded.  Seizures: None recorded.    Activation Procedures:   Hyperventilation was not performed.    Photic stimulation was not performed.     Artifacts:  Intermittent myogenic and movement artifacts were noted.    ECG:  The heart rate on single channel ECG was predominantly between 80-90 BPM.    _____________________________________________________________  EEG SUMMARY/CLASSIFICATION  Abnormal EEG in an altered  patient.  - Mild generalized slowing.    _____________________________________________________________  EEG IMPRESSION/CLINICAL CORRELATE    Abnormal EEG study.  1. Mild nonspecific diffuse or multifocal cerebral dysfunction.   2. No epileptiform pattern or seizure seen.    Preliminary Fellow Report  _____________________________________________________________    Clarita Francisco MD  Epilepsy Fellow    Finn Salas MD  Attending Physician, [facility] Epilepsy [facility]   COMPREHENSIVE EPILEPSY CENTER   REPORT OF ROUTINE VIDEO EEG     [facility]: [address] [place] [address] [address], [place] [address], Ph#: [phone]  [facility]: [id number], [place] [address], Ph#: [phone]  [facility]: [address] [place] [address], Ph#: [phone]    Patient Name: SHIKHA MASON  Age and : 84y ([birth year])  MRN #: [id number]  Location: [facility] 8MON [facility]  Referring Physician: Dominique Kothari    Study Date: 10-17-19    _____________________________________________________________  TECHNICAL INFORMATION    Placement and Labeling of Electrodes:  The EEG was performed utilizing 20 channels referential EEG connections (coronal over temporal over parasagittal montage) using all standard 10-20 electrode placements with EKG.  Recording was at a sampling rate of 256 samples per second per channel.  Time synchronized digital video recording was done simultaneously with EEG recording.  A low light infrared camera was used for low light recording.  Breezy and seizure detection algorithms were utilized.    _______________________________________________________  HISTORY    Patient is a 84y old  Male who presents with a chief complaint of status epilepticus (17 Oct 2019 15:38)      PERTINENT MEDICATION:  lacosamide IVPB 150 milliGRAM(s) IV Intermittent every 12 hours  levETIRAcetam  IVPB 1500 milliGRAM(s) IV Intermittent every 12 hours  _____________________________________________________________  STUDY INTERPRETATION    Findings: The background was continuous, spontaneously variable and reactive. During wakefulness, the posterior dominant rhythm consisted of fragments of 8 Hz activity, with amplitude to 30 uV, that attenuated to eye opening.      Background Slowing:  Background predominantly consisted of theta, delta and faster activities.    Focal Slowing:   None were present.    Sleep Background:  Drowsiness and stage II sleep transients were not recorded.    Other Non-Epileptiform Findings:  None were present.    Interictal Epileptiform Activity:   None were present.    Events:  Clinical events: None recorded.  Seizures: None recorded.    Activation Procedures:   Hyperventilation was not performed.    Photic stimulation was not performed.     Artifacts:  Intermittent myogenic and movement artifacts were noted.    ECG:  The heart rate on single channel ECG was predominantly between 80-90 BPM.    _____________________________________________________________  EEG SUMMARY/CLASSIFICATION    Abnormal EEG in an awake state.  - Mild to moderate generalized slowing.    _____________________________________________________________  EEG IMPRESSION/CLINICAL CORRELATE    Abnormal EEG study.  1. Mild to moderate nonspecific diffuse or multifocal cerebral dysfunction.   2. No epileptiform pattern or seizure seen.    _____________________________________________________________    Clarita Francisco MD  Epilepsy Fellow    Finn Salas MD  Attending Physician, [facility] Epilepsy [place]

## 2019-10-17 NOTE — PROGRESS NOTE ADULT - PROBLEM SELECTOR PLAN 1
-failed speech and swallow eval x3, will keep NGT for now  -discussed with speech and swallow therapist , pt has difficulty swallowing secretions as well  -failed re-eval as well  -ENT recs appreciated  -continue with NGT for now  -failed speech and swallow eval on 10/15, numerous attempts tried before  -prognosis overall is unclear  -family requesting NGT placement  -consult placed and discussed with GI fellow today   -f/u recs

## 2019-10-17 NOTE — CONSULT NOTE ADULT - ASSESSMENT
84 year old male Hx HTN, GERD, prostate cancer admitted for status epilepticus.  Patient transferred to [facility] MICU for video EEG. CSF consistent with viral encephalitis vs inflammatory - improved with decadron and acyclovir. MRI right parietal abnormalities and EEG with epileptic focus - on AEDs.    Failed bedside swallow evaluation and so GI consult was called for PEG evaluation.    Abdomen exam is distended   elevated ALT, AST and ALP    Impression:  # Dysphagia 84 year old male Hx HTN, GERD, prostate cancer admitted for status epilepticus.  Patient transferred to [facility] MICU for video EEG. CSF consistent with viral encephalitis vs inflammatory - improved with decadron and acyclovir. MRI right parietal abnormalities and EEG with epileptic focus - on AEDs.    Failed bedside swallow evaluation 3 times and so GI consult was called for PEG evaluation.    Abdomen exam is distended   elevated ALT, AST and ALP    Impression:  # Oropharyngeal Dysphagia s/p Laryngoscopy 10/10 showed decreased sensation, pooling of secretions and aspiration.   # Seizures/ encephalitis   # Elevated liver enzymes: infection / viral encephalitis, DILI ?  # Abdominal distention ddx: stool impaction, ileus, ascites    Recommendations:  - Obtain CT abdomen and pelvis.  - Continue tube feeding through NG  - Neurology clearance   - Will schedule PEG tube placement Monday vs Tuesday if he is medically optimized for the procedure.  - Check CMP and INR daily

## 2019-10-17 NOTE — CONSULT NOTE ADULT - SUBJECTIVE AND OBJECTIVE BOX
Chief Complaint:  Seizure    HPI:  84 year old male Hx HTN, GERD, prostate cancer who presented with seizure.  On , patient fell out of bed and hit his head.  He subsequently developed seizure like activity.  Patient intubated by EMS for airway protection.  Patient given ativan and versed in ED.  Patient had CT Head which did not show acute processes (including bleed).  Patient had brain MRI which showed R frontal enhancement (potential seizure foci).  Patient started on propofol gtt, lacosamide and Keppra  Patient started on acyclovir for meningitis suspicion.  Patient had spot EEG at OSH which showed R focal seizure activity on .  Patient was following commands and then attempted to wean off propofol, however unsuccessful (patient started having seizures again).  Resumed prop and increased keppra dose.  Patient transferred to [facility] MICU for video EEG and management of status epilepticus.   He underwent LP with CSF consistent with viral encephalitis vs inflammatory - improved with decadron and acyclovir.   MRI right parietal abnormalities and EEG with epileptic focus - on AEDs.  Failed bedside swallow evaluation and so GI consult was called for PEG evaluation      Allergies:  No Known Allergies        Hospital Medications:  aMILoride 10 milliGRAM(s) Oral <User Schedule>  chlorhexidine 4% Liquid 1 Application(s) Topical <User Schedule>  heparin  Injectable 5000 Unit(s) SubCutaneous every 8 hours  insulin lispro (HumaLOG) corrective regimen sliding scale   SubCutaneous every 6 hours  labetalol 100 milliGRAM(s) Oral every 8 hours  lacosamide IVPB 150 milliGRAM(s) IV Intermittent every 12 hours  levETIRAcetam  IVPB 1500 milliGRAM(s) IV Intermittent every 12 hours  magnesium oxide 400 milliGRAM(s) Oral two times a day with meals  pantoprazole  Injectable 40 milliGRAM(s) IV Push daily  polyethylene glycol 3350 17 Gram(s) Oral two times a day  senna Syrup 10 milliLiter(s) Oral at bedtime      PMHX/PSHX:  History of gastroesophageal reflux (GERD)  Hypertension  Prostate cancer  Hypokalemia  PC (prostate cancer)  S/P cholecystectomy  Prostate cancer      Family history:  unable to be obtained due to his current altered mental status     Social History: no smoking    ROS: unable to be obtained due to his current altered mental status       PHYSICAL EXAM:   GENERAL:  ill appearing   HEENT:  NC/AT,  conjunctivae clear and pink, sclera -anicteric  CHEST:  CTA B/L, Normal effort  HEART:  RRR S1/S2, No murmurs  ABDOMEN:  Soft, non-tender, distended, normoactive bowel sounds,  no masses   EXTREMITIES:  No cyanosis or Edema  SKIN:  Warm & Dry. No rash or erythema  NEURO: moving all 4 extremities     Vital Signs:  Vital Signs Last 24 Hrs  T(C): 36.4 (17 Oct 2019 13:59), Max: 36.9 (17 Oct 2019 07:54)  T(F): 97.5 (17 Oct 2019 13:59), Max: 98.4 (17 Oct 2019 07:54)  HR: 80 (17 Oct 2019 13:59) (73 - 85)  BP: 129/75 (17 Oct 2019 13:59) (112/69 - 139/76)  BP(mean): --  RR: 17 (17 Oct 2019 13:59) (17 - 18)  SpO2: 98% (17 Oct 2019 13:59) (95% - 98%)  Daily     Daily Weight in k (17 Oct 2019 08:00)    LABS:                        14.2   13.26 )-----------( 205      ( 16 Oct 2019 10:48 )             43.6       10-16    138  |  97  |  38<H>  ----------------------------<  202<H>  3.9   |  26  |  0.86    Ca    9.6      16 Oct 2019 08:35    TPro  7.7  /  Alb  3.0<L>  /  TBili  0.8  /  DBili  x   /  AST  48<H>  /  ALT  57<H>  /  AlkPhos  203<H>  10-16    LIVER FUNCTIONS - ( 16 Oct 2019 08:35 )  Alb: 3.0 g/dL / Pro: 7.7 g/dL / ALK PHOS: 203 U/L / ALT: 57 U/L / AST: 48 U/L / GGT: x             Urinalysis Basic - ( 15 Oct 2019 23:15 )    Color: Yellow / Appearance: Slightly Turbid / S.019 / pH: x  Gluc: x / Ketone: Negative  / Bili: Negative / Urobili: 3 mg/dL   Blood: x / Protein: 30 mg/dL / Nitrite: Negative   Leuk Esterase: Negative / RBC: 1 /HPF / WBC 0 /HPF   Sq Epi: x / Non Sq Epi: 0 /HPF / Bacteria: Negative                              14.2   13.26 )-----------( 205      ( 16 Oct 2019 10:48 )             43.6                         13.9   11.62 )-----------( 275      ( 15 Oct 2019 09:13 )             42.2     Imaging: Chief Complaint:  Seizure    HPI:  84 year old male Hx HTN, GERD, prostate cancer who presented with seizure.  On , patient fell out of bed and hit his head.  He subsequently developed seizure like activity.  Patient intubated by EMS for airway protection.  Patient given ativan and versed in ED.  Patient had CT Head which did not show acute processes (including bleed).  Patient had brain MRI which showed R frontal enhancement (potential seizure foci).  Patient started on propofol gtt, lacosamide and Keppra  Patient started on acyclovir for meningitis suspicion.  Patient had spot EEG at OSH which showed R focal seizure activity on .  Patient was following commands and then attempted to wean off propofol, however unsuccessful (patient started having seizures again).  Resumed prop and increased keppra dose.  Patient transferred to [facility] MICU for video EEG and management of status epilepticus.   He underwent LP with CSF consistent with viral encephalitis vs inflammatory - improved with decadron and acyclovir.   MRI right parietal abnormalities and EEG with epileptic focus - on AEDs.  Failed bedside swallow evaluation and so GI consult was called for PEG evaluation      Allergies:  No Known Allergies        Hospital Medications:  aMILoride 10 milliGRAM(s) Oral <User Schedule>  chlorhexidine 4% Liquid 1 Application(s) Topical <User Schedule>  heparin  Injectable 5000 Unit(s) SubCutaneous every 8 hours  insulin lispro (HumaLOG) corrective regimen sliding scale   SubCutaneous every 6 hours  labetalol 100 milliGRAM(s) Oral every 8 hours  lacosamide IVPB 150 milliGRAM(s) IV Intermittent every 12 hours  levETIRAcetam  IVPB 1500 milliGRAM(s) IV Intermittent every 12 hours  magnesium oxide 400 milliGRAM(s) Oral two times a day with meals  pantoprazole  Injectable 40 milliGRAM(s) IV Push daily  polyethylene glycol 3350 17 Gram(s) Oral two times a day  senna Syrup 10 milliLiter(s) Oral at bedtime      PMHX/PSHX:  History of gastroesophageal reflux (GERD)  Hypertension  Prostate cancer  Hypokalemia  PC (prostate cancer)  S/P cholecystectomy  Prostate cancer      Family history:  unable to be obtained due to his current altered mental status     Social History: no smoking    ROS: unable to be obtained due to his current altered mental status       PHYSICAL EXAM:   GENERAL:  ill appearing   HEENT:  NC/AT,  conjunctivae clear and pink, sclera -anicteric  CHEST:  CTA B/L, Normal effort  HEART:  RRR S1/S2, No murmurs  ABDOMEN:  Soft, non-tender, distended, normoactive bowel sounds,  no masses   EXTREMITIES:  No cyanosis or Edema  SKIN:  Warm & Dry. No rash or erythema  NEURO: moving all 4 extremities     Vital Signs:  Vital Signs Last 24 Hrs  T(C): 36.4 (17 Oct 2019 13:59), Max: 36.9 (17 Oct 2019 07:54)  T(F): 97.5 (17 Oct 2019 13:59), Max: 98.4 (17 Oct 2019 07:54)  HR: 80 (17 Oct 2019 13:59) (73 - 85)  BP: 129/75 (17 Oct 2019 13:59) (112/69 - 139/76)  BP(mean): --  RR: 17 (17 Oct 2019 13:59) (17 - 18)  SpO2: 98% (17 Oct 2019 13:59) (95% - 98%)  Daily     Daily Weight in k (17 Oct 2019 08:00)    LABS:                        14.2   13.26 )-----------( 205      ( 16 Oct 2019 10:48 )             43.6       10-16    138  |  97  |  38<H>  ----------------------------<  202<H>  3.9   |  26  |  0.86    Ca    9.6      16 Oct 2019 08:35    TPro  7.7  /  Alb  3.0<L>  /  TBili  0.8  /  DBili  x   /  AST  48<H>  /  ALT  57<H>  /  AlkPhos  203<H>  10-16    LIVER FUNCTIONS - ( 16 Oct 2019 08:35 )  Alb: 3.0 g/dL / Pro: 7.7 g/dL / ALK PHOS: 203 U/L / ALT: 57 U/L / AST: 48 U/L / GGT: x             Urinalysis Basic - ( 15 Oct 2019 23:15 )    Color: Yellow / Appearance: Slightly Turbid / S.019 / pH: x  Gluc: x / Ketone: Negative  / Bili: Negative / Urobili: 3 mg/dL   Blood: x / Protein: 30 mg/dL / Nitrite: Negative   Leuk Esterase: Negative / RBC: 1 /HPF / WBC 0 /HPF   Sq Epi: x / Non Sq Epi: 0 /HPF / Bacteria: Negative                              14.2   13.26 )-----------( 205      ( 16 Oct 2019 10:48 )             43.6                         13.9   11.62 )-----------( 275      ( 15 Oct 2019 09:13 )             42.2     Imaging:

## 2019-10-17 NOTE — PROGRESS NOTE ADULT - SUBJECTIVE AND OBJECTIVE BOX
Patient is a 84y old  Male who presents with a chief complaint of status epilepticus (16 Oct 2019 14:01)      SUBJECTIVE / OVERNIGHT EVENTS:    MEDICATIONS  (STANDING):  aMILoride 10 milliGRAM(s) Oral <User Schedule>  chlorhexidine 4% Liquid 1 Application(s) Topical <User Schedule>  heparin  Injectable 5000 Unit(s) SubCutaneous every 8 hours  insulin lispro (HumaLOG) corrective regimen sliding scale   SubCutaneous every 6 hours  labetalol 100 milliGRAM(s) Oral every 8 hours  lacosamide IVPB 150 milliGRAM(s) IV Intermittent every 12 hours  levETIRAcetam  IVPB 1500 milliGRAM(s) IV Intermittent every 12 hours  magnesium oxide 400 milliGRAM(s) Oral two times a day with meals  pantoprazole  Injectable 40 milliGRAM(s) IV Push daily  polyethylene glycol 3350 17 Gram(s) Oral two times a day  senna Syrup 10 milliLiter(s) Oral at bedtime    MEDICATIONS  (PRN):      Vital Signs Last 24 Hrs  T(C): 36.9 (17 Oct 2019 07:54), Max: 36.9 (17 Oct 2019 07:54)  T(F): 98.4 (17 Oct 2019 07:54), Max: 98.4 (17 Oct 2019 07:54)  HR: 73 (17 Oct 2019 07:54) (73 - 85)  BP: 112/69 (17 Oct 2019 07:54) (112/69 - 139/76)  BP(mean): --  RR: 17 (17 Oct 2019 07:54) (17 - 18)  SpO2: 97% (17 Oct 2019 07:54) (95% - 97%)  CAPILLARY BLOOD GLUCOSE      POCT Blood Glucose.: 228 mg/dL (17 Oct 2019 11:58)  POCT Blood Glucose.: 219 mg/dL (17 Oct 2019 00:57)  POCT Blood Glucose.: 207 mg/dL (16 Oct 2019 17:59)    I&O's Summary    16 Oct 2019 07:  -  17 Oct 2019 07:00  --------------------------------------------------------  IN: 1050 mL / OUT: 0 mL / NET: 1050 mL    17 Oct 2019 07:01  -  17 Oct 2019 13:51  --------------------------------------------------------  IN: 0 mL / OUT: 75 mL / NET: -75 mL      PHYSICAL EXAM:  GENERAL: NAD  EYES: conjunctiva and sclera clear  NECK: Supple, No JVD  CHEST/LUNG: Clear to auscultation bilaterally; No wheeze  HEART: +S1/S2, reg   ABDOMEN: Soft, Nontender, Nondistended; Bowel sounds present  EXTREMITIES: no edema   PSYCH: AAOx1      LABS:                        14.2   13.26 )-----------( 205      ( 16 Oct 2019 10:48 )             43.6     10-    138  |  97  |  38<H>  ----------------------------<  202<H>  3.9   |  26  |  0.86    Ca    9.6      16 Oct 2019 08:35    TPro  7.7  /  Alb  3.0<L>  /  TBili  0.8  /  DBili  x   /  AST  48<H>  /  ALT  57<H>  /  AlkPhos  203<H>  10-16          Urinalysis Basic - ( 15 Oct 2019 23:15 )    Color: Yellow / Appearance: Slightly Turbid / S.019 / pH: x  Gluc: x / Ketone: Negative  / Bili: Negative / Urobili: 3 mg/dL   Blood: x / Protein: 30 mg/dL / Nitrite: Negative   Leuk Esterase: Negative / RBC: 1 /HPF / WBC 0 /HPF   Sq Epi: x / Non Sq Epi: 0 /HPF / Bacteria: Negative

## 2019-10-17 NOTE — CONSULT NOTE ADULT - REASON FOR ADMISSION
status epilepticus

## 2019-10-17 NOTE — PROGRESS NOTE ADULT - PROBLEM SELECTOR PLAN 2
-ddx included status epilepticus, infarct HSV encephalitis, meningitis, hypoxic injury and CJD. likely viral vs. inflammatory based on CSF studies thus far.  - vEEG has consistently shown foci of epileptiform activity without seizure (~5 days)  - Neurology and ID following, recs appreciated  - keppra 2g q12h decreased to 1.5g q12h as per neuro recs  - c/w vimpat 150mg q12h  - was on decadron 10mg daily for inflammatory encephalopathy coverage, now on steroid taper, starting with  prednisone 60mg and decreasing by 20mg every 3 days  - f/u CSF 14-3-3, tau, neuron specific enolase, HTLV-1, JEANETTE virus, HSV 6, paraneoplastic panel      *Negative CSF studies so far:  CSF PCR, Bacterial cx, WNV, Toxo, Cryptococcus, HSV  - ESR/CRP 81/9.53, YANDEL and dsDNa negative - were checked to r/o autoimmune disease in setting of above MRI findings  -repeat MRI brain consistent with status   -continue with current dose of AEDs   -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   -neuro seeing pt today, asked them to comment on prognosis   -overall clinically pt has encephalitis, however workup for specific etiology has come back as negative  -ABG does not show retention, sent off cultures to rule out any infection - all neg   -repeat EEG is pending

## 2019-10-17 NOTE — CONSULT NOTE ADULT - CONSULT REQUESTED DATE/TIME
10-Oct-2019 18:22
25-Sep-2019 03:42
28-Sep-2019 17:52
09-Oct-2019
11-Oct-2019 22:55
17-Oct-2019 15:38

## 2019-10-17 NOTE — CONSULT NOTE ADULT - ATTENDING COMMENTS
85 y/o man with hx of prostate CA s/o seed implant and RT, HTN, p/w sudden onset AMS seizure brought to [facility] . Was found to be in status epilepticus and required intubation for airway control. Pt transferred to [facility] for further workup and EEG monitoring.     MRI from [place] uploaded into system and shows right parieto-occipital cortical ribboning with T2 hyperintensity also involving the posterior right thalamus.     EEG from today shows - Abundant right posterior quadrant (max P8/O2) periodic sharp wave discharges, 1-2 hz.   - Severe generalized slowing with a discontinuous pattern (burst suppression, generalized)  - Asymmetry, Increased Amplitude in right hemisphere    CSF shows elevated protein of 75, elevated nucleated cell count of 79 with lymphocytic predominance.   He is currently on Keppra 200mg BID, lacosamide 150mg BID, Versed and propofol infusions.     On exam he was completely unresponsive, no gaze deviation or nystagmus, oculocephalics intact, Pupils 2mm bilat and reactive.   no withdrawal to noxious stimuli.     Pt has clear epileptogenic focus in the right parietal-occipital region.   Differential for cortical ribboning includes status epilepticus, infarct, HSV encephalitis, meningitis, hypoxic injury, CJD. CSF suggestive of viral etiology vs inflammatory. Less likely CJD given acuity of presentation.     Will send in CSF, viral panel, cryptococcus, 14-3-3, tau, neuron specific enolase, HTLV-1, JEANETTE virus, HSV 6.  Continue VEEG monitoring  neuro checks Q2
Agree with above management.
Tomas Marcos  Attending Physician   Division of Infectious Disease  Pager #[phone]  After 5pm/weekend or no response, call #[phone]

## 2019-10-17 NOTE — PROGRESS NOTE ADULT - ASSESSMENT
83 y/o man with hx of prostate CA s/o seed implant and RT, HTN, p/w sudden onset AMS seizure brought to [facility] . Was found to be in status epilepticus and required intubation for airway control. Pt transferred to [facility] for further workup and EEG monitoring with no further seizure activity extubated on 10/1 downgraded to medicine on 10/3:

## 2019-10-18 DIAGNOSIS — R93.89 ABNORMAL FINDINGS ON DIAGNOSTIC IMAGING OF OTHER SPECIFIED BODY STRUCTURES: ICD-10-CM

## 2019-10-18 LAB
ALBUMIN SERPL ELPH-MCNC: 3.1 G/DL — LOW (ref 3.3–5)
ALP SERPL-CCNC: 219 U/L — HIGH (ref 40–120)
ALT FLD-CCNC: 69 U/L — HIGH (ref 10–45)
ANION GAP SERPL CALC-SCNC: 15 MMOL/L — SIGNIFICANT CHANGE UP (ref 5–17)
APPEARANCE UR: CLEAR — SIGNIFICANT CHANGE UP
AST SERPL-CCNC: 46 U/L — HIGH (ref 10–40)
BILIRUB SERPL-MCNC: 0.9 MG/DL — SIGNIFICANT CHANGE UP (ref 0.2–1.2)
BILIRUB UR-MCNC: NEGATIVE — SIGNIFICANT CHANGE UP
BUN SERPL-MCNC: 34 MG/DL — HIGH (ref 7–23)
CALCIUM SERPL-MCNC: 9.8 MG/DL — SIGNIFICANT CHANGE UP (ref 8.4–10.5)
CHLORIDE SERPL-SCNC: 93 MMOL/L — LOW (ref 96–108)
CO2 SERPL-SCNC: 25 MMOL/L — SIGNIFICANT CHANGE UP (ref 22–31)
COLOR SPEC: YELLOW — SIGNIFICANT CHANGE UP
CREAT SERPL-MCNC: 0.87 MG/DL — SIGNIFICANT CHANGE UP (ref 0.5–1.3)
DIFF PNL FLD: NEGATIVE — SIGNIFICANT CHANGE UP
GLUCOSE BLDC GLUCOMTR-MCNC: 187 MG/DL — HIGH (ref 70–99)
GLUCOSE BLDC GLUCOMTR-MCNC: 211 MG/DL — HIGH (ref 70–99)
GLUCOSE BLDC GLUCOMTR-MCNC: 224 MG/DL — HIGH (ref 70–99)
GLUCOSE BLDC GLUCOMTR-MCNC: 231 MG/DL — HIGH (ref 70–99)
GLUCOSE SERPL-MCNC: 242 MG/DL — HIGH (ref 70–99)
GLUCOSE UR QL: NEGATIVE — SIGNIFICANT CHANGE UP
INR BLD: 1.19 RATIO — HIGH (ref 0.88–1.16)
KETONES UR-MCNC: NEGATIVE — SIGNIFICANT CHANGE UP
LEUKOCYTE ESTERASE UR-ACNC: NEGATIVE — SIGNIFICANT CHANGE UP
NITRITE UR-MCNC: NEGATIVE — SIGNIFICANT CHANGE UP
PH UR: 7 — SIGNIFICANT CHANGE UP (ref 5–8)
POTASSIUM SERPL-MCNC: 4.2 MMOL/L — SIGNIFICANT CHANGE UP (ref 3.5–5.3)
POTASSIUM SERPL-SCNC: 4.2 MMOL/L — SIGNIFICANT CHANGE UP (ref 3.5–5.3)
PROT SERPL-MCNC: 8.3 G/DL — SIGNIFICANT CHANGE UP (ref 6–8.3)
PROT UR-MCNC: SIGNIFICANT CHANGE UP
PROTHROM AB SERPL-ACNC: 13.5 SEC — HIGH (ref 10–13.1)
SODIUM SERPL-SCNC: 133 MMOL/L — LOW (ref 135–145)
SP GR SPEC: 1.02 — SIGNIFICANT CHANGE UP (ref 1.01–1.02)
UROBILINOGEN FLD QL: SIGNIFICANT CHANGE UP

## 2019-10-18 PROCEDURE — 99232 SBSQ HOSP IP/OBS MODERATE 35: CPT

## 2019-10-18 PROCEDURE — 99233 SBSQ HOSP IP/OBS HIGH 50: CPT | Mod: GC

## 2019-10-18 RX ADMIN — Medication 10 MILLIGRAM(S): at 13:34

## 2019-10-18 RX ADMIN — CHLORHEXIDINE GLUCONATE 1 APPLICATION(S): 213 SOLUTION TOPICAL at 13:35

## 2019-10-18 RX ADMIN — LEVETIRACETAM 400 MILLIGRAM(S): 250 TABLET, FILM COATED ORAL at 13:55

## 2019-10-18 RX ADMIN — Medication 2: at 06:09

## 2019-10-18 RX ADMIN — SENNA PLUS 10 MILLILITER(S): 8.6 TABLET ORAL at 22:32

## 2019-10-18 RX ADMIN — Medication 10 MILLIGRAM(S): at 08:00

## 2019-10-18 RX ADMIN — LACOSAMIDE 130 MILLIGRAM(S): 50 TABLET ORAL at 18:25

## 2019-10-18 RX ADMIN — Medication 2: at 18:30

## 2019-10-18 RX ADMIN — Medication 100 MILLIGRAM(S): at 16:06

## 2019-10-18 RX ADMIN — Medication 10 MILLIGRAM(S): at 21:19

## 2019-10-18 RX ADMIN — POLYETHYLENE GLYCOL 3350 17 GRAM(S): 17 POWDER, FOR SOLUTION ORAL at 18:26

## 2019-10-18 RX ADMIN — HEPARIN SODIUM 5000 UNIT(S): 5000 INJECTION INTRAVENOUS; SUBCUTANEOUS at 22:31

## 2019-10-18 RX ADMIN — LACOSAMIDE 130 MILLIGRAM(S): 50 TABLET ORAL at 06:07

## 2019-10-18 RX ADMIN — MAGNESIUM OXIDE 400 MG ORAL TABLET 400 MILLIGRAM(S): 241.3 TABLET ORAL at 13:34

## 2019-10-18 RX ADMIN — POLYETHYLENE GLYCOL 3350 17 GRAM(S): 17 POWDER, FOR SOLUTION ORAL at 06:07

## 2019-10-18 RX ADMIN — HEPARIN SODIUM 5000 UNIT(S): 5000 INJECTION INTRAVENOUS; SUBCUTANEOUS at 13:33

## 2019-10-18 RX ADMIN — MAGNESIUM OXIDE 400 MG ORAL TABLET 400 MILLIGRAM(S): 241.3 TABLET ORAL at 18:29

## 2019-10-18 RX ADMIN — Medication 2: at 13:45

## 2019-10-18 RX ADMIN — HEPARIN SODIUM 5000 UNIT(S): 5000 INJECTION INTRAVENOUS; SUBCUTANEOUS at 06:07

## 2019-10-18 RX ADMIN — PANTOPRAZOLE SODIUM 40 MILLIGRAM(S): 20 TABLET, DELAYED RELEASE ORAL at 13:49

## 2019-10-18 NOTE — PROGRESS NOTE ADULT - ASSESSMENT
84 year old male Hx HTN, GERD, prostate cancer admitted for status epilepticus.  Patient transferred to [facility] MICU for video EEG. CSF consistent with viral encephalitis vs inflammatory - improved with decadron and acyclovir. MRI right parietal abnormalities and EEG with epileptic focus - on AEDs.  Failed bedside swallow evaluation 3 times and so GI consult was called for PEG eval    Impression:  # Oropharyngeal Dysphagia s/p Laryngoscopy 10/10 showed decreased sensation, pooling of secretions and aspiration. DDx: metabolic encephalopathy, dementia, osteophytes and skeletal abnormalities     # Seizures/ encephalitis   # Elevated liver enzymes: infection / viral encephalitis, DILI ?    Recommendations:  - aggressive bowel regimen including Moviprep through NG, or daily enemas    - Continue tube feeding through NG  - Neurology clearance   - Will schedule PEG tube placement Monday. If he is medically optimized for the procedure.

## 2019-10-18 NOTE — PROGRESS NOTE ADULT - ASSESSMENT
85 y/o man with hx of prostate CA s/o seed implant and RT, HTN, p/w sudden onset AMS seizure brought to [facility] . Was found to be in status epilepticus and required intubation for airway control. Pt transferred to [facility] for further workup and EEG monitoring with no further seizure activity extubated on 10/1 downgraded to medicine on 10/3, failed multiple speech and swallow evals, now pending PEG placement

## 2019-10-18 NOTE — PROGRESS NOTE ADULT - SUBJECTIVE AND OBJECTIVE BOX
Interval Events:   CT scan reviewed No obstruction     Hospital Medications:  aMILoride 10 milliGRAM(s) Oral <User Schedule>  chlorhexidine 4% Liquid 1 Application(s) Topical <User Schedule>  heparin  Injectable 5000 Unit(s) SubCutaneous every 8 hours  insulin lispro (HumaLOG) corrective regimen sliding scale   SubCutaneous every 6 hours  labetalol 100 milliGRAM(s) Oral every 8 hours  lacosamide IVPB 150 milliGRAM(s) IV Intermittent every 12 hours  levETIRAcetam  IVPB 1500 milliGRAM(s) IV Intermittent every 12 hours  magnesium oxide 400 milliGRAM(s) Oral two times a day with meals  pantoprazole  Injectable 40 milliGRAM(s) IV Push daily  polyethylene glycol 3350 17 Gram(s) Oral two times a day  senna Syrup 10 milliLiter(s) Oral at bedtime        ROS: unable to be obtained -     PHYSICAL EXAM:   Vital Signs:  Vital Signs Last 24 Hrs  T(C): 36.6 (18 Oct 2019 08:30), Max: 36.6 (18 Oct 2019 08:30)  T(F): 97.9 (18 Oct 2019 08:30), Max: 97.9 (18 Oct 2019 08:30)  HR: 90 (18 Oct 2019 08:30) (80 - 98)  BP: 146/88 (18 Oct 2019 08:30) (128/81 - 146/88)  BP(mean): --  RR: 18 (18 Oct 2019 08:30) (17 - 18)  SpO2: 96% (18 Oct 2019 08:30) (96% - 99%)  Daily     Daily     GENERAL:  ill appearing   HEENT:  NC/AT,  conjunctivae clear and pink, sclera -anicteric  CHEST:  CTA B/L, Normal effort  HEART:  RRR S1/S2,   ABDOMEN:  Soft, non-tender, distended, normoactive bowel sounds,  no masses   EXTREMITIES:  No cyanosis or Edema  SKIN:  Warm & Dry. No rash or erythema  NEURO: moving all 4 extremities     LABS:      10-18    133<L>  |  93<L>  |  34<H>  ----------------------------<  242<H>  4.2   |  25  |  0.87    Ca    9.8      18 Oct 2019 07:09    TPro  8.3  /  Alb  3.1<L>  /  TBili  0.9  /  DBili  x   /  AST  46<H>  /  ALT  69<H>  /  AlkPhos  219<H>  10-18    LIVER FUNCTIONS - ( 18 Oct 2019 07:09 )  Alb: 3.1 g/dL / Pro: 8.3 g/dL / ALK PHOS: 219 U/L / ALT: 69 U/L / AST: 46 U/L / GGT: x           PT/INR - ( 18 Oct 2019 08:48 )   PT: 13.5 sec;   INR: 1.19 ratio                                     14.2   13.26 )-----------( 205      ( 16 Oct 2019 10:48 )             43.6       Imaging:

## 2019-10-18 NOTE — PROGRESS NOTE ADULT - PROBLEM SELECTOR PLAN 2
-ddx included status epilepticus, infarct HSV encephalitis, meningitis, hypoxic injury and CJD. likely viral vs. inflammatory based on CSF studies thus far.  - vEEG has consistently shown foci of epileptiform activity without seizure (~5 days)  - Neurology and ID following, recs appreciated  - keppra 2g q12h decreased to 1.5g q12h as per neuro recs  - c/w vimpat 150mg q12h  - was on decadron 10mg daily for inflammatory encephalopathy coverage, now on steroid taper, starting with  prednisone 60mg and decreasing by 20mg every 3 days  - f/u CSF 14-3-3, tau, neuron specific enolase, HTLV-1, JEANETTE virus, HSV 6, paraneoplastic panel      *Negative CSF studies so far:  CSF PCR, Bacterial cx, WNV, Toxo, Cryptococcus, HSV  - ESR/CRP 81/9.53, YANDEL and dsDNa negative - were checked to r/o autoimmune disease in setting of above MRI findings  -repeat MRI brain consistent with status   -continue with current dose of AEDs   -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   -neuro seeing pt today, asked them to comment on prognosis   -overall clinically pt has encephalitis, however workup for specific etiology has come back as negative  -ABG does not show retention, sent off cultures to rule out any infection - all neg   -repeat EEG is pending -ddx included status epilepticus, infarct HSV encephalitis, meningitis, hypoxic injury and CJD. likely viral vs. inflammatory based on CSF studies thus far.  - vEEG has consistently shown foci of epileptiform activity without seizure (~5 days)  - Neurology and ID following, recs appreciated  - keppra 2g q12h decreased to 1.5g q12h as per neuro recs  - c/w vimpat 150mg q12h  - was on decadron 10mg daily for inflammatory encephalopathy coverage, now on steroid taper, starting with  prednisone 60mg and decreasing by 20mg every 3 days  - f/u CSF 14-3-3, tau, neuron specific enolase, HTLV-1, JEANETTE virus, HSV 6, paraneoplastic panel      *Negative CSF studies so far:  CSF PCR, Bacterial cx, WNV, Toxo, Cryptococcus, HSV  - ESR/CRP 81/9.53, YANDEL and dsDNa negative - were checked to r/o autoimmune disease in setting of above MRI findings  -repeat MRI brain consistent with status   -continue with current dose of AEDs   -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   -neuro seeing pt today, asked them to comment on prognosis   -overall clinically pt has encephalitis, however workup for specific etiology has come back as negative  -ABG does not show retention, sent off cultures to rule out any infection - all neg   -repeat EEG shows no epileptiform pattern, and mild to moderate diffuse cerebral dysfunction

## 2019-10-18 NOTE — PROGRESS NOTE ADULT - PROBLEM SELECTOR PLAN 6
outpatient follow-up after discharge stable.  - home amlodipine held  - continue labetalol 100mg q8h (new medication started this admission)

## 2019-10-18 NOTE — PROGRESS NOTE ADULT - PROBLEM SELECTOR PLAN 5
stable.  - home amlodipine held  - continue labetalol 100mg q8h (new medication started this admission) continue with protonix

## 2019-10-18 NOTE — PROGRESS NOTE ADULT - PROBLEM SELECTOR PLAN 1
-failed speech and swallow eval x3, will keep NGT for now  -discussed with speech and swallow therapist , pt has difficulty swallowing secretions as well  -failed re-eval as well  -ENT recs appreciated  -continue with NGT for now  -failed speech and swallow eval on 10/15, numerous attempts tried before  -prognosis overall is unclear  -family requesting NGT placement  -consult placed and discussed with GI fellow today   -f/u recs -failed speech and swallow eval x3,   -failed re-eval as well  -ENT recs appreciated  -continue with NGT for now  -failed speech and swallow eval on 10/15, numerous attempts tried before  -prognosis overall is unclear  -family requesting NGT placement  -GI evaluating for PEG placement, may be tentatively scheduled for Monday.  -CT A/P shows constipation, will initiate aggressive bowel prep   -F/u GI recs  -needs neuro clearance as well

## 2019-10-18 NOTE — PROGRESS NOTE ADULT - PROBLEM SELECTOR PLAN 3
-appreciate renal recs  -c/w amiloride and c/w standing potassium and mag  -monitor K levels daily -CT A/P had an incidental finding of fluid loculation noted overlying R hip  -discussed with radiology, does not involve the hip joint, appears to be present in the muscle, significance of finding is unclear  -monitor clinically  -remains afebrile

## 2019-10-18 NOTE — CHART NOTE - NSCHARTNOTEFT_GEN_A_CORE
No contraindications from a neurologic perspective for PEG tube placement. Please continue with current AEDs.     PGY4 Neurology resident  NPatel

## 2019-10-18 NOTE — PROGRESS NOTE ADULT - PROBLEM SELECTOR PLAN 4
continue with protonix -appreciate renal recs  -c/w amiloride and c/w standing potassium and mag  -monitor K levels daily

## 2019-10-18 NOTE — PROGRESS NOTE ADULT - SUBJECTIVE AND OBJECTIVE BOX
Patient is a 84y old  Male who presents with a chief complaint of status epilepticus (17 Oct 2019 15:38)      SUBJECTIVE / OVERNIGHT EVENTS: no acute events overnight     MEDICATIONS  (STANDING):  aMILoride 10 milliGRAM(s) Oral <User Schedule>  chlorhexidine 4% Liquid 1 Application(s) Topical <User Schedule>  heparin  Injectable 5000 Unit(s) SubCutaneous every 8 hours  insulin lispro (HumaLOG) corrective regimen sliding scale   SubCutaneous every 6 hours  labetalol 100 milliGRAM(s) Oral every 8 hours  lacosamide IVPB 150 milliGRAM(s) IV Intermittent every 12 hours  levETIRAcetam  IVPB 1500 milliGRAM(s) IV Intermittent every 12 hours  magnesium oxide 400 milliGRAM(s) Oral two times a day with meals  pantoprazole  Injectable 40 milliGRAM(s) IV Push daily  polyethylene glycol 3350 17 Gram(s) Oral two times a day  senna Syrup 10 milliLiter(s) Oral at bedtime    MEDICATIONS  (PRN):      Vital Signs Last 24 Hrs  T(C): 36.6 (18 Oct 2019 08:30), Max: 36.6 (18 Oct 2019 08:30)  T(F): 97.9 (18 Oct 2019 08:30), Max: 97.9 (18 Oct 2019 08:30)  HR: 90 (18 Oct 2019 08:30) (80 - 98)  BP: 146/88 (18 Oct 2019 08:30) (128/81 - 146/88)  BP(mean): --  RR: 18 (18 Oct 2019 08:30) (17 - 18)  SpO2: 96% (18 Oct 2019 08:30) (96% - 99%)  CAPILLARY BLOOD GLUCOSE      POCT Blood Glucose.: 224 mg/dL (18 Oct 2019 06:07)  POCT Blood Glucose.: 149 mg/dL (17 Oct 2019 23:37)  POCT Blood Glucose.: 231 mg/dL (17 Oct 2019 18:12)  POCT Blood Glucose.: 228 mg/dL (17 Oct 2019 11:58)    I&O's Summary    17 Oct 2019 07:01  -  18 Oct 2019 07:00  --------------------------------------------------------  IN: 1050 mL / OUT: 950 mL / NET: 100 mL    18 Oct 2019 07:01  -  18 Oct 2019 11:46  --------------------------------------------------------  IN: 0 mL / OUT: 350 mL / NET: -350 mL        PHYSICAL EXAM:  GENERAL: chronically ill appearing   NOSE: NGT in place   NECK: Supple, No JVD  CHEST/LUNG: Clear to auscultation bilaterally; No wheeze  HEART: +S1/S2, reg   ABDOMEN: Soft, Nontender, Nondistended; Bowel sounds present  EXTREMITIES: trace edema present   PSYCH: AAOx2  : bradley in place with clear urine     LABS:    10-18    133<L>  |  93<L>  |  34<H>  ----------------------------<  242<H>  4.2   |  25  |  0.87    Ca    9.8      18 Oct 2019 07:09    TPro  8.3  /  Alb  3.1<L>  /  TBili  0.9  /  DBili  x   /  AST  46<H>  /  ALT  69<H>  /  AlkPhos  219<H>  10-18    PT/INR - ( 18 Oct 2019 08:48 )   PT: 13.5 sec;   INR: 1.19 ratio

## 2019-10-19 LAB
GLUCOSE BLDC GLUCOMTR-MCNC: 139 MG/DL — HIGH (ref 70–99)
GLUCOSE BLDC GLUCOMTR-MCNC: 202 MG/DL — HIGH (ref 70–99)
GLUCOSE BLDC GLUCOMTR-MCNC: 238 MG/DL — HIGH (ref 70–99)
GLUCOSE BLDC GLUCOMTR-MCNC: 243 MG/DL — HIGH (ref 70–99)
GLUCOSE BLDC GLUCOMTR-MCNC: 249 MG/DL — HIGH (ref 70–99)

## 2019-10-19 PROCEDURE — 71045 X-RAY EXAM CHEST 1 VIEW: CPT | Mod: 26

## 2019-10-19 PROCEDURE — 99232 SBSQ HOSP IP/OBS MODERATE 35: CPT

## 2019-10-19 RX ADMIN — HEPARIN SODIUM 5000 UNIT(S): 5000 INJECTION INTRAVENOUS; SUBCUTANEOUS at 06:17

## 2019-10-19 RX ADMIN — LEVETIRACETAM 400 MILLIGRAM(S): 250 TABLET, FILM COATED ORAL at 14:27

## 2019-10-19 RX ADMIN — HEPARIN SODIUM 5000 UNIT(S): 5000 INJECTION INTRAVENOUS; SUBCUTANEOUS at 14:26

## 2019-10-19 RX ADMIN — LACOSAMIDE 130 MILLIGRAM(S): 50 TABLET ORAL at 07:11

## 2019-10-19 RX ADMIN — MAGNESIUM OXIDE 400 MG ORAL TABLET 400 MILLIGRAM(S): 241.3 TABLET ORAL at 22:32

## 2019-10-19 RX ADMIN — POLYETHYLENE GLYCOL 3350 17 GRAM(S): 17 POWDER, FOR SOLUTION ORAL at 06:17

## 2019-10-19 RX ADMIN — LACOSAMIDE 130 MILLIGRAM(S): 50 TABLET ORAL at 19:29

## 2019-10-19 RX ADMIN — Medication 2: at 01:09

## 2019-10-19 RX ADMIN — PANTOPRAZOLE SODIUM 40 MILLIGRAM(S): 20 TABLET, DELAYED RELEASE ORAL at 14:27

## 2019-10-19 RX ADMIN — HEPARIN SODIUM 5000 UNIT(S): 5000 INJECTION INTRAVENOUS; SUBCUTANEOUS at 22:32

## 2019-10-19 RX ADMIN — Medication 2: at 14:26

## 2019-10-19 RX ADMIN — SENNA PLUS 10 MILLILITER(S): 8.6 TABLET ORAL at 22:32

## 2019-10-19 RX ADMIN — CHLORHEXIDINE GLUCONATE 1 APPLICATION(S): 213 SOLUTION TOPICAL at 10:53

## 2019-10-19 RX ADMIN — Medication 2: at 06:20

## 2019-10-19 RX ADMIN — MAGNESIUM OXIDE 400 MG ORAL TABLET 400 MILLIGRAM(S): 241.3 TABLET ORAL at 10:52

## 2019-10-19 RX ADMIN — Medication 10 MILLIGRAM(S): at 10:53

## 2019-10-19 RX ADMIN — Medication 10 MILLIGRAM(S): at 20:08

## 2019-10-19 RX ADMIN — LEVETIRACETAM 400 MILLIGRAM(S): 250 TABLET, FILM COATED ORAL at 01:11

## 2019-10-19 NOTE — CHART NOTE - NSCHARTNOTEFT_GEN_A_CORE
Informed by RN that pt restless, confused, and attempting to pull out his NGT even with mittens. This is a 84 year old male Hx HTN, GERD, prostate cancer who presented with seizure. On 9/20, patient fell out of bed and hit his head. He subsequently developed seizure like activity. Patient intubated by EMS for airway protection. Patient given Ativan and versed in ED. Patient had CTH which did not show acute processes (including bleed). Patient had brain MRI which showed R frontal enhancement (potential seizure foci). Patient started on propofol gtt, locasamide and keppra. Patient started on acyclovir for meningitis suspicion. Patient transferred to [facility] MICU for vEEG and management of status epilepticus. LP results consistent with inflammatory process, autoimmune  or infectious etiologies cannot be excluded. Ceftriaxone started for UTI, currently under Constant Observation.   Plan:  -Constant Observation renewed  -Mittens renewed  -Consider psych consult   -Will endorse to day team in AM    Jackie Yi PA-C  Dept of Medicine  Spectra [phone]

## 2019-10-19 NOTE — PROGRESS NOTE ADULT - SUBJECTIVE AND OBJECTIVE BOX
Patient is a 84y old  Male who presents with a chief complaint of status epilepticus (18 Oct 2019 11:46)      SUBJECTIVE / OVERNIGHT EVENTS: No overnight events. cannot get ROS given poor mental status    MEDICATIONS  (STANDING):  aMILoride 10 milliGRAM(s) Oral <User Schedule>  chlorhexidine 4% Liquid 1 Application(s) Topical <User Schedule>  heparin  Injectable 5000 Unit(s) SubCutaneous every 8 hours  insulin lispro (HumaLOG) corrective regimen sliding scale   SubCutaneous every 6 hours  labetalol 100 milliGRAM(s) Oral every 8 hours  lacosamide IVPB 150 milliGRAM(s) IV Intermittent every 12 hours  levETIRAcetam  IVPB 1500 milliGRAM(s) IV Intermittent every 12 hours  magnesium oxide 400 milliGRAM(s) Oral two times a day with meals  pantoprazole  Injectable 40 milliGRAM(s) IV Push daily  polyethylene glycol 3350 17 Gram(s) Oral two times a day  senna Syrup 10 milliLiter(s) Oral at bedtime    MEDICATIONS  (PRN):      Vital Signs Last 24 Hrs  T(C): 37.3 (19 Oct 2019 08:35), Max: 37.5 (19 Oct 2019 06:03)  T(F): 99.2 (19 Oct 2019 08:35), Max: 99.5 (19 Oct 2019 06:03)  HR: 98 (19 Oct 2019 08:35) (94 - 116)  BP: 147/86 (19 Oct 2019 08:35) (113/64 - 147/86)  BP(mean): --  RR: 18 (19 Oct 2019 08:35) (18 - 20)  SpO2: 96% (19 Oct 2019 08:35) (96% - 98%)  CAPILLARY BLOOD GLUCOSE      POCT Blood Glucose.: 202 mg/dL (19 Oct 2019 13:34)  POCT Blood Glucose.: 243 mg/dL (19 Oct 2019 08:51)  POCT Blood Glucose.: 238 mg/dL (19 Oct 2019 06:13)  POCT Blood Glucose.: 249 mg/dL (19 Oct 2019 00:22)  POCT Blood Glucose.: 231 mg/dL (18 Oct 2019 18:26)    I&O's Summary    18 Oct 2019 07:01  -  19 Oct 2019 07:00  --------------------------------------------------------  IN: 1300 mL / OUT: 3525 mL / NET: -2225 mL        PHYSICAL EXAM:  GENERAL: elderly chronically ill appearing, NG in place, mitten in place, no distress but attempting to pull ng  HEAD:  Atraumatic, Normocephalic  NECK: Supple, No JVD  CHEST/LUNG: Clear to auscultation bilaterally; No wheeze  HEART: Regular rate and rhythm;   ABDOMEN: Soft, Nontender, Nondistended; Bowel sounds present  EXTREMITIES:  2+ Peripheral Pulses, No clubbing, cyanosis, or edema   bradley in place  PSYCH: AAOx0      LABS:    10-18    133<L>  |  93<L>  |  34<H>  ----------------------------<  242<H>  4.2   |  25  |  0.87    Ca    9.8      18 Oct 2019 07:09    TPro  8.3  /  Alb  3.1<L>  /  TBili  0.9  /  DBili  x   /  AST  46<H>  /  ALT  69<H>  /  AlkPhos  219<H>  10    PT/INR - ( 18 Oct 2019 08:48 )   PT: 13.5 sec;   INR: 1.19 ratio               Urinalysis Basic - ( 18 Oct 2019 17:30 )    Color: Yellow / Appearance: Clear / S.018 / pH: x  Gluc: x / Ketone: Negative  / Bili: Negative / Urobili: <2 mg/dL   Blood: x / Protein: Trace / Nitrite: Negative   Leuk Esterase: Negative / RBC: x / WBC x   Sq Epi: x / Non Sq Epi: x / Bacteria: x            RADIOLOGY & ADDITIONAL TESTS:    Imaging Personally Reviewed:    Consultant(s) Notes Reviewed:      Care Discussed with Consultants/Other Providers:

## 2019-10-19 NOTE — PROGRESS NOTE ADULT - PROBLEM SELECTOR PLAN 1
-failed speech and swallow eval multiple times  -ENT recs appreciated  -continue with NGT with NG feeds  -GI for possible PEG placement, Monday.  -needs neuro clearance granted

## 2019-10-19 NOTE — CHART NOTE - NSCHARTNOTEFT_GEN_A_CORE
85 y/o man with hx of prostate CA s/o seed implant and RT, HTN, p/w sudden onset AMS seizure brought to [facility] . Was found to be in status epilepticus s/p MICU/ intubation. Dysphagia with NGT feeding pending Peg tube in Monday ----called by RN to report patient coughing during med pass via NGT. TF stopped, CXR showed Enteric tube in the mid-esophagus; NGT replaced and extensive oral care provided. Repeat cxr ordered to confirm placement.

## 2019-10-19 NOTE — PROGRESS NOTE ADULT - PROBLEM SELECTOR PLAN 3
-CT A/P had an incidental finding of fluid loculation noted overlying R hip  -discussed with radiology, does not involve the hip joint, appears to be present in the muscle, significance of finding is unclear  -monitor clinically  -remains afebrile

## 2019-10-19 NOTE — PROGRESS NOTE ADULT - PROBLEM SELECTOR PLAN 2
Unclear etiology per neuto: ddx included status epilepticus, encephalitis, , hypoxic injury and CJD.   -likely viral vs. inflammatory based on CSF studies thus far.  - vEEG has consistently shown foci of epileptiform activity without seizure (~5 days)  - Neurology and ID following, recs appreciated  - c/w keppra 1.5g q12h   - c/w vimpat 150mg q12h  - was on decadron 10mg daily for inflammatory encephalopathy, now completed taper  - f/u CSF 14-3-3, tau, neuron specific enolase, HTLV-1, JEANETTE virus, HSV 6, paraneoplastic panel      *Negative CSF studies so far:  CSF PCR, Bacterial cx, WNV, Toxo, Cryptococcus, HSV  - ESR/CRP 81/9.53, YANDEL and dsDNa negative - were checked to r/o autoimmune disease in setting of above MRI findings  -repeat MRI brain consistent with status   -continue with current dose of AEDs   -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   -ABG does not show retention, sent off cultures to rule out any infection - all neg   -repeat EEG shows no epileptiform pattern, and mild to moderate diffuse cerebral dysfunction  -on 1:1, continue

## 2019-10-19 NOTE — PROGRESS NOTE ADULT - ASSESSMENT
85 y/o man with hx of prostate CA s/o seed implant and RT, HTN, p/w sudden onset AMS seizure brought to [facility] . Was found to be in status epilepticus and required intubation for airway control. Pt transferred to [facility] for further workup and EEG monitoring with no further seizure activity extubated on 10/1 downgraded to medicine on 10/3, failed multiple speech and swallow evals, now pending PEG placement

## 2019-10-20 LAB
ANION GAP SERPL CALC-SCNC: 16 MMOL/L — SIGNIFICANT CHANGE UP (ref 5–17)
BUN SERPL-MCNC: 36 MG/DL — HIGH (ref 7–23)
CALCIUM SERPL-MCNC: 9.8 MG/DL — SIGNIFICANT CHANGE UP (ref 8.4–10.5)
CHLORIDE SERPL-SCNC: 93 MMOL/L — LOW (ref 96–108)
CO2 SERPL-SCNC: 26 MMOL/L — SIGNIFICANT CHANGE UP (ref 22–31)
CREAT SERPL-MCNC: 0.89 MG/DL — SIGNIFICANT CHANGE UP (ref 0.5–1.3)
CULTURE RESULTS: SIGNIFICANT CHANGE UP
CULTURE RESULTS: SIGNIFICANT CHANGE UP
GLUCOSE BLDC GLUCOMTR-MCNC: 169 MG/DL — HIGH (ref 70–99)
GLUCOSE BLDC GLUCOMTR-MCNC: 173 MG/DL — HIGH (ref 70–99)
GLUCOSE BLDC GLUCOMTR-MCNC: 191 MG/DL — HIGH (ref 70–99)
GLUCOSE BLDC GLUCOMTR-MCNC: 191 MG/DL — HIGH (ref 70–99)
GLUCOSE BLDC GLUCOMTR-MCNC: 194 MG/DL — HIGH (ref 70–99)
GLUCOSE SERPL-MCNC: 172 MG/DL — HIGH (ref 70–99)
MAGNESIUM SERPL-MCNC: 1.8 MG/DL — SIGNIFICANT CHANGE UP (ref 1.6–2.6)
PHOSPHATE SERPL-MCNC: 4.1 MG/DL — SIGNIFICANT CHANGE UP (ref 2.5–4.5)
POTASSIUM SERPL-MCNC: 4.2 MMOL/L — SIGNIFICANT CHANGE UP (ref 3.5–5.3)
POTASSIUM SERPL-SCNC: 4.2 MMOL/L — SIGNIFICANT CHANGE UP (ref 3.5–5.3)
SODIUM SERPL-SCNC: 135 MMOL/L — SIGNIFICANT CHANGE UP (ref 135–145)
SPECIMEN SOURCE: SIGNIFICANT CHANGE UP
SPECIMEN SOURCE: SIGNIFICANT CHANGE UP

## 2019-10-20 PROCEDURE — 71045 X-RAY EXAM CHEST 1 VIEW: CPT | Mod: 26

## 2019-10-20 PROCEDURE — 99232 SBSQ HOSP IP/OBS MODERATE 35: CPT

## 2019-10-20 RX ORDER — SODIUM CHLORIDE 9 MG/ML
1000 INJECTION, SOLUTION INTRAVENOUS
Refills: 0 | Status: DISCONTINUED | OUTPATIENT
Start: 2019-10-20 | End: 2019-10-23

## 2019-10-20 RX ADMIN — Medication 1: at 06:40

## 2019-10-20 RX ADMIN — HEPARIN SODIUM 5000 UNIT(S): 5000 INJECTION INTRAVENOUS; SUBCUTANEOUS at 21:17

## 2019-10-20 RX ADMIN — CHLORHEXIDINE GLUCONATE 1 APPLICATION(S): 213 SOLUTION TOPICAL at 08:53

## 2019-10-20 RX ADMIN — POLYETHYLENE GLYCOL 3350 17 GRAM(S): 17 POWDER, FOR SOLUTION ORAL at 06:39

## 2019-10-20 RX ADMIN — LACOSAMIDE 130 MILLIGRAM(S): 50 TABLET ORAL at 06:50

## 2019-10-20 RX ADMIN — LACOSAMIDE 130 MILLIGRAM(S): 50 TABLET ORAL at 19:10

## 2019-10-20 RX ADMIN — Medication 1: at 00:52

## 2019-10-20 RX ADMIN — LEVETIRACETAM 400 MILLIGRAM(S): 250 TABLET, FILM COATED ORAL at 12:15

## 2019-10-20 RX ADMIN — HEPARIN SODIUM 5000 UNIT(S): 5000 INJECTION INTRAVENOUS; SUBCUTANEOUS at 06:39

## 2019-10-20 RX ADMIN — Medication 1: at 19:17

## 2019-10-20 RX ADMIN — Medication 1: at 15:31

## 2019-10-20 RX ADMIN — HEPARIN SODIUM 5000 UNIT(S): 5000 INJECTION INTRAVENOUS; SUBCUTANEOUS at 15:33

## 2019-10-20 RX ADMIN — Medication 10 MILLIGRAM(S): at 08:52

## 2019-10-20 RX ADMIN — MAGNESIUM OXIDE 400 MG ORAL TABLET 400 MILLIGRAM(S): 241.3 TABLET ORAL at 08:52

## 2019-10-20 RX ADMIN — PANTOPRAZOLE SODIUM 40 MILLIGRAM(S): 20 TABLET, DELAYED RELEASE ORAL at 15:33

## 2019-10-20 RX ADMIN — LEVETIRACETAM 400 MILLIGRAM(S): 250 TABLET, FILM COATED ORAL at 00:51

## 2019-10-20 NOTE — CHART NOTE - NSCHARTNOTEFT_GEN_A_CORE
pt's NGT required frequent repositioning due to not in proper place. Family at bedside (daughter and wife ) and requested no further NGT adjustment . As per daughter her father will have a peg tube placed tommorow and will also be NPO after MN. NGT removed but mittens remained in place and 1:1 discontinued since patient has no ngt. Wife and daughter aware and in an agreement. Will implement enhanced supervision.

## 2019-10-20 NOTE — PROGRESS NOTE ADULT - ASSESSMENT
85 y/o man with hx of prostate CA s/o seed implant and RT, HTN, p/w sudden onset AMS seizure brought to [facility] . Was found to be in status epilepticus and required intubation for airway control. Pt transferred to [facility] for further workup and EEG monitoring with no further seizure activity extubated on 10/1 downgraded to medicine on 10/3, failed multiple speech and swallow evals, now pending PEG placement

## 2019-10-20 NOTE — PROGRESS NOTE ADULT - SUBJECTIVE AND OBJECTIVE BOX
Patient is a 84y old  Male who presents with a chief complaint of status epilepticus (19 Oct 2019 14:58)      SUBJECTIVE / OVERNIGHT EVENTS: no overnight events. had cough yesterday, NG tube advanced but still in proximal stomach, radiology recommended further advancement which was done this morning, repeat CXR pending. Feeds/meds on hold until repeat CXR.      Patient knows hes in hospital but cannot accuetly given further hx or answer questions given confusion    MEDICATIONS  (STANDING):  aMILoride 10 milliGRAM(s) Oral <User Schedule>  chlorhexidine 4% Liquid 1 Application(s) Topical <User Schedule>  heparin  Injectable 5000 Unit(s) SubCutaneous every 8 hours  insulin lispro (HumaLOG) corrective regimen sliding scale   SubCutaneous every 6 hours  labetalol 100 milliGRAM(s) Oral every 8 hours  lacosamide IVPB 150 milliGRAM(s) IV Intermittent every 12 hours  levETIRAcetam  IVPB 1500 milliGRAM(s) IV Intermittent every 12 hours  magnesium oxide 400 milliGRAM(s) Oral two times a day with meals  pantoprazole  Injectable 40 milliGRAM(s) IV Push daily  polyethylene glycol 3350 17 Gram(s) Oral two times a day  senna Syrup 10 milliLiter(s) Oral at bedtime    MEDICATIONS  (PRN):      Vital Signs Last 24 Hrs  T(C): 36.3 (20 Oct 2019 05:40), Max: 36.4 (19 Oct 2019 16:00)  T(F): 97.3 (20 Oct 2019 05:40), Max: 97.6 (19 Oct 2019 16:00)  HR: 87 (20 Oct 2019 05:40) (76 - 95)  BP: 126/68 (20 Oct 2019 05:40) (126/68 - 138/83)  BP(mean): --  RR: 18 (20 Oct 2019 05:40) (18 - 18)  SpO2: 97% (20 Oct 2019 05:40) (96% - 97%)  CAPILLARY BLOOD GLUCOSE      POCT Blood Glucose.: 194 mg/dL (20 Oct 2019 07:35)  POCT Blood Glucose.: 191 mg/dL (20 Oct 2019 05:49)  POCT Blood Glucose.: 191 mg/dL (20 Oct 2019 00:10)  POCT Blood Glucose.: 139 mg/dL (19 Oct 2019 18:56)    I&O's Summary    19 Oct 2019 07:01  -  20 Oct 2019 07:00  --------------------------------------------------------  IN: 0 mL / OUT: 1500 mL / NET: -1500 mL        PHYSICAL EXAM:  GENERAL: elderly chronically ill appearing, NG in place, mitten in place, no distress  HEAD:  Atraumatic, Normocephalic  NECK: Supple, No JVD  CHEST/LUNG: Clear to auscultation bilaterally; No wheeze  HEART: Regular rate and rhythm;   ABDOMEN: Soft, Nontender, Nondistended; Bowel sounds present  EXTREMITIES:  2+ Peripheral Pulses, No clubbing, cyanosis, or edema   bradley in place  PSYCH: AAOx1 today      LABS:                Urinalysis Basic - ( 18 Oct 2019 17:30 )    Color: Yellow / Appearance: Clear / S.018 / pH: x  Gluc: x / Ketone: Negative  / Bili: Negative / Urobili: <2 mg/dL   Blood: x / Protein: Trace / Nitrite: Negative   Leuk Esterase: Negative / RBC: x / WBC x   Sq Epi: x / Non Sq Epi: x / Bacteria: x            RADIOLOGY & ADDITIONAL TESTS:    Imaging Personally Reviewed: CXRL   Impression: On the initial radiograph, the nasogastric tube tip within   the distal esophagus. On sequential imaging, the nasogastric tube tip is   in the proximal stomach. This tube should be slightly advanced.      Consultant(s) Notes Reviewed:      Care Discussed with Consultants/Other Providers:

## 2019-10-20 NOTE — PROGRESS NOTE ADULT - PROBLEM SELECTOR PLAN 1
-failed speech and swallow eval multiple times, ENT recs appreciated  -NG tube advanced last night but CXR showing in proximal stomach, radiology suggest advancement which was done this AM, repeat CXR pending  -hold feeds/meds until repeat CXR  -GI possible PEG placement, Monday, will hold PEG feeds at might  -needs neuro clearance granted

## 2019-10-21 LAB
ANION GAP SERPL CALC-SCNC: 17 MMOL/L — SIGNIFICANT CHANGE UP (ref 5–17)
BUN SERPL-MCNC: 36 MG/DL — HIGH (ref 7–23)
CALCIUM SERPL-MCNC: 9.4 MG/DL — SIGNIFICANT CHANGE UP (ref 8.4–10.5)
CHLORIDE SERPL-SCNC: 94 MMOL/L — LOW (ref 96–108)
CO2 SERPL-SCNC: 25 MMOL/L — SIGNIFICANT CHANGE UP (ref 22–31)
CREAT SERPL-MCNC: 0.93 MG/DL — SIGNIFICANT CHANGE UP (ref 0.5–1.3)
GLUCOSE BLDC GLUCOMTR-MCNC: 147 MG/DL — HIGH (ref 70–99)
GLUCOSE BLDC GLUCOMTR-MCNC: 152 MG/DL — HIGH (ref 70–99)
GLUCOSE BLDC GLUCOMTR-MCNC: 157 MG/DL — HIGH (ref 70–99)
GLUCOSE BLDC GLUCOMTR-MCNC: 161 MG/DL — HIGH (ref 70–99)
GLUCOSE SERPL-MCNC: 165 MG/DL — HIGH (ref 70–99)
HCT VFR BLD CALC: 45.4 % — SIGNIFICANT CHANGE UP (ref 39–50)
HGB BLD-MCNC: 14.8 G/DL — SIGNIFICANT CHANGE UP (ref 13–17)
MCHC RBC-ENTMCNC: 30.8 PG — SIGNIFICANT CHANGE UP (ref 27–34)
MCHC RBC-ENTMCNC: 32.6 GM/DL — SIGNIFICANT CHANGE UP (ref 32–36)
MCV RBC AUTO: 94.6 FL — SIGNIFICANT CHANGE UP (ref 80–100)
PLATELET # BLD AUTO: 266 K/UL — SIGNIFICANT CHANGE UP (ref 150–400)
POTASSIUM SERPL-MCNC: 3.9 MMOL/L — SIGNIFICANT CHANGE UP (ref 3.5–5.3)
POTASSIUM SERPL-SCNC: 3.9 MMOL/L — SIGNIFICANT CHANGE UP (ref 3.5–5.3)
RBC # BLD: 4.8 M/UL — SIGNIFICANT CHANGE UP (ref 4.2–5.8)
RBC # FLD: 14.7 % — HIGH (ref 10.3–14.5)
SODIUM SERPL-SCNC: 136 MMOL/L — SIGNIFICANT CHANGE UP (ref 135–145)
WBC # BLD: 9.86 K/UL — SIGNIFICANT CHANGE UP (ref 3.8–10.5)
WBC # FLD AUTO: 9.86 K/UL — SIGNIFICANT CHANGE UP (ref 3.8–10.5)

## 2019-10-21 PROCEDURE — 99232 SBSQ HOSP IP/OBS MODERATE 35: CPT | Mod: GC

## 2019-10-21 PROCEDURE — 43246 EGD PLACE GASTROSTOMY TUBE: CPT | Mod: GC

## 2019-10-21 PROCEDURE — 99233 SBSQ HOSP IP/OBS HIGH 50: CPT

## 2019-10-21 RX ORDER — METOPROLOL TARTRATE 50 MG
2.5 TABLET ORAL ONCE
Refills: 0 | Status: COMPLETED | OUTPATIENT
Start: 2019-10-21 | End: 2019-10-21

## 2019-10-21 RX ADMIN — HEPARIN SODIUM 5000 UNIT(S): 5000 INJECTION INTRAVENOUS; SUBCUTANEOUS at 21:24

## 2019-10-21 RX ADMIN — CHLORHEXIDINE GLUCONATE 1 APPLICATION(S): 213 SOLUTION TOPICAL at 07:38

## 2019-10-21 RX ADMIN — Medication 1: at 06:42

## 2019-10-21 RX ADMIN — Medication 2.5 MILLIGRAM(S): at 06:39

## 2019-10-21 RX ADMIN — LEVETIRACETAM 400 MILLIGRAM(S): 250 TABLET, FILM COATED ORAL at 00:14

## 2019-10-21 RX ADMIN — LACOSAMIDE 130 MILLIGRAM(S): 50 TABLET ORAL at 18:14

## 2019-10-21 RX ADMIN — Medication 1: at 00:18

## 2019-10-21 RX ADMIN — Medication 10 MILLIGRAM(S): at 20:03

## 2019-10-21 RX ADMIN — LEVETIRACETAM 400 MILLIGRAM(S): 250 TABLET, FILM COATED ORAL at 12:45

## 2019-10-21 RX ADMIN — PANTOPRAZOLE SODIUM 40 MILLIGRAM(S): 20 TABLET, DELAYED RELEASE ORAL at 12:45

## 2019-10-21 RX ADMIN — Medication 1: at 12:50

## 2019-10-21 RX ADMIN — LACOSAMIDE 130 MILLIGRAM(S): 50 TABLET ORAL at 06:37

## 2019-10-21 RX ADMIN — Medication 100 MILLIGRAM(S): at 21:23

## 2019-10-21 RX ADMIN — HEPARIN SODIUM 5000 UNIT(S): 5000 INJECTION INTRAVENOUS; SUBCUTANEOUS at 05:08

## 2019-10-21 RX ADMIN — HEPARIN SODIUM 5000 UNIT(S): 5000 INJECTION INTRAVENOUS; SUBCUTANEOUS at 12:45

## 2019-10-21 NOTE — PROGRESS NOTE ADULT - SUBJECTIVE AND OBJECTIVE BOX
Pre-Endoscopy Evaluation      Referring Physician:  dr. marcy campoverde                                  Procedure:  upper gastrointestinal endoscopy/peg placement    Indication for Procedure: dysphagia    Pertinent History: 84y male with PMH of prostate CA S/P seed implant and RT, HTN, admitted with AMS found to be in status epilepticus and required intubation, extubated on 10/1 subsequently failed multiple speech and swallow evaluations requiring PEG placement     Sedation by Anesthesia [x]    PAST MEDICAL & SURGICAL HISTORY:  History of gastroesophageal reflux (GERD)  Hypertension  Prostate cancer: had seed implant &amp; radiation ( 2001 )  S/P cholecystectomy      PMH of Gastroparesis [ ]  Gastric Surgery [ ]  Gastric Outlet Obstruction [ ]    Allergies:    No Known Allergies    Intolerances:    Latex allergy: [ ] yes [X] no    Medications:MEDICATIONS  (STANDING):  aMILoride 10 milliGRAM(s) Oral <User Schedule>  chlorhexidine 4% Liquid 1 Application(s) Topical <User Schedule>  dextrose 5% + sodium chloride 0.45%. 1000 milliLiter(s) (50 mL/Hr) IV Continuous <Continuous>  heparin  Injectable 5000 Unit(s) SubCutaneous every 8 hours  insulin lispro (HumaLOG) corrective regimen sliding scale   SubCutaneous every 6 hours  labetalol 100 milliGRAM(s) Oral every 8 hours  lacosamide IVPB 150 milliGRAM(s) IV Intermittent every 12 hours  levETIRAcetam  IVPB 1500 milliGRAM(s) IV Intermittent every 12 hours  magnesium oxide 400 milliGRAM(s) Oral two times a day with meals  pantoprazole  Injectable 40 milliGRAM(s) IV Push daily  polyethylene glycol 3350 17 Gram(s) Oral two times a day  senna Syrup 10 milliLiter(s) Oral at bedtime    MEDICATIONS  (PRN):      Smoking: [ ] yes  [X] no    AICD/PPM: [ ] yes   [X] no    Pertinent lab data:                        14.8   9.86  )-----------( 266      ( 21 Oct 2019 09:11 )             45.4     10-21    136  |  94<L>  |  36<H>  ----------------------------<  165<H>  3.9   |  25  |  0.93    Ca    9.4      21 Oct 2019 07:41  Phos  4.1     10-20  Mg     1.8     10-20          Physical Examination:     Daily   Vital Signs Last 24 Hrs  T(C): 36.3 (21 Oct 2019 08:58), Max: 36.4 (20 Oct 2019 23:59)  T(F): 97.4 (21 Oct 2019 08:58), Max: 97.5 (20 Oct 2019 23:59)  HR: 81 (21 Oct 2019 08:58) (68 - 91)  BP: 121/76 (21 Oct 2019 08:58) (121/76 - 153/87)  BP(mean): --  RR: 18 (21 Oct 2019 08:58) (18 - 18)  SpO2: 97% (21 Oct 2019 08:58) (97% - 97%)    Drug Dosing Weight  Height (cm): 177.8 (24 Sep 2019 23:32)  Weight (kg): 88.6 (28 Sep 2019 06:00)  BMI (kg/m2): 28 (28 Sep 2019 06:00)  BSA (m2): 2.07 (28 Sep 2019 06:00)    Constitutional: NAD     Neck:  No JVD    Respiratory: CTAB/L    Cardiovascular: S1 and S2    Gastrointestinal: BS+, soft, NT/ND    Extremities: No peripheral edema    Neurological: Awake, alert, responsive    : No Hernandez    Skin: No rashes    Comments:    The patient is a suitable candidate for the planned procedure unless box checked [ ]  No, explain:

## 2019-10-21 NOTE — PROGRESS NOTE ADULT - PROBLEM SELECTOR PLAN 1
-failed speech and swallow eval multiple times, ENT recs appreciated  -hold feeds/meds until repeat CXR  -GI possible PEG placement today

## 2019-10-21 NOTE — PROGRESS NOTE ADULT - ASSESSMENT
85 y/o man with hx of prostate CA s/o seed implant and RT, HTN, p/w sudden onset AMS seizure brought to [facility] . Was found to be in status epilepticus and required intubation for airway control. Pt transferred to [facility] for further workup and EEG monitoring with no further seizure activity extubated on 10/1 downgraded to medicine on 10/3, failed multiple speech and swallow evals, now pending PEG placement

## 2019-10-21 NOTE — PROGRESS NOTE ADULT - ASSESSMENT
Assessment:  85 y/o man with hx of of prostate cancer p/w SE, intubated, with MRI abnormalities, CSF analysis c/w viral encephalitis vs inflammatory. pt improved with decadron and acyclovir. CSF PCF panel finally returned as negative. Pt with MRI abnormalities in the right parietal lobe along with EEG epileptic focus. 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 Patient appears to be more awake today with improved eye opening apraxia.    Decrease keppra from 1500 to 1000 mg BID.   Continue Vimpat 150 mg BID   Seroquel at night for sleep and delirium.   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     Case discussed with neurology attending, Dr. Carmichael Assessment:  83 y/o man with hx of of prostate cancer p/w SE, intubated, with MRI abnormalities, CSF analysis c/w viral encephalitis vs inflammatory. pt improved with decadron and acyclovir. CSF PCF panel finally returned as negative. Pt with MRI abnormalities in the right parietal lobe along with EEG epileptic focus. 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 Patient appears to be more awake today with improved eye opening apraxia.    Decrease keppra from 1500 to 1000 mg BID.   Continue Vimpat 150 mg BID   Seroquel at night for sleep and delirium.       Case discussed with neurology attending, Dr. Carmichael

## 2019-10-21 NOTE — PROGRESS NOTE ADULT - SUBJECTIVE AND OBJECTIVE BOX
SUBJECTIVE: Patient seen and examined at the bedside by the neurology team. Patient had no complaints.     PAST MEDICAL & SURGICAL HISTORY:  History of gastroesophageal reflux (GERD)  Hypertension  Prostate cancer: had seed implant &amp; radiation ( 2001 )  PC (prostate cancer)  S/P cholecystectomy    FAMILY HISTORY:  No pertinent family history in first degree relatives    MEDICATIONS (HOME):  Home Medications:  amiloride 5 mg oral tablet: 1 tab(s) orally 3 times a day (25 Sep 2019 04:10)  ketorolac 10 mg oral tablet: 1 tab(s) orally 4 times a day (25 Sep 2019 04:10)  multivitamin: 1 tab(s) orally once a day (25 Sep 2019 04:10)  potassium chloride 10 mEq oral tablet, extended release: 3 tab(s) orally 2 times a day (25 Sep 2019 04:10)    MEDICATIONS  (STANDING):  aMILoride 10 milliGRAM(s) Oral <User Schedule>  chlorhexidine 4% Liquid 1 Application(s) Topical <User Schedule>  dextrose 5% + sodium chloride 0.45%. 1000 milliLiter(s) (50 mL/Hr) IV Continuous <Continuous>  heparin  Injectable 5000 Unit(s) SubCutaneous every 8 hours  insulin lispro (HumaLOG) corrective regimen sliding scale   SubCutaneous every 6 hours  labetalol 100 milliGRAM(s) Oral every 8 hours  lacosamide IVPB 150 milliGRAM(s) IV Intermittent every 12 hours  levETIRAcetam  IVPB 1500 milliGRAM(s) IV Intermittent every 12 hours  magnesium oxide 400 milliGRAM(s) Oral two times a day with meals  pantoprazole  Injectable 40 milliGRAM(s) IV Push daily  polyethylene glycol 3350 17 Gram(s) Oral two times a day  senna Syrup 10 milliLiter(s) Oral at bedtime    MEDICATIONS  (PRN):    ALLERGIES/INTOLERANCES:  Allergies  No Known Allergies    Intolerances    VITALS & EXAMINATION:  Vital Signs Last 24 Hrs  T(C): 36.3 (21 Oct 2019 08:58), Max: 36.6 (20 Oct 2019 15:06)  T(F): 97.4 (21 Oct 2019 08:58), Max: 97.9 (20 Oct 2019 15:06)  HR: 81 (21 Oct 2019 08:58) (68 - 97)  BP: 121/76 (21 Oct 2019 08:58) (111/76 - 153/87)  BP(mean): --  RR: 18 (21 Oct 2019 08:58) (18 - 18)  SpO2: 97% (21 Oct 2019 08:58) (95% - 97%)    General: Male, appears older than stated age, in no apparent distress including pain.    Neurological (>12):  MS: Awake, alert. Normal affect. Follows all commands. No asomatognosia.    Language: Speech is clear, fluent with good comprehension    CNs: LHH by BTT. Can identify colors. apparent left visual neglect. left gaze palsy. Not tracking with gaze. V1-3 intact to LT/pinprick, well developed masseter muscles b/l. No facial asymmetry b/l.    Motor: No noticeable tremor or seizure.   LUE: 1/5 throughout  RUE: 3/5 throughout at least  RLE hip flexion and dorsiflexion: 4+/5   LLE: hip flexion and dorsiflexion 4/5               Sensation: Intact to LT b/l throughout.     Cortical: Extinction on DSS (neglect): present (tactile)      LABORATORY:  CBC                       14.8   9.86  )-----------( 266      ( 21 Oct 2019 09:11 )             45.4     Chem 10-21    136  |  94<L>  |  36<H>  ----------------------------<  165<H>  3.9   |  25  |  0.93    Ca    9.4      21 Oct 2019 07:41  Phos  4.1     10-20  Mg     1.8     10-20      LFTs   Coagulopathy   Lipid Panel   A1c 09-25 [id number] 5.7    Cardiac enzymes     U/A   CSF  Immunological  Other    STUDIES & IMAGING:  Studies (EKG, EEG, EMG, etc):     Radiology (XR, CT, MR, U/S, TTE/LEENA): SUBJECTIVE: Patient seen and examined at the bedside by the neurology team. Patient had no complaints.     PAST MEDICAL & SURGICAL HISTORY:  History of gastroesophageal reflux (GERD)  Hypertension  Prostate cancer: had seed implant &amp; radiation ( 2001 )  PC (prostate cancer)  S/P cholecystectomy    FAMILY HISTORY:  No pertinent family history in first degree relatives    MEDICATIONS (HOME):  Home Medications:  amiloride 5 mg oral tablet: 1 tab(s) orally 3 times a day (25 Sep 2019 04:10)  ketorolac 10 mg oral tablet: 1 tab(s) orally 4 times a day (25 Sep 2019 04:10)  multivitamin: 1 tab(s) orally once a day (25 Sep 2019 04:10)  potassium chloride 10 mEq oral tablet, extended release: 3 tab(s) orally 2 times a day (25 Sep 2019 04:10)    MEDICATIONS  (STANDING):  aMILoride 10 milliGRAM(s) Oral <User Schedule>  chlorhexidine 4% Liquid 1 Application(s) Topical <User Schedule>  dextrose 5% + sodium chloride 0.45%. 1000 milliLiter(s) (50 mL/Hr) IV Continuous <Continuous>  heparin  Injectable 5000 Unit(s) SubCutaneous every 8 hours  insulin lispro (HumaLOG) corrective regimen sliding scale   SubCutaneous every 6 hours  labetalol 100 milliGRAM(s) Oral every 8 hours  lacosamide IVPB 150 milliGRAM(s) IV Intermittent every 12 hours  levETIRAcetam  IVPB 1500 milliGRAM(s) IV Intermittent every 12 hours  magnesium oxide 400 milliGRAM(s) Oral two times a day with meals  pantoprazole  Injectable 40 milliGRAM(s) IV Push daily  polyethylene glycol 3350 17 Gram(s) Oral two times a day  senna Syrup 10 milliLiter(s) Oral at bedtime    MEDICATIONS  (PRN):    ALLERGIES/INTOLERANCES:  Allergies  No Known Allergies    Intolerances    VITALS & EXAMINATION:  Vital Signs Last 24 Hrs  T(C): 36.3 (21 Oct 2019 08:58), Max: 36.6 (20 Oct 2019 15:06)  T(F): 97.4 (21 Oct 2019 08:58), Max: 97.9 (20 Oct 2019 15:06)  HR: 81 (21 Oct 2019 08:58) (68 - 97)  BP: 121/76 (21 Oct 2019 08:58) (111/76 - 153/87)  BP(mean): --  RR: 18 (21 Oct 2019 08:58) (18 - 18)  SpO2: 97% (21 Oct 2019 08:58) (95% - 97%)    General: Male, appears older than stated age, in no apparent distress including pain.    Neurological (>12):  MS: Awake, alert. Normal affect. Follows all commands. No asomatognosia.    Language: Speech is clear, fluent with good comprehension    CNs: LHH by BTT. Can identify colors. apparent left visual neglect. left gaze palsy. Not tracking with gaze. V1-3 intact to LT/pinprick, well developed masseter muscles b/l. No facial asymmetry b/l.    Motor: No noticeable tremor or seizure.   LUE: 1/5 throughout, some movement in the left thumb  RUE: 4/5 throughout at least,  RLE hip flexion and dorsiflexion: 4+/5   LLE: hip flexion and dorsiflexion 4/5               Sensation: Intact to LT b/l throughout.     Cortical: Extinction on DSS (neglect): present (tactile)      LABORATORY:  CBC                       14.8   9.86  )-----------( 266      ( 21 Oct 2019 09:11 )             45.4     Chem 10-21    136  |  94<L>  |  36<H>  ----------------------------<  165<H>  3.9   |  25  |  0.93    Ca    9.4      21 Oct 2019 07:41  Phos  4.1     10-20  Mg     1.8     10-20      LFTs   Coagulopathy   Lipid Panel   A1c 09-25 [id number] 5.7    Cardiac enzymes     U/A   CSF  Immunological  Other    STUDIES & IMAGING:  Studies (EKG, EEG, EMG, etc):     Radiology (XR, CT, MR, U/S, TTE/LEENA):

## 2019-10-21 NOTE — CHART NOTE - NSCHARTNOTEFT_GEN_A_CORE
Nutrition Follow Up Note  Patient seen for: LOS follow up     Interim events noted, chart reviewed. As per team: "83 y/o man with hx of prostate CA s/o seed implant and RT, HTN, p/w sudden onset AMS seizure brought to [facility] . Was found to be in status epilepticus and required intubation for airway control. Pt transferred to [facility] for further workup and EEG monitoring with no further seizure activity extubated on 10/1 downgraded to medicine on 10/3, failed multiple speech and swallow evaluations, now pending PEG placement."    Source: pt (confused), spouse and daughter at bedside     Diet : NPO at this time, previously pt was on Jevity 1.2 at 75ml/hr x24 hours (1800ml total volume, 2160cal, 100 Gm Prot; 25cal/kg and 1.15 Gm Prot/kg based on wt of 86.3kg)    As per family, pt had good appetite and intake PTA, NKFA. Spouse and daughter report wt PTA was around 175-180 pounds. In house, as per RN, pt has been tolerating tube feeds well, noted last BM 10/20.     Daily Weight: dosin.2 pounds-> noted 10/2-10/16: wt of 192-194 pounds on bed scale, 10/17: 182.9 pounds; nutrition focused physical exam conducted in presence of family, no significant muscle or fat loss noted, except for mild fat loss around orbital region and decreased muscle tone to legs (likely given lack of activity and prolonged hospitalization), family reports acromion process likely has always been prominent    Pertinent Medications: MEDICATIONS  (STANDING):  aMILoride 10 milliGRAM(s) Oral <User Schedule>  chlorhexidine 4% Liquid 1 Application(s) Topical <User Schedule>  dextrose 5% + sodium chloride 0.45%. 1000 milliLiter(s) (50 mL/Hr) IV Continuous <Continuous>  heparin  Injectable 5000 Unit(s) SubCutaneous every 8 hours  insulin lispro (HumaLOG) corrective regimen sliding scale   SubCutaneous every 6 hours  labetalol 100 milliGRAM(s) Oral every 8 hours  lacosamide IVPB 150 milliGRAM(s) IV Intermittent every 12 hours  levETIRAcetam  IVPB 1500 milliGRAM(s) IV Intermittent every 12 hours  magnesium oxide 400 milliGRAM(s) Oral two times a day with meals  pantoprazole  Injectable 40 milliGRAM(s) IV Push daily  polyethylene glycol 3350 17 Gram(s) Oral two times a day  senna Syrup 10 milliLiter(s) Oral at bedtime    MEDICATIONS  (PRN):    Pertinent Labs: 10-21 @ 07:41: Na 136, BUN 36<H>, Cr 0.93, <H>, K+ 3.9, Phos --, Mg --, Alk Phos --, ALT/SGPT --, AST/SGOT --, HbA1c --  10-20 @ 15:25: Na 135, BUN 36<H>, Cr 0.89, <H>, K+ 4.2, Phos 4.1, Mg 1.8, Alk Phos --, ALT/SGPT --, AST/SGOT --, HbA1c --    Finger Sticks:  POCT Blood Glucose.: 157 mg/dL (10-21 @ 06:41)  POCT Blood Glucose.: 161 mg/dL (10-21 @ 00:13)  POCT Blood Glucose.: 169 mg/dL (10-20 @ 18:34)  POCT Blood Glucose.: 173 mg/dL (10-20 @ 15:06)      Skin per nursing documentation: intact  Edema: +1 right arm and left hand     Estimated Needs:   [x] recalculated: 159-cal (20-25cal/kg based on stated wt of 78.9kg) and  Gm Prot (1.2-1.4 Gm Prot/kg based on stated wt of 78.9kg)    Previous Nutrition Diagnosis: no previous diagnosis       New Nutrition Diagnosis: swallowing difficulty   Related to: oropharyngeal dysphagia superimposed upon reduced mental status as per Speech pathologist   As evidenced by: need for alternate means of nutrition      Interventions: continue c tube feeds once PEG placed    Recommend   If/when PEG tube placed, recommend continue c Jevity 1.2 c a goal rate of 75ml/hr x24 hours. If/when plan to transition to bolus feeds, recommend 7 cans daily of Jevity 1.2 (1659ml total volume, , 92 Gm Prot; 25cal/kg and 1.2 Gm Prot/kg based on 78.9kg) + 1 packet of No Carb Prosource to bring protein needs to 107 Gm Prot (~1.4 Gm Prot/kg based on 78.9kg). Consider providing boluses: 2 cans at 6 AM, 2 cans at 12PM and 1 can at 6 PM as tolerated.      Monitoring and Evaluation:     Continue to monitor Nutritional intake, Tolerance to diet prescription, weights, labs, skin integrity    RD remains available upon request and will follow up per protocol  Ekta Spears MS RD CDN [facility],  #[phone]

## 2019-10-21 NOTE — PROGRESS NOTE ADULT - SUBJECTIVE AND OBJECTIVE BOX
Authored by Dr Abbe Palomo [phone]  After hours or if no response please page Hospitalist service [phone]    Patient is a 84y old  Male who presents with a chief complaint of status epilepticus (21 Oct 2019 10:21)    SUBJECTIVE / OVERNIGHT EVENTS: no new complaints. spouse and daughter at bedside questions answered    MEDICATIONS  (STANDING):  aMILoride 10 milliGRAM(s) Oral <User Schedule>  chlorhexidine 4% Liquid 1 Application(s) Topical <User Schedule>  dextrose 5% + sodium chloride 0.45%. 1000 milliLiter(s) (50 mL/Hr) IV Continuous <Continuous>  heparin  Injectable 5000 Unit(s) SubCutaneous every 8 hours  insulin lispro (HumaLOG) corrective regimen sliding scale   SubCutaneous every 6 hours  labetalol 100 milliGRAM(s) Oral every 8 hours  lacosamide IVPB 150 milliGRAM(s) IV Intermittent every 12 hours  levETIRAcetam  IVPB 1500 milliGRAM(s) IV Intermittent every 12 hours  magnesium oxide 400 milliGRAM(s) Oral two times a day with meals  pantoprazole  Injectable 40 milliGRAM(s) IV Push daily  polyethylene glycol 3350 17 Gram(s) Oral two times a day  senna Syrup 10 milliLiter(s) Oral at bedtime    MEDICATIONS  (PRN):      Vital Signs Last 24 Hrs  T(C): 36.3 (21 Oct 2019 08:58), Max: 36.4 (20 Oct 2019 23:59)  T(F): 97.4 (21 Oct 2019 08:58), Max: 97.5 (20 Oct 2019 23:59)  HR: 81 (21 Oct 2019 08:58) (68 - 91)  BP: 121/76 (21 Oct 2019 08:58) (121/76 - 153/87)  BP(mean): --  RR: 18 (21 Oct 2019 08:58) (18 - 18)  SpO2: 97% (21 Oct 2019 08:58) (97% - 97%)  CAPILLARY BLOOD GLUCOSE      POCT Blood Glucose.: 152 mg/dL (21 Oct 2019 12:46)  POCT Blood Glucose.: 157 mg/dL (21 Oct 2019 06:41)  POCT Blood Glucose.: 161 mg/dL (21 Oct 2019 00:13)  POCT Blood Glucose.: 169 mg/dL (20 Oct 2019 18:34)    I&O's Summary    20 Oct 2019 07:01  -  21 Oct 2019 07:00  --------------------------------------------------------  IN: 0 mL / OUT: 1275 mL / NET: -1275 mL    21 Oct 2019 07:01  -  21 Oct 2019 15:26  --------------------------------------------------------  IN: 0 mL / OUT: 500 mL / NET: -500 mL        PHYSICAL EXAM  GENERAL: elderly chronically ill appearing, NG in place, mitten in place, no distress  NECK: Supple, No JVD  CHEST/LUNG: Clear to auscultation bilaterally; No wheeze  HEART: Regular rate and rhythm;   ABDOMEN: Soft, Nontender, Nondistended; Bowel sounds present  EXTREMITIES:  2+ Peripheral Pulses, No clubbing, cyanosis, or edema   bradley in place  NEURO: Charles today L hemiparesis 1/5 LUE 3/5 [facility]    LABS:                        14.8   9.86  )-----------( 266      ( 21 Oct 2019 09:11 )             45.4     10-21    136  |  94<L>  |  36<H>  ----------------------------<  165<H>  3.9   |  25  |  0.93    Ca    9.4      21 Oct 2019 07:41  Phos  4.1     10-20  Mg     1.8     10-20      RADIOLOGY & ADDITIONAL TESTS:    Imaging Personally Reviewed:  Consultant(s) Notes Reviewed:  neurology  Care Discussed with Consultants/Other Providers:

## 2019-10-21 NOTE — PROGRESS NOTE ADULT - PROBLEM SELECTOR PLAN 2
Unclear etiology per neuro: ddx included status epilepticus, encephalitis, hypoxic injury and CJD.   -likely viral vs. inflammatory based on CSF studies thus far.  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   -ABG does not show retention, sent off cultures to rule out any infection - all neg   -repeat EEG shows no epileptiform pattern, and mild to moderate diffuse cerebral dysfunction

## 2019-10-21 NOTE — PROGRESS NOTE ADULT - ATTENDING COMMENTS
Pt appeared more awake today. No longer with eye opening apraxia.   Will continue to go down on AEDs. Last EEG was negative

## 2019-10-22 LAB
ANION GAP SERPL CALC-SCNC: 17 MMOL/L — SIGNIFICANT CHANGE UP (ref 5–17)
BUN SERPL-MCNC: 34 MG/DL — HIGH (ref 7–23)
CALCIUM SERPL-MCNC: 9.2 MG/DL — SIGNIFICANT CHANGE UP (ref 8.4–10.5)
CHLORIDE SERPL-SCNC: 96 MMOL/L — SIGNIFICANT CHANGE UP (ref 96–108)
CO2 SERPL-SCNC: 21 MMOL/L — LOW (ref 22–31)
CREAT SERPL-MCNC: 0.98 MG/DL — SIGNIFICANT CHANGE UP (ref 0.5–1.3)
GLUCOSE BLDC GLUCOMTR-MCNC: 147 MG/DL — HIGH (ref 70–99)
GLUCOSE BLDC GLUCOMTR-MCNC: 152 MG/DL — HIGH (ref 70–99)
GLUCOSE BLDC GLUCOMTR-MCNC: 158 MG/DL — HIGH (ref 70–99)
GLUCOSE BLDC GLUCOMTR-MCNC: 161 MG/DL — HIGH (ref 70–99)
GLUCOSE SERPL-MCNC: 173 MG/DL — HIGH (ref 70–99)
HCT VFR BLD CALC: 44 % — SIGNIFICANT CHANGE UP (ref 39–50)
HGB BLD-MCNC: 14.3 G/DL — SIGNIFICANT CHANGE UP (ref 13–17)
MCHC RBC-ENTMCNC: 30.1 PG — SIGNIFICANT CHANGE UP (ref 27–34)
MCHC RBC-ENTMCNC: 32.5 GM/DL — SIGNIFICANT CHANGE UP (ref 32–36)
MCV RBC AUTO: 92.6 FL — SIGNIFICANT CHANGE UP (ref 80–100)
PLATELET # BLD AUTO: 287 K/UL — SIGNIFICANT CHANGE UP (ref 150–400)
POTASSIUM SERPL-MCNC: 3.7 MMOL/L — SIGNIFICANT CHANGE UP (ref 3.5–5.3)
POTASSIUM SERPL-SCNC: 3.7 MMOL/L — SIGNIFICANT CHANGE UP (ref 3.5–5.3)
RBC # BLD: 4.75 M/UL — SIGNIFICANT CHANGE UP (ref 4.2–5.8)
RBC # FLD: 14.4 % — SIGNIFICANT CHANGE UP (ref 10.3–14.5)
SODIUM SERPL-SCNC: 134 MMOL/L — LOW (ref 135–145)
WBC # BLD: 8.79 K/UL — SIGNIFICANT CHANGE UP (ref 3.8–10.5)
WBC # FLD AUTO: 8.79 K/UL — SIGNIFICANT CHANGE UP (ref 3.8–10.5)

## 2019-10-22 PROCEDURE — 99233 SBSQ HOSP IP/OBS HIGH 50: CPT

## 2019-10-22 PROCEDURE — 99232 SBSQ HOSP IP/OBS MODERATE 35: CPT | Mod: GC

## 2019-10-22 RX ORDER — LEVETIRACETAM 250 MG/1
1000 TABLET, FILM COATED ORAL
Refills: 0 | Status: DISCONTINUED | OUTPATIENT
Start: 2019-10-22 | End: 2019-10-23

## 2019-10-22 RX ADMIN — MAGNESIUM OXIDE 400 MG ORAL TABLET 400 MILLIGRAM(S): 241.3 TABLET ORAL at 17:52

## 2019-10-22 RX ADMIN — Medication 1: at 18:08

## 2019-10-22 RX ADMIN — Medication 1: at 06:14

## 2019-10-22 RX ADMIN — Medication 10 MILLIGRAM(S): at 21:14

## 2019-10-22 RX ADMIN — LACOSAMIDE 130 MILLIGRAM(S): 50 TABLET ORAL at 05:03

## 2019-10-22 RX ADMIN — LEVETIRACETAM 1000 MILLIGRAM(S): 250 TABLET, FILM COATED ORAL at 17:52

## 2019-10-22 RX ADMIN — HEPARIN SODIUM 5000 UNIT(S): 5000 INJECTION INTRAVENOUS; SUBCUTANEOUS at 14:41

## 2019-10-22 RX ADMIN — Medication 100 MILLIGRAM(S): at 05:03

## 2019-10-22 RX ADMIN — Medication 10 MILLIGRAM(S): at 14:42

## 2019-10-22 RX ADMIN — HEPARIN SODIUM 5000 UNIT(S): 5000 INJECTION INTRAVENOUS; SUBCUTANEOUS at 05:03

## 2019-10-22 RX ADMIN — LEVETIRACETAM 400 MILLIGRAM(S): 250 TABLET, FILM COATED ORAL at 00:00

## 2019-10-22 RX ADMIN — HEPARIN SODIUM 5000 UNIT(S): 5000 INJECTION INTRAVENOUS; SUBCUTANEOUS at 21:14

## 2019-10-22 RX ADMIN — CHLORHEXIDINE GLUCONATE 1 APPLICATION(S): 213 SOLUTION TOPICAL at 07:59

## 2019-10-22 RX ADMIN — PANTOPRAZOLE SODIUM 40 MILLIGRAM(S): 20 TABLET, DELAYED RELEASE ORAL at 12:19

## 2019-10-22 RX ADMIN — MAGNESIUM OXIDE 400 MG ORAL TABLET 400 MILLIGRAM(S): 241.3 TABLET ORAL at 07:59

## 2019-10-22 RX ADMIN — SENNA PLUS 10 MILLILITER(S): 8.6 TABLET ORAL at 21:11

## 2019-10-22 RX ADMIN — Medication 10 MILLIGRAM(S): at 07:58

## 2019-10-22 RX ADMIN — Medication 1: at 00:13

## 2019-10-22 NOTE — PROGRESS NOTE ADULT - PROBLEM SELECTOR PLAN 2
d/w neuro - etiology seems like it was an unspecified viral encephalitis given CSF studies. extensive encephalitic/autoimmune workup was done and generally negative. 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   neuro deficit related to scarring which occurred - expect recovery similar to that of CVA   AEDs per neuro - c/w present regimen including reduced dose keppra  no further changes prior to d/c per neuro

## 2019-10-22 NOTE — PROGRESS NOTE ADULT - PROVIDER SPECIALTY LIST ADULT
ENT
Gastroenterology
Hospitalist
Infectious Disease
MICU
Nephrology
Nephrology
Neurology
Radiology
Rehab Medicine
Infectious Disease
Neurology
MICU
Hospitalist
Infectious Disease

## 2019-10-22 NOTE — PROGRESS NOTE ADULT - SUBJECTIVE AND OBJECTIVE BOX
Interval Events:   s/p PEG  no bleeding    Hospital Medications:  aMILoride 10 milliGRAM(s) Oral <User Schedule>  chlorhexidine 4% Liquid 1 Application(s) Topical <User Schedule>  dextrose 5% + sodium chloride 0.45%. 1000 milliLiter(s) IV Continuous <Continuous>  heparin  Injectable 5000 Unit(s) SubCutaneous every 8 hours  insulin lispro (HumaLOG) corrective regimen sliding scale   SubCutaneous every 6 hours  labetalol 100 milliGRAM(s) Oral every 8 hours  levETIRAcetam  Solution 1000 milliGRAM(s) Enteral Tube two times a day  magnesium oxide 400 milliGRAM(s) Oral two times a day with meals  pantoprazole  Injectable 40 milliGRAM(s) IV Push daily  polyethylene glycol 3350 17 Gram(s) Oral two times a day  senna Syrup 10 milliLiter(s) Oral at bedtime        ROS: unable to be obtained due to encephalopathy     PHYSICAL EXAM:   Vital Signs:  Vital Signs Last 24 Hrs  T(C): 36.3 (22 Oct 2019 08:00), Max: 36.6 (21 Oct 2019 18:44)  T(F): 97.3 (22 Oct 2019 08:00), Max: 97.8 (21 Oct 2019 18:44)  HR: 83 (22 Oct 2019 08:00) (81 - 104)  BP: 123/76 (22 Oct 2019 08:00) (116/73 - 157/95)  BP(mean): --  RR: 18 (22 Oct 2019 08:00) (18 - 18)  SpO2: 95% (22 Oct 2019 08:00) (95% - 97%)  Daily     Daily     GENERAL:  no acute distress  HEENT:  NC/AT,  conjunctivae clear and pink, sclera -anicteric  CHEST:  CTA B/L, Normal effort  HEART:  RRR S1/S2,   ABDOMEN:  Soft, non-tender, , normoactive bowel sounds,+ PEG site is clean, intact  EXTREMITIES:  No cyanosis or Edema  SKIN:  Warm & Dry. No rash or erythema  NEURO: moving all 4 extremities     LABS:                        14.3   8.79  )-----------( 287      ( 22 Oct 2019 09:39 )             44.0     Mean Cell Volume: 92.6 fl (10-22-19 @ 09:39)    10-22    134<L>  |  96  |  34<H>  ----------------------------<  173<H>  3.7   |  21<L>  |  0.98    Ca    9.2      22 Oct 2019 07:07  Phos  4.1     10-20  Mg     1.8     10-20                                    14.3   8.79  )-----------( 287      ( 22 Oct 2019 09:39 )             44.0                         14.8   9.86  )-----------( 266      ( 21 Oct 2019 09:11 )             45.4       Imaging: Interval Events:   s/p PEG  no bleeding    Hospital Medications:  aMILoride 10 milliGRAM(s) Oral <User Schedule>  chlorhexidine 4% Liquid 1 Application(s) Topical <User Schedule>  dextrose 5% + sodium chloride 0.45%. 1000 milliLiter(s) IV Continuous <Continuous>  heparin  Injectable 5000 Unit(s) SubCutaneous every 8 hours  insulin lispro (HumaLOG) corrective regimen sliding scale   SubCutaneous every 6 hours  labetalol 100 milliGRAM(s) Oral every 8 hours  levETIRAcetam  Solution 1000 milliGRAM(s) Enteral Tube two times a day  magnesium oxide 400 milliGRAM(s) Oral two times a day with meals  pantoprazole  Injectable 40 milliGRAM(s) IV Push daily  polyethylene glycol 3350 17 Gram(s) Oral two times a day  senna Syrup 10 milliLiter(s) Oral at bedtime        ROS: unable to be obtained due to encephalopathy     PHYSICAL EXAM:   Vital Signs:  Vital Signs Last 24 Hrs  T(C): 36.3 (22 Oct 2019 08:00), Max: 36.6 (21 Oct 2019 18:44)  T(F): 97.3 (22 Oct 2019 08:00), Max: 97.8 (21 Oct 2019 18:44)  HR: 83 (22 Oct 2019 08:00) (81 - 104)  BP: 123/76 (22 Oct 2019 08:00) (116/73 - 157/95)  BP(mean): --  RR: 18 (22 Oct 2019 08:00) (18 - 18)  SpO2: 95% (22 Oct 2019 08:00) (95% - 97%)  Daily     Daily     GENERAL:  no acute distress  HEENT:  NC/AT,  conjunctivae clear and pink, sclera -anicteric  CHEST:  CTA B/L, Normal effort  HEART:  RRR S1/S2,   ABDOMEN:  Soft, non-tender, , normoactive bowel sounds,+ PEG site is clean, with skin intact at 3cm  EXTREMITIES:  No cyanosis or Edema  SKIN:  Warm & Dry. No rash or erythema  NEURO: moving all 4 extremities; A+O x 1    LABS:                        14.3   8.79  )-----------( 287      ( 22 Oct 2019 09:39 )             44.0     Mean Cell Volume: 92.6 fl (10-22-19 @ 09:39)    10-22    134<L>  |  96  |  34<H>  ----------------------------<  173<H>  3.7   |  21<L>  |  0.98    Ca    9.2      22 Oct 2019 07:07  Phos  4.1     10-20  Mg     1.8     10-20                                    14.3   8.79  )-----------( 287      ( 22 Oct 2019 09:39 )             44.0                         14.8   9.86  )-----------( 266      ( 21 Oct 2019 09:11 )             45.4

## 2019-10-22 NOTE — PROGRESS NOTE ADULT - ASSESSMENT
83 y/o man with hx of prostate CA s/o seed implant and RT, HTN, p/w sudden onset AMS seizure brought to [facility] . Was found to be in status epilepticus and required intubation for airway control. Pt transferred to [facility] for further workup and EEG monitoring with no further seizure activity extubated on 10/1 downgraded to medicine on 10/3, failed multiple speech and swallow evals, now s/p PEG placement

## 2019-10-22 NOTE — PROGRESS NOTE ADULT - SUBJECTIVE AND OBJECTIVE BOX
Authored by Dr Abbe Palomo [phone]  After hours or if no response please page Hospitalist service [phone]    Patient is a 84y old  Male who presents with a chief complaint of status epilepticus (22 Oct 2019 14:07)    SUBJECTIVE / OVERNIGHT EVENTS: s/p peg    MEDICATIONS  (STANDING):  aMILoride 10 milliGRAM(s) Oral <User Schedule>  chlorhexidine 4% Liquid 1 Application(s) Topical <User Schedule>  dextrose 5% + sodium chloride 0.45%. 1000 milliLiter(s) (50 mL/Hr) IV Continuous <Continuous>  heparin  Injectable 5000 Unit(s) SubCutaneous every 8 hours  insulin lispro (HumaLOG) corrective regimen sliding scale   SubCutaneous every 6 hours  labetalol 100 milliGRAM(s) Oral every 8 hours  levETIRAcetam  Solution 1000 milliGRAM(s) Enteral Tube two times a day  magnesium oxide 400 milliGRAM(s) Oral two times a day with meals  pantoprazole  Injectable 40 milliGRAM(s) IV Push daily  polyethylene glycol 3350 17 Gram(s) Oral two times a day  senna Syrup 10 milliLiter(s) Oral at bedtime    MEDICATIONS  (PRN):      Vital Signs Last 24 Hrs  T(C): 35.8 (22 Oct 2019 14:00), Max: 36.6 (21 Oct 2019 18:44)  T(F): 96.4 (22 Oct 2019 14:00), Max: 97.8 (21 Oct 2019 18:44)  HR: 86 (22 Oct 2019 14:00) (81 - 104)  BP: 102/65 (22 Oct 2019 14:00) (102/65 - 157/95)  BP(mean): --  RR: 18 (22 Oct 2019 14:00) (18 - 18)  SpO2: 95% (22 Oct 2019 08:00) (95% - 97%)  CAPILLARY BLOOD GLUCOSE      POCT Blood Glucose.: 147 mg/dL (22 Oct 2019 12:09)  POCT Blood Glucose.: 161 mg/dL (22 Oct 2019 06:12)  POCT Blood Glucose.: 158 mg/dL (22 Oct 2019 00:12)  POCT Blood Glucose.: 147 mg/dL (21 Oct 2019 18:43)    I&O's Summary    21 Oct 2019 07:01  -  22 Oct 2019 07:00  --------------------------------------------------------  IN: 0 mL / OUT: 1800 mL / NET: -1800 mL    22 Oct 2019 07:01  -  22 Oct 2019 15:04  --------------------------------------------------------  IN: 0 mL / OUT: 300 mL / NET: -300 mL        PHYSICAL EXAM  GENERAL: elderly chronically ill appearing, NG in place, mitten in place, no distress  NECK: Supple, No JVD  CHEST/LUNG: Clear to auscultation bilaterally; No wheeze  HEART: Regular rate and rhythm;   ABDOMEN: s/p PEG clean site minimally tender   EXTREMITIES:  2+ Peripheral Pulses, No clubbing, cyanosis, or edema   bradley in place  NEURO: AALuis today L hemiparesis 1/5 LUE 3/5 LLE    LABS:                        14.3   8.79  )-----------( 287      ( 22 Oct 2019 09:39 )             44.0     10-22    134<L>  |  96  |  34<H>  ----------------------------<  173<H>  3.7   |  21<L>  |  0.98    Ca    9.2      22 Oct 2019 07:07  Phos  4.1     10-20  Mg     1.8     10-20      RADIOLOGY & ADDITIONAL TESTS:    Imaging Personally Reviewed:  Consultant(s) Notes Reviewed:    Care Discussed with Consultants/Other Providers: GI Dr Anand re: PEG mgmt

## 2019-10-22 NOTE — PROGRESS NOTE ADULT - SUBJECTIVE AND OBJECTIVE BOX
Interval events: 10/15 swallow eval: oropharyngeal dysphagia with mental status fluctuations, NPO.  Seen by GI for multiple (3) failed swallow evals now s/p PEG 10/21.  Renal following for hypokalemia.      FUNCTIONAL PROGRESS  Max Assist of 2     REVIEW OF SYSTEMS  limited due to patient condition  denies pain or headache   per daughter at bedside: patient with "waxing and waning" level of motivation during PT sessions    VITALS  T(C): 36.3 (10-22-19 @ 08:00), Max: 36.6 (10-21-19 @ 18:44)  HR: 83 (10-22-19 @ 08:00) (81 - 104)  BP: 123/76 (10-22-19 @ 08:00) (116/73 - 157/95)  RR: 18 (10-22-19 @ 08:00) (18 - 18)  SpO2: 95% (10-22-19 @ 08:00) (95% - 97%)  Wt(kg): --    MEDICATIONS   aMILoride 10 milliGRAM(s) <User Schedule>  chlorhexidine 4% Liquid 1 Application(s) <User Schedule>  dextrose 5% + sodium chloride 0.45%. 1000 milliLiter(s) <Continuous>  heparin  Injectable 5000 Unit(s) every 8 hours  insulin lispro (HumaLOG) corrective regimen sliding scale   every 6 hours  labetalol 100 milliGRAM(s) every 8 hours  levETIRAcetam  Solution 1000 milliGRAM(s) two times a day  magnesium oxide 400 milliGRAM(s) two times a day with meals  pantoprazole  Injectable 40 milliGRAM(s) daily  polyethylene glycol 3350 17 Gram(s) two times a day  senna Syrup 10 milliLiter(s) at bedtime      RECENT LABS/IMAGING  CBC Full  -  ( 22 Oct 2019 09:39 )  WBC Count : 8.79 K/uL  RBC Count : 4.75 M/uL  Hemoglobin : 14.3 g/dL  Hematocrit : 44.0 %  Platelet Count - Automated : 287 K/uL  Mean Cell Volume : 92.6 fl  Mean Cell Hemoglobin : 30.1 pg  Mean Cell Hemoglobin Concentration : 32.5 gm/dL  Auto Neutrophil # : x  Auto Lymphocyte # : x  Auto Monocyte # : x  Auto Eosinophil # : x  Auto Basophil # : x  Auto Neutrophil % : x  Auto Lymphocyte % : x  Auto Monocyte % : x  Auto Eosinophil % : x  Auto Basophil % : x    10-22    134<L>  |  96  |  34<H>  ----------------------------<  173<H>  3.7   |  21<L>  |  0.98    Ca    9.2      22 Oct 2019 07:07  Phos  4.1     10-20  Mg     1.8     10-20    ---------  PHYSICAL EXAM  Constitutional - NAD, sitting in chair   Extremities - No C/C/E, No calf tenderness  Neurologic Exam -                    Cognitive - alert but closes eyes at times, oriented to self only      Communication - delayed responses, intact naming, difficulty with repetition and following commands      Motor - RUE/RLE against gravity. LUE/LLE 0/5      Sensory - Intact to LT  Psychiatric - Mood WNL, Affect WNL    ASSESSMENT/PLAN  85 y/o M with functional, gait, ADL impairments related to encephalitis, possible viral, and seizures    Disposition - we had a discussion with family regarding disposition. Patient with limited participation in PT/OT due to waxing and waning motivation and level of alertness.  He continues to require Max Assist of 2 people during PT.  He would likely benefit from longer term, less intense rehab to address functional deficits.  Recommend SUBACUTE REHAB to address current functional deficit.    PT - ROM, Bed mobility, Transfers, Ambulation with assistive device  OT - ADLs, ROM  SLP - Dysphagia eval and treat  Precautions - Falls, Cardiac

## 2019-10-22 NOTE — PROGRESS NOTE ADULT - PROBLEM SELECTOR PLAN 8
Diet: tube feeds   DVT ppx: hep subc  poor prognosis  Dispo: rehab
Diet: tube feeds   DVT ppx: hep subc  poor prognosis  Dispo: rehab pending PEG
Diet: tube feeds   DVT ppx: hep subc  Dispo: rehab
Diet: tube feeds   DVT ppx: hep subc  Dispo: rehab pending PEG
Diet: tube feeds   DVT ppx: hep subc    Dispo: rehab

## 2019-10-22 NOTE — PROGRESS NOTE ADULT - PROBLEM SELECTOR PROBLEM 2
Seizure
History of gastroesophageal reflux (GERD)
Seizure
Abnormal cerebrospinal fluid
Seizure
Seizure
Abnormal cerebrospinal fluid
Seizure
Seizure
Abnormal cerebrospinal fluid

## 2019-10-22 NOTE — PROGRESS NOTE ADULT - ASSESSMENT
Impression:  # Oropharyngeal Dysphagia s/p PEG 10/21   # Seizures/ encephalitis     Recommendations:  - okay to start using PEG today for feeding  - nutrition consult for type and rate of feeding   - keep bumper clean and dry, recommend NO gauze underneath the bumper   - aspiration precautions, elevate head end of the bed  - close observation to prevent dislodgement, can use a binder for protection to prevent the patient from pulling out the PEG  - eventual Speech and Swallow evaluation as an outpatient to evaluate for swallowing, if it improves, can remove the PEG as an outpatient atleast 6-8 weeks post placement   - supportive care as per primary team.     Please call us back as needed.

## 2019-10-22 NOTE — PROGRESS NOTE ADULT - REASON FOR ADMISSION
status epilepticus

## 2019-10-22 NOTE — PROGRESS NOTE ADULT - PROBLEM SELECTOR PROBLEM 1
Dysphagia
Seizure
Encephalitis
Dysphagia
Encephalitis

## 2019-10-22 NOTE — PROGRESS NOTE ADULT - ATTENDING COMMENTS
Seen and examined with resident. Agree with note.   Patient will need subacute rehabilitation when stable.

## 2019-10-22 NOTE — PROGRESS NOTE ADULT - PROBLEM/PLAN-6
No chest pain, No difficulty breathing, focal weakness/ numbness
DISPLAY PLAN FREE TEXT

## 2019-10-23 ENCOUNTER — TRANSCRIPTION ENCOUNTER (OUTPATIENT)
Age: 84
End: 2019-10-23

## 2019-10-23 VITALS
OXYGEN SATURATION: 97 % | DIASTOLIC BLOOD PRESSURE: 78 MMHG | TEMPERATURE: 97 F | SYSTOLIC BLOOD PRESSURE: 35 MMHG | HEART RATE: 74 BPM | RESPIRATION RATE: 18 BRPM

## 2019-10-23 LAB
ANION GAP SERPL CALC-SCNC: 19 MMOL/L — HIGH (ref 5–17)
BUN SERPL-MCNC: 37 MG/DL — HIGH (ref 7–23)
CALCIUM SERPL-MCNC: 9.8 MG/DL — SIGNIFICANT CHANGE UP (ref 8.4–10.5)
CHLORIDE SERPL-SCNC: 94 MMOL/L — LOW (ref 96–108)
CO2 SERPL-SCNC: 24 MMOL/L — SIGNIFICANT CHANGE UP (ref 22–31)
CREAT SERPL-MCNC: 0.97 MG/DL — SIGNIFICANT CHANGE UP (ref 0.5–1.3)
GLUCOSE BLDC GLUCOMTR-MCNC: 135 MG/DL — HIGH (ref 70–99)
GLUCOSE BLDC GLUCOMTR-MCNC: 156 MG/DL — HIGH (ref 70–99)
GLUCOSE BLDC GLUCOMTR-MCNC: 199 MG/DL — HIGH (ref 70–99)
GLUCOSE SERPL-MCNC: 152 MG/DL — HIGH (ref 70–99)
POTASSIUM SERPL-MCNC: 3.5 MMOL/L — SIGNIFICANT CHANGE UP (ref 3.5–5.3)
POTASSIUM SERPL-SCNC: 3.5 MMOL/L — SIGNIFICANT CHANGE UP (ref 3.5–5.3)
SODIUM SERPL-SCNC: 137 MMOL/L — SIGNIFICANT CHANGE UP (ref 135–145)

## 2019-10-23 PROCEDURE — 99239 HOSP IP/OBS DSCHRG MGMT >30: CPT

## 2019-10-23 RX ORDER — LABETALOL HCL 100 MG
1 TABLET ORAL
Qty: 0 | Refills: 0 | DISCHARGE
Start: 2019-10-23

## 2019-10-23 RX ORDER — MAGNESIUM OXIDE 400 MG ORAL TABLET 241.3 MG
1 TABLET ORAL
Qty: 0 | Refills: 0 | DISCHARGE
Start: 2019-10-23

## 2019-10-23 RX ORDER — PANTOPRAZOLE SODIUM 20 MG/1
1 TABLET, DELAYED RELEASE ORAL
Qty: 0 | Refills: 0 | DISCHARGE
Start: 2019-10-23

## 2019-10-23 RX ORDER — AMILORIDE HCL 5 MG
2 TABLET ORAL
Qty: 0 | Refills: 0 | DISCHARGE
Start: 2019-10-23

## 2019-10-23 RX ORDER — LEVETIRACETAM 250 MG/1
10 TABLET, FILM COATED ORAL
Qty: 0 | Refills: 0 | DISCHARGE
Start: 2019-10-23

## 2019-10-23 RX ORDER — POLYETHYLENE GLYCOL 3350 17 G/17G
17 POWDER, FOR SOLUTION ORAL
Qty: 0 | Refills: 0 | DISCHARGE
Start: 2019-10-23

## 2019-10-23 RX ORDER — LACOSAMIDE 50 MG/1
1 TABLET ORAL
Qty: 60 | Refills: 0
Start: 2019-10-23 | End: 2019-11-21

## 2019-10-23 RX ORDER — SENNA PLUS 8.6 MG/1
10 TABLET ORAL
Qty: 0 | Refills: 0 | DISCHARGE
Start: 2019-10-23

## 2019-10-23 RX ORDER — PANTOPRAZOLE SODIUM 20 MG/1
40 TABLET, DELAYED RELEASE ORAL
Qty: 0 | Refills: 0 | DISCHARGE
Start: 2019-10-23

## 2019-10-23 RX ADMIN — Medication 10 MILLIGRAM(S): at 08:35

## 2019-10-23 RX ADMIN — PANTOPRAZOLE SODIUM 40 MILLIGRAM(S): 20 TABLET, DELAYED RELEASE ORAL at 15:06

## 2019-10-23 RX ADMIN — HEPARIN SODIUM 5000 UNIT(S): 5000 INJECTION INTRAVENOUS; SUBCUTANEOUS at 06:26

## 2019-10-23 RX ADMIN — Medication 10 MILLIGRAM(S): at 15:05

## 2019-10-23 RX ADMIN — CHLORHEXIDINE GLUCONATE 1 APPLICATION(S): 213 SOLUTION TOPICAL at 09:35

## 2019-10-23 RX ADMIN — Medication 1: at 00:14

## 2019-10-23 RX ADMIN — MAGNESIUM OXIDE 400 MG ORAL TABLET 400 MILLIGRAM(S): 241.3 TABLET ORAL at 08:35

## 2019-10-23 RX ADMIN — Medication 1: at 06:25

## 2019-10-23 RX ADMIN — HEPARIN SODIUM 5000 UNIT(S): 5000 INJECTION INTRAVENOUS; SUBCUTANEOUS at 15:06

## 2019-10-23 RX ADMIN — LEVETIRACETAM 1000 MILLIGRAM(S): 250 TABLET, FILM COATED ORAL at 06:30

## 2019-10-23 RX ADMIN — Medication 100 MILLIGRAM(S): at 06:26

## 2019-10-23 NOTE — PROVIDER CONTACT NOTE (OTHER) - NAME OF MD/NP/PA/DO NOTIFIED:
Cameron [place]
Dr. Francois
NAN block
Ninfa Hightower
kalyan beckham NP

## 2019-10-23 NOTE — PROVIDER CONTACT NOTE (OTHER) - ASSESSMENT
A&O x1-2. Peg tube feeding started 10/22, tolerating well. Pt denies any discomfort. small amount of incontinence noted.
Pt on EEG, LUE jerking/twitching; broke after one minute

## 2019-10-23 NOTE — DISCHARGE NOTE PROVIDER - HOSPITAL COURSE
DCP with [facility] rec discussed with 85 y/o man with hx of prostate CA s/o seed implant and RT, HTN, p/w sudden onset AMS seizure brought to [facility] . Was found to be in status epilepticus and required intubation for airway control. Pt transferred to [facility] for further workup and EEG monitoring with no further seizure activity extubated on 10/1 downgraded to medicine on 10/3, failed multiple speech and swallow evals, now s/p PEG placement          Problem/Plan - 1:    ·  Problem: Dysphagia.  Plan: s/p PEG - start feeds today.          Problem/Plan - 2:    ·  Problem: Seizure.  Plan: d/w neuro - etiology seems like it was an unspecified viral encephalitis given CSF studies. extensive encephalitic/autoimmune workup was done and generally negative. 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     neuro deficit related to scarring which occurred - expect recovery similar to that of CVA     AEDs per neuro - c/w present regimen including reduced dose keppra    no further changes prior to d/c per neuro.          Problem/Plan - 3:    ·  Problem: Imaging finding.  Plan: -CT A/P had an incidental finding of fluid loculation noted overlying R hip    -discussed with radiology, does not involve the hip joint, appears to be present in the muscle, significance of finding is unclear    -monitor clinically    -remains afebrile.                     DCP with [facility] rec discussed with 83 y/o man with hx of prostate CA s/o seed implant and RT, HTN, p/w sudden onset AMS seizure brought to [facility] . Was found to be in status epilepticus and required intubation for airway control. Pt transferred to [facility] for further workup and EEG monitoring with no further seizure activity extubated on 10/1 downgraded to medicine on 10/3, failed multiple speech and swallow evals, now s/p PEG placement     Problem: Dysphagia.  Plan: s/p PEG - start feeds today.     boluses: 2 cans at 6 AM, 2 cans at 12PM and 1 can at 6 PM as tolerated.      Problem: Seizure.  Plan: d/w neuro - etiology seems like it was an unspecified viral encephalitis given CSF studies. extensive encephalitic/autoimmune workup was done and generally negative. 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     neuro deficit related to scarring which occurred - expect recovery similar to that of CVA     AEDs per neuro - c/w present regimen including reduced dose keppra    DCP with med rec discussed with Dr Palomo PT is clear to [place]     Follow up with PCP out patient 83 y/o man with hx of prostate CA s/o seed implant and RT, HTN, p/w sudden onset AMS seizure brought to [facility] . Was found to be in status epilepticus and required intubation for airway control. Pt transferred to [facility] for further workup and EEG monitoring with no further seizure activity extubated on 10/1 downgraded to medicine on 10/3, failed multiple speech and swallow evals, now s/p PEG placement     Problem: Dysphagia.  Plan: s/p PEG - start feeds today.     boluses: 2 cans at 6 AM, 2 cans at 12PM and 1 can at 6 PM as tolerated.      Problem: Seizure.  Plan: d/w neuro - etiology seems like it was an unspecified viral encephalitis given CSF studies. extensive encephalitic/autoimmune workup was done and generally negative. 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     neuro deficit related to scarring which occurred - expect recovery similar to that of CVA     AEDs per neuro - c/w present regimen including reduced dose keppra    DCP with med rec discussed with Dr Palomo PT is clear to [facility]     Follow up with PCP out patient 85 y/o man with hx of prostate CA s/o seed implant and RT, HTN, p/w sudden onset AMS seizure brought to [facility] . Was found to be in status epilepticus and required intubation for airway control. Pt transferred to [facility] for further workup and EEG monitoring with no further seizure activity extubated on 10/1 downgraded to medicine on 10/3, failed multiple speech and swallow evals, now s/p PEG placement     Problem: Dysphagia.  Plan: s/p PEG - start feeds today.     boluses: 2 cans at 6 AM, 2 cans at 12PM and 1 can at 6 PM as tolerated.      Problem: Seizure.  Plan: d/w neuro - etiology seems like it was an unspecified viral encephalitis given CSF studies. extensive encephalitic/autoimmune workup was done and generally negative. 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

## 2019-10-23 NOTE — DISCHARGE NOTE PROVIDER - NSDCPNSUBOBJ_GEN_ALL_CORE
Daughter at bedside    Patient seen and examined. No new complaints. labs vitals reviewed    Discussed plan of care with daughter, she is in agreement    Discharge planning to [facility] today now that feeds are at goal. Will need continued rehabilitation, follow up w/pcp neuro gi renal and for prostate ca.     35 minutes spent orchestrating discharge    PCP contacted.

## 2019-10-23 NOTE — DISCHARGE NOTE PROVIDER - CARE PROVIDERS DIRECT ADDRESSES
,[email or web address],DirectAddress_Unknown

## 2019-10-23 NOTE — DISCHARGE NOTE PROVIDER - NSDCCPCAREPLAN_GEN_ALL_CORE_FT
PRINCIPAL DISCHARGE DIAGNOSIS  Diagnosis: Recurrent UTI  Assessment and Plan of Treatment: You had a bladder &/or kidney infection  Call your doctor with pain or burning with urination, frequent urination, feeling to urinate suddenly, blood in urine, fever, back pain, nausea or vomiting  You need to take and finish your antibiotic as prescribed so that the infection does not reoccur  Completed course abx         SECONDARY DISCHARGE DIAGNOSES  Diagnosis: Dysphagia  Assessment and Plan of Treatment: Dysphagia  S/p Peg placement at goal with bolus feeds   Bolus feeding per RD recomendation   Follow up with PCP outpatient GI PRINCIPAL DISCHARGE DIAGNOSIS  Diagnosis: Viral encephalitis  Assessment and Plan of Treatment: You were found to have an encephalitis causing altered mental status and left sided weakness. The specific cause was not determined though a virus is suspected. Autoimmune causes were ruled out as part of a large workup completed in ICU and with neurology Dr Carmichael. Please follow up with neurology for continued management and monitoring for recovery.      SECONDARY DISCHARGE DIAGNOSES  Diagnosis: Dysphagia  Assessment and Plan of Treatment: Dysphagia  Had Peg placement at goal with bolus feeds   Bolus feeding per dietician recomendation   Follow up with PCP outpatient GI  Interval swallowing therapy and repeat speech and swallow testing as per speech language pathologist at rehab and as outpatient. PRINCIPAL DISCHARGE DIAGNOSIS  Diagnosis: Viral encephalitis  Assessment and Plan of Treatment: You were found to have an encephalitis causing altered mental status and left sided weakness. The specific cause was not determined though a virus is suspected. Autoimmune causes were ruled out as part of a large workup completed in ICU and with neurology Dr Carmichael. Continue antiepeliptic medications at present doses. Please follow up with neurology for continued management and monitoring for recovery.      SECONDARY DISCHARGE DIAGNOSES  Diagnosis: Dysphagia  Assessment and Plan of Treatment: Dysphagia  Had Peg placement at goal with bolus feeds   Bolus feeding per dietician recomendation   Follow up with PCP outpatient GI  Interval swallowing therapy and repeat speech and swallow testing as per speech language pathologist at rehab and as outpatient.    Diagnosis: Gitelman syndrome  Assessment and Plan of Treatment: Continue amiloride and magnesium and present doses, follow up with nephrologist.

## 2019-10-23 NOTE — PROVIDER CONTACT NOTE (OTHER) - ACTION/TREATMENT ORDERED:
NP made aware, evaluated. ok to place in AM. will change K+ and mag PO meds to IV. IV K+ and mag infusion given. will cont to monitor.
start IVF and continue CPAP trial
NP made aware. evaluated need. ok to D/C mitten order until NGT in place. mittens removed. will cont to monitor.
NP made aware. evaluated. re-ordered mittens. NGT placement attempted, daughter Rebekah request to be at beside. will wait for daughter to return to bedside. will cont to monitor.
PA made aware. evaluated. will handoff in report to replace NGT. endorsed to day RN. will cont to monitor.
Provider made aware. Repeat bladder scan after 2-4hrs. Straight cath residual if still over 300ml. Continue to monitor.
1mg ativan ordered STAT, EEG button pressed

## 2019-10-23 NOTE — DISCHARGE NOTE NURSING/CASE MANAGEMENT/SOCIAL WORK - PATIENT PORTAL LINK FT
You can access the FollowMyHealth Patient Portal offered by [facility] by registering at the following website: [email or web address]. By joining [employer]’s FollowMyHealth portal, you will also be able to view your health information using other applications (apps) compatible with our system.

## 2019-10-23 NOTE — PROVIDER CONTACT NOTE (OTHER) - SITUATION
pt daughter returned to bedside, request if NGT can be placed in AM d/t pt agitation. states if absolute nec to place overnight, it is ok.
369ml on bladder scan.
Patient on CPAP trial on ventilator with settings 5/5 and 30% fio2
Pt with LUE Jerking/twitching upon stimulation, sustained over one minute
pt daughter request if NGT not in place until AM can mitten restraint please be D/C so pt can rest comfortable
pt dislodged NGT. no s/s of distress, lungs cear
upon shift arrival pt without mitten without family member, restriant order d/c'd from day shift and no reordered at the time of discovery. NGT self removed.

## 2019-10-23 NOTE — PROVIDER CONTACT NOTE (OTHER) - BACKGROUND
as above.
Pt s/p fall with seizures. HX: GERD, HTN, prostate CA. s/p Hernandez 10/22.
Pt. s/p fall, subsequently followed by seizure like activity, R Frontal enhancement, meningitis suspicion?
as above

## 2019-10-23 NOTE — DISCHARGE NOTE PROVIDER - PROVIDER TOKENS
PROVIDER:[TOKEN:[[id number]]] PROVIDER:[TOKEN:[[id number]]],PROVIDER:[TOKEN:[[id number]],FOLLOWUP:[2 weeks]],PROVIDER:[TOKEN:[[id number]],FOLLOWUP:[2 weeks]] PROVIDER:[TOKEN:[[id number]]],PROVIDER:[TOKEN:[[id number]],FOLLOWUP:[2 weeks]],PROVIDER:[TOKEN:[[id number]],FOLLOWUP:[2 weeks]],PROVIDER:[TOKEN:[[id number]],FOLLOWUP:[1 month]]

## 2019-10-23 NOTE — PROVIDER CONTACT NOTE (OTHER) - REASON
ABG pH= 7.50, Pco2=44, Hco3=34
LUE jerking/twitching
pt daughter request if NGT not in place until AM can mitten restraint please be D/C so pt can rest comfortable
pt dislodged NGT. no s/s of distress, lungs cear
upon shift arrival pt without mitten without family member, NGT self removed.
pt daughter reutrned to bedside, request if NGT can be placed in AM d/t pt agitation.
369ml on bladder scan.

## 2019-10-23 NOTE — DISCHARGE NOTE PROVIDER - CARE PROVIDER_API CALL
Peter Anand)  Gastroenterology; Internal Medicine  [address] [place] [address]  Phone: [phone]  Fax: [phone]  Follow Up Time: Peter Anand)  Gastroenterology; Internal Medicine  [address] [place] [address]  Phone: [phone]  Fax: [phone]  Follow Up Time:     Stacey Carmichael)  Neurology  General  [address] [place] [address]  Phone: [phone]  Fax: [phone]  Follow Up Time: 2 weeks    Merline Nur ()  Family Medicine  [address], [address]  [place] [address]  Phone: [phone]  Fax: [phone]  Follow Up Time: 2 weeks Peter Anand)  Gastroenterology; Internal Medicine  [address] [place] [address]  Phone: [phone]  Fax: [phone]  Follow Up Time:     Stacey Carmichael)  Neurology  General  [address] [place] [address]  Phone: [phone]  Fax: [phone]  Follow Up Time: 2 weeks    Merline Nur (DO)  Family Medicine  [address]  [place] [address]  Phone: [phone]  Fax: [phone]  Follow Up Time: 2 weeks    Jenna Alfaro (DO)  Nephrology  [address] [place] [address]  Phone: [phone]  Fax: [phone]  Follow Up Time: 1 month

## 2019-10-24 ENCOUNTER — CLINICAL ADVICE (OUTPATIENT)
Age: 84
End: 2019-10-24

## 2019-10-26 LAB
CULTURE RESULTS: SIGNIFICANT CHANGE UP
SPECIMEN SOURCE: SIGNIFICANT CHANGE UP

## 2019-11-12 PROCEDURE — 97110 THERAPEUTIC EXERCISES: CPT

## 2019-11-12 PROCEDURE — 70496 CT ANGIOGRAPHY HEAD: CPT

## 2019-11-12 PROCEDURE — 82947 ASSAY GLUCOSE BLOOD QUANT: CPT

## 2019-11-12 PROCEDURE — 97530 THERAPEUTIC ACTIVITIES: CPT

## 2019-11-12 PROCEDURE — 82565 ASSAY OF CREATININE: CPT

## 2019-11-12 PROCEDURE — 74177 CT ABD & PELVIS W/CONTRAST: CPT

## 2019-11-12 PROCEDURE — 84133 ASSAY OF URINE POTASSIUM: CPT

## 2019-11-12 PROCEDURE — 83935 ASSAY OF URINE OSMOLALITY: CPT

## 2019-11-12 PROCEDURE — 86592 SYPHILIS TEST NON-TREP QUAL: CPT

## 2019-11-12 PROCEDURE — 87483 CNS DNA AMP PROBE TYPE 12-25: CPT

## 2019-11-12 PROCEDURE — 89051 BODY FLUID CELL COUNT: CPT

## 2019-11-12 PROCEDURE — 85014 HEMATOCRIT: CPT

## 2019-11-12 PROCEDURE — 97166 OT EVAL MOD COMPLEX 45 MIN: CPT

## 2019-11-12 PROCEDURE — 87799 DETECT AGENT NOS DNA QUANT: CPT

## 2019-11-12 PROCEDURE — 86780 TREPONEMA PALLIDUM: CPT

## 2019-11-12 PROCEDURE — 93005 ELECTROCARDIOGRAM TRACING: CPT

## 2019-11-12 PROCEDURE — 87070 CULTURE OTHR SPECIMN AEROBIC: CPT

## 2019-11-12 PROCEDURE — 82435 ASSAY OF BLOOD CHLORIDE: CPT

## 2019-11-12 PROCEDURE — 93926 LOWER EXTREMITY STUDY: CPT

## 2019-11-12 PROCEDURE — 85652 RBC SED RATE AUTOMATED: CPT

## 2019-11-12 PROCEDURE — 85027 COMPLETE CBC AUTOMATED: CPT

## 2019-11-12 PROCEDURE — 87633 RESP VIRUS 12-25 TARGETS: CPT

## 2019-11-12 PROCEDURE — 84146 ASSAY OF PROLACTIN: CPT

## 2019-11-12 PROCEDURE — 87798 DETECT AGENT NOS DNA AMP: CPT

## 2019-11-12 PROCEDURE — G0515: CPT

## 2019-11-12 PROCEDURE — 97112 NEUROMUSCULAR REEDUCATION: CPT

## 2019-11-12 PROCEDURE — 82945 GLUCOSE OTHER FLUID: CPT

## 2019-11-12 PROCEDURE — 87581 M.PNEUMON DNA AMP PROBE: CPT

## 2019-11-12 PROCEDURE — 84157 ASSAY OF PROTEIN OTHER: CPT

## 2019-11-12 PROCEDURE — 86403 PARTICLE AGGLUT ANTBDY SCRN: CPT

## 2019-11-12 PROCEDURE — 87205 SMEAR GRAM STAIN: CPT

## 2019-11-12 PROCEDURE — 87040 BLOOD CULTURE FOR BACTERIA: CPT

## 2019-11-12 PROCEDURE — 77003 FLUOROGUIDE FOR SPINE INJECT: CPT

## 2019-11-12 PROCEDURE — 82330 ASSAY OF CALCIUM: CPT

## 2019-11-12 PROCEDURE — 82550 ASSAY OF CK (CPK): CPT

## 2019-11-12 PROCEDURE — 93971 EXTREMITY STUDY: CPT

## 2019-11-12 PROCEDURE — 70450 CT HEAD/BRAIN W/O DYE: CPT

## 2019-11-12 PROCEDURE — C9254: CPT

## 2019-11-12 PROCEDURE — 95816 EEG AWAKE AND DROWSY: CPT

## 2019-11-12 PROCEDURE — 84300 ASSAY OF URINE SODIUM: CPT

## 2019-11-12 PROCEDURE — 86140 C-REACTIVE PROTEIN: CPT

## 2019-11-12 PROCEDURE — 83036 HEMOGLOBIN GLYCOSYLATED A1C: CPT

## 2019-11-12 PROCEDURE — 62270 DX LMBR SPI PNXR: CPT

## 2019-11-12 PROCEDURE — 82962 GLUCOSE BLOOD TEST: CPT

## 2019-11-12 PROCEDURE — 82803 BLOOD GASES ANY COMBINATION: CPT

## 2019-11-12 PROCEDURE — 86038 ANTINUCLEAR ANTIBODIES: CPT

## 2019-11-12 PROCEDURE — 80053 COMPREHEN METABOLIC PANEL: CPT

## 2019-11-12 PROCEDURE — 87529 HSV DNA AMP PROBE: CPT

## 2019-11-12 PROCEDURE — 87086 URINE CULTURE/COLONY COUNT: CPT

## 2019-11-12 PROCEDURE — 94003 VENT MGMT INPAT SUBQ DAY: CPT

## 2019-11-12 PROCEDURE — 82570 ASSAY OF URINE CREATININE: CPT

## 2019-11-12 PROCEDURE — 70553 MRI BRAIN STEM W/O & W/DYE: CPT

## 2019-11-12 PROCEDURE — 71045 X-RAY EXAM CHEST 1 VIEW: CPT

## 2019-11-12 PROCEDURE — 70498 CT ANGIOGRAPHY NECK: CPT

## 2019-11-12 PROCEDURE — 86789 WEST NILE VIRUS ANTIBODY: CPT

## 2019-11-12 PROCEDURE — 95951: CPT

## 2019-11-12 PROCEDURE — 81003 URINALYSIS AUTO W/O SCOPE: CPT

## 2019-11-12 PROCEDURE — 84100 ASSAY OF PHOSPHORUS: CPT

## 2019-11-12 PROCEDURE — 97535 SELF CARE MNGMENT TRAINING: CPT

## 2019-11-12 PROCEDURE — 84132 ASSAY OF SERUM POTASSIUM: CPT

## 2019-11-12 PROCEDURE — A9585: CPT

## 2019-11-12 PROCEDURE — 87476 LYME DIS DNA AMP PROBE: CPT

## 2019-11-12 PROCEDURE — 86788 WEST NILE VIRUS AB IGM: CPT

## 2019-11-12 PROCEDURE — 83735 ASSAY OF MAGNESIUM: CPT

## 2019-11-12 PROCEDURE — 87486 CHLMYD PNEUM DNA AMP PROBE: CPT

## 2019-11-12 PROCEDURE — 92610 EVALUATE SWALLOWING FUNCTION: CPT

## 2019-11-12 PROCEDURE — 81001 URINALYSIS AUTO W/SCOPE: CPT

## 2019-11-12 PROCEDURE — 86225 DNA ANTIBODY NATIVE: CPT

## 2019-11-12 PROCEDURE — 80048 BASIC METABOLIC PNL TOTAL CA: CPT

## 2019-11-12 PROCEDURE — 87102 FUNGUS ISOLATION CULTURE: CPT

## 2019-11-12 PROCEDURE — L8699: CPT

## 2019-11-12 PROCEDURE — 85610 PROTHROMBIN TIME: CPT

## 2019-11-12 PROCEDURE — 97760 ORTHOTIC MGMT&TRAING 1ST ENC: CPT

## 2019-11-12 PROCEDURE — 94002 VENT MGMT INPAT INIT DAY: CPT

## 2019-11-12 PROCEDURE — 97162 PT EVAL MOD COMPLEX 30 MIN: CPT

## 2019-11-12 PROCEDURE — 85730 THROMBOPLASTIN TIME PARTIAL: CPT

## 2019-11-12 PROCEDURE — 86255 FLUORESCENT ANTIBODY SCREEN: CPT

## 2019-11-12 PROCEDURE — 86431 RHEUMATOID FACTOR QUANT: CPT

## 2019-11-12 PROCEDURE — 83615 LACTATE (LD) (LDH) ENZYME: CPT

## 2019-11-12 PROCEDURE — 84295 ASSAY OF SERUM SODIUM: CPT

## 2019-11-12 PROCEDURE — 83605 ASSAY OF LACTIC ACID: CPT

## 2019-11-14 ENCOUNTER — INBOUND DOCUMENT (OUTPATIENT)
Age: 84
End: 2019-11-14

## 2019-11-14 PROBLEM — I10 ESSENTIAL (PRIMARY) HYPERTENSION: Chronic | Status: ACTIVE | Noted: 2019-09-25

## 2019-11-16 ENCOUNTER — INPATIENT (INPATIENT)
Facility: HOSPITAL | Age: 84
LOS: 18 days | Discharge: NURSING, REHAB OR LONG-TERM CARE | DRG: 97 | End: 2019-12-05
Attending: PSYCHIATRY & NEUROLOGY | Admitting: HOSPITALIST
Payer: MEDICARE

## 2019-11-16 VITALS
DIASTOLIC BLOOD PRESSURE: 71 MMHG | SYSTOLIC BLOOD PRESSURE: 126 MMHG | OXYGEN SATURATION: 97 % | RESPIRATION RATE: 18 BRPM | HEART RATE: 80 BPM | HEIGHT: 71 IN | WEIGHT: 169.09 LBS | TEMPERATURE: 97 F

## 2019-11-16 DIAGNOSIS — D64.9 ANEMIA, UNSPECIFIED: ICD-10-CM

## 2019-11-16 DIAGNOSIS — G40.909 EPILEPSY, UNSPECIFIED, NOT INTRACTABLE, WITHOUT STATUS EPILEPTICUS: ICD-10-CM

## 2019-11-16 DIAGNOSIS — Z90.49 ACQUIRED ABSENCE OF OTHER SPECIFIED PARTS OF DIGESTIVE TRACT: Chronic | ICD-10-CM

## 2019-11-16 DIAGNOSIS — R79.0 ABNORMAL LEVEL OF BLOOD MINERAL: ICD-10-CM

## 2019-11-16 DIAGNOSIS — G93.41 METABOLIC ENCEPHALOPATHY: ICD-10-CM

## 2019-11-16 DIAGNOSIS — C61 MALIGNANT NEOPLASM OF PROSTATE: Chronic | ICD-10-CM

## 2019-11-16 DIAGNOSIS — Z29.9 ENCOUNTER FOR PROPHYLACTIC MEASURES, UNSPECIFIED: ICD-10-CM

## 2019-11-16 DIAGNOSIS — I10 ESSENTIAL (PRIMARY) HYPERTENSION: ICD-10-CM

## 2019-11-16 DIAGNOSIS — E83.39 OTHER DISORDERS OF PHOSPHORUS METABOLISM: ICD-10-CM

## 2019-11-16 DIAGNOSIS — E87.2 ACIDOSIS: ICD-10-CM

## 2019-11-16 DIAGNOSIS — E87.6 HYPOKALEMIA: ICD-10-CM

## 2019-11-16 DIAGNOSIS — N30.00 ACUTE CYSTITIS WITHOUT HEMATURIA: ICD-10-CM

## 2019-11-16 LAB
APPEARANCE UR: ABNORMAL
BACTERIA # UR AUTO: ABNORMAL
BASE EXCESS BLDV CALC-SCNC: 3.5 MMOL/L — HIGH (ref -2–2)
BASOPHILS # BLD AUTO: 0.07 K/UL — SIGNIFICANT CHANGE UP (ref 0–0.2)
BASOPHILS NFR BLD AUTO: 0.8 % — SIGNIFICANT CHANGE UP (ref 0–2)
BILIRUB UR-MCNC: NEGATIVE — SIGNIFICANT CHANGE UP
CA-I SERPL-SCNC: 1.18 MMOL/L — SIGNIFICANT CHANGE UP (ref 1.12–1.3)
CHLORIDE BLDV-SCNC: 98 MMOL/L — SIGNIFICANT CHANGE UP (ref 96–108)
CO2 BLDV-SCNC: 29 MMOL/L — SIGNIFICANT CHANGE UP (ref 22–30)
COLOR SPEC: YELLOW — SIGNIFICANT CHANGE UP
DIFF PNL FLD: ABNORMAL
EOSINOPHIL # BLD AUTO: 0.1 K/UL — SIGNIFICANT CHANGE UP (ref 0–0.5)
EOSINOPHIL NFR BLD AUTO: 1.2 % — SIGNIFICANT CHANGE UP (ref 0–6)
EPI CELLS # UR: 2 /HPF — SIGNIFICANT CHANGE UP
GAS PNL BLDV: 135 MMOL/L — SIGNIFICANT CHANGE UP (ref 135–145)
GAS PNL BLDV: SIGNIFICANT CHANGE UP
GAS PNL BLDV: SIGNIFICANT CHANGE UP
GLUCOSE BLDV-MCNC: 187 MG/DL — HIGH (ref 70–99)
GLUCOSE UR QL: NEGATIVE — SIGNIFICANT CHANGE UP
HCO3 BLDV-SCNC: 28 MMOL/L — SIGNIFICANT CHANGE UP (ref 21–29)
HCT VFR BLD CALC: 35.5 % — LOW (ref 39–50)
HCT VFR BLDA CALC: 38 % — LOW (ref 39–50)
HGB BLD CALC-MCNC: 12.4 G/DL — LOW (ref 13–17)
HGB BLD-MCNC: 12.3 G/DL — LOW (ref 13–17)
HYALINE CASTS # UR AUTO: 6 /LPF — HIGH (ref 0–2)
IMM GRANULOCYTES NFR BLD AUTO: 0.4 % — SIGNIFICANT CHANGE UP (ref 0–1.5)
KETONES UR-MCNC: NEGATIVE — SIGNIFICANT CHANGE UP
LACTATE BLDV-MCNC: 2.6 MMOL/L — HIGH (ref 0.7–2)
LEUKOCYTE ESTERASE UR-ACNC: ABNORMAL
LYMPHOCYTES # BLD AUTO: 1.43 K/UL — SIGNIFICANT CHANGE UP (ref 1–3.3)
LYMPHOCYTES # BLD AUTO: 16.7 % — SIGNIFICANT CHANGE UP (ref 13–44)
MAGNESIUM SERPL-MCNC: 1.4 MG/DL — LOW (ref 1.6–2.6)
MCHC RBC-ENTMCNC: 31.4 PG — SIGNIFICANT CHANGE UP (ref 27–34)
MCHC RBC-ENTMCNC: 34.6 GM/DL — SIGNIFICANT CHANGE UP (ref 32–36)
MCV RBC AUTO: 90.6 FL — SIGNIFICANT CHANGE UP (ref 80–100)
MONOCYTES # BLD AUTO: 0.82 K/UL — SIGNIFICANT CHANGE UP (ref 0–0.9)
MONOCYTES NFR BLD AUTO: 9.6 % — SIGNIFICANT CHANGE UP (ref 2–14)
NEUTROPHILS # BLD AUTO: 6.09 K/UL — SIGNIFICANT CHANGE UP (ref 1.8–7.4)
NEUTROPHILS NFR BLD AUTO: 71.3 % — SIGNIFICANT CHANGE UP (ref 43–77)
NITRITE UR-MCNC: POSITIVE
NRBC # BLD: 0 /100 WBCS — SIGNIFICANT CHANGE UP (ref 0–0)
PCO2 BLDV: 41 MMHG — SIGNIFICANT CHANGE UP (ref 35–50)
PH BLDV: 7.44 — SIGNIFICANT CHANGE UP (ref 7.35–7.45)
PH UR: 7 — SIGNIFICANT CHANGE UP (ref 5–8)
PHOSPHATE SERPL-MCNC: 2.3 MG/DL — LOW (ref 2.5–4.5)
PLATELET # BLD AUTO: 366 K/UL — SIGNIFICANT CHANGE UP (ref 150–400)
PO2 BLDV: 52 MMHG — HIGH (ref 25–45)
POTASSIUM BLDV-SCNC: 3.4 MMOL/L — LOW (ref 3.5–5.3)
PROT UR-MCNC: ABNORMAL
RBC # BLD: 3.92 M/UL — LOW (ref 4.2–5.8)
RBC # FLD: 14.4 % — SIGNIFICANT CHANGE UP (ref 10.3–14.5)
RBC CASTS # UR COMP ASSIST: 4 /HPF — SIGNIFICANT CHANGE UP (ref 0–4)
SAO2 % BLDV: 86 % — SIGNIFICANT CHANGE UP (ref 67–88)
SP GR SPEC: 1.02 — SIGNIFICANT CHANGE UP (ref 1.01–1.02)
UROBILINOGEN FLD QL: NEGATIVE — SIGNIFICANT CHANGE UP
WBC # BLD: 8.54 K/UL — SIGNIFICANT CHANGE UP (ref 3.8–10.5)
WBC # FLD AUTO: 8.54 K/UL — SIGNIFICANT CHANGE UP (ref 3.8–10.5)
WBC UR QL: 656 /HPF — HIGH (ref 0–5)

## 2019-11-16 PROCEDURE — 93010 ELECTROCARDIOGRAM REPORT: CPT

## 2019-11-16 PROCEDURE — 99284 EMERGENCY DEPT VISIT MOD MDM: CPT | Mod: GC

## 2019-11-16 PROCEDURE — 99223 1ST HOSP IP/OBS HIGH 75: CPT | Mod: GC

## 2019-11-16 PROCEDURE — 71045 X-RAY EXAM CHEST 1 VIEW: CPT | Mod: 26

## 2019-11-16 PROCEDURE — 70450 CT HEAD/BRAIN W/O DYE: CPT | Mod: 26

## 2019-11-16 RX ORDER — MAGNESIUM OXIDE 400 MG ORAL TABLET 241.3 MG
400 TABLET ORAL
Refills: 0 | Status: DISCONTINUED | OUTPATIENT
Start: 2019-11-16 | End: 2019-12-03

## 2019-11-16 RX ORDER — MAGNESIUM SULFATE 500 MG/ML
1 VIAL (ML) INJECTION ONCE
Refills: 0 | Status: COMPLETED | OUTPATIENT
Start: 2019-11-16 | End: 2019-11-16

## 2019-11-16 RX ORDER — ACETAMINOPHEN 500 MG
650 TABLET ORAL EVERY 6 HOURS
Refills: 0 | Status: DISCONTINUED | OUTPATIENT
Start: 2019-11-16 | End: 2019-12-05

## 2019-11-16 RX ORDER — CEFTRIAXONE 500 MG/1
1000 INJECTION, POWDER, FOR SOLUTION INTRAMUSCULAR; INTRAVENOUS EVERY 24 HOURS
Refills: 0 | Status: COMPLETED | OUTPATIENT
Start: 2019-11-16 | End: 2019-11-18

## 2019-11-16 RX ORDER — CEFTRIAXONE 500 MG/1
1000 INJECTION, POWDER, FOR SOLUTION INTRAMUSCULAR; INTRAVENOUS ONCE
Refills: 0 | Status: COMPLETED | OUTPATIENT
Start: 2019-11-16 | End: 2019-11-16

## 2019-11-16 RX ORDER — LACOSAMIDE 50 MG/1
150 TABLET ORAL
Refills: 0 | Status: DISCONTINUED | OUTPATIENT
Start: 2019-11-16 | End: 2019-11-20

## 2019-11-16 RX ORDER — POLYETHYLENE GLYCOL 3350 17 G/17G
17 POWDER, FOR SOLUTION ORAL
Refills: 0 | Status: DISCONTINUED | OUTPATIENT
Start: 2019-11-16 | End: 2019-12-01

## 2019-11-16 RX ORDER — SODIUM,POTASSIUM PHOSPHATES 278-250MG
1 POWDER IN PACKET (EA) ORAL ONCE
Refills: 0 | Status: DISCONTINUED | OUTPATIENT
Start: 2019-11-16 | End: 2019-11-16

## 2019-11-16 RX ORDER — LEVETIRACETAM 250 MG/1
1500 TABLET, FILM COATED ORAL ONCE
Refills: 0 | Status: COMPLETED | OUTPATIENT
Start: 2019-11-16 | End: 2019-11-16

## 2019-11-16 RX ORDER — INFLUENZA VIRUS VACCINE 15; 15; 15; 15 UG/.5ML; UG/.5ML; UG/.5ML; UG/.5ML
0.5 SUSPENSION INTRAMUSCULAR ONCE
Refills: 0 | Status: DISCONTINUED | OUTPATIENT
Start: 2019-11-16 | End: 2019-12-05

## 2019-11-16 RX ORDER — LEVETIRACETAM 250 MG/1
1500 TABLET, FILM COATED ORAL
Refills: 0 | Status: DISCONTINUED | OUTPATIENT
Start: 2019-11-16 | End: 2019-12-05

## 2019-11-16 RX ORDER — SENNA PLUS 8.6 MG/1
10 TABLET ORAL AT BEDTIME
Refills: 0 | Status: DISCONTINUED | OUTPATIENT
Start: 2019-11-16 | End: 2019-12-05

## 2019-11-16 RX ORDER — CEFTRIAXONE 500 MG/1
1000 INJECTION, POWDER, FOR SOLUTION INTRAMUSCULAR; INTRAVENOUS ONCE
Refills: 0 | Status: DISCONTINUED | OUTPATIENT
Start: 2019-11-16 | End: 2019-11-16

## 2019-11-16 RX ORDER — POTASSIUM PHOSPHATE, MONOBASIC POTASSIUM PHOSPHATE, DIBASIC 236; 224 MG/ML; MG/ML
15 INJECTION, SOLUTION INTRAVENOUS ONCE
Refills: 0 | Status: COMPLETED | OUTPATIENT
Start: 2019-11-16 | End: 2019-11-16

## 2019-11-16 RX ORDER — LABETALOL HCL 100 MG
100 TABLET ORAL EVERY 8 HOURS
Refills: 0 | Status: DISCONTINUED | OUTPATIENT
Start: 2019-11-16 | End: 2019-12-05

## 2019-11-16 RX ORDER — LACOSAMIDE 50 MG/1
150 TABLET ORAL ONCE
Refills: 0 | Status: DISCONTINUED | OUTPATIENT
Start: 2019-11-16 | End: 2019-11-16

## 2019-11-16 RX ORDER — ENOXAPARIN SODIUM 100 MG/ML
40 INJECTION SUBCUTANEOUS DAILY
Refills: 0 | Status: DISCONTINUED | OUTPATIENT
Start: 2019-11-16 | End: 2019-11-21

## 2019-11-16 RX ORDER — SODIUM CHLORIDE 9 MG/ML
1000 INJECTION INTRAMUSCULAR; INTRAVENOUS; SUBCUTANEOUS
Refills: 0 | Status: DISCONTINUED | OUTPATIENT
Start: 2019-11-16 | End: 2019-11-23

## 2019-11-16 RX ADMIN — Medication 100 GRAM(S): at 17:43

## 2019-11-16 RX ADMIN — CEFTRIAXONE 100 MILLIGRAM(S): 500 INJECTION, POWDER, FOR SOLUTION INTRAMUSCULAR; INTRAVENOUS at 21:18

## 2019-11-16 RX ADMIN — LACOSAMIDE 150 MILLIGRAM(S): 50 TABLET ORAL at 21:23

## 2019-11-16 RX ADMIN — LEVETIRACETAM 1500 MILLIGRAM(S): 250 TABLET, FILM COATED ORAL at 21:23

## 2019-11-16 RX ADMIN — POTASSIUM PHOSPHATE, MONOBASIC POTASSIUM PHOSPHATE, DIBASIC 62.5 MILLIMOLE(S): 236; 224 INJECTION, SOLUTION INTRAVENOUS at 20:07

## 2019-11-16 RX ADMIN — Medication 1 GRAM(S): at 18:19

## 2019-11-16 NOTE — H&P ADULT - PROBLEM SELECTOR PLAN 2
Urinalysis with , many bacteria, large leukocyte esterase, and positive nitrites.  - s/p 1g ceftriaxone in the ED  - continue with ceftriaxone 1 g daily  - follow up on UCX  - trend CBC daily  - monitor vitals

## 2019-11-16 NOTE — H&P ADULT - NSHPREVIEWOFSYSTEMS_GEN_ALL_CORE
REVIEW OF SYSTEMS:  CONSTITUTIONAL: No fever, chills, night sweats, or fatigue  EYES: No eye pain, visual disturbances, or discharge  ENT:  No difficulty hearing, tinnitus, vertigo; No sinus or throat pain  NECK: No pain or stiffness  RESPIRATORY: SEE HPI  CARDIOVASCULAR: No chest pain, palpitations, dizziness, or leg swelling  GASTROINTESTINAL: No abdominal or epigastric pain.   GENITOURINARY: No dysuria, frequency, hematuria, or incontinence  NEUROLOGICAL: SEE HPI  SKIN: No itching, burning, rashes, or lesions   LYMPH NODES: No enlarged glands  ENDOCRINE: No hot or cold intolerance; No hair loss  MUSCULOSKELETAL: No joint pain or swelling; No muscle, back, or extremity pain  PSYCHIATRIC: No depression, anxiety, mood swings, or difficulty sleeping  HEME/LYMPH: No easy bruising, or bleeding gums  ALLERGY AND IMMUNOLOGIC: No hives or eczema

## 2019-11-16 NOTE — ED ADULT NURSE NOTE - NSIMPLEMENTINTERV_GEN_ALL_ED
Implemented All Fall Risk Interventions:  [place] to call system. Call bell, personal items and telephone within reach. Instruct patient to call for assistance. Room bathroom lighting operational. Non-slip footwear when patient is off stretcher. Physically safe environment: no spills, clutter or unnecessary equipment. Stretcher in lowest position, wheels locked, appropriate side rails in place. Provide visual cue, wrist band, yellow gown, etc. Monitor gait and stability. Monitor for mental status changes and reorient to person, place, and time. Review medications for side effects contributing to fall risk. Reinforce activity limits and safety measures with patient and family.

## 2019-11-16 NOTE — ED ADULT NURSE REASSESSMENT NOTE - NS ED NURSE REASSESS COMMENT FT1
Patient is turned/cleaned/repositioned, diaper is changed. Patient's family asks for his nightly anti-seizure medications, doctor Girma Najera made aware, pending orders at this time. Patient's family made aware of plan of care at this time.

## 2019-11-16 NOTE — ED PROVIDER NOTE - PMH
History of gastroesophageal reflux (GERD)    Hypertension    Prostate cancer  had seed implant & radiation ( 2001 )

## 2019-11-16 NOTE — ED PROVIDER NOTE - CARE PLAN
Principal Discharge DX:	Altered behavior Principal Discharge DX:	Seizure disorder  Secondary Diagnosis:	UTI (urinary tract infection)

## 2019-11-16 NOTE — H&P ADULT - NSHPLABSRESULTS_GEN_ALL_CORE
LABS:                          12.3   8.54  )-----------( 366      ( 2019 16:30 )             35.5     Hb Trend: 12.3<--  WBC Trend: 8.54<--  Plt Trend: 366<--              135  |  96  |  12  ----------------------------<  199<H>  3.4<L>   |  24  |  0.54    Ca    9.1      2019 16:30  Phos  2.3       Mg     1.4         TPro  7.1  /  Alb  3.0<L>  /  TBili  0.3  /  DBili  x   /  AST  27  /  ALT  34  /  AlkPhos  168<H>          Urinalysis Basic - ( 2019 18:21 )    Color: Yellow / Appearance: Slightly Turbid / S.016 / pH: x  Gluc: x / Ketone: Negative  / Bili: Negative / Urobili: Negative   Blood: x / Protein: 30 mg/dL / Nitrite: Positive   Leuk Esterase: Large / RBC: 4 /hpf /  /HPF   Sq Epi: x / Non Sq Epi: 2 /hpf / Bacteria: Many      CAPILLARY BLOOD GLUCOSE      EXAM:  CT BRAIN                          PROCEDURE DATE:  2019      INTERPRETATION:  Noncontrast CT of the brain.    CLINICAL INDICATION:  Altered mental status    Impression:    No acute hemorrhage, mass effect or extra-axial collections.      < from: Xray Chest 1 View- PORTABLE-Urgent (19 @ 18:07) >      EXAM:  XR CHEST PORTABLE URGENT 1V                            PROCEDURE DATE:  2019      ******PRELIMINARY REPORT******    ******PRELIMINARY REPORT******            INTERPRETATION:  no emergent findings    < end of copied text >      EKG Sinus rhythm with 1st degree AV block, HR 79 bpm, QTc 442 ms LABS:                          12.3   8.54  )-----------( 366      ( 2019 16:30 )             35.5     Hb Trend: 12.3<--  WBC Trend: 8.54<--  Plt Trend: 366<--              135  |  96  |  12  ----------------------------<  199<H>  3.4<L>   |  24  |  0.54    Ca    9.1      2019 16:30  Phos  2.3       Mg     1.4         TPro  7.1  /  Alb  3.0<L>  /  TBili  0.3  /  DBili  x   /  AST  27  /  ALT  34  /  AlkPhos  168<H>          Urinalysis Basic - ( 2019 18:21 )    Color: Yellow / Appearance: Slightly Turbid / S.016 / pH: x  Gluc: x / Ketone: Negative  / Bili: Negative / Urobili: Negative   Blood: x / Protein: 30 mg/dL / Nitrite: Positive   Leuk Esterase: Large / RBC: 4 /hpf /  /HPF   Sq Epi: x / Non Sq Epi: 2 /hpf / Bacteria: Many      CAPILLARY BLOOD GLUCOSE      EXAM:  CT BRAIN                          PROCEDURE DATE:  2019      INTERPRETATION:  Noncontrast CT of the brain.    CLINICAL INDICATION:  Altered mental status    Impression:    No acute hemorrhage, mass effect or extra-axial collections.      < from: Xray Chest 1 View- PORTABLE-Urgent (19 @ 18:07) >      EXAM:  XR CHEST PORTABLE URGENT 1V                            PROCEDURE DATE:  2019      ******PRELIMINARY REPORT******    ******PRELIMINARY REPORT******            INTERPRETATION:  no emergent findings    < end of copied text >      EKG Sinus rhythm with 1st degree AV block, HR 79 bpm, QTc 442 ms    labs, imaging, ekg personally reviewed

## 2019-11-16 NOTE — CONSULT NOTE ADULT - ASSESSMENT
84M w/ PMH of HTN, Prostate cancer s/p seed implant and radiation (2001), recent admission 8 weeks ago for suspected viral encephalitis and subsequent seizure disorder, presents w/ worsening myoclonic jerks in setting of UTI and multiple electrolyte abnormalities. Exam notable for R. hemineglect for which patient has had since previous admission.     Impression: Remote symptomatic focal myoclonic seizure in setting of UTI and electrolyte imbalances    Plan:   [] Admit to medicine   [] MRI brain w/ and w/o contrast to assess for progression of previously noted cortical ribboning  [] Continous EEG  [] Increase Keppra from 1g BID to 1.5g BID. Continue Vimpat 150mg BID   [] Will consider LP depending on EEG and imaging findings  [] ESR, CRP, B12, TSH, folate   [] Full infectious workup 84M w/ PMH of HTN, Prostate cancer s/p seed implant and radiation (2001), recent admission 8 weeks ago for suspected viral encephalitis and subsequent seizure disorder, presents w/ worsening myoclonic jerks in setting of UTI and multiple electrolyte abnormalities. Exam notable for R. hemineglect for which patient has had since previous admission.     Impression: Remote symptomatic focal myoclonic seizure in setting of UTI and electrolyte imbalances. Etiology of seizures likely structural 2/2 to the R. parieto-occipital enhancement which was treated as a viral encephalitis.     Plan:   [] Admit to medicine   [] MRI brain w/ and w/o contrast to assess for progression of previously noted cortical ribboning. If enhancement persists and/or progression, workup should be broadened to include autoimmune encephalitic causes   [] Continous EEG  [] Increase Keppra from 1g BID to 1.5g BID. Continue Vimpat 150mg BID   [] Will consider LP depending on EEG and imaging findings  [] ESR, CRP, B12, TSH, folate   [] Full infectious workup   [] Anti MUSK, Anti achR ab 84M w/ PMH of HTN, Prostate cancer s/p seed implant and radiation (2001), recent admission 8 weeks ago for suspected viral encephalitis and subsequent seizure disorder, presents w/ worsening myoclonic jerks in setting of UTI and multiple electrolyte abnormalities. Exam notable for L. hemineglect for which patient has had since previous admission.     Impression: Remote symptomatic focal myoclonic seizure in setting of UTI and electrolyte imbalances. Etiology of seizures likely structural 2/2 to the R. parieto-occipital enhancement which was treated as a viral encephalitis.     Plan:   [] Admit to medicine   [] MRI brain w/ and w/o contrast to assess for progression of previously noted cortical ribboning. If enhancement persists and/or progression, workup should be broadened to include autoimmune encephalitic causes   [] Continous EEG  [] Increase Keppra from 1g BID to 1.5g BID. Continue Vimpat 150mg BID   [] Will consider LP depending on EEG and imaging findings  [] ESR, CRP, B12, TSH, folate   [] Full infectious workup   [] Anti MUSK, Anti achR ab

## 2019-11-16 NOTE — H&P ADULT - PROBLEM SELECTOR PLAN 1
Patient presents with known seizure disorder with new onset seizures in the setting of keppra and vimpat use.   - as per Neurology, etiology likely 2/2 right parieto-occipital enhancement which was treated as viral encephalitis previously   - follow up on serum keppra level  - MRI w/ and w/o contrast  - may need LP and autoimmune encephalitis work up depending on findings  - continuous EEG  - will increase keppra from 1g BID to 1.5 BID  - continue vimpat 150 mg BID   - ESR, CRP, B12, TSH, folate ordered  - Anti MUSK, anti achR ab  - neuro checks  - continue to monitor

## 2019-11-16 NOTE — H&P ADULT - NSHPPHYSICALEXAM_GEN_ALL_CORE
Vital Signs Last 24 Hrs  T(C): 36.3 (16 Nov 2019 15:30), Max: 36.3 (16 Nov 2019 15:30)  T(F): 97.4 (16 Nov 2019 15:30), Max: 97.4 (16 Nov 2019 15:30)  HR: 94 (16 Nov 2019 22:32) (77 - 96)  BP: 110/59 (16 Nov 2019 22:32) (105/66 - 126/71)  BP(mean): --  RR: 18 (16 Nov 2019 22:32) (18 - 20)  SpO2: 100% (16 Nov 2019 22:32) (97% - 100%)    General: Not in acute distress, elderly  Head: Normocephalic, Atraumatic  Eyes: PERRLA, EOMI, normal sclera  Throat: Moist mucous membranes  Respiratory: CTAB, normal respiratory effort, no wheezes, crackles, rales  CV: RRR, S1/S2, no murmurs, rubs or gallops  Abdominal: Soft, bowel sounds present, NT, ND  Extremities: No edema, 2+ DP pulses  Neurological: A&Ox3, MAEx4, 0/5 LUE strength and ROM, 3/5 LLE strength  Skin: No rashes Vital Signs Last 24 Hrs  T(C): 36.3 (16 Nov 2019 15:30), Max: 36.3 (16 Nov 2019 15:30)  T(F): 97.4 (16 Nov 2019 15:30), Max: 97.4 (16 Nov 2019 15:30)  HR: 94 (16 Nov 2019 22:32) (77 - 96)  BP: 110/59 (16 Nov 2019 22:32) (105/66 - 126/71)  BP(mean): --  RR: 18 (16 Nov 2019 22:32) (18 - 20)  SpO2: 100% (16 Nov 2019 22:32) (97% - 100%)    General: Not in acute distress, elderly  Head: Normocephalic, Atraumatic  Eyes: PERRLA, EOMI, normal sclera  Throat: Moist mucous membranes  Respiratory: CTAB, normal respiratory effort, no wheezes, crackles, rales  CV: RRR, S1/S2, no murmurs, rubs or gallops  Abdominal: Soft, bowel sounds present, NT, ND, left peg tube  Extremities: No edema, 2+ DP pulses  Neurological: A&Ox3, MAEx4, 0/5 LUE strength and ROM, 3/5 LLE strength  Skin: No rashes Vital Signs Last 24 Hrs  T(C): 36.3 (16 Nov 2019 15:30), Max: 36.3 (16 Nov 2019 15:30)  T(F): 97.4 (16 Nov 2019 15:30), Max: 97.4 (16 Nov 2019 15:30)  HR: 94 (16 Nov 2019 22:32) (77 - 96)  BP: 110/59 (16 Nov 2019 22:32) (105/66 - 126/71)  BP(mean): --  RR: 18 (16 Nov 2019 22:32) (18 - 20)  SpO2: 100% (16 Nov 2019 22:32) (97% - 100%)    General: Not in acute distress, elderly  Head: Normocephalic, Atraumatic  Eyes: PERRLA, EOMI, normal sclera  Throat: Moist mucous membranes  Respiratory: CTAB, normal respiratory effort, no wheezes, crackles, rales  CV: RRR, S1/S2, no murmurs, rubs or gallops  Abdominal: Soft, bowel sounds present, tender to palpation, ND, left peg tube  Extremities: No edema, 2+ DP pulses  Neurological: A&Ox3, MAEx4, 0/5 LUE strength and ROM, 3/5 LLE strength  Skin: No rashes Vital Signs Last 24 Hrs  T(C): 36.3 (16 Nov 2019 15:30), Max: 36.3 (16 Nov 2019 15:30)  T(F): 97.4 (16 Nov 2019 15:30), Max: 97.4 (16 Nov 2019 15:30)  HR: 94 (16 Nov 2019 22:32) (77 - 96)  BP: 110/59 (16 Nov 2019 22:32) (105/66 - 126/71)  BP(mean): --  RR: 18 (16 Nov 2019 22:32) (18 - 20)  SpO2: 100% (16 Nov 2019 22:32) (97% - 100%)    General: Not in acute distress, elderly  Head: Normocephalic, Atraumatic  Eyes: PERRLA, EOMI, normal sclera  Throat: Moist mucous membranes  LUngs: CTAB, normal respiratory effort, no wheezes, crackles, rales  Heart: RRR, S1/S2, no murmurs, rubs or gallops no sig LE edema  Abdomen: Soft, bowel sounds present, tender to palpation suprapubic lower abd, ND, left peg tube  Extremities: No edema, 2+ DP pulses  Neurological: A&Ox3, MAEx4, 0/5 LUE strength and ROM, 3/5 LLE strength  Skin: No rashes no petechiae  psych: no si no hi

## 2019-11-16 NOTE — ED ADULT NURSE NOTE - OBJECTIVE STATEMENT
84 year old Male, no significant past medical history as per daughter. States patient had fallen out of bed about 8 weeks ago, due to unknown cause. Was admitted to the hospital and found to have multiple seizures. Patient was diagnosed with viral encephalopathy as per daughter and sent to [facility] rehab facility. Last night patient had a seizure and comes to ED for AMS. Patient has had left upper extremity spasm for about 3 weeks progressing since last night as per daughter. Patient arrives to ED, A&Ox2, with disorientation to time. Patient is breathing spontaneous and unlabored with equal chest rise and fall, b/l clear breathe sounds, palpable pulses, sinus rhythm on cardiac monitor, skin normal for ethnicity, abdomen firm and slightly distended, nontender to palpation but complains of some pain in upper left quadrant without palpation, motor weak in all extremities, sensation intact in all extremities, patient's skin shows no injury, peg tube inserted in stomach. Patient incontinent of stool and urine as per family who is at bedside. Patient was cleaned and repositioned. Bed locked and in lowest position with side rails up for safety. [place] provided.

## 2019-11-16 NOTE — H&P ADULT - PROBLEM SELECTOR PLAN 5
Hgb was 14.3 in October 2019.   - Hgb is now 12.3  - family not endorsing hematochezia or melena  - trend CBC daily  - transfuse for Hgb <7

## 2019-11-16 NOTE — ED PROVIDER NOTE - CRANIAL NERVE AND PUPILLARY EXAM
pt will not cooperate w EOM testing or facial movements but obvious abnormality, remainder of CN exam WNL

## 2019-11-16 NOTE — H&P ADULT - PROBLEM SELECTOR PLAN 3
Patient was reportedly confused and lethargic. Currently A&Ox3.  - etiology was likely 2/2 seizures vs electrolyte abnormalities vs UTI  - appears to be near baseline  - treat UTI and correct electrolyte abnormalities  - continue to monitor

## 2019-11-16 NOTE — ED PROVIDER NOTE - OBJECTIVE STATEMENT
84 y old male with history of hypertension, prostate CA s/p radiation and seed implant.  Admitted to [facility] sept 24, 2019 and discharged on oct 23rd for viral encephalitis of unknown etiology which presented with new onset seizure.  Sent to rehab after discharge and today was brought by EMS for worsening lethargy, confusion and 2 episodes of seizure ( 1 on wednesday and 1 yesterday).  No fevers/chills, dysuria.  According to daughter, patient had hyponatremia at rehab and treated fluid restriction.

## 2019-11-16 NOTE — ED PROVIDER NOTE - CLINICAL SUMMARY MEDICAL DECISION MAKING FREE TEXT BOX
84 years old male s/p viral encephalitis of unknown etiology and seizures on keppra 1500mgs daily, brought from rehab with increasing letharygy, confusion and 2 episode of seizures.  COncern for infectious metabolic process. Doubtful new stroke.  WIll obtain blood work, Keppra level, UA/UCx, CT head, neuro consult and re-evaluation ZR

## 2019-11-16 NOTE — H&P ADULT - PROBLEM SELECTOR PLAN 10
- DVT: lovenox  - Diet: NPO  - Full code (daughter is HCP) - DVT: Lovenox  - Diet: NPO with tube feeds  - Full code (daughter is HCP)

## 2019-11-16 NOTE — H&P ADULT - NSHPSOCIALHISTORY_GEN_ALL_CORE
The patient lives with his wife.  He has not history of drug or tobacco use.  He drinks alcohol socially.

## 2019-11-16 NOTE — ED PROVIDER NOTE - PROGRESS NOTE DETAILS
Elizabeth Goldberger PGY-3: imaging w/o acute findings. Discussed case w hospitalist as pt will require readmission for breakthrough seizures and reported AMS - stated should wait for neuro input in case will be accepted to their service. Called back neuro who have not yet seen pt - stated will call back Pending neuro recs. Called neuro stating will place formal recs shortly. Likely tba hospitalist neuro called for the 3d time ,still didn't see a pt ,hospitalist decline to admit pt until neuro see him per neuro admit to medicine. Increase keppra to 1500mg bid from 1g bid

## 2019-11-16 NOTE — H&P ADULT - HISTORY OF PRESENT ILLNESS
84 year old male with history of hypertension, prostate cancer s/p radiation and seed implant presents from [facility] with confusion in the setting of recent seizures. The patient was admitted during 9/24-10/23 after episode of status epilepticus requiring intubation. 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 He was eventually treated on the medicine floor after extubation. He failed multiple speech and swallow evaluations and is now s/p peg placement. Patient is also with residual slurred speech and weakness of left upper extremity. Patient was discharged to [facility] where he has been for the last three weeks. Per daughter, patient was doing well. However, on Wednesday the patient experienced seizure activity. His keppra was increased from 1000 mg to 1500 mg. He experienced another seizure on Friday, and daughter thinks his vimpat may have been increased, but is uncertain. Today, the daughter did not like the way the patient appeared. His left eye was droopy, left arm was twitching, he sounded congested, and he kept jerking his right arm. Of note, his sodium has been low during the week, so he appeared lethargic earlier in the week. He reports experiencing headache and cough. He denies trauma, fever, chills, chest pain, shortness of breath, sick contacts, or urinary symptoms      The patient was independent in ADLs/IADLs before his recent admission for his seizure. He had a bradley catheter upon discharge, but it was removed at the rehab facility. The patient has been voiding without difficulty. 84 year old male with history of hypertension, prostate cancer s/p radiation and seed implant presents from [facility] with confusion in the setting of recent seizures. The patient was admitted during 9/24-10/23 after episode of status epilepticus requiring intubation. 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 He was eventually treated on the medicine floor after extubation. He failed multiple speech and swallow evaluations and is now s/p peg placement. Patient is also with residual slurred speech and weakness of left upper extremity. Patient was discharged to [facility] where he has been for the last three weeks. Per daughter, patient was doing well. However, on Wednesday the patient experienced seizure activity. His keppra was increased from 1000 mg to 1500 mg. He experienced another seizure on Friday, and daughter thinks his vimpat may have been increased, but is uncertain. Today, the daughter did not like the way the patient appeared. His left eye was droopy, left arm was twitching, he sounded congested, and he kept jerking his right arm. Of note, his sodium has been low during the week, so he appeared lethargic earlier in the week. He reports experiencing headache and cough. He denies trauma, fever, chills, chest pain, shortness of breath, sick contacts, or urinary symptoms      The patient was independent in ADLs/IADLs before his recent admission for his seizure. He had a bradley catheter upon discharge, but it was removed at the rehab facility. The patient has been voiding without difficulty.     In the ED, vital signs were stable.  He received 1g ceftriaxone, 150 mg vimpat, 1500 mg keppra, 1g magnesium, and 15 meq potassium phosphate.  Keppra level and urine culture were sent.

## 2019-11-16 NOTE — ED PROVIDER NOTE - SEVERE SEPSIS ALERT DETAILS
Elizabeth Goldberger PGY-3: pt w elevated lactate likely 2/2 recent seizure. No fever or focal infectious sx - not concerned for sepsis at this time

## 2019-11-16 NOTE — CONSULT NOTE ADULT - SUBJECTIVE AND OBJECTIVE BOX
HPI:  84M w/ PMH of HTN, Prostate cancer s/p seed implant and radiation (), recent admission 8 weeks ago for suspected viral encephalitis and subsequent seizure disorder, presents w/ worsening jerking of his right side.     Patient originally admitted to [facility] on  for sustained first time seizure episodes. He was noted to have significant R. hemineglect, R. hemiparesis, and eye opening apraxia. Imaging demonstrated evidence of cortical ribboning in the R. parietooccippital lobe, w/ corresponding EEG findings of LPDs in the R. posterior quadrant, and LP findings suggestive of an inflammatory cause: Elevate WBC 79 (lymphocytic predominance 80%), Glucose 75, Pro 75. These findings were suggestive of a viral encephalitis, and so patient treated with acyclovir and steroids for which he reportedly improved clinically back to his baseline mental status. Upon discharge he no longer had any epileptiform discharges noted on EEG. He was discharged on Keppra 1g BID and Vimpat 150mg BID. Further workup negative including negative Viral CSF PCR panel, HSV CSF panel. Unable to access autoimmune panel.     Since then, patient has been in physical therapy. According to family, patient has been stable with no reported seizure up until this past week where he was noted to have multiple episodes of jerking of his RUE > RLE > R. face. These episodes occur intermittently throughout the day. He is responsive during all these episodes. No urinary incontinence or tongue biting. Denies any fevers, chills, chest pain, SOB. No dysuria. Family also reports new onset L. eye droop since this morning.     In the ED:   UA + bacteria, +LE  Lytes: Mg 1.4, P 2.3,     A 10-system ROS was performed and is negative except for those items noted above and/or in the HPI.    PAST MEDICAL & SURGICAL HISTORY:  History of gastroesophageal reflux (GERD)  Hypertension  Prostate cancer: had seed implant &amp; radiation (  )  PC (prostate cancer)  S/P cholecystectomy    FAMILY HISTORY:  No pertinent family history in first degree relatives    SOCIAL HISTORY:   T/E/D: No smoke, drink, drugs     MEDICATIONS (HOME):  Home Medications:  aMILoride 5 mg oral tablet: 2 tab(s) orally 0800,1400,2200 (23 Oct 2019 13:13)  labetalol 100 mg oral tablet: 1 tab(s) orally every 8 hours (23 Oct 2019 13:13)  levETIRAcetam 100 mg/mL oral solution: 10 milliliter(s) orally 2 times a day (23 Oct 2019 13:13)  magnesium oxide 400 mg (241.3 mg elemental magnesium) oral tablet: 1 tab(s) orally 2 times a day (with meals) (23 Oct 2019 13:13)  pantoprazole 40 mg oral delayed release tablet: 1 tab(s) orally once a day (23 Oct 2019 16:32)  polyethylene glycol 3350 oral powder for reconstitution: 17 gram(s) orally 2 times a day (23 Oct 2019 13:13)  senna 8.8 mg/5 mL oral syrup: 10 milliliter(s) orally once a day (at bedtime) (23 Oct 2019 13:13)    Exam:   MS: Oriented to self, thinks its [place] but knows [place]. States 1990. States Trump, and Black prior (not obama). Cant serial 7. Spells WORLD frontwards but not backwards. Language fluent. Comprehension intact. Significant R. hemineglect and intermittent eye opening apraxia.   CN: VFF. PRUDENCE. EOMI. V1-V3 intact. Face symmetric. T/u midline. Normal shrug.   Motor: Increase tone in all 4 limbs (LE>UE). Full strength on R. side. At least antigravity in the LUE/LLE.   Sensation: Withdraws to noxious stimuli in all 4 limbs (R>L)  Coordination: No dysmetria on FNF or H2S bilaterally   Gait: Deferred     STUDIES & IMAGIN-16 Na 135   K 3.4<L>   P 2.3<L>   Mg 1.4<L>   Ca 9.1   Cr 0.54 HPI:  84M w/ PMH of HTN, Prostate cancer s/p seed implant and radiation (), recent admission 8 weeks ago for suspected viral encephalitis and subsequent seizure disorder, presents w/ worsening jerking of his Left side.     Patient originally admitted to [facility] on  for sustained first time seizure episodes. He was noted to have significant L hemineglect, L. hemiparesis, and eye opening apraxia. Imaging demonstrated evidence of cortical ribboning in the R. parietooccippital lobe, w/ corresponding EEG findings of LPDs in the R. posterior quadrant, and LP findings suggestive of an inflammatory cause: Elevate WBC 79 (lymphocytic predominance 80%), Glucose 75, Pro 75. These findings were suggestive of a viral encephalitis, and so patient treated with acyclovir and steroids for which he reportedly improved clinically back to his baseline mental status. Upon discharge he no longer had any epileptiform discharges noted on EEG. He was discharged on Keppra 1g BID and Vimpat 150mg BID. Further workup negative including negative Viral CSF PCR panel, HSV CSF panel. Unable to access autoimmune panel.     Since then, patient has been in physical therapy. According to family, patient has been stable with no reported seizure up until this past week where he was noted to have multiple episodes of jerking of his RUE > RLE > R. face. These episodes occur intermittently throughout the day. He is responsive during all these episodes. No urinary incontinence or tongue biting. Denies any fevers, chills, chest pain, SOB. No dysuria. Family also reports new onset L. eye droop since this morning.     In the ED:   UA + bacteria, +LE  Lytes: Mg 1.4, P 2.3,     A 10-system ROS was performed and is negative except for those items noted above and/or in the HPI.    PAST MEDICAL & SURGICAL HISTORY:  History of gastroesophageal reflux (GERD)  Hypertension  Prostate cancer: had seed implant &amp; radiation (  )  PC (prostate cancer)  S/P cholecystectomy    FAMILY HISTORY:  No pertinent family history in first degree relatives    SOCIAL HISTORY:   T/E/D: No smoke, drink, drugs     MEDICATIONS (HOME):  Home Medications:  aMILoride 5 mg oral tablet: 2 tab(s) orally 0800,1400,2200 (23 Oct 2019 13:13)  labetalol 100 mg oral tablet: 1 tab(s) orally every 8 hours (23 Oct 2019 13:13)  levETIRAcetam 100 mg/mL oral solution: 10 milliliter(s) orally 2 times a day (23 Oct 2019 13:13)  magnesium oxide 400 mg (241.3 mg elemental magnesium) oral tablet: 1 tab(s) orally 2 times a day (with meals) (23 Oct 2019 13:13)  pantoprazole 40 mg oral delayed release tablet: 1 tab(s) orally once a day (23 Oct 2019 16:32)  polyethylene glycol 3350 oral powder for reconstitution: 17 gram(s) orally 2 times a day (23 Oct 2019 13:13)  senna 8.8 mg/5 mL oral syrup: 10 milliliter(s) orally once a day (at bedtime) (23 Oct 2019 13:13)    Exam:   MS: Oriented to self, thinks its [place] but knows [place]. States 1990. States Trump, and Black prior (not obama). Cant serial 7. Spells WORLD frontwards but not backwards. Language fluent. Comprehension intact. Significant L hemineglect  CN: VFF. PRUDENCE. EOMI. V1-V3 intact. Face symmetric. T/u midline. Normal shrug.   Motor: Increase tone in all 4 limbs (LE>UE). Full strength on R. side. At least antigravity in the LUE/LLE.   Sensation: Withdraws to noxious stimuli in all 4 limbs (R>L)  Coordination: No dysmetria on FNF or H2S bilaterally   Gait: Deferred     STUDIES & IMAGIN-16 Na 135   K 3.4<L>   P 2.3<L>   Mg 1.4<L>   Ca 9.1   Cr 0.54

## 2019-11-16 NOTE — H&P ADULT - ASSESSMENT
84 year old male with history of hypertension, prostate cancer s/p radiation and seed implant presents with recent admission for seizure 2/2 suspected viral encephalitis, [facility] setting of new-onset seizures. Admitted for further work up.

## 2019-11-16 NOTE — H&P ADULT - PROBLEM SELECTOR PLAN 4
Admitted with lactic acid of 2.6.  - likely 2/2 recent seizure activity vs reduced intake  - continue with IVFs  - will repeat lactate in AM

## 2019-11-16 NOTE — H&P ADULT - PROBLEM SELECTOR PLAN 9
- on amiloride at home  - will hold in setting of lactic acidemia  - blood pressure stable - on amiloride and labetolol at home  - will hold in setting of lactic acidemia  - blood pressure stable

## 2019-11-17 LAB
ANION GAP SERPL CALC-SCNC: 16 MMOL/L — SIGNIFICANT CHANGE UP (ref 5–17)
BASOPHILS # BLD AUTO: 0.08 K/UL — SIGNIFICANT CHANGE UP (ref 0–0.2)
BASOPHILS NFR BLD AUTO: 0.8 % — SIGNIFICANT CHANGE UP (ref 0–2)
BUN SERPL-MCNC: 9 MG/DL — SIGNIFICANT CHANGE UP (ref 7–23)
CALCIUM SERPL-MCNC: 9.3 MG/DL — SIGNIFICANT CHANGE UP (ref 8.4–10.5)
CHLORIDE SERPL-SCNC: 95 MMOL/L — LOW (ref 96–108)
CO2 SERPL-SCNC: 24 MMOL/L — SIGNIFICANT CHANGE UP (ref 22–31)
CREAT SERPL-MCNC: 0.55 MG/DL — SIGNIFICANT CHANGE UP (ref 0.5–1.3)
CRP SERPL-MCNC: 5.62 MG/DL — HIGH (ref 0–0.4)
EOSINOPHIL # BLD AUTO: 0.12 K/UL — SIGNIFICANT CHANGE UP (ref 0–0.5)
EOSINOPHIL NFR BLD AUTO: 1.2 % — SIGNIFICANT CHANGE UP (ref 0–6)
FOLATE SERPL-MCNC: >20 NG/ML — SIGNIFICANT CHANGE UP
GLUCOSE SERPL-MCNC: 111 MG/DL — HIGH (ref 70–99)
HCT VFR BLD CALC: 44.1 % — SIGNIFICANT CHANGE UP (ref 39–50)
HGB BLD-MCNC: 13.9 G/DL — SIGNIFICANT CHANGE UP (ref 13–17)
IMM GRANULOCYTES NFR BLD AUTO: 0.6 % — SIGNIFICANT CHANGE UP (ref 0–1.5)
LACTATE SERPL-SCNC: 1.7 MMOL/L — SIGNIFICANT CHANGE UP (ref 0.7–2)
LYMPHOCYTES # BLD AUTO: 1.57 K/UL — SIGNIFICANT CHANGE UP (ref 1–3.3)
LYMPHOCYTES # BLD AUTO: 15.7 % — SIGNIFICANT CHANGE UP (ref 13–44)
MAGNESIUM SERPL-MCNC: 1.6 MG/DL — SIGNIFICANT CHANGE UP (ref 1.6–2.6)
MCHC RBC-ENTMCNC: 29.9 PG — SIGNIFICANT CHANGE UP (ref 27–34)
MCHC RBC-ENTMCNC: 31.5 GM/DL — LOW (ref 32–36)
MCV RBC AUTO: 94.8 FL — SIGNIFICANT CHANGE UP (ref 80–100)
MONOCYTES # BLD AUTO: 0.8 K/UL — SIGNIFICANT CHANGE UP (ref 0–0.9)
MONOCYTES NFR BLD AUTO: 8 % — SIGNIFICANT CHANGE UP (ref 2–14)
NEUTROPHILS # BLD AUTO: 7.35 K/UL — SIGNIFICANT CHANGE UP (ref 1.8–7.4)
NEUTROPHILS NFR BLD AUTO: 73.7 % — SIGNIFICANT CHANGE UP (ref 43–77)
PHOSPHATE SERPL-MCNC: 3.4 MG/DL — SIGNIFICANT CHANGE UP (ref 2.5–4.5)
PLATELET # BLD AUTO: 335 K/UL — SIGNIFICANT CHANGE UP (ref 150–400)
POTASSIUM SERPL-MCNC: 3.3 MMOL/L — LOW (ref 3.5–5.3)
POTASSIUM SERPL-SCNC: 3.3 MMOL/L — LOW (ref 3.5–5.3)
RBC # BLD: 4.65 M/UL — SIGNIFICANT CHANGE UP (ref 4.2–5.8)
RBC # FLD: 14.6 % — HIGH (ref 10.3–14.5)
SODIUM SERPL-SCNC: 135 MMOL/L — SIGNIFICANT CHANGE UP (ref 135–145)
TSH SERPL-MCNC: 1.41 UIU/ML — SIGNIFICANT CHANGE UP (ref 0.27–4.2)
VIT B12 SERPL-MCNC: 413 PG/ML — SIGNIFICANT CHANGE UP (ref 232–1245)
WBC # BLD: 9.98 K/UL — SIGNIFICANT CHANGE UP (ref 3.8–10.5)
WBC # FLD AUTO: 9.98 K/UL — SIGNIFICANT CHANGE UP (ref 3.8–10.5)

## 2019-11-17 PROCEDURE — 99233 SBSQ HOSP IP/OBS HIGH 50: CPT

## 2019-11-17 PROCEDURE — 70551 MRI BRAIN STEM W/O DYE: CPT | Mod: 26

## 2019-11-17 PROCEDURE — 99222 1ST HOSP IP/OBS MODERATE 55: CPT

## 2019-11-17 RX ORDER — POTASSIUM CHLORIDE 20 MEQ
20 PACKET (EA) ORAL
Refills: 0 | Status: DISCONTINUED | OUTPATIENT
Start: 2019-11-17 | End: 2019-11-17

## 2019-11-17 RX ORDER — POTASSIUM CHLORIDE 20 MEQ
20 PACKET (EA) ORAL
Refills: 0 | Status: COMPLETED | OUTPATIENT
Start: 2019-11-17 | End: 2019-11-17

## 2019-11-17 RX ADMIN — Medication 650 MILLIGRAM(S): at 01:03

## 2019-11-17 RX ADMIN — ENOXAPARIN SODIUM 40 MILLIGRAM(S): 100 INJECTION SUBCUTANEOUS at 12:26

## 2019-11-17 RX ADMIN — POLYETHYLENE GLYCOL 3350 17 GRAM(S): 17 POWDER, FOR SOLUTION ORAL at 06:31

## 2019-11-17 RX ADMIN — Medication 100 MILLIGRAM(S): at 15:11

## 2019-11-17 RX ADMIN — POLYETHYLENE GLYCOL 3350 17 GRAM(S): 17 POWDER, FOR SOLUTION ORAL at 18:23

## 2019-11-17 RX ADMIN — LEVETIRACETAM 1500 MILLIGRAM(S): 250 TABLET, FILM COATED ORAL at 18:24

## 2019-11-17 RX ADMIN — LEVETIRACETAM 1500 MILLIGRAM(S): 250 TABLET, FILM COATED ORAL at 06:36

## 2019-11-17 RX ADMIN — MAGNESIUM OXIDE 400 MG ORAL TABLET 400 MILLIGRAM(S): 241.3 TABLET ORAL at 18:24

## 2019-11-17 RX ADMIN — Medication 650 MILLIGRAM(S): at 00:11

## 2019-11-17 RX ADMIN — SODIUM CHLORIDE 75 MILLILITER(S): 9 INJECTION INTRAMUSCULAR; INTRAVENOUS; SUBCUTANEOUS at 00:11

## 2019-11-17 RX ADMIN — Medication 20 MILLIEQUIVALENT(S): at 18:24

## 2019-11-17 RX ADMIN — CEFTRIAXONE 100 MILLIGRAM(S): 500 INJECTION, POWDER, FOR SOLUTION INTRAMUSCULAR; INTRAVENOUS at 23:18

## 2019-11-17 RX ADMIN — LACOSAMIDE 150 MILLIGRAM(S): 50 TABLET ORAL at 18:23

## 2019-11-17 RX ADMIN — LACOSAMIDE 150 MILLIGRAM(S): 50 TABLET ORAL at 06:31

## 2019-11-17 RX ADMIN — Medication 100 MILLIGRAM(S): at 06:31

## 2019-11-17 RX ADMIN — Medication 1 MILLIGRAM(S): at 20:30

## 2019-11-17 RX ADMIN — SENNA PLUS 10 MILLILITER(S): 8.6 TABLET ORAL at 23:19

## 2019-11-17 RX ADMIN — Medication 100 MILLIGRAM(S): at 23:19

## 2019-11-17 RX ADMIN — MAGNESIUM OXIDE 400 MG ORAL TABLET 400 MILLIGRAM(S): 241.3 TABLET ORAL at 08:52

## 2019-11-17 RX ADMIN — Medication 20 MILLIEQUIVALENT(S): at 16:41

## 2019-11-17 RX ADMIN — Medication 20 MILLIEQUIVALENT(S): at 23:18

## 2019-11-17 NOTE — PROGRESS NOTE ADULT - PROBLEM SELECTOR PLAN 8
Med Rec completed by calling facility.  Per facility he is not taking any medications.  Allergies reviewed   No antibiotics within the last 30 days.    Please call [phone] ext. [phone] for patient updates     Patient transported to imaging sign of to TNTL   Pt to rm 13 from trauma room, ct scan.  Report to Alec EVERETT.     - Phos 3.4 s/p repletion  - will monitor heavy meconium

## 2019-11-17 NOTE — PROGRESS NOTE ADULT - PROBLEM SELECTOR PLAN 9
- on amiloride and labetalol at home  - were held in setting of lactic acidemia  - blood pressure stable ; continue to hold for now  - will restart if BP elevated

## 2019-11-17 NOTE — PROGRESS NOTE ADULT - PROBLEM SELECTOR PLAN 2
Urinalysis with , many bacteria, large leukocyte esterase, and positive nitrites.  - c/w ceftriaxone 1 g daily  - f/up urine cx  - trend CBC daily  - monitor vitals

## 2019-11-17 NOTE — PROGRESS NOTE ADULT - PROBLEM SELECTOR PLAN 4
Admitted with lactic acid of 2.6; down to 1.7 after IV hydration  - likely 2/2 recent seizure activity vs reduced intake

## 2019-11-17 NOTE — PROGRESS NOTE ADULT - ASSESSMENT
84 year old male with history of hypertension, prostate cancer s/p radiation and seed implant presents with recent admission for seizure 2/2 suspected viral encephalitis, [facility] in the setting of new-onset seizures. Admitted for further work up.

## 2019-11-17 NOTE — PROVIDER CONTACT NOTE (OTHER) - SITUATION
AS Per MRI pt able to only complete 2/15 Sequences of imaging  additional imaging required pt may need to be premedicated intravenously as per MRI.

## 2019-11-17 NOTE — PROGRESS NOTE ADULT - PROBLEM SELECTOR PLAN 1
Patient  with known seizure disorder now with breakthrough seizures in the setting of keppra and vimpat use.   -  per Neurology, etiology likely 2/2 right parieto-occipital enhancement which was treated as viral encephalitis previously   - keppra level pending  - MRI w/ and w/o contrast pending (pt c/o claustrophobia;  will premedicate with PO Ativan 1mg; dtr in agreement)  - may need LP and autoimmune encephalitis work up depending on findings  - continuous EEG  -  keppra increased from 1g BID to 1.5 BID  - continue vimpat 150 mg BID   - ESR pending  - CRP elevated  - f/up B12, TSH, folate   - Anti MUSK, anti achR ab pending  - neuro checks  - continue to monitor

## 2019-11-17 NOTE — PROGRESS NOTE ADULT - SUBJECTIVE AND OBJECTIVE BOX
Patient is a 84y old  Male who presents with a chief complaint of Confusion, Seizure (2019 22:38)      SUBJECTIVE / OVERNIGHT EVENTS:    MEDICATIONS  (STANDING):  cefTRIAXone   IVPB 1000 milliGRAM(s) IV Intermittent every 24 hours  enoxaparin Injectable 40 milliGRAM(s) SubCutaneous daily  influenza   Vaccine 0.5 milliLiter(s) IntraMuscular once  labetalol 100 milliGRAM(s) Oral every 8 hours  lacosamide 150 milliGRAM(s) Oral two times a day  levETIRAcetam  Solution 1500 milliGRAM(s) Oral two times a day  magnesium oxide 400 milliGRAM(s) Oral two times a day with meals  polyethylene glycol 3350 17 Gram(s) Oral two times a day  senna Syrup 10 milliLiter(s) Oral at bedtime  sodium chloride 0.9%. 1000 milliLiter(s) (75 mL/Hr) IV Continuous <Continuous>    MEDICATIONS  (PRN):  acetaminophen    Suspension .. 650 milliGRAM(s) Oral every 6 hours PRN Moderate Pain (4 - 6), Severe Pain (7 - 10)      Vital Signs Last 24 Hrs  T(C): 36.5 (2019 06:19), Max: 36.5 (2019 23:29)  T(F): 97.7 (2019 06:19), Max: 97.7 (2019 23:29)  HR: 88 (2019 06:19) (77 - 97)  BP: 124/78 (2019 06:19) (105/66 - 126/71)  BP(mean): --  RR: 18 (2019 06:19) (18 - 20)  SpO2: 95% (2019 06:19) (95% - 100%)  CAPILLARY BLOOD GLUCOSE        I&O's Summary    2019 07:01  -  2019 07:00  --------------------------------------------------------  IN: 525 mL / OUT: 300 mL / NET: 225 mL        PHYSICAL EXAM:  GENERAL: NAD, well-developed  HEAD:  Atraumatic, Normocephalic  EYES: EOMI, PERRLA, conjunctiva and sclera clear  NECK: Supple, No JVD  CHEST/LUNG: Clear to auscultation bilaterally; No wheeze  HEART: Regular rate and rhythm; No murmurs, rubs, or gallops  ABDOMEN: Soft, Nontender, Nondistended; Bowel sounds present  EXTREMITIES:  2+ Peripheral Pulses, No clubbing, cyanosis, or edema  PSYCH: AAOx3  NEUROLOGY: non-focal  SKIN: No rashes or lesions    LABS:                        12.3   8.54  )-----------( 366      ( 2019 16:30 )             35.5     11-17    135  |  95<L>  |  9   ----------------------------<  111<H>  3.3<L>   |  24  |  0.55    Ca    9.3      2019 07:52  Phos  3.4     11-  Mg     1.6     -    TPro  7.1  /  Alb  3.0<L>  /  TBili  0.3  /  DBili  x   /  AST  27  /  ALT  34  /  AlkPhos  168<H>  -16          Urinalysis Basic - ( 2019 18:21 )    Color: Yellow / Appearance: Slightly Turbid / S.016 / pH: x  Gluc: x / Ketone: Negative  / Bili: Negative / Urobili: Negative   Blood: x / Protein: 30 mg/dL / Nitrite: Positive   Leuk Esterase: Large / RBC: 4 /hpf /  /HPF   Sq Epi: x / Non Sq Epi: 2 /hpf / Bacteria: Many        RADIOLOGY & ADDITIONAL TESTS:    Imaging Personally Reviewed:    Consultant(s) Notes Reviewed:      Care Discussed with Consultants/Other Providers: Patient is a 84y old  Male who presents with a chief complaint of Confusion, Seizure (2019 22:38)      SUBJECTIVE / OVERNIGHT EVENTS:  Pt seen and examined with daughter ( Rebekah) at bedside. No acute events overnight. Pt denies headache, fever/chills, urinary symptoms.    MEDICATIONS  (STANDING):  cefTRIAXone   IVPB 1000 milliGRAM(s) IV Intermittent every 24 hours  enoxaparin Injectable 40 milliGRAM(s) SubCutaneous daily  influenza   Vaccine 0.5 milliLiter(s) IntraMuscular once  labetalol 100 milliGRAM(s) Oral every 8 hours  lacosamide 150 milliGRAM(s) Oral two times a day  levETIRAcetam  Solution 1500 milliGRAM(s) Oral two times a day  magnesium oxide 400 milliGRAM(s) Oral two times a day with meals  polyethylene glycol 3350 17 Gram(s) Oral two times a day  senna Syrup 10 milliLiter(s) Oral at bedtime  sodium chloride 0.9%. 1000 milliLiter(s) (75 mL/Hr) IV Continuous <Continuous>    MEDICATIONS  (PRN):  acetaminophen    Suspension .. 650 milliGRAM(s) Oral every 6 hours PRN Moderate Pain (4 - 6), Severe Pain (7 - 10)      Vital Signs Last 24 Hrs  T(C): 36.5 (2019 06:19), Max: 36.5 (2019 23:29)  T(F): 97.7 (2019 06:19), Max: 97.7 (2019 23:29)  HR: 88 (2019 06:19) (77 - 97)  BP: 124/78 (2019 06:19) (105/66 - 126/71)  BP(mean): --  RR: 18 (2019 06:19) (18 - 20)  SpO2: 95% (2019 06:19) (95% - 100%)  CAPILLARY BLOOD GLUCOSE        I&O's Summary    2019 07:01  -  2019 07:00  --------------------------------------------------------  IN: 525 mL / OUT: 300 mL / NET: 225 mL        PHYSICAL EXAM:    General: NAD, up in bed  	Head: Normocephalic, Atraumatic  	Eyes: PERRLA, EOMI, normal sclera  	Throat: Moist mucous membranes  	Lungs: CTAB, normal respiratory effort, no wheezes, crackles, rales  	Heart: RRR, S1/S2, no murmurs, rubs or gallops no sig LE edema  	Abdomen: Soft, bowel sounds present, tender to palpation suprapubic lower abd, ND, PEG tube in place  	Extremities: No edema, 2+ DP pulses    Neurological: A&Ox3, Increase tone in all 4 limbs (LE>UE). Full strength on R. side. At least antigravity in the LUE/LLE ; jerky movements noted in LUE  	Skin: No rashes no petechiae    Psych : calm, cooperative, appropriate affect    LABS:                        12.3   8.54  )-----------( 366      ( 2019 16:30 )             35.5     11-17    135  |  95<L>  |  9   ----------------------------<  111<H>  3.3<L>   |  24  |  0.55    Ca    9.3      2019 07:52  Phos  3.4     11-  Mg     1.6     -    TPro  7.1  /  Alb  3.0<L>  /  TBili  0.3  /  DBili  x   /  AST  27  /  ALT  34  /  AlkPhos  168<H>  11-16          Urinalysis Basic - ( 2019 18:21 )    Color: Yellow / Appearance: Slightly Turbid / S.016 / pH: x  Gluc: x / Ketone: Negative  / Bili: Negative / Urobili: Negative   Blood: x / Protein: 30 mg/dL / Nitrite: Positive   Leuk Esterase: Large / RBC: 4 /hpf /  /HPF   Sq Epi: x / Non Sq Epi: 2 /hpf / Bacteria: Many          Consultant(s) Notes Reviewed:  Neurology

## 2019-11-17 NOTE — PROGRESS NOTE ADULT - PROBLEM SELECTOR PLAN 3
- Patient was reportedly confused and lethargic. Currently AAO x 3  - back to baseline mental status by daughter.  - etiology was likely 2/2 seizures vs electrolyte abnormalities vs UTI  - appears to be near baseline  - treat UTI and correct electrolyte abnormalities  - continue to monitor

## 2019-11-18 LAB
ANION GAP SERPL CALC-SCNC: 15 MMOL/L — SIGNIFICANT CHANGE UP (ref 5–17)
BUN SERPL-MCNC: 13 MG/DL — SIGNIFICANT CHANGE UP (ref 7–23)
CALCIUM SERPL-MCNC: 9.1 MG/DL — SIGNIFICANT CHANGE UP (ref 8.4–10.5)
CHLORIDE SERPL-SCNC: 92 MMOL/L — LOW (ref 96–108)
CO2 SERPL-SCNC: 25 MMOL/L — SIGNIFICANT CHANGE UP (ref 22–31)
CREAT SERPL-MCNC: 0.62 MG/DL — SIGNIFICANT CHANGE UP (ref 0.5–1.3)
CULTURE RESULTS: SIGNIFICANT CHANGE UP
GLUCOSE SERPL-MCNC: 154 MG/DL — HIGH (ref 70–99)
HCT VFR BLD CALC: 38.6 % — LOW (ref 39–50)
HGB BLD-MCNC: 12.6 G/DL — LOW (ref 13–17)
LEVETIRACETAM SERPL-MCNC: 55.6 MCG/ML — HIGH (ref 12–46)
MCHC RBC-ENTMCNC: 29.7 PG — SIGNIFICANT CHANGE UP (ref 27–34)
MCHC RBC-ENTMCNC: 32.6 GM/DL — SIGNIFICANT CHANGE UP (ref 32–36)
MCV RBC AUTO: 91 FL — SIGNIFICANT CHANGE UP (ref 80–100)
PLATELET # BLD AUTO: 453 K/UL — HIGH (ref 150–400)
POTASSIUM SERPL-MCNC: 3.3 MMOL/L — LOW (ref 3.5–5.3)
POTASSIUM SERPL-SCNC: 3.3 MMOL/L — LOW (ref 3.5–5.3)
RBC # BLD: 4.24 M/UL — SIGNIFICANT CHANGE UP (ref 4.2–5.8)
RBC # FLD: 14.2 % — SIGNIFICANT CHANGE UP (ref 10.3–14.5)
SODIUM SERPL-SCNC: 132 MMOL/L — LOW (ref 135–145)
SPECIMEN SOURCE: SIGNIFICANT CHANGE UP
WBC # BLD: 9.05 K/UL — SIGNIFICANT CHANGE UP (ref 3.8–10.5)
WBC # FLD AUTO: 9.05 K/UL — SIGNIFICANT CHANGE UP (ref 3.8–10.5)

## 2019-11-18 PROCEDURE — 99233 SBSQ HOSP IP/OBS HIGH 50: CPT

## 2019-11-18 PROCEDURE — 99232 SBSQ HOSP IP/OBS MODERATE 35: CPT | Mod: GC

## 2019-11-18 RX ORDER — POTASSIUM CHLORIDE 20 MEQ
20 PACKET (EA) ORAL
Refills: 0 | Status: COMPLETED | OUTPATIENT
Start: 2019-11-18 | End: 2019-11-18

## 2019-11-18 RX ADMIN — LEVETIRACETAM 1500 MILLIGRAM(S): 250 TABLET, FILM COATED ORAL at 06:03

## 2019-11-18 RX ADMIN — Medication 100 MILLIGRAM(S): at 06:03

## 2019-11-18 RX ADMIN — MAGNESIUM OXIDE 400 MG ORAL TABLET 400 MILLIGRAM(S): 241.3 TABLET ORAL at 18:04

## 2019-11-18 RX ADMIN — Medication 100 MILLIGRAM(S): at 14:24

## 2019-11-18 RX ADMIN — CEFTRIAXONE 100 MILLIGRAM(S): 500 INJECTION, POWDER, FOR SOLUTION INTRAMUSCULAR; INTRAVENOUS at 22:36

## 2019-11-18 RX ADMIN — LEVETIRACETAM 1500 MILLIGRAM(S): 250 TABLET, FILM COATED ORAL at 18:05

## 2019-11-18 RX ADMIN — Medication 2 MILLIGRAM(S): at 11:05

## 2019-11-18 RX ADMIN — ENOXAPARIN SODIUM 40 MILLIGRAM(S): 100 INJECTION SUBCUTANEOUS at 13:31

## 2019-11-18 RX ADMIN — MAGNESIUM OXIDE 400 MG ORAL TABLET 400 MILLIGRAM(S): 241.3 TABLET ORAL at 07:58

## 2019-11-18 RX ADMIN — Medication 20 MILLIEQUIVALENT(S): at 20:07

## 2019-11-18 RX ADMIN — Medication 20 MILLIEQUIVALENT(S): at 22:36

## 2019-11-18 RX ADMIN — SENNA PLUS 10 MILLILITER(S): 8.6 TABLET ORAL at 22:46

## 2019-11-18 RX ADMIN — LACOSAMIDE 150 MILLIGRAM(S): 50 TABLET ORAL at 06:03

## 2019-11-18 RX ADMIN — Medication 100 MILLIGRAM(S): at 22:36

## 2019-11-18 RX ADMIN — Medication 20 MILLIEQUIVALENT(S): at 18:05

## 2019-11-18 RX ADMIN — POLYETHYLENE GLYCOL 3350 17 GRAM(S): 17 POWDER, FOR SOLUTION ORAL at 18:05

## 2019-11-18 RX ADMIN — LACOSAMIDE 150 MILLIGRAM(S): 50 TABLET ORAL at 18:04

## 2019-11-18 NOTE — DIETITIAN INITIAL EVALUATION ADULT. - OTHER INFO
[facility]. Per pt's daughter at bedside pt was receiving Jevity 1.2 bolus feeds 3x/day providing about 2130kcal and 91g protein/day (~28kcal/kg and 1.2g protein/kg) PTA. Daughter reports pt had loose stool on Jevity 1.2.    Pt's daughter denies pt has N+V. Per flow sheets last BM was today (11/18). Pt's daughter confirms weight loss. States pt was more than 180 pounds 8 weeks ago and is now about 170 pounds. Per previous RD note weight was 189.9 pounds (09/26). Current weight is 168.7 pounds (11/16). Weight loss of 21.2 pounds in about 2 months (11%).     Pt started on Vital AF yesterday (11/17). Now @ goal rate of 60ml/hour to provide 1440ml, 1728kcal, 108g protein, and 1167g/ml water since 10am today per RN (11/18). Meets 75-90% estimated calorie needs and 100% protein needs calculated below. [facility]. Per pt's daughter at bedside pt was receiving Jevity 1.2 bolus feeds 3x/day providing about 2130kcal and 91g protein/day (~28kcal/kg and 1.2g protein/kg) PTA. Daughter reports pt had loose stool on Jevity 1.2.    Pt's daughter denies pt has N+V. Per flow sheets last BM was today (11/18). Pt's daughter confirms weight loss. States pt was more than 180 pounds 8 weeks ago and is now about 170 pounds. Per previous RD note weight was 189.9 pounds (09/26). Current weight is 168.7 pounds (11/16). Weight loss of 21.2 pounds in about 2 months (11%).     Pt started on Vital AF yesterday (11/17). Now @ goal rate of 60ml/hour to provide 1440ml, 1728kcal, 108g protein, and 1167g/ml water (22 kcal/kg, 104g protein/kg)since 10am today per RN (11/18). Meets 75-90% estimated calorie needs and 75% protein needs calculated below.

## 2019-11-18 NOTE — DIETITIAN INITIAL EVALUATION ADULT. - PHYSICAL APPEARANCE
Nutrition focused physical exam not conducted at this time as pt is lethargic and confused./other (specify) Ht: 71 inches Wt: 168.7 pounds (11/16) BMI: 23.5kg/m2 IBW: 172 pounds (+/-10%) %IBW: 98%  Edema: 1+ left hand per flow sheets Skin: IAD per documentation

## 2019-11-18 NOTE — PROGRESS NOTE ADULT - ASSESSMENT
Impression: Focal myoclonic seizure in setting of UTI and electrolyte imbalances. 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      Plan:   [] Repeat MRI brain w/ and w/o under anesthesia    [] Continous EEG  [] Continue Keppra 1.5g BID, Vimpat 150mg BID   [] Will consider LP depending on EEG and imaging findings  [x] ESR, CRP elevated 5.62, B12 wnl, TSH wnl, folate wnl    [] Anti MUSK, Anti achR ab pending

## 2019-11-18 NOTE — DIETITIAN INITIAL EVALUATION ADULT. - PROBLEM SELECTOR PLAN 9
- on amiloride and labetolol at home  - will hold in setting of lactic acidemia  - blood pressure stable

## 2019-11-18 NOTE — DIETITIAN INITIAL EVALUATION ADULT. - ENTERAL
Recommend if medically feasible increase Vital AF to goal rate of 75ml/hour to provide 1800ml, 2160kcal, 135g protein, 1460g/ml water (28kcal/kg, 1.75g protein/kg, 19ml water/kg)- Spoke to provider. Will continue to monitor and adjust as needed.

## 2019-11-18 NOTE — PROGRESS NOTE ADULT - SUBJECTIVE AND OBJECTIVE BOX
SUBJECTIVE:     Interval:   Patient less alert and more dysarthric today. Was found to have focal myoclonic jerks in the face during our examination that later resolved with 2mg of ativan. Still not on EEG but tech was called to be placed.     OBJECTIVE:    Vitals: T(F): 98.5  HR: 102  BP: 105/62  RR: 17  SpO2: 94%    Exam:   - Mental Status: Oriented to self, thinks its [facility]. Knows [place]. Fluent, following all commands. L. hemineglect.   - Cranial Nerves II-XII:  PRUDENCE. EOMI. Mild L. facial droop w/ facial twitching. T/u midline.   - Motor:  Full strength on Right. Trace movement on the left.   - Sensory: Grimaces and localizes to pain bilaterally     Labs  11-18 Na 132<L>  11-18 K 3.3<L>  11-18 P --  11-18 Mg --  11-18 Ca 9.1  11-18 Cr 0.62    Imaging:  [x] 10/11 MR Brain - Increased signal in parietal and occipital lobes, likely sequelae of recent seizure activity   [x] 11/17 MRI Brain - Limited given motion artifact   [] Previous EEGs - LPDs in R. posterior quadrant (later resolved), moderate to severe generalized slowing

## 2019-11-18 NOTE — DIETITIAN INITIAL EVALUATION ADULT. - ADD RECOMMEND
1. Continue to monitor weight, labs, skin, diet tolerance, and educational needs. 2. Monitor and replete electrolytes as needed. 3. Pt's family at bedside made aware RD remains available.

## 2019-11-18 NOTE — PROGRESS NOTE ADULT - SUBJECTIVE AND OBJECTIVE BOX
Patient is a 84y old  Male who presents with a chief complaint of Confusion, Seizure (2019 09:52)      SUBJECTIVE / OVERNIGHT EVENTS:    MEDICATIONS  (STANDING):  cefTRIAXone   IVPB 1000 milliGRAM(s) IV Intermittent every 24 hours  enoxaparin Injectable 40 milliGRAM(s) SubCutaneous daily  influenza   Vaccine 0.5 milliLiter(s) IntraMuscular once  labetalol 100 milliGRAM(s) Oral every 8 hours  lacosamide 150 milliGRAM(s) Oral two times a day  levETIRAcetam  Solution 1500 milliGRAM(s) Oral two times a day  magnesium oxide 400 milliGRAM(s) Oral two times a day with meals  polyethylene glycol 3350 17 Gram(s) Oral two times a day  senna Syrup 10 milliLiter(s) Oral at bedtime  sodium chloride 0.9%. 1000 milliLiter(s) (75 mL/Hr) IV Continuous <Continuous>    MEDICATIONS  (PRN):  acetaminophen    Suspension .. 650 milliGRAM(s) Oral every 6 hours PRN Moderate Pain (4 - 6), Severe Pain (7 - 10)      Vital Signs Last 24 Hrs  T(C): 36.9 (2019 06:01), Max: 36.9 (2019 11:25)  T(F): 98.5 (2019 06:01), Max: 98.5 (2019 06:01)  HR: 102 (2019 06:01) (95 - 105)  BP: 105/62 (2019 06:01) (105/62 - 118/71)  BP(mean): --  RR: 17 (2019 06:01) (17 - 19)  SpO2: 94% (2019 06:01) (94% - 96%)  CAPILLARY BLOOD GLUCOSE        I&O's Summary    2019 07:01  -  2019 07:00  --------------------------------------------------------  IN: 1350 mL / OUT: 0 mL / NET: 1350 mL        PHYSICAL EXAM:  GENERAL: NAD, well-developed  HEAD:  Atraumatic, Normocephalic  EYES: EOMI, PERRLA, conjunctiva and sclera clear  NECK: Supple, No JVD  CHEST/LUNG: Clear to auscultation bilaterally; No wheeze  HEART: Regular rate and rhythm; No murmurs, rubs, or gallops  ABDOMEN: Soft, Nontender, Nondistended; Bowel sounds present  EXTREMITIES:  2+ Peripheral Pulses, No clubbing, cyanosis, or edema  PSYCH: AAOx3  NEUROLOGY: non-focal  SKIN: No rashes or lesions    LABS:                        13.9   9.98  )-----------( 335      ( 2019 11:23 )             44.1     11-18    132<L>  |  92<L>  |  13  ----------------------------<  154<H>  3.3<L>   |  25  |  0.62    Ca    9.1      2019 07:03  Phos  3.4     11-  Mg     1.6     -17    TPro  7.1  /  Alb  3.0<L>  /  TBili  0.3  /  DBili  x   /  AST  27  /  ALT  34  /  AlkPhos  168<H>  11-16          Urinalysis Basic - ( 2019 18:21 )    Color: Yellow / Appearance: Slightly Turbid / S.016 / pH: x  Gluc: x / Ketone: Negative  / Bili: Negative / Urobili: Negative   Blood: x / Protein: 30 mg/dL / Nitrite: Positive   Leuk Esterase: Large / RBC: 4 /hpf /  /HPF   Sq Epi: x / Non Sq Epi: 2 /hpf / Bacteria: Many        RADIOLOGY & ADDITIONAL TESTS:    Imaging Personally Reviewed:    Consultant(s) Notes Reviewed:      Care Discussed with Consultants/Other Providers: Patient is a 84y old  Male who presents with a chief complaint of Confusion, Seizure (2019 09:52)      SUBJECTIVE / OVERNIGHT EVENTS:  Pt seen and examined with daughter at bedside. MRI was not completed yesterday because was unable to tolerate. Pt c/o left sided facial twitching this am.    MEDICATIONS  (STANDING):  cefTRIAXone   IVPB 1000 milliGRAM(s) IV Intermittent every 24 hours  enoxaparin Injectable 40 milliGRAM(s) SubCutaneous daily  influenza   Vaccine 0.5 milliLiter(s) IntraMuscular once  labetalol 100 milliGRAM(s) Oral every 8 hours  lacosamide 150 milliGRAM(s) Oral two times a day  levETIRAcetam  Solution 1500 milliGRAM(s) Oral two times a day  magnesium oxide 400 milliGRAM(s) Oral two times a day with meals  polyethylene glycol 3350 17 Gram(s) Oral two times a day  senna Syrup 10 milliLiter(s) Oral at bedtime  sodium chloride 0.9%. 1000 milliLiter(s) (75 mL/Hr) IV Continuous <Continuous>    MEDICATIONS  (PRN):  acetaminophen    Suspension .. 650 milliGRAM(s) Oral every 6 hours PRN Moderate Pain (4 - 6), Severe Pain (7 - 10)      Vital Signs Last 24 Hrs  T(C): 36.9 (2019 06:01), Max: 36.9 (2019 11:25)  T(F): 98.5 (2019 06:01), Max: 98.5 (2019 06:01)  HR: 102 (2019 06:01) (95 - 105)  BP: 105/62 (2019 06:01) (105/62 - 118/71)  BP(mean): --  RR: 17 (2019 06:01) (17 - 19)  SpO2: 94% (2019 06:01) (94% - 96%)  CAPILLARY BLOOD GLUCOSE        I&O's Summary    2019 07:01  -  2019 07:00  --------------------------------------------------------  IN: 1350 mL / OUT: 0 mL / NET: 1350 mL        PHYSICAL EXAM:   General: NAD, up in bed  	Head: Normocephalic, Atraumatic  	Eyes: PERRLA, EOMI, normal sclera  	Throat: Moist mucous membranes  	Lungs: CTAB, normal respiratory effort, no wheezes, crackles, rales  	Heart: RRR, S1/S2, no murmurs, rubs or gallops no sig LE edema  	Abdomen: Soft, bowel sounds present, tender to palpation suprapubic lower abd, ND, PEG tube in place  	Extremities: No edema, 2+ DP pulses    Neurological: dysarthria+, left sided facial twitching, AAOx3, increase tone in all 4 limbs (LE>UE). Full strength on R. side. At least antigravity in the LUE/LLE   	Skin: No rashes no petechiae    Psych : calm, cooperative, appropriate affect    LABS:                        13.9   9.98  )-----------( 335      ( 2019 11:23 )             44.1     11-18    132<L>  |  92<L>  |  13  ----------------------------<  154<H>  3.3<L>   |  25  |  0.62    Ca    9.1      2019 07:03  Phos  3.4     11-17  Mg     1.6     11-17    TPro  7.1  /  Alb  3.0<L>  /  TBili  0.3  /  DBili  x   /  AST  27  /  ALT  34  /  AlkPhos  168<H>  11-16          Urinalysis Basic - ( 2019 18:21 )    Color: Yellow / Appearance: Slightly Turbid / S.016 / pH: x  Gluc: x / Ketone: Negative  / Bili: Negative / Urobili: Negative   Blood: x / Protein: 30 mg/dL / Nitrite: Positive   Leuk Esterase: Large / RBC: 4 /hpf /  /HPF   Sq Epi: x / Non Sq Epi: 2 /hpf / Bacteria: Many          Care Discussed with Consultants/Other Providers: Neurology

## 2019-11-18 NOTE — DIETITIAN INITIAL EVALUATION ADULT. - SIGNS/SYMPTOMS
weight loss of 11% x 2 months weight loss of 11% x 2 months, hypokalemia, hyponatremia, <75% intake x 1 month

## 2019-11-18 NOTE — PROGRESS NOTE ADULT - PROBLEM SELECTOR PLAN 5
Hgb was 14.3 in October 2019.   - Hgb stable at 12.6  - family not endorsing hematochezia or melena  - trend CBC daily  - transfuse for Hgb <7

## 2019-11-18 NOTE — DIETITIAN INITIAL EVALUATION ADULT. - REASON INDICATOR FOR ASSESSMENT
Consult for nutritional assessment and tube feeding  Information obtained from medical record, previous RD note, RN, and patient's daughter at bedside.  Pt lethargic, confused.

## 2019-11-18 NOTE — PROGRESS NOTE ADULT - PROBLEM SELECTOR PLAN 1
Patient  with known seizure disorder now with breakthrough seizures in the setting of keppra and vimpat use.   -  unclear etiology ; ?2/2 infectious process ( UTI) ; recent tx for viral encephalitis.  - keppra level 55.6  - Pt was unable to complete MRI despite premedication ; will reorder  MRI with anesthesia  - may need LP and autoimmune encephalitis work up depending on findings  - continuous EEG pending  -  c/w Keppra 1500mg BID  - continue vimpat 150 mg BID   - ESR 81  - CRP elevated  - B12,folate wnl  - Anti MUSK, anti achR ab pending  - neuro checks  - right sided facial twitching likely focal seizure ; will start Ativan 1mg IVP  q6 prn focal seizure per Neurology

## 2019-11-18 NOTE — CHART NOTE - NSCHARTNOTEFT_GEN_A_CORE
Upon Nutritional Assessment by the Registered Dietitian your patient was determined to meet criteria / has evidence of the following diagnosis/diagnoses:          [ ]  Mild Protein Calorie Malnutrition        [ ]  Moderate Protein Calorie Malnutrition        [X] Severe Protein Calorie Malnutrition        [ ] Unspecified Protein Calorie Malnutrition        [ ] Underweight / BMI <19        [ ] Morbid Obesity / BMI > 40      Findings as based on:  [x ] Comprehensive nutrition assessment: h/o inadequate protein energy intake > 2 months, new Peg 10/21  [ ] Nutrition Focused Physical Exam  [ x] Other: weight loss 21 lbs (11% in 2 months), admitted with  hypophosphatemia, hypomagnesia, hypokalemia    Nutrition Plan/Recommendations:  increase tube feeding   Vital AF to goal rate of 75ml/hour to provide 1800ml, 2160kcal, 135g protein, 1460g/ml water (28kcal/kg, 1.75g protein/kg, 19ml water/kg)        PROVIDER Section:     By signing this assessment you are acknowledging and agree with the diagnosis/diagnoses assigned by the Registered Dietitian    Comments:

## 2019-11-18 NOTE — PROGRESS NOTE ADULT - PROBLEM SELECTOR PLAN 2
Urinalysis with , many bacteria, large leukocyte esterase, and positive nitrites.  - urine cx w/possible collection contamination  - c/w ceftriaxone 1 g daily day 2/3

## 2019-11-18 NOTE — PROGRESS NOTE ADULT - ASSESSMENT
84 year old male with history of hypertension, prostate cancer s/p radiation and seed implant presents with recent admission for seizure 2/2 suspected viral encephalitis, [facility] with [facility] in the setting of new-onset seizures. Admitted for further work up.

## 2019-11-19 LAB
ANION GAP SERPL CALC-SCNC: 17 MMOL/L — SIGNIFICANT CHANGE UP (ref 5–17)
ANION GAP SERPL CALC-SCNC: 18 MMOL/L — HIGH (ref 5–17)
BUN SERPL-MCNC: 15 MG/DL — SIGNIFICANT CHANGE UP (ref 7–23)
BUN SERPL-MCNC: 16 MG/DL — SIGNIFICANT CHANGE UP (ref 7–23)
CALCIUM SERPL-MCNC: 9.2 MG/DL — SIGNIFICANT CHANGE UP (ref 8.4–10.5)
CALCIUM SERPL-MCNC: 9.6 MG/DL — SIGNIFICANT CHANGE UP (ref 8.4–10.5)
CHLORIDE SERPL-SCNC: 90 MMOL/L — LOW (ref 96–108)
CHLORIDE SERPL-SCNC: 90 MMOL/L — LOW (ref 96–108)
CO2 SERPL-SCNC: 26 MMOL/L — SIGNIFICANT CHANGE UP (ref 22–31)
CO2 SERPL-SCNC: 27 MMOL/L — SIGNIFICANT CHANGE UP (ref 22–31)
CREAT SERPL-MCNC: 0.61 MG/DL — SIGNIFICANT CHANGE UP (ref 0.5–1.3)
CREAT SERPL-MCNC: 0.64 MG/DL — SIGNIFICANT CHANGE UP (ref 0.5–1.3)
GLUCOSE SERPL-MCNC: 146 MG/DL — HIGH (ref 70–99)
GLUCOSE SERPL-MCNC: 151 MG/DL — HIGH (ref 70–99)
HCT VFR BLD CALC: 38.6 % — LOW (ref 39–50)
HCT VFR BLD CALC: 38.9 % — LOW (ref 39–50)
HGB BLD-MCNC: 12.7 G/DL — LOW (ref 13–17)
HGB BLD-MCNC: 13.4 G/DL — SIGNIFICANT CHANGE UP (ref 13–17)
MAGNESIUM SERPL-MCNC: 1.3 MG/DL — LOW (ref 1.6–2.6)
MCHC RBC-ENTMCNC: 30 PG — SIGNIFICANT CHANGE UP (ref 27–34)
MCHC RBC-ENTMCNC: 30.8 PG — SIGNIFICANT CHANGE UP (ref 27–34)
MCHC RBC-ENTMCNC: 32.9 GM/DL — SIGNIFICANT CHANGE UP (ref 32–36)
MCHC RBC-ENTMCNC: 34.4 GM/DL — SIGNIFICANT CHANGE UP (ref 32–36)
MCV RBC AUTO: 89.4 FL — SIGNIFICANT CHANGE UP (ref 80–100)
MCV RBC AUTO: 91 FL — SIGNIFICANT CHANGE UP (ref 80–100)
NRBC # BLD: 0 /100 WBCS — SIGNIFICANT CHANGE UP (ref 0–0)
PHOSPHATE SERPL-MCNC: 3 MG/DL — SIGNIFICANT CHANGE UP (ref 2.5–4.5)
PLATELET # BLD AUTO: 426 K/UL — HIGH (ref 150–400)
PLATELET # BLD AUTO: 430 K/UL — HIGH (ref 150–400)
POTASSIUM SERPL-MCNC: 3.2 MMOL/L — LOW (ref 3.5–5.3)
POTASSIUM SERPL-MCNC: 3.4 MMOL/L — LOW (ref 3.5–5.3)
POTASSIUM SERPL-SCNC: 3.2 MMOL/L — LOW (ref 3.5–5.3)
POTASSIUM SERPL-SCNC: 3.4 MMOL/L — LOW (ref 3.5–5.3)
RBC # BLD: 4.24 M/UL — SIGNIFICANT CHANGE UP (ref 4.2–5.8)
RBC # BLD: 4.35 M/UL — SIGNIFICANT CHANGE UP (ref 4.2–5.8)
RBC # FLD: 14.5 % — SIGNIFICANT CHANGE UP (ref 10.3–14.5)
RBC # FLD: 14.6 % — HIGH (ref 10.3–14.5)
SODIUM SERPL-SCNC: 134 MMOL/L — LOW (ref 135–145)
SODIUM SERPL-SCNC: 134 MMOL/L — LOW (ref 135–145)
WBC # BLD: 10.12 K/UL — SIGNIFICANT CHANGE UP (ref 3.8–10.5)
WBC # BLD: 9.27 K/UL — SIGNIFICANT CHANGE UP (ref 3.8–10.5)
WBC # FLD AUTO: 10.12 K/UL — SIGNIFICANT CHANGE UP (ref 3.8–10.5)
WBC # FLD AUTO: 9.27 K/UL — SIGNIFICANT CHANGE UP (ref 3.8–10.5)

## 2019-11-19 PROCEDURE — 99233 SBSQ HOSP IP/OBS HIGH 50: CPT

## 2019-11-19 PROCEDURE — 99232 SBSQ HOSP IP/OBS MODERATE 35: CPT | Mod: GC

## 2019-11-19 PROCEDURE — 70553 MRI BRAIN STEM W/O & W/DYE: CPT | Mod: 26

## 2019-11-19 RX ORDER — POTASSIUM CHLORIDE 20 MEQ
15 PACKET (EA) ORAL ONCE
Refills: 0 | Status: DISCONTINUED | OUTPATIENT
Start: 2019-11-19 | End: 2019-11-19

## 2019-11-19 RX ORDER — POTASSIUM CHLORIDE 20 MEQ
20 PACKET (EA) ORAL ONCE
Refills: 0 | Status: COMPLETED | OUTPATIENT
Start: 2019-11-19 | End: 2019-11-19

## 2019-11-19 RX ORDER — POTASSIUM CHLORIDE 20 MEQ
20 PACKET (EA) ORAL
Refills: 0 | Status: DISCONTINUED | OUTPATIENT
Start: 2019-11-19 | End: 2019-11-19

## 2019-11-19 RX ORDER — MAGNESIUM SULFATE 500 MG/ML
2 VIAL (ML) INJECTION ONCE
Refills: 0 | Status: COMPLETED | OUTPATIENT
Start: 2019-11-19 | End: 2019-11-19

## 2019-11-19 RX ADMIN — POLYETHYLENE GLYCOL 3350 17 GRAM(S): 17 POWDER, FOR SOLUTION ORAL at 18:59

## 2019-11-19 RX ADMIN — MAGNESIUM OXIDE 400 MG ORAL TABLET 400 MILLIGRAM(S): 241.3 TABLET ORAL at 18:58

## 2019-11-19 RX ADMIN — LACOSAMIDE 150 MILLIGRAM(S): 50 TABLET ORAL at 18:59

## 2019-11-19 RX ADMIN — Medication 50 GRAM(S): at 17:23

## 2019-11-19 RX ADMIN — Medication 100 MILLIGRAM(S): at 05:56

## 2019-11-19 RX ADMIN — LACOSAMIDE 150 MILLIGRAM(S): 50 TABLET ORAL at 05:56

## 2019-11-19 RX ADMIN — POLYETHYLENE GLYCOL 3350 17 GRAM(S): 17 POWDER, FOR SOLUTION ORAL at 05:59

## 2019-11-19 RX ADMIN — ENOXAPARIN SODIUM 40 MILLIGRAM(S): 100 INJECTION SUBCUTANEOUS at 12:07

## 2019-11-19 RX ADMIN — Medication 100 MILLIGRAM(S): at 21:54

## 2019-11-19 RX ADMIN — LEVETIRACETAM 1500 MILLIGRAM(S): 250 TABLET, FILM COATED ORAL at 05:57

## 2019-11-19 RX ADMIN — LEVETIRACETAM 1500 MILLIGRAM(S): 250 TABLET, FILM COATED ORAL at 18:59

## 2019-11-19 RX ADMIN — Medication 20 MILLIEQUIVALENT(S): at 19:00

## 2019-11-19 RX ADMIN — Medication 100 MILLIGRAM(S): at 18:59

## 2019-11-19 NOTE — PROGRESS NOTE ADULT - PROBLEM SELECTOR PLAN 2
Urinalysis with , many bacteria, large leukocyte esterase, and positive nitrites.  - urine cx w/possible collection contamination  - c/w ceftriaxone 1 g daily day 3/3

## 2019-11-19 NOTE — PROGRESS NOTE ADULT - PROBLEM SELECTOR PLAN 1
Patient  with known seizure disorder now with breakthrough seizures in the setting of keppra and vimpat use.   -  unclear etiology ; ?2/2 infectious process ( UTI) ; recent tx for viral encephalitis.  - keppra level 55.6  - for brain  MRI with anesthesia today  - may need LP and autoimmune encephalitis work up depending on findings  - continuous EEG pending  -  c/w Keppra 1500mg BID  - continue vimpat 150 mg BID   - ESR 81  - CRP elevated  - B12,folate wnl  - Anti MUSK, anti achR ab pending  - neuro checks  - c/w Ativan 1mg IVP  q6 prn focal seizure   - Will transfer to Neurology service today

## 2019-11-19 NOTE — PROGRESS NOTE ADULT - ASSESSMENT
Impression: Focal myoclonic seizure in setting of UTI and electrolyte imbalances. 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      Plan:   [] Repeat MRI brain w/ and w/o under anesthesia    [] Continous EEG if possible, obtain routine otherwise in mean time  [] Continue Keppra 1.5g BID, Vimpat 150mg BID   [] Will consider LP depending on EEG and imaging findings  [x] ESR, CRP elevated 5.62, B12 wnl, TSH wnl, folate wnl    [] Anti MUSK, Anti achR ab pending   [x] transfer to neurology martinez service

## 2019-11-19 NOTE — PROGRESS NOTE ADULT - SUBJECTIVE AND OBJECTIVE BOX
Patient is a 84y old  Male who presents with a chief complaint of Confusion, Seizure (18 Nov 2019 11:44)      SUBJECTIVE / OVERNIGHT EVENTS:    MEDICATIONS  (STANDING):  enoxaparin Injectable 40 milliGRAM(s) SubCutaneous daily  influenza   Vaccine 0.5 milliLiter(s) IntraMuscular once  labetalol 100 milliGRAM(s) Oral every 8 hours  lacosamide 150 milliGRAM(s) Oral two times a day  levETIRAcetam  Solution 1500 milliGRAM(s) Oral two times a day  magnesium oxide 400 milliGRAM(s) Oral two times a day with meals  polyethylene glycol 3350 17 Gram(s) Oral two times a day  senna Syrup 10 milliLiter(s) Oral at bedtime  sodium chloride 0.9%. 1000 milliLiter(s) (75 mL/Hr) IV Continuous <Continuous>    MEDICATIONS  (PRN):  acetaminophen    Suspension .. 650 milliGRAM(s) Oral every 6 hours PRN Moderate Pain (4 - 6), Severe Pain (7 - 10)  LORazepam   Injectable 1 milliGRAM(s) IV Push every 6 hours PRN focal seizures      Vital Signs Last 24 Hrs  T(C): 37.1 (19 Nov 2019 05:48), Max: 37.1 (18 Nov 2019 20:11)  T(F): 98.8 (19 Nov 2019 05:48), Max: 98.8 (18 Nov 2019 20:11)  HR: 97 (19 Nov 2019 05:48) (77 - 97)  BP: 102/65 (19 Nov 2019 05:48) (102/65 - 107/68)  BP(mean): --  RR: 18 (19 Nov 2019 05:48) (18 - 18)  SpO2: 94% (19 Nov 2019 05:48) (94% - 97%)  CAPILLARY BLOOD GLUCOSE        I&O's Summary    18 Nov 2019 07:01  -  19 Nov 2019 07:00  --------------------------------------------------------  IN: 1610 mL / OUT: 0 mL / NET: 1610 mL        PHYSICAL EXAM:  GENERAL: NAD, well-developed  HEAD:  Atraumatic, Normocephalic  EYES: EOMI, PERRLA, conjunctiva and sclera clear  NECK: Supple, No JVD  CHEST/LUNG: Clear to auscultation bilaterally; No wheeze  HEART: Regular rate and rhythm; No murmurs, rubs, or gallops  ABDOMEN: Soft, Nontender, Nondistended; Bowel sounds present  EXTREMITIES:  2+ Peripheral Pulses, No clubbing, cyanosis, or edema  PSYCH: AAOx3  NEUROLOGY: non-focal  SKIN: No rashes or lesions    LABS:                        12.7   9.27  )-----------( 430      ( 19 Nov 2019 09:01 )             38.6     11-19    134<L>  |  90<L>  |  16  ----------------------------<  151<H>  3.2<L>   |  27  |  0.61    Ca    9.2      19 Nov 2019 07:05  Phos  3.0     11-19  Mg     1.3     11-19                RADIOLOGY & ADDITIONAL TESTS:    Imaging Personally Reviewed:    Consultant(s) Notes Reviewed:      Care Discussed with Consultants/Other Providers: Patient is a 84y old  Male who presents with a chief complaint of Confusion, Seizure (18 Nov 2019 11:44)      SUBJECTIVE / OVERNIGHT EVENTS:  Pt seen and examined with daughter at bedside. No acute events overnight. Pt denies any acute c/o. Dysarthria appears to be worse today per daughter at bedside. No overt seizure activity noted.    MEDICATIONS  (STANDING):  enoxaparin Injectable 40 milliGRAM(s) SubCutaneous daily  influenza   Vaccine 0.5 milliLiter(s) IntraMuscular once  labetalol 100 milliGRAM(s) Oral every 8 hours  lacosamide 150 milliGRAM(s) Oral two times a day  levETIRAcetam  Solution 1500 milliGRAM(s) Oral two times a day  magnesium oxide 400 milliGRAM(s) Oral two times a day with meals  polyethylene glycol 3350 17 Gram(s) Oral two times a day  senna Syrup 10 milliLiter(s) Oral at bedtime  sodium chloride 0.9%. 1000 milliLiter(s) (75 mL/Hr) IV Continuous <Continuous>    MEDICATIONS  (PRN):  acetaminophen    Suspension .. 650 milliGRAM(s) Oral every 6 hours PRN Moderate Pain (4 - 6), Severe Pain (7 - 10)  LORazepam   Injectable 1 milliGRAM(s) IV Push every 6 hours PRN focal seizures      Vital Signs Last 24 Hrs  T(C): 37.1 (19 Nov 2019 05:48), Max: 37.1 (18 Nov 2019 20:11)  T(F): 98.8 (19 Nov 2019 05:48), Max: 98.8 (18 Nov 2019 20:11)  HR: 97 (19 Nov 2019 05:48) (77 - 97)  BP: 102/65 (19 Nov 2019 05:48) (102/65 - 107/68)  BP(mean): --  RR: 18 (19 Nov 2019 05:48) (18 - 18)  SpO2: 94% (19 Nov 2019 05:48) (94% - 97%)  CAPILLARY BLOOD GLUCOSE        I&O's Summary    18 Nov 2019 07:01  -  19 Nov 2019 07:00  --------------------------------------------------------  IN: 1610 mL / OUT: 0 mL / NET: 1610 mL        PHYSICAL EXAM:   General: NAD, up in bed  	Head: Normocephalic, Atraumatic  	Eyes: PERRLA, EOMI, normal sclera  	Throat: Moist mucous membranes  	Lungs: CTAB, normal respiratory effort, no wheezes, crackles, rales  	Heart: RRR, S1/S2, no murmurs, rubs or gallops no sig LE edema  	Abdomen: Soft, bowel sounds present, tender to palpation suprapubic lower abd, ND, PEG tube in place  	Extremities: No edema, 2+ DP pulses    Neurological: dysarthria+, left sided facial droop, AAO x 2- 3,Full strength on Right. Trace movement on the left.   	Skin: No rashes no petechiae    Psych : calm, cooperative, flat affect    LABS:                        12.7   9.27  )-----------( 430      ( 19 Nov 2019 09:01 )             38.6     11-19    134<L>  |  90<L>  |  16  ----------------------------<  151<H>  3.2<L>   |  27  |  0.61    Ca    9.2      19 Nov 2019 07:05  Phos  3.0     11-19  Mg     1.3     11-19                  Care Discussed with Consultants/Other Providers: Neurology ( Dr Carmichael) ; will transfer pt to Neurology service today

## 2019-11-19 NOTE — PROGRESS NOTE ADULT - PROBLEM SELECTOR PLAN 3
- Patient was reportedly confused and lethargic at presentation  - intially returned back to baseline mental status by daughter.  - dtr now reports that mental status is waxing /waning  - etiology was likely 2/2 seizures vs electrolyte abnormalities vs UTI  - monitor and correct electrolyte abnormalities

## 2019-11-19 NOTE — PROGRESS NOTE ADULT - SUBJECTIVE AND OBJECTIVE BOX
SUBJECTIVE:     Interval:   no overnight events    OBJECTIVE:    Vital Signs Last 24 Hrs  T(C): 36.9 (19 Nov 2019 17:07), Max: 37.2 (19 Nov 2019 13:22)  T(F): 98.4 (19 Nov 2019 17:07), Max: 99 (19 Nov 2019 13:22)  HR: 81 (19 Nov 2019 17:07) (81 - 97)  BP: 114/64 (19 Nov 2019 17:07) (102/65 - 114/64)  BP(mean): --  RR: 18 (19 Nov 2019 17:07) (18 - 18)  SpO2: 94% (19 Nov 2019 17:07) (94% - 95%)    Exam:   - Mental Status: Oriented to self, thinks its [facility], [facility]. Knows [place]. Fluent, following all commands. L. hemineglect.   - Cranial Nerves II-XII:  PERRL. EOMI. Mild L. facial droop w/ facial twitching. T/u midline. eye opening apraxia worse today  - Motor:  Full strength on Right. Trace movement on the left.   - Sensory: Grimaces and localizes to pain bilaterally     Labs  11-18 Na 132<L>  11-18 K 3.3<L>  11-18 P --  11-18 Mg --  11-18 Ca 9.1  11-18 Cr 0.62    Imaging:  [x] 10/11 MR Brain - Increased signal in parietal and occipital lobes, likely sequelae of recent seizure activity   [x] 11/17 MRI Brain - Limited given motion artifact   [] Previous EEGs - LPDs in R. posterior quadrant (later resolved), moderate to severe generalized slowing SUBJECTIVE:     Interval:   no overnight events    OBJECTIVE:    Vital Signs Last 24 Hrs  T(C): 36.9 (19 Nov 2019 17:07), Max: 37.2 (19 Nov 2019 13:22)  T(F): 98.4 (19 Nov 2019 17:07), Max: 99 (19 Nov 2019 13:22)  HR: 81 (19 Nov 2019 17:07) (81 - 97)  BP: 114/64 (19 Nov 2019 17:07) (102/65 - 114/64)  BP(mean): --  RR: 18 (19 Nov 2019 17:07) (18 - 18)  SpO2: 94% (19 Nov 2019 17:07) (94% - 95%)    Exam:   - Mental Status: Oriented to self, thinks its [facility], [facility]. Knows [place]. Fluent, following all commands. L. hemineglect.   - Cranial Nerves II-XII:  PERRL. EOMI. Mild L. facial droop . T/u midline. eye opening apraxia worse today  - Motor:  Full strength on Right. Trace movement on the left.   - Sensory: Grimaces and localizes to pain bilaterally     Labs  11-18 Na 132<L>  11-18 K 3.3<L>  11-18 P --  11-18 Mg --  11-18 Ca 9.1  11-18 Cr 0.62    Imaging:  [x] 10/11 MR Brain - Increased signal in parietal and occipital lobes, likely sequelae of recent seizure activity   [x] 11/17 MRI Brain - Limited given motion artifact   [] Previous EEGs - LPDs in R. posterior quadrant (later resolved), moderate to severe generalized slowing

## 2019-11-19 NOTE — PROGRESS NOTE ADULT - ASSESSMENT
84 year old male with history of hypertension, prostate cancer s/p radiation and seed implant presents with recent admission for seizure 2/2 suspected viral encephalitis, [facility] setting of new-onset seizures. Admitted for further work up.

## 2019-11-19 NOTE — PROGRESS NOTE ADULT - PROBLEM SELECTOR PLAN 5
Hgb was 14.3 in October 2019.   - Hgb stable at 12.6  -no hematochezia or melena  - trend CBC daily  - transfuse for Hgb <7

## 2019-11-20 LAB
ALBUMIN SERPL ELPH-MCNC: 3.6 G/DL — SIGNIFICANT CHANGE UP (ref 3.3–5)
ALP SERPL-CCNC: 191 U/L — HIGH (ref 40–120)
ALT FLD-CCNC: 32 U/L — SIGNIFICANT CHANGE UP (ref 10–45)
ANION GAP SERPL CALC-SCNC: 17 MMOL/L — SIGNIFICANT CHANGE UP (ref 5–17)
ANION GAP SERPL CALC-SCNC: 19 MMOL/L — HIGH (ref 5–17)
APPEARANCE CSF: CLEAR — SIGNIFICANT CHANGE UP
APPEARANCE SPUN FLD: COLORLESS — SIGNIFICANT CHANGE UP
AST SERPL-CCNC: 31 U/L — SIGNIFICANT CHANGE UP (ref 10–40)
BILIRUB SERPL-MCNC: 0.7 MG/DL — SIGNIFICANT CHANGE UP (ref 0.2–1.2)
BUN SERPL-MCNC: 16 MG/DL — SIGNIFICANT CHANGE UP (ref 7–23)
BUN SERPL-MCNC: 18 MG/DL — SIGNIFICANT CHANGE UP (ref 7–23)
CALCIUM SERPL-MCNC: 10 MG/DL — SIGNIFICANT CHANGE UP (ref 8.4–10.5)
CALCIUM SERPL-MCNC: 9.5 MG/DL — SIGNIFICANT CHANGE UP (ref 8.4–10.5)
CHLORIDE SERPL-SCNC: 87 MMOL/L — LOW (ref 96–108)
CHLORIDE SERPL-SCNC: 90 MMOL/L — LOW (ref 96–108)
CO2 SERPL-SCNC: 26 MMOL/L — SIGNIFICANT CHANGE UP (ref 22–31)
CO2 SERPL-SCNC: 28 MMOL/L — SIGNIFICANT CHANGE UP (ref 22–31)
COLOR CSF: SIGNIFICANT CHANGE UP
CREAT SERPL-MCNC: 0.64 MG/DL — SIGNIFICANT CHANGE UP (ref 0.5–1.3)
CREAT SERPL-MCNC: 0.68 MG/DL — SIGNIFICANT CHANGE UP (ref 0.5–1.3)
CSF PCR RESULT: SIGNIFICANT CHANGE UP
GLUCOSE CSF-MCNC: 98 MG/DL — HIGH (ref 40–70)
GLUCOSE SERPL-MCNC: 147 MG/DL — HIGH (ref 70–99)
GLUCOSE SERPL-MCNC: 194 MG/DL — HIGH (ref 70–99)
GRAM STN FLD: SIGNIFICANT CHANGE UP
HCT VFR BLD CALC: 42.6 % — SIGNIFICANT CHANGE UP (ref 39–50)
HGB BLD-MCNC: 14 G/DL — SIGNIFICANT CHANGE UP (ref 13–17)
LABORATORY COMMENT REPORT: SIGNIFICANT CHANGE UP
LDH CSF L TO P-CCNC: 26 U/L — SIGNIFICANT CHANGE UP
LDH FLD-CCNC: 26 U/L — SIGNIFICANT CHANGE UP
LYMPHOCYTES # CSF: 90 % — HIGH (ref 40–80)
MCHC RBC-ENTMCNC: 30.2 PG — SIGNIFICANT CHANGE UP (ref 27–34)
MCHC RBC-ENTMCNC: 32.9 GM/DL — SIGNIFICANT CHANGE UP (ref 32–36)
MCV RBC AUTO: 92 FL — SIGNIFICANT CHANGE UP (ref 80–100)
MONOS+MACROS NFR CSF: 10 % — LOW (ref 15–45)
NEUTROPHILS # CSF: 0 % — SIGNIFICANT CHANGE UP (ref 0–6)
NRBC NFR CSF: 1 /UL — SIGNIFICANT CHANGE UP (ref 0–5)
PLATELET # BLD AUTO: 520 K/UL — HIGH (ref 150–400)
POTASSIUM SERPL-MCNC: 3.1 MMOL/L — LOW (ref 3.5–5.3)
POTASSIUM SERPL-MCNC: 3.2 MMOL/L — LOW (ref 3.5–5.3)
POTASSIUM SERPL-SCNC: 3.1 MMOL/L — LOW (ref 3.5–5.3)
POTASSIUM SERPL-SCNC: 3.2 MMOL/L — LOW (ref 3.5–5.3)
PROT CSF-MCNC: 42 MG/DL — SIGNIFICANT CHANGE UP (ref 15–45)
PROT SERPL-MCNC: 8.6 G/DL — HIGH (ref 6–8.3)
RBC # BLD: 4.63 M/UL — SIGNIFICANT CHANGE UP (ref 4.2–5.8)
RBC # CSF: 0 /UL — SIGNIFICANT CHANGE UP (ref 0–0)
RBC # FLD: 14.6 % — HIGH (ref 10.3–14.5)
SODIUM SERPL-SCNC: 133 MMOL/L — LOW (ref 135–145)
SODIUM SERPL-SCNC: 134 MMOL/L — LOW (ref 135–145)
SOURCE HSV 1/2: SIGNIFICANT CHANGE UP
SPECIMEN SOURCE: SIGNIFICANT CHANGE UP
TUBE TYPE: SIGNIFICANT CHANGE UP
WBC # BLD: 10.11 K/UL — SIGNIFICANT CHANGE UP (ref 3.8–10.5)
WBC # FLD AUTO: 10.11 K/UL — SIGNIFICANT CHANGE UP (ref 3.8–10.5)

## 2019-11-20 PROCEDURE — 88188 FLOWCYTOMETRY/READ 9-15: CPT

## 2019-11-20 PROCEDURE — 99232 SBSQ HOSP IP/OBS MODERATE 35: CPT | Mod: GC

## 2019-11-20 PROCEDURE — 88108 CYTOPATH CONCENTRATE TECH: CPT | Mod: 26,59

## 2019-11-20 RX ORDER — LACOSAMIDE 50 MG/1
200 TABLET ORAL
Refills: 0 | Status: DISCONTINUED | OUTPATIENT
Start: 2019-11-20 | End: 2019-12-05

## 2019-11-20 RX ORDER — MAGNESIUM SULFATE 500 MG/ML
1 VIAL (ML) INJECTION ONCE
Refills: 0 | Status: COMPLETED | OUTPATIENT
Start: 2019-11-20 | End: 2019-11-20

## 2019-11-20 RX ORDER — POTASSIUM CHLORIDE 20 MEQ
10 PACKET (EA) ORAL
Refills: 0 | Status: COMPLETED | OUTPATIENT
Start: 2019-11-20 | End: 2019-11-20

## 2019-11-20 RX ORDER — POTASSIUM CHLORIDE 20 MEQ
10 PACKET (EA) ORAL
Refills: 0 | Status: DISCONTINUED | OUTPATIENT
Start: 2019-11-20 | End: 2019-11-20

## 2019-11-20 RX ORDER — POTASSIUM CHLORIDE 20 MEQ
20 PACKET (EA) ORAL
Refills: 0 | Status: COMPLETED | OUTPATIENT
Start: 2019-11-20 | End: 2019-11-20

## 2019-11-20 RX ADMIN — LEVETIRACETAM 1500 MILLIGRAM(S): 250 TABLET, FILM COATED ORAL at 05:01

## 2019-11-20 RX ADMIN — Medication 100 MILLIGRAM(S): at 22:26

## 2019-11-20 RX ADMIN — LACOSAMIDE 150 MILLIGRAM(S): 50 TABLET ORAL at 05:17

## 2019-11-20 RX ADMIN — Medication 650 MILLIGRAM(S): at 16:30

## 2019-11-20 RX ADMIN — POLYETHYLENE GLYCOL 3350 17 GRAM(S): 17 POWDER, FOR SOLUTION ORAL at 17:23

## 2019-11-20 RX ADMIN — Medication 100 MILLIGRAM(S): at 05:01

## 2019-11-20 RX ADMIN — Medication 100 MILLIEQUIVALENT(S): at 17:22

## 2019-11-20 RX ADMIN — Medication 100 MILLIGRAM(S): at 13:19

## 2019-11-20 RX ADMIN — Medication 20 MILLIEQUIVALENT(S): at 09:08

## 2019-11-20 RX ADMIN — Medication 650 MILLIGRAM(S): at 15:46

## 2019-11-20 RX ADMIN — MAGNESIUM OXIDE 400 MG ORAL TABLET 400 MILLIGRAM(S): 241.3 TABLET ORAL at 17:22

## 2019-11-20 RX ADMIN — LEVETIRACETAM 1500 MILLIGRAM(S): 250 TABLET, FILM COATED ORAL at 17:22

## 2019-11-20 RX ADMIN — Medication 100 MILLIEQUIVALENT(S): at 15:46

## 2019-11-20 RX ADMIN — Medication 20 MILLIEQUIVALENT(S): at 15:46

## 2019-11-20 RX ADMIN — Medication 20 MILLIEQUIVALENT(S): at 12:40

## 2019-11-20 RX ADMIN — MAGNESIUM OXIDE 400 MG ORAL TABLET 400 MILLIGRAM(S): 241.3 TABLET ORAL at 09:08

## 2019-11-20 RX ADMIN — POLYETHYLENE GLYCOL 3350 17 GRAM(S): 17 POWDER, FOR SOLUTION ORAL at 05:01

## 2019-11-20 RX ADMIN — LACOSAMIDE 200 MILLIGRAM(S): 50 TABLET ORAL at 17:22

## 2019-11-20 RX ADMIN — Medication 100 MILLIEQUIVALENT(S): at 18:20

## 2019-11-20 RX ADMIN — SENNA PLUS 10 MILLILITER(S): 8.6 TABLET ORAL at 05:02

## 2019-11-20 RX ADMIN — Medication 100 GRAM(S): at 12:40

## 2019-11-20 NOTE — PROCEDURE NOTE - NSINFORMCONSENT_GEN_A_CORE
Benefits, risks, and possible complications of procedure explained to patient/caregiver who verbalized understanding and gave verbal consent./Daughter and Niece at bedside

## 2019-11-20 NOTE — PROGRESS NOTE ADULT - ASSESSMENT
Impression: Focal myoclonic seizure in setting of UTI and electrolyte imbalances. 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      Plan:   [] Repeat MRI brain w/ and w/o under anesthesia    [] Continous EEG if possible, obtain routine otherwise in mean time  [] Continue Keppra 1.5g BID, Vimpat 150mg BID   [] Will consider LP depending on EEG and imaging findings  [x] ESR, CRP elevated 5.62, B12 wnl, TSH wnl, folate wnl    [] Anti MUSK, Anti achR ab pending   [x] transfer to neurology martinez service Impression: Focal myoclonic seizure in setting of UTI and electrolyte imbalances. 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 At this time, however, the enhancement has shown improvement upon repeat Neuroimaging. Patient to require further investigation for ?seizures.     Plan:   [x] Repeat MRI brain w/ and w/o under anesthesia - done   [] Continous EEG if possible, obtain routine otherwise in mean time  [] Continue Keppra 1.5g BID, Vimpat 150mg BID --> (11/20) 200mg bid  [] Family deferring LP at this time, to be revisited  [x] ESR, CRP elevated 5.62, B12 wnl, TSH wnl, folate wnl    [] Anti MUSK, Anti achR ab pending     management discussed with Dr. Carmichael Impression: Focal myoclonic seizure in setting of UTI and electrolyte imbalances. 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 At this time, however, the enhancement has shown improvement upon repeat Neuroimaging. Patient to require further investigation for ?seizures.     Plan:   [x] Repeat MRI brain w/ and w/o under anesthesia - done   [] Continous EEG if possible, obtain routine otherwise in mean time  [] Continue Keppra 1.5g BID, Vimpat 150mg BID --> (11/20) 200mg bid  [] LP done  [x] ESR, CRP elevated 5.62, B12 wnl, TSH wnl, folate wnl    [] Anti MUSK, Anti achR ab pending     management discussed with Dr. Carmichael Impression: Focal myoclonic seizure in setting of UTI and electrolyte imbalances. 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 At this time, however, the enhancement has shown improvement upon repeat Neuroimaging. Patient to require further investigation for ?seizures.     Plan:   [x] Repeat MRI brain w/ and w/o under anesthesia - done   [] Continous EEG if possible, obtain routine otherwise in mean time  [] Continue Keppra 1.5g BID, Vimpat 150mg BID --> (11/20) 200mg bid  [] LP performed today, will follow up results  [x] ESR, CRP elevated 5.62, B12 wnl, TSH wnl, folate wnl    [] Anti MUSK, Anti achR ab pending     management discussed with Dr. Carmichael

## 2019-11-20 NOTE — PROGRESS NOTE ADULT - SUBJECTIVE AND OBJECTIVE BOX
SUBJECTIVE: patient seen and examined at bedside.    Interval:   no overnight events    OBJECTIVE:    Vital Signs Last 24 Hrs  T(C): 36.9 (19 Nov 2019 17:07), Max: 37.2 (19 Nov 2019 13:22)  T(F): 98.4 (19 Nov 2019 17:07), Max: 99 (19 Nov 2019 13:22)  HR: 81 (19 Nov 2019 17:07) (81 - 97)  BP: 114/64 (19 Nov 2019 17:07) (102/65 - 114/64)  BP(mean): --  RR: 18 (19 Nov 2019 17:07) (18 - 18)  SpO2: 94% (19 Nov 2019 17:07) (94% - 95%)    Exam:   - Mental Status: Oriented to self, thinks its [facility], 2008. Knows [place]. Fluent, following all commands. L. hemineglect.   - Cranial Nerves II-XII:  PERRL. EOMI. Mild L. facial droop . T/u midline. eye opening apraxia worse today  - Motor:  Full strength on Right. Trace movement on the left.   - Sensory: Grimaces and localizes to pain bilaterally     Labs  11-18 Na 132<L>  11-18 K 3.3<L>  11-18 P --  11-18 Mg --  11-18 Ca 9.1  11-18 Cr 0.62    Imaging:  [x] 10/11 MR Brain - Increased signal in parietal and occipital lobes, likely sequelae of recent seizure activity   [x] 11/17 MRI Brain - Limited given motion artifact   [] Previous EEGs - LPDs in R. posterior quadrant (later resolved), moderate to severe generalized slowing SUBJECTIVE: patient seen and examined at bedside. patient awake but difficult to understand. family at bedside.     Interval:   no overnight events    OBJECTIVE:    Vital Signs Last 24 Hrs  T(C): 36.8 (20 Nov 2019 08:40), Max: 36.9 (19 Nov 2019 17:07)  T(F): 98.3 (20 Nov 2019 08:40), Max: 98.4 (19 Nov 2019 17:07)  HR: 108 (20 Nov 2019 13:00) (74 - 108)  BP: 102/68 (20 Nov 2019 13:00) (102/68 - 133/75)  BP(mean): --  RR: 20 (20 Nov 2019 13:00) (18 - 20)  SpO2: 95% (20 Nov 2019 13:00) (94% - 96%)    Exam:   Limited Neurological examination at this time due to family deferring examination  Mental Status: awake, patient's voice combination of hypophonia and ?non-fluent, patient intermittently followed simple commands  Motor: right arm antigravity, left side plegic      Labs  11-18 Na 132<L>  11-18 K 3.3<L>  11-18 P --  11-18 Mg --  11-18 Ca 9.1  11-18 Cr 0.62    Imaging:  [x] 10/11 MR Brain - Increased signal in parietal and occipital lobes, likely sequelae of recent seizure activity   [x] 11/17 MRI Brain - Limited given motion artifact   [] Previous EEGs - LPDs in R. posterior quadrant (later resolved), moderate to severe generalized slowing   < from: MR Head w/wo IV Cont (11.19.19 @ 15:39) >  IMPRESSION:    No acute intracranial hemorrhage, mass effect, vasogenic edema, or   evidence of acute territorial infarct.    No abnormal parenchymal or leptomeningeal enhancement.    No evidence for mesial temporal sclerosis, gray matter heterotopia, or   cortical dysplasia.    Similar mild ventricular dilatation which may be on the basis of   age-appropriate atrophy.    < end of copied text >

## 2019-11-21 LAB
ALBUMIN CSF-MCNC: 26.1 MG/DL — HIGH (ref 14–25)
ALBUMIN SERPL ELPH-MCNC: 3.5 G/DL — SIGNIFICANT CHANGE UP (ref 3.3–5)
ALP SERPL-CCNC: 228 U/L — HIGH (ref 40–120)
ALT FLD-CCNC: 47 U/L — HIGH (ref 10–45)
ANION GAP SERPL CALC-SCNC: 16 MMOL/L — SIGNIFICANT CHANGE UP (ref 5–17)
ANION GAP SERPL CALC-SCNC: 18 MMOL/L — HIGH (ref 5–17)
AST SERPL-CCNC: 47 U/L — HIGH (ref 10–40)
BILIRUB SERPL-MCNC: 0.5 MG/DL — SIGNIFICANT CHANGE UP (ref 0.2–1.2)
BUN SERPL-MCNC: 22 MG/DL — SIGNIFICANT CHANGE UP (ref 7–23)
BUN SERPL-MCNC: 24 MG/DL — HIGH (ref 7–23)
CALCIUM SERPL-MCNC: 9.6 MG/DL — SIGNIFICANT CHANGE UP (ref 8.4–10.5)
CALCIUM SERPL-MCNC: 9.7 MG/DL — SIGNIFICANT CHANGE UP (ref 8.4–10.5)
CHLORIDE SERPL-SCNC: 91 MMOL/L — LOW (ref 96–108)
CHLORIDE SERPL-SCNC: 93 MMOL/L — LOW (ref 96–108)
CHLORIDE UR-SCNC: 100 MMOL/L — SIGNIFICANT CHANGE UP
CO2 SERPL-SCNC: 27 MMOL/L — SIGNIFICANT CHANGE UP (ref 22–31)
CO2 SERPL-SCNC: 27 MMOL/L — SIGNIFICANT CHANGE UP (ref 22–31)
CREAT ?TM UR-MCNC: 46 MG/DL — SIGNIFICANT CHANGE UP
CREAT SERPL-MCNC: 0.63 MG/DL — SIGNIFICANT CHANGE UP (ref 0.5–1.3)
CREAT SERPL-MCNC: 0.67 MG/DL — SIGNIFICANT CHANGE UP (ref 0.5–1.3)
GLUCOSE SERPL-MCNC: 211 MG/DL — HIGH (ref 70–99)
GLUCOSE SERPL-MCNC: 215 MG/DL — HIGH (ref 70–99)
HCT VFR BLD CALC: 39.5 % — SIGNIFICANT CHANGE UP (ref 39–50)
HGB BLD-MCNC: 13.3 G/DL — SIGNIFICANT CHANGE UP (ref 13–17)
IGG CSF-MCNC: 10.2 MG/DL — HIGH
IGG SYNTH RATE SER+CSF CALC-MRATE: 9.2 MG/DAY — HIGH
IGG/ALB CSF: 0.39 RATIO — HIGH
MAGNESIUM SERPL-MCNC: 1.7 MG/DL — SIGNIFICANT CHANGE UP (ref 1.6–2.6)
MCHC RBC-ENTMCNC: 30.2 PG — SIGNIFICANT CHANGE UP (ref 27–34)
MCHC RBC-ENTMCNC: 33.7 GM/DL — SIGNIFICANT CHANGE UP (ref 32–36)
MCV RBC AUTO: 89.8 FL — SIGNIFICANT CHANGE UP (ref 80–100)
OSMOLALITY SERPL: 297 MOSMOL/KG — SIGNIFICANT CHANGE UP (ref 280–301)
PLATELET # BLD AUTO: 571 K/UL — HIGH (ref 150–400)
POTASSIUM SERPL-MCNC: 2.8 MMOL/L — CRITICAL LOW (ref 3.5–5.3)
POTASSIUM SERPL-MCNC: 3.3 MMOL/L — LOW (ref 3.5–5.3)
POTASSIUM SERPL-SCNC: 2.8 MMOL/L — CRITICAL LOW (ref 3.5–5.3)
POTASSIUM SERPL-SCNC: 3.3 MMOL/L — LOW (ref 3.5–5.3)
POTASSIUM UR-SCNC: >95 MMOL/L — SIGNIFICANT CHANGE UP
PROT SERPL-MCNC: 8.4 G/DL — HIGH (ref 6–8.3)
RAPID RVP RESULT: SIGNIFICANT CHANGE UP
RBC # BLD: 4.4 M/UL — SIGNIFICANT CHANGE UP (ref 4.2–5.8)
RBC # FLD: 14.5 % — SIGNIFICANT CHANGE UP (ref 10.3–14.5)
SODIUM SERPL-SCNC: 136 MMOL/L — SIGNIFICANT CHANGE UP (ref 135–145)
SODIUM SERPL-SCNC: 136 MMOL/L — SIGNIFICANT CHANGE UP (ref 135–145)
SODIUM UR-SCNC: <35 MMOL/L — SIGNIFICANT CHANGE UP
THYROGLOB AB FLD IA-ACNC: <20 IU/ML — SIGNIFICANT CHANGE UP
THYROGLOB AB SERPL-ACNC: <20 IU/ML — SIGNIFICANT CHANGE UP
THYROPEROXIDASE AB SERPL-ACNC: 14.1 IU/ML — SIGNIFICANT CHANGE UP
TM INTERPRETATION: SIGNIFICANT CHANGE UP
WBC # BLD: 13.25 K/UL — HIGH (ref 3.8–10.5)
WBC # FLD AUTO: 13.25 K/UL — HIGH (ref 3.8–10.5)

## 2019-11-21 PROCEDURE — 71260 CT THORAX DX C+: CPT | Mod: 26

## 2019-11-21 PROCEDURE — 71045 X-RAY EXAM CHEST 1 VIEW: CPT | Mod: 26

## 2019-11-21 PROCEDURE — 95816 EEG AWAKE AND DROWSY: CPT | Mod: 26

## 2019-11-21 PROCEDURE — 74177 CT ABD & PELVIS W/CONTRAST: CPT | Mod: 26

## 2019-11-21 PROCEDURE — 99232 SBSQ HOSP IP/OBS MODERATE 35: CPT | Mod: GC

## 2019-11-21 RX ORDER — AMILORIDE HCL 5 MG
10 TABLET ORAL
Refills: 0 | Status: DISCONTINUED | OUTPATIENT
Start: 2019-11-21 | End: 2019-12-05

## 2019-11-21 RX ORDER — POTASSIUM CHLORIDE 20 MEQ
40 PACKET (EA) ORAL ONCE
Refills: 0 | Status: COMPLETED | OUTPATIENT
Start: 2019-11-21 | End: 2019-11-21

## 2019-11-21 RX ORDER — POTASSIUM CHLORIDE 20 MEQ
40 PACKET (EA) ORAL DAILY
Refills: 0 | Status: DISCONTINUED | OUTPATIENT
Start: 2019-11-21 | End: 2019-11-21

## 2019-11-21 RX ORDER — POTASSIUM CHLORIDE 20 MEQ
20 PACKET (EA) ORAL
Refills: 0 | Status: DISCONTINUED | OUTPATIENT
Start: 2019-11-21 | End: 2019-11-21

## 2019-11-21 RX ORDER — FLUTICASONE PROPIONATE 50 MCG
1 SPRAY, SUSPENSION NASAL
Refills: 0 | Status: DISCONTINUED | OUTPATIENT
Start: 2019-11-21 | End: 2019-12-05

## 2019-11-21 RX ORDER — POTASSIUM CHLORIDE 20 MEQ
40 PACKET (EA) ORAL DAILY
Refills: 0 | Status: DISCONTINUED | OUTPATIENT
Start: 2019-11-21 | End: 2019-12-05

## 2019-11-21 RX ORDER — POTASSIUM CHLORIDE 20 MEQ
10 PACKET (EA) ORAL
Refills: 0 | Status: COMPLETED | OUTPATIENT
Start: 2019-11-21 | End: 2019-11-21

## 2019-11-21 RX ORDER — ENOXAPARIN SODIUM 100 MG/ML
40 INJECTION SUBCUTANEOUS DAILY
Refills: 0 | Status: DISCONTINUED | OUTPATIENT
Start: 2019-11-21 | End: 2019-12-05

## 2019-11-21 RX ADMIN — Medication 100 MILLIEQUIVALENT(S): at 10:16

## 2019-11-21 RX ADMIN — Medication 100 MILLIEQUIVALENT(S): at 11:27

## 2019-11-21 RX ADMIN — Medication 100 MILLIGRAM(S): at 14:51

## 2019-11-21 RX ADMIN — POLYETHYLENE GLYCOL 3350 17 GRAM(S): 17 POWDER, FOR SOLUTION ORAL at 17:29

## 2019-11-21 RX ADMIN — SENNA PLUS 10 MILLILITER(S): 8.6 TABLET ORAL at 22:53

## 2019-11-21 RX ADMIN — Medication 40 MILLIEQUIVALENT(S): at 19:27

## 2019-11-21 RX ADMIN — Medication 1 SPRAY(S): at 17:25

## 2019-11-21 RX ADMIN — Medication 100 MILLIGRAM(S): at 06:50

## 2019-11-21 RX ADMIN — ENOXAPARIN SODIUM 40 MILLIGRAM(S): 100 INJECTION SUBCUTANEOUS at 11:27

## 2019-11-21 RX ADMIN — Medication 10 MILLIGRAM(S): at 22:52

## 2019-11-21 RX ADMIN — POLYETHYLENE GLYCOL 3350 17 GRAM(S): 17 POWDER, FOR SOLUTION ORAL at 06:26

## 2019-11-21 RX ADMIN — LACOSAMIDE 200 MILLIGRAM(S): 50 TABLET ORAL at 17:29

## 2019-11-21 RX ADMIN — SENNA PLUS 10 MILLILITER(S): 8.6 TABLET ORAL at 02:25

## 2019-11-21 RX ADMIN — Medication 100 MILLIEQUIVALENT(S): at 08:55

## 2019-11-21 RX ADMIN — LEVETIRACETAM 1500 MILLIGRAM(S): 250 TABLET, FILM COATED ORAL at 06:26

## 2019-11-21 RX ADMIN — LACOSAMIDE 200 MILLIGRAM(S): 50 TABLET ORAL at 06:26

## 2019-11-21 RX ADMIN — LEVETIRACETAM 1500 MILLIGRAM(S): 250 TABLET, FILM COATED ORAL at 17:29

## 2019-11-21 RX ADMIN — MAGNESIUM OXIDE 400 MG ORAL TABLET 400 MILLIGRAM(S): 241.3 TABLET ORAL at 08:33

## 2019-11-21 RX ADMIN — Medication 100 MILLIGRAM(S): at 22:52

## 2019-11-21 RX ADMIN — Medication 40 MILLIEQUIVALENT(S): at 10:20

## 2019-11-21 RX ADMIN — MAGNESIUM OXIDE 400 MG ORAL TABLET 400 MILLIGRAM(S): 241.3 TABLET ORAL at 17:26

## 2019-11-21 NOTE — EEG REPORT - NS EEG TEXT BOX
[facility]   COMPREHENSIVE EPILEPSY CENTER   REPORT OF ROUTINE VIDEO EEG     [facility]: [address] [place] [address] [address], [place] [address], Ph#: [phone]  [facility]: [address], [place] [address], Ph#: [phone]  [facility]: [address] [place] [address], Ph#: [phone]    Patient Name: SHIKHA MASON  Age and : 84y ([birth year])  MRN #: [id number]  Location: [address]   Referring Physician: Stacey Carmichael    Study Date: 19    _____________________________________________________________  TECHNICAL INFORMATION    Placement and Labeling of Electrodes:  The EEG was performed utilizing 20 channels referential EEG connections (coronal over temporal over parasagittal montage) using all standard 10-20 electrode placements with EKG.  Recording was at a sampling rate of 256 samples per second per channel.  Time synchronized digital video recording was done simultaneously with EEG recording.  A low light infrared camera was used for low light recording.  Breezy and seizure detection algorithms were utilized.    _____________________________________________________________  HISTORY    Patient is a 84y old  Male who presents with a chief complaint of Confusion, Seizure (2019 07:36)      PERTINENT MEDICATION:  lacosamide 200 milliGRAM(s) Oral two times a day  levETIRAcetam  Solution 1500 milliGRAM(s) Oral two times a day    _____________________________________________________________  STUDY INTERPRETATION    Findings: The background was continuous, spontaneously variable and reactive. During wakefulness, the posterior dominant rhythm consisted of fragments of 6-7 Hz activity, with amplitude to 30 uV, that attenuated to eye opening.      Background Slowing:  Diffuse theta and polymorphic delta slowing.    Focal Slowing:   Continuous theta/delta slowing in the right temporal region.     Sleep Background:  Drowsiness was characterized by fragmentation, attenuation, and slowing of the background activity.    Sleep was characterized by the presence of symmetric, rudimentary sleep spindles and K-complexes.    Other Non-Epileptiform Findings:  None were present.    Interictal Epileptiform Activity:   - Abundant right temporal (shifting max F8/T8/P8) sharp wave discharges.  - Abundant right centroparietal (C4/P4) sharp wave discharges.  - Occasional right temporooccipital (P8/O2) sharp wave discharges.    Events:  Clinical events: None recorded.  Seizures: None recorded.    Activation Procedures:   Hyperventilation was not performed.    Photic stimulation was not performed.     Artifacts:  Intermittent myogenic and movement artifacts were noted.    ECG:  The heart rate on single channel ECG was predominantly between 100-120 BPM.    _____________________________________________________________  EEG SUMMARY/CLASSIFICATION  Abnormal EEG in the awake, drowsy and asleep states.  - Abundant right temporal (shifting max F8/T8/P8) sharp wave discharges.  - Abundant right centroparietal (C4/P4) sharp wave discharges.  - Occasional right temporooccipital (P8/O2) sharp wave discharges.  - Continuous theta/delta slowing in the right temporal region.   - Mild to moderate generalized slowing.    _____________________________________________________________  EEG IMPRESSION/CLINICAL CORRELATE    Abnormal EEG study.  1. Potential epileptogenic foci in the right temporal, right centroparietal and right temporooccipital regions.   2. Structural abnormality in the right temporal region.   3. Mild to moderate nonspecific diffuse or multifocal cerebral dysfunction.   4. No seizure seen.    _____________________________________________________________    Finn Salas MD  Attending Physician, [facility]

## 2019-11-21 NOTE — PHYSICAL THERAPY INITIAL EVALUATION ADULT - ADDITIONAL COMMENTS
At rehab, pt mostly in W/C and req assist for all ADLs. Prior to Sept/Oct seizure, pt independent with ADLs At rehab, pt mostly in W/C and req assist for all ADLs. Prior to Sept/Oct seizure, pt independent with ADLs and lived with wife in pvt home.

## 2019-11-21 NOTE — PHYSICAL THERAPY INITIAL EVALUATION ADULT - CRITERIA FOR SKILLED THERAPEUTIC INTERVENTIONS
impairments found/functional limitations in following categories/anticipated discharge recommendation/predicted duration of therapy intervention/therapy frequency/risk reduction/prevention/rehab potential

## 2019-11-21 NOTE — PHYSICAL THERAPY INITIAL EVALUATION ADULT - PRECAUTIONS/LIMITATIONS, REHAB EVAL
seizure precautions/He failed multiple speech and swallow evaluations and is now s/p peg placement. Patient is also with residual slurred speech and weakness of left upper extremity. Pt presents with left eye was droopy, left arm was twitching, he sounded congested, and he kept jerking his right arm. Of note, his sodium has been low during the week, so he appeared lethargic earlier in the week. Focal myoclonic seizure in setting of UTI and electrolyte imbalances.

## 2019-11-21 NOTE — CONSULT NOTE ADULT - SUBJECTIVE AND OBJECTIVE BOX
Reason for consult: Thrombocytosis    HPI:  84 year old male with history of hypertension, prostate cancer s/p radiation and [facility] in the setting of recent seizures.  He was admitted there after he experienced status epilepticus requiring intubation. Current etiology of seizures unknown but thought to be secondary to viral encephalitis. He failed multiple speech and swallow evaluations and is now s/p peg placement. Patient is also with residual slurred speech and weakness of left upper extremity.  In rehab facility he experienced further seizure activity leading to increased dose of KeppraPatient was discharged to [facility] where he has been for the last three weeks. Per daughter, patient was doing well. However, on Wednesday the patient experienced seizure activity. His keppra was increased from 1000 mg to 1500 mg. He experienced another seizure on Friday, and daughter thinks his vimpat may have been increased, but is uncertain. Due to abnormal neurologic findings he was admitted to [facility]. Since admission he has been undergoing extensive work up for etiology of his seizures including LP, MRI, electrolyte correction, etc  Of note found to have worsening thrombocytosis over past 3-4 days leading to hematology consultation          PAST MEDICAL & SURGICAL HISTORY:  Hypertension  Prostate cancer: had seed implant &amp; radiation ( 2001 )  PC (prostate cancer)  S/P cholecystectomy      FAMILY HISTORY:  No pertinent family history in first degree relatives      Alochol: Denied  Smoking: Nonsmoker  Drug Use: Denied  Marital Status:         Allergies    No Known Allergies    Intolerances        MEDICATIONS  (STANDING):  aMILoride 10 milliGRAM(s) Oral <User Schedule>  enoxaparin Injectable 40 milliGRAM(s) SubCutaneous daily  fluticasone propionate 50 MICROgram(s)/spray Nasal Spray 1 Spray(s) Both Nostrils two times a day  influenza   Vaccine 0.5 milliLiter(s) IntraMuscular once  labetalol 100 milliGRAM(s) Oral every 8 hours  lacosamide 200 milliGRAM(s) Oral two times a day  levETIRAcetam  Solution 1500 milliGRAM(s) Oral two times a day  magnesium oxide 400 milliGRAM(s) Oral two times a day with meals  polyethylene glycol 3350 17 Gram(s) Oral two times a day  potassium chloride   Solution 40 milliEquivalent(s) Enteral Tube daily  senna Syrup 10 milliLiter(s) Oral at bedtime  sodium chloride 0.9%. 1000 milliLiter(s) (75 mL/Hr) IV Continuous <Continuous>    MEDICATIONS  (PRN):  acetaminophen    Suspension .. 650 milliGRAM(s) Oral every 6 hours PRN Moderate Pain (4 - 6), Severe Pain (7 - 10)  LORazepam   Injectable 1 milliGRAM(s) IV Push every 6 hours PRN focal seizures      ROS  No fever, sweats, chills  No epistaxis, HA, sore throat  No CP, SOB, cough, sputum  No n/v/d, abd pain, melena, hematochezia  No edema  No rash  No anxiety  No back pain, joint pain  No bleeding, bruising  No dysuria, hematuria    T(C): 37.3 (11-21-19 @ 12:25), Max: 37.3 (11-21-19 @ 12:25)  HR: 58 (11-21-19 @ 12:25) (58 - 101)  BP: 153/79 (11-21-19 @ 12:25) (119/73 - 153/79)  RR: 18 (11-21-19 @ 12:25) (18 - 19)  SpO2: 98% (11-21-19 @ 12:25) (95% - 98%)  Wt(kg): --    PE  NAD  Awake, alert  Anicteric, MMM  RRR  CTAB  Abd soft, NT, ND  No c/c/e  No rash grossly  FROM                          13.3   13.25 )-----------( 571      ( 21 Nov 2019 08:58 )             39.5       11-21    136  |  93<L>  |  24<H>  ----------------------------<  215<H>  3.3<L>   |  27  |  0.63    Ca    9.6      21 Nov 2019 15:32  Mg     1.7     11-21    TPro  8.4<H>  /  Alb  3.5  /  TBili  0.5  /  DBili  x   /  AST  47<H>  /  ALT  47<H>  /  AlkPhos  228<H>  11-21

## 2019-11-21 NOTE — CHART NOTE - NSCHARTNOTEFT_GEN_A_CORE
Nutrition Follow Up Note  Patient seen for: Nutrition Follow-up    Source: Medical record, RD note, family at bedside, and RN.    Per RN pt tolerating tube feed well. Reports no vomiting. Last BM 4 days ago (11/18) per nursing flow sheets.     Enteral Nutrition: Vital AF @ goal rate of 60ml/hour to provide 1440ml, 1728kcal, 108g protein, and 1167g/ml water (22.5 kcal/kg, 104g protein/kg based on 76.7kg dosing weight). Meets 75-90% estimated calorie needs and 75% protein needs as shown below per initial nutrition evaluation (11/18).     Weights: (11/20) 180.1 pounds (11/16) 168.7 pounds ?accuracy of 11/20 bed weight    Pertinent Medications: Potassium chloride, Normodyne, Mag-Ox 400, Miralax, senna  Pertinent Labs: (11/21) Potassium 2.8 <L>, Glucose 211 <H>, AST/SGOT 47 <H>, ALT/SGPT <H> (11/19) Magnesium 1.3 <L> (09/25) HbA1c 5.7%    Skin per nursing documentation: IAD  Edema: 1+ left hand per flow sheets    Estimated Needs:   [x] no change since previous assessment: 25-30kcal/kg= 1912-2295kcal/day; 1.6-1.8g protein/kg= 122-138g protein/day  [ ] recalculated:     Previous Nutrition Diagnosis: Malnutrition Severe chronic.  Nutrition Diagnosis is: care plan in progress; Being addressed by monitoring and recommendation to increase tube feed    New Nutrition Diagnosis: N/A    Recommend  1) Recommend if medically feasible increase Vital AF to goal rate of 75ml/hour to provide 1800ml, 2160kcal, 135g protein, 1460g/ml water (28kcal/kg, 1.75g protein/kg, 19ml water/kg)- Spoke to provider. Will continue to monitor and adjust as needed.  2) Monitor and replete electrolytes as needed.     Monitoring and Evaluation:     Continue to monitor Nutritional intake, Tolerance to diet prescription, weights, labs, skin integrity, and further questions from family at bedside.    RD remains available upon request and will follow up per protocol.    Michelle Yuen, Dietetic Intern (Pager #[phone]) Nutrition Follow Up Note  Patient seen for: Nutrition Follow-up    Source: Medical record, RD note, family at bedside, and RN.    Per RN pt tolerating tube feed well. Reports no vomiting. Last BM today (11/21) per nursing flow sheets.     Enteral Nutrition: Vital AF @ goal rate of 60ml/hour to provide 1440ml, 1728kcal, 108g protein, and 1167ml water (22.5 kcal/kg, 104g protein/kg based on 76.7kg dosing weight). Meets 75-90% estimated calorie needs and 75% protein needs as shown below per initial nutrition evaluation (11/18).     Weights: (11/20) 180.1 pounds (11/16) 168.7 pounds ?accuracy of 11/20 bed weight    Pertinent Medications: Potassium chloride, Normodyne, Mag-Ox 400, Miralax, senna  Pertinent Labs: (11/21) Potassium 2.8 <L>, Glucose 211 <H>, AST/SGOT 47 <H>, ALT/SGPT <H> (11/19) Magnesium 1.3 <L> (09/25) HbA1c 5.7%    Skin per nursing documentation: IAD  Edema: 1+ left hand per flow sheets    Estimated Needs:   [x] no change since previous assessment: 25-30kcal/kg= 1912-2295kcal/day; 1.6-1.8g protein/kg= 122-138g protein/day  [ ] recalculated:     Previous Nutrition Diagnosis: Malnutrition Severe chronic.  Nutrition Diagnosis is: care plan in progress; Being addressed by monitoring and recommendation to increase tube feed    New Nutrition Diagnosis: N/A    Recommend  1) Recommend if medically feasible increase Vital AF to goal rate of 70ml/hour to provide 1680ml, 2016kcal, 126g protein, 1362g/ml water (26kcal/kg, 1.6g protein/kg)- Spoke to provider. Will continue to monitor and adjust as needed.  2) Monitor and replete electrolytes as needed.     Monitoring and Evaluation:     Continue to monitor Nutritional intake, Tolerance to diet prescription, weights, labs, skin integrity, and further questions from family at bedside.    RD remains available upon request and will follow up per protocol.    Michelle Yuen, Dietetic Intern (Pager #[phone]) Nutrition Follow Up Note  Patient seen for: Nutrition Follow-up    Source: Medical record, RD note, family at bedside, and RN.    Per RN pt tolerating tube feed well. Reports no vomiting. Last BM today (11/21) per nursing flow sheets.     Enteral Nutrition: Vital AF @ goal rate of 60ml/hour to provide 1440ml, 1728kcal, 108g protein, and 1167ml water (22.5 kcal/kg, 104g protein/kg based on 76.7kg dosing weight). Meets 75-90% estimated calorie needs and 75% protein needs as shown below per initial nutrition evaluation (11/18).     Weights: (11/20) 180.1 pounds (11/16) 168.7 pounds ?accuracy of 11/20 bed weight    Pertinent Medications: Potassium chloride, Normodyne, Mag-Ox 400, Miralax, senna  Pertinent Labs: (11/21) Potassium 2.8 <L>, Glucose 211 <H>, AST/SGOT 47 <H>, ALT/SGPT <H> (11/19) Magnesium 1.3 <L> (09/25) HbA1c 5.7%    Skin per nursing documentation: IAD  Edema: 1+ left hand per flow sheets    Estimated Needs:   [ ] no change since previous assessment  [x] recalculated: based on 76.7kg dosing weight (11/18) 20-25kcal/id=1973-1151tqgo/day; 1.4-1.6g protein= 107-138g protein/day    Previous Nutrition Diagnosis: Malnutrition Severe chronic.  Nutrition Diagnosis is: care plan in progress; Being addressed by monitoring and recommendation to increase tube feed    New Nutrition Diagnosis: N/A    Recommend  1) Recommend if medically feasible increase Vital AF to goal rate of 65ml/hour to provide 1560ml, 1872kcal, 117g protein, 1265g/ml water (24kcal/kg, 1.5g protein/kg based on 76.7kg dosing weight (11/18))- Spoke to provider. Will continue to monitor and adjust as needed.  2) Monitor and replete electrolytes as needed.     Monitoring and Evaluation:     Continue to monitor Nutritional intake, Tolerance to diet prescription, weights, labs, skin integrity, and further questions from family at bedside.    Family made aware RD remains available upon request and will follow up per protocol.    Michelle Yuen, Dietetic Intern (Pager #[phone]) Nutrition Follow Up Note  Patient seen for: Nutrition Follow-up    Source: Medical record, RD note, family at bedside, and RN.    Per RN pt tolerating tube feed well. Reports no vomiting. Last BM today (11/21) per nursing flow sheets.     Enteral Nutrition: Currently Vital AF @ goal rate of 60ml/hour to provide 1440ml, 1728kcal, 108g protein, and 1167ml water (22.5 kcal/kg, 104g protein/kg based on 76.7kg dosing weight).     Weights: (11/20) 180.1 pounds (11/16) 168.7 pounds ?accuracy of 11/20 bed weight    Pertinent Medications: Potassium chloride, Normodyne, Mag-Ox 400, Miralax, senna  Pertinent Labs: (11/21) Potassium 2.8 <L>, Glucose 211 <H>, AST/SGOT 47 <H>, ALT/SGPT <H> (11/19) Magnesium 1.3 <L> (09/25) HbA1c 5.7%    Skin per nursing documentation: IAD  Edema: 1+ left hand per flow sheets    Estimated Needs:   [ ] no change since previous assessment  [x] recalculated: based on 76.7kg dosing weight (11/18) 20-25kcal/wb=3904-0447qymr/day; 1.4-1.6g protein= 107-138g protein/day    Previous Nutrition Diagnosis: Malnutrition Severe chronic.  Nutrition Diagnosis is: care plan in progress; Being addressed by monitoring and recommendation to increase tube feed    New Nutrition Diagnosis: N/A    Recommend  1) Recommend if medically feasible increase Vital AF to goal rate of 65ml/hour to provide 1560ml, 1872kcal, 117g protein, 1265g/ml water (24kcal/kg, 1.5g protein/kg based on 76.7kg dosing weight (11/18))- Spoke to provider. Will continue to monitor and adjust as needed.  2) Monitor and replete electrolytes as needed.     Monitoring and Evaluation:     Continue to monitor Nutritional intake, Tolerance to diet prescription, weights, labs, skin integrity, and further questions from family at bedside.    Family made aware RD remains available upon request and will follow up per protocol.    Michelle Yuen, Dietetic Intern (Pager #[phone])

## 2019-11-21 NOTE — PROGRESS NOTE ADULT - SUBJECTIVE AND OBJECTIVE BOX
SUBJECTIVE: patient seen and examined at bedside. patient awake but difficult to understand. family at bedside.     Interval:   no overnight events    OBJECTIVE:    Vital Signs Last 24 Hrs  Vital Signs Last 24 Hrs  T(C): 36.8 (21 Nov 2019 04:28), Max: 36.9 (20 Nov 2019 19:46)  T(F): 98.3 (21 Nov 2019 04:28), Max: 98.4 (20 Nov 2019 19:46)  HR: 84 (21 Nov 2019 04:28) (84 - 108)  BP: 129/74 (21 Nov 2019 04:28) (102/68 - 133/75)  BP(mean): --  RR: 18 (21 Nov 2019 04:28) (18 - 20)  SpO2: 96% (21 Nov 2019 04:28) (95% - 96%)    MEDICATIONS  (STANDING):  enoxaparin Injectable 40 milliGRAM(s) SubCutaneous daily  influenza   Vaccine 0.5 milliLiter(s) IntraMuscular once  labetalol 100 milliGRAM(s) Oral every 8 hours  lacosamide 200 milliGRAM(s) Oral two times a day  levETIRAcetam  Solution 1500 milliGRAM(s) Oral two times a day  magnesium oxide 400 milliGRAM(s) Oral two times a day with meals  polyethylene glycol 3350 17 Gram(s) Oral two times a day  senna Syrup 10 milliLiter(s) Oral at bedtime  sodium chloride 0.9%. 1000 milliLiter(s) (75 mL/Hr) IV Continuous <Continuous>    MEDICATIONS  (PRN):  acetaminophen    Suspension .. 650 milliGRAM(s) Oral every 6 hours PRN Moderate Pain (4 - 6), Severe Pain (7 - 10)  LORazepam   Injectable 1 milliGRAM(s) IV Push every 6 hours PRN focal seizures      Exam:   Limited Neurological examination at this time due to family deferring examination  Mental Status: awake, patient's voice combination of hypophonia and ?non-fluent, patient intermittently followed simple commands  Motor: right arm antigravity, left side plegic      Labs  11-18 Na 132<L>  11-18 K 3.3<L>  11-18 P --  11-18 Mg --  11-18 Ca 9.1  11-18 Cr 0.62    Imaging:  [x] 10/11 MR Brain - Increased signal in parietal and occipital lobes, likely sequelae of recent seizure activity   [x] 11/17 MRI Brain - Limited given motion artifact   [] Previous EEGs - LPDs in R. posterior quadrant (later resolved), moderate to severe generalized slowing   < from: MR Head w/wo IV Cont (11.19.19 @ 15:39) >  IMPRESSION:    No acute intracranial hemorrhage, mass effect, vasogenic edema, or   evidence of acute territorial infarct.    No abnormal parenchymal or leptomeningeal enhancement.    No evidence for mesial temporal sclerosis, gray matter heterotopia, or   cortical dysplasia.    Similar mild ventricular dilatation which may be on the basis of   age-appropriate atrophy.    < end of copied text > SUBJECTIVE: patient seen and examined at bedside. had LP yesterday. patient currently laying in bed, mumbling nonsensical words    Interval:   no overnight events    OBJECTIVE:    Vital Signs Last 24 Hrs  Vital Signs Last 24 Hrs  T(C): 36.8 (21 Nov 2019 04:28), Max: 36.9 (20 Nov 2019 19:46)  T(F): 98.3 (21 Nov 2019 04:28), Max: 98.4 (20 Nov 2019 19:46)  HR: 84 (21 Nov 2019 04:28) (84 - 108)  BP: 129/74 (21 Nov 2019 04:28) (102/68 - 133/75)  BP(mean): --  RR: 18 (21 Nov 2019 04:28) (18 - 20)  SpO2: 96% (21 Nov 2019 04:28) (95% - 96%)    MEDICATIONS  (STANDING):  enoxaparin Injectable 40 milliGRAM(s) SubCutaneous daily  fluticasone propionate 50 MICROgram(s)/spray Nasal Spray 1 Spray(s) Both Nostrils two times a day  influenza   Vaccine 0.5 milliLiter(s) IntraMuscular once  labetalol 100 milliGRAM(s) Oral every 8 hours  lacosamide 200 milliGRAM(s) Oral two times a day  levETIRAcetam  Solution 1500 milliGRAM(s) Oral two times a day  magnesium oxide 400 milliGRAM(s) Oral two times a day with meals  polyethylene glycol 3350 17 Gram(s) Oral two times a day  potassium chloride    Tablet ER 40 milliEquivalent(s) Oral daily  potassium chloride  10 mEq/100 mL IVPB 10 milliEquivalent(s) IV Intermittent every 1 hour  senna Syrup 10 milliLiter(s) Oral at bedtime  sodium chloride 0.9%. 1000 milliLiter(s) (75 mL/Hr) IV Continuous <Continuous>    MEDICATIONS  (PRN):  acetaminophen    Suspension .. 650 milliGRAM(s) Oral every 6 hours PRN Moderate Pain (4 - 6), Severe Pain (7 - 10)  LORazepam   Injectable 1 milliGRAM(s) IV Push every 6 hours PRN focal seizures      Exam:   Limited Neurological examination at this time due to family deferring examination  Mental Status: awake, patient's voice combination of hypophonia and ?non-fluent, patient intermittently followed simple commands  Motor: right arm antigravity, left side plegic    Labs  11-18 Na 132<L>  11-18 K 3.3<L>  11-18 P --  11-18 Mg --  11-18 Ca 9.1  11-18 Cr 0.62    Imaging:  [x] 10/11 MR Brain - Increased signal in parietal and occipital lobes, likely sequelae of recent seizure activity   [x] 11/17 MRI Brain - Limited given motion artifact   [] Previous EEGs - LPDs in R. posterior quadrant (later resolved), moderate to severe generalized slowing   < from: MR Head w/wo IV Cont (11.19.19 @ 15:39) >  IMPRESSION:    No acute intracranial hemorrhage, mass effect, vasogenic edema, or   evidence of acute territorial infarct.    No abnormal parenchymal or leptomeningeal enhancement.    No evidence for mesial temporal sclerosis, gray matter heterotopia, or   cortical dysplasia.    Similar mild ventricular dilatation which may be on the basis of   age-appropriate atrophy.    < end of copied text >

## 2019-11-21 NOTE — CONSULT NOTE ADULT - ASSESSMENT
Impression  84 year old male with history of hypertension, prostate cancer s/p radiation and seed implant presented  from [facility] with confusion in the setting of recent seizures and currently going extensive work up.  Of note, with increasing platelet count over past week leading to hematology consultation    Thrombocytosis  -likely reactive given increase in inflammatory markers as well  -would continue to check CBC w/ diff daily  -check iron studies to rule out iron deficiency as cause of thrombocytosis  -if platelet count rises further will consider starting aspirin  -will not send JOSE 2 testing at this time unless platelets continue to rise    Seizure  -F/u extensive neurology work up, s/p LP  -mgmt per neurology    Thank you for the courtesy of this consultation and we will continue to follow.    Naeem Posey MD  [facility]  Cell: [phone]

## 2019-11-21 NOTE — PHYSICAL THERAPY INITIAL EVALUATION ADULT - IMPAIRMENTS CONTRIBUTING IMPAIRED BED MOBILITY, REHAB EVAL
abnormal muscle tone/impaired coordination/decreased strength/impaired postural control/cognition/impaired motor control/impaired balance

## 2019-11-21 NOTE — PROGRESS NOTE ADULT - ASSESSMENT
Impression: Focal myoclonic seizure in setting of UTI and electrolyte imbalances. 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 At this time, however, the enhancement has shown improvement upon repeat Neuroimaging. Patient to require further investigation for ?seizures.     Plan:   [x] Repeat MRI brain w/ and w/o under anesthesia - done   [] Continous EEG if possible, obtain routine otherwise in mean time  [] Continue Keppra 1.5g BID, Vimpat 150mg BID --> (11/20) 200mg bid  [] LP performed today, will follow up results  [x] ESR, CRP elevated 5.62, B12 wnl, TSH wnl, folate wnl    [] Anti MUSK, Anti achR ab pending     management discussed with Dr. Carmichael

## 2019-11-21 NOTE — PHYSICAL THERAPY INITIAL EVALUATION ADULT - BALANCE DISTURBANCE, IDENTIFIED IMPAIRMENT CONTRIBUTE, REHAB EVAL
impaired postural control/impaired coordination/impaired motor control/decreased strength/abnormal muscle tone

## 2019-11-21 NOTE — PHYSICAL THERAPY INITIAL EVALUATION ADULT - FOLLOWS COMMANDS/ANSWERS QUESTIONS, REHAB EVAL
50% of the time/speech unintelligible/garbled but appropriate speech/able to follow single-step instructions

## 2019-11-21 NOTE — PHYSICAL THERAPY INITIAL EVALUATION ADULT - IMPAIRED TRANSFERS: SIT/STAND, REHAB EVAL
impaired postural control/impaired motor control/decreased sensation/impaired balance/impaired coordination/decreased strength

## 2019-11-21 NOTE — PHYSICAL THERAPY INITIAL EVALUATION ADULT - MANUAL MUSCLE TESTING RESULTS, REHAB EVAL
LUE/LLE 0/5, RUE 3/5 within ROM (shoulder limited to 110 degrees flex PROM), RLE 2/5 inconsistently.

## 2019-11-21 NOTE — PHYSICAL THERAPY INITIAL EVALUATION ADULT - PERTINENT HX OF CURRENT PROBLEM, REHAB EVAL
Pt is an 84 year old male with history of hypertension, prostate cancer s/p radiation and seed implant presents from [facility] with confusion in the setting of recent seizures. The patient was admitted during 9/24-10/23 after episode of status epilepticus requiring intubation. 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

## 2019-11-21 NOTE — PHYSICAL THERAPY INITIAL EVALUATION ADULT - IMPAIRMENTS FOUND, PT EVAL
decreased midline orientation/joint integrity and mobility/fine motor/tone/gross motor/posture/gait, locomotion, and balance/cognitive impairment/muscle strength/aerobic capacity/endurance/arousal, attention, and cognition

## 2019-11-22 LAB
ACRM MODULATING ANTIBODY: 0 NMOL/L — SIGNIFICANT CHANGE UP
ALBUMIN SERPL ELPH-MCNC: 3.2 G/DL — LOW (ref 3.3–5)
ALDOST SERPL-MCNC: 37.1 NG/DL — HIGH
ALP SERPL-CCNC: 209 U/L — HIGH (ref 40–120)
ALT FLD-CCNC: 48 U/L — HIGH (ref 10–45)
ANION GAP SERPL CALC-SCNC: 17 MMOL/L — SIGNIFICANT CHANGE UP (ref 5–17)
AST SERPL-CCNC: 45 U/L — HIGH (ref 10–40)
BILIRUB SERPL-MCNC: 0.4 MG/DL — SIGNIFICANT CHANGE UP (ref 0.2–1.2)
BUN SERPL-MCNC: 23 MG/DL — SIGNIFICANT CHANGE UP (ref 7–23)
CALCIUM SERPL-MCNC: 9.8 MG/DL — SIGNIFICANT CHANGE UP (ref 8.4–10.5)
CALCIUM UR-MCNC: 15.1 MG/DL — SIGNIFICANT CHANGE UP
CHLORIDE SERPL-SCNC: 91 MMOL/L — LOW (ref 96–108)
CO2 SERPL-SCNC: 28 MMOL/L — SIGNIFICANT CHANGE UP (ref 22–31)
CREAT SERPL-MCNC: 0.62 MG/DL — SIGNIFICANT CHANGE UP (ref 0.5–1.3)
CRYPTOC AG CSF-ACNC: NEGATIVE — SIGNIFICANT CHANGE UP
ENDOMYSIUM IGA TITR SER IF: NEGATIVE — SIGNIFICANT CHANGE UP
ENDOMYSIUM IGA TITR SER: SIGNIFICANT CHANGE UP
GLUCOSE SERPL-MCNC: 170 MG/DL — HIGH (ref 70–99)
HCT VFR BLD CALC: 37.9 % — LOW (ref 39–50)
HGB BLD-MCNC: 12.5 G/DL — LOW (ref 13–17)
HIV 1+2 AB+HIV1 P24 AG SERPL QL IA: SIGNIFICANT CHANGE UP
IRON SATN MFR SERPL: 18 % — SIGNIFICANT CHANGE UP (ref 16–55)
IRON SATN MFR SERPL: 37 UG/DL — LOW (ref 45–165)
MAGNESIUM UR-MCNC: 17.2 MG/DL — SIGNIFICANT CHANGE UP
MCHC RBC-ENTMCNC: 30.4 PG — SIGNIFICANT CHANGE UP (ref 27–34)
MCHC RBC-ENTMCNC: 33 GM/DL — SIGNIFICANT CHANGE UP (ref 32–36)
MCV RBC AUTO: 92.2 FL — SIGNIFICANT CHANGE UP (ref 80–100)
MUSK IGG SER IA-MCNC: 0 NMOL/L — SIGNIFICANT CHANGE UP (ref 0–0.02)
OLIGOCLONAL BANDS CSF ELPH-IMP: SIGNIFICANT CHANGE UP
OLIGOCLONAL BANDS CSF ELPH-IMP: SIGNIFICANT CHANGE UP
OSMOLALITY UR: 569 MOSM/KG — SIGNIFICANT CHANGE UP (ref 50–1200)
PLATELET # BLD AUTO: 526 K/UL — HIGH (ref 150–400)
POTASSIUM SERPL-MCNC: 3 MMOL/L — LOW (ref 3.5–5.3)
POTASSIUM SERPL-MCNC: 3 MMOL/L — LOW (ref 3.5–5.3)
POTASSIUM SERPL-SCNC: 3 MMOL/L — LOW (ref 3.5–5.3)
POTASSIUM SERPL-SCNC: 3 MMOL/L — LOW (ref 3.5–5.3)
PROT ?TM UR-MCNC: 63 MG/DL — HIGH (ref 0–12)
PROT CSF-MCNC: 60 MG/DL — HIGH (ref 15–45)
PROT SERPL-MCNC: 8 G/DL — SIGNIFICANT CHANGE UP (ref 6–8.3)
RBC # BLD: 4.11 M/UL — LOW (ref 4.2–5.8)
RBC # FLD: 14.6 % — HIGH (ref 10.3–14.5)
SODIUM SERPL-SCNC: 136 MMOL/L — SIGNIFICANT CHANGE UP (ref 135–145)
TIBC SERPL-MCNC: 208 UG/DL — LOW (ref 220–430)
UIBC SERPL-MCNC: 171 UG/DL — SIGNIFICANT CHANGE UP (ref 110–370)
WBC # BLD: 11.98 K/UL — HIGH (ref 3.8–10.5)
WBC # FLD AUTO: 11.98 K/UL — HIGH (ref 3.8–10.5)
WNV IGG CSF IA-ACNC: POSITIVE
WNV IGM CSF IA-ACNC: NEGATIVE — SIGNIFICANT CHANGE UP

## 2019-11-22 PROCEDURE — 99233 SBSQ HOSP IP/OBS HIGH 50: CPT | Mod: GC

## 2019-11-22 RX ORDER — POTASSIUM CHLORIDE 20 MEQ
10 PACKET (EA) ORAL
Refills: 0 | Status: COMPLETED | OUTPATIENT
Start: 2019-11-22 | End: 2019-11-22

## 2019-11-22 RX ORDER — LACTULOSE 10 G/15ML
20 SOLUTION ORAL ONCE
Refills: 0 | Status: DISCONTINUED | OUTPATIENT
Start: 2019-11-22 | End: 2019-11-22

## 2019-11-22 RX ORDER — IMMUNE GLOBULIN (HUMAN) 10 G/100ML
30 INJECTION INTRAVENOUS; SUBCUTANEOUS DAILY
Refills: 0 | Status: COMPLETED | OUTPATIENT
Start: 2019-11-22 | End: 2019-11-27

## 2019-11-22 RX ORDER — PANTOPRAZOLE SODIUM 20 MG/1
40 TABLET, DELAYED RELEASE ORAL
Refills: 0 | Status: DISCONTINUED | OUTPATIENT
Start: 2019-11-22 | End: 2019-12-05

## 2019-11-22 RX ORDER — ACETAMINOPHEN 500 MG
325 TABLET ORAL DAILY
Refills: 0 | Status: DISCONTINUED | OUTPATIENT
Start: 2019-11-22 | End: 2019-12-05

## 2019-11-22 RX ORDER — DIPHENHYDRAMINE HCL 50 MG
25 CAPSULE ORAL DAILY
Refills: 0 | Status: COMPLETED | OUTPATIENT
Start: 2019-11-22 | End: 2019-11-26

## 2019-11-22 RX ADMIN — ENOXAPARIN SODIUM 40 MILLIGRAM(S): 100 INJECTION SUBCUTANEOUS at 11:42

## 2019-11-22 RX ADMIN — Medication 1 SPRAY(S): at 06:37

## 2019-11-22 RX ADMIN — MAGNESIUM OXIDE 400 MG ORAL TABLET 400 MILLIGRAM(S): 241.3 TABLET ORAL at 09:30

## 2019-11-22 RX ADMIN — Medication 100 MILLIEQUIVALENT(S): at 10:54

## 2019-11-22 RX ADMIN — PANTOPRAZOLE SODIUM 40 MILLIGRAM(S): 20 TABLET, DELAYED RELEASE ORAL at 18:21

## 2019-11-22 RX ADMIN — Medication 25 MILLIGRAM(S): at 19:08

## 2019-11-22 RX ADMIN — LACOSAMIDE 200 MILLIGRAM(S): 50 TABLET ORAL at 06:37

## 2019-11-22 RX ADMIN — Medication 40 MILLIEQUIVALENT(S): at 11:42

## 2019-11-22 RX ADMIN — IMMUNE GLOBULIN (HUMAN) 50 GRAM(S): 10 INJECTION INTRAVENOUS; SUBCUTANEOUS at 19:54

## 2019-11-22 RX ADMIN — Medication 100 MILLIGRAM(S): at 13:50

## 2019-11-22 RX ADMIN — Medication 1 SPRAY(S): at 18:22

## 2019-11-22 RX ADMIN — Medication 10 MILLIGRAM(S): at 09:31

## 2019-11-22 RX ADMIN — Medication 100 MILLIEQUIVALENT(S): at 09:31

## 2019-11-22 RX ADMIN — LEVETIRACETAM 1500 MILLIGRAM(S): 250 TABLET, FILM COATED ORAL at 18:21

## 2019-11-22 RX ADMIN — Medication 10 MILLIGRAM(S): at 13:50

## 2019-11-22 RX ADMIN — MAGNESIUM OXIDE 400 MG ORAL TABLET 400 MILLIGRAM(S): 241.3 TABLET ORAL at 18:21

## 2019-11-22 RX ADMIN — Medication 100 MILLIGRAM(S): at 06:37

## 2019-11-22 RX ADMIN — LEVETIRACETAM 1500 MILLIGRAM(S): 250 TABLET, FILM COATED ORAL at 06:37

## 2019-11-22 RX ADMIN — Medication 100 MILLIEQUIVALENT(S): at 12:12

## 2019-11-22 RX ADMIN — LACOSAMIDE 200 MILLIGRAM(S): 50 TABLET ORAL at 18:21

## 2019-11-22 RX ADMIN — Medication 10 MILLIGRAM(S): at 22:54

## 2019-11-22 RX ADMIN — Medication 100 MILLIGRAM(S): at 22:54

## 2019-11-22 NOTE — PROGRESS NOTE ADULT - SUBJECTIVE AND OBJECTIVE BOX
Interval History: CT C/A/P done, prelim read shows large amount of stool. evaluated by hematology for thrombocytosis, iron studies sent. amiloride started for known Gittleman's syndrome. EEG report: potential epileptogenic focus in R temporal, R centroparietal, R temporoocopital regions, mild-mod dysfunction, no seizure seen.     MEDICATIONS  (STANDING):  aMILoride 10 milliGRAM(s) Oral <User Schedule>  enoxaparin Injectable 40 milliGRAM(s) SubCutaneous daily  fluticasone propionate 50 MICROgram(s)/spray Nasal Spray 1 Spray(s) Both Nostrils two times a day  influenza   Vaccine 0.5 milliLiter(s) IntraMuscular once  labetalol 100 milliGRAM(s) Oral every 8 hours  lacosamide 200 milliGRAM(s) Oral two times a day  lactulose Syrup 20 Gram(s) Enteral Tube once  levETIRAcetam  Solution 1500 milliGRAM(s) Oral two times a day  magnesium oxide 400 milliGRAM(s) Oral two times a day with meals  polyethylene glycol 3350 17 Gram(s) Oral two times a day  potassium chloride   Solution 40 milliEquivalent(s) Enteral Tube daily  potassium chloride  10 mEq/100 mL IVPB 10 milliEquivalent(s) IV Intermittent every 1 hour  senna Syrup 10 milliLiter(s) Oral at bedtime  sodium chloride 0.9%. 1000 milliLiter(s) (75 mL/Hr) IV Continuous <Continuous>    MEDICATIONS  (PRN):  acetaminophen    Suspension .. 650 milliGRAM(s) Oral every 6 hours PRN Moderate Pain (4 - 6), Severe Pain (7 - 10)  LORazepam   Injectable 1 milliGRAM(s) IV Push every 6 hours PRN focal seizures    Allergies  No Known Allergies    Vital Signs Last 24 Hrs  T(C): 36.8 (22 Nov 2019 06:30), Max: 37.3 (21 Nov 2019 12:25)  T(F): 98.3 (22 Nov 2019 06:30), Max: 99.2 (21 Nov 2019 12:25)  HR: 97 (22 Nov 2019 06:30) (58 - 101)  BP: 118/72 (22 Nov 2019 06:30) (118/72 - 153/79)  BP(mean): --  RR: 18 (22 Nov 2019 06:30) (18 - 18)  SpO2: 94% (22 Nov 2019 06:30) (94% - 98%)  NEUROLOGICAL EXAM:  Mental Status: awake, patient's voice combination of hypophonia and ?non-fluent, patient intermittently followed simple commands  Motor: right arm antigravity, left side plegic    Labs:   cbc                      13.3   13.25 )-----------( 571      ( 21 Nov 2019 08:58 )             39.5     [id number]    x   |  x   |  x   ----------------------------<  x   3.0<L>   |  x   |  x     Ca    9.6      21 Nov 2019 15:32  Mg     1.7     11-21    TPro  8.4<H>  /  Alb  3.5  /  TBili  0.5  /  DBili  x   /  AST  47<H>  /  ALT  47<H>  /  AlkPhos  228<H>  11-21 Interval History: CT C/A/P done, prelim read shows large amount of stool. evaluated by hematology for thrombocytosis, iron studies sent. amiloride started for known Gittleman's syndrome. EEG report: potential epileptogenic focus in R temporal, R centroparietal, R temporoocopital regions, mild-mod dysfunction, no seizure seen. 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 in addition to solumedrol    MEDICATIONS  (STANDING):  aMILoride 10 milliGRAM(s) Oral <User Schedule>  enoxaparin Injectable 40 milliGRAM(s) SubCutaneous daily  fluticasone propionate 50 MICROgram(s)/spray Nasal Spray 1 Spray(s) Both Nostrils two times a day  influenza   Vaccine 0.5 milliLiter(s) IntraMuscular once  labetalol 100 milliGRAM(s) Oral every 8 hours  lacosamide 200 milliGRAM(s) Oral two times a day  lactulose Syrup 20 Gram(s) Enteral Tube once  levETIRAcetam  Solution 1500 milliGRAM(s) Oral two times a day  magnesium oxide 400 milliGRAM(s) Oral two times a day with meals  polyethylene glycol 3350 17 Gram(s) Oral two times a day  potassium chloride   Solution 40 milliEquivalent(s) Enteral Tube daily  potassium chloride  10 mEq/100 mL IVPB 10 milliEquivalent(s) IV Intermittent every 1 hour  senna Syrup 10 milliLiter(s) Oral at bedtime  sodium chloride 0.9%. 1000 milliLiter(s) (75 mL/Hr) IV Continuous <Continuous>    MEDICATIONS  (PRN):  acetaminophen    Suspension .. 650 milliGRAM(s) Oral every 6 hours PRN Moderate Pain (4 - 6), Severe Pain (7 - 10)  LORazepam   Injectable 1 milliGRAM(s) IV Push every 6 hours PRN focal seizures    Allergies  No Known Allergies    Vital Signs Last 24 Hrs  T(C): 36.8 (22 Nov 2019 06:30), Max: 37.3 (21 Nov 2019 12:25)  T(F): 98.3 (22 Nov 2019 06:30), Max: 99.2 (21 Nov 2019 12:25)  HR: 97 (22 Nov 2019 06:30) (58 - 101)  BP: 118/72 (22 Nov 2019 06:30) (118/72 - 153/79)  BP(mean): --  RR: 18 (22 Nov 2019 06:30) (18 - 18)  SpO2: 94% (22 Nov 2019 06:30) (94% - 98%)  NEUROLOGICAL EXAM:  Mental Status: awake, patient's voice combination of hypophonia and ?non-fluent, patient intermittently followed simple commands  Motor: right arm antigravity, left side plegic    Labs:   cbc                      13.3   13.25 )-----------( 571      ( 21 Nov 2019 08:58 )             39.5     [id number]    x   |  x   |  x   ----------------------------<  x   3.0<L>   |  x   |  x     Ca    9.6      21 Nov 2019 15:32  Mg     1.7     11-21    TPro  8.4<H>  /  Alb  3.5  /  TBili  0.5  /  DBili  x   /  AST  47<H>  /  ALT  47<H>  /  AlkPhos  228<H>  11-21 Interval History: CT C/A/P done, prelim read shows large amount of stool. evaluated by hematology for thrombocytosis, iron studies sent. amiloride started for known Gittleman's syndrome. EEG report: potential epileptogenic focus in R temporal, R centroparietal, R temporoocopital regions, mild-mod dysfunction, no seizure seen. 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 in addition to solumedrol    MEDICATIONS  (STANDING):  aMILoride 10 milliGRAM(s) Oral <User Schedule>  enoxaparin Injectable 40 milliGRAM(s) SubCutaneous daily  fluticasone propionate 50 MICROgram(s)/spray Nasal Spray 1 Spray(s) Both Nostrils two times a day  influenza   Vaccine 0.5 milliLiter(s) IntraMuscular once  labetalol 100 milliGRAM(s) Oral every 8 hours  lacosamide 200 milliGRAM(s) Oral two times a day  lactulose Syrup 20 Gram(s) Enteral Tube once  levETIRAcetam  Solution 1500 milliGRAM(s) Oral two times a day  magnesium oxide 400 milliGRAM(s) Oral two times a day with meals  polyethylene glycol 3350 17 Gram(s) Oral two times a day  potassium chloride   Solution 40 milliEquivalent(s) Enteral Tube daily  potassium chloride  10 mEq/100 mL IVPB 10 milliEquivalent(s) IV Intermittent every 1 hour  senna Syrup 10 milliLiter(s) Oral at bedtime  sodium chloride 0.9%. 1000 milliLiter(s) (75 mL/Hr) IV Continuous <Continuous>    MEDICATIONS  (PRN):  acetaminophen    Suspension .. 650 milliGRAM(s) Oral every 6 hours PRN Moderate Pain (4 - 6), Severe Pain (7 - 10)  LORazepam   Injectable 1 milliGRAM(s) IV Push every 6 hours PRN focal seizures    Allergies  No Known Allergies    Vital Signs Last 24 Hrs  T(C): 36.8 (22 Nov 2019 06:30), Max: 37.3 (21 Nov 2019 12:25)  T(F): 98.3 (22 Nov 2019 06:30), Max: 99.2 (21 Nov 2019 12:25)  HR: 97 (22 Nov 2019 06:30) (58 - 101)  BP: 118/72 (22 Nov 2019 06:30) (118/72 - 153/79)  BP(mean): --  RR: 18 (22 Nov 2019 06:30) (18 - 18)  SpO2: 94% (22 Nov 2019 06:30) (94% - 98%)  NEUROLOGICAL EXAM:  Mental Status: awake, patient's voice combination of hypophonia and ?non-fluent, patient intermittently followed simple commands, speaks randomly about different topics but is accurate. Keeps eyes closed, but opens on command, somatagnosia, thinks his left arm is examiners arm, when asked to move left side will move the right side.   Motor: right arm antigravity, left side plegic    Labs:   cbc                      13.3   13.25 )-----------( 571      ( 21 Nov 2019 08:58 )             39.5     [id number]    x   |  x   |  x   ----------------------------<  x   3.0<L>   |  x   |  x     Ca    9.6      21 Nov 2019 15:32  Mg     1.7     11-21    TPro  8.4<H>  /  Alb  3.5  /  TBili  0.5  /  DBili  x   /  AST  47<H>  /  ALT  47<H>  /  AlkPhos  228<H>  11-21

## 2019-11-22 NOTE — OCCUPATIONAL THERAPY INITIAL EVALUATION ADULT - DIAGNOSIS, OT EVAL
Pt presents with decreased command following, impaired cognition, impaired vision, L neglect, decreased ROM, strength, endurance, balance, and coordination, all impacting ability to perform ADLs and functional mobility.

## 2019-11-22 NOTE — PROGRESS NOTE ADULT - SUBJECTIVE AND OBJECTIVE BOX
Pt is seen and examined  pt is awake and lying in bed   No acute events  Platelets improving    PAST MEDICAL & SURGICAL HISTORY:  Hypertension  Prostate cancer: had seed implant &amp; radiation (  )  PC (prostate cancer)  S/P cholecystectomy      ROS:  Negative except for:    MEDICATIONS  (STANDING):  aMILoride 10 milliGRAM(s) Oral <User Schedule>  enoxaparin Injectable 40 milliGRAM(s) SubCutaneous daily  fluticasone propionate 50 MICROgram(s)/spray Nasal Spray 1 Spray(s) Both Nostrils two times a day  influenza   Vaccine 0.5 milliLiter(s) IntraMuscular once  labetalol 100 milliGRAM(s) Oral every 8 hours  lacosamide 200 milliGRAM(s) Oral two times a day  levETIRAcetam  Solution 1500 milliGRAM(s) Oral two times a day  magnesium oxide 400 milliGRAM(s) Oral two times a day with meals  methylPREDNISolone sodium succinate IVPB 1000 milliGRAM(s) IV Intermittent daily  pantoprazole  Injectable 40 milliGRAM(s) IV Push two times a day  polyethylene glycol 3350 17 Gram(s) Oral two times a day  potassium chloride   Solution 40 milliEquivalent(s) Enteral Tube daily  senna Syrup 10 milliLiter(s) Oral at bedtime  sodium chloride 0.9%. 1000 milliLiter(s) (75 mL/Hr) IV Continuous <Continuous>    MEDICATIONS  (PRN):  acetaminophen    Suspension .. 650 milliGRAM(s) Oral every 6 hours PRN Moderate Pain (4 - 6), Severe Pain (7 - 10)  LORazepam   Injectable 1 milliGRAM(s) IV Push every 6 hours PRN focal seizures      Allergies    No Known Allergies    Intolerances        Vital Signs Last 24 Hrs  T(C): 36.3 (2019 12:10), Max: 36.8 (2019 06:30)  T(F): 97.4 (2019 12:10), Max: 98.3 (2019 06:30)  HR: 77 (2019 12:10) (77 - 97)  BP: 118/66 (2019 12:10) (118/66 - 121/75)  BP(mean): --  RR: 18 (2019 12:10) (18 - 20)  SpO2: 94% (2019 12:10) (94% - 97%)    PHYSICAL EXAM  General: adult in NAD  HEENT: clear oropharynx, anicteric sclera, pink conjunctiva  Neck: supple  CV: normal S1/S2 with no murmur rubs or gallops  Lungs: positive air movement b/l ant lungs,clear to auscultation, no wheezes, no rales  Abdomen: soft non-tender non-distended, no hepatosplenomegaly  Ext: no clubbing cyanosis or edema     LABS:                          12.5    )-----------( 526      ( 2019 09:11 )             37.9     Serial CBC's   @ 09:11  Hct-37.9 / Hgb-12.5 / Plat-526 / RBC-4.11 / WBC-11.98          Serial CBC's   @ 08:58  Hct-39.5 / Hgb-13.3 / Plat-571 / RBC-4.40 / WBC-13.25                136  |  91<L>  |  23  ----------------------------<  170<H>  3.0<L>   |  28  |  0.62    Ca    9.8      2019 06:39  Mg     1.7         TPro  8.0  /  Alb  3.2<L>  /  TBili  0.4  /  DBili  x   /  AST  45<H>  /  ALT  48<H>  /  AlkPhos  209<H>            WBC Count: 11.98 K/uL ( @ 09:11)  Hemoglobin: 12.5 g/dL ( @ 09:11)      Quantitative Ig mg/dL ( @ 16:20)        RADIOLOGY & ADDITIONAL STUDIES:

## 2019-11-22 NOTE — OCCUPATIONAL THERAPY INITIAL EVALUATION ADULT - BALANCE DISTURBANCE, IDENTIFIED IMPAIRMENT CONTRIBUTE, REHAB EVAL
decreased ROM/impaired motor control/decreased strength/impaired postural control/impaired coordination

## 2019-11-22 NOTE — OCCUPATIONAL THERAPY INITIAL EVALUATION ADULT - IMPAIRMENTS CONTRIBUTING IMPAIRED BED MOBILITY, REHAB EVAL
decreased strength/cognition/impaired coordination/impaired balance/impaired postural control/decreased ROM/impaired motor control

## 2019-11-22 NOTE — OCCUPATIONAL THERAPY INITIAL EVALUATION ADULT - ADDITIONAL COMMENTS
CONTINUED.  Pt presents with left eye was droopy, left arm was twitching, he sounded congested, and he kept jerking his right arm. Of note, his sodium has been low during the week, so he appeared lethargic earlier in the week. Focal myoclonic seizure in setting of UTI and electrolyte imbalances.  MR HEAD 11/17: Tiny amount of residual high signal on diffusion-weighted images within the right parasagittal parieto-occipital lobes which may related to sequela of seizure activity. Signal abnormality has improved when compared with the prior brain MRI study from 10/11/2019.

## 2019-11-22 NOTE — OCCUPATIONAL THERAPY INITIAL EVALUATION ADULT - BALANCE TRAINING, PT EVAL
GOAL: Pt will increase static sitting balance by 1/2 grade to facilitate ability to perform ADLs and functional mobility within 4 weeks

## 2019-11-22 NOTE — PROGRESS NOTE ADULT - ASSESSMENT
Focal myoclonic seizure in setting of UTI and electrolyte imbalances. 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 At this time, however, the enhancement has shown improvement upon repeat Neuroimaging. Patient to require further investigation for ?seizures.     Plan:   Labs  [x] repeat LP done on 11/20, studies sent: gluc 98, protein 42, lymph predom 90%, gram stain neg, HSV negative, CSF PCR negative, flow cytometry negative  [] pending CSF studies: WNV, VDRL, VZV, toxo, MBP, lyme, histoplasma, EBV, ENCES, CMV, fungal cx, crypto, borrelia, beta-2 microglobulin, ACE, acid fast cx.   [x] HIV negative, TPO negative, thyroglobulin negative  [x] ESR, CRP elevated 5.62, B12 wnl, TSH wnl, folate wnl    [] urine lytes  [] celiac panel sent  [] [place] sent  [] Anti MUSK, Anti achR ab pending  [] RVP sent    Consults  [x] heme onc note for thrombocytosis: studies sent, consult appreciated    Meds  [x] Continue Keppra 1.5g BID  [x] got 3 days ceftriaxone for UTI - ucx negative, ceftriaxone DC.   [x] Vimpat increased from 150 BID to 200 BID on 11/20    Imaging  [x] Repeat MRI brain w/ and w/o under anesthesia: looks improved  [x]  rEEG: potential epileptogenic focus in R temporal, R centroparietal, R temporoocopital regions, mild-mod dysfunction, no seizure seen.   [x] CT C/A/P stool impaction, follow official read Focal myoclonic seizure in setting of UTI and electrolyte imbalances. 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 At this time, however, the enhancement has shown improvement upon repeat Neuroimaging. Patient to require further investigation for ?seizures.     Plan:   Labs  [x] repeat LP done on 11/20, studies sent: gluc 98, protein 42, lymph predom 90%, gram stain neg, HSV negative, CSF PCR negative, flow cytometry negative  [] pending CSF studies: WNV, VDRL, VZV, toxo, MBP, lyme, histoplasma, EBV, ENCES, CMV, fungal cx, crypto, borrelia, beta-2 microglobulin, ACE, acid fast cx.   [x] HIV negative, TPO negative, thyroglobulin negative  [x] ESR, CRP elevated 5.62, B12 wnl, TSH wnl, folate wnl    [x] Anti MUSK, Anti achR ab negative  [] urine lytes  [] celiac panel sent  [] [place]  [] RVP sent    Consults  [x] heme onc note for thrombocytosis: studies sent, consult appreciated    Meds  [x] Continue Keppra 1.5g BID  [x] got 3 days ceftriaxone for UTI - ucx negative, ceftriaxone DC.   [x] Vimpat increased from 150 BID to 200 BID on 11/20    Imaging  [x] Repeat MRI brain w/ and w/o under anesthesia: looks improved  [x]  rEEG: potential epileptogenic focus in R temporal, R centroparietal, R temporoocopital regions, mild-mod dysfunction, no seizure seen.   [x] CT C/A/P stool impaction, follow official read Focal myoclonic seizure in setting of UTI and electrolyte imbalances. 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 At this time, however, the enhancement has shown improvement upon repeat Neuroimaging. Patient to require further investigation for ?seizures.     Plan:   Labs  [x] repeat LP done on 11/20, studies sent: gluc 98, protein 42, lymph predom 90%, gram stain neg, HSV negative, CSF PCR negative, flow cytometry negative. WNV IgG + in CSF, IgM negative, fungal cx negative  [] pending CSF studies: VDRL, VZV, toxo, MBP, lyme, histoplasma, EBV, ENCES, CMV, crypto, borrelia, beta-2 microglobulin, ACE, acid fast cx.   [x] HIV negative, TPO negative, thyroglobulin negative  [x] ESR, CRP elevated 5.62, B12 wnl, TSH wnl, folate wnl    [x] Anti MUSK, Anti achR ab negative  [] urine lytes  [] celiac panel sent  [] [place]  [] RVP sent    Consults  [x] heme onc note for thrombocytosis: studies sent, consult appreciated    Meds  [x] Continue Keppra 1.5g BID  [x] got 3 days ceftriaxone for UTI - ucx negative, ceftriaxone DC.   [x] Vimpat increased from 150 BID to 200 BID on 11/20  [x] solumedrol 1g x5 days  [x] IVIG x5 days + tylenol, benadryl    Imaging  [x] Repeat MRI brain w/ and w/o under anesthesia: looks improved  [x] rEEG: potential epileptogenic focus in R temporal, R centroparietal, R temporoocopital regions, mild-mod dysfunction, no seizure seen.   [x] CT C/A/P stool impaction, follow official read Focal myoclonic seizure in setting of UTI and electrolyte imbalances. 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 At this time, however, the enhancement has shown improvement upon repeat Neuroimaging. Patient to require further investigation for ?seizures.     Plan:   Labs  [x] repeat LP done on 11/20, studies sent: gluc 98, protein 42, lymph predom 90%, gram stain neg, HSV negative, CSF PCR negative, flow cytometry negative. WNV IgG + in CSF, IgM negative, fungal cx negative  [] pending CSF studies: VDRL, VZV, toxo, MBP, lyme, histoplasma, EBV, ENCES, CMV, crypto, borrelia, beta-2 microglobulin, ACE, acid fast cx.   [x] HIV negative, TPO negative, thyroglobulin negative  [x] ESR, CRP elevated 5.62, B12 wnl, TSH wnl, folate wnl    [x] Anti MUSK, Anti achR ab negative  [] urine lytes  [x] celiac panel: endomysial ab negative  [] [place]  [x] RVP: negative    Consults  [x] heme onc note for thrombocytosis: studies sent, consult appreciated    Meds  [x] Continue Keppra 1.5g BID  [x] got 3 days ceftriaxone for UTI - ucx negative, ceftriaxone DC.   [x] Vimpat increased from 150 BID to 200 BID on 11/20  [x] solumedrol 1g x5 days  [x] IVIG x5 days + tylenol, benadryl    Imaging  [x] Repeat MRI brain w/ and w/o under anesthesia: looks improved  [x] rEEG: potential epileptogenic focus in R temporal, R centroparietal, R temporoocopital regions, mild-mod dysfunction, no seizure seen.   [x] CT C/A/P stool impaction, follow official read Focal myoclonic seizure in setting of UTI and electrolyte imbalances. 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 At this time, however, the enhancement has shown improvement upon repeat Neuroimaging. Patient to require further investigation for ?seizures.     Plan:   Labs  [x] repeat LP done on 11/20, studies sent: gluc 98, protein 42, lymph predom 90%, gram stain neg, HSV negative, CSF PCR negative, flow cytometry negative. WNV IgG + in CSF, IgM negative, fungal cx negative  [] pending CSF studies: VDRL, VZV, toxo, MBP, lyme, histoplasma, EBV, ENCES, CMV, crypto, borrelia, beta-2 microglobulin, ACE, acid fast cx.   [x] HIV negative, TPO negative, thyroglobulin negative  [x] ESR, CRP elevated 5.62, B12 wnl, TSH wnl, folate wnl    [x] Anti MUSK, Anti achR ab negative  [] urine lytes  [x] celiac panel: endomysial ab negative  [] [place]  [x] RVP: negative    Consults  [x] heme onc note for thrombocytosis: studies sent, consult appreciated    Meds  [x] Continue Keppra 1.5g BID  [x] got 3 days ceftriaxone for UTI - ucx negative, ceftriaxone DC.   [x] Vimpat increased from 150 BID to 200 BID on 11/20  [x] solumedrol 1g x5 days  [x] IVIG x5 days + tylenol, benadryl    Imaging  [x] Repeat MRI brain w/ and w/o under anesthesia: looks improved  [x] rEEG: potential epileptogenic focus in R temporal, R centroparietal, R temporoocopital regions, mild-mod dysfunction, no seizure seen.   [x] CT C/A/P stool impaction, mild esophagitis and debris in trachea.

## 2019-11-22 NOTE — OCCUPATIONAL THERAPY INITIAL EVALUATION ADULT - IMPAIRED TRANSFERS: SIT/STAND, REHAB EVAL
impaired coordination/impaired postural control/decreased ROM/impaired balance/cognition/impaired motor control/decreased strength

## 2019-11-22 NOTE — OCCUPATIONAL THERAPY INITIAL EVALUATION ADULT - PERTINENT HX OF CURRENT PROBLEM, REHAB EVAL
84 year old M presents from [facility] with confusion in the setting of recent seizures. The patient was admitted during 9/24-10/23 after episode of status epilepticus requiring intubation. 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 He failed multiple speech and swallow evaluations and is now s/p peg placement. Patient is also with residual slurred speech and weakness of left upper extremity. Patient was discharged to [facility] where he has been for the last 3 weeks.

## 2019-11-22 NOTE — OCCUPATIONAL THERAPY INITIAL EVALUATION ADULT - LIVES WITH, PROFILE
Pt came from [facility] where he was mostly in the w/c, required assistance with all ADL. Prior to that he was living in a house and independent.

## 2019-11-22 NOTE — OCCUPATIONAL THERAPY INITIAL EVALUATION ADULT - RANGE OF MOTION EXAMINATION, LOWER EXTREMITY
Left LE Active Assistive ROM was WFL (within functional limits)/Right LE Active ROM was WFL   (within functional limits)

## 2019-11-22 NOTE — PROGRESS NOTE ADULT - ASSESSMENT
1. Thrombocytosis    -- Platelets slowly trending down. I suspect reactive  -- monitor for now  -- Low TIBC and Iron. Check Ferritin  -- trend counts for now  -- Doubt MPN. No need to check JAK2     2. Seizure   -F/u extensive neurology work up, s/p LP  -mgmt per neurology    Antoni Troy MD  [phone]

## 2019-11-23 LAB
A PHAGOCYTOPH IGG TITR SER IF: SIGNIFICANT CHANGE UP TITER
ALBUMIN SERPL ELPH-MCNC: 3.1 G/DL — LOW (ref 3.3–5)
ALP SERPL-CCNC: 196 U/L — HIGH (ref 40–120)
ALT FLD-CCNC: 39 U/L — SIGNIFICANT CHANGE UP (ref 10–45)
ANION GAP SERPL CALC-SCNC: 12 MMOL/L — SIGNIFICANT CHANGE UP (ref 5–17)
AST SERPL-CCNC: 40 U/L — SIGNIFICANT CHANGE UP (ref 10–40)
B BURGDOR AB SER QL IA: NEGATIVE — SIGNIFICANT CHANGE UP
B MICROTI IGG TITR SER: SIGNIFICANT CHANGE UP TITER
BASOPHILS # BLD AUTO: 0.06 K/UL — SIGNIFICANT CHANGE UP (ref 0–0.2)
BASOPHILS NFR BLD AUTO: 0.6 % — SIGNIFICANT CHANGE UP (ref 0–2)
BILIRUB SERPL-MCNC: 0.4 MG/DL — SIGNIFICANT CHANGE UP (ref 0.2–1.2)
BUN SERPL-MCNC: 24 MG/DL — HIGH (ref 7–23)
CALCIUM SERPL-MCNC: 9.7 MG/DL — SIGNIFICANT CHANGE UP (ref 8.4–10.5)
CHLORIDE SERPL-SCNC: 93 MMOL/L — LOW (ref 96–108)
CO2 SERPL-SCNC: 26 MMOL/L — SIGNIFICANT CHANGE UP (ref 22–31)
CREAT SERPL-MCNC: 0.61 MG/DL — SIGNIFICANT CHANGE UP (ref 0.5–1.3)
CULTURE RESULTS: SIGNIFICANT CHANGE UP
E CHAFFEENSIS IGG TITR SER IF: SIGNIFICANT CHANGE UP TITER
EOSINOPHIL # BLD AUTO: 0.17 K/UL — SIGNIFICANT CHANGE UP (ref 0–0.5)
EOSINOPHIL NFR BLD AUTO: 1.8 % — SIGNIFICANT CHANGE UP (ref 0–6)
FERRITIN SERPL-MCNC: 294 NG/ML — SIGNIFICANT CHANGE UP (ref 30–400)
GLUCOSE SERPL-MCNC: 165 MG/DL — HIGH (ref 70–99)
HCT VFR BLD CALC: 36.2 % — LOW (ref 39–50)
HGB BLD-MCNC: 11.9 G/DL — LOW (ref 13–17)
IMM GRANULOCYTES NFR BLD AUTO: 0.6 % — SIGNIFICANT CHANGE UP (ref 0–1.5)
LYMPHOCYTES # BLD AUTO: 2.19 K/UL — SIGNIFICANT CHANGE UP (ref 1–3.3)
LYMPHOCYTES # BLD AUTO: 22.7 % — SIGNIFICANT CHANGE UP (ref 13–44)
MCHC RBC-ENTMCNC: 30.1 PG — SIGNIFICANT CHANGE UP (ref 27–34)
MCHC RBC-ENTMCNC: 32.9 GM/DL — SIGNIFICANT CHANGE UP (ref 32–36)
MCV RBC AUTO: 91.4 FL — SIGNIFICANT CHANGE UP (ref 80–100)
MONOCYTES # BLD AUTO: 0.81 K/UL — SIGNIFICANT CHANGE UP (ref 0–0.9)
MONOCYTES NFR BLD AUTO: 8.4 % — SIGNIFICANT CHANGE UP (ref 2–14)
NEUTROPHILS # BLD AUTO: 6.34 K/UL — SIGNIFICANT CHANGE UP (ref 1.8–7.4)
NEUTROPHILS NFR BLD AUTO: 65.9 % — SIGNIFICANT CHANGE UP (ref 43–77)
PLATELET # BLD AUTO: 535 K/UL — HIGH (ref 150–400)
POTASSIUM SERPL-MCNC: 3.6 MMOL/L — SIGNIFICANT CHANGE UP (ref 3.5–5.3)
POTASSIUM SERPL-SCNC: 3.6 MMOL/L — SIGNIFICANT CHANGE UP (ref 3.5–5.3)
PROT SERPL-MCNC: 8.3 G/DL — SIGNIFICANT CHANGE UP (ref 6–8.3)
RBC # BLD: 3.96 M/UL — LOW (ref 4.2–5.8)
RBC # FLD: 14.6 % — HIGH (ref 10.3–14.5)
SODIUM SERPL-SCNC: 131 MMOL/L — LOW (ref 135–145)
SPECIMEN SOURCE: SIGNIFICANT CHANGE UP
WBC # BLD: 9.63 K/UL — SIGNIFICANT CHANGE UP (ref 3.8–10.5)
WBC # FLD AUTO: 9.63 K/UL — SIGNIFICANT CHANGE UP (ref 3.8–10.5)

## 2019-11-23 RX ORDER — SODIUM CHLORIDE 9 MG/ML
1000 INJECTION INTRAMUSCULAR; INTRAVENOUS; SUBCUTANEOUS
Refills: 0 | Status: DISCONTINUED | OUTPATIENT
Start: 2019-11-23 | End: 2019-12-01

## 2019-11-23 RX ADMIN — SENNA PLUS 10 MILLILITER(S): 8.6 TABLET ORAL at 21:53

## 2019-11-23 RX ADMIN — Medication 40 MILLIEQUIVALENT(S): at 12:34

## 2019-11-23 RX ADMIN — Medication 10 MILLIGRAM(S): at 15:37

## 2019-11-23 RX ADMIN — PANTOPRAZOLE SODIUM 40 MILLIGRAM(S): 20 TABLET, DELAYED RELEASE ORAL at 06:32

## 2019-11-23 RX ADMIN — Medication 116 MILLIGRAM(S): at 06:33

## 2019-11-23 RX ADMIN — IMMUNE GLOBULIN (HUMAN) 50 GRAM(S): 10 INJECTION INTRAVENOUS; SUBCUTANEOUS at 13:19

## 2019-11-23 RX ADMIN — Medication 10 MILLIGRAM(S): at 10:21

## 2019-11-23 RX ADMIN — Medication 100 MILLIGRAM(S): at 06:33

## 2019-11-23 RX ADMIN — POLYETHYLENE GLYCOL 3350 17 GRAM(S): 17 POWDER, FOR SOLUTION ORAL at 06:33

## 2019-11-23 RX ADMIN — Medication 100 MILLIGRAM(S): at 15:38

## 2019-11-23 RX ADMIN — Medication 1 SPRAY(S): at 06:32

## 2019-11-23 RX ADMIN — Medication 100 MILLIGRAM(S): at 21:52

## 2019-11-23 RX ADMIN — MAGNESIUM OXIDE 400 MG ORAL TABLET 400 MILLIGRAM(S): 241.3 TABLET ORAL at 10:21

## 2019-11-23 RX ADMIN — SODIUM CHLORIDE 75 MILLILITER(S): 9 INJECTION INTRAMUSCULAR; INTRAVENOUS; SUBCUTANEOUS at 18:10

## 2019-11-23 RX ADMIN — Medication 10 MILLIGRAM(S): at 21:52

## 2019-11-23 RX ADMIN — POLYETHYLENE GLYCOL 3350 17 GRAM(S): 17 POWDER, FOR SOLUTION ORAL at 17:29

## 2019-11-23 RX ADMIN — LACOSAMIDE 200 MILLIGRAM(S): 50 TABLET ORAL at 06:33

## 2019-11-23 RX ADMIN — ENOXAPARIN SODIUM 40 MILLIGRAM(S): 100 INJECTION SUBCUTANEOUS at 12:30

## 2019-11-23 RX ADMIN — Medication 1 SPRAY(S): at 17:28

## 2019-11-23 RX ADMIN — LEVETIRACETAM 1500 MILLIGRAM(S): 250 TABLET, FILM COATED ORAL at 17:28

## 2019-11-23 RX ADMIN — LACOSAMIDE 200 MILLIGRAM(S): 50 TABLET ORAL at 17:32

## 2019-11-23 RX ADMIN — Medication 25 MILLIGRAM(S): at 12:30

## 2019-11-23 RX ADMIN — MAGNESIUM OXIDE 400 MG ORAL TABLET 400 MILLIGRAM(S): 241.3 TABLET ORAL at 17:28

## 2019-11-23 RX ADMIN — PANTOPRAZOLE SODIUM 40 MILLIGRAM(S): 20 TABLET, DELAYED RELEASE ORAL at 17:29

## 2019-11-23 RX ADMIN — LEVETIRACETAM 1500 MILLIGRAM(S): 250 TABLET, FILM COATED ORAL at 06:32

## 2019-11-23 NOTE — PROGRESS NOTE ADULT - SUBJECTIVE AND OBJECTIVE BOX
Interval History: No acute events overnight.     MEDICATIONS  (STANDING):  aMILoride 10 milliGRAM(s) Oral <User Schedule>  enoxaparin Injectable 40 milliGRAM(s) SubCutaneous daily  fluticasone propionate 50 MICROgram(s)/spray Nasal Spray 1 Spray(s) Both Nostrils two times a day  influenza   Vaccine 0.5 milliLiter(s) IntraMuscular once  labetalol 100 milliGRAM(s) Oral every 8 hours  lacosamide 200 milliGRAM(s) Oral two times a day  lactulose Syrup 20 Gram(s) Enteral Tube once  levETIRAcetam  Solution 1500 milliGRAM(s) Oral two times a day  magnesium oxide 400 milliGRAM(s) Oral two times a day with meals  polyethylene glycol 3350 17 Gram(s) Oral two times a day  potassium chloride   Solution 40 milliEquivalent(s) Enteral Tube daily  potassium chloride  10 mEq/100 mL IVPB 10 milliEquivalent(s) IV Intermittent every 1 hour  senna Syrup 10 milliLiter(s) Oral at bedtime  sodium chloride 0.9%. 1000 milliLiter(s) (75 mL/Hr) IV Continuous <Continuous>    MEDICATIONS  (PRN):  acetaminophen    Suspension .. 650 milliGRAM(s) Oral every 6 hours PRN Moderate Pain (4 - 6), Severe Pain (7 - 10)  LORazepam   Injectable 1 milliGRAM(s) IV Push every 6 hours PRN focal seizures    Allergies  No Known Allergies    Vital Signs Last 24 Hrs  T(C): 36.3 (23 Nov 2019 15:50), Max: 36.8 (22 Nov 2019 19:51)  T(F): 97.3 (23 Nov 2019 15:50), Max: 98.3 (22 Nov 2019 19:51)  HR: 74 (23 Nov 2019 15:50) (71 - 93)  BP: 110/60 (23 Nov 2019 15:50) (98/62 - 117/65)  BP(mean): --  RR: 18 (23 Nov 2019 15:50) (18 - 18)  SpO2: 95% (23 Nov 2019 15:50) (94% - 98%)    NEUROLOGICAL EXAM:  Mental Status: awake, patient's voice combination of hypophonia and ?non-fluent, patient intermittently followed simple commands, speaks randomly about different topics but is accurate. Keeps eyes closed, but opens on command, somatagnosia, thinks his left arm is examiners arm, when asked to move left side will move the right side.   Motor: right arm antigravity, left side plegic    Labs:   cbc                      13.3   13.25 )-----------( 571      ( 21 Nov 2019 08:58 )             39.5     [id number]    x   |  x   |  x   ----------------------------<  x   3.0<L>   |  x   |  x     Ca    9.6      21 Nov 2019 15:32  Mg     1.7     11-21    TPro  8.4<H>  /  Alb  3.5  /  TBili  0.5  /  DBili  x   /  AST  47<H>  /  ALT  47<H>  /  AlkPhos  228<H>  11-21

## 2019-11-23 NOTE — PROGRESS NOTE ADULT - ASSESSMENT
· Assessment		  1. Thrombocytosis    -- Platelets slowly trending down. I suspect reactive  -- monitor for now  -- Low TIBC and Iron. Check Ferritin  -- trend counts for now  -- Doubt MPN. No need to check JAK2     2. Seizure   -F/u extensive neurology work up, s/p LP  -mgmt per neurology    Thank you for the courtesy of this consultation and we will continue to follow.    Naeem Posey MD  [facility]  Cell: [phone]

## 2019-11-23 NOTE — PROGRESS NOTE ADULT - SUBJECTIVE AND OBJECTIVE BOX
Pt seen, sleeping in bed      MEDICATIONS  (STANDING):  acetaminophen   Tablet .. 325 milliGRAM(s) Oral daily  aMILoride 10 milliGRAM(s) Oral <User Schedule>  diphenhydrAMINE   Injectable 25 milliGRAM(s) IV Push daily  enoxaparin Injectable 40 milliGRAM(s) SubCutaneous daily  fluticasone propionate 50 MICROgram(s)/spray Nasal Spray 1 Spray(s) Both Nostrils two times a day  immune   globulin 10% (GAMMAGARD) IVPB 30 Gram(s) IV Intermittent daily  influenza   Vaccine 0.5 milliLiter(s) IntraMuscular once  labetalol 100 milliGRAM(s) Oral every 8 hours  lacosamide 200 milliGRAM(s) Oral two times a day  levETIRAcetam  Solution 1500 milliGRAM(s) Oral two times a day  magnesium oxide 400 milliGRAM(s) Oral two times a day with meals  methylPREDNISolone sodium succinate IVPB 1000 milliGRAM(s) IV Intermittent daily  pantoprazole  Injectable 40 milliGRAM(s) IV Push two times a day  polyethylene glycol 3350 17 Gram(s) Oral two times a day  potassium chloride   Solution 40 milliEquivalent(s) Enteral Tube daily  senna Syrup 10 milliLiter(s) Oral at bedtime  sodium chloride 0.9%. 1000 milliLiter(s) (75 mL/Hr) IV Continuous <Continuous>    MEDICATIONS  (PRN):  acetaminophen    Suspension .. 650 milliGRAM(s) Oral every 6 hours PRN Moderate Pain (4 - 6), Severe Pain (7 - 10)  LORazepam   Injectable 1 milliGRAM(s) IV Push every 6 hours PRN focal seizures      ROS  No fever, sweats, chills  No epistaxis, HA, sore throat  No CP, SOB, cough, sputum  No n/v/d, abd pain, melena, hematochezia  No edema  No rash  No anxiety  No back pain, joint pain  No bleeding, bruising  No dysuria, hematuria    Vital Signs Last 24 Hrs  T(C): 36.4 (23 Nov 2019 06:27), Max: 36.8 (22 Nov 2019 19:51)  T(F): 97.5 (23 Nov 2019 06:27), Max: 98.3 (22 Nov 2019 19:51)  HR: 86 (23 Nov 2019 06:27) (69 - 93)  BP: 108/70 (23 Nov 2019 06:27) (101/60 - 118/66)  BP(mean): --  RR: 18 (23 Nov 2019 06:27) (18 - 18)  SpO2: 95% (23 Nov 2019 06:27) (94% - 98%)    PE  NAD  Awake, alert  Anicteric, MMM  RRR  CTAB  Abd soft, NT, ND  No c/c/e  No rash grossly  FROM                          12.5   11.98 )-----------( 526      ( 22 Nov 2019 09:11 )             37.9       11-23    131<L>  |  93<L>  |  24<H>  ----------------------------<  165<H>  3.6   |  26  |  0.61    Ca    9.7      23 Nov 2019 07:33  Mg     1.7     11-21    TPro  8.3  /  Alb  3.1<L>  /  TBili  0.4  /  DBili  x   /  AST  40  /  ALT  39  /  AlkPhos  196<H>  11-23

## 2019-11-23 NOTE — PROGRESS NOTE ADULT - ASSESSMENT
Focal myoclonic seizure in setting of UTI and electrolyte imbalances. 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 Patient now here for focal seizures presumed to be secondary to infectious etiology.    Plan:   Labs  [x] repeat LP done on 11/20, studies sent: gluc 98, protein 42, lymph predom 90%, gram stain neg, HSV negative, CSF PCR negative, flow cytometry negative. WNV IgG + in CSF, IgM negative, fungal cx negative  [] pending CSF studies: VDRL, VZV, toxo, MBP, lyme, histoplasma, EBV, ENCES, CMV, crypto, borrelia, beta-2 microglobulin, ACE, acid fast cx.   [x] HIV negative, TPO negative, thyroglobulin negative  [x] ESR, CRP elevated 5.62, B12 wnl, TSH wnl, folate wnl    [x] Anti MUSK, Anti achR ab negative  [] [facility] encephalitis panel sent    Consults  [x] heme onc note for thrombocytosis: studies sent, consult appreciated    Meds  [x] Continue Keppra 1.5g BID  [x] Continue Vimpat 200 BID  [x] Continue solumedrol 1g x5 days  [x] Continue IVIG x5 days + tylenol, benadryl    Imaging  [x] Repeat MRI brain w/ and w/o under anesthesia: looks improved  [x] rEEG: potential epileptogenic focus in R temporal, R centroparietal, R temporoocopital regions, mild-mod dysfunction, no seizure seen.   [x] CT C/A/P stool impaction, mild esophagitis and debris in trachea  [] pending repeat VEEG

## 2019-11-24 LAB
ANION GAP SERPL CALC-SCNC: 15 MMOL/L — SIGNIFICANT CHANGE UP (ref 5–17)
B2 MICROGLOB CSF-MCNC: 2.05 MG/L — SIGNIFICANT CHANGE UP (ref 0.36–2.56)
BUN SERPL-MCNC: 34 MG/DL — HIGH (ref 7–23)
CALCIUM SERPL-MCNC: 9.1 MG/DL — SIGNIFICANT CHANGE UP (ref 8.4–10.5)
CHLORIDE SERPL-SCNC: 94 MMOL/L — LOW (ref 96–108)
CO2 SERPL-SCNC: 22 MMOL/L — SIGNIFICANT CHANGE UP (ref 22–31)
CREAT SERPL-MCNC: 0.59 MG/DL — SIGNIFICANT CHANGE UP (ref 0.5–1.3)
GLUCOSE SERPL-MCNC: 302 MG/DL — HIGH (ref 70–99)
HCT VFR BLD CALC: 34.7 % — LOW (ref 39–50)
HGB BLD-MCNC: 11.3 G/DL — LOW (ref 13–17)
INNER EAR 68KD AB FLD QL: <5 U/L — SIGNIFICANT CHANGE UP
MCHC RBC-ENTMCNC: 29.9 PG — SIGNIFICANT CHANGE UP (ref 27–34)
MCHC RBC-ENTMCNC: 32.6 GM/DL — SIGNIFICANT CHANGE UP (ref 32–36)
MCV RBC AUTO: 91.8 FL — SIGNIFICANT CHANGE UP (ref 80–100)
PLATELET # BLD AUTO: 515 K/UL — HIGH (ref 150–400)
POTASSIUM SERPL-MCNC: 4.3 MMOL/L — SIGNIFICANT CHANGE UP (ref 3.5–5.3)
POTASSIUM SERPL-SCNC: 4.3 MMOL/L — SIGNIFICANT CHANGE UP (ref 3.5–5.3)
RBC # BLD: 3.78 M/UL — LOW (ref 4.2–5.8)
RBC # FLD: 14.4 % — SIGNIFICANT CHANGE UP (ref 10.3–14.5)
SODIUM SERPL-SCNC: 131 MMOL/L — LOW (ref 135–145)
WBC # BLD: 12.38 K/UL — HIGH (ref 3.8–10.5)
WBC # FLD AUTO: 12.38 K/UL — HIGH (ref 3.8–10.5)

## 2019-11-24 RX ADMIN — Medication 1 SPRAY(S): at 05:05

## 2019-11-24 RX ADMIN — Medication 10 MILLIGRAM(S): at 18:02

## 2019-11-24 RX ADMIN — LACOSAMIDE 200 MILLIGRAM(S): 50 TABLET ORAL at 05:06

## 2019-11-24 RX ADMIN — LACOSAMIDE 200 MILLIGRAM(S): 50 TABLET ORAL at 18:13

## 2019-11-24 RX ADMIN — IMMUNE GLOBULIN (HUMAN) 50 GRAM(S): 10 INJECTION INTRAVENOUS; SUBCUTANEOUS at 13:45

## 2019-11-24 RX ADMIN — MAGNESIUM OXIDE 400 MG ORAL TABLET 400 MILLIGRAM(S): 241.3 TABLET ORAL at 21:17

## 2019-11-24 RX ADMIN — Medication 100 MILLIGRAM(S): at 18:00

## 2019-11-24 RX ADMIN — ENOXAPARIN SODIUM 40 MILLIGRAM(S): 100 INJECTION SUBCUTANEOUS at 12:51

## 2019-11-24 RX ADMIN — Medication 40 MILLIEQUIVALENT(S): at 12:51

## 2019-11-24 RX ADMIN — Medication 116 MILLIGRAM(S): at 05:05

## 2019-11-24 RX ADMIN — PANTOPRAZOLE SODIUM 40 MILLIGRAM(S): 20 TABLET, DELAYED RELEASE ORAL at 05:06

## 2019-11-24 RX ADMIN — MAGNESIUM OXIDE 400 MG ORAL TABLET 400 MILLIGRAM(S): 241.3 TABLET ORAL at 12:50

## 2019-11-24 RX ADMIN — Medication 1 SPRAY(S): at 18:01

## 2019-11-24 RX ADMIN — Medication 10 MILLIGRAM(S): at 12:50

## 2019-11-24 RX ADMIN — Medication 100 MILLIGRAM(S): at 21:17

## 2019-11-24 RX ADMIN — Medication 25 MILLIGRAM(S): at 12:58

## 2019-11-24 RX ADMIN — Medication 100 MILLIGRAM(S): at 05:11

## 2019-11-24 RX ADMIN — LEVETIRACETAM 1500 MILLIGRAM(S): 250 TABLET, FILM COATED ORAL at 05:05

## 2019-11-24 RX ADMIN — PANTOPRAZOLE SODIUM 40 MILLIGRAM(S): 20 TABLET, DELAYED RELEASE ORAL at 18:02

## 2019-11-24 RX ADMIN — Medication 10 MILLIGRAM(S): at 21:17

## 2019-11-24 RX ADMIN — LEVETIRACETAM 1500 MILLIGRAM(S): 250 TABLET, FILM COATED ORAL at 18:02

## 2019-11-24 NOTE — PROGRESS NOTE ADULT - ASSESSMENT
Focal myoclonic seizure in setting of UTI and electrolyte imbalances. 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 Patient now here for focal seizures presumed to be secondary to infectious etiology.    Plan:   Labs  [x] repeat LP done on 11/20, studies sent: gluc 98, protein 42, lymph predom 90%, gram stain neg, HSV negative, CSF PCR negative, flow cytometry negative. WNV IgG + in CSF, IgM negative, fungal cx negative, errlichia negative, lyme negative, borrelia negative, ACE negative, beta 2 microglobulin wnl, crypto neg  [] pending CSF studies: VDRL, VZV, toxo, MBP, histoplasma, EBV, ENCES, CMV, acid fast cx.   [x] HIV negative, TPO negative, thyroglobulin negative  [x] ESR, CRP elevated 5.62, B12 wnl, TSH wnl, folate wnl    [x] Anti MUSK, Anti achR ab negative  [] [place] sent    Consults  [x] heme onc note for thrombocytosis: studies sent, consult appreciated    Meds  [x] Continue Keppra 1.5g BID  [x] Continue Vimpat 200 BID  [x] Continue solumedrol 1g x5 days  [x] Continue IVIG x5 days + tylenol, benadryl    Imaging  [x] Repeat MRI brain w/ and w/o under anesthesia: looks improved  [x] rEEG: potential epileptogenic focus in R temporal, R centroparietal, R temporoocopital regions, mild-mod dysfunction, no seizure seen.   [x] CT C/A/P stool impaction, mild esophagitis and debris in trachea  [] pending repeat VEEG Focal myoclonic seizure in setting of UTI and electrolyte imbalances. 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 Patient now here for focal seizures presumed to be secondary to infectious etiology.    Plan:   Labs  [x] repeat LP done on 11/20, studies sent: gluc 98, protein 42, lymph predom 90%, gram stain neg, HSV negative, CSF PCR negative, flow cytometry negative. WNV IgG + in CSF, IgM negative, fungal cx negative, errlichia negative, lyme negative, borrelia negative, ACE negative, beta 2 microglobulin wnl, crypto neg  [] pending CSF studies: VDRL, VZV, toxo, MBP, histoplasma, EBV, ENCES, CMV, acid fast cx.   [x] HIV negative, TPO negative, thyroglobulin negative  [x] ESR, CRP elevated 5.62, B12 wnl, TSH wnl, folate wnl    [x] Anti MUSK, Anti achR ab negative  [] [facility] encephalitis panel sent  [x] thrombocytosis likely reactive, will monitor Hgb, if continues to decr will get FOBT    Consults  [x] heme onc note for thrombocytosis: studies sent, consult appreciated    Meds  [x] Continue Keppra 1.5g BID  [x] Continue Vimpat 200 BID  [x] Continue solumedrol 1g x5 days (last dose 11/27)  [x] Continue IVIG x5 days + tylenol, benadryl (last dose 11/27)    Imaging  [x] Repeat MRI brain w/ and w/o under anesthesia: looks improved  [x] rEEG: potential epileptogenic focus in R temporal, R centroparietal, R temporoocopital regions, mild-mod dysfunction, no seizure seen.   [x] CT C/A/P stool impaction, mild esophagitis and debris in trachea  [] pending repeat VEEG

## 2019-11-24 NOTE — PROGRESS NOTE ADULT - SUBJECTIVE AND OBJECTIVE BOX
pt non-verbal to obtain subjective assessment    acetaminophen    Suspension .. 650 milliGRAM(s) Oral every 6 hours PRN  acetaminophen   Tablet .. 325 milliGRAM(s) Oral daily  aMILoride 10 milliGRAM(s) Oral <User Schedule>  diphenhydrAMINE   Injectable 25 milliGRAM(s) IV Push daily  enoxaparin Injectable 40 milliGRAM(s) SubCutaneous daily  fluticasone propionate 50 MICROgram(s)/spray Nasal Spray 1 Spray(s) Both Nostrils two times a day  immune   globulin 10% (GAMMAGARD) IVPB 30 Gram(s) IV Intermittent daily  influenza   Vaccine 0.5 milliLiter(s) IntraMuscular once  labetalol 100 milliGRAM(s) Oral every 8 hours  lacosamide 200 milliGRAM(s) Oral two times a day  levETIRAcetam  Solution 1500 milliGRAM(s) Oral two times a day  LORazepam   Injectable 1 milliGRAM(s) IV Push every 6 hours PRN  magnesium oxide 400 milliGRAM(s) Oral two times a day with meals  methylPREDNISolone sodium succinate IVPB 1000 milliGRAM(s) IV Intermittent daily  pantoprazole  Injectable 40 milliGRAM(s) IV Push two times a day  polyethylene glycol 3350 17 Gram(s) Oral two times a day  potassium chloride   Solution 40 milliEquivalent(s) Enteral Tube daily  senna Syrup 10 milliLiter(s) Oral at bedtime  sodium chloride 0.9%. 1000 milliLiter(s) IV Continuous <Continuous>      No Known Allergies      ROS otherwise unobtainable as pt non-verbal    T(C): 36.3 (11-24-19 @ 05:03), Max: 36.4 (11-23-19 @ 21:11)  HR: 68 (11-24-19 @ 05:03) (68 - 90)  BP: 131/71 (11-24-19 @ 05:03) (98/62 - 131/71)  RR: 18 (11-24-19 @ 05:03) (18 - 18)  SpO2: 96% (11-24-19 @ 05:03) (94% - 96%)  PHYSICAL EXAM  Gen:  laying in bed, nad  H:  anicteric, eomi  CV:  RRR, S1, S2  Lungs:  CTA b/l  Ab soft/nt/nd  Ext:  no edema                          11.9   9.63  )-----------( 535      ( 23 Nov 2019 10:54 )             36.2                         12.5   11.98 )-----------( 526      ( 22 Nov 2019 09:11 )             37.9                         13.3   13.25 )-----------( 571      ( 21 Nov 2019 08:58 )             39.5   11-23    131<L>  |  93<L>  |  24<H>  ----------------------------<  165<H>  3.6   |  26  |  0.61    Ca    9.7      23 Nov 2019 07:33    TPro  8.3  /  Alb  3.1<L>  /  TBili  0.4  /  DBili  x   /  AST  40  /  ALT  39  /  AlkPhos  196<H>  11-23    Ferritin, Serum: 294 ng/mL (11.23.19 @ 10:43)

## 2019-11-24 NOTE — PROGRESS NOTE ADULT - SUBJECTIVE AND OBJECTIVE BOX
Interval History: no events overnight    MEDICATIONS  (STANDING):  acetaminophen   Tablet .. 325 milliGRAM(s) Oral daily  aMILoride 10 milliGRAM(s) Oral <User Schedule>  diphenhydrAMINE   Injectable 25 milliGRAM(s) IV Push daily  enoxaparin Injectable 40 milliGRAM(s) SubCutaneous daily  fluticasone propionate 50 MICROgram(s)/spray Nasal Spray 1 Spray(s) Both Nostrils two times a day  immune   globulin 10% (GAMMAGARD) IVPB 30 Gram(s) IV Intermittent daily  influenza   Vaccine 0.5 milliLiter(s) IntraMuscular once  labetalol 100 milliGRAM(s) Oral every 8 hours  lacosamide 200 milliGRAM(s) Oral two times a day  levETIRAcetam  Solution 1500 milliGRAM(s) Oral two times a day  magnesium oxide 400 milliGRAM(s) Oral two times a day with meals  methylPREDNISolone sodium succinate IVPB 1000 milliGRAM(s) IV Intermittent daily  pantoprazole  Injectable 40 milliGRAM(s) IV Push two times a day  polyethylene glycol 3350 17 Gram(s) Oral two times a day  potassium chloride   Solution 40 milliEquivalent(s) Enteral Tube daily  senna Syrup 10 milliLiter(s) Oral at bedtime  sodium chloride 0.9%. 1000 milliLiter(s) (75 mL/Hr) IV Continuous <Continuous>    MEDICATIONS  (PRN):  acetaminophen    Suspension .. 650 milliGRAM(s) Oral every 6 hours PRN Moderate Pain (4 - 6), Severe Pain (7 - 10)  LORazepam   Injectable 1 milliGRAM(s) IV Push every 6 hours PRN focal seizures    Allergies  No Known Allergies    Vital Signs Last 24 Hrs  T(C): 36.3 (24 Nov 2019 05:03), Max: 36.4 (23 Nov 2019 21:11)  T(F): 97.4 (24 Nov 2019 05:03), Max: 97.6 (23 Nov 2019 21:11)  HR: 68 (24 Nov 2019 05:03) (68 - 90)  BP: 131/71 (24 Nov 2019 05:03) (98/62 - 131/71)  BP(mean): --  RR: 18 (24 Nov 2019 05:03) (18 - 18)  SpO2: 96% (24 Nov 2019 05:03) (94% - 96%)  NEUROLOGICAL EXAM:  Mental Status: awake, patient's voice combination of hypophonia and ?non-fluent, patient intermittently followed simple commands, speaks randomly about different topics but is accurate. Keeps eyes closed, but opens on command, somatagnosia, thinks his left arm is examiners arm, when asked to move left side will move the right side.   Motor: right arm antigravity, left side plegic    Labs:   cbc                      11.9   9.63  )-----------( 535      ( 23 Nov 2019 10:54 )             36.2     [id number]    131<L>  |  93<L>  |  24<H>  ----------------------------<  165<H>  3.6   |  26  |  0.61    Ca    9.7      23 Nov 2019 07:33    TPro  8.3  /  Alb  3.1<L>  /  TBili  0.4  /  DBili  x   /  AST  40  /  ALT  39  /  AlkPhos  196<H>  11-23

## 2019-11-24 NOTE — PROGRESS NOTE ADULT - ASSESSMENT
1. Thrombocytosis    -- Platelets stable in 500s.  I suspect reactive  -- monitor for now  -- Low TIBC and Iron. Ferritin nl, not consistent w iron deficiency  -- trend counts for now  -- Doubt MPN. No need to check JAK2     2. Seizure   -F/u extensive neurology work up, s/p LP  -mgmt per neurology    3.  Anemia  --suspect dilutional  --trend cbc  --if continues to drop, need FOBT    Gopi Iverson MD  Hematology/Oncology  Cell:  [phone]  Office Phone: [phone]  Office Fax:  [phone] [address] [place] [address]

## 2019-11-25 LAB
ALBUMIN SERPL ELPH-MCNC: 3.1 G/DL — LOW (ref 3.3–5)
ALP SERPL-CCNC: 158 U/L — HIGH (ref 40–120)
ALT FLD-CCNC: 44 U/L — SIGNIFICANT CHANGE UP (ref 10–45)
ANION GAP SERPL CALC-SCNC: 10 MMOL/L — SIGNIFICANT CHANGE UP (ref 5–17)
ANION GAP SERPL CALC-SCNC: 11 MMOL/L — SIGNIFICANT CHANGE UP (ref 5–17)
AST SERPL-CCNC: 28 U/L — SIGNIFICANT CHANGE UP (ref 10–40)
BILIRUB SERPL-MCNC: 0.3 MG/DL — SIGNIFICANT CHANGE UP (ref 0.2–1.2)
BUN SERPL-MCNC: 33 MG/DL — HIGH (ref 7–23)
BUN SERPL-MCNC: 33 MG/DL — HIGH (ref 7–23)
CALCIUM SERPL-MCNC: 9.5 MG/DL — SIGNIFICANT CHANGE UP (ref 8.4–10.5)
CALCIUM SERPL-MCNC: 9.5 MG/DL — SIGNIFICANT CHANGE UP (ref 8.4–10.5)
CHLORIDE SERPL-SCNC: 97 MMOL/L — SIGNIFICANT CHANGE UP (ref 96–108)
CHLORIDE SERPL-SCNC: 98 MMOL/L — SIGNIFICANT CHANGE UP (ref 96–108)
CO2 SERPL-SCNC: 21 MMOL/L — LOW (ref 22–31)
CO2 SERPL-SCNC: 22 MMOL/L — SIGNIFICANT CHANGE UP (ref 22–31)
CREAT SERPL-MCNC: 0.59 MG/DL — SIGNIFICANT CHANGE UP (ref 0.5–1.3)
CREAT SERPL-MCNC: 0.61 MG/DL — SIGNIFICANT CHANGE UP (ref 0.5–1.3)
GLUCOSE SERPL-MCNC: 209 MG/DL — HIGH (ref 70–99)
GLUCOSE SERPL-MCNC: 329 MG/DL — HIGH (ref 70–99)
HCT VFR BLD CALC: 34.1 % — LOW (ref 39–50)
HGB BLD-MCNC: 11.3 G/DL — LOW (ref 13–17)
MCHC RBC-ENTMCNC: 30.3 PG — SIGNIFICANT CHANGE UP (ref 27–34)
MCHC RBC-ENTMCNC: 33.1 GM/DL — SIGNIFICANT CHANGE UP (ref 32–36)
MCV RBC AUTO: 91.4 FL — SIGNIFICANT CHANGE UP (ref 80–100)
PLATELET # BLD AUTO: 523 K/UL — HIGH (ref 150–400)
POTASSIUM SERPL-MCNC: 3.9 MMOL/L — SIGNIFICANT CHANGE UP (ref 3.5–5.3)
POTASSIUM SERPL-MCNC: 5.2 MMOL/L — SIGNIFICANT CHANGE UP (ref 3.5–5.3)
POTASSIUM SERPL-SCNC: 3.9 MMOL/L — SIGNIFICANT CHANGE UP (ref 3.5–5.3)
POTASSIUM SERPL-SCNC: 5.2 MMOL/L — SIGNIFICANT CHANGE UP (ref 3.5–5.3)
PROT SERPL-MCNC: 8.9 G/DL — HIGH (ref 6–8.3)
RBC # BLD: 3.73 M/UL — LOW (ref 4.2–5.8)
RBC # FLD: 14.6 % — HIGH (ref 10.3–14.5)
SODIUM SERPL-SCNC: 129 MMOL/L — LOW (ref 135–145)
SODIUM SERPL-SCNC: 130 MMOL/L — LOW (ref 135–145)
WBC # BLD: 16 K/UL — HIGH (ref 3.8–10.5)
WBC # FLD AUTO: 16 K/UL — HIGH (ref 3.8–10.5)
WNV RNA SPEC QL NAA+PROBE: SIGNIFICANT CHANGE UP
WNV RNA SPEC QL NAA+PROBE: SIGNIFICANT CHANGE UP

## 2019-11-25 PROCEDURE — 99232 SBSQ HOSP IP/OBS MODERATE 35: CPT

## 2019-11-25 RX ORDER — SODIUM CHLORIDE 9 MG/ML
1 INJECTION INTRAMUSCULAR; INTRAVENOUS; SUBCUTANEOUS
Refills: 0 | Status: DISCONTINUED | OUTPATIENT
Start: 2019-11-25 | End: 2019-11-25

## 2019-11-25 RX ORDER — SODIUM CHLORIDE 9 MG/ML
2 INJECTION INTRAMUSCULAR; INTRAVENOUS; SUBCUTANEOUS
Refills: 0 | Status: DISCONTINUED | OUTPATIENT
Start: 2019-11-25 | End: 2019-11-26

## 2019-11-25 RX ADMIN — MAGNESIUM OXIDE 400 MG ORAL TABLET 400 MILLIGRAM(S): 241.3 TABLET ORAL at 08:48

## 2019-11-25 RX ADMIN — PANTOPRAZOLE SODIUM 40 MILLIGRAM(S): 20 TABLET, DELAYED RELEASE ORAL at 17:26

## 2019-11-25 RX ADMIN — Medication 100 MILLIGRAM(S): at 05:06

## 2019-11-25 RX ADMIN — Medication 100 MILLIGRAM(S): at 14:43

## 2019-11-25 RX ADMIN — IMMUNE GLOBULIN (HUMAN) 50 GRAM(S): 10 INJECTION INTRAVENOUS; SUBCUTANEOUS at 12:46

## 2019-11-25 RX ADMIN — Medication 25 MILLIGRAM(S): at 12:23

## 2019-11-25 RX ADMIN — Medication 40 MILLIEQUIVALENT(S): at 12:23

## 2019-11-25 RX ADMIN — MAGNESIUM OXIDE 400 MG ORAL TABLET 400 MILLIGRAM(S): 241.3 TABLET ORAL at 17:26

## 2019-11-25 RX ADMIN — SODIUM CHLORIDE 2 GRAM(S): 9 INJECTION INTRAMUSCULAR; INTRAVENOUS; SUBCUTANEOUS at 08:48

## 2019-11-25 RX ADMIN — LEVETIRACETAM 1500 MILLIGRAM(S): 250 TABLET, FILM COATED ORAL at 17:26

## 2019-11-25 RX ADMIN — SODIUM CHLORIDE 75 MILLILITER(S): 9 INJECTION INTRAMUSCULAR; INTRAVENOUS; SUBCUTANEOUS at 05:13

## 2019-11-25 RX ADMIN — LEVETIRACETAM 1500 MILLIGRAM(S): 250 TABLET, FILM COATED ORAL at 05:06

## 2019-11-25 RX ADMIN — LACOSAMIDE 200 MILLIGRAM(S): 50 TABLET ORAL at 17:25

## 2019-11-25 RX ADMIN — Medication 1 SPRAY(S): at 17:26

## 2019-11-25 RX ADMIN — ENOXAPARIN SODIUM 40 MILLIGRAM(S): 100 INJECTION SUBCUTANEOUS at 12:23

## 2019-11-25 RX ADMIN — Medication 10 MILLIGRAM(S): at 22:09

## 2019-11-25 RX ADMIN — Medication 10 MILLIGRAM(S): at 14:43

## 2019-11-25 RX ADMIN — Medication 100 MILLIGRAM(S): at 22:09

## 2019-11-25 RX ADMIN — Medication 116 MILLIGRAM(S): at 05:06

## 2019-11-25 RX ADMIN — Medication 1 SPRAY(S): at 05:06

## 2019-11-25 RX ADMIN — PANTOPRAZOLE SODIUM 40 MILLIGRAM(S): 20 TABLET, DELAYED RELEASE ORAL at 05:06

## 2019-11-25 RX ADMIN — LACOSAMIDE 200 MILLIGRAM(S): 50 TABLET ORAL at 05:13

## 2019-11-25 RX ADMIN — Medication 10 MILLIGRAM(S): at 08:49

## 2019-11-25 RX ADMIN — SODIUM CHLORIDE 2 GRAM(S): 9 INJECTION INTRAMUSCULAR; INTRAVENOUS; SUBCUTANEOUS at 22:09

## 2019-11-25 NOTE — PROGRESS NOTE ADULT - ASSESSMENT
pended    Focal myoclonic seizure in setting of UTI and electrolyte imbalances. 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 Patient now here for focal seizures presumed to be secondary to infectious etiology.    Plan:   Labs  [x] repeat LP done on 11/20, studies sent: gluc 98, protein 42, lymph predom 90%, gram stain neg, HSV negative, CSF PCR negative, flow cytometry negative. WNV IgG + in CSF, IgM negative, fungal cx negative, errlichia negative, lyme negative, borrelia negative, ACE negative, beta 2 microglobulin wnl, crypto neg  [] pending CSF studies: VDRL, VZV, toxo, MBP, histoplasma, EBV, ENCES, CMV, acid fast cx.   [x] HIV negative, TPO negative, thyroglobulin negative  [x] ESR, CRP elevated 5.62, B12 wnl, TSH wnl, folate wnl    [x] Anti MUSK, Anti achR ab negative  [] [facility] encephalitis panel sent  [x] thrombocytosis likely reactive, will monitor Hgb, if continues to decr will get FOBT    Consults  [x] heme onc note for thrombocytosis: studies sent, consult appreciated    Meds  [x] Continue Keppra 1.5g BID  [x] Continue Vimpat 200 BID  [x] Continue solumedrol 1g x5 days (last dose 11/27)  [x] Continue IVIG x5 days + tylenol, benadryl (last dose 11/27)    Imaging  [x] Repeat MRI brain w/ and w/o under anesthesia: looks improved  [x] rEEG: potential epileptogenic focus in R temporal, R centroparietal, R temporoocopital regions, mild-mod dysfunction, no seizure seen.   [x] CT C/A/P stool impaction, mild esophagitis and debris in trachea  [] pending repeat VEEG Focal myoclonic seizure in setting of UTI and electrolyte imbalances. 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 Patient now here for focal seizures presumed to be secondary to infectious etiology. His exam is improved today, more awake and moving LLE.     Plan:   Labs  [x] repeat LP done on 11/20, studies sent: gluc 98, protein 42, lymph predom 90%, gram stain neg, HSV negative, CSF PCR negative, flow cytometry negative. WNV IgG + in CSF, IgM negative, fungal cx negative, errlichia negative, lyme negative, borrelia negative, ACE negative, beta 2 microglobulin wnl, crypto neg, acid fast negative  [] pending CSF studies: VDRL, VZV, toxo, MBP, histoplasma, EBV, ENCES, CMV,  [x] HIV negative, TPO negative, thyroglobulin negative  [x] ESR, CRP elevated 5.62, B12 wnl, TSH wnl, folate wnl    [x] Anti MUSK, Anti achR ab negative  [] [facility] encephalitis panel sent  [x] thrombocytosis likely reactive, will monitor Hgb, if continues to decr will get FOBT  [x] started on salt tabs for hyponatremia.     Consults  [x] heme onc note for thrombocytosis: studies sent, consult appreciated    Meds  [x] Continue Keppra 1.5g BID  [x] Continue Vimpat 200 BID  [x] Continue solumedrol 1g x5 days (last dose 11/27)  [x] Continue IVIG x5 days + tylenol, benadryl (last dose 11/27)    Imaging  [x] Repeat MRI brain w/ and w/o under anesthesia: looks improved  [x] rEEG: potential epileptogenic focus in R temporal, R centroparietal, R temporoocopital regions, mild-mod dysfunction, no seizure seen.   [x] CT C/A/P stool impaction, mild esophagitis and debris in trachea  [] pending repeat VEEG

## 2019-11-25 NOTE — PROGRESS NOTE ADULT - SUBJECTIVE AND OBJECTIVE BOX
Interval History: No events overnight    MEDICATIONS  (STANDING):  acetaminophen   Tablet .. 325 milliGRAM(s) Oral daily  aMILoride 10 milliGRAM(s) Oral <User Schedule>  diphenhydrAMINE   Injectable 25 milliGRAM(s) IV Push daily  enoxaparin Injectable 40 milliGRAM(s) SubCutaneous daily  fluticasone propionate 50 MICROgram(s)/spray Nasal Spray 1 Spray(s) Both Nostrils two times a day  immune   globulin 10% (GAMMAGARD) IVPB 30 Gram(s) IV Intermittent daily  influenza   Vaccine 0.5 milliLiter(s) IntraMuscular once  labetalol 100 milliGRAM(s) Oral every 8 hours  lacosamide 200 milliGRAM(s) Oral two times a day  levETIRAcetam  Solution 1500 milliGRAM(s) Oral two times a day  magnesium oxide 400 milliGRAM(s) Oral two times a day with meals  methylPREDNISolone sodium succinate IVPB 1000 milliGRAM(s) IV Intermittent daily  pantoprazole  Injectable 40 milliGRAM(s) IV Push two times a day  polyethylene glycol 3350 17 Gram(s) Oral two times a day  potassium chloride   Solution 40 milliEquivalent(s) Enteral Tube daily  senna Syrup 10 milliLiter(s) Oral at bedtime  sodium chloride 0.9%. 1000 milliLiter(s) (75 mL/Hr) IV Continuous <Continuous>    MEDICATIONS  (PRN):  acetaminophen    Suspension .. 650 milliGRAM(s) Oral every 6 hours PRN Moderate Pain (4 - 6), Severe Pain (7 - 10)  LORazepam   Injectable 1 milliGRAM(s) IV Push every 6 hours PRN focal seizures    Allergies  No Known Allergies    Vital Signs Last 24 Hrs  T(C): 36.4 (25 Nov 2019 05:04), Max: 36.4 (25 Nov 2019 05:04)  T(F): 97.6 (25 Nov 2019 05:04), Max: 97.6 (25 Nov 2019 05:04)  HR: 67 (25 Nov 2019 05:04) (60 - 97)  BP: 110/55 (25 Nov 2019 05:04) (107/67 - 139/77)  BP(mean): --  RR: 18 (25 Nov 2019 05:04) (18 - 18)  SpO2: 95% (25 Nov 2019 05:04) (95% - 97%)    NEUROLOGICAL EXAM:  Mental Status: awake, patient's voice combination of hypophonia and ?non-fluent, patient intermittently followed simple commands, speaks randomly about different topics but is accurate. Keeps eyes closed, but opens on command, somatagnosia, thinks his left arm is examiners arm, when asked to move left side will move the right side.   Motor: right arm antigravity, left side plegic    Labs:   cbc                      11.3   12.38 )-----------( 515      ( 24 Nov 2019 13:37 )             34.7     [id number]    131<L>  |  94<L>  |  34<H>  ----------------------------<  302<H>  4.3   |  22  |  0.59    Ca    9.1      24 Nov 2019 10:31    TPro  8.3  /  Alb  3.1<L>  /  TBili  0.4  /  DBili  x   /  AST  40  /  ALT  39  /  AlkPhos  196<H>  11-23 Interval History: No events overnight, patients exam is improved from before, more responsive and moving his [place] AG. on day 3/5 of IVIG and solumedrol IV. started on salt tabs 2g for hyponatremia, pending repeat BMP at 7 PM.     MEDICATIONS  (STANDING):  acetaminophen   Tablet .. 325 milliGRAM(s) Oral daily  aMILoride 10 milliGRAM(s) Oral <User Schedule>  diphenhydrAMINE   Injectable 25 milliGRAM(s) IV Push daily  enoxaparin Injectable 40 milliGRAM(s) SubCutaneous daily  fluticasone propionate 50 MICROgram(s)/spray Nasal Spray 1 Spray(s) Both Nostrils two times a day  immune   globulin 10% (GAMMAGARD) IVPB 30 Gram(s) IV Intermittent daily  influenza   Vaccine 0.5 milliLiter(s) IntraMuscular once  labetalol 100 milliGRAM(s) Oral every 8 hours  lacosamide 200 milliGRAM(s) Oral two times a day  levETIRAcetam  Solution 1500 milliGRAM(s) Oral two times a day  magnesium oxide 400 milliGRAM(s) Oral two times a day with meals  methylPREDNISolone sodium succinate IVPB 1000 milliGRAM(s) IV Intermittent daily  pantoprazole  Injectable 40 milliGRAM(s) IV Push two times a day  polyethylene glycol 3350 17 Gram(s) Oral two times a day  potassium chloride   Solution 40 milliEquivalent(s) Enteral Tube daily  senna Syrup 10 milliLiter(s) Oral at bedtime  sodium chloride 0.9%. 1000 milliLiter(s) (75 mL/Hr) IV Continuous <Continuous>    MEDICATIONS  (PRN):  acetaminophen    Suspension .. 650 milliGRAM(s) Oral every 6 hours PRN Moderate Pain (4 - 6), Severe Pain (7 - 10)  LORazepam   Injectable 1 milliGRAM(s) IV Push every 6 hours PRN focal seizures    Vital Signs Last 24 Hrs  T(C): 36.4 (25 Nov 2019 05:04), Max: 36.4 (25 Nov 2019 05:04)  T(F): 97.6 (25 Nov 2019 05:04), Max: 97.6 (25 Nov 2019 05:04)  HR: 67 (25 Nov 2019 05:04) (60 - 97)  BP: 110/55 (25 Nov 2019 05:04) (107/67 - 139/77)  BP(mean): --  RR: 18 (25 Nov 2019 05:04) (18 - 18)  SpO2: 95% (25 Nov 2019 05:04) (95% - 97%)    NEUROLOGICAL EXAM:  Mental Status: awake, nonfluent speech,, patient intermittently followed simple command. Intermittently opens eyes to voice,  Has L sided neglect,   Motor: L leg is AG, withdraws from pinching under L knee. Both RUE and RLE appear to be antigravity but unable to assess further.   Patellar reflexes 2+, +clonus in RLE at ankle.     Labs:   cbc                      11.3   12.38 )-----------( 515      ( 24 Nov 2019 13:37 )             34.7     [id number]    131<L>  |  94<L>  |  34<H>  ----------------------------<  302<H>  4.3   |  22  |  0.59    Ca    9.1      24 Nov 2019 10:31    TPro  8.3  /  Alb  3.1<L>  /  TBili  0.4  /  DBili  x   /  AST  40  /  ALT  39  /  AlkPhos  196<H>  11-23 Interval History: No events overnight, patients exam is improved from before, more responsive and moving his LLE AG. on day 3/5 of IVIG and solumedrol IV. started on salt tabs 2g for hyponatremia, pending repeat BMP at 7 PM.     MEDICATIONS  (STANDING):  acetaminophen   Tablet .. 325 milliGRAM(s) Oral daily  aMILoride 10 milliGRAM(s) Oral <User Schedule>  diphenhydrAMINE   Injectable 25 milliGRAM(s) IV Push daily  enoxaparin Injectable 40 milliGRAM(s) SubCutaneous daily  fluticasone propionate 50 MICROgram(s)/spray Nasal Spray 1 Spray(s) Both Nostrils two times a day  immune   globulin 10% (GAMMAGARD) IVPB 30 Gram(s) IV Intermittent daily  influenza   Vaccine 0.5 milliLiter(s) IntraMuscular once  labetalol 100 milliGRAM(s) Oral every 8 hours  lacosamide 200 milliGRAM(s) Oral two times a day  levETIRAcetam  Solution 1500 milliGRAM(s) Oral two times a day  magnesium oxide 400 milliGRAM(s) Oral two times a day with meals  methylPREDNISolone sodium succinate IVPB 1000 milliGRAM(s) IV Intermittent daily  pantoprazole  Injectable 40 milliGRAM(s) IV Push two times a day  polyethylene glycol 3350 17 Gram(s) Oral two times a day  potassium chloride   Solution 40 milliEquivalent(s) Enteral Tube daily  senna Syrup 10 milliLiter(s) Oral at bedtime  sodium chloride 0.9%. 1000 milliLiter(s) (75 mL/Hr) IV Continuous <Continuous>    MEDICATIONS  (PRN):  acetaminophen    Suspension .. 650 milliGRAM(s) Oral every 6 hours PRN Moderate Pain (4 - 6), Severe Pain (7 - 10)  LORazepam   Injectable 1 milliGRAM(s) IV Push every 6 hours PRN focal seizures    Vital Signs Last 24 Hrs  T(C): 36.4 (25 Nov 2019 05:04), Max: 36.4 (25 Nov 2019 05:04)  T(F): 97.6 (25 Nov 2019 05:04), Max: 97.6 (25 Nov 2019 05:04)  HR: 67 (25 Nov 2019 05:04) (60 - 97)  BP: 110/55 (25 Nov 2019 05:04) (107/67 - 139/77)  BP(mean): --  RR: 18 (25 Nov 2019 05:04) (18 - 18)  SpO2: 95% (25 Nov 2019 05:04) (95% - 97%)    NEUROLOGICAL EXAM:  Mental Status: awake, nonfluent speech,, patient intermittently followed simple command. Intermittently opens eyes to voice,  Has L sided neglect,   Motor: L leg is AG, withdraws from pinching under L knee. Both RUE and RLE appear to be antigravity but unable to assess further.   Patellar reflexes 2+, +clonus in RLE at ankle.     Labs:   cbc                      11.3   12.38 )-----------( 515      ( 24 Nov 2019 13:37 )             34.7     [id number]    131<L>  |  94<L>  |  34<H>  ----------------------------<  302<H>  4.3   |  22  |  0.59    Ca    9.1      24 Nov 2019 10:31    TPro  8.3  /  Alb  3.1<L>  /  TBili  0.4  /  DBili  x   /  AST  40  /  ALT  39  /  AlkPhos  196<H>  11-23

## 2019-11-26 ENCOUNTER — APPOINTMENT (OUTPATIENT)
Dept: NEUROLOGY | Facility: CLINIC | Age: 84
End: 2019-11-26

## 2019-11-26 LAB
ALBUMIN SERPL ELPH-MCNC: 3.1 G/DL — LOW (ref 3.3–5)
ALP SERPL-CCNC: 159 U/L — HIGH (ref 40–120)
ALT FLD-CCNC: 55 U/L — HIGH (ref 10–45)
ANION GAP SERPL CALC-SCNC: 11 MMOL/L — SIGNIFICANT CHANGE UP (ref 5–17)
AST SERPL-CCNC: 31 U/L — SIGNIFICANT CHANGE UP (ref 10–40)
B BURGDOR DNA SPEC QL NAA+PROBE: NEGATIVE — SIGNIFICANT CHANGE UP
BILIRUB SERPL-MCNC: 0.4 MG/DL — SIGNIFICANT CHANGE UP (ref 0.2–1.2)
BUN SERPL-MCNC: 31 MG/DL — HIGH (ref 7–23)
CALCIUM SERPL-MCNC: 9.6 MG/DL — SIGNIFICANT CHANGE UP (ref 8.4–10.5)
CHLORIDE SERPL-SCNC: 98 MMOL/L — SIGNIFICANT CHANGE UP (ref 96–108)
CO2 SERPL-SCNC: 23 MMOL/L — SIGNIFICANT CHANGE UP (ref 22–31)
CREAT SERPL-MCNC: 0.58 MG/DL — SIGNIFICANT CHANGE UP (ref 0.5–1.3)
GLUCOSE SERPL-MCNC: 259 MG/DL — HIGH (ref 70–99)
HCT VFR BLD CALC: 35 % — LOW (ref 39–50)
HGB BLD-MCNC: 11.5 G/DL — LOW (ref 13–17)
MCHC RBC-ENTMCNC: 29.9 PG — SIGNIFICANT CHANGE UP (ref 27–34)
MCHC RBC-ENTMCNC: 32.9 GM/DL — SIGNIFICANT CHANGE UP (ref 32–36)
MCV RBC AUTO: 90.9 FL — SIGNIFICANT CHANGE UP (ref 80–100)
PLATELET # BLD AUTO: 519 K/UL — HIGH (ref 150–400)
POTASSIUM SERPL-MCNC: 3.9 MMOL/L — SIGNIFICANT CHANGE UP (ref 3.5–5.3)
POTASSIUM SERPL-SCNC: 3.9 MMOL/L — SIGNIFICANT CHANGE UP (ref 3.5–5.3)
PROT SERPL-MCNC: 9.3 G/DL — HIGH (ref 6–8.3)
RBC # BLD: 3.85 M/UL — LOW (ref 4.2–5.8)
RBC # FLD: 14.6 % — HIGH (ref 10.3–14.5)
SODIUM SERPL-SCNC: 132 MMOL/L — LOW (ref 135–145)
TTG IGA SER-ACNC: <1.2 U/ML — SIGNIFICANT CHANGE UP
TTG IGA SER-ACNC: NEGATIVE — SIGNIFICANT CHANGE UP
TTG IGG SER IA-ACNC: NEGATIVE — SIGNIFICANT CHANGE UP
TTG IGG SER-ACNC: 2.1 U/ML — SIGNIFICANT CHANGE UP
WBC # BLD: 17.09 K/UL — HIGH (ref 3.8–10.5)
WBC # FLD AUTO: 17.09 K/UL — HIGH (ref 3.8–10.5)

## 2019-11-26 PROCEDURE — 99232 SBSQ HOSP IP/OBS MODERATE 35: CPT

## 2019-11-26 RX ORDER — SODIUM CHLORIDE 9 MG/ML
2 INJECTION INTRAMUSCULAR; INTRAVENOUS; SUBCUTANEOUS THREE TIMES A DAY
Refills: 0 | Status: DISCONTINUED | OUTPATIENT
Start: 2019-11-26 | End: 2019-12-01

## 2019-11-26 RX ADMIN — LACOSAMIDE 200 MILLIGRAM(S): 50 TABLET ORAL at 17:29

## 2019-11-26 RX ADMIN — ENOXAPARIN SODIUM 40 MILLIGRAM(S): 100 INJECTION SUBCUTANEOUS at 12:30

## 2019-11-26 RX ADMIN — Medication 1 SPRAY(S): at 05:12

## 2019-11-26 RX ADMIN — SENNA PLUS 10 MILLILITER(S): 8.6 TABLET ORAL at 22:24

## 2019-11-26 RX ADMIN — Medication 25 MILLIGRAM(S): at 14:18

## 2019-11-26 RX ADMIN — LEVETIRACETAM 1500 MILLIGRAM(S): 250 TABLET, FILM COATED ORAL at 17:26

## 2019-11-26 RX ADMIN — PANTOPRAZOLE SODIUM 40 MILLIGRAM(S): 20 TABLET, DELAYED RELEASE ORAL at 17:26

## 2019-11-26 RX ADMIN — Medication 100 MILLIGRAM(S): at 14:19

## 2019-11-26 RX ADMIN — Medication 10 MILLIGRAM(S): at 22:24

## 2019-11-26 RX ADMIN — Medication 40 MILLIEQUIVALENT(S): at 12:30

## 2019-11-26 RX ADMIN — SODIUM CHLORIDE 75 MILLILITER(S): 9 INJECTION INTRAMUSCULAR; INTRAVENOUS; SUBCUTANEOUS at 17:26

## 2019-11-26 RX ADMIN — Medication 10 MILLIGRAM(S): at 14:18

## 2019-11-26 RX ADMIN — Medication 100 MILLIGRAM(S): at 05:13

## 2019-11-26 RX ADMIN — LEVETIRACETAM 1500 MILLIGRAM(S): 250 TABLET, FILM COATED ORAL at 05:13

## 2019-11-26 RX ADMIN — SODIUM CHLORIDE 75 MILLILITER(S): 9 INJECTION INTRAMUSCULAR; INTRAVENOUS; SUBCUTANEOUS at 08:25

## 2019-11-26 RX ADMIN — MAGNESIUM OXIDE 400 MG ORAL TABLET 400 MILLIGRAM(S): 241.3 TABLET ORAL at 17:26

## 2019-11-26 RX ADMIN — MAGNESIUM OXIDE 400 MG ORAL TABLET 400 MILLIGRAM(S): 241.3 TABLET ORAL at 08:24

## 2019-11-26 RX ADMIN — Medication 116 MILLIGRAM(S): at 05:13

## 2019-11-26 RX ADMIN — SODIUM CHLORIDE 2 GRAM(S): 9 INJECTION INTRAMUSCULAR; INTRAVENOUS; SUBCUTANEOUS at 22:24

## 2019-11-26 RX ADMIN — SODIUM CHLORIDE 2 GRAM(S): 9 INJECTION INTRAMUSCULAR; INTRAVENOUS; SUBCUTANEOUS at 14:18

## 2019-11-26 RX ADMIN — IMMUNE GLOBULIN (HUMAN) 50 GRAM(S): 10 INJECTION INTRAVENOUS; SUBCUTANEOUS at 14:52

## 2019-11-26 RX ADMIN — Medication 10 MILLIGRAM(S): at 08:24

## 2019-11-26 RX ADMIN — POLYETHYLENE GLYCOL 3350 17 GRAM(S): 17 POWDER, FOR SOLUTION ORAL at 17:26

## 2019-11-26 RX ADMIN — SODIUM CHLORIDE 75 MILLILITER(S): 9 INJECTION INTRAMUSCULAR; INTRAVENOUS; SUBCUTANEOUS at 05:13

## 2019-11-26 RX ADMIN — PANTOPRAZOLE SODIUM 40 MILLIGRAM(S): 20 TABLET, DELAYED RELEASE ORAL at 05:12

## 2019-11-26 RX ADMIN — Medication 1 SPRAY(S): at 17:26

## 2019-11-26 RX ADMIN — LACOSAMIDE 200 MILLIGRAM(S): 50 TABLET ORAL at 05:13

## 2019-11-26 NOTE — PROGRESS NOTE ADULT - SUBJECTIVE AND OBJECTIVE BOX
Interval History: [place] by PCR resulted - not detected. tolerating IVIG and solumedrol well (wbc elevated likely from that), exam is improving. no events overnight.     MEDICATIONS  (STANDING):  acetaminophen   Tablet .. 325 milliGRAM(s) Oral daily  aMILoride 10 milliGRAM(s) Oral <User Schedule>  diphenhydrAMINE   Injectable 25 milliGRAM(s) IV Push daily  enoxaparin Injectable 40 milliGRAM(s) SubCutaneous daily  fluticasone propionate 50 MICROgram(s)/spray Nasal Spray 1 Spray(s) Both Nostrils two times a day  immune   globulin 10% (GAMMAGARD) IVPB 30 Gram(s) IV Intermittent daily  influenza   Vaccine 0.5 milliLiter(s) IntraMuscular once  labetalol 100 milliGRAM(s) Oral every 8 hours  lacosamide 200 milliGRAM(s) Oral two times a day  levETIRAcetam  Solution 1500 milliGRAM(s) Oral two times a day  magnesium oxide 400 milliGRAM(s) Oral two times a day with meals  methylPREDNISolone sodium succinate IVPB 1000 milliGRAM(s) IV Intermittent daily  pantoprazole  Injectable 40 milliGRAM(s) IV Push two times a day  polyethylene glycol 3350 17 Gram(s) Oral two times a day  potassium chloride   Solution 40 milliEquivalent(s) Enteral Tube daily  senna Syrup 10 milliLiter(s) Oral at bedtime  sodium chloride 2 Gram(s) Oral three times a day  sodium chloride 0.9%. 1000 milliLiter(s) (75 mL/Hr) IV Continuous <Continuous>    MEDICATIONS  (PRN):  acetaminophen    Suspension .. 650 milliGRAM(s) Oral every 6 hours PRN Moderate Pain (4 - 6), Severe Pain (7 - 10)    Vital Signs Last 24 Hrs  T(C): 36.4 (26 Nov 2019 05:06), Max: 36.4 (25 Nov 2019 09:02)  T(F): 97.6 (26 Nov 2019 05:06), Max: 97.6 (25 Nov 2019 09:02)  HR: 60 (26 Nov 2019 05:06) (58 - 62)  BP: 119/69 (26 Nov 2019 05:06) (116/65 - 122/68)  BP(mean): --  RR: 18 (26 Nov 2019 05:06) (18 - 18)  SpO2: 98% (26 Nov 2019 05:06) (94% - 98%)    ****NEUROLOGICAL EXAM:  Mental Status: awake, nonfluent speech,, patient intermittently followed simple command. Intermittently opens eyes to voice,  Has L sided neglect,   Motor: L leg is AG, withdraws from pinching under L knee. Both RUE and RLE appear to be antigravity but unable to assess further.   Patellar reflexes 2+, +clonus in RLE at ankle.     Labs:   cbc                      11.3   16.00 )-----------( 523      ( 25 Nov 2019 09:05 )             34.1     [id number]    130<L>  |  98  |  33<H>  ----------------------------<  329<H>  5.2   |  21<L>  |  0.61    Ca    9.5      25 Nov 2019 19:42    TPro  8.9<H>  /  Alb  3.1<L>  /  TBili  0.3  /  DBili  x   /  AST  28  /  ALT  44  /  AlkPhos  158<H>  11-25 Interval History: [place] by PCR resulted - not detected. tolerating IVIG and solumedrol well (wbc elevated likely from that), exam is improving. no events overnight.     MEDICATIONS  (STANDING):  acetaminophen   Tablet .. 325 milliGRAM(s) Oral daily  aMILoride 10 milliGRAM(s) Oral <User Schedule>  diphenhydrAMINE   Injectable 25 milliGRAM(s) IV Push daily  enoxaparin Injectable 40 milliGRAM(s) SubCutaneous daily  fluticasone propionate 50 MICROgram(s)/spray Nasal Spray 1 Spray(s) Both Nostrils two times a day  immune   globulin 10% (GAMMAGARD) IVPB 30 Gram(s) IV Intermittent daily  influenza   Vaccine 0.5 milliLiter(s) IntraMuscular once  labetalol 100 milliGRAM(s) Oral every 8 hours  lacosamide 200 milliGRAM(s) Oral two times a day  levETIRAcetam  Solution 1500 milliGRAM(s) Oral two times a day  magnesium oxide 400 milliGRAM(s) Oral two times a day with meals  methylPREDNISolone sodium succinate IVPB 1000 milliGRAM(s) IV Intermittent daily  pantoprazole  Injectable 40 milliGRAM(s) IV Push two times a day  polyethylene glycol 3350 17 Gram(s) Oral two times a day  potassium chloride   Solution 40 milliEquivalent(s) Enteral Tube daily  senna Syrup 10 milliLiter(s) Oral at bedtime  sodium chloride 2 Gram(s) Oral three times a day  sodium chloride 0.9%. 1000 milliLiter(s) (75 mL/Hr) IV Continuous <Continuous>    MEDICATIONS  (PRN):  acetaminophen    Suspension .. 650 milliGRAM(s) Oral every 6 hours PRN Moderate Pain (4 - 6), Severe Pain (7 - 10)    Vital Signs Last 24 Hrs  T(C): 36.4 (26 Nov 2019 05:06), Max: 36.4 (25 Nov 2019 09:02)  T(F): 97.6 (26 Nov 2019 05:06), Max: 97.6 (25 Nov 2019 09:02)  HR: 60 (26 Nov 2019 05:06) (58 - 62)  BP: 119/69 (26 Nov 2019 05:06) (116/65 - 122/68)  BP(mean): --  RR: 18 (26 Nov 2019 05:06) (18 - 18)  SpO2: 98% (26 Nov 2019 05:06) (94% - 98%)    NEUROLOGICAL EXAM:  Mental Status: awake and alert, AOx3, able to follow commands, interactive, eyes are crossing midline to the L  L sided arm neglect resolved, able to recognize arm  Motor: bilateral legs are 5/5.   Patellar reflexes 2+    Labs:   cbc                      11.3   16.00 )-----------( 523      ( 25 Nov 2019 09:05 )             34.1     [id number]    130<L>  |  98  |  33<H>  ----------------------------<  329<H>  5.2   |  21<L>  |  0.61    Ca    9.5      25 Nov 2019 19:42    TPro  8.9<H>  /  Alb  3.1<L>  /  TBili  0.3  /  DBili  x   /  AST  28  /  ALT  44  /  AlkPhos  158<H>  11-25

## 2019-11-26 NOTE — PROGRESS NOTE ADULT - ASSESSMENT
Focal myoclonic seizure in setting of UTI and electrolyte imbalances. 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 Patient now here for focal seizures presumed to be secondary to infectious etiology. His exam is improved today, more awake and moving LLE.     Plan:   Labs  [x] repeat LP done on 11/20, studies sent: gluc 98, protein 42, lymph predom 90%, gram stain neg, HSV negative, CSF PCR negative, flow cytometry negative. WNV IgG + in CSF, IgM negative, fungal cx negative, errlichia negative, lyme negative, borrelia negative, ACE negative, beta 2 microglobulin wnl, crypto neg, acid fast negative  [] pending CSF studies: VDRL, VZV, toxo, MBP, histoplasma, EBV, ENCES, CMV,  [x] HIV negative, TPO negative, thyroglobulin negative  [x] ESR, CRP elevated 5.62, B12 wnl, TSH wnl, folate wnl    [x] Anti MUSK, Anti achR ab negative  [] [facility] encephalitis panel sent  [x] thrombocytosis likely reactive, will monitor Hgb, if continues to decr will get FOBT  [x] started on salt tabs for hyponatremia.     Consults  [x] heme onc note for thrombocytosis: studies sent, consult appreciated    Meds  [x] Continue Keppra 1.5g BID  [x] Continue Vimpat 200 BID  [x] Continue solumedrol 1g x5 days (last dose 11/27)  [x] Continue IVIG x5 days + tylenol, benadryl (last dose 11/27)    Imaging  [x] Repeat MRI brain w/ and w/o under anesthesia: looks improved  [x] rEEG: potential epileptogenic focus in R temporal, R centroparietal, R temporoocopital regions, mild-mod dysfunction, no seizure seen.   [x] CT C/A/P stool impaction, mild esophagitis and debris in trachea  [] pending repeat VEEG Focal myoclonic seizure in setting of UTI and electrolyte imbalances. 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 Patient now here for focal seizures presumed to be secondary to infectious etiology. His exam is improved today, much more awake and alert.     Plan:   Labs  [x] repeat LP done on 11/20, studies sent: gluc 98, protein 42, lymph predom 90%, gram stain neg, HSV negative, CSF PCR negative, flow cytometry negative. WNV IgG + in CSF, IgM negative, fungal cx negative, errlichia negative, lyme negative, borrelia negative, ACE negative, beta 2 microglobulin wnl, crypto neg, acid fast negative, CSF PCR negative for [place]  [] pending CSF studies: VDRL, VZV, toxo, MBP, histoplasma, EBV, ENCES, CMV,  [x] HIV negative, TPO negative, thyroglobulin negative  [x] ESR, CRP elevated 5.62, B12 wnl, TSH wnl, folate wnl    [x] Anti MUSK, Anti achR ab negative  [] [facility] encephalitis panel sent  [x] thrombocytosis likely reactive, will monitor Hgb, if continues to decr will get FOBT  [x] started on salt tabs for hyponatremia.     Consults  [x] heme onc note for thrombocytosis: studies sent, consult appreciated    Meds  [x] Continue Keppra 1.5g BID  [x] Continue Vimpat 200 BID  [x] Continue solumedrol 1g x5 days (last dose 11/27)  [x] Continue IVIG x5 days + tylenol, benadryl (last dose 11/27)    Imaging  [x] Repeat MRI brain w/ and w/o under anesthesia: looks improved  [x] rEEG: potential epileptogenic focus in R temporal, R centroparietal, R temporoocopital regions, mild-mod dysfunction, no seizure seen.   [x] CT C/A/P stool impaction, mild esophagitis and debris in trachea  [] pending repeat VEEG     Other  [] Pt/OT  [] S/s Focal myoclonic seizure in setting of UTI and electrolyte imbalances. 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 Patient now here for focal seizures presumed to be secondary to infectious etiology. His exam is improved today, much more awake and alert and oriented    Plan:   Labs  [x] repeat LP done on 11/20, studies sent: gluc 98, protein 42, lymph predom 90%, gram stain neg, HSV negative, CSF PCR negative, flow cytometry negative. WNV IgG + in CSF, IgM negative, fungal cx negative, errlichia negative, lyme negative, borrelia negative, ACE negative, beta 2 microglobulin wnl, crypto neg, acid fast negative, CSF PCR negative for [place]  [] pending CSF studies: VDRL, VZV, toxo, MBP, histoplasma, EBV, ENCES, CMV,  [x] HIV negative, TPO negative, thyroglobulin negative  [x] ESR, CRP elevated 5.62, B12 wnl, TSH wnl, folate wnl    [x] Anti MUSK, Anti achR ab negative  [] [facility] encephalitis panel sent  [x] thrombocytosis likely reactive, will monitor Hgb, if continues to decr will get FOBT  [x] started on salt tabs for hyponatremia.     Consults  [x] heme onc note for thrombocytosis: studies sent, consult appreciated    Meds  [x] Continue Keppra 1.5g BID  [x] Continue Vimpat 200 BID  [x] Continue solumedrol 1g x5 days (last dose 11/27)  [x] Continue IVIG x5 days + tylenol, benadryl (last dose 11/27)    Imaging  [x] Repeat MRI brain w/ and w/o under anesthesia: looks improved  [x] rEEG: potential epileptogenic focus in R temporal, R centroparietal, R temporoocopital regions, mild-mod dysfunction, no seizure seen.   [x] CT C/A/P stool impaction, mild esophagitis and debris in trachea  [] pending repeat VEEG     Other  [] Pt/OT  [] S/s

## 2019-11-27 LAB
C ALBICANS AG SER-ACNC: HIGH
CELIAC DISEASE INTERPRETATION: SIGNIFICANT CHANGE UP
CELIAC GENE PAIRS PRESENT: NO — SIGNIFICANT CHANGE UP
CORTICOSTEROID BINDING GLOBULIN RESULT: 1.9 MG/DL — SIGNIFICANT CHANGE UP
CORTIS F/TOTAL MFR SERPL: 47 % — SIGNIFICANT CHANGE UP
CORTIS SERPL-MCNC: 24 UG/DL — HIGH
CORTISOL, FREE RESULT: 11 UG/DL — HIGH
DQ ALPHA 1: SIGNIFICANT CHANGE UP
DQ BETA 1: SIGNIFICANT CHANGE UP
IMMUNOGLOBULIN A (IGA), S: 249 MG/DL — SIGNIFICANT CHANGE UP (ref 61–356)
MBP CSF-MCNC: <2 MCG/L — LOW (ref 2–4)
T GONDII AB CSF-ACNC: SIGNIFICANT CHANGE UP

## 2019-11-27 PROCEDURE — 95816 EEG AWAKE AND DROWSY: CPT | Mod: 26

## 2019-11-27 PROCEDURE — 99232 SBSQ HOSP IP/OBS MODERATE 35: CPT

## 2019-11-27 RX ORDER — DIPHENHYDRAMINE HCL 50 MG
25 CAPSULE ORAL ONCE
Refills: 0 | Status: COMPLETED | OUTPATIENT
Start: 2019-11-27 | End: 2019-11-27

## 2019-11-27 RX ADMIN — Medication 10 MILLIGRAM(S): at 10:28

## 2019-11-27 RX ADMIN — SODIUM CHLORIDE 2 GRAM(S): 9 INJECTION INTRAMUSCULAR; INTRAVENOUS; SUBCUTANEOUS at 22:49

## 2019-11-27 RX ADMIN — PANTOPRAZOLE SODIUM 40 MILLIGRAM(S): 20 TABLET, DELAYED RELEASE ORAL at 18:29

## 2019-11-27 RX ADMIN — Medication 100 MILLIGRAM(S): at 15:15

## 2019-11-27 RX ADMIN — Medication 25 MILLIGRAM(S): at 15:22

## 2019-11-27 RX ADMIN — SODIUM CHLORIDE 2 GRAM(S): 9 INJECTION INTRAMUSCULAR; INTRAVENOUS; SUBCUTANEOUS at 05:59

## 2019-11-27 RX ADMIN — IMMUNE GLOBULIN (HUMAN) 50 GRAM(S): 10 INJECTION INTRAVENOUS; SUBCUTANEOUS at 15:58

## 2019-11-27 RX ADMIN — SODIUM CHLORIDE 75 MILLILITER(S): 9 INJECTION INTRAMUSCULAR; INTRAVENOUS; SUBCUTANEOUS at 10:28

## 2019-11-27 RX ADMIN — LEVETIRACETAM 1500 MILLIGRAM(S): 250 TABLET, FILM COATED ORAL at 18:29

## 2019-11-27 RX ADMIN — MAGNESIUM OXIDE 400 MG ORAL TABLET 400 MILLIGRAM(S): 241.3 TABLET ORAL at 18:29

## 2019-11-27 RX ADMIN — PANTOPRAZOLE SODIUM 40 MILLIGRAM(S): 20 TABLET, DELAYED RELEASE ORAL at 05:59

## 2019-11-27 RX ADMIN — LACOSAMIDE 200 MILLIGRAM(S): 50 TABLET ORAL at 18:29

## 2019-11-27 RX ADMIN — Medication 100 MILLIGRAM(S): at 22:49

## 2019-11-27 RX ADMIN — Medication 40 MILLIEQUIVALENT(S): at 12:06

## 2019-11-27 RX ADMIN — Medication 1 SPRAY(S): at 18:29

## 2019-11-27 RX ADMIN — LEVETIRACETAM 1500 MILLIGRAM(S): 250 TABLET, FILM COATED ORAL at 05:59

## 2019-11-27 RX ADMIN — SENNA PLUS 10 MILLILITER(S): 8.6 TABLET ORAL at 22:49

## 2019-11-27 RX ADMIN — MAGNESIUM OXIDE 400 MG ORAL TABLET 400 MILLIGRAM(S): 241.3 TABLET ORAL at 10:28

## 2019-11-27 RX ADMIN — Medication 10 MILLIGRAM(S): at 22:49

## 2019-11-27 RX ADMIN — SODIUM CHLORIDE 2 GRAM(S): 9 INJECTION INTRAMUSCULAR; INTRAVENOUS; SUBCUTANEOUS at 15:14

## 2019-11-27 RX ADMIN — POLYETHYLENE GLYCOL 3350 17 GRAM(S): 17 POWDER, FOR SOLUTION ORAL at 18:29

## 2019-11-27 RX ADMIN — Medication 116 MILLIGRAM(S): at 05:59

## 2019-11-27 RX ADMIN — LACOSAMIDE 200 MILLIGRAM(S): 50 TABLET ORAL at 06:10

## 2019-11-27 RX ADMIN — Medication 100 MILLIGRAM(S): at 02:24

## 2019-11-27 RX ADMIN — Medication 1 SPRAY(S): at 05:59

## 2019-11-27 RX ADMIN — ENOXAPARIN SODIUM 40 MILLIGRAM(S): 100 INJECTION SUBCUTANEOUS at 12:05

## 2019-11-27 RX ADMIN — Medication 100 MILLIGRAM(S): at 05:59

## 2019-11-27 RX ADMIN — POLYETHYLENE GLYCOL 3350 17 GRAM(S): 17 POWDER, FOR SOLUTION ORAL at 05:59

## 2019-11-27 RX ADMIN — Medication 10 MILLIGRAM(S): at 15:15

## 2019-11-27 NOTE — PROGRESS NOTE ADULT - SUBJECTIVE AND OBJECTIVE BOX
no new changes    acetaminophen    Suspension .. 650 milliGRAM(s) Oral every 6 hours PRN  acetaminophen   Tablet .. 325 milliGRAM(s) Oral daily  aMILoride 10 milliGRAM(s) Oral <User Schedule>  enoxaparin Injectable 40 milliGRAM(s) SubCutaneous daily  fluticasone propionate 50 MICROgram(s)/spray Nasal Spray 1 Spray(s) Both Nostrils two times a day  influenza   Vaccine 0.5 milliLiter(s) IntraMuscular once  labetalol 100 milliGRAM(s) Oral every 8 hours  lacosamide 200 milliGRAM(s) Oral two times a day  levETIRAcetam  Solution 1500 milliGRAM(s) Oral two times a day  magnesium oxide 400 milliGRAM(s) Oral two times a day with meals  pantoprazole  Injectable 40 milliGRAM(s) IV Push two times a day  polyethylene glycol 3350 17 Gram(s) Oral two times a day  potassium chloride   Solution 40 milliEquivalent(s) Enteral Tube daily  senna Syrup 10 milliLiter(s) Oral at bedtime  sodium chloride 2 Gram(s) Oral three times a day  sodium chloride 0.9%. 1000 milliLiter(s) IV Continuous <Continuous>      No Known Allergies      ROS otherwise unobtainable     T(C): 36.3 (11-27-19 @ 16:17), Max: 36.6 (11-26-19 @ 19:38)  HR: 78 (11-27-19 @ 16:17) (65 - 79)  BP: 130/77 (11-27-19 @ 16:17) (121/70 - 139/82)  RR: 18 (11-27-19 @ 16:17) (17 - 18)  SpO2: 98% (11-27-19 @ 16:17) (95% - 98%)  PHYSICAL EXAM  Gen:  laying in bed, nad, confused  H:  anicteric, eomi  CV:  RRR, S1, S2  Lungs:  CTA b/l  Ab soft/nt/nd  Ext:  no edema                          11.5   17.09 )-----------( 519      ( 26 Nov 2019 08:44 )             35.0                         11.3   16.00 )-----------( 523      ( 25 Nov 2019 09:05 )             34.1                         11.3   12.38 )-----------( 515      ( 24 Nov 2019 13:37 )             34.7   11-26    132<L>  |  98  |  31<H>  ----------------------------<  259<H>  3.9   |  23  |  0.58    Ca    9.6      26 Nov 2019 06:22    TPro  9.3<H>  /  Alb  3.1<L>  /  TBili  0.4  /  DBili  x   /  AST  31  /  ALT  55<H>  /  AlkPhos  159<H>  11-26

## 2019-11-27 NOTE — SWALLOW BEDSIDE ASSESSMENT ADULT - SWALLOW EVAL: DIAGNOSIS
Pt admitted s/p seizure disorder, acute cystitis and acute metabolic encephalopathy from [facility]. Pt known to this service from previous admission s/p multiple bedside evaluations were completed with recommendations for NPO, with non-oral nutrition/hydration/medications. Pt currently NPO with PEG. Today, patient presents with 1) inability to manage secretions at baseline 2) Dysarthria 3) Cognitive impairment. Pt admitted s/p seizure disorder, acute cystitis and acute metabolic encephalopathy from [facility]. Pt known to this service from previous admission s/p multiple bedside evaluations with recommendations for NPO, with non-oral nutrition/hydration/medications. Pt currently NPO with PEG. Today, patient presents with 1) evidence of poor secretion management 2) Dysarthria 3) Cognitive impairment. Pt admitted s/p seizure disorder, acute cystitis and acute metabolic encephalopathy from [facility]. Pt known to this service from previous admission s/p multiple bedside evaluations with recommendations for NPO, with non-oral nutrition/hydration/medications. Pt currently NPO with PEG. Today, patient presents with 1) evidence of poor secretion management. SLP cued patient to cough and expectorate secretions; however, unable. Therefore, trials deferred 2) Dysarthria 3) Cognitive impairment.

## 2019-11-27 NOTE — PROGRESS NOTE ADULT - ASSESSMENT
PENDED NOTE    Focal myoclonic seizure in setting of UTI and electrolyte imbalances. 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 WNV IgG + in CSF, IgM negative, fungal cx negative, errlichia negative, lyme negative, borrelia negative, ACE negative, beta 2 microglobulin wnl, crypto neg, acid fast negative, CSF PCR negative for [place], MBP negative  [] pending CSF studies: VDRL, VZV, toxo, histoplasma, EBV, ENCES, CMV,  [x] HIV negative, TPO negative, thyroglobulin negative  [x] ESR, CRP elevated 5.62, B12 wnl, TSH wnl, folate wnl    [x] Anti MUSK, Anti achR ab negative  [] [facility] encephalitis panel sent  [x] thrombocytosis likely reactive, will monitor Hgb, if continues to decr will get FOBT  [x] started on salt tabs for hyponatremia.     Consults  [x] heme onc note for thrombocytosis: studies sent, consult appreciated    Meds  [x] Continue Keppra 1.5g BID  [x] Continue Vimpat 200 BID  [x] Continue solumedrol 1g x5 days (last dose 11/27)  [x] Continue IVIG x5 days + tylenol, benadryl (last dose 11/27)    Imaging  [x] Repeat MRI brain w/ and w/o under anesthesia: looks improved  [x] rEEG: potential epileptogenic focus in R temporal, R centroparietal, R temporoocopital regions, mild-mod dysfunction, no seizure seen.   [x] CT C/A/P stool impaction, mild esophagitis and debris in trachea  [] pending repeat VEEG     Other  [x] Pt/OT: for MAEVE  [] S/s pending Focal myoclonic seizure in setting of UTI and electrolyte imbalances. 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 Last day of IVIG, solumedrol today, then will start taper.     Plan:   Labs  [x] repeat LP done on 11/20, studies sent: gluc 98, protein 42, lymph predom 90%, gram stain neg, HSV negative, CSF PCR negative, flow cytometry negative. WNV IgG + in CSF, IgM negative, fungal cx negative, errlichia negative, lyme negative, borrelia negative, ACE negative, beta 2 microglobulin wnl, crypto neg, acid fast negative, CSF PCR negative for [place], MBP negative  [] pending CSF studies: VDRL, VZV, toxo, histoplasma, EBV, ENCES, CMV,  [x] HIV negative, TPO negative, thyroglobulin negative  [x] ESR, CRP elevated 5.62, B12 wnl, TSH wnl, folate wnl    [x] Anti MUSK, Anti achR ab negative  [] [place] [facility] encephalitis panel sent  [x] thrombocytosis likely reactive, will monitor Hgb, if continues to decr will get FOBT  [x] started on salt tabs for hyponatremia.     Consults  [x] heme onc note for thrombocytosis: studies sent, consult appreciated    Meds  [] taper regimen: 60 x1 week (starting 11/28), then 40 x1 week, then 20 x1 week, then 10 x1 week.   [x] Continue Keppra 1.5g BID  [x] Continue Vimpat 200 BID  [x] Continue solumedrol 1g x5 days (last dose 11/27)  [x] Continue IVIG x5 days + tylenol, benadryl (last dose 11/27)    Imaging  [x] Repeat MRI brain w/ and w/o under anesthesia: looks improved  [x] rEEG: potential epileptogenic focus in R temporal, R centroparietal, R temporoocopital regions, mild-mod dysfunction, no seizure seen.   [x] CT C/A/P stool impaction, mild esophagitis and debris in trachea  [] pending repeat VEEG     Other  [x] Pt/OT: for MAEVE  [] S/s pending Focal myoclonic seizure in setting of UTI and electrolyte imbalances. 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 Last day of IVIG, solumedrol today, then will start taper.     Plan:   Labs  [x] repeat LP done on 11/20, studies sent: gluc 98, protein 42, lymph predom 90%, gram stain neg, HSV negative, CSF PCR negative, flow cytometry negative. WNV IgG + in CSF, IgM negative, fungal cx negative, errlichia negative, lyme negative, borrelia negative, ACE negative, beta 2 microglobulin wnl, crypto neg, acid fast negative, CSF PCR negative for [place], MBP negative  [] pending CSF studies: VDRL, VZV, toxo, histoplasma, EBV, ENCES, CMV,  [x] HIV negative, TPO negative, thyroglobulin negative  [x] ESR, CRP elevated 5.62, B12 wnl, TSH wnl, folate wnl    [x] Anti MUSK, Anti achR ab negative  [] [place] [facility] encephalitis panel sent  [x] thrombocytosis likely reactive, will monitor Hgb, if continues to decr will get FOBT  [x] started on salt tabs for hyponatremia.     Consults  [x] heme onc note for thrombocytosis: studies sent, consult appreciated    Meds  [] taper regimen: 60 x1 week (starting 11/28), then 40 x1 week, then 20 x1 week, then 10 x1 week.   [x] Continue Keppra 1.5g BID  [x] Continue Vimpat 200 BID  [x] Continue solumedrol 1g x5 days (last dose 11/27)  [x] Continue IVIG x5 days + tylenol, benadryl (last dose 11/27)    Imaging  [x] Repeat MRI brain w/ and w/o under anesthesia: looks improved  [x] rEEG: potential epileptogenic focus in R temporal, R centroparietal, R temporoocopital regions, mild-mod dysfunction, no seizure seen.   [x] CT C/A/P stool impaction, mild esophagitis and debris in trachea  [] pending repeat VEEG     Other  [x] Pt/OT: for MAEVE  [] S/s pending

## 2019-11-27 NOTE — PROGRESS NOTE ADULT - ASSESSMENT
1. Thrombocytosis    -- Platelets stable in 500s.  I suspect reactive secondary to acute neurologic process  -- monitor for now  -- Low TIBC and Iron. Ferritin nl, not consistent w iron deficiency  -- trend counts for now  -- Doubt MPN. No need to check JAK2     2. 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

## 2019-11-27 NOTE — SWALLOW BEDSIDE ASSESSMENT ADULT - SLP GENERAL OBSERVATIONS
Pt encountered asleep, in bed. SLP elevated HOB. +PEG +Left facial weakness. Pt followed directives for purposes of evaluation. +Dsyarthria characterized by hoarse, wet, gurgly vocal quality, decreased vocal volume, and reduced intelligibility at the sentence level. Pt also with limited verbal output. SLP provided oral care with suctioning to remove dried mucous secretions from oral cavity. Pt with congested baseline cough. Pt encountered asleep, in bed on RA. Roused to verbal stimuli. A&Ox3. SLP elevated HOB for evaluation. +PEG +Left facial weakness. Pt required verbal cues to follow directives t/o exam. +Dysarthria characterized by hoarse and wet, gurgly vocal quality, decreased vocal volume, and reduced intelligibility at the sentence level. Pt also with limited verbal output. SLP provided oral care with suctioning to remove dried mucous secretions from oral cavity. Pt with congested and weak baseline cough.

## 2019-11-27 NOTE — PROGRESS NOTE ADULT - SUBJECTIVE AND OBJECTIVE BOX
Interval History:    MEDICATIONS  (STANDING):  acetaminophen   Tablet .. 325 milliGRAM(s) Oral daily  aMILoride 10 milliGRAM(s) Oral <User Schedule>  enoxaparin Injectable 40 milliGRAM(s) SubCutaneous daily  fluticasone propionate 50 MICROgram(s)/spray Nasal Spray 1 Spray(s) Both Nostrils two times a day  immune   globulin 10% (GAMMAGARD) IVPB 30 Gram(s) IV Intermittent daily  influenza   Vaccine 0.5 milliLiter(s) IntraMuscular once  labetalol 100 milliGRAM(s) Oral every 8 hours  lacosamide 200 milliGRAM(s) Oral two times a day  levETIRAcetam  Solution 1500 milliGRAM(s) Oral two times a day  magnesium oxide 400 milliGRAM(s) Oral two times a day with meals  pantoprazole  Injectable 40 milliGRAM(s) IV Push two times a day  polyethylene glycol 3350 17 Gram(s) Oral two times a day  potassium chloride   Solution 40 milliEquivalent(s) Enteral Tube daily  senna Syrup 10 milliLiter(s) Oral at bedtime  sodium chloride 2 Gram(s) Oral three times a day  sodium chloride 0.9%. 1000 milliLiter(s) (75 mL/Hr) IV Continuous <Continuous>    MEDICATIONS  (PRN):  acetaminophen    Suspension .. 650 milliGRAM(s) Oral every 6 hours PRN Moderate Pain (4 - 6), Severe Pain (7 - 10)    Vital Signs Last 24 Hrs  T(C): 36.6 (27 Nov 2019 04:11), Max: 36.7 (26 Nov 2019 15:05)  T(F): 97.8 (27 Nov 2019 04:11), Max: 98 (26 Nov 2019 15:05)  HR: 77 (27 Nov 2019 04:11) (65 - 79)  BP: 123/70 (27 Nov 2019 04:11) (114/68 - 130/73)  BP(mean): --  RR: 18 (27 Nov 2019 04:11) (18 - 18)  SpO2: 96% (27 Nov 2019 04:11) (95% - 99%)    NEUROLOGICAL EXAM:  Mental Status: awake and alert, AOx3, able to follow commands, interactive, eyes are crossing midline to the L  L sided arm neglect resolved, able to recognize arm  Motor: bilateral legs are 5/5.   Patellar reflexes 2+    Labs:   cbc                      11.5   17.09 )-----------( 519      ( 26 Nov 2019 08:44 )             35.0     [id number]    132<L>  |  98  |  31<H>  ----------------------------<  259<H>  3.9   |  23  |  0.58    Ca    9.6      26 Nov 2019 06:22 Interval History: no events overnight, PT eval recommended MAEVE. S/S eval pending    MEDICATIONS  (STANDING):  acetaminophen   Tablet .. 325 milliGRAM(s) Oral daily  aMILoride 10 milliGRAM(s) Oral <User Schedule>  enoxaparin Injectable 40 milliGRAM(s) SubCutaneous daily  fluticasone propionate 50 MICROgram(s)/spray Nasal Spray 1 Spray(s) Both Nostrils two times a day  immune   globulin 10% (GAMMAGARD) IVPB 30 Gram(s) IV Intermittent daily  influenza   Vaccine 0.5 milliLiter(s) IntraMuscular once  labetalol 100 milliGRAM(s) Oral every 8 hours  lacosamide 200 milliGRAM(s) Oral two times a day  levETIRAcetam  Solution 1500 milliGRAM(s) Oral two times a day  magnesium oxide 400 milliGRAM(s) Oral two times a day with meals  pantoprazole  Injectable 40 milliGRAM(s) IV Push two times a day  polyethylene glycol 3350 17 Gram(s) Oral two times a day  potassium chloride   Solution 40 milliEquivalent(s) Enteral Tube daily  senna Syrup 10 milliLiter(s) Oral at bedtime  sodium chloride 2 Gram(s) Oral three times a day  sodium chloride 0.9%. 1000 milliLiter(s) (75 mL/Hr) IV Continuous <Continuous>    MEDICATIONS  (PRN):  acetaminophen    Suspension .. 650 milliGRAM(s) Oral every 6 hours PRN Moderate Pain (4 - 6), Severe Pain (7 - 10)    Vital Signs Last 24 Hrs  T(C): 36.6 (27 Nov 2019 04:11), Max: 36.7 (26 Nov 2019 15:05)  T(F): 97.8 (27 Nov 2019 04:11), Max: 98 (26 Nov 2019 15:05)  HR: 77 (27 Nov 2019 04:11) (65 - 79)  BP: 123/70 (27 Nov 2019 04:11) (114/68 - 130/73)  BP(mean): --  RR: 18 (27 Nov 2019 04:11) (18 - 18)  SpO2: 96% (27 Nov 2019 04:11) (95% - 99%)    NEUROLOGICAL EXAM:  Mental Status: awake and alert, AOx3, able to follow commands, interactive, eyes are crossing midline to the L  L sided arm neglect resolved, able to recognize arm  Motor: bilateral legs are 5/5.   Patellar reflexes 2+    Labs:   cbc                      11.5   17.09 )-----------( 519      ( 26 Nov 2019 08:44 )             35.0     [id number]    132<L>  |  98  |  31<H>  ----------------------------<  259<H>  3.9   |  23  |  0.58    Ca    9.6      26 Nov 2019 06:22 Interval History: no events overnight, PT eval recommended MAEVE. S/S eval pending. more tired when evaluated but per nurse at bedside, full evaluation was done shortly prior and patient was alert and communicative.     MEDICATIONS  (STANDING):  acetaminophen   Tablet .. 325 milliGRAM(s) Oral daily  aMILoride 10 milliGRAM(s) Oral <User Schedule>  enoxaparin Injectable 40 milliGRAM(s) SubCutaneous daily  fluticasone propionate 50 MICROgram(s)/spray Nasal Spray 1 Spray(s) Both Nostrils two times a day  immune   globulin 10% (GAMMAGARD) IVPB 30 Gram(s) IV Intermittent daily  influenza   Vaccine 0.5 milliLiter(s) IntraMuscular once  labetalol 100 milliGRAM(s) Oral every 8 hours  lacosamide 200 milliGRAM(s) Oral two times a day  levETIRAcetam  Solution 1500 milliGRAM(s) Oral two times a day  magnesium oxide 400 milliGRAM(s) Oral two times a day with meals  pantoprazole  Injectable 40 milliGRAM(s) IV Push two times a day  polyethylene glycol 3350 17 Gram(s) Oral two times a day  potassium chloride   Solution 40 milliEquivalent(s) Enteral Tube daily  senna Syrup 10 milliLiter(s) Oral at bedtime  sodium chloride 2 Gram(s) Oral three times a day  sodium chloride 0.9%. 1000 milliLiter(s) (75 mL/Hr) IV Continuous <Continuous>    MEDICATIONS  (PRN):  acetaminophen    Suspension .. 650 milliGRAM(s) Oral every 6 hours PRN Moderate Pain (4 - 6), Severe Pain (7 - 10)    Vital Signs Last 24 Hrs  T(C): 36.6 (27 Nov 2019 04:11), Max: 36.7 (26 Nov 2019 15:05)  T(F): 97.8 (27 Nov 2019 04:11), Max: 98 (26 Nov 2019 15:05)  HR: 77 (27 Nov 2019 04:11) (65 - 79)  BP: 123/70 (27 Nov 2019 04:11) (114/68 - 130/73)  BP(mean): --  RR: 18 (27 Nov 2019 04:11) (18 - 18)  SpO2: 96% (27 Nov 2019 04:11) (95% - 99%)    NEUROLOGICAL EXAM:  Mental Status: arousable to voice/stim, able to follow commands (show 2 fingers). able to raise both arms and R leg.   Motor: R leg at least AG, L leg not moving right now.    Patient will be re-examined later in the day to assess clincal status    Labs:   cbc                      11.5   17.09 )-----------( 519      ( 26 Nov 2019 08:44 )             35.0     [id number]    132<L>  |  98  |  31<H>  ----------------------------<  259<H>  3.9   |  23  |  0.58    Ca    9.6      26 Nov 2019 06:22

## 2019-11-27 NOTE — SWALLOW BEDSIDE ASSESSMENT ADULT - SLP PERTINENT HISTORY OF CURRENT PROBLEM
84 year old male with history of hypertension, prostate cancer s/p radiation and seed implant presents from [facility] with confusion in the setting of recent seizures. The patient was admitted during 9/24-10/23 after episode of status epilepticus requiring intubation. 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 He was eventually treated on the medicine floor after being extubated. He failed multiple speech and swallow evaluations and is now s/p peg placement. Patient is also with residual slurred speech and weakness of left upper extremity. Patient was discharged to [facility] where he has been for the last three weeks. Per daughter, patient was doing well. However, on 11/13, the patient experienced seizure activity. His keppra was increased from 1000 mg to 1500 mg. He experienced another seizure on 11/15, and daughter thinks his vimpat may have been increased, but is uncertain.

## 2019-11-28 LAB
ALBUMIN SERPL ELPH-MCNC: 3.2 G/DL — LOW (ref 3.3–5)
ALP SERPL-CCNC: 123 U/L — HIGH (ref 40–120)
ALT FLD-CCNC: 66 U/L — HIGH (ref 10–45)
ANION GAP SERPL CALC-SCNC: 11 MMOL/L — SIGNIFICANT CHANGE UP (ref 5–17)
AST SERPL-CCNC: 33 U/L — SIGNIFICANT CHANGE UP (ref 10–40)
BILIRUB SERPL-MCNC: 0.5 MG/DL — SIGNIFICANT CHANGE UP (ref 0.2–1.2)
BUN SERPL-MCNC: 31 MG/DL — HIGH (ref 7–23)
CALCIUM SERPL-MCNC: 9.8 MG/DL — SIGNIFICANT CHANGE UP (ref 8.4–10.5)
CHLORIDE SERPL-SCNC: 95 MMOL/L — LOW (ref 96–108)
CMV DNA # UR NAA DL=200: SIGNIFICANT CHANGE UP IU/ML
CO2 SERPL-SCNC: 25 MMOL/L — SIGNIFICANT CHANGE UP (ref 22–31)
CREAT SERPL-MCNC: 0.52 MG/DL — SIGNIFICANT CHANGE UP (ref 0.5–1.3)
EBV PCR: SIGNIFICANT CHANGE UP IU/ML
GLUCOSE SERPL-MCNC: 162 MG/DL — HIGH (ref 70–99)
HCT VFR BLD CALC: 39.1 % — SIGNIFICANT CHANGE UP (ref 39–50)
HGB BLD-MCNC: 13 G/DL — SIGNIFICANT CHANGE UP (ref 13–17)
MCHC RBC-ENTMCNC: 30.3 PG — SIGNIFICANT CHANGE UP (ref 27–34)
MCHC RBC-ENTMCNC: 33.2 GM/DL — SIGNIFICANT CHANGE UP (ref 32–36)
MCV RBC AUTO: 91.1 FL — SIGNIFICANT CHANGE UP (ref 80–100)
PLATELET # BLD AUTO: 494 K/UL — HIGH (ref 150–400)
POTASSIUM SERPL-MCNC: 3.8 MMOL/L — SIGNIFICANT CHANGE UP (ref 3.5–5.3)
POTASSIUM SERPL-SCNC: 3.8 MMOL/L — SIGNIFICANT CHANGE UP (ref 3.5–5.3)
PROT SERPL-MCNC: 10.2 G/DL — HIGH (ref 6–8.3)
RBC # BLD: 4.29 M/UL — SIGNIFICANT CHANGE UP (ref 4.2–5.8)
RBC # FLD: 14.8 % — HIGH (ref 10.3–14.5)
SODIUM SERPL-SCNC: 131 MMOL/L — LOW (ref 135–145)
VZV PCR: SIGNIFICANT CHANGE UP COPIES/ML
WBC # BLD: 16.42 K/UL — HIGH (ref 3.8–10.5)
WBC # FLD AUTO: 16.42 K/UL — HIGH (ref 3.8–10.5)

## 2019-11-28 PROCEDURE — 99223 1ST HOSP IP/OBS HIGH 75: CPT | Mod: GC

## 2019-11-28 PROCEDURE — 95951: CPT | Mod: 26

## 2019-11-28 PROCEDURE — 99232 SBSQ HOSP IP/OBS MODERATE 35: CPT

## 2019-11-28 RX ORDER — SULFADIAZINE
1000 POWDER (GRAM) MISCELLANEOUS EVERY 6 HOURS
Refills: 0 | Status: DISCONTINUED | OUTPATIENT
Start: 2019-11-28 | End: 2019-11-28

## 2019-11-28 RX ADMIN — Medication 1 SPRAY(S): at 06:00

## 2019-11-28 RX ADMIN — LACOSAMIDE 200 MILLIGRAM(S): 50 TABLET ORAL at 17:23

## 2019-11-28 RX ADMIN — Medication 10 MILLIGRAM(S): at 14:09

## 2019-11-28 RX ADMIN — Medication 10 MILLIGRAM(S): at 21:57

## 2019-11-28 RX ADMIN — MAGNESIUM OXIDE 400 MG ORAL TABLET 400 MILLIGRAM(S): 241.3 TABLET ORAL at 08:45

## 2019-11-28 RX ADMIN — PANTOPRAZOLE SODIUM 40 MILLIGRAM(S): 20 TABLET, DELAYED RELEASE ORAL at 17:23

## 2019-11-28 RX ADMIN — Medication 100 MILLIGRAM(S): at 21:56

## 2019-11-28 RX ADMIN — Medication 524.06 MILLIGRAM(S): at 17:22

## 2019-11-28 RX ADMIN — SODIUM CHLORIDE 2 GRAM(S): 9 INJECTION INTRAMUSCULAR; INTRAVENOUS; SUBCUTANEOUS at 14:08

## 2019-11-28 RX ADMIN — Medication 100 MILLIGRAM(S): at 05:58

## 2019-11-28 RX ADMIN — LEVETIRACETAM 1500 MILLIGRAM(S): 250 TABLET, FILM COATED ORAL at 17:23

## 2019-11-28 RX ADMIN — LACOSAMIDE 200 MILLIGRAM(S): 50 TABLET ORAL at 06:08

## 2019-11-28 RX ADMIN — LEVETIRACETAM 1500 MILLIGRAM(S): 250 TABLET, FILM COATED ORAL at 05:57

## 2019-11-28 RX ADMIN — SODIUM CHLORIDE 75 MILLILITER(S): 9 INJECTION INTRAMUSCULAR; INTRAVENOUS; SUBCUTANEOUS at 21:57

## 2019-11-28 RX ADMIN — POLYETHYLENE GLYCOL 3350 17 GRAM(S): 17 POWDER, FOR SOLUTION ORAL at 17:23

## 2019-11-28 RX ADMIN — SODIUM CHLORIDE 2 GRAM(S): 9 INJECTION INTRAMUSCULAR; INTRAVENOUS; SUBCUTANEOUS at 05:58

## 2019-11-28 RX ADMIN — Medication 1 SPRAY(S): at 17:23

## 2019-11-28 RX ADMIN — POLYETHYLENE GLYCOL 3350 17 GRAM(S): 17 POWDER, FOR SOLUTION ORAL at 05:58

## 2019-11-28 RX ADMIN — MAGNESIUM OXIDE 400 MG ORAL TABLET 400 MILLIGRAM(S): 241.3 TABLET ORAL at 17:23

## 2019-11-28 RX ADMIN — Medication 40 MILLIEQUIVALENT(S): at 12:30

## 2019-11-28 RX ADMIN — PANTOPRAZOLE SODIUM 40 MILLIGRAM(S): 20 TABLET, DELAYED RELEASE ORAL at 06:00

## 2019-11-28 RX ADMIN — SODIUM CHLORIDE 2 GRAM(S): 9 INJECTION INTRAMUSCULAR; INTRAVENOUS; SUBCUTANEOUS at 21:56

## 2019-11-28 RX ADMIN — ENOXAPARIN SODIUM 40 MILLIGRAM(S): 100 INJECTION SUBCUTANEOUS at 12:31

## 2019-11-28 RX ADMIN — Medication 60 MILLIGRAM(S): at 06:15

## 2019-11-28 RX ADMIN — Medication 10 MILLIGRAM(S): at 08:45

## 2019-11-28 RX ADMIN — SENNA PLUS 10 MILLILITER(S): 8.6 TABLET ORAL at 21:57

## 2019-11-28 RX ADMIN — Medication 100 MILLIGRAM(S): at 14:09

## 2019-11-28 NOTE — CONSULT NOTE ADULT - ATTENDING COMMENTS
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 but all w/u negative s/p PEG now presented 11/16 with seizures, repeat tap with 1 WBC and elevated pr, westnile IgG positive, MRI unchanged and s/p IVIG  now ID was called that the CSF toxo IgM and IgG came back positive (negative last admission)  unsure about the significance of toxo CSF serology when it was negative on last admission and MRI negative for toxo lesions but cannot ignore     encephalitis, ?west nile vs toxo or autoimmune    * Start Bactrim 385mg IV q 12 hours (ordered by ID) as there is no pyrimethamine available  * Check MR head to evaluate for signs of toxoplasmosis  * Follow up toxoplasma serologies still pending   * Monitor for fevers  * monitor BMP
patient was seen and examined at bedside. He is awake, oriented to self and place. He is cooperative and pleasant with exam. Denies any headaches. noted to have mild L shoulder twitching, weakness of LUE, difficulty with calculations, L sided neglect. plan as above will get MRI brain with Travis, EEG, treatment of UTI and electrolyte abnormalities. keppra was increased from [id number] bid, continue Vimpat. pending above may need an LP.

## 2019-11-28 NOTE — PROGRESS NOTE ADULT - ASSESSMENT
· Assessment		  1. Thrombocytosis    -- Platelets stable in 450-500s.  I suspect reactive secondary to acute neurologic process  -- monitor for now  -- Low TIBC and Iron. Ferritin nl, not consistent w iron deficiency  -- trend counts for now  -- Doubt MPN. No need to check JAK2     2. 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    Naeem Posey MD  [facility]  Cell: [phone]

## 2019-11-28 NOTE — EEG REPORT - NS EEG TEXT BOX
[facility]   COMPREHENSIVE EPILEPSY CENTER   REPORT OF ROUTINE VIDEO EEG     [facility]: [address] [place] [address] [address], [place] [address], Ph#: [phone]  [facility]: [address], [place] [address], Ph#: [phone]  [facility]: [address] [place] [address], Ph#: [phone]    Patient Name: SHIKHA MASON  Age and : 84y ([birth year])  MRN #: [id number]  Location: [address]   Referring Physician: Stacey Carmichael    Study Date: 19    _____________________________________________________________  TECHNICAL INFORMATION    Placement and Labeling of Electrodes:  The EEG was performed utilizing 20 channels referential EEG connections (coronal over temporal over parasagittal montage) using all standard 10-20 electrode placements with EKG.  Recording was at a sampling rate of 256 samples per second per channel.  Time synchronized digital video recording was done simultaneously with EEG recording.  A low light infrared camera was used for low light recording.  Breezy and seizure detection algorithms were utilized.    _____________________________________________________________  HISTORY    Patient is a 84y old  Male who presents with a chief complaint of Confusion, Seizure (2019 19:14)      PERTINENT MEDICATIONS:  lacosamide 200 milliGRAM(s) Oral two times a day  levETIRAcetam  Solution 1500 milliGRAM(s) Oral two times a day  _____________________________________________________________  STUDY INTERPRETATION    Findings: The background was continuous, spontaneously variable and reactive. During wakefulness, the posterior dominant rhythm consisted of symmetric, well-modulated 6 Hz activity, with amplitude to 20 uV, that attenuated to eye opening.     Background Slowing:  Diffuse theta and polymorphic delta slowing.    Focal Slowing:   Intermittent theta/delta slowing in the right hemisphere, maximum right posterior quadrant.     Sleep Background:  Drowsiness was characterized by fragmentation, attenuation, and slowing of the background activity.    Sleep was characterized by the presence of symmetric, rudimentary sleep spindles and K-complexes.    Other Non-Epileptiform Findings:  None were present.    Interictal Epileptiform Activity:   None were present.    Events:  Clinical events: None recorded.  Seizures: None recorded.    Activation Procedures:   Hyperventilation was not performed.    Photic stimulation was not performed.     Artifacts:  Intermittent myogenic and movement artifacts were noted.    ECG:  The heart rate on single channel ECG was predominantly between 70-80 BPM.    _____________________________________________________________  EEG SUMMARY/CLASSIFICATION    Abnormal EEG in the awake, drowsy and asleep states.  - Intermittent theta/delta slowing in the right posterior quadrant.   - Moderate generalized slowing.    _____________________________________________________________  EEG IMPRESSION/CLINICAL CORRELATE    Abnormal EEG study.  1. Structural or functional abnormality in the right hemisphere.  2. Moderate nonspecific diffuse or multifocal cerebral dysfunction.   3. No epileptiform pattern or seizure seen.    Preliminary Fellow Report  _____________________________________________________________  Clarita Francisco MD  Epilepsy Fellow    Arnie Cohen MD PHD  Attending Physician, [facility] Epilepsy [place] [facility]   COMPREHENSIVE EPILEPSY CENTER   REPORT OF ROUTINE VIDEO EEG     [facility]: [address] [place] [address] [address], [place] [address], Ph#: [phone]  [facility]: [address], [place] [address], Ph#: [phone]  [facility]: [address] [place] [address], Ph#: [phone]    Patient Name: SHIKHA MASON  Age and : 84y ([birth year])  MRN #: [id number]  Location: [address]   Referring Physician: Stacey Carmichael    Study Date: 19    _____________________________________________________________  TECHNICAL INFORMATION    Placement and Labeling of Electrodes:  The EEG was performed utilizing 20 channels referential EEG connections (coronal over temporal over parasagittal montage) using all standard 10-20 electrode placements with EKG.  Recording was at a sampling rate of 256 samples per second per channel.  Time synchronized digital video recording was done simultaneously with EEG recording.  A low light infrared camera was used for low light recording.  Breezy and seizure detection algorithms were utilized.    _____________________________________________________________  HISTORY    Patient is a 84y old  Male who presents with a chief complaint of Confusion, Seizure (2019 19:14)      PERTINENT MEDICATIONS:  lacosamide 200 milliGRAM(s) Oral two times a day  levETIRAcetam  Solution 1500 milliGRAM(s) Oral two times a day  _____________________________________________________________  STUDY INTERPRETATION    Findings: The background was continuous, spontaneously variable and reactive. During wakefulness, the posterior dominant rhythm consisted of symmetric, well-modulated 6 Hz activity, with amplitude to 20 uV, that attenuated to eye opening.     Background Slowing:  Diffuse theta and polymorphic delta slowing.    Focal Slowing:   Continuous theta and polymorphic delta slowing in the right posterior quadrant.     Sleep Background:  Drowsiness was characterized by fragmentation, attenuation, and slowing of the background activity.    Sleep was characterized by the presence of symmetric, rudimentary sleep spindles and K-complexes.    Other Non-Epileptiform Findings:  None were present.    Interictal Epileptiform Activity:   None were present.    Events:  Clinical events: None recorded.  Seizures: None recorded.    Activation Procedures:   Hyperventilation was not performed.    Photic stimulation was not performed.     Artifacts:  Intermittent myogenic and movement artifacts were noted.    ECG:  The heart rate on single channel ECG was predominantly between 70-80 BPM.    _____________________________________________________________  EEG SUMMARY/CLASSIFICATION    Abnormal EEG in the awake, drowsy and asleep states.  - Continuous theta and polymorphic delta slowing in the right posterior quadrant.   - Moderate generalized slowing.    _____________________________________________________________  EEG IMPRESSION/CLINICAL CORRELATE    Abnormal EEG study.  1. Structural or functional abnormality in the right posterior quadrant.  2. Moderate nonspecific diffuse or multifocal cerebral dysfunction.   3. No epileptiform pattern or seizure seen.    Preliminary Fellow Report  _____________________________________________________________  Clarita Francisco MD  Epilepsy Fellow    Arnie Cohen MD PHD  Attending Physician, [facility] Epilepsy [place] [facility]   COMPREHENSIVE EPILEPSY CENTER   REPORT OF ROUTINE VIDEO EEG     [facility]: [address] [place] [address] [address], [place] [address], Ph#: [phone]  [facility]: [address], [place] [address], Ph#: [phone]  [facility]: [address] [place] [address], Ph#: [phone]    Patient Name: SHIKHA MASON  Age and : 84y ([birth year])  MRN #: [id number]  Location: [address]   Referring Physician: Stacey Carmichael    Study Date: 19    _____________________________________________________________  TECHNICAL INFORMATION    Placement and Labeling of Electrodes:  The EEG was performed utilizing 20 channels referential EEG connections (coronal over temporal over parasagittal montage) using all standard 10-20 electrode placements with EKG.  Recording was at a sampling rate of 256 samples per second per channel.  Time synchronized digital video recording was done simultaneously with EEG recording.  A low light infrared camera was used for low light recording.  Breezy and seizure detection algorithms were utilized.    _____________________________________________________________  HISTORY    Patient is a 84y old  Male who presents with a chief complaint of Confusion, Seizure (2019 19:14)      PERTINENT MEDICATIONS:  lacosamide 200 milliGRAM(s) Oral two times a day  levETIRAcetam  Solution 1500 milliGRAM(s) Oral two times a day  _____________________________________________________________  STUDY INTERPRETATION    Findings: The background was continuous, spontaneously variable and reactive. During wakefulness, the posterior dominant rhythm consisted of symmetric, poorly-modulated 6 Hz activity, with amplitude to 20 uV, that attenuated to eye opening.     Background Slowing:  Diffuse theta and polymorphic delta slowing.    Focal Slowing:   Continuous theta and polymorphic delta slowing in the right posterior quadrant.     Sleep Background:  Drowsiness was characterized by fragmentation, attenuation, and slowing of the background activity.    Sleep was characterized by the presence of symmetric, rudimentary sleep spindles and K-complexes.    Other Non-Epileptiform Findings:  None were present.    Interictal Epileptiform Activity:   None were present.    Events:  Clinical events: None recorded.  Seizures: None recorded.    Activation Procedures:   Hyperventilation was not performed.    Photic stimulation was not performed.     Artifacts:  Intermittent myogenic and movement artifacts were noted.    ECG:  The heart rate on single channel ECG was predominantly between 70-80 BPM.    _____________________________________________________________  EEG SUMMARY/CLASSIFICATION    Abnormal EEG in the awake, drowsy and asleep states.  - Continuous theta and polymorphic delta slowing in the right posterior quadrant.   - Moderate generalized slowing.    _____________________________________________________________  EEG IMPRESSION/CLINICAL CORRELATE    Abnormal EEG study.  1. Structural or functional abnormality in the right posterior quadrant.  2. Moderate nonspecific diffuse or multifocal cerebral dysfunction.   3. No epileptiform pattern or seizure seen.    Preliminary Fellow Report  _____________________________________________________________  Clarita Francisco MD  Epilepsy Fellow    Arnie Cohen MD PHD  Attending Physician, [facility] Epilepsy [facility]   COMPREHENSIVE EPILEPSY CENTER   REPORT OF ROUTINE VIDEO EEG     [facility]: [address] [place] [address] [address], [place] [address], Ph#: [phone]  [facility]: [address], [place] [address], Ph#: [phone]  [facility]: [address] [place] [address], Ph#: [phone]    Patient Name: SHIKHA MASON  Age and : 84y ([birth year])  MRN #: [id number]  Location: [address]   Referring Physician: Stacey Carmichael    Study Date: 19    _____________________________________________________________  TECHNICAL INFORMATION    Placement and Labeling of Electrodes:  The EEG was performed utilizing 20 channels referential EEG connections (coronal over temporal over parasagittal montage) using all standard 10-20 electrode placements with EKG.  Recording was at a sampling rate of 256 samples per second per channel.  Time synchronized digital video recording was done simultaneously with EEG recording.  A low light infrared camera was used for low light recording.  Breezy and seizure detection algorithms were utilized.    _____________________________________________________________  HISTORY    Patient is a 84y old  Male who presents with a chief complaint of Confusion, Seizure (2019 19:14)      PERTINENT MEDICATIONS:  lacosamide 200 milliGRAM(s) Oral two times a day  levETIRAcetam  Solution 1500 milliGRAM(s) Oral two times a day  _____________________________________________________________  STUDY INTERPRETATION    Findings: The background was continuous, spontaneously variable and reactive. During wakefulness, the posterior dominant rhythm consisted of symmetric, poorly-modulated 6 Hz activity, with amplitude to 20 uV, that attenuated to eye opening.     Background Slowing:  Diffuse theta and polymorphic delta slowing.    Focal Slowing:   Continuous theta and polymorphic delta slowing in the right posterior quadrant.     Sleep Background:  Drowsiness was characterized by fragmentation, attenuation, and slowing of the background activity.    Sleep was characterized by the presence of symmetric, rudimentary sleep spindles and K-complexes.    Other Non-Epileptiform Findings:  None were present.    Interictal Epileptiform Activity:   Occasional, blunt right posterior quadrant sharp waves (P8, O2)    Events:  Clinical events: None recorded.  Seizures: None recorded.    Activation Procedures:   Hyperventilation was not performed.    Photic stimulation was not performed.     Artifacts:  Intermittent myogenic and movement artifacts were noted.    ECG:  The heart rate on single channel ECG was predominantly between 70-80 BPM.    _____________________________________________________________  EEG SUMMARY/CLASSIFICATION    Abnormal EEG in the awake, drowsy and asleep states.  - Continuous theta and polymorphic delta slowing in the right posterior quadrant.   -Occasional, blunt right posterior quadrant sharp waves (P8, O2)  - Moderate generalized slowing.    _____________________________________________________________  EEG IMPRESSION/CLINICAL CORRELATE    Abnormal EEG study.  1. Structural or functional abnormality in the right posterior quadrant.  2. Moderate nonspecific diffuse or multifocal cerebral dysfunction.   3. Possible seizure focus in the left posterior region (post. temp-parietal, occipital). These discharges were less formed and more rare compared to the prior EEG .   No seizure seen.      Clarita Francisco MD  Epilepsy Fellow    Arnie Cohen MD PHD  Attending Physician, [facility] Epilepsy [place]

## 2019-11-28 NOTE — PROGRESS NOTE ADULT - SUBJECTIVE AND OBJECTIVE BOX
Interval History: No acute events overnight.    MEDICATIONS  (STANDING):  acetaminophen   Tablet .. 325 milliGRAM(s) Oral daily  aMILoride 10 milliGRAM(s) Oral <User Schedule>  enoxaparin Injectable 40 milliGRAM(s) SubCutaneous daily  fluticasone propionate 50 MICROgram(s)/spray Nasal Spray 1 Spray(s) Both Nostrils two times a day  immune   globulin 10% (GAMMAGARD) IVPB 30 Gram(s) IV Intermittent daily  influenza   Vaccine 0.5 milliLiter(s) IntraMuscular once  labetalol 100 milliGRAM(s) Oral every 8 hours  lacosamide 200 milliGRAM(s) Oral two times a day  levETIRAcetam  Solution 1500 milliGRAM(s) Oral two times a day  magnesium oxide 400 milliGRAM(s) Oral two times a day with meals  pantoprazole  Injectable 40 milliGRAM(s) IV Push two times a day  polyethylene glycol 3350 17 Gram(s) Oral two times a day  potassium chloride   Solution 40 milliEquivalent(s) Enteral Tube daily  senna Syrup 10 milliLiter(s) Oral at bedtime  sodium chloride 2 Gram(s) Oral three times a day  sodium chloride 0.9%. 1000 milliLiter(s) (75 mL/Hr) IV Continuous <Continuous>    MEDICATIONS  (PRN):  acetaminophen    Suspension .. 650 milliGRAM(s) Oral every 6 hours PRN Moderate Pain (4 - 6), Severe Pain (7 - 10)    Vital Signs Last 24 Hrs  T(C): 36.6 (27 Nov 2019 04:11), Max: 36.7 (26 Nov 2019 15:05)  T(F): 97.8 (27 Nov 2019 04:11), Max: 98 (26 Nov 2019 15:05)  HR: 77 (27 Nov 2019 04:11) (65 - 79)  BP: 123/70 (27 Nov 2019 04:11) (114/68 - 130/73)  BP(mean): --  RR: 18 (27 Nov 2019 04:11) (18 - 18)  SpO2: 96% (27 Nov 2019 04:11) (95% - 99%)    NEUROLOGICAL EXAM:  Mental Status: arousable to voice/stim, able to follow commands (show 2 fingers). able to raise both arms and R leg.   Motor: R leg at least AG, L leg not moving right now.    Patient will be re-examined later in the day to assess clincal status    Labs:   cbc                      11.5   17.09 )-----------( 519      ( 26 Nov 2019 08:44 )             35.0     [id number]    132<L>  |  98  |  31<H>  ----------------------------<  259<H>  3.9   |  23  |  0.58    Ca    9.6      26 Nov 2019 06:22 Interval History: No acute events overnight.    MEDICATIONS  (STANDING):  acetaminophen   Tablet .. 325 milliGRAM(s) Oral daily  aMILoride 10 milliGRAM(s) Oral <User Schedule>  enoxaparin Injectable 40 milliGRAM(s) SubCutaneous daily  fluticasone propionate 50 MICROgram(s)/spray Nasal Spray 1 Spray(s) Both Nostrils two times a day  immune   globulin 10% (GAMMAGARD) IVPB 30 Gram(s) IV Intermittent daily  influenza   Vaccine 0.5 milliLiter(s) IntraMuscular once  labetalol 100 milliGRAM(s) Oral every 8 hours  lacosamide 200 milliGRAM(s) Oral two times a day  levETIRAcetam  Solution 1500 milliGRAM(s) Oral two times a day  magnesium oxide 400 milliGRAM(s) Oral two times a day with meals  pantoprazole  Injectable 40 milliGRAM(s) IV Push two times a day  polyethylene glycol 3350 17 Gram(s) Oral two times a day  potassium chloride   Solution 40 milliEquivalent(s) Enteral Tube daily  senna Syrup 10 milliLiter(s) Oral at bedtime  sodium chloride 2 Gram(s) Oral three times a day  sodium chloride 0.9%. 1000 milliLiter(s) (75 mL/Hr) IV Continuous <Continuous>    MEDICATIONS  (PRN):  acetaminophen    Suspension .. 650 milliGRAM(s) Oral every 6 hours PRN Moderate Pain (4 - 6), Severe Pain (7 - 10)    Vital Signs Last 24 Hrs  T(C): 36.6 (28 Nov 2019 14:20), Max: 36.6 (28 Nov 2019 14:20)  T(F): 97.9 (28 Nov 2019 14:20), Max: 97.9 (28 Nov 2019 14:20)  HR: 84 (28 Nov 2019 14:20) (70 - 89)  BP: 118/73 (28 Nov 2019 14:20) (118/73 - 132/74)  BP(mean): --  RR: 18 (28 Nov 2019 14:20) (18 - 18)  SpO2: 96% (28 Nov 2019 14:20) (96% - 98%)    NEUROLOGICAL EXAM:  Mental Status: arousable to voice/stim, able to follow commands (show 2 fingers). able to raise both arms and R leg.   Motor: R leg at least AG, L leg not moving right now.    Patient will be re-examined later in the day to assess clincal status    Labs:   cbc                      11.5   17.09 )-----------( 519      ( 26 Nov 2019 08:44 )             35.0     [id number]    132<L>  |  98  |  31<H>  ----------------------------<  259<H>  3.9   |  23  |  0.58    Ca    9.6      26 Nov 2019 06:22

## 2019-11-28 NOTE — EEG REPORT - NS EEG TEXT BOX
[facility]   COMPREHENSIVE EPILEPSY CENTER   REPORT OF CONTINUOUS VIDEO EEG     [facility]: [address], [place] [address]  [facility]: [phone], [place] [address]  [facility]: [address] [place] [address]    Patient Name: SHIKHA MASON  Age and : 84y ([birth year])  MRN #: [id number]  Location: [place] [address]  Referring Physician: Stacey Carmichael    Start Time/Date: 18:26 on 19  End Time/Date: 08:00 on 19    _____________________________________________________________  STUDY INFORMATION    EEG Recording Technique:  The patient underwent continuous Video-EEG monitoring, using Telemetry System hardware on the XLTek Digital System. EEG and video data were stored on a computer hard drive with important events saved in digital archive files. The material was reviewed by a physician (electroencephalographer / epileptologist) on a daily basis. Breezy and seizure detection algorithms were utilized and reviewed. An EEG Technician attended to the patient, and was available throughout daytime work hours.  The epilepsy center neurologist was available in person or on call 24-hours per day.    EEG Placement and Labeling of Electrodes:  The EEG was performed utilizing 20 channel referential EEG connections (coronal over temporal over parasagittal montage) using all standard 10-20 electrode placements with EKG, with additional electrodes placed in the inferior temporal region using the modified 10-10 montage electrode placements for elective admissions, or if deemed necessary. Recording was at a sampling rate of 256 samples per second per channel. Time synchronized digital video recording was done simultaneously with EEG recording. A low light infrared camera was used for low light recording.     _____________________________________________________________  HISTORY    Patient is a 84y old  Male who presents with a chief complaint of Confusion, Seizure (2019 19:14)      PERTINENT MEDICATION:  lacosamide 200 milliGRAM(s) Oral two times a day  levETIRAcetam  Solution 1500 milliGRAM(s) Oral two times a day  _____________________________________________________________  HISTORY    Patient is a 84y old  Male who presents with a chief complaint of Confusion, Seizure (2019 19:14)      PERTINENT MEDICATIONS:  lacosamide 200 milliGRAM(s) Oral two times a day  levETIRAcetam  Solution 1500 milliGRAM(s) Oral two times a day  _____________________________________________________________  STUDY INTERPRETATION    Findings: The background was continuous, spontaneously variable and reactive. During wakefulness, the posterior dominant rhythm consisted of symmetric, poorly-modulated 6 Hz activity, with amplitude to 20 uV, that attenuated to eye opening.     Background Slowing:  Diffuse theta and polymorphic delta slowing.    Focal Slowing:   Continuous theta and polymorphic delta slowing in the right posterior quadrant.     Sleep Background:  Drowsiness was characterized by fragmentation, attenuation, and slowing of the background activity.    Sleep was characterized by the presence of symmetric vertex waves, sleep spindles and K-complexes.    Other Non-Epileptiform Findings:  None were present.    Interictal Epileptiform Activity:   None were present.    Events:  Clinical events: None recorded.  Seizures: None recorded.    Activation Procedures:   Hyperventilation was not performed.    Photic stimulation was not performed.     Artifacts:  Intermittent myogenic and movement artifacts were noted.    ECG:  The heart rate on single channel ECG was predominantly between 70-80 BPM.    _____________________________________________________________  EEG SUMMARY/CLASSIFICATION    Abnormal EEG in the awake, drowsy and asleep states.  - Continuous theta and polymorphic delta slowing in the right posterior quadrant.   - Moderate generalized slowing.    _____________________________________________________________  EEG IMPRESSION/CLINICAL CORRELATE    Abnormal EEG study.  1. Structural or functional abnormality in the right posterior quadrant.  2. Moderate nonspecific diffuse or multifocal cerebral dysfunction.   3. No epileptiform pattern or seizure seen.    Preliminary Fellow Report  _____________________________________________________________  Clarita Francisco MD  Epilepsy Fellow    Arnie Cohen MD PHD  Attending Physician, [facility] Epilepsy [facility]   COMPREHENSIVE EPILEPSY CENTER   REPORT OF CONTINUOUS VIDEO EEG     [facility]: [address], [address], [place] [address]  [facility]: [phone], [place] [address]  [facility]: [address] [place] [address]    Patient Name: SHIKHA MASON  Age and : 84y ([birth year])  MRN #: [id number]  Location: [place] [address]  Referring Physician: Stacey Carmichael    Start Time/Date: 18:26 on 19  End Time/Date: 08:00 on 19    _____________________________________________________________  STUDY INFORMATION    EEG Recording Technique:  The patient underwent continuous Video-EEG monitoring, using Telemetry System hardware on the XLTek Digital System. EEG and video data were stored on a computer hard drive with important events saved in digital archive files. The material was reviewed by a physician (electroencephalographer / epileptologist) on a daily basis. Breezy and seizure detection algorithms were utilized and reviewed. An EEG Technician attended to the patient, and was available throughout daytime work hours.  The epilepsy center neurologist was available in person or on call 24-hours per day.    EEG Placement and Labeling of Electrodes:  The EEG was performed utilizing 20 channel referential EEG connections (coronal over temporal over parasagittal montage) using all standard 10-20 electrode placements with EKG, with additional electrodes placed in the inferior temporal region using the modified 10-10 montage electrode placements for elective admissions, or if deemed necessary. Recording was at a sampling rate of 256 samples per second per channel. Time synchronized digital video recording was done simultaneously with EEG recording. A low light infrared camera was used for low light recording.     _____________________________________________________________  HISTORY    Patient is a 84y old  Male who presents with a chief complaint of Confusion, Seizure (2019 19:14)      PERTINENT MEDICATION:  lacosamide 200 milliGRAM(s) Oral two times a day  levETIRAcetam  Solution 1500 milliGRAM(s) Oral two times a day  _____________________________________________________________  HISTORY    Patient is a 84y old  Male who presents with a chief complaint of Confusion, Seizure (2019 19:14)      PERTINENT MEDICATIONS:  lacosamide 200 milliGRAM(s) Oral two times a day  levETIRAcetam  Solution 1500 milliGRAM(s) Oral two times a day  _____________________________________________________________  STUDY INTERPRETATION    Findings: The background was continuous, spontaneously variable and reactive. During wakefulness, the posterior dominant rhythm consisted of symmetric, poorly-modulated 6 Hz activity, with amplitude to 20 uV, that attenuated to eye opening.     Background Slowing:  Diffuse theta and polymorphic delta slowing.    Focal Slowing:   Continuous theta and polymorphic delta slowing in the right posterior quadrant.     Sleep Background:  Drowsiness was characterized by fragmentation, attenuation, and slowing of the background activity.    Sleep was characterized by the presence of symmetric vertex waves, sleep spindles and K-complexes.    Other Non-Epileptiform Findings:  None were present.    Interictal Epileptiform Activity:   Occasional, blunt right posterior quadrant sharp waves (P8, O2)    Events:  Clinical events: None recorded.  Seizures: None recorded.    Activation Procedures:   Hyperventilation was not performed.    Photic stimulation was not performed.     Artifacts:  Intermittent myogenic and movement artifacts were noted.    ECG:  The heart rate on single channel ECG was predominantly between 70-80 BPM.    _____________________________________________________________  EEG SUMMARY/CLASSIFICATION    Abnormal EEG in the awake, drowsy and asleep states.  - Continuous theta and polymorphic delta slowing in the right posterior quadrant.   - Occasional, blunt right posterior quadrant sharp waves (P8, O2)  - Moderate generalized slowing.    _____________________________________________________________  EEG IMPRESSION/CLINICAL CORRELATE    Abnormal EEG study.  1. Structural or functional abnormality in the right posterior quadrant.  2. Moderate nonspecific diffuse or multifocal cerebral dysfunction.   3. Possible seizure focus in the left posterior region (post. temp-parietal, occipital). These discharges were less formed and more rare compared to the prior EEG .  No seizure seen.     Clarita Francisco MD  Epilepsy Fellow    Arnie Cohen MD PHD  Attending Physician, [facility] Epilepsy [place]

## 2019-11-28 NOTE — CONSULT NOTE ADULT - SUBJECTIVE AND OBJECTIVE BOX
INFECTIOUS DISEASE SERVICE INITIAL CONSULTATION NOTE    HPI:  84 year old male with history of hypertension, prostate cancer s/p radiation and seed implant presents from [facility] with confusion in the setting of recent seizures. The patient was admitted during 9/24-10/23 after episode of status epilepticus requiring intubation. 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 He was eventually treated on the medicine floor after extubation. He failed multiple speech and swallow evaluations and is now s/p peg placement. Patient is also with residual slurred speech and weakness of left upper extremity. Patient was discharged to [facility] where he has been for the last three weeks. Per daughter, patient was doing well. However, on Wednesday the patient experienced seizure activity. His keppra was increased from 1000 mg to 1500 mg. He experienced another seizure on Friday, and daughter thinks his vimpat may have been increased, but is uncertain. Today, the daughter did not like the way the patient appeared. His left eye was droopy, left arm was twitching, he sounded congested, and he kept jerking his right arm. Of note, his sodium has been low during the week, so he appeared lethargic earlier in the week. He reports experiencing headache and cough. He denies trauma, fever, chills, chest pain, shortness of breath, sick contacts, or urinary symptoms      The patient was independent in ADLs/IADLs before his recent admission for his seizure. He had a bradley catheter upon discharge, but it was removed at the rehab facility. The patient has been voiding without difficulty.     In the ED, vital signs were stable.  He received 1g ceftriaxone, 150 mg vimpat, 1500 mg keppra, 1g magnesium, and 15 meq potassium phosphate.  Keppra level and urine culture were sent. (16 Nov 2019 22:38)    Patient was admitted to medicine, worked up for breakthrough seizures. LP done on 11/20 came back with serologies positive for west nile virus, so he was treated with 5 days of 1g Solumedrol and 5 days of IVIg. On the evening of 11/27, a critical value was called from an outside lab, showing a positive IgG and IgM for toxoplasma in the CSF, however it is not currently visible in sunrise.     Currently, the patient reports that he is doing alright, denies any pain, no fevers, chills, chest pain, nausea, vomiting, diarrhea, or rash.     PAST MEDICAL & SURGICAL HISTORY:  Hypertension  Prostate cancer: had seed implant &amp; radiation ( 2001 )  PC (prostate cancer)  S/P cholecystectomy      REVIEW OF SYSTEMS:  Constitutional: no weakness, no fevers, no chills  Dermatologic: no rash  Respiratory: no SOB, no cough  Cardiovascular: no chest pain, no palpitations  Gastrointestinal: no nausea, no vomiting, no diarrhea  Genitourinary: no dysuria, no urinary frequency, no hematuria, no urinary retention  Musculoskeletal:	no weakness, no joint swelling/pain  Neurological: no focal weakness or numbness  Endocrine: no polyuria, no polydipsia    ACTIVE ANTIMICROBIAL/ANTIBIOTIC MEDICATIONS:  sulfADIAZINE 1000 milliGRAM(s) Oral every 6 hours      OTHER MEDICATIONS:  acetaminophen    Suspension .. 650 milliGRAM(s) Oral every 6 hours PRN  acetaminophen   Tablet .. 325 milliGRAM(s) Oral daily  aMILoride 10 milliGRAM(s) Oral <User Schedule>  enoxaparin Injectable 40 milliGRAM(s) SubCutaneous daily  fluticasone propionate 50 MICROgram(s)/spray Nasal Spray 1 Spray(s) Both Nostrils two times a day  influenza   Vaccine 0.5 milliLiter(s) IntraMuscular once  labetalol 100 milliGRAM(s) Oral every 8 hours  lacosamide 200 milliGRAM(s) Oral two times a day  levETIRAcetam  Solution 1500 milliGRAM(s) Oral two times a day  magnesium oxide 400 milliGRAM(s) Oral two times a day with meals  methylPREDNISolone 60 milliGRAM(s) Oral daily  pantoprazole  Injectable 40 milliGRAM(s) IV Push two times a day  polyethylene glycol 3350 17 Gram(s) Oral two times a day  potassium chloride   Solution 40 milliEquivalent(s) Enteral Tube daily  senna Syrup 10 milliLiter(s) Oral at bedtime  sodium chloride 2 Gram(s) Oral three times a day  sodium chloride 0.9%. 1000 milliLiter(s) IV Continuous <Continuous>      ALLERGIES:  Allergies    No Known Allergies    Intolerances        SOCIAL HISTORY: Currently living in rehab, lived with wife before that, nonsmoker, no alcohol or drugs.    FAMILY HISTORY:  No pertinent family history in first degree relatives      VITAL SIGNS:  ICU Vital Signs Last 24 Hrs  T(C): 36.6 (28 Nov 2019 14:20), Max: 36.6 (28 Nov 2019 14:20)  T(F): 97.9 (28 Nov 2019 14:20), Max: 97.9 (28 Nov 2019 14:20)  HR: 84 (28 Nov 2019 14:20) (65 - 89)  BP: 118/73 (28 Nov 2019 14:20) (118/73 - 132/74)  BP(mean): --  ABP: --  ABP(mean): --  RR: 18 (28 Nov 2019 14:20) (18 - 18)  SpO2: 96% (28 Nov 2019 14:20) (96% - 98%)      PHYSICAL EXAM:  Constitutional: WDWN  Head: NC/AT, EEG leads in place   Eyes: anicteric sclera  ENMT: no rhinorrhea; no sinus tenderness on palpation; no oropharyngeal lesions, erythema, or exudates	  Neck: stiff, not rigid; no JVD or LAD  Respiratory: CTA B/L  Cardiovascular: +S1/S2, RRR; no appreciable murmurs  Gastrointestinal: soft, NT/ND; +BSx4, no HSM; +PEG tube   Extremities: WWP; no clubbing, cyanosis, or edema  Dermatologic: skin warm and dry; no visible rashes or lesions  Neurologic: AAOx3; mumbled speech at times    LABS:                        13.0   16.42 )-----------( 494      ( 28 Nov 2019 08:46 )             39.1     11-28    131<L>  |  95<L>  |  31<H>  ----------------------------<  162<H>  3.8   |  25  |  0.52    Ca    9.8      28 Nov 2019 07:29    TPro  10.2<H>  /  Alb  3.2<L>  /  TBili  0.5  /  DBili  x   /  AST  33  /  ALT  66<H>  /  AlkPhos  123<H>  11-28          MICROBIOLOGY:  [facility] CSF (11.20.19 @ 16:20)    [facility] CSF: [phone]    West Nile Virus IgG/IgM Antibody, CSF (11.20.19 @ 20:30)    West Nile Virus IgG - CSF: Positive: Positive for West Nile Virus IgG Antibody. Serological  cross-reactivity among flaviviruses (e.g. Yamile  encephalitis and dengue viruses) is common.    West Nile Virus IgM - CSF: Negative: No detectable West Nile Virus IgM Antibody. If a recent  infection is suspected, another specimen should be  submitted for testing within 7-14 days.  Performed At: [address] [id number]  Nav Mireles MD Ph:[phone]    West Nile RT-PCR (11.21.19 @ 23:32)    West Nile Virus by PCR: NotDetec: NOT DETECTED - A negative result does not rule out the  presence of PCR inhibitors in the patient specimen or assay  specific nucleic acid in concentrations below the level of  detection by the assay.  INTERPRETIVE INFORMATION: West Nile Virus by PCR  Test developed and characteristics determined by [facility]. See Compliance Statement B: [facility]  Performed by [facility],  [address] [place] [address] [phone]  [email or web address], Pedro Perez MD, Lab. Director    West Nile Virus by PCR Comment: CSF        RADIOLOGY & ADDITIONAL STUDIES:    < from: MR Head w/wo IV Cont (11.19.19 @ 15:39) >  EXAM:  MR BRAIN [facility]                            PROCEDURE DATE:  11/19/2019            INTERPRETATION:  Contrast-enhanced MRI of the brain.    CLINICAL INDICATION: breakthrough seizure    TECHNIQUE:  Multiplanar, multisequence MR images of the brain were   obtained before and after the intravenous administration of 7.5 cc of   Gadavist. 0 cc were discarded. Special attention was paid to the temporal   lobes.    Laryngal mask anesthesia provided by members of the anesthesia department   who were present.    COMPARISON: MRI brain 11/17/2019 and 10/11/2019.    FINDINGS:      Ventricles are persistently mildly dilated, this may be on the basis of   age-appropriate atrophy. No midline shift or effacement of the basal   cisterns.    No acute intracranial hemorrhage, mass effect, vasogenic edema, or   evidence of acute territorial infarct. Moderate white matter   microvascular ischemic disease.     Abnormal signal within sulci on FLAIR images, consistent with   supplemental oxygen administration.    Signal voids are seen within the major intracranial vessels consistent   with their patency.    Hippocampal gyri are grossly symmetric in size and signal characteristics.    No abnormal parenchymal or leptomeningeal enhancement.    Right maxillary sinus mucosal thickening. Mastoid air cells clear. The   orbits, sellar and suprasellar structures, and craniocervical junction   are unremarkable.    IMPRESSION:    No acute intracranial hemorrhage, mass effect, vasogenic edema, or   evidence of acute territorial infarct.    No abnormal parenchymal or leptomeningeal enhancement.    No evidence for mesial temporal sclerosis, gray matter heterotopia, or   cortical dysplasia.    Similar mild ventricular dilatation which may be on the basis of   age-appropriate atrophy.                        BENI WANG M.D., ATTENDING RADIOLOGIST  This document has been electronically signed. Nov 19 2019  4:34PM    < end of copied text > INFECTIOUS DISEASE SERVICE INITIAL CONSULTATION NOTE    HPI:  84 year old male with history of hypertension, prostate cancer s/p radiation and seed implant presents from [facility] with confusion in the setting of recent seizures. The patient was admitted during 9/24-10/23 after episode of status epilepticus requiring intubation. 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 He was eventually treated on the medicine floor after extubation. He failed multiple speech and swallow evaluations and is now s/p peg placement. Patient is also with residual slurred speech and weakness of left upper extremity. Patient was discharged to [facility] where he has been for the last three weeks. Per daughter, patient was doing well. However, on Wednesday the patient experienced seizure activity. His keppra was increased from 1000 mg to 1500 mg. He experienced another seizure on Friday, and daughter thinks his vimpat may have been increased, but is uncertain. Today, the daughter did not like the way the patient appeared. His left eye was droopy, left arm was twitching, he sounded congested, and he kept jerking his right arm. Of note, his sodium has been low during the week, so he appeared lethargic earlier in the week. He reports experiencing headache and cough. He denies trauma, fever, chills, chest pain, shortness of breath, sick contacts, or urinary symptoms      The patient was independent in ADLs/IADLs before his recent admission for his seizure. He had a bradley catheter upon discharge, but it was removed at the rehab facility. The patient has been voiding without difficulty.     In the ED, vital signs were stable.  He received 1g ceftriaxone, 150 mg vimpat, 1500 mg keppra, 1g magnesium, and 15 meq potassium phosphate.  Keppra level and urine culture were sent. (16 Nov 2019 22:38)    Patient was admitted to medicine, worked up for breakthrough seizures. LP done on 11/20 came back with serologies positive for west nile virus, so he was treated with 5 days of 1g Solumedrol and 5 days of IVIg. On the evening of 11/27, a critical value was called from an outside lab, showing a positive IgG and IgM for toxoplasma in the CSF, however it is not currently visible in sunrise.     Currently, the patient reports that he is doing alright, denies any pain, no fevers, chills, chest pain, nausea, vomiting, diarrhea, or rash.     PAST MEDICAL & SURGICAL HISTORY:  Hypertension  Prostate cancer: had seed implant &amp; radiation ( 2001 )  PC (prostate cancer)  S/P cholecystectomy      REVIEW OF SYSTEMS:  Constitutional: no weakness, no fevers, no chills  Head: some frontal headache  eyes: no eye pain or change in the vision  ENT: no sore throat or rhinorrhea  Dermatologic: no rash  Respiratory: no SOB, no cough  Cardiovascular: no chest pain, no palpitations  Gastrointestinal: no nausea, no vomiting, no diarrhea  Genitourinary: no dysuria, no urinary frequency, no hematuria, no urinary retention  Musculoskeletal:	no weakness, no joint swelling/pain  Neurological: no focal weakness or numbness  Endocrine: no polyuria, no polydipsia  psych: no anxiety or depression     ACTIVE ANTIMICROBIAL/ANTIBIOTIC MEDICATIONS:  sulfADIAZINE 1000 milliGRAM(s) Oral every 6 hours      OTHER MEDICATIONS:  acetaminophen    Suspension .. 650 milliGRAM(s) Oral every 6 hours PRN  acetaminophen   Tablet .. 325 milliGRAM(s) Oral daily  aMILoride 10 milliGRAM(s) Oral <User Schedule>  enoxaparin Injectable 40 milliGRAM(s) SubCutaneous daily  fluticasone propionate 50 MICROgram(s)/spray Nasal Spray 1 Spray(s) Both Nostrils two times a day  influenza   Vaccine 0.5 milliLiter(s) IntraMuscular once  labetalol 100 milliGRAM(s) Oral every 8 hours  lacosamide 200 milliGRAM(s) Oral two times a day  levETIRAcetam  Solution 1500 milliGRAM(s) Oral two times a day  magnesium oxide 400 milliGRAM(s) Oral two times a day with meals  methylPREDNISolone 60 milliGRAM(s) Oral daily  pantoprazole  Injectable 40 milliGRAM(s) IV Push two times a day  polyethylene glycol 3350 17 Gram(s) Oral two times a day  potassium chloride   Solution 40 milliEquivalent(s) Enteral Tube daily  senna Syrup 10 milliLiter(s) Oral at bedtime  sodium chloride 2 Gram(s) Oral three times a day  sodium chloride 0.9%. 1000 milliLiter(s) IV Continuous <Continuous>      ALLERGIES:  Allergies    No Known Allergies    Intolerances        SOCIAL HISTORY: , Currently living in rehab, lived with wife before that, nonsmoker, no alcohol or drugs.    FAMILY HISTORY:  No pertinent family history in first degree relatives      VITAL SIGNS:  ICU Vital Signs Last 24 Hrs  T(C): 36.6 (28 Nov 2019 14:20), Max: 36.6 (28 Nov 2019 14:20)  T(F): 97.9 (28 Nov 2019 14:20), Max: 97.9 (28 Nov 2019 14:20)  HR: 84 (28 Nov 2019 14:20) (65 - 89)  BP: 118/73 (28 Nov 2019 14:20) (118/73 - 132/74)  BP(mean): --  ABP: --  ABP(mean): --  RR: 18 (28 Nov 2019 14:20) (18 - 18)  SpO2: 96% (28 Nov 2019 14:20) (96% - 98%)      PHYSICAL EXAM:  Constitutional: WDWN  Head: NC/AT, EEG leads in place   Eyes: anicteric sclera  ENMT: no rhinorrhea; no sinus tenderness on palpation; no oropharyngeal lesions, erythema, or exudates	  Neck: stiff, not rigid; no JVD or LAD  Respiratory: CTA B/L  Cardio: +S1/S2, RRR; no appreciable murmurs  Gastrointestinal: soft, NT/ND; +BSx4, no HSM; +PEG tube   : no suprapubic or CVA tenderness  Extremities: WWP; no clubbing, cyanosis, or edema  Dermatologic: skin warm and dry; no visible rashes or lesions  Neurologic: AAOx3; mumbled speech at times  psych: normal affect    LABS:                        13.0   16.42 )-----------( 494      ( 28 Nov 2019 08:46 )             39.1     11-28    131<L>  |  95<L>  |  31<H>  ----------------------------<  162<H>  3.8   |  25  |  0.52    Ca    9.8      28 Nov 2019 07:29    TPro  10.2<H>  /  Alb  3.2<L>  /  TBili  0.5  /  DBili  x   /  AST  33  /  ALT  66<H>  /  AlkPhos  123<H>  11-28          MICROBIOLOGY:  [facility] CSF (11.20.19 @ 16:20)    [facility]: [phone]    West Nile Virus IgG/IgM Antibody, CSF (11.20.19 @ 20:30)    West Nile Virus IgG - CSF: Positive: Positive for West Nile Virus IgG Antibody. Serological  cross-reactivity among flaviviruses (e.g. Yamile  encephalitis and dengue viruses) is common.    West Nile Virus IgM - CSF: Negative: No detectable West Nile Virus IgM Antibody. If a recent  infection is suspected, another specimen should be  submitted for testing within 7-14 days.  Performed At:  [facility] [id number]  Nav Mireles MD Ph:[phone]    West Nile RT-PCR (11.21.19 @ 23:32)    West Nile Virus by PCR: NotDetec: NOT DETECTED - A negative result does not rule out the  presence of PCR inhibitors in the patient specimen or assay  specific nucleic acid in concentrations below the level of  detection by the assay.  INTERPRETIVE INFORMATION: West Nile Virus by PCR  Test developed and characteristics determined by [facility]. See Compliance Statement B: [employer]/  Performed by [facility],  [address] [place] [id number] [phone]  [email or web address], Pedro Perez MD, Lab. Director    West Nile Virus by PCR Comment: CSF        RADIOLOGY & ADDITIONAL STUDIES:    < from: MR Head w/wo IV Cont (11.19.19 @ 15:39) >  EXAM:  MR BRAIN [facility]                            PROCEDURE DATE:  11/19/2019            INTERPRETATION:  Contrast-enhanced MRI of the brain.    CLINICAL INDICATION: breakthrough seizure    TECHNIQUE:  Multiplanar, multisequence MR images of the brain were   obtained before and after the intravenous administration of 7.5 cc of   Gadavist. 0 cc were discarded. Special attention was paid to the temporal   lobes.    Laryngal mask anesthesia provided by members of the anesthesia department   who were present.    COMPARISON: MRI brain 11/17/2019 and 10/11/2019.    FINDINGS:      Ventricles are persistently mildly dilated, this may be on the basis of   age-appropriate atrophy. No midline shift or effacement of the basal   cisterns.    No acute intracranial hemorrhage, mass effect, vasogenic edema, or   evidence of acute territorial infarct. Moderate white matter   microvascular ischemic disease.     Abnormal signal within sulci on FLAIR images, consistent with   supplemental oxygen administration.    Signal voids are seen within the major intracranial vessels consistent   with their patency.    Hippocampal gyri are grossly symmetric in size and signal characteristics.    No abnormal parenchymal or leptomeningeal enhancement.    Right maxillary sinus mucosal thickening. Mastoid air cells clear. The   orbits, sellar and suprasellar structures, and craniocervical junction   are unremarkable.    IMPRESSION:    No acute intracranial hemorrhage, mass effect, vasogenic edema, or   evidence of acute territorial infarct.    No abnormal parenchymal or leptomeningeal enhancement.    No evidence for mesial temporal sclerosis, gray matter heterotopia, or   cortical dysplasia.    Similar mild ventricular dilatation which may be on the basis of   age-appropriate atrophy.                        BENI WANG M.D., ATTENDING RADIOLOGIST  This document has been electronically signed. Nov 19 2019  4:34PM    < end of copied text >

## 2019-11-28 NOTE — PROGRESS NOTE ADULT - ASSESSMENT
Focal myoclonic seizure in setting of UTI and electrolyte imbalances. 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 Last day of IVIG, solumedrol today, then will start taper.     Plan:   Labs  [x] repeat LP done on 11/20, studies sent: gluc 98, protein 42, lymph predom 90%, gram stain neg, HSV negative, CSF PCR negative, flow cytometry negative. WNV IgG + in CSF, IgM negative, fungal cx negative, errlichia negative, lyme negative, borrelia negative, ACE negative, beta 2 microglobulin wnl, crypto neg, acid fast negative, CSF PCR negative for West Nile, MBP negative  [] pending CSF studies: VDRL, VZV, toxo, histoplasma, EBV, ENCES, CMV,  [x] HIV negative, TPO negative, thyroglobulin negative  [x] ESR, CRP elevated 5.62, B12 wnl, TSH wnl, folate wnl    [x] Anti MUSK, Anti achR ab negative  [] [place] [facility] encephalitis panel sent  [x] thrombocytosis likely reactive, will monitor Hgb, if continues to decr will get FOBT  [x] started on salt tabs for hyponatremia.     Consults  [x] heme onc note for thrombocytosis: studies sent, consult appreciated    Meds  [] taper regimen: 60 x1 week (starting 11/28), then 40 x1 week, then 20 x1 week, then 10 x1 week.   [x] Continue Keppra 1.5g BID  [x] Continue Vimpat 200 BID  [x] Continue solumedrol 1g x5 days (last dose 11/27)  [x] Continue IVIG x5 days + tylenol, benadryl (last dose 11/27)    Imaging  [x] Repeat MRI brain w/ and w/o under anesthesia: looks improved  [x] rEEG: potential epileptogenic focus in R temporal, R centroparietal, R temporoocopital regions, mild-mod dysfunction, no seizure seen.   [x] CT C/A/P stool impaction, mild esophagitis and debris in trachea  [] pending repeat VEEG     Other  [x] Pt/OT: for MAEVE  [] S/s pending Focal myoclonic seizure in setting of UTI and electrolyte imbalances. 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 Last day of IVIG, solumedrol today, then will start taper.     Plan:   Labs  [x] repeat LP done on 11/20, studies sent: gluc 98, protein 42, lymph predom 90%, gram stain neg, HSV negative, CSF PCR negative, flow cytometry negative. WNV IgG + in CSF, IgM negative, fungal cx negative, errlichia negative, lyme negative, borrelia negative, ACE negative, beta 2 microglobulin wnl, crypto neg, acid fast negative, CSF PCR negative for West Nile, MBP negative  [] pending CSF studies: VDRL, VZV, histoplasma, EBV, ENCES, CMV. +toxoplasma  [x] ID consulted 11/28 for positive toxoplasma in the CSF - started Bactrim for now  [] repeat MRI brain with and without contrast, as initial did not show toxo lesions  [x] HIV negative, TPO negative, thyroglobulin negative  [x] ESR, CRP elevated 5.62, B12 wnl, TSH wnl, folate wnl    [x] Anti MUSK, Anti achR ab negative  [] [facility] encephalitis panel sent  [x] thrombocytosis likely reactive, will monitor Hgb, if continues to decr will get FOBT  [x] started on salt tabs for hyponatremia.     Consults  [x] heme onc note for thrombocytosis: studies sent, consult appreciated    Meds  [] steroid aper regimen: 60 x1 week (starting 11/28), then 40 x1 week, then 20 x1 week, then 10 x1 week.   [x] Continue Keppra 1.5g BID  [x] Continue Vimpat 200 BID  [x] Completed IVIG and solumedrol     Imaging  [x] Repeat MRI brain w/ and w/o under anesthesia: looks improved  [x] rEEG: potential epileptogenic focus in R temporal, R centroparietal, R temporoocopital regions, mild-mod dysfunction, no seizure seen.   [x] CT C/A/P stool impaction, mild esophagitis and debris in trachea  [] pending repeat VEEG     Other  [x] Pt/OT: for MAEVE  [] S/s pending

## 2019-11-28 NOTE — PROGRESS NOTE ADULT - SUBJECTIVE AND OBJECTIVE BOX
Pt seen/examined, resting      MEDICATIONS  (STANDING):  acetaminophen   Tablet .. 325 milliGRAM(s) Oral daily  aMILoride 10 milliGRAM(s) Oral <User Schedule>  enoxaparin Injectable 40 milliGRAM(s) SubCutaneous daily  fluticasone propionate 50 MICROgram(s)/spray Nasal Spray 1 Spray(s) Both Nostrils two times a day  influenza   Vaccine 0.5 milliLiter(s) IntraMuscular once  labetalol 100 milliGRAM(s) Oral every 8 hours  lacosamide 200 milliGRAM(s) Oral two times a day  levETIRAcetam  Solution 1500 milliGRAM(s) Oral two times a day  magnesium oxide 400 milliGRAM(s) Oral two times a day with meals  methylPREDNISolone 60 milliGRAM(s) Oral daily  pantoprazole  Injectable 40 milliGRAM(s) IV Push two times a day  polyethylene glycol 3350 17 Gram(s) Oral two times a day  potassium chloride   Solution 40 milliEquivalent(s) Enteral Tube daily  senna Syrup 10 milliLiter(s) Oral at bedtime  sodium chloride 2 Gram(s) Oral three times a day  sodium chloride 0.9%. 1000 milliLiter(s) (75 mL/Hr) IV Continuous <Continuous>    MEDICATIONS  (PRN):  acetaminophen    Suspension .. 650 milliGRAM(s) Oral every 6 hours PRN Moderate Pain (4 - 6), Severe Pain (7 - 10)      ROS  No fever, sweats, chills  No epistaxis, HA, sore throat  No CP, SOB, cough, sputum  No n/v/d, abd pain, melena, hematochezia  No edema  No rash  No anxiety  No back pain, joint pain  No bleeding, bruising  No dysuria, hematuria    Vital Signs Last 24 Hrs  T(C): 36.4 (28 Nov 2019 03:47), Max: 36.4 (28 Nov 2019 03:47)  T(F): 97.6 (28 Nov 2019 03:47), Max: 97.6 (28 Nov 2019 03:47)  HR: 70 (28 Nov 2019 03:47) (65 - 89)  BP: 132/74 (28 Nov 2019 03:47) (121/70 - 132/74)  BP(mean): --  RR: 18 (28 Nov 2019 03:47) (18 - 18)  SpO2: 97% (28 Nov 2019 03:47) (96% - 98%)    PE  NAD  Awake, alert  Anicteric, MMM  RRR  CTAB  Abd soft, NT, ND  No c/c/e  No rash grossly  FROM                          13.0   16.42 )-----------( 494      ( 28 Nov 2019 08:46 )             39.1       11-28    131<L>  |  95<L>  |  31<H>  ----------------------------<  162<H>  3.8   |  25  |  0.52    Ca    9.8      28 Nov 2019 07:29    TPro  10.2<H>  /  Alb  3.2<L>  /  TBili  0.5  /  DBili  x   /  AST  33  /  ALT  66<H>  /  AlkPhos  123<H>  11-28

## 2019-11-28 NOTE — CONSULT NOTE ADULT - ASSESSMENT
80M PMHx of HTN and prostate cancer who is currently admitted for seizures, had a very long hospitalization recently for a suspected viral encephalitis. This admission, repeat LP was positive for west nile virus IgG, treated with IVIg and Solumedrol, now on a prednisone taper. Toxoplasma serologies from an outside lab were called in as positive last night, however the values are not visible in the EMR. The listed phone number for said lab is not working.   Given his clinical picture, recent negative testing for toxoplasma, and MRI head findings, unsure what the significance of this lab result is, but will treat at this point.     Positive CSF toxoplasma serologies  - Start Bactrim 385mg IV q 12 hours (ordered by ID)  - Check MR head to evaluate for signs of toxoplasmosis  - Follow up toxoplasma serologies still pending   - Monitor for fevers  - Trend WBCs    Olamide Oconnor, PGY-4  Infectious Disease Fellow   Pager: [phone]  After 5pm/weekends: [phone]

## 2019-11-29 LAB
ANION GAP SERPL CALC-SCNC: 12 MMOL/L — SIGNIFICANT CHANGE UP (ref 5–17)
B BURGDOR AB CSF-ACNC: SIGNIFICANT CHANGE UP
BUN SERPL-MCNC: 32 MG/DL — HIGH (ref 7–23)
CALCIUM SERPL-MCNC: 9.2 MG/DL — SIGNIFICANT CHANGE UP (ref 8.4–10.5)
CHLORIDE SERPL-SCNC: 95 MMOL/L — LOW (ref 96–108)
CO2 SERPL-SCNC: 23 MMOL/L — SIGNIFICANT CHANGE UP (ref 22–31)
CREAT SERPL-MCNC: 0.62 MG/DL — SIGNIFICANT CHANGE UP (ref 0.5–1.3)
GLUCOSE SERPL-MCNC: 162 MG/DL — HIGH (ref 70–99)
HCT VFR BLD CALC: 40.1 % — SIGNIFICANT CHANGE UP (ref 39–50)
HGB BLD-MCNC: 13 G/DL — SIGNIFICANT CHANGE UP (ref 13–17)
MCHC RBC-ENTMCNC: 29.7 PG — SIGNIFICANT CHANGE UP (ref 27–34)
MCHC RBC-ENTMCNC: 32.4 GM/DL — SIGNIFICANT CHANGE UP (ref 32–36)
MCV RBC AUTO: 91.8 FL — SIGNIFICANT CHANGE UP (ref 80–100)
PLATELET # BLD AUTO: 439 K/UL — HIGH (ref 150–400)
POTASSIUM SERPL-MCNC: 3.6 MMOL/L — SIGNIFICANT CHANGE UP (ref 3.5–5.3)
POTASSIUM SERPL-SCNC: 3.6 MMOL/L — SIGNIFICANT CHANGE UP (ref 3.5–5.3)
RBC # BLD: 4.37 M/UL — SIGNIFICANT CHANGE UP (ref 4.2–5.8)
RBC # FLD: 15 % — HIGH (ref 10.3–14.5)
SODIUM SERPL-SCNC: 130 MMOL/L — LOW (ref 135–145)
WBC # BLD: 11.13 K/UL — HIGH (ref 3.8–10.5)
WBC # FLD AUTO: 11.13 K/UL — HIGH (ref 3.8–10.5)

## 2019-11-29 PROCEDURE — 99233 SBSQ HOSP IP/OBS HIGH 50: CPT

## 2019-11-29 PROCEDURE — 99232 SBSQ HOSP IP/OBS MODERATE 35: CPT

## 2019-11-29 PROCEDURE — 70553 MRI BRAIN STEM W/O & W/DYE: CPT | Mod: 26

## 2019-11-29 PROCEDURE — 95951: CPT | Mod: 26

## 2019-11-29 RX ADMIN — PANTOPRAZOLE SODIUM 40 MILLIGRAM(S): 20 TABLET, DELAYED RELEASE ORAL at 18:34

## 2019-11-29 RX ADMIN — LACOSAMIDE 200 MILLIGRAM(S): 50 TABLET ORAL at 18:35

## 2019-11-29 RX ADMIN — Medication 40 MILLIEQUIVALENT(S): at 13:41

## 2019-11-29 RX ADMIN — Medication 100 MILLIGRAM(S): at 06:23

## 2019-11-29 RX ADMIN — Medication 10 MILLIGRAM(S): at 21:35

## 2019-11-29 RX ADMIN — Medication 60 MILLIGRAM(S): at 06:23

## 2019-11-29 RX ADMIN — Medication 1 SPRAY(S): at 06:23

## 2019-11-29 RX ADMIN — Medication 100 MILLIGRAM(S): at 13:46

## 2019-11-29 RX ADMIN — Medication 524.06 MILLIGRAM(S): at 18:26

## 2019-11-29 RX ADMIN — MAGNESIUM OXIDE 400 MG ORAL TABLET 400 MILLIGRAM(S): 241.3 TABLET ORAL at 08:21

## 2019-11-29 RX ADMIN — Medication 100 MILLIGRAM(S): at 21:35

## 2019-11-29 RX ADMIN — SODIUM CHLORIDE 2 GRAM(S): 9 INJECTION INTRAMUSCULAR; INTRAVENOUS; SUBCUTANEOUS at 21:35

## 2019-11-29 RX ADMIN — Medication 524.06 MILLIGRAM(S): at 06:24

## 2019-11-29 RX ADMIN — SODIUM CHLORIDE 75 MILLILITER(S): 9 INJECTION INTRAMUSCULAR; INTRAVENOUS; SUBCUTANEOUS at 18:26

## 2019-11-29 RX ADMIN — LEVETIRACETAM 1500 MILLIGRAM(S): 250 TABLET, FILM COATED ORAL at 06:21

## 2019-11-29 RX ADMIN — SODIUM CHLORIDE 2 GRAM(S): 9 INJECTION INTRAMUSCULAR; INTRAVENOUS; SUBCUTANEOUS at 06:21

## 2019-11-29 RX ADMIN — MAGNESIUM OXIDE 400 MG ORAL TABLET 400 MILLIGRAM(S): 241.3 TABLET ORAL at 18:34

## 2019-11-29 RX ADMIN — ENOXAPARIN SODIUM 40 MILLIGRAM(S): 100 INJECTION SUBCUTANEOUS at 13:41

## 2019-11-29 RX ADMIN — PANTOPRAZOLE SODIUM 40 MILLIGRAM(S): 20 TABLET, DELAYED RELEASE ORAL at 06:23

## 2019-11-29 RX ADMIN — SODIUM CHLORIDE 2 GRAM(S): 9 INJECTION INTRAMUSCULAR; INTRAVENOUS; SUBCUTANEOUS at 13:40

## 2019-11-29 RX ADMIN — POLYETHYLENE GLYCOL 3350 17 GRAM(S): 17 POWDER, FOR SOLUTION ORAL at 18:35

## 2019-11-29 RX ADMIN — Medication 10 MILLIGRAM(S): at 13:41

## 2019-11-29 RX ADMIN — Medication 10 MILLIGRAM(S): at 10:11

## 2019-11-29 RX ADMIN — SENNA PLUS 10 MILLILITER(S): 8.6 TABLET ORAL at 21:37

## 2019-11-29 RX ADMIN — Medication 1 SPRAY(S): at 18:34

## 2019-11-29 RX ADMIN — LACOSAMIDE 200 MILLIGRAM(S): 50 TABLET ORAL at 06:23

## 2019-11-29 RX ADMIN — LEVETIRACETAM 1500 MILLIGRAM(S): 250 TABLET, FILM COATED ORAL at 18:34

## 2019-11-29 RX ADMIN — Medication 650 MILLIGRAM(S): at 22:54

## 2019-11-29 NOTE — PROGRESS NOTE ADULT - SUBJECTIVE AND OBJECTIVE BOX
no new events    acetaminophen    Suspension .. 650 milliGRAM(s) Oral every 6 hours PRN  acetaminophen   Tablet .. 325 milliGRAM(s) Oral daily  aMILoride 10 milliGRAM(s) Oral <User Schedule>  enoxaparin Injectable 40 milliGRAM(s) SubCutaneous daily  fluticasone propionate 50 MICROgram(s)/spray Nasal Spray 1 Spray(s) Both Nostrils two times a day  influenza   Vaccine 0.5 milliLiter(s) IntraMuscular once  labetalol 100 milliGRAM(s) Oral every 8 hours  lacosamide 200 milliGRAM(s) Oral two times a day  levETIRAcetam  Solution 1500 milliGRAM(s) Oral two times a day  magnesium oxide 400 milliGRAM(s) Oral two times a day with meals  methylPREDNISolone 60 milliGRAM(s) Oral daily  pantoprazole  Injectable 40 milliGRAM(s) IV Push two times a day  polyethylene glycol 3350 17 Gram(s) Oral two times a day  potassium chloride   Solution 40 milliEquivalent(s) Enteral Tube daily  senna Syrup 10 milliLiter(s) Oral at bedtime  sodium chloride 2 Gram(s) Oral three times a day  sodium chloride 0.9%. 1000 milliLiter(s) IV Continuous <Continuous>  trimethoprim / sulfamethoxazole IVPB 385 milliGRAM(s) IV Intermittent every 12 hours      No Known Allergies      ROS otherwise unobtainable    T(C): 36.4 (11-29-19 @ 09:08), Max: 36.6 (11-28-19 @ 14:20)  HR: 82 (11-29-19 @ 09:08) (61 - 84)  BP: 105/74 (11-29-19 @ 09:08) (105/74 - 128/79)  RR: 18 (11-29-19 @ 09:08) (18 - 18)  SpO2: 95% (11-29-19 @ 09:08) (95% - 97%)  PHYSICAL EXAM  Gen:  laying in bed, nad, dysarthric  H:  anicteric, eomi  CV:  RRR, S1, S2  Lungs:  CTA b/l  Ab soft/nt/nd  Ext:  no edema                          13.0   11.13 )-----------( 439      ( 29 Nov 2019 08:25 )             40.1                         13.0   16.42 )-----------( 494      ( 28 Nov 2019 08:46 )             39.1                         11.5   17.09 )-----------( 519      ( 26 Nov 2019 08:44 )             35.0   11-29    130<L>  |  95<L>  |  32<H>  ----------------------------<  162<H>  3.6   |  23  |  0.62    Ca    9.2      29 Nov 2019 06:49    TPro  10.2<H>  /  Alb  3.2<L>  /  TBili  0.5  /  DBili  x   /  AST  33  /  ALT  66<H>  /  AlkPhos  123<H>  11-28

## 2019-11-29 NOTE — CHART NOTE - NSCHARTNOTEFT_GEN_A_CORE
Nutrition Follow Up Note    Patient seen for malnutrition follow up.     Source: Comprehensive chart review, family, RN    Chart reviewed, events noted. Per chart, pt is a 84 year old male with PMH of HTN, prostate cancer, recent prolong hospitalization for encephalitis, s/p PEG, presents  with seizures and encephalitis. Per ID, ?west nile vs toxo or autoimmune.    Diet : NPO with Tube Feed via PEG     Enteral /Parenteral Nutrition: Vital 1.2 at goal rate of 60cc/hr x 24 hrs to provide 1440ml, 1728kcal, 108g protein, and 1167ml water (22.5 kcal/kg, 1.4g protein/kg based on 76.7kg dosing weight). Per chart, pt confused/disoriented; family requested to allow pt to rest and discuss nutrition with them. Family reports pt tolerating current regimen with no complaints of nausea/vomiting, diarrhea/constipation or other acute GI distress. Family confirms last BM . Per documentation, amount of EN provision unclear; RN confirms pt has been tolerating at goal rate.      Daily Weight in k.1 (), 76.7 kg (-) - Weight gain of 3.4 kg (4% of body weight) noted; however, question accuracy of weights as pt noted with 1+ bilateral ankle edema, thus fluid shifts may be contributing factor to changes.     Pertinent Medications: MEDICATIONS  (STANDING):  acetaminophen   Tablet .. 325 milliGRAM(s) Oral daily  aMILoride 10 milliGRAM(s) Oral <User Schedule>  enoxaparin Injectable 40 milliGRAM(s) SubCutaneous daily  fluticasone propionate 50 MICROgram(s)/spray Nasal Spray 1 Spray(s) Both Nostrils two times a day  influenza   Vaccine 0.5 milliLiter(s) IntraMuscular once  labetalol 100 milliGRAM(s) Oral every 8 hours  lacosamide 200 milliGRAM(s) Oral two times a day  levETIRAcetam  Solution 1500 milliGRAM(s) Oral two times a day  magnesium oxide 400 milliGRAM(s) Oral two times a day with meals  methylPREDNISolone 60 milliGRAM(s) Oral daily  pantoprazole  Injectable 40 milliGRAM(s) IV Push two times a day  polyethylene glycol 3350 17 Gram(s) Oral two times a day  potassium chloride   Solution 40 milliEquivalent(s) Enteral Tube daily  senna Syrup 10 milliLiter(s) Oral at bedtime  sodium chloride 2 Gram(s) Oral three times a day  sodium chloride 0.9%. 1000 milliLiter(s) (75 mL/Hr) IV Continuous <Continuous>  trimethoprim / sulfamethoxazole IVPB 385 milliGRAM(s) IV Intermittent every 12 hours    MEDICATIONS  (PRN):  acetaminophen    Suspension .. 650 milliGRAM(s) Oral every 6 hours PRN Moderate Pain (4 - 6), Severe Pain (7 - 10)    Pertinent Labs:  @ 06:49: Na 130<L>, BUN 32<H>, Cr 0.62, <H>, K+ 3.6    Skin per nursing documentation: surgical incision lower back  Edema per nursing documentation: 1+ bilateral ankles    Estimated Needs:   [x ] no change since previous assessment    Previous Nutrition Diagnosis: Severe, Chronic Malnutrition  Nutrition Diagnosis is: ongoing, addressed with provision of enteral nutrition    New Nutrition Diagnosis: n/a     Interventions:     Recommend  1) In setting of tolerance of current regimen and relative weight maintenance, recommend continue current enteral nutrition regimen. Continue Vital 1.2 at goal rate of 60cc/hr x 24 hrs to provide 1440ml, 1728kcal, 108g protein, and 1167ml water (22.5 kcal/kg, 1.4g protein/kg based on 76.7kg dosing weight).  2) Monitor and replete electrolytes PRN    Monitoring and Evaluation:     Continue to monitor Nutritional intake, Tolerance to diet prescription, weights, labs, skin integrity    RD remains available upon request and will follow up per protocol    May Deng RD, CDN    Pager# [phone]

## 2019-11-29 NOTE — PROGRESS NOTE ADULT - SUBJECTIVE AND OBJECTIVE BOX
Follow Up:  encephalitis with positive CSF toxo    Interval History: pt stable and afebrile, pending brain MRI    ROS:      All other systems negative    Constitutional: no fever, no chills  Eyes: no vision changes, no eye pain  ENT:  no sore throat, no rhinorrhea  Cardiovascular:  no chest pain, no palpitation  Respiratory:  no SOB, no cough  GI:  no abd pain, no vomiting, no diarrhea  urinary: no dysuria, no hematuria, no flank pain  musculoskeletal:  no joint pain, no joint swelling  skin:  no rash  neurology:  occasional frontal headache          Allergies  No Known Allergies        ANTIMICROBIALS:  trimethoprim / sulfamethoxazole IVPB 385 every 12 hours      OTHER MEDS:  acetaminophen    Suspension .. 650 milliGRAM(s) Oral every 6 hours PRN  acetaminophen   Tablet .. 325 milliGRAM(s) Oral daily  aMILoride 10 milliGRAM(s) Oral <User Schedule>  enoxaparin Injectable 40 milliGRAM(s) SubCutaneous daily  fluticasone propionate 50 MICROgram(s)/spray Nasal Spray 1 Spray(s) Both Nostrils two times a day  influenza   Vaccine 0.5 milliLiter(s) IntraMuscular once  labetalol 100 milliGRAM(s) Oral every 8 hours  lacosamide 200 milliGRAM(s) Oral two times a day  levETIRAcetam  Solution 1500 milliGRAM(s) Oral two times a day  magnesium oxide 400 milliGRAM(s) Oral two times a day with meals  methylPREDNISolone 60 milliGRAM(s) Oral daily  pantoprazole  Injectable 40 milliGRAM(s) IV Push two times a day  polyethylene glycol 3350 17 Gram(s) Oral two times a day  potassium chloride   Solution 40 milliEquivalent(s) Enteral Tube daily  senna Syrup 10 milliLiter(s) Oral at bedtime  sodium chloride 2 Gram(s) Oral three times a day  sodium chloride 0.9%. 1000 milliLiter(s) IV Continuous <Continuous>      Vital Signs Last 24 Hrs  T(C): 36.4 (29 Nov 2019 12:30), Max: 36.4 (29 Nov 2019 05:18)  T(F): 97.6 (29 Nov 2019 12:30), Max: 97.6 (29 Nov 2019 09:08)  HR: 62 (29 Nov 2019 13:45) (61 - 82)  BP: 124/76 (29 Nov 2019 13:45) (105/74 - 128/79)  BP(mean): --  RR: 19 (29 Nov 2019 12:30) (18 - 19)  SpO2: 97% (29 Nov 2019 12:30) (95% - 97%)    Physical Exam:  General:    NAD,  non toxic  Head: EEG electrodes  Eye: normal sclera and conjunctiva  ENT:    no oropharyngeal lesions,   no LAD,   neck supple  Cardio:     regular S1, S2,  no murmur  Respiratory:    clear b/l,    no wheezing  abd:     soft,   BS +,   no tenderness, PEG with no erythema  :   no CVAT,  no suprapubic tenderness,   no  bradley  Musculoskeletal:   no joint swelling,   no edema  vascular: no phlebitis, normal pulses  Skin:    no rash  Neurologic:     A&O x 3                        13.0   11.13 )-----------( 439      ( 29 Nov 2019 08:25 )             40.1       11-29    130<L>  |  95<L>  |  32<H>  ----------------------------<  162<H>  3.6   |  23  |  0.62    Ca    9.2      29 Nov 2019 06:49    TPro  10.2<H>  /  Alb  3.2<L>  /  TBili  0.5  /  DBili  x   /  AST  33  /  ALT  66<H>  /  AlkPhos  123<H>  11-28          MICROBIOLOGY:  v  .CSF CSF  11-20-19   Testing in progress  --    No polymorphonuclear cells seen  No organisms seen  by cytocentrifuge      .Urine Clean Catch (Midstream)  11-17-19   >=3 organisms. Probable collection contamination.  --  --        CMV PCR Detection: NotDetec IU/mL (11-27-19 @ 13:39)    CMV PCR Detection: NotDetec IU/mL (11-27 @ 13:39)  EBV PCR: NotDetec IU/mL (11-27 @ 13:39)        RADIOLOGY:  Images below reviewed personally  < from: CT Chest w/ IV Cont (11.21.19 @ 18:40) >  IMPRESSION:     Diffuse esophagitis.    Secretions/debris within the trachea.    No acute pathology in the abdomen or pelvis.

## 2019-11-29 NOTE — SWALLOW BEDSIDE ASSESSMENT ADULT - SLP GENERAL OBSERVATIONS
Pt encountered asleep, in bed on RA. Roused to verbal stimuli. HOB elevated for purposes of evaluation. A&Ox3. +PEG +Left facial weakness. +ability to follow simple commands. + Mild Dysarthria characterized by hoarse vocal quality, decreased vocal volume, and reduced intelligibility at the sentence level. Pt also with limited verbal output and latent in response. Pt attempting to clear secretions independently; however, weak cough and required oral suctioning. +Delayed volitional swallow.

## 2019-11-29 NOTE — PROGRESS NOTE ADULT - ASSESSMENT
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 but all w/u negative s/p PEG now presented 11/16 with seizures, repeat tap with 1 WBC and elevated pr, CSF west nile IgG positive, IgM and PCR negative, MRI unchanged and s/p IVIG  now ID was called that the CSF toxo IgM and IgG came back positive (negative last admission)  unsure about the significance of toxo CSF serology when it was negative on last admission and MRI negative for toxo lesions but cannot ignore     encephalitis, ?west nile vs toxo or autoimmune    * c/w Bactrim 385mg IV q 12 hours,  as there is no pyrimethamine available, started 11/28, now day 2  * Check MR head to evaluate for signs of toxoplasmosis  * Follow up toxoplasma serologies still pending   * monitor BMP

## 2019-11-29 NOTE — SWALLOW BEDSIDE ASSESSMENT ADULT - SWALLOW EVAL: DIAGNOSIS
Pt admitted s/p seizure disorder, acute cystitis and acute metabolic encephalopathy from [facility]. Pt known to this service from previous admission s/p multiple bedside evaluations with recommendations for NPO, with non-oral nutrition/hydration/medications. Pt currently NPO with PEG. Seen today for re-evaluation of swallow function. Today pt presents with clear baseline vocal quality. He present with 1) evidence of oropharyngeal dsyaphgia with significantly delayed oral tansit time and overt clinical s/s of laryngeal penetration/aspiration with conservative texture of puree 2) Improved attempt to independently expectorate secretions; however, pt with weak cough and unable to expectorate secretion, requiring oral suctioning and increasing aspiration risk.

## 2019-11-29 NOTE — PROGRESS NOTE ADULT - ASSESSMENT
1. Thrombocytosis--improving    -- I suspect reactive secondary to acute neurologic process  -- monitor for now  -- Low TIBC and Iron. Ferritin nl, not consistent w iron deficiency  -- trend counts for now  -- Doubt MPN. No need to check JAK2     2. Seizure   -F/u extensive neurology work up, s/p LP;  suspected WNV vs. toxo infection vs autoimmune etiology  -mgmt per neurology and ID    Gopi Iverson MD  Hematology/Oncology  Cell:  [phone]  Office Phone: [phone]  Office Fax:  [phone] [address] [id number]

## 2019-11-29 NOTE — PROGRESS NOTE ADULT - SUBJECTIVE AND OBJECTIVE BOX
Interval History:    MEDICATIONS  (STANDING):  acetaminophen   Tablet .. 325 milliGRAM(s) Oral daily  aMILoride 10 milliGRAM(s) Oral <User Schedule>  enoxaparin Injectable 40 milliGRAM(s) SubCutaneous daily  fluticasone propionate 50 MICROgram(s)/spray Nasal Spray 1 Spray(s) Both Nostrils two times a day  influenza   Vaccine 0.5 milliLiter(s) IntraMuscular once  labetalol 100 milliGRAM(s) Oral every 8 hours  lacosamide 200 milliGRAM(s) Oral two times a day  levETIRAcetam  Solution 1500 milliGRAM(s) Oral two times a day  magnesium oxide 400 milliGRAM(s) Oral two times a day with meals  methylPREDNISolone 60 milliGRAM(s) Oral daily  pantoprazole  Injectable 40 milliGRAM(s) IV Push two times a day  polyethylene glycol 3350 17 Gram(s) Oral two times a day  potassium chloride   Solution 40 milliEquivalent(s) Enteral Tube daily  senna Syrup 10 milliLiter(s) Oral at bedtime  sodium chloride 2 Gram(s) Oral three times a day  sodium chloride 0.9%. 1000 milliLiter(s) (75 mL/Hr) IV Continuous <Continuous>  trimethoprim / sulfamethoxazole IVPB 385 milliGRAM(s) IV Intermittent every 12 hours    MEDICATIONS  (PRN):  acetaminophen    Suspension .. 650 milliGRAM(s) Oral every 6 hours PRN Moderate Pain (4 - 6), Severe Pain (7 - 10)    Allergies  No Known Allergies    Intolerances      ROS: Pertinent positives in HPI, all other ROS were reviewed and are negative.      Vital Signs Last 24 Hrs  T(C): 36.4 (29 Nov 2019 05:18), Max: 36.6 (28 Nov 2019 14:20)  T(F): 97.5 (29 Nov 2019 05:18), Max: 97.9 (28 Nov 2019 14:20)  HR: 80 (29 Nov 2019 05:18) (61 - 84)  BP: 128/79 (29 Nov 2019 05:18) (118/73 - 128/79)  BP(mean): --  RR: 18 (29 Nov 2019 05:18) (18 - 18)  SpO2: 96% (29 Nov 2019 05:18) (96% - 97%)    NEUROLOGICAL EXAM:  Mental Status: arousable to voice/stim, able to follow commands (show 2 fingers). able to raise both arms and R leg.   Motor: R leg at least AG, L leg not moving right now.    Patient will be re-examined later in the day to assess clincal status    Labs:   cbc                      13.0   16.42 )-----------( 494      ( 28 Nov 2019 08:46 )             39.1     [id number]    131<L>  |  95<L>  |  31<H>  ----------------------------<  162<H>  3.8   |  25  |  0.52    Ca    9.8      28 Nov 2019 07:29    TPro  10.2<H>  /  Alb  3.2<L>  /  TBili  0.5  /  DBili  x   /  AST  33  /  ALT  66<H>  /  AlkPhos  123<H>  11-28    Coags  Lipids  A1C  CardiacMarkers    LFTsLIVER FUNCTIONS - ( 28 Nov 2019 07:29 )  Alb: 3.2 g/dL / Pro: 10.2 g/dL / ALK PHOS: 123 U/L / ALT: 66 U/L / AST: 33 U/L / GGT: x           UA Interval History: Patient is more awake this morning, no neglect, tolerating bactrim well (for treatment for toxo)  EEG from 11/29 (24 hr EEG):  1. Structural abnormality and potential epileptogenic focus in the right posterior quadrant.  2. Moderate nonspecific diffuse or multifocal cerebral dysfunction.   3. No seizure seen.    MEDICATIONS  (STANDING):  acetaminophen   Tablet .. 325 milliGRAM(s) Oral daily  aMILoride 10 milliGRAM(s) Oral <User Schedule>  enoxaparin Injectable 40 milliGRAM(s) SubCutaneous daily  fluticasone propionate 50 MICROgram(s)/spray Nasal Spray 1 Spray(s) Both Nostrils two times a day  influenza   Vaccine 0.5 milliLiter(s) IntraMuscular once  labetalol 100 milliGRAM(s) Oral every 8 hours  lacosamide 200 milliGRAM(s) Oral two times a day  levETIRAcetam  Solution 1500 milliGRAM(s) Oral two times a day  magnesium oxide 400 milliGRAM(s) Oral two times a day with meals  methylPREDNISolone 60 milliGRAM(s) Oral daily  pantoprazole  Injectable 40 milliGRAM(s) IV Push two times a day  polyethylene glycol 3350 17 Gram(s) Oral two times a day  potassium chloride   Solution 40 milliEquivalent(s) Enteral Tube daily  senna Syrup 10 milliLiter(s) Oral at bedtime  sodium chloride 2 Gram(s) Oral three times a day  sodium chloride 0.9%. 1000 milliLiter(s) (75 mL/Hr) IV Continuous <Continuous>  trimethoprim / sulfamethoxazole IVPB 385 milliGRAM(s) IV Intermittent every 12 hours    MEDICATIONS  (PRN):  acetaminophen    Suspension .. 650 milliGRAM(s) Oral every 6 hours PRN Moderate Pain (4 - 6), Severe Pain (7 - 10)    Vital Signs Last 24 Hrs  T(C): 36.4 (29 Nov 2019 05:18), Max: 36.6 (28 Nov 2019 14:20)  T(F): 97.5 (29 Nov 2019 05:18), Max: 97.9 (28 Nov 2019 14:20)  HR: 80 (29 Nov 2019 05:18) (61 - 84)  BP: 128/79 (29 Nov 2019 05:18) (118/73 - 128/79)  BP(mean): --  RR: 18 (29 Nov 2019 05:18) (18 - 18)  SpO2: 96% (29 Nov 2019 05:18) (96% - 97%)    NEUROLOGICAL EXAM:  Mental Status: awake and alert, AOx3, no neglect able to follow commands (show 2 fingers). able to raise both arms and and L, R leg   Motor: AG in all extremities.     Labs:   cbc                      13.0   16.42 )-----------( 494      ( 28 Nov 2019 08:46 )             39.1     [id number]    131<L>  |  95<L>  |  31<H>  ----------------------------<  162<H>  3.8   |  25  |  0.52    Ca    9.8      28 Nov 2019 07:29    TPro  10.2<H>  /  Alb  3.2<L>  /  TBili  0.5  /  DBili  x   /  AST  33  /  ALT  66<H>  /  AlkPhos  123<H>  11-28

## 2019-11-29 NOTE — EEG REPORT - NS EEG TEXT BOX
[facility]   COMPREHENSIVE EPILEPSY CENTER   REPORT OF CONTINUOUS VIDEO EEG     [facility]: [address], [place] [address]  [facility]: [phone], [place] [address]  [facility]: [address] [place] [address]    Patient Name: SHIKHA MASON  Age and : 84y ([birth year])  MRN #: [id number]  Location: [place] [address]  Referring Physician: Stacey Carmichael    Start Time/Date: 08:00 on 19  End Time/Date: 08:00 on 19    _____________________________________________________________  STUDY INFORMATION    EEG Recording Technique:  The patient underwent continuous Video-EEG monitoring, using Telemetry System hardware on the XLTek Digital System. EEG and video data were stored on a computer hard drive with important events saved in digital archive files. The material was reviewed by a physician (electroencephalographer / epileptologist) on a daily basis. Breezy and seizure detection algorithms were utilized and reviewed. An EEG Technician attended to the patient, and was available throughout daytime work hours.  The epilepsy center neurologist was available in person or on call 24-hours per day.    EEG Placement and Labeling of Electrodes:  The EEG was performed utilizing 20 channel referential EEG connections (coronal over temporal over parasagittal montage) using all standard 10-20 electrode placements with EKG, with additional electrodes placed in the inferior temporal region using the modified 10-10 montage electrode placements for elective admissions, or if deemed necessary. Recording was at a sampling rate of 256 samples per second per channel. Time synchronized digital video recording was done simultaneously with EEG recording. A low light infrared camera was used for low light recording.     _____________________________________________________________  HISTORY    Patient is a 84y old  Male who presents with a chief complaint of Confusion, Seizure (2019 19:14)      PERTINENT MEDICATION:  lacosamide 200 milliGRAM(s) Oral two times a day  levETIRAcetam  Solution 1500 milliGRAM(s) Oral two times a day  _____________________________________________________________  STUDY INTERPRETATION    Findings: The background was continuous, spontaneously variable and reactive. During wakefulness, the posterior dominant rhythm consisted of asymmetric, poorly-modulated 6 Hz activity better formed over the left, with amplitude to 20 uV, that attenuated to eye opening.     Background Slowing:  Diffuse theta and polymorphic delta slowing.    Focal Slowing:   Continuous theta and polymorphic delta slowing in the right posterior quadrant.     Sleep Background:  Drowsiness was characterized by fragmentation, attenuation, and slowing of the background activity.    Sleep was characterized by the presence of symmetric vertex waves, sleep spindles and K-complexes.    Other Non-Epileptiform Findings:  None were present.      Interictal Epileptiform Activity:   Occasional, blunt right posterior quadrant sharp waves (P8/O2)    Events:  Clinical events: None recorded.  Seizures: None recorded.    Activation Procedures:   Hyperventilation was not performed.    Photic stimulation was not performed.     Artifacts:  Intermittent myogenic and movement artifacts were noted.    ECG:  The heart rate on single channel ECG was predominantly between 70-80 BPM.    _____________________________________________________________  EEG SUMMARY/CLASSIFICATION    Abnormal EEG in the awake, drowsy and asleep states.  - Continuous theta and polymorphic delta slowing in the right posterior quadrant.   - Occasional, blunt right posterior quadrant sharp waves (P8, O2)  - Moderate generalized slowing.    _____________________________________________________________  EEG IMPRESSION/CLINICAL CORRELATE    Abnormal EEG study.  1. Structural abnormality and potential epileptogenic focus in the right posterior quadrant.  2. Moderate nonspecific diffuse or multifocal cerebral dysfunction.   3. No seizure seen.     ______________________________________________________________    Harmanf Angelina OCHOA  Epilepsy Fellow, [facility] Epilepsy [facility]   COMPREHENSIVE EPILEPSY CENTER   REPORT OF CONTINUOUS VIDEO EEG     [facility]: [address], [place] [address]  [facility]: [phone], [place] [address]  [facility]: [address] [place] [address]    Patient Name: SHIKHA MASON  Age and : 84y ([birth year]34)  MRN #: [id number]  Location: [place] [address]  Referring Physician: Stacey Carmichael    Start Time/Date: 08:00 on 19  End Time/Date: 08:00 on 19    _____________________________________________________________  STUDY INFORMATION    EEG Recording Technique:  The patient underwent continuous Video-EEG monitoring, using Telemetry System hardware on the XLTek Digital System. EEG and video data were stored on a computer hard drive with important events saved in digital archive files. The material was reviewed by a physician (electroencephalographer / epileptologist) on a daily basis. Breezy and seizure detection algorithms were utilized and reviewed. An EEG Technician attended to the patient, and was available throughout daytime work hours.  The epilepsy center neurologist was available in person or on call 24-hours per day.    EEG Placement and Labeling of Electrodes:  The EEG was performed utilizing 20 channel referential EEG connections (coronal over temporal over parasagittal montage) using all standard 10-20 electrode placements with EKG, with additional electrodes placed in the inferior temporal region using the modified 10-10 montage electrode placements for elective admissions, or if deemed necessary. Recording was at a sampling rate of 256 samples per second per channel. Time synchronized digital video recording was done simultaneously with EEG recording. A low light infrared camera was used for low light recording.     _____________________________________________________________  HISTORY    Patient is a 84y old  Male who presents with a chief complaint of Confusion, Seizure (2019 19:14)      PERTINENT MEDICATION:  lacosamide 200 milliGRAM(s) Oral two times a day  levETIRAcetam  Solution 1500 milliGRAM(s) Oral two times a day    _____________________________________________________________  STUDY INTERPRETATION    Findings: The background was continuous, spontaneously variable and reactive. During wakefulness, the posterior dominant rhythm consisted of asymmetric, poorly-modulated 6-7 Hz activity better formed over the left, with amplitude to 30 uV, that attenuated to eye opening.     Background Slowing:  Excess diffuse polymorphic delta slowing.    Focal Slowing:   Continuous theta and polymorphic delta slowing in the right posterior quadrant (max F8/T8/P8).     Sleep Background:  Drowsiness was characterized by fragmentation, attenuation, and slowing of the background activity.    Sleep was characterized by the presence of symmetric vertex waves, sleep spindles and K-complexes.    Other Non-Epileptiform Findings:  None were present.      Interictal Epileptiform Activity:   Frequent right posterior quadrant (max O2/P8) sharp wave discharges.    Events:  Clinical events: None recorded.  Seizures: None recorded.    Activation Procedures:   Hyperventilation was not performed.    Photic stimulation was not performed.     Artifacts:  Intermittent myogenic and movement artifacts were noted.    ECG:  The heart rate on single channel ECG was predominantly between 70-80 BPM.    _____________________________________________________________  EEG SUMMARY/CLASSIFICATION    Abnormal EEG in the awake, drowsy and asleep states.  - Frequent right posterior quadrant (max O2/P8) sharp wave discharges.  - Continuous theta and polymorphic delta slowing in the right posterior quadrant (max F8/T8/P8).   - Mild to moderate generalized slowing.    _____________________________________________________________  EEG IMPRESSION/CLINICAL CORRELATE    Abnormal EEG study.  1. Potential epileptogenic focus and structural abnormality in the right posterior quadrant.  2. Mild to moderate nonspecific diffuse or multifocal cerebral dysfunction.   3. No seizure seen.    ______________________________________________________________    Marjorie Alfaro DO  Epilepsy Fellow, [facility]    Finn Salas MD  Attending Physician, [facility] Epilepsy [place]

## 2019-11-29 NOTE — PROGRESS NOTE ADULT - ASSESSMENT
Focal myoclonic seizure in setting of UTI and electrolyte imbalances. 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 Last day of IVIG, solumedrol today, then will start taper.     Plan:   Labs  [x] repeat LP done on 11/20, studies sent: gluc 98, protein 42, lymph predom 90%, gram stain neg, HSV negative, CSF PCR negative, flow cytometry negative. WNV IgG + in CSF, IgM negative, fungal cx negative, errlichia negative, lyme negative, borrelia negative, ACE negative, beta 2 microglobulin wnl, crypto neg, acid fast negative, CSF PCR negative for West Nile, MBP negative  [] pending CSF studies: VDRL, VZV, histoplasma, EBV, ENCES, CMV. +toxoplasma  [] repeat MRI brain with and without contrast, as initial did not show toxo lesions  [x] HIV negative, TPO negative, thyroglobulin negative  [x] ESR, CRP elevated 5.62, B12 wnl, TSH wnl, folate wnl    [x] Anti MUSK, Anti achR ab negative  [] [facility] encephalitis panel sent  [x] thrombocytosis likely reactive, will monitor Hgb, if continues to decr will get FOBT  [x] started on salt tabs for hyponatremia.     Consults  [x] heme onc note for thrombocytosis: studies sent, consult appreciated  [x] ID consulted 11/28 for positive toxoplasma in the CSF - Bactrim started on 11/28.     Meds  [] steroid aper regimen: 60 x1 week (starting 11/28), then 40 x1 week, then 20 x1 week, then 10 x1 week.   [x] Continue Keppra 1.5g BID  [x] Continue Vimpat 200 BID  [x] Completed IVIG and solumedrol     Imaging  [x] Repeat MRI brain w/ and w/o under anesthesia: looks improved  [x] rEEG: potential epileptogenic focus in R temporal, R centroparietal, R temporoocopital regions, mild-mod dysfunction, no seizure seen.   [x] CT C/A/P stool impaction, mild esophagitis and debris in trachea  [] pending repeat VEEG     Other  [x] Pt/OT: for MAEVE  [] S/s pending Focal myoclonic seizure in setting of UTI and electrolyte imbalances. 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 Completed IVIG and solumedrol x5 days, currently on steroid taper. He is pending MRI brain w, w/o repeat to assess for lesions that would be consistent with toxo.     Plan:   Labs  [x] repeat LP done on 11/20, studies sent: gluc 98, protein 42, lymph predom 90%, gram stain neg, HSV negative, CSF PCR negative, flow cytometry negative. WNV IgG + in CSF, IgM negative, fungal cx negative, errlichia negative, lyme negative, borrelia negative, ACE negative, beta 2 microglobulin wnl, crypto neg, acid fast negative, CSF PCR negative for West Nile, MBP negative, + toxo plasma, - EBV, - EMV, - VZV  [] pending CSF studies: VDRL, histoplasma, ENCES,  [] repeat MRI brain with and without contrast, as initial did not show toxo lesions  [x] HIV negative, TPO negative, thyroglobulin negative  [x] ESR, CRP elevated 5.62, B12 wnl, TSH wnl, folate wnl    [x] Anti MUSK, Anti achR ab negative  [] [place] state encephalitis panel sent  [x] thrombocytosis likely reactive, will monitor Hgb, if continues to decr will get FOBT  [x] started on salt tabs for hyponatremia.     Consults  [x] heme onc note for thrombocytosis: studies sent, consult appreciated  [x] ID consulted 11/28 for positive toxoplasma in the CSF - Bactrim started on 11/28.     Meds  [] steroid aper regimen: 60 x1 week (starting 11/28), then 40 x1 week, then 20 x1 week, then 10 x1 week.   [x] Continue Keppra 1.5g BID  [x] Continue Vimpat 200 BID  [x] Completed IVIG and solumedrol     Imaging  [] MRI brain w, wo pending to assess for lesions (due to + toxo in CSF)  [x] Repeat MRI brain w/ and w/o under anesthesia: looks improved  [x] rEEG: potential epileptogenic focus in R temporal, R centroparietal, R temporoocopital regions, mild-mod dysfunction, no seizure seen.   [x] CT C/A/P stool impaction, mild esophagitis and debris in trachea  [x] 24 hr vEEG negative for seizures     Other  [x] Pt/OT: for MAEVE  [x] S/s failed. continue TF

## 2019-11-29 NOTE — SWALLOW BEDSIDE ASSESSMENT ADULT - SLP PERTINENT HISTORY OF CURRENT PROBLEM
84 year old male with history of hypertension, prostate cancer s/p radiation and seed implant presents from [facility] with confusion in the setting of recent seizures. The patient was admitted during 9/24-10/23 after episode of status epilepticus requiring intubation. 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 He was eventually treated on the medicine floor after being extubated. He failed multiple speech and swallow evaluations and is now s/p peg placement. Patient is also with residual slurred speech and weakness of left upper extremity. Patient was discharged to [facility] where he has been for the last three weeks. Per daughter, patient was doing well. However, on 11/13, the patient experienced seizure activity. His keppra was increased from 1000 mg to 1500 mg. He experienced another seizure on 11/15, and daughter thinks his vimpat may have been increased, but is uncertain.

## 2019-11-30 PROCEDURE — 99231 SBSQ HOSP IP/OBS SF/LOW 25: CPT

## 2019-11-30 RX ORDER — SERTRALINE 25 MG/1
50 TABLET, FILM COATED ORAL DAILY
Refills: 0 | Status: DISCONTINUED | OUTPATIENT
Start: 2019-11-30 | End: 2019-12-05

## 2019-11-30 RX ADMIN — LACOSAMIDE 200 MILLIGRAM(S): 50 TABLET ORAL at 05:49

## 2019-11-30 RX ADMIN — SODIUM CHLORIDE 2 GRAM(S): 9 INJECTION INTRAMUSCULAR; INTRAVENOUS; SUBCUTANEOUS at 05:29

## 2019-11-30 RX ADMIN — MAGNESIUM OXIDE 400 MG ORAL TABLET 400 MILLIGRAM(S): 241.3 TABLET ORAL at 17:06

## 2019-11-30 RX ADMIN — LEVETIRACETAM 1500 MILLIGRAM(S): 250 TABLET, FILM COATED ORAL at 05:31

## 2019-11-30 RX ADMIN — Medication 10 MILLIGRAM(S): at 21:25

## 2019-11-30 RX ADMIN — Medication 60 MILLIGRAM(S): at 05:30

## 2019-11-30 RX ADMIN — Medication 1 SPRAY(S): at 17:06

## 2019-11-30 RX ADMIN — POLYETHYLENE GLYCOL 3350 17 GRAM(S): 17 POWDER, FOR SOLUTION ORAL at 17:08

## 2019-11-30 RX ADMIN — Medication 524.06 MILLIGRAM(S): at 18:33

## 2019-11-30 RX ADMIN — Medication 524.06 MILLIGRAM(S): at 05:49

## 2019-11-30 RX ADMIN — Medication 100 MILLIGRAM(S): at 14:00

## 2019-11-30 RX ADMIN — Medication 40 MILLIEQUIVALENT(S): at 13:02

## 2019-11-30 RX ADMIN — MAGNESIUM OXIDE 400 MG ORAL TABLET 400 MILLIGRAM(S): 241.3 TABLET ORAL at 09:52

## 2019-11-30 RX ADMIN — PANTOPRAZOLE SODIUM 40 MILLIGRAM(S): 20 TABLET, DELAYED RELEASE ORAL at 17:05

## 2019-11-30 RX ADMIN — Medication 650 MILLIGRAM(S): at 09:54

## 2019-11-30 RX ADMIN — Medication 650 MILLIGRAM(S): at 00:00

## 2019-11-30 RX ADMIN — Medication 10 MILLIGRAM(S): at 14:00

## 2019-11-30 RX ADMIN — Medication 100 MILLIGRAM(S): at 05:29

## 2019-11-30 RX ADMIN — LACOSAMIDE 200 MILLIGRAM(S): 50 TABLET ORAL at 17:06

## 2019-11-30 RX ADMIN — PANTOPRAZOLE SODIUM 40 MILLIGRAM(S): 20 TABLET, DELAYED RELEASE ORAL at 05:30

## 2019-11-30 RX ADMIN — SODIUM CHLORIDE 2 GRAM(S): 9 INJECTION INTRAMUSCULAR; INTRAVENOUS; SUBCUTANEOUS at 13:02

## 2019-11-30 RX ADMIN — Medication 1 SPRAY(S): at 05:30

## 2019-11-30 RX ADMIN — SODIUM CHLORIDE 2 GRAM(S): 9 INJECTION INTRAMUSCULAR; INTRAVENOUS; SUBCUTANEOUS at 21:25

## 2019-11-30 RX ADMIN — Medication 650 MILLIGRAM(S): at 10:45

## 2019-11-30 RX ADMIN — Medication 10 MILLIGRAM(S): at 09:52

## 2019-11-30 RX ADMIN — POLYETHYLENE GLYCOL 3350 17 GRAM(S): 17 POWDER, FOR SOLUTION ORAL at 05:31

## 2019-11-30 RX ADMIN — LEVETIRACETAM 1500 MILLIGRAM(S): 250 TABLET, FILM COATED ORAL at 17:05

## 2019-11-30 RX ADMIN — ENOXAPARIN SODIUM 40 MILLIGRAM(S): 100 INJECTION SUBCUTANEOUS at 13:07

## 2019-11-30 RX ADMIN — SERTRALINE 50 MILLIGRAM(S): 25 TABLET, FILM COATED ORAL at 17:12

## 2019-11-30 RX ADMIN — Medication 100 MILLIGRAM(S): at 21:25

## 2019-11-30 NOTE — PROGRESS NOTE ADULT - ASSESSMENT
1. Thrombocytosis--improving    -- suspect reactive secondary to acute neurologic process  -- monitor for now  -- Low TIBC and Iron. Ferritin nl, not consistent w iron deficiency  -- trend counts for now  -- Doubt MPN. No need to check JAK2     2. Seizure   -F/u extensive neurology work up, s/p LP;  suspected WNV vs. toxo infection vs autoimmune etiology  -mgmt per neurology and ID    3. abd discomfort -- had BM early this am. Mild, monitor and defer to primary team for now    4. leukocytosis -- likely reactive as well, improving as well, monitor for now    D/w pt briefly and d/w family at bedside, will follow, [phone]

## 2019-11-30 NOTE — PROGRESS NOTE ADULT - SUBJECTIVE AND OBJECTIVE BOX
Pt seen, family at bedside. Reporting RUQ discomfort at times, not constant, not severe.     MEDICATIONS  (STANDING):  acetaminophen   Tablet .. 325 milliGRAM(s) Oral daily  aMILoride 10 milliGRAM(s) Oral <User Schedule>  enoxaparin Injectable 40 milliGRAM(s) SubCutaneous daily  fluticasone propionate 50 MICROgram(s)/spray Nasal Spray 1 Spray(s) Both Nostrils two times a day  influenza   Vaccine 0.5 milliLiter(s) IntraMuscular once  labetalol 100 milliGRAM(s) Oral every 8 hours  lacosamide 200 milliGRAM(s) Oral two times a day  levETIRAcetam  Solution 1500 milliGRAM(s) Oral two times a day  magnesium oxide 400 milliGRAM(s) Oral two times a day with meals  methylPREDNISolone 60 milliGRAM(s) Oral daily  pantoprazole  Injectable 40 milliGRAM(s) IV Push two times a day  polyethylene glycol 3350 17 Gram(s) Oral two times a day  potassium chloride   Solution 40 milliEquivalent(s) Enteral Tube daily  senna Syrup 10 milliLiter(s) Oral at bedtime  sertraline 50 milliGRAM(s) Oral daily  sodium chloride 2 Gram(s) Oral three times a day  sodium chloride 0.9%. 1000 milliLiter(s) (75 mL/Hr) IV Continuous <Continuous>  trimethoprim / sulfamethoxazole IVPB 385 milliGRAM(s) IV Intermittent every 12 hours    MEDICATIONS  (PRN):  acetaminophen    Suspension .. 650 milliGRAM(s) Oral every 6 hours PRN Moderate Pain (4 - 6), Severe Pain (7 - 10)      ROS  limited 2/2 participation, as above    Vital Signs Last 24 Hrs  T(C): 36.5 (30 Nov 2019 12:26), Max: 36.8 (30 Nov 2019 04:26)  T(F): 97.7 (30 Nov 2019 12:26), Max: 98.2 (30 Nov 2019 04:26)  HR: 65 (30 Nov 2019 12:26) (61 - 66)  BP: 118/70 (30 Nov 2019 12:26) (110/62 - 143/76)  BP(mean): --  RR: 17 (30 Nov 2019 12:26) (17 - 18)  SpO2: 96% (30 Nov 2019 12:26) (96% - 98%)    PE  NAD  Awake, not very interactive  RRR  CTAB ant chest  abd slightly tender RUQ, no guarding, soft, ND  no edema  remainder of exam limited 2/2 participation                          13.0   11.13 )-----------( 439      ( 29 Nov 2019 08:25 )             40.1       11-29    130<L>  |  95<L>  |  32<H>  ----------------------------<  162<H>  3.6   |  23  |  0.62    Ca    9.2      29 Nov 2019 06:49

## 2019-11-30 NOTE — PROGRESS NOTE ADULT - SUBJECTIVE AND OBJECTIVE BOX
INTERVAL HISTORY: no events o/n    PAST MEDICAL & SURGICAL HISTORY:  Hypertension  Prostate cancer: had seed implant &amp; radiation ( 2001 )  PC (prostate cancer)  S/P cholecystectomy    FAMILY HISTORY:  No pertinent family history in first degree relatives    SOCIAL HISTORY:   T/E/D:   Occupation:   Lives with:     MEDICATIONS (HOME):  Home Medications:  acetaminophen 160 mg/5 mL oral liquid: 5 milliliter(s) by gastrostomy tube every 4 hours (16 Nov 2019 22:58)  aMILoride 5 mg oral tablet: 2 tab(s) orally 0800,1400,2200 (16 Nov 2019 22:58)  labetalol 100 mg oral tablet: 1 tab(s) orally every 8 hours (16 Nov 2019 22:58)  levETIRAcetam 100 mg/mL oral solution: 10 milliliter(s) orally 2 times a day (16 Nov 2019 22:58)  magnesium oxide 400 mg (241.3 mg elemental magnesium) oral tablet: 1 tab(s) orally 2 times a day (with meals) (16 Nov 2019 22:58)  polyethylene glycol 3350 oral powder for reconstitution: 17 gram(s) orally 2 times a day (16 Nov 2019 22:58)  senna 8.8 mg/5 mL oral syrup: 10 milliliter(s) orally once a day (at bedtime) (16 Nov 2019 22:58)    MEDICATIONS  (STANDING):  acetaminophen   Tablet .. 325 milliGRAM(s) Oral daily  aMILoride 10 milliGRAM(s) Oral <User Schedule>  enoxaparin Injectable 40 milliGRAM(s) SubCutaneous daily  fluticasone propionate 50 MICROgram(s)/spray Nasal Spray 1 Spray(s) Both Nostrils two times a day  influenza   Vaccine 0.5 milliLiter(s) IntraMuscular once  labetalol 100 milliGRAM(s) Oral every 8 hours  lacosamide 200 milliGRAM(s) Oral two times a day  levETIRAcetam  Solution 1500 milliGRAM(s) Oral two times a day  magnesium oxide 400 milliGRAM(s) Oral two times a day with meals  methylPREDNISolone 60 milliGRAM(s) Oral daily  pantoprazole  Injectable 40 milliGRAM(s) IV Push two times a day  polyethylene glycol 3350 17 Gram(s) Oral two times a day  potassium chloride   Solution 40 milliEquivalent(s) Enteral Tube daily  senna Syrup 10 milliLiter(s) Oral at bedtime  sodium chloride 2 Gram(s) Oral three times a day  sodium chloride 0.9%. 1000 milliLiter(s) (75 mL/Hr) IV Continuous <Continuous>  trimethoprim / sulfamethoxazole IVPB 385 milliGRAM(s) IV Intermittent every 12 hours    MEDICATIONS  (PRN):  acetaminophen    Suspension .. 650 milliGRAM(s) Oral every 6 hours PRN Moderate Pain (4 - 6), Severe Pain (7 - 10)    ALLERGIES/INTOLERANCES:  Allergies  No Known Allergies    Intolerances    VITALS & EXAMINATION:  Vital Signs Last 24 Hrs  T(C): 36.6 (30 Nov 2019 07:49), Max: 36.8 (30 Nov 2019 04:26)  T(F): 97.8 (30 Nov 2019 07:49), Max: 98.2 (30 Nov 2019 04:26)  HR: 65 (30 Nov 2019 07:49) (61 - 66)  BP: 133/68 (30 Nov 2019 07:49) (110/62 - 143/76)  BP(mean): --  RR: 17 (30 Nov 2019 07:49) (17 - 19)  SpO2: 97% (30 Nov 2019 07:49) (96% - 98%)    NEUROLOGICAL EXAM:  Mental Status: awake and alert, AOx3, no neglect able to follow commands (show 2 fingers).   Motor: able to raise both arms and and L, R leg. weak in left arm.     LABORATORY:  CBC                       13.0   11.13 )-----------( 439      ( 29 Nov 2019 08:25 )             40.1     Chem 11-29    130<L>  |  95<L>  |  32<H>  ----------------------------<  162<H>  3.6   |  23  |  0.62    Ca    9.2      29 Nov 2019 06:49      LFTs   Coagulopathy   Lipid Panel   A1c   Cardiac enzymes     U/A   CSF  Immunological  Other    STUDIES & IMAGING:  Studies (EKG, EEG, EMG, etc):     Radiology (XR, CT, MR, U/S, TTE/LEENA):    < from: MR Head w/wo IV Cont (11.29.19 @ 16:41) >  IMPRESSION:    Limited by motion    No gross evidence for abnormal intracranial enhancement.    No acute intracranial hemorrhage, mass effect, vasogenic edema, or   evidence of acute territorial infarct.    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

## 2019-12-01 LAB
ALBUMIN SERPL ELPH-MCNC: 3.1 G/DL — LOW (ref 3.3–5)
ALP SERPL-CCNC: 117 U/L — SIGNIFICANT CHANGE UP (ref 40–120)
ALT FLD-CCNC: 53 U/L — HIGH (ref 10–45)
ANION GAP SERPL CALC-SCNC: 14 MMOL/L — SIGNIFICANT CHANGE UP (ref 5–17)
AST SERPL-CCNC: 29 U/L — SIGNIFICANT CHANGE UP (ref 10–40)
BASOPHILS # BLD AUTO: 0.01 K/UL — SIGNIFICANT CHANGE UP (ref 0–0.2)
BASOPHILS NFR BLD AUTO: 0.1 % — SIGNIFICANT CHANGE UP (ref 0–2)
BILIRUB SERPL-MCNC: 0.3 MG/DL — SIGNIFICANT CHANGE UP (ref 0.2–1.2)
BUN SERPL-MCNC: 27 MG/DL — HIGH (ref 7–23)
CALCIUM SERPL-MCNC: 8.8 MG/DL — SIGNIFICANT CHANGE UP (ref 8.4–10.5)
CHLORIDE SERPL-SCNC: 92 MMOL/L — LOW (ref 96–108)
CO2 SERPL-SCNC: 21 MMOL/L — LOW (ref 22–31)
CREAT SERPL-MCNC: 0.62 MG/DL — SIGNIFICANT CHANGE UP (ref 0.5–1.3)
EOSINOPHIL # BLD AUTO: 0.07 K/UL — SIGNIFICANT CHANGE UP (ref 0–0.5)
EOSINOPHIL NFR BLD AUTO: 0.7 % — SIGNIFICANT CHANGE UP (ref 0–6)
GLUCOSE SERPL-MCNC: 169 MG/DL — HIGH (ref 70–99)
HCT VFR BLD CALC: 40.4 % — SIGNIFICANT CHANGE UP (ref 39–50)
HGB BLD-MCNC: 13.5 G/DL — SIGNIFICANT CHANGE UP (ref 13–17)
IMM GRANULOCYTES NFR BLD AUTO: 1 % — SIGNIFICANT CHANGE UP (ref 0–1.5)
LYMPHOCYTES # BLD AUTO: 2.21 K/UL — SIGNIFICANT CHANGE UP (ref 1–3.3)
LYMPHOCYTES # BLD AUTO: 22.5 % — SIGNIFICANT CHANGE UP (ref 13–44)
MAGNESIUM SERPL-MCNC: 1.2 MG/DL — LOW (ref 1.6–2.6)
MCHC RBC-ENTMCNC: 30.1 PG — SIGNIFICANT CHANGE UP (ref 27–34)
MCHC RBC-ENTMCNC: 33.4 GM/DL — SIGNIFICANT CHANGE UP (ref 32–36)
MCV RBC AUTO: 90.2 FL — SIGNIFICANT CHANGE UP (ref 80–100)
MONOCYTES # BLD AUTO: 0.82 K/UL — SIGNIFICANT CHANGE UP (ref 0–0.9)
MONOCYTES NFR BLD AUTO: 8.3 % — SIGNIFICANT CHANGE UP (ref 2–14)
NEUTROPHILS # BLD AUTO: 6.63 K/UL — SIGNIFICANT CHANGE UP (ref 1.8–7.4)
NEUTROPHILS NFR BLD AUTO: 67.4 % — SIGNIFICANT CHANGE UP (ref 43–77)
PHOSPHATE SERPL-MCNC: 1.8 MG/DL — LOW (ref 2.5–4.5)
PLATELET # BLD AUTO: 382 K/UL — SIGNIFICANT CHANGE UP (ref 150–400)
POTASSIUM SERPL-MCNC: 3.4 MMOL/L — LOW (ref 3.5–5.3)
POTASSIUM SERPL-SCNC: 3.4 MMOL/L — LOW (ref 3.5–5.3)
PROT SERPL-MCNC: 8.6 G/DL — HIGH (ref 6–8.3)
RBC # BLD: 4.48 M/UL — SIGNIFICANT CHANGE UP (ref 4.2–5.8)
RBC # FLD: 15 % — HIGH (ref 10.3–14.5)
SODIUM SERPL-SCNC: 127 MMOL/L — LOW (ref 135–145)
WBC # BLD: 9.84 K/UL — SIGNIFICANT CHANGE UP (ref 3.8–10.5)
WBC # FLD AUTO: 9.84 K/UL — SIGNIFICANT CHANGE UP (ref 3.8–10.5)

## 2019-12-01 PROCEDURE — 99231 SBSQ HOSP IP/OBS SF/LOW 25: CPT

## 2019-12-01 RX ORDER — SODIUM CHLORIDE 9 MG/ML
3 INJECTION INTRAMUSCULAR; INTRAVENOUS; SUBCUTANEOUS THREE TIMES A DAY
Refills: 0 | Status: DISCONTINUED | OUTPATIENT
Start: 2019-12-01 | End: 2019-12-03

## 2019-12-01 RX ORDER — MAGNESIUM SULFATE 500 MG/ML
1 VIAL (ML) INJECTION ONCE
Refills: 0 | Status: COMPLETED | OUTPATIENT
Start: 2019-12-01 | End: 2019-12-01

## 2019-12-01 RX ADMIN — Medication 100 MILLIGRAM(S): at 22:25

## 2019-12-01 RX ADMIN — MAGNESIUM OXIDE 400 MG ORAL TABLET 400 MILLIGRAM(S): 241.3 TABLET ORAL at 18:23

## 2019-12-01 RX ADMIN — Medication 10 MILLIGRAM(S): at 13:20

## 2019-12-01 RX ADMIN — SODIUM CHLORIDE 3 GRAM(S): 9 INJECTION INTRAMUSCULAR; INTRAVENOUS; SUBCUTANEOUS at 22:25

## 2019-12-01 RX ADMIN — MAGNESIUM OXIDE 400 MG ORAL TABLET 400 MILLIGRAM(S): 241.3 TABLET ORAL at 08:02

## 2019-12-01 RX ADMIN — Medication 1 SPRAY(S): at 05:31

## 2019-12-01 RX ADMIN — Medication 10 MILLIGRAM(S): at 08:02

## 2019-12-01 RX ADMIN — Medication 60 MILLIGRAM(S): at 05:30

## 2019-12-01 RX ADMIN — SODIUM CHLORIDE 3 GRAM(S): 9 INJECTION INTRAMUSCULAR; INTRAVENOUS; SUBCUTANEOUS at 13:20

## 2019-12-01 RX ADMIN — PANTOPRAZOLE SODIUM 40 MILLIGRAM(S): 20 TABLET, DELAYED RELEASE ORAL at 05:34

## 2019-12-01 RX ADMIN — LEVETIRACETAM 1500 MILLIGRAM(S): 250 TABLET, FILM COATED ORAL at 18:22

## 2019-12-01 RX ADMIN — Medication 10 MILLIGRAM(S): at 22:25

## 2019-12-01 RX ADMIN — Medication 100 MILLIGRAM(S): at 05:31

## 2019-12-01 RX ADMIN — Medication 524.06 MILLIGRAM(S): at 05:32

## 2019-12-01 RX ADMIN — POLYETHYLENE GLYCOL 3350 17 GRAM(S): 17 POWDER, FOR SOLUTION ORAL at 05:31

## 2019-12-01 RX ADMIN — Medication 1 SPRAY(S): at 18:22

## 2019-12-01 RX ADMIN — LACOSAMIDE 200 MILLIGRAM(S): 50 TABLET ORAL at 05:30

## 2019-12-01 RX ADMIN — PANTOPRAZOLE SODIUM 40 MILLIGRAM(S): 20 TABLET, DELAYED RELEASE ORAL at 18:23

## 2019-12-01 RX ADMIN — ENOXAPARIN SODIUM 40 MILLIGRAM(S): 100 INJECTION SUBCUTANEOUS at 11:26

## 2019-12-01 RX ADMIN — LACOSAMIDE 200 MILLIGRAM(S): 50 TABLET ORAL at 18:23

## 2019-12-01 RX ADMIN — Medication 100 GRAM(S): at 08:02

## 2019-12-01 RX ADMIN — Medication 524.06 MILLIGRAM(S): at 18:22

## 2019-12-01 RX ADMIN — SODIUM CHLORIDE 2 GRAM(S): 9 INJECTION INTRAMUSCULAR; INTRAVENOUS; SUBCUTANEOUS at 05:30

## 2019-12-01 RX ADMIN — SERTRALINE 50 MILLIGRAM(S): 25 TABLET, FILM COATED ORAL at 11:27

## 2019-12-01 RX ADMIN — LEVETIRACETAM 1500 MILLIGRAM(S): 250 TABLET, FILM COATED ORAL at 05:28

## 2019-12-01 RX ADMIN — Medication 40 MILLIEQUIVALENT(S): at 11:26

## 2019-12-01 RX ADMIN — Medication 100 MILLIGRAM(S): at 13:20

## 2019-12-01 NOTE — PROGRESS NOTE ADULT - ASSESSMENT
Focal myoclonic seizure in setting of UTI and electrolyte imbalances. 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 Patient now here for focal seizures presumed to be secondary to ?autoimmune encephalitis Completed IVIG and solumedrol x5 days, currently on steroid taper.     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 He tested postive for Toxoplasma in CSF, but low suspicion for toxoplasmosis.     Labs  [x] repeat LP done on 11/20, studies sent: gluc 98, protein 42, lymph predom 90%, gram stain neg, HSV negative, CSF PCR negative, flow cytometry negative. WNV IgG + in CSF, IgM negative, fungal cx negative, errlichia negative, lyme negative, borrelia negative, ACE negative, beta 2 microglobulin wnl, crypto neg, acid fast negative, CSF PCR negative for West Nile, MBP negative, + toxo plasma, - EBV, - EMV, - VZV  [] pending CSF studies: VDRL, histoplasma, ENCES,  [x] repeat MRI brain with and without contrast- no enhancement seen, limited by motion  [x] HIV negative, TPO negative, thyroglobulin negative  [x] ESR, CRP elevated 5.62, B12 wnl, TSH wnl, folate wnl    [x] Anti MUSK, Anti achR ab negative  [] [facility] encephalitis panel sent  [x] thrombocytosis likely reactive, will monitor Hgb, if continues to decr will get FOBT  [] discuss need for toxoplasma treatment with ID  [x] started on salt tabs for hyponatremia.     Consults  [x] heme onc note for thrombocytosis: studies sent, consult appreciated  [x] ID consulted 11/28 for positive toxoplasma in the CSF - Bactrim started on 11/28. will need to d/w ID regarding continuing Bactrim.    Meds  [] steroid taper regimen: 60 x1 week (starting 11/28), then 40 x1 week, then 20 x1 week, then 10 x1 week.   [x] Continue Keppra 1.5g BID  [x] Continue Vimpat 200 BID  [x] Completed IVIG and solumedrol     Imaging  [] MRI brain w, wo pending to assess for lesions (due to + toxo in CSF) - no enhancement seen, limited by motion  [x] Repeat MRI brain w/ and w/o under anesthesia: looks improved  [x] rEEG: potential epileptogenic focus in R temporal, R centroparietal, R temporo-occipital regions, mild-mod dysfunction, no seizure seen.   [x] CT C/A/P stool impaction, mild esophagitis and debris in trachea  [x] 24 hr vEEG negative for seizures     Other  [x] Pt/OT: for MAEVE  [x] S/s failed. continue TF Focal myoclonic seizure in setting of UTI and electrolyte imbalances. 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 Patient now here for focal seizures presumed to be secondary to ?autoimmune encephalitis Completed IVIG and solumedrol x5 days, currently on steroid taper.     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 He tested postive for Toxoplasma in CSF, but low suspicion for toxoplasmosis. Exam with improved alertness.    Labs  [x] repeat LP done on 11/20, studies sent: gluc 98, protein 42, lymph predom 90%, gram stain neg, HSV negative, CSF PCR negative, flow cytometry negative. WNV IgG + in CSF, IgM negative, fungal cx negative, errlichia negative, lyme negative, borrelia negative, ACE negative, beta 2 microglobulin wnl, crypto neg, acid fast negative, CSF PCR negative for West Nile, MBP negative, + toxo plasma, - EBV, - EMV, - VZV  [] pending CSF studies: VDRL, histoplasma, ENCES,  [x] repeat MRI brain with and without contrast- no enhancement seen, limited by motion  [x] HIV negative, TPO negative, thyroglobulin negative  [x] ESR, CRP elevated 5.62, B12 wnl, TSH wnl, folate wnl    [x] Anti MUSK, Anti achR ab negative  [] [place] [facility] encephalitis panel sent  [x] thrombocytosis likely reactive, will monitor Hgb, if continues to decr will get FOBT  [] discuss need for toxoplasma treatment with ID  [x] started on salt tabs for hyponatremia.     Consults  [x] heme onc note for thrombocytosis: studies sent, consult appreciated  [x] ID consulted 11/28 for positive toxoplasma in the CSF - Bactrim started on 11/28. will need to d/w ID regarding continuing Bactrim.    Meds  [] steroid taper regimen: 60 x1 week (starting 11/28), then 40 x1 week, then 20 x1 week, then 10 x1 week.   [x] Continue Keppra 1.5g BID  [x] Continue Vimpat 200 BID  [x] Completed IVIG and solumedrol     Imaging  [] MRI brain w, wo pending to assess for lesions (due to + toxo in CSF) - no enhancement seen, limited by motion  [x] Repeat MRI brain w/ and w/o under anesthesia: looks improved  [x] rEEG: potential epileptogenic focus in R temporal, R centroparietal, R temporo-occipital regions, mild-mod dysfunction, no seizure seen.   [x] CT C/A/P stool impaction, mild esophagitis and debris in trachea  [x] 24 hr vEEG negative for seizures     Other  [x] Pt/OT: for MAEVE  [x] S/s failed. continue TF

## 2019-12-01 NOTE — PROGRESS NOTE ADULT - SUBJECTIVE AND OBJECTIVE BOX
Pt seen, confused    MEDICATIONS  (STANDING):  acetaminophen   Tablet .. 325 milliGRAM(s) Oral daily  aMILoride 10 milliGRAM(s) Oral <User Schedule>  enoxaparin Injectable 40 milliGRAM(s) SubCutaneous daily  fluticasone propionate 50 MICROgram(s)/spray Nasal Spray 1 Spray(s) Both Nostrils two times a day  influenza   Vaccine 0.5 milliLiter(s) IntraMuscular once  labetalol 100 milliGRAM(s) Oral every 8 hours  lacosamide 200 milliGRAM(s) Oral two times a day  levETIRAcetam  Solution 1500 milliGRAM(s) Oral two times a day  magnesium oxide 400 milliGRAM(s) Oral two times a day with meals  methylPREDNISolone 60 milliGRAM(s) Oral daily  pantoprazole  Injectable 40 milliGRAM(s) IV Push two times a day  potassium chloride   Solution 40 milliEquivalent(s) Enteral Tube daily  senna Syrup 10 milliLiter(s) Oral at bedtime  sertraline 50 milliGRAM(s) Oral daily  sodium chloride 3 Gram(s) Oral three times a day  trimethoprim / sulfamethoxazole IVPB 385 milliGRAM(s) IV Intermittent every 12 hours    MEDICATIONS  (PRN):  acetaminophen    Suspension .. 650 milliGRAM(s) Oral every 6 hours PRN Moderate Pain (4 - 6), Severe Pain (7 - 10)      ROS  confused, said no when asked if has pain, limited interaction    Vital Signs Last 24 Hrs  T(C): 36.7 (01 Dec 2019 11:22), Max: 36.9 (30 Nov 2019 16:59)  T(F): 98 (01 Dec 2019 11:22), Max: 98.4 (30 Nov 2019 16:59)  HR: 59 (01 Dec 2019 11:22) (58 - 72)  BP: 119/68 (01 Dec 2019 11:22) (111/70 - 153/75)  BP(mean): --  RR: 19 (01 Dec 2019 11:22) (17 - 19)  SpO2: 97% (01 Dec 2019 11:22) (95% - 97%)    PE  NAD, confused  Asleep but easily arousable  abd benign  no edema  remainder of exam limited 2/2 participation                          13.5   9.84  )-----------( 382      ( 01 Dec 2019 08:42 )             40.4       12-01    127<L>  |  92<L>  |  27<H>  ----------------------------<  169<H>  3.4<L>   |  21<L>  |  0.62    Ca    8.8      01 Dec 2019 05:57  Phos  1.8     12-01  Mg     1.2     12-01    TPro  8.6<H>  /  Alb  3.1<L>  /  TBili  0.3  /  DBili  x   /  AST  29  /  ALT  53<H>  /  AlkPhos  117  12-01

## 2019-12-01 NOTE — PROGRESS NOTE ADULT - ASSESSMENT
1. Thrombocytosis--resolved    -- suspect reactive secondary to acute neurologic process  -- monitor for now  -- Low TIBC and Iron. Ferritin nl, not consistent w iron deficiency  -- trend counts for now  -- Doubt MPN. No need for further w/u for now    2. Seizure   -F/u extensive neurology work up, s/p LP;  suspected WNV vs. toxo infection vs autoimmune etiology  -mgmt per neurology and ID    3. abd discomfort -- Mild, monitor and defer to primary team for now    4. leukocytosis -- likely reactive as well, resolved, monitor for now    Will follow, [phone]

## 2019-12-01 NOTE — PROGRESS NOTE ADULT - SUBJECTIVE AND OBJECTIVE BOX
PENDED NOTE    Interval History: repeat MRI negative, to be taken off Bactrim?   EEG from 11/29:   1. Potential epileptogenic focus and structural abnormality in the right posterior quadrant.  2. Mild to moderate nonspecific diffuse or multifocal cerebral dysfunction.   3. No seizure seen    MEDICATIONS  (STANDING):  acetaminophen   Tablet .. 325 milliGRAM(s) Oral daily  aMILoride 10 milliGRAM(s) Oral <User Schedule>  enoxaparin Injectable 40 milliGRAM(s) SubCutaneous daily  fluticasone propionate 50 MICROgram(s)/spray Nasal Spray 1 Spray(s) Both Nostrils two times a day  influenza   Vaccine 0.5 milliLiter(s) IntraMuscular once  labetalol 100 milliGRAM(s) Oral every 8 hours  lacosamide 200 milliGRAM(s) Oral two times a day  levETIRAcetam  Solution 1500 milliGRAM(s) Oral two times a day  magnesium oxide 400 milliGRAM(s) Oral two times a day with meals  magnesium sulfate  IVPB 1 Gram(s) IV Intermittent once  methylPREDNISolone 60 milliGRAM(s) Oral daily  pantoprazole  Injectable 40 milliGRAM(s) IV Push two times a day  polyethylene glycol 3350 17 Gram(s) Oral two times a day  potassium chloride   Solution 40 milliEquivalent(s) Enteral Tube daily  senna Syrup 10 milliLiter(s) Oral at bedtime  sertraline 50 milliGRAM(s) Oral daily  sodium chloride 3 Gram(s) Oral three times a day  sodium chloride 0.9%. 1000 milliLiter(s) (75 mL/Hr) IV Continuous <Continuous>  trimethoprim / sulfamethoxazole IVPB 385 milliGRAM(s) IV Intermittent every 12 hours    MEDICATIONS  (PRN):  acetaminophen    Suspension .. 650 milliGRAM(s) Oral every 6 hours PRN Moderate Pain (4 - 6), Severe Pain (7 - 10)    Vital Signs Last 24 Hrs  T(C): 36.6 (01 Dec 2019 04:40), Max: 36.9 (30 Nov 2019 16:59)  T(F): 97.9 (01 Dec 2019 04:40), Max: 98.4 (30 Nov 2019 16:59)  HR: 68 (01 Dec 2019 04:40) (58 - 72)  BP: 111/70 (01 Dec 2019 04:40) (111/70 - 153/75)  BP(mean): --  RR: 18 (01 Dec 2019 04:40) (17 - 19)  SpO2: 95% (01 Dec 2019 04:40) (95% - 97%)    NEUROLOGICAL EXAM:  Mental Status: awake and alert, AOx3, no neglect able to follow commands (show 2 fingers).   Motor: able to raise both arms and L, R leg. weak in left arm.     Labs:   cbc                      13.0   11.13 )-----------( 439      ( 29 Nov 2019 08:25 )             40.1     [id number]    127<L>  |  92<L>  |  27<H>  ----------------------------<  169<H>  3.4<L>   |  21<L>  |  0.62    Ca    8.8      01 Dec 2019 05:57  Phos  1.8     12-01  Mg     1.2     12-01    TPro  8.6<H>  /  Alb  3.1<L>  /  TBili  0.3  /  DBili  x   /  AST  29  /  ALT  53<H>  /  AlkPhos  117  12-01 Interval History: repeat MRI negative, to be taken off Bactrim? will follow up with ID  EEG from 11/29:   1. Potential epileptogenic focus and structural abnormality in the right posterior quadrant.  2. Mild to moderate nonspecific diffuse or multifocal cerebral dysfunction.   3. No seizure seen    MEDICATIONS  (STANDING):  acetaminophen   Tablet .. 325 milliGRAM(s) Oral daily  aMILoride 10 milliGRAM(s) Oral <User Schedule>  enoxaparin Injectable 40 milliGRAM(s) SubCutaneous daily  fluticasone propionate 50 MICROgram(s)/spray Nasal Spray 1 Spray(s) Both Nostrils two times a day  influenza   Vaccine 0.5 milliLiter(s) IntraMuscular once  labetalol 100 milliGRAM(s) Oral every 8 hours  lacosamide 200 milliGRAM(s) Oral two times a day  levETIRAcetam  Solution 1500 milliGRAM(s) Oral two times a day  magnesium oxide 400 milliGRAM(s) Oral two times a day with meals  magnesium sulfate  IVPB 1 Gram(s) IV Intermittent once  methylPREDNISolone 60 milliGRAM(s) Oral daily  pantoprazole  Injectable 40 milliGRAM(s) IV Push two times a day  polyethylene glycol 3350 17 Gram(s) Oral two times a day  potassium chloride   Solution 40 milliEquivalent(s) Enteral Tube daily  senna Syrup 10 milliLiter(s) Oral at bedtime  sertraline 50 milliGRAM(s) Oral daily  sodium chloride 3 Gram(s) Oral three times a day  sodium chloride 0.9%. 1000 milliLiter(s) (75 mL/Hr) IV Continuous <Continuous>  trimethoprim / sulfamethoxazole IVPB 385 milliGRAM(s) IV Intermittent every 12 hours    MEDICATIONS  (PRN):  acetaminophen    Suspension .. 650 milliGRAM(s) Oral every 6 hours PRN Moderate Pain (4 - 6), Severe Pain (7 - 10)    Vital Signs Last 24 Hrs  T(C): 36.6 (01 Dec 2019 04:40), Max: 36.9 (30 Nov 2019 16:59)  T(F): 97.9 (01 Dec 2019 04:40), Max: 98.4 (30 Nov 2019 16:59)  HR: 68 (01 Dec 2019 04:40) (58 - 72)  BP: 111/70 (01 Dec 2019 04:40) (111/70 - 153/75)  BP(mean): --  RR: 18 (01 Dec 2019 04:40) (17 - 19)  SpO2: 95% (01 Dec 2019 04:40) (95% - 97%)    NEUROLOGICAL EXAM:  Mental Status: awake and alert, AOx3, no neglect able to follow commands (show 2 fingers).   Motor: able to raise both arms and both legs    Labs:   cbc                      13.0   11.13 )-----------( 439      ( 29 Nov 2019 08:25 )             40.1     [id number]    127<L>  |  92<L>  |  27<H>  ----------------------------<  169<H>  3.4<L>   |  21<L>  |  0.62    Ca    8.8      01 Dec 2019 05:57  Phos  1.8     12-01  Mg     1.2     12-01    TPro  8.6<H>  /  Alb  3.1<L>  /  TBili  0.3  /  DBili  x   /  AST  29  /  ALT  53<H>  /  AlkPhos  117  12-01 Interval History: repeat MRI negative, to be taken off Bactrim? will follow up with ID  EEG from 11/29:   1. Potential epileptogenic focus and structural abnormality in the right posterior quadrant.  2. Mild to moderate nonspecific diffuse or multifocal cerebral dysfunction.   3. No seizure seen    MEDICATIONS  (STANDING):  acetaminophen   Tablet .. 325 milliGRAM(s) Oral daily  aMILoride 10 milliGRAM(s) Oral <User Schedule>  enoxaparin Injectable 40 milliGRAM(s) SubCutaneous daily  fluticasone propionate 50 MICROgram(s)/spray Nasal Spray 1 Spray(s) Both Nostrils two times a day  influenza   Vaccine 0.5 milliLiter(s) IntraMuscular once  labetalol 100 milliGRAM(s) Oral every 8 hours  lacosamide 200 milliGRAM(s) Oral two times a day  levETIRAcetam  Solution 1500 milliGRAM(s) Oral two times a day  magnesium oxide 400 milliGRAM(s) Oral two times a day with meals  magnesium sulfate  IVPB 1 Gram(s) IV Intermittent once  methylPREDNISolone 60 milliGRAM(s) Oral daily  pantoprazole  Injectable 40 milliGRAM(s) IV Push two times a day  polyethylene glycol 3350 17 Gram(s) Oral two times a day  potassium chloride   Solution 40 milliEquivalent(s) Enteral Tube daily  senna Syrup 10 milliLiter(s) Oral at bedtime  sertraline 50 milliGRAM(s) Oral daily  sodium chloride 3 Gram(s) Oral three times a day  sodium chloride 0.9%. 1000 milliLiter(s) (75 mL/Hr) IV Continuous <Continuous>  trimethoprim / sulfamethoxazole IVPB 385 milliGRAM(s) IV Intermittent every 12 hours    MEDICATIONS  (PRN):  acetaminophen    Suspension .. 650 milliGRAM(s) Oral every 6 hours PRN Moderate Pain (4 - 6), Severe Pain (7 - 10)    Vital Signs Last 24 Hrs  T(C): 36.6 (01 Dec 2019 04:40), Max: 36.9 (30 Nov 2019 16:59)  T(F): 97.9 (01 Dec 2019 04:40), Max: 98.4 (30 Nov 2019 16:59)  HR: 68 (01 Dec 2019 04:40) (58 - 72)  BP: 111/70 (01 Dec 2019 04:40) (111/70 - 153/75)  BP(mean): --  RR: 18 (01 Dec 2019 04:40) (17 - 19)  SpO2: 95% (01 Dec 2019 04:40) (95% - 97%)    NEUROLOGICAL EXAM:  Mental Status: awake and alert, AOx3, no clear, neglect able to follow commands (show 2 fingers).   Motor: able to raise both arms and both legs    Labs:   cbc                      13.0   11.13 )-----------( 439      ( 29 Nov 2019 08:25 )             40.1     [id number]    127<L>  |  92<L>  |  27<H>  ----------------------------<  169<H>  3.4<L>   |  21<L>  |  0.62    Ca    8.8      01 Dec 2019 05:57  Phos  1.8     12-01  Mg     1.2     12-01    TPro  8.6<H>  /  Alb  3.1<L>  /  TBili  0.3  /  DBili  x   /  AST  29  /  ALT  53<H>  /  AlkPhos  117  12-01

## 2019-12-02 LAB
ALBUMIN SERPL ELPH-MCNC: 3 G/DL — LOW (ref 3.3–5)
ALP SERPL-CCNC: 108 U/L — SIGNIFICANT CHANGE UP (ref 40–120)
ALT FLD-CCNC: 51 U/L — HIGH (ref 10–45)
ANION GAP SERPL CALC-SCNC: 15 MMOL/L — SIGNIFICANT CHANGE UP (ref 5–17)
AST SERPL-CCNC: 31 U/L — SIGNIFICANT CHANGE UP (ref 10–40)
BILIRUB SERPL-MCNC: 0.3 MG/DL — SIGNIFICANT CHANGE UP (ref 0.2–1.2)
BUN SERPL-MCNC: 25 MG/DL — HIGH (ref 7–23)
CALCIUM SERPL-MCNC: 8.9 MG/DL — SIGNIFICANT CHANGE UP (ref 8.4–10.5)
CHLORIDE SERPL-SCNC: 92 MMOL/L — LOW (ref 96–108)
CO2 SERPL-SCNC: 22 MMOL/L — SIGNIFICANT CHANGE UP (ref 22–31)
CREAT SERPL-MCNC: 0.63 MG/DL — SIGNIFICANT CHANGE UP (ref 0.5–1.3)
GLUCOSE SERPL-MCNC: 145 MG/DL — HIGH (ref 70–99)
HCT VFR BLD CALC: 38.9 % — LOW (ref 39–50)
HGB BLD-MCNC: 13.4 G/DL — SIGNIFICANT CHANGE UP (ref 13–17)
MAGNESIUM SERPL-MCNC: 1.4 MG/DL — LOW (ref 1.6–2.6)
MCHC RBC-ENTMCNC: 30.3 PG — SIGNIFICANT CHANGE UP (ref 27–34)
MCHC RBC-ENTMCNC: 34.4 GM/DL — SIGNIFICANT CHANGE UP (ref 32–36)
MCV RBC AUTO: 88 FL — SIGNIFICANT CHANGE UP (ref 80–100)
PHOSPHATE SERPL-MCNC: 1.9 MG/DL — LOW (ref 2.5–4.5)
PLATELET # BLD AUTO: 322 K/UL — SIGNIFICANT CHANGE UP (ref 150–400)
POTASSIUM SERPL-MCNC: 3.5 MMOL/L — SIGNIFICANT CHANGE UP (ref 3.5–5.3)
POTASSIUM SERPL-SCNC: 3.5 MMOL/L — SIGNIFICANT CHANGE UP (ref 3.5–5.3)
PROT SERPL-MCNC: 8.2 G/DL — SIGNIFICANT CHANGE UP (ref 6–8.3)
RBC # BLD: 4.42 M/UL — SIGNIFICANT CHANGE UP (ref 4.2–5.8)
RBC # FLD: 15.2 % — HIGH (ref 10.3–14.5)
SODIUM SERPL-SCNC: 129 MMOL/L — LOW (ref 135–145)
TROPONIN T, HIGH SENSITIVITY RESULT: 19 NG/L — SIGNIFICANT CHANGE UP (ref 0–51)
TROPONIN T, HIGH SENSITIVITY RESULT: 19 NG/L — SIGNIFICANT CHANGE UP (ref 0–51)
WBC # BLD: 8.64 K/UL — SIGNIFICANT CHANGE UP (ref 3.8–10.5)
WBC # FLD AUTO: 8.64 K/UL — SIGNIFICANT CHANGE UP (ref 3.8–10.5)

## 2019-12-02 PROCEDURE — 93010 ELECTROCARDIOGRAM REPORT: CPT

## 2019-12-02 PROCEDURE — 99233 SBSQ HOSP IP/OBS HIGH 50: CPT

## 2019-12-02 PROCEDURE — 99231 SBSQ HOSP IP/OBS SF/LOW 25: CPT | Mod: GC

## 2019-12-02 RX ORDER — MAGNESIUM SULFATE 500 MG/ML
1 VIAL (ML) INJECTION ONCE
Refills: 0 | Status: COMPLETED | OUTPATIENT
Start: 2019-12-02 | End: 2019-12-02

## 2019-12-02 RX ORDER — SODIUM,POTASSIUM PHOSPHATES 278-250MG
1 POWDER IN PACKET (EA) ORAL ONCE
Refills: 0 | Status: DISCONTINUED | OUTPATIENT
Start: 2019-12-02 | End: 2019-12-02

## 2019-12-02 RX ORDER — MAGNESIUM SULFATE 500 MG/ML
1 VIAL (ML) INJECTION ONCE
Refills: 0 | Status: DISCONTINUED | OUTPATIENT
Start: 2019-12-02 | End: 2019-12-02

## 2019-12-02 RX ORDER — SIMETHICONE 80 MG/1
80 TABLET, CHEWABLE ORAL ONCE
Refills: 0 | Status: COMPLETED | OUTPATIENT
Start: 2019-12-02 | End: 2019-12-02

## 2019-12-02 RX ORDER — POTASSIUM PHOSPHATE, MONOBASIC POTASSIUM PHOSPHATE, DIBASIC 236; 224 MG/ML; MG/ML
15 INJECTION, SOLUTION INTRAVENOUS ONCE
Refills: 0 | Status: COMPLETED | OUTPATIENT
Start: 2019-12-02 | End: 2019-12-02

## 2019-12-02 RX ADMIN — Medication 40 MILLIEQUIVALENT(S): at 12:56

## 2019-12-02 RX ADMIN — Medication 10 MILLIGRAM(S): at 08:06

## 2019-12-02 RX ADMIN — Medication 10 MILLIGRAM(S): at 22:01

## 2019-12-02 RX ADMIN — POTASSIUM PHOSPHATE, MONOBASIC POTASSIUM PHOSPHATE, DIBASIC 62.5 MILLIMOLE(S): 236; 224 INJECTION, SOLUTION INTRAVENOUS at 05:10

## 2019-12-02 RX ADMIN — Medication 524.06 MILLIGRAM(S): at 08:07

## 2019-12-02 RX ADMIN — LACOSAMIDE 200 MILLIGRAM(S): 50 TABLET ORAL at 05:11

## 2019-12-02 RX ADMIN — Medication 650 MILLIGRAM(S): at 01:43

## 2019-12-02 RX ADMIN — SERTRALINE 50 MILLIGRAM(S): 25 TABLET, FILM COATED ORAL at 12:56

## 2019-12-02 RX ADMIN — SODIUM CHLORIDE 3 GRAM(S): 9 INJECTION INTRAMUSCULAR; INTRAVENOUS; SUBCUTANEOUS at 22:03

## 2019-12-02 RX ADMIN — Medication 100 MILLIGRAM(S): at 05:14

## 2019-12-02 RX ADMIN — SIMETHICONE 80 MILLIGRAM(S): 80 TABLET, CHEWABLE ORAL at 02:33

## 2019-12-02 RX ADMIN — MAGNESIUM OXIDE 400 MG ORAL TABLET 400 MILLIGRAM(S): 241.3 TABLET ORAL at 17:37

## 2019-12-02 RX ADMIN — Medication 650 MILLIGRAM(S): at 02:13

## 2019-12-02 RX ADMIN — Medication 60 MILLIGRAM(S): at 05:14

## 2019-12-02 RX ADMIN — Medication 1 SPRAY(S): at 05:12

## 2019-12-02 RX ADMIN — PANTOPRAZOLE SODIUM 40 MILLIGRAM(S): 20 TABLET, DELAYED RELEASE ORAL at 17:37

## 2019-12-02 RX ADMIN — LEVETIRACETAM 1500 MILLIGRAM(S): 250 TABLET, FILM COATED ORAL at 05:10

## 2019-12-02 RX ADMIN — LACOSAMIDE 200 MILLIGRAM(S): 50 TABLET ORAL at 17:36

## 2019-12-02 RX ADMIN — SODIUM CHLORIDE 3 GRAM(S): 9 INJECTION INTRAMUSCULAR; INTRAVENOUS; SUBCUTANEOUS at 12:58

## 2019-12-02 RX ADMIN — Medication 1 SPRAY(S): at 17:36

## 2019-12-02 RX ADMIN — Medication 100 MILLIGRAM(S): at 22:02

## 2019-12-02 RX ADMIN — Medication 10 MILLIGRAM(S): at 12:56

## 2019-12-02 RX ADMIN — PANTOPRAZOLE SODIUM 40 MILLIGRAM(S): 20 TABLET, DELAYED RELEASE ORAL at 05:10

## 2019-12-02 RX ADMIN — LEVETIRACETAM 1500 MILLIGRAM(S): 250 TABLET, FILM COATED ORAL at 17:37

## 2019-12-02 RX ADMIN — Medication 100 MILLIGRAM(S): at 12:57

## 2019-12-02 RX ADMIN — ENOXAPARIN SODIUM 40 MILLIGRAM(S): 100 INJECTION SUBCUTANEOUS at 12:55

## 2019-12-02 RX ADMIN — Medication 100 GRAM(S): at 02:35

## 2019-12-02 RX ADMIN — SODIUM CHLORIDE 3 GRAM(S): 9 INJECTION INTRAMUSCULAR; INTRAVENOUS; SUBCUTANEOUS at 05:10

## 2019-12-02 NOTE — PROGRESS NOTE ADULT - ASSESSMENT
· Assessment		  1. Thrombocytosis--resolved    -- suspect was reactive secondary to acute neurologic process  -- Low TIBC and Iron. Ferritin nl, not consistent w iron deficiency  -- Not MPN. No need for further w/u for now    2. Seizure   -F/u extensive neurology work up, s/p LP;  suspected autoimmune etiology;  noted WNV and toxo ab's  -mgmt per neurology and ID    3. leukocytosis -- reactive as well, resolved, monitor for now    Gopi Iverson MD  Hematology/Oncology  Cell:  [phone]  Office Phone: [phone]  Office Fax:  [phone]  [facility] [place] [address]

## 2019-12-02 NOTE — PROGRESS NOTE ADULT - SUBJECTIVE AND OBJECTIVE BOX
improved verabalization over weekend  no other new complaints    acetaminophen    Suspension .. 650 milliGRAM(s) Oral every 6 hours PRN  acetaminophen   Tablet .. 325 milliGRAM(s) Oral daily  aMILoride 10 milliGRAM(s) Oral <User Schedule>  enoxaparin Injectable 40 milliGRAM(s) SubCutaneous daily  fluticasone propionate 50 MICROgram(s)/spray Nasal Spray 1 Spray(s) Both Nostrils two times a day  influenza   Vaccine 0.5 milliLiter(s) IntraMuscular once  labetalol 100 milliGRAM(s) Oral every 8 hours  lacosamide 200 milliGRAM(s) Oral two times a day  levETIRAcetam  Solution 1500 milliGRAM(s) Oral two times a day  magnesium oxide 400 milliGRAM(s) Oral two times a day with meals  methylPREDNISolone 60 milliGRAM(s) Oral daily  pantoprazole  Injectable 40 milliGRAM(s) IV Push two times a day  potassium chloride   Solution 40 milliEquivalent(s) Enteral Tube daily  senna Syrup 10 milliLiter(s) Oral at bedtime  sertraline 50 milliGRAM(s) Oral daily  sodium chloride 3 Gram(s) Oral three times a day  trimethoprim / sulfamethoxazole IVPB 385 milliGRAM(s) IV Intermittent every 12 hours      No Known Allergies      ROS otherwise negative     T(C): 36.5 (12-02-19 @ 08:46), Max: 36.7 (12-01-19 @ 20:22)  HR: 53 (12-02-19 @ 08:46) (53 - 67)  BP: 123/72 (12-02-19 @ 08:46) (114/57 - 129/70)  RR: 18 (12-02-19 @ 08:46) (18 - 20)  SpO2: 99% (12-02-19 @ 08:46) (95% - 99%)  PHYSICAL EXAM  Gen:  laying in bed, nad, dysarthric  H:  anicteric, eomi  CV:  RRR, S1, S2  Lungs:  CTA b/l  Ab soft/nt/nd  Ext:  no edema                          13.4   8.64  )-----------( 322      ( 02 Dec 2019 08:01 )             38.9                         13.5   9.84  )-----------( 382      ( 01 Dec 2019 08:42 )             40.4                         13.0   11.13 )-----------( 439      ( 29 Nov 2019 08:25 )             40.1   12-02    129<L>  |  92<L>  |  25<H>  ----------------------------<  145<H>  3.5   |  22  |  0.63    Ca    8.9      02 Dec 2019 06:00  Phos  1.9     12-02  Mg     1.4     12-02    TPro  8.2  /  Alb  3.0<L>  /  TBili  0.3  /  DBili  x   /  AST  31  /  ALT  51<H>  /  AlkPhos  108  12-02

## 2019-12-02 NOTE — PROGRESS NOTE ADULT - SUBJECTIVE AND OBJECTIVE BOX
Interval History: pending ID eval for continuation vs DC of bactrim. C/o chest pain overnight, EKG OK, troponin negative, pending repeat    MEDICATIONS  (STANDING):  acetaminophen   Tablet .. 325 milliGRAM(s) Oral daily  aMILoride 10 milliGRAM(s) Oral <User Schedule>  enoxaparin Injectable 40 milliGRAM(s) SubCutaneous daily  fluticasone propionate 50 MICROgram(s)/spray Nasal Spray 1 Spray(s) Both Nostrils two times a day  influenza   Vaccine 0.5 milliLiter(s) IntraMuscular once  labetalol 100 milliGRAM(s) Oral every 8 hours  lacosamide 200 milliGRAM(s) Oral two times a day  levETIRAcetam  Solution 1500 milliGRAM(s) Oral two times a day  magnesium oxide 400 milliGRAM(s) Oral two times a day with meals  methylPREDNISolone 60 milliGRAM(s) Oral daily  pantoprazole  Injectable 40 milliGRAM(s) IV Push two times a day  potassium chloride   Solution 40 milliEquivalent(s) Enteral Tube daily  senna Syrup 10 milliLiter(s) Oral at bedtime  sertraline 50 milliGRAM(s) Oral daily  sodium chloride 3 Gram(s) Oral three times a day  trimethoprim / sulfamethoxazole IVPB 385 milliGRAM(s) IV Intermittent every 12 hours    MEDICATIONS  (PRN):  acetaminophen    Suspension .. 650 milliGRAM(s) Oral every 6 hours PRN Moderate Pain (4 - 6), Severe Pain (7 - 10)     Vital Signs Last 24 Hrs  T(C): 36.6 (02 Dec 2019 05:54), Max: 36.7 (01 Dec 2019 11:22)  T(F): 97.8 (02 Dec 2019 05:54), Max: 98 (01 Dec 2019 11:22)  HR: 57 (02 Dec 2019 05:54) (57 - 67)  BP: 123/72 (02 Dec 2019 05:54) (114/57 - 129/70)  BP(mean): --  RR: 18 (02 Dec 2019 05:54) (18 - 20)  SpO2: 99% (02 Dec 2019 05:54) (95% - 99%)    NEUROLOGICAL EXAM:  NEUROLOGICAL EXAM:  Mental Status: awake and alert, AOx3, no clear neglect, able to follow commands (show 2 fingers).   Motor: able to raise both arms and both legs    Labs:   cbc                      13.4   8.64  )-----------( 322      ( 02 Dec 2019 08:01 )             38.9     [id number]    129<L>  |  92<L>  |  25<H>  ----------------------------<  145<H>  3.5   |  22  |  0.63    Ca    8.9      02 Dec 2019 06:00  Phos  1.9     12-02  Mg     1.4     12-02    TPro  8.2  /  Alb  3.0<L>  /  TBili  0.3  /  DBili  x   /  AST  31  /  ALT  51<H>  /  AlkPhos  108  12-02 Interval History: pending ID eval for continuation vs DC of bactrim. C/o chest pain overnight, EKG OK, troponin negative, pending repeat    MEDICATIONS  (STANDING):  acetaminophen   Tablet .. 325 milliGRAM(s) Oral daily  aMILoride 10 milliGRAM(s) Oral <User Schedule>  enoxaparin Injectable 40 milliGRAM(s) SubCutaneous daily  fluticasone propionate 50 MICROgram(s)/spray Nasal Spray 1 Spray(s) Both Nostrils two times a day  influenza   Vaccine 0.5 milliLiter(s) IntraMuscular once  labetalol 100 milliGRAM(s) Oral every 8 hours  lacosamide 200 milliGRAM(s) Oral two times a day  levETIRAcetam  Solution 1500 milliGRAM(s) Oral two times a day  magnesium oxide 400 milliGRAM(s) Oral two times a day with meals  methylPREDNISolone 60 milliGRAM(s) Oral daily  pantoprazole  Injectable 40 milliGRAM(s) IV Push two times a day  potassium chloride   Solution 40 milliEquivalent(s) Enteral Tube daily  senna Syrup 10 milliLiter(s) Oral at bedtime  sertraline 50 milliGRAM(s) Oral daily  sodium chloride 3 Gram(s) Oral three times a day  trimethoprim / sulfamethoxazole IVPB 385 milliGRAM(s) IV Intermittent every 12 hours    MEDICATIONS  (PRN):  acetaminophen    Suspension .. 650 milliGRAM(s) Oral every 6 hours PRN Moderate Pain (4 - 6), Severe Pain (7 - 10)     Vital Signs Last 24 Hrs  T(C): 36.6 (02 Dec 2019 05:54), Max: 36.7 (01 Dec 2019 11:22)  T(F): 97.8 (02 Dec 2019 05:54), Max: 98 (01 Dec 2019 11:22)  HR: 57 (02 Dec 2019 05:54) (57 - 67)  BP: 123/72 (02 Dec 2019 05:54) (114/57 - 129/70)  BP(mean): --  RR: 18 (02 Dec 2019 05:54) (18 - 20)  SpO2: 99% (02 Dec 2019 05:54) (95% - 99%)    NEUROLOGICAL EXAM:  NEUROLOGICAL EXAM:  CN: opens both eyes, EOMI, left facial droop  Mental Status: awake and alert, AOx3, no clear neglect, able to follow commands (show 2 fingers).   Motor: able to raise both arms and both legs    Labs:   cbc                      13.4   8.64  )-----------( 322      ( 02 Dec 2019 08:01 )             38.9     [id number]    129<L>  |  92<L>  |  25<H>  ----------------------------<  145<H>  3.5   |  22  |  0.63    Ca    8.9      02 Dec 2019 06:00  Phos  1.9     12-02  Mg     1.4     12-02    TPro  8.2  /  Alb  3.0<L>  /  TBili  0.3  /  DBili  x   /  AST  31  /  ALT  51<H>  /  AlkPhos  108  12-02

## 2019-12-02 NOTE — PROGRESS NOTE ADULT - ASSESSMENT
Focal myoclonic seizure in setting of UTI and electrolyte imbalances. 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 Patient now here for focal seizures presumed to be secondary to ?autoimmune encephalitis Completed IVIG and solumedrol x5 days, currently on steroid taper.     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 He tested postive for Toxoplasma in CSF, but low suspicion for toxoplasmosis. Exam with improved alertness.    Labs  [x] repeat LP done on 11/20, studies sent: gluc 98, protein 42, lymph predom 90%, gram stain neg, HSV negative, CSF PCR negative, flow cytometry negative. WNV IgG + in CSF, IgM negative, fungal cx negative, errlichia negative, lyme negative, borrelia negative, ACE negative, beta 2 microglobulin wnl, crypto neg, acid fast negative, CSF PCR negative for [place], MBP negative, + toxo plasma, - EBV, - EMV, - VZV  [] pending CSF studies: VDRL, histoplasma, ENCES,  [x] repeat MRI brain with and without contrast- no enhancement seen, limited by motion  [x] HIV negative, TPO negative, thyroglobulin negative  [x] ESR, CRP elevated 5.62, B12 wnl, TSH wnl, folate wnl    [x] Anti MUSK, Anti achR ab negative  [] [place]  [x] thrombocytosis likely reactive, will monitor Hgb, if continues to decr will get FOBT  [] discuss need for toxoplasma treatment with ID  [x] started on salt tabs for hyponatremia.     Consults  [x] heme onc note for thrombocytosis: studies sent, consult appreciated  [x] ID consulted 11/28 for positive toxoplasma in the CSF - Bactrim started on 11/28. will need to d/w ID regarding continuing Bactrim.    Meds  [] steroid taper regimen: 60 x1 week (starting 11/28), then 40 x1 week (on 12/5), then 20 x1 week, then 10 x1 week.   [x] Continue Keppra 1.5g BID  [x] Continue Vimpat 200 BID  [x] Completed IVIG and solumedrol     Imaging  [x] MRI brain w, wo pending to assess for lesions (due to + toxo in CSF) - no enhancement seen, limited by motion  [x] Repeat MRI brain w/ and w/o under anesthesia: looks improved  [x] rEEG: potential epileptogenic focus in R temporal, R centroparietal, R temporo-occipital regions, mild-mod dysfunction, no seizure seen.   [x] CT C/A/P stool impaction, mild esophagitis and debris in trachea  [x] 24 hr vEEG negative for seizures     Other  [x] Pt/OT: for MAEVE  [x] S/s failed. continue TF Focal myoclonic seizure in setting of UTI and electrolyte imbalances. 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 Patient now here for focal seizures presumed to be secondary to ?autoimmune encephalitis Completed IVIG and solumedrol x5 days, currently on steroid taper.     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 He tested postive for Toxoplasma in CSF, but low suspicion for toxoplasmosis. Exam with improved alertness.    Labs  [x] repeat LP done on 11/20, studies sent: gluc 98, protein 42, lymph predom 90%, gram stain neg, HSV negative, CSF PCR negative, flow cytometry negative. WNV IgG + in CSF, IgM negative, fungal cx negative, errlichia negative, lyme negative, borrelia negative, ACE negative, beta 2 microglobulin wnl, crypto neg, acid fast negative, CSF PCR negative for [place], MBP negative, + toxo plasma, - EBV, - EMV, - VZV  [] pending CSF studies: VDRL, histoplasma, ENCES,  [x] repeat MRI brain with and without contrast- no enhancement seen, limited by motion  [x] HIV negative, TPO negative, thyroglobulin negative  [x] ESR, CRP elevated 5.62, B12 wnl, TSH wnl, folate wnl    [x] Anti MUSK, Anti achR ab negative  [] [place]  [x] thrombocytosis likely reactive, will monitor Hgb, if continues to decr will get FOBT  [] discuss need for toxoplasma treatment with ID  [x] started on salt tabs for hyponatremia.     Consults  [x] heme onc note for thrombocytosis: studies sent, consult appreciated  [x] ID consulted 11/28 for positive toxoplasma in the CSF - Bactrim started on 11/28. DCed, ID sent toxo labs    Meds  [] steroid taper regimen: 60 x1 week (starting 11/28), then 40 x1 week (on 12/5), then 20 x1 week, then 10 x1 week.   [x] Continue Keppra 1.5g BID  [x] Continue Vimpat 200 BID  [x] Completed IVIG and solumedrol     Imaging  [x] MRI brain w, wo pending to assess for lesions (due to + toxo in CSF) - no enhancement seen, limited by motion  [x] Repeat MRI brain w/ and w/o under anesthesia: looks improved  [x] rEEG: potential epileptogenic focus in R temporal, R centroparietal, R temporo-occipital regions, mild-mod dysfunction, no seizure seen.   [x] CT C/A/P stool impaction, mild esophagitis and debris in trachea  [x] 24 hr vEEG negative for seizures     Other  [x] Pt/OT: for MAEVE  [x] S/s failed. continue TF

## 2019-12-02 NOTE — PROVIDER CONTACT NOTE (CHANGE IN STATUS NOTIFICATION) - ASSESSMENT
pt moaning states pain is severe c/o SOB but no labored breathing  noted . O2 NC admin /64 62 Osat on RA 96-97%

## 2019-12-02 NOTE — PROVIDER CONTACT NOTE (OTHER) - ASSESSMENT
pt c/o pain CP left side moaning pt states" my heart is stoppong" vitals stable nonlabored resp Osat WDL

## 2019-12-02 NOTE — PROGRESS NOTE ADULT - ASSESSMENT
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 but all w/u negative s/p PEG now presented 11/16 with seizures, repeat tap with 1 WBC and elevated pr, CSF west nile IgG positive, IgM and PCR negative, MRI unchanged and s/p IVIG  now ID was called that the CSF toxo IgM and IgG came back positive (negative last admission)  unsure about the significance of toxo CSF serology when it was negative on last admission and MRI negative for toxo lesions but cannot ignore     encephalitis, likely autoimmune, west nile IgG was positive but PCR negative, toxo serology also positive this time but MRI not suggestive, ?false positive    * was started on Bactrim 385mg IV q 12,11/28, now s/p 5 days but the MRI also not suggestive so will discontinue the bactrim  * check serum toxo serology  * Follow up toxoplasma serologies still pending

## 2019-12-03 LAB
ALBUMIN SERPL ELPH-MCNC: 2.9 G/DL — LOW (ref 3.3–5)
ALP SERPL-CCNC: 112 U/L — SIGNIFICANT CHANGE UP (ref 40–120)
ALT FLD-CCNC: 55 U/L — HIGH (ref 10–45)
AMPA-R AB CBA, CSF: NEGATIVE — SIGNIFICANT CHANGE UP
AMPHIPHYSIN AB TITR CSF: NEGATIVE TITER — SIGNIFICANT CHANGE UP
ANION GAP SERPL CALC-SCNC: 12 MMOL/L — SIGNIFICANT CHANGE UP (ref 5–17)
ANION GAP SERPL CALC-SCNC: 13 MMOL/L — SIGNIFICANT CHANGE UP (ref 5–17)
AST SERPL-CCNC: 33 U/L — SIGNIFICANT CHANGE UP (ref 10–40)
BILIRUB SERPL-MCNC: 0.4 MG/DL — SIGNIFICANT CHANGE UP (ref 0.2–1.2)
BUN SERPL-MCNC: 27 MG/DL — HIGH (ref 7–23)
BUN SERPL-MCNC: 28 MG/DL — HIGH (ref 7–23)
BUN SERPL-MCNC: 28 MG/DL — HIGH (ref 7–23)
BUN SERPL-MCNC: 29 MG/DL — HIGH (ref 7–23)
BUN SERPL-MCNC: 30 MG/DL — HIGH (ref 7–23)
BUN SERPL-MCNC: 31 MG/DL — HIGH (ref 7–23)
CALCIUM SERPL-MCNC: 8.9 MG/DL — SIGNIFICANT CHANGE UP (ref 8.4–10.5)
CALCIUM SERPL-MCNC: 9.1 MG/DL — SIGNIFICANT CHANGE UP (ref 8.4–10.5)
CALCIUM SERPL-MCNC: 9.3 MG/DL — SIGNIFICANT CHANGE UP (ref 8.4–10.5)
CALCIUM SERPL-MCNC: 9.3 MG/DL — SIGNIFICANT CHANGE UP (ref 8.4–10.5)
CASPR2-IGG CBA, CSF: NEGATIVE — SIGNIFICANT CHANGE UP
CHLORIDE SERPL-SCNC: 91 MMOL/L — LOW (ref 96–108)
CHLORIDE SERPL-SCNC: 92 MMOL/L — LOW (ref 96–108)
CHLORIDE SERPL-SCNC: 94 MMOL/L — LOW (ref 96–108)
CHLORIDE SERPL-SCNC: 95 MMOL/L — LOW (ref 96–108)
CO2 SERPL-SCNC: 20 MMOL/L — LOW (ref 22–31)
CO2 SERPL-SCNC: 21 MMOL/L — LOW (ref 22–31)
CO2 SERPL-SCNC: 22 MMOL/L — SIGNIFICANT CHANGE UP (ref 22–31)
CO2 SERPL-SCNC: 23 MMOL/L — SIGNIFICANT CHANGE UP (ref 22–31)
CREAT SERPL-MCNC: 0.55 MG/DL — SIGNIFICANT CHANGE UP (ref 0.5–1.3)
CREAT SERPL-MCNC: 0.56 MG/DL — SIGNIFICANT CHANGE UP (ref 0.5–1.3)
CREAT SERPL-MCNC: 0.58 MG/DL — SIGNIFICANT CHANGE UP (ref 0.5–1.3)
CREAT SERPL-MCNC: 0.64 MG/DL — SIGNIFICANT CHANGE UP (ref 0.5–1.3)
CREAT SERPL-MCNC: 0.64 MG/DL — SIGNIFICANT CHANGE UP (ref 0.5–1.3)
CREAT SERPL-MCNC: 0.65 MG/DL — SIGNIFICANT CHANGE UP (ref 0.5–1.3)
CV2 IGG TITR CSF: NEGATIVE TITER — SIGNIFICANT CHANGE UP
DPPX ANTIBODY IFA, CSF: NEGATIVE — SIGNIFICANT CHANGE UP
GABA-B-R AB CBA, CSF: NEGATIVE — SIGNIFICANT CHANGE UP
GAD65 AB CSF-SCNC: 0 NMOL/L — SIGNIFICANT CHANGE UP
GFAP IFA, CSF: NEGATIVE — SIGNIFICANT CHANGE UP
GLIAL NUC TYPE 1 AB TITR CSF: NEGATIVE TITER — SIGNIFICANT CHANGE UP
GLUCOSE SERPL-MCNC: 179 MG/DL — HIGH (ref 70–99)
GLUCOSE SERPL-MCNC: 183 MG/DL — HIGH (ref 70–99)
GLUCOSE SERPL-MCNC: 193 MG/DL — HIGH (ref 70–99)
GLUCOSE SERPL-MCNC: 267 MG/DL — HIGH (ref 70–99)
GLUCOSE SERPL-MCNC: 269 MG/DL — HIGH (ref 70–99)
GLUCOSE SERPL-MCNC: 287 MG/DL — HIGH (ref 70–99)
H CAPSUL AG CSF IA-MCNC: SIGNIFICANT CHANGE UP
HCT VFR BLD CALC: 40.8 % — SIGNIFICANT CHANGE UP (ref 39–50)
HGB BLD-MCNC: 13.9 G/DL — SIGNIFICANT CHANGE UP (ref 13–17)
HU1 AB TITR CSF IF: NEGATIVE TITER — SIGNIFICANT CHANGE UP
HU2 AB TITR CSF IF: NEGATIVE TITER — SIGNIFICANT CHANGE UP
HU3 AB TITR CSF: NEGATIVE TITER — SIGNIFICANT CHANGE UP
IMMUNOLOGIST REVIEW: SIGNIFICANT CHANGE UP
LGI1-IGG CBA, CSF: NEGATIVE — SIGNIFICANT CHANGE UP
MCHC RBC-ENTMCNC: 30.5 PG — SIGNIFICANT CHANGE UP (ref 27–34)
MCHC RBC-ENTMCNC: 34.1 GM/DL — SIGNIFICANT CHANGE UP (ref 32–36)
MCV RBC AUTO: 89.5 FL — SIGNIFICANT CHANGE UP (ref 80–100)
MGLUR1 AB IFA, CSF: NEGATIVE — SIGNIFICANT CHANGE UP
NMDA-R AB CBA, CSF: NEGATIVE — SIGNIFICANT CHANGE UP
PCA-TR AB TITR CSF: NEGATIVE TITER — SIGNIFICANT CHANGE UP
PLATELET # BLD AUTO: 300 K/UL — SIGNIFICANT CHANGE UP (ref 150–400)
POTASSIUM SERPL-MCNC: 3.6 MMOL/L — SIGNIFICANT CHANGE UP (ref 3.5–5.3)
POTASSIUM SERPL-MCNC: 3.7 MMOL/L — SIGNIFICANT CHANGE UP (ref 3.5–5.3)
POTASSIUM SERPL-MCNC: 4.1 MMOL/L — SIGNIFICANT CHANGE UP (ref 3.5–5.3)
POTASSIUM SERPL-MCNC: 4.3 MMOL/L — SIGNIFICANT CHANGE UP (ref 3.5–5.3)
POTASSIUM SERPL-MCNC: 5.3 MMOL/L — SIGNIFICANT CHANGE UP (ref 3.5–5.3)
POTASSIUM SERPL-MCNC: 7.4 MMOL/L — CRITICAL HIGH (ref 3.5–5.3)
POTASSIUM SERPL-SCNC: 3.6 MMOL/L — SIGNIFICANT CHANGE UP (ref 3.5–5.3)
POTASSIUM SERPL-SCNC: 3.7 MMOL/L — SIGNIFICANT CHANGE UP (ref 3.5–5.3)
POTASSIUM SERPL-SCNC: 4.1 MMOL/L — SIGNIFICANT CHANGE UP (ref 3.5–5.3)
POTASSIUM SERPL-SCNC: 4.3 MMOL/L — SIGNIFICANT CHANGE UP (ref 3.5–5.3)
POTASSIUM SERPL-SCNC: 5.3 MMOL/L — SIGNIFICANT CHANGE UP (ref 3.5–5.3)
POTASSIUM SERPL-SCNC: 7.4 MMOL/L — CRITICAL HIGH (ref 3.5–5.3)
PROT SERPL-MCNC: 7.9 G/DL — SIGNIFICANT CHANGE UP (ref 6–8.3)
PURKINJE CELL CYTOPLASMIC AB TYPE 2: NEGATIVE TITER — SIGNIFICANT CHANGE UP
PURKINJE CELLS AB TITR CSF IF: NEGATIVE TITER — SIGNIFICANT CHANGE UP
RBC # BLD: 4.56 M/UL — SIGNIFICANT CHANGE UP (ref 4.2–5.8)
RBC # FLD: 15.4 % — HIGH (ref 10.3–14.5)
REFLEX ADDED: SIGNIFICANT CHANGE UP
SODIUM SERPL-SCNC: 125 MMOL/L — LOW (ref 135–145)
SODIUM SERPL-SCNC: 126 MMOL/L — LOW (ref 135–145)
SODIUM SERPL-SCNC: 127 MMOL/L — LOW (ref 135–145)
SODIUM SERPL-SCNC: 129 MMOL/L — LOW (ref 135–145)
T GONDII IGG CSF-ACNC: <0.9 — SIGNIFICANT CHANGE UP
T GONDII IGG SER QL: 5.6 IU/ML — SIGNIFICANT CHANGE UP
T GONDII IGG SER QL: NEGATIVE — SIGNIFICANT CHANGE UP
T GONDII IGM CSF-ACNC: <0.8 — SIGNIFICANT CHANGE UP
T GONDII IGM SER QL: <3 AU/ML — SIGNIFICANT CHANGE UP
T GONDII IGM SER QL: NEGATIVE — SIGNIFICANT CHANGE UP
TROPONIN T, HIGH SENSITIVITY RESULT: 13 NG/L — SIGNIFICANT CHANGE UP (ref 0–51)
VDRL CSF-TITR: NEGATIVE — SIGNIFICANT CHANGE UP
WBC # BLD: 8.82 K/UL — SIGNIFICANT CHANGE UP (ref 3.8–10.5)
WBC # FLD AUTO: 8.82 K/UL — SIGNIFICANT CHANGE UP (ref 3.8–10.5)

## 2019-12-03 PROCEDURE — 93010 ELECTROCARDIOGRAM REPORT: CPT

## 2019-12-03 PROCEDURE — 99232 SBSQ HOSP IP/OBS MODERATE 35: CPT

## 2019-12-03 PROCEDURE — 99233 SBSQ HOSP IP/OBS HIGH 50: CPT | Mod: GC

## 2019-12-03 RX ORDER — MAGNESIUM OXIDE 400 MG ORAL TABLET 241.3 MG
800 TABLET ORAL
Refills: 0 | Status: DISCONTINUED | OUTPATIENT
Start: 2019-12-03 | End: 2019-12-05

## 2019-12-03 RX ORDER — SODIUM CHLORIDE 5 G/100ML
1000 INJECTION, SOLUTION INTRAVENOUS
Refills: 0 | Status: DISCONTINUED | OUTPATIENT
Start: 2019-12-03 | End: 2019-12-03

## 2019-12-03 RX ORDER — SODIUM CHLORIDE 5 G/100ML
1000 INJECTION, SOLUTION INTRAVENOUS
Refills: 0 | Status: DISCONTINUED | OUTPATIENT
Start: 2019-12-03 | End: 2019-12-04

## 2019-12-03 RX ADMIN — SERTRALINE 50 MILLIGRAM(S): 25 TABLET, FILM COATED ORAL at 11:08

## 2019-12-03 RX ADMIN — Medication 100 MILLIGRAM(S): at 14:32

## 2019-12-03 RX ADMIN — SODIUM CHLORIDE 3 GRAM(S): 9 INJECTION INTRAMUSCULAR; INTRAVENOUS; SUBCUTANEOUS at 04:22

## 2019-12-03 RX ADMIN — Medication 10 MILLIGRAM(S): at 22:34

## 2019-12-03 RX ADMIN — PANTOPRAZOLE SODIUM 40 MILLIGRAM(S): 20 TABLET, DELAYED RELEASE ORAL at 04:23

## 2019-12-03 RX ADMIN — Medication 10 MILLIGRAM(S): at 13:06

## 2019-12-03 RX ADMIN — LACOSAMIDE 200 MILLIGRAM(S): 50 TABLET ORAL at 04:23

## 2019-12-03 RX ADMIN — SODIUM CHLORIDE 30 MILLILITER(S): 5 INJECTION, SOLUTION INTRAVENOUS at 11:30

## 2019-12-03 RX ADMIN — Medication 100 MILLIGRAM(S): at 22:34

## 2019-12-03 RX ADMIN — Medication 100 MILLIGRAM(S): at 04:17

## 2019-12-03 RX ADMIN — Medication 60 MILLIGRAM(S): at 04:18

## 2019-12-03 RX ADMIN — Medication 650 MILLIGRAM(S): at 11:04

## 2019-12-03 RX ADMIN — Medication 1 SPRAY(S): at 04:16

## 2019-12-03 RX ADMIN — SODIUM CHLORIDE 50 MILLILITER(S): 5 INJECTION, SOLUTION INTRAVENOUS at 17:14

## 2019-12-03 RX ADMIN — Medication 40 MILLIEQUIVALENT(S): at 11:03

## 2019-12-03 RX ADMIN — ENOXAPARIN SODIUM 40 MILLIGRAM(S): 100 INJECTION SUBCUTANEOUS at 11:04

## 2019-12-03 RX ADMIN — LEVETIRACETAM 1500 MILLIGRAM(S): 250 TABLET, FILM COATED ORAL at 04:15

## 2019-12-03 RX ADMIN — LEVETIRACETAM 1500 MILLIGRAM(S): 250 TABLET, FILM COATED ORAL at 17:11

## 2019-12-03 RX ADMIN — Medication 10 MILLIGRAM(S): at 09:30

## 2019-12-03 RX ADMIN — Medication 650 MILLIGRAM(S): at 17:11

## 2019-12-03 RX ADMIN — PANTOPRAZOLE SODIUM 40 MILLIGRAM(S): 20 TABLET, DELAYED RELEASE ORAL at 17:11

## 2019-12-03 RX ADMIN — Medication 1 SPRAY(S): at 17:12

## 2019-12-03 RX ADMIN — LACOSAMIDE 200 MILLIGRAM(S): 50 TABLET ORAL at 17:17

## 2019-12-03 RX ADMIN — Medication 650 MILLIGRAM(S): at 11:34

## 2019-12-03 RX ADMIN — Medication 650 MILLIGRAM(S): at 17:41

## 2019-12-03 RX ADMIN — MAGNESIUM OXIDE 400 MG ORAL TABLET 800 MILLIGRAM(S): 241.3 TABLET ORAL at 17:12

## 2019-12-03 NOTE — PROGRESS NOTE ADULT - SUBJECTIVE AND OBJECTIVE BOX
Follow Up:  encephalitis with positive CSF toxo    Interval History: brain MRI again did not show any lesions and bactrim was discontinued     ROS:      All other systems negative    Constitutional: no fever, no chills  Eyes: no vision changes, no eye pain  ENT:  no sore throat, no rhinorrhea  Cardiovascular:  no chest pain, no palpitation  Respiratory:  no SOB, no cough  GI:  no abd pain, no vomiting, no diarrhea  urinary: no dysuria, no hematuria, no flank pain  musculoskeletal:  no joint pain, no joint swelling  skin:  no rash  neurology:  occasional frontal headache        Allergies  No Known Allergies        ANTIMICROBIALS:      OTHER MEDS:  acetaminophen    Suspension .. 650 milliGRAM(s) Oral every 6 hours PRN  acetaminophen   Tablet .. 325 milliGRAM(s) Oral daily  aMILoride 10 milliGRAM(s) Oral <User Schedule>  enoxaparin Injectable 40 milliGRAM(s) SubCutaneous daily  fluticasone propionate 50 MICROgram(s)/spray Nasal Spray 1 Spray(s) Both Nostrils two times a day  influenza   Vaccine 0.5 milliLiter(s) IntraMuscular once  labetalol 100 milliGRAM(s) Oral every 8 hours  lacosamide 200 milliGRAM(s) Oral two times a day  levETIRAcetam  Solution 1500 milliGRAM(s) Oral two times a day  magnesium oxide 800 milliGRAM(s) Oral two times a day with meals  methylPREDNISolone 60 milliGRAM(s) Oral daily  pantoprazole  Injectable 40 milliGRAM(s) IV Push two times a day  potassium chloride   Solution 40 milliEquivalent(s) Enteral Tube daily  senna Syrup 10 milliLiter(s) Oral at bedtime  sertraline 50 milliGRAM(s) Oral daily  sodium chloride 2% . 1000 milliLiter(s) IV Continuous <Continuous>      Vital Signs Last 24 Hrs  T(C): 36.9 (03 Dec 2019 08:23), Max: 36.9 (02 Dec 2019 12:26)  T(F): 98.4 (03 Dec 2019 08:23), Max: 98.5 (02 Dec 2019 12:26)  HR: 66 (03 Dec 2019 08:23) (63 - 66)  BP: 127/74 (03 Dec 2019 08:23) (102/63 - 134/72)  BP(mean): --  RR: 18 (03 Dec 2019 08:23) (18 - 18)  SpO2: 97% (03 Dec 2019 08:23) (95% - 98%)    Physical Exam:  General:    NAD,  non toxic  Head: EEG electrodes  Eye: normal sclera and conjunctiva  ENT:    no oropharyngeal lesions,   no LAD,   neck supple  Cardio:     regular S1, S2,  no murmur  Respiratory:    clear b/l,    no wheezing  abd:     soft,   BS +,   no tenderness, PEG with no erythema  :   no CVAT,  no suprapubic tenderness,   no  bradley  Musculoskeletal:   no joint swelling,   no edema  vascular: no phlebitis, normal pulses  Skin:    no rash  Neurologic:   awake and sitting on a chair, A&O x 3, follows commands                        13.9   8.82  )-----------( 300      ( 03 Dec 2019 09:13 )             40.8       12-03    126<L>  |  91<L>  |  30<H>  ----------------------------<  269<H>  4.1   |  23  |  0.64    Ca    9.1      03 Dec 2019 10:07  Phos  1.9     12-02  Mg     1.4     12-02    TPro  7.9  /  Alb  2.9<L>  /  TBili  0.4  /  DBili  x   /  AST  33  /  ALT  55<H>  /  AlkPhos  112  12-03          MICROBIOLOGY:  v  .CSF CSF  11-20-19   No growth  --    No polymorphonuclear cells seen  No organisms seen  by cytocentrifuge      .Urine Clean Catch (Midstream)  11-17-19   >=3 organisms. Probable collection contamination.  --  --        CMV PCR Detection: NotDetec IU/mL (11-27-19 @ 13:39)    CMV PCR Detection: NotDetec IU/mL (11-27 @ 13:39)  EBV PCR: NotDetec IU/mL (11-27 @ 13:39)        RADIOLOGY:  Images below reviewed personally  < from: MR Head w/wo IV Cont (11.29.19 @ 16:41) >  IMPRESSION:    Limited by motion    No gross evidence for abnormal intracranial enhancement.    No acute intracranial hemorrhage, mass effect, vasogenic edema, or   evidence of acute territorial infarct.    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

## 2019-12-03 NOTE — PROVIDER CONTACT NOTE (CRITICAL VALUE NOTIFICATION) - ACTION/TREATMENT ORDERED:
we will give K Iv.
ABI Syed notified
MD Colleen Preston notified, pt already receiving IVIG. Continue to monitor
MD Eufemia Pritchard notified.  Will continue monitor.
Redraw BMP.

## 2019-12-03 NOTE — PROVIDER CONTACT NOTE (OTHER) - RECOMMENDATIONS
MD assess and evaluate pt at bedside, EKG, cardiac enzymes MD assess and evaluate pt at bedside, EKG, cardiac enzymes, pain medication

## 2019-12-03 NOTE — CONSULT NOTE ADULT - ASSESSMENT
84 year old male with history of hypertension, prostate cancer s/p radiation and seed implant with hypokalemia, hypomagnesemia and viral encephalitis presented with sz now with hyponatremia as well    1- hyponatremia  2- hypomagnesemia   3- hypokalemia  4- htn      suspect hyponatremia in setting of bactrim use  urine lytes and osoms  but is on 2% nacl. na still low increased rate to 50 cc/hr cont q4 hr na level with correction to 133 in 24 hr only   change tube feeds to nepro to allow fluid restriction   increase mag ox 800 mg bid  on amiloride. monitor na closely given on 2% nacl  k in range  cont labetalol   d/w Neuro team

## 2019-12-03 NOTE — PROGRESS NOTE ADULT - SUBJECTIVE AND OBJECTIVE BOX
Interval History: chest pain overnight, trops sent, wnl. hyponatremic, will follow up nephro reccs.     MEDICATIONS  (STANDING):  acetaminophen   Tablet .. 325 milliGRAM(s) Oral daily  aMILoride 10 milliGRAM(s) Oral <User Schedule>  enoxaparin Injectable 40 milliGRAM(s) SubCutaneous daily  fluticasone propionate 50 MICROgram(s)/spray Nasal Spray 1 Spray(s) Both Nostrils two times a day  influenza   Vaccine 0.5 milliLiter(s) IntraMuscular once  labetalol 100 milliGRAM(s) Oral every 8 hours  lacosamide 200 milliGRAM(s) Oral two times a day  levETIRAcetam  Solution 1500 milliGRAM(s) Oral two times a day  magnesium oxide 400 milliGRAM(s) Oral two times a day with meals  methylPREDNISolone 60 milliGRAM(s) Oral daily  pantoprazole  Injectable 40 milliGRAM(s) IV Push two times a day  potassium chloride   Solution 40 milliEquivalent(s) Enteral Tube daily  senna Syrup 10 milliLiter(s) Oral at bedtime  sertraline 50 milliGRAM(s) Oral daily  sodium chloride 3 Gram(s) Oral three times a day    MEDICATIONS  (PRN):  acetaminophen    Suspension .. 650 milliGRAM(s) Oral every 6 hours PRN Moderate Pain (4 - 6), Severe Pain (7 - 10)    Allergies  No Known Allergies    Vital Signs Last 24 Hrs  T(C): 36.9 (03 Dec 2019 08:23), Max: 36.9 (02 Dec 2019 12:26)  T(F): 98.4 (03 Dec 2019 08:23), Max: 98.5 (02 Dec 2019 12:26)  HR: 66 (03 Dec 2019 08:23) (53 - 66)  BP: 127/74 (03 Dec 2019 08:23) (102/63 - 134/72)  BP(mean): --  RR: 18 (03 Dec 2019 08:23) (18 - 18)  SpO2: 97% (03 Dec 2019 08:23) (95% - 99%)    NEUROLOGICAL EXAM:  CN: opens both eyes, EOMI, left facial droop  Mental Status: awake and alert, AOx3, no clear neglect, able to follow commands (show 2 fingers).   Motor: able to raise both arms and both legs    Labs:   cbc                      13.4   8.64  )-----------( 322      ( 02 Dec 2019 08:01 )             38.9     [id number]    125<L>  |  92<L>  |  27<H>  ----------------------------<  183<H>  3.6   |  20<L>  |  0.65    Ca    9.1      03 Dec 2019 06:44  Phos  1.9     12-02  Mg     1.4     12-02    TPro  7.9  /  Alb  2.9<L>  /  TBili  0.4  /  DBili  x   /  AST  33  /  ALT  55<H>  /  AlkPhos  112  12-03 Interval History: chest pain overnight, trops sent, wnl. hyponatremic, per nephro- start on 2%, bmp q4 hr.     MEDICATIONS  (STANDING):  acetaminophen   Tablet .. 325 milliGRAM(s) Oral daily  aMILoride 10 milliGRAM(s) Oral <User Schedule>  enoxaparin Injectable 40 milliGRAM(s) SubCutaneous daily  fluticasone propionate 50 MICROgram(s)/spray Nasal Spray 1 Spray(s) Both Nostrils two times a day  influenza   Vaccine 0.5 milliLiter(s) IntraMuscular once  labetalol 100 milliGRAM(s) Oral every 8 hours  lacosamide 200 milliGRAM(s) Oral two times a day  levETIRAcetam  Solution 1500 milliGRAM(s) Oral two times a day  magnesium oxide 400 milliGRAM(s) Oral two times a day with meals  methylPREDNISolone 60 milliGRAM(s) Oral daily  pantoprazole  Injectable 40 milliGRAM(s) IV Push two times a day  potassium chloride   Solution 40 milliEquivalent(s) Enteral Tube daily  senna Syrup 10 milliLiter(s) Oral at bedtime  sertraline 50 milliGRAM(s) Oral daily  sodium chloride 3 Gram(s) Oral three times a day    MEDICATIONS  (PRN):  acetaminophen    Suspension .. 650 milliGRAM(s) Oral every 6 hours PRN Moderate Pain (4 - 6), Severe Pain (7 - 10)    Allergies  No Known Allergies    Vital Signs Last 24 Hrs  T(C): 36.9 (03 Dec 2019 08:23), Max: 36.9 (02 Dec 2019 12:26)  T(F): 98.4 (03 Dec 2019 08:23), Max: 98.5 (02 Dec 2019 12:26)  HR: 66 (03 Dec 2019 08:23) (53 - 66)  BP: 127/74 (03 Dec 2019 08:23) (102/63 - 134/72)  BP(mean): --  RR: 18 (03 Dec 2019 08:23) (18 - 18)  SpO2: 97% (03 Dec 2019 08:23) (95% - 99%)    NEUROLOGICAL EXAM:  CN: opens both eyes, EOMI, more alert and sitting in chair.   Mental Status: awake and alert, AOx3, no clear neglect, able to follow commands (show 2 fingers).   Motor: able to raise both arms and both legs    Labs:   cbc                      13.4   8.64  )-----------( 322      ( 02 Dec 2019 08:01 )             38.9     [id number]    125<L>  |  92<L>  |  27<H>  ----------------------------<  183<H>  3.6   |  20<L>  |  0.65    Ca    9.1      03 Dec 2019 06:44  Phos  1.9     12-02  Mg     1.4     12-02    TPro  7.9  /  Alb  2.9<L>  /  TBili  0.4  /  DBili  x   /  AST  33  /  ALT  55<H>  /  AlkPhos  112  12-03

## 2019-12-03 NOTE — PROGRESS NOTE ADULT - SUBJECTIVE AND OBJECTIVE BOX
no new changes in symptoms    acetaminophen    Suspension .. 650 milliGRAM(s) Oral every 6 hours PRN  acetaminophen   Tablet .. 325 milliGRAM(s) Oral daily  aMILoride 10 milliGRAM(s) Oral <User Schedule>  enoxaparin Injectable 40 milliGRAM(s) SubCutaneous daily  fluticasone propionate 50 MICROgram(s)/spray Nasal Spray 1 Spray(s) Both Nostrils two times a day  influenza   Vaccine 0.5 milliLiter(s) IntraMuscular once  labetalol 100 milliGRAM(s) Oral every 8 hours  lacosamide 200 milliGRAM(s) Oral two times a day  levETIRAcetam  Solution 1500 milliGRAM(s) Oral two times a day  magnesium oxide 800 milliGRAM(s) Oral two times a day with meals  methylPREDNISolone 60 milliGRAM(s) Oral daily  pantoprazole  Injectable 40 milliGRAM(s) IV Push two times a day  potassium chloride   Solution 40 milliEquivalent(s) Enteral Tube daily  senna Syrup 10 milliLiter(s) Oral at bedtime  sertraline 50 milliGRAM(s) Oral daily  sodium chloride 2% . 1000 milliLiter(s) IV Continuous <Continuous>      No Known Allergies      ROS otherwise negative     T(C): 36.4 (12-03-19 @ 20:10), Max: 36.9 (12-03-19 @ 08:23)  HR: 54 (12-03-19 @ 20:10) (54 - 69)  BP: 122/67 (12-03-19 @ 20:10) (102/63 - 158/83)  RR: 18 (12-03-19 @ 20:10) (18 - 21)  SpO2: 97% (12-03-19 @ 20:10) (96% - 98%)  PHYSICAL EXAM  Gen:  laying in bed, nad  H:  anicteric, eomi  CV:  RRR, S1, S2  Lungs:  CTA b/l  Ab soft/nt/nd  Ext:  no edema                          13.9   8.82  )-----------( 300      ( 03 Dec 2019 09:13 )             40.8                         13.4   8.64  )-----------( 322      ( 02 Dec 2019 08:01 )             38.9                         13.5   9.84  )-----------( 382      ( 01 Dec 2019 08:42 )             40.4

## 2019-12-03 NOTE — PROVIDER CONTACT NOTE (CRITICAL VALUE NOTIFICATION) - BACKGROUND
Pt admitted for nonintractable epilepsy. Pt maintained on sodium chloride 2% with q 4 hour BMP blood draws.

## 2019-12-03 NOTE — PROGRESS NOTE ADULT - ASSESSMENT
1. Thrombocytosis--resolved    -- suspect was reactive secondary to acute neurologic process  -- Low TIBC and Iron. Ferritin nl, not consistent w iron deficiency  -- Not MPN. No need for further w/u for now    2. Seizure   -F/u extensive neurology work up, s/p LP;  suspected autoimmune etiology;  noted WNV and toxo ab's  -mgmt per neurology and ID    3. leukocytosis -- reactive as well, resolved, monitor for now    4.  Hyponatremia  -nephrology w/u underway    Gopi Iverson MD  Hematology/Oncology  Cell:  [phone]  Office Phone: [phone]  Office Fax:  [phone]  [facility] [place] [address]

## 2019-12-03 NOTE — PROVIDER CONTACT NOTE (CRITICAL VALUE NOTIFICATION) - ASSESSMENT
Pt admitted for nonintractable epilepsy. Pt maintained on sodium chloride 2% with q 4 hour BMP blood draws. Pts potassium noted to 7.4 and sample was noted to be hemolyzed.

## 2019-12-03 NOTE — PROGRESS NOTE ADULT - ASSESSMENT
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 but all w/u negative s/p PEG now presented 11/16 with seizures, repeat tap with 1 WBC and elevated pr, CSF west nile IgG positive, IgM and PCR negative, MRI unchanged and s/p IVIG  now ID was called that the CSF toxo IgM and IgG came back positive (negative last admission)  unsure about the significance of toxo CSF serology when it was negative on last admission and MRI negative for toxo lesions but cannot ignore     encephalitis, likely autoimmune, west nile IgG was positive but PCR negative, toxo serology also positive this time (last LP negative) but MRI not suggestive, ?false positive    * s/p 5 days of Bactrim 385mg IV q 12,11/28-12/2,  but the MRI also not suggestive so discontinued  * f/u the serum toxo serology  * Follow up toxoplasma serologies still pending   * now pt on methyl prednisolone 60 and on tapering steroids  * if pt remains on long term steroids then will need bactrim for PCP PPX

## 2019-12-03 NOTE — CONSULT NOTE ADULT - SUBJECTIVE AND OBJECTIVE BOX
[facility]  [phone]  NEPHROLOGY      INITIAL CONSULT NOTE  --------------------------------------------------------------------------------  HPI:      84 year old male with history of hypertension, prostate cancer s/p radiation and seed implant presents from [facility] with confusion in the setting of recent seizures. The patient was admitted during 9/24-10/23 after episode of status epilepticus requiring intubation. 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 He was eventually treated on the medicine floor after extubation. He failed multiple speech and swallow evaluations and is now s/p peg placement. Patient is also with residual slurred speech and weakness of left upper extremity. Patient was discharged to [facility] where he has been for the last three weeks PTA. Per daughter, patient was doing well. However,  the patient experienced seizure activity. His keppra was increased from 1000 mg to 1500 mg. He experienced another seizure  and on day of admission patient appeared. His left eye was droopy, left arm was twitching, he sounded congested, and he kept jerking his right arm.  his sodium has been low during the week, so he appeared lethargic earlier in the week PTA.   11/16 with seizures, repeat tap with 1 WBC and elevated pr, CSF [place] IgG positive, IgM and PCR negative, MRI unchanged and s/p IVIG  now ID was called that the CSF toxo IgM and IgG came back positive (negative last admission) seen by ID  now with worsening hyponatremia. renal consult called.     PAST HISTORY  --------------------------------------------------------------------------------  PAST MEDICAL & SURGICAL HISTORY:  Hypertension  Prostate cancer: had seed implant &amp; radiation ( 2001 )  PC (prostate cancer)  S/P cholecystectomy    FAMILY HISTORY:  No pertinent family history in first degree relatives    PAST SOCIAL HISTORY: no tobacco no alcohol     ALLERGIES & MEDICATIONS  --------------------------------------------------------------------------------  Allergies    No Known Allergies    Intolerances      Standing Inpatient Medications  acetaminophen   Tablet .. 325 milliGRAM(s) Oral daily  aMILoride 10 milliGRAM(s) Oral <User Schedule>  enoxaparin Injectable 40 milliGRAM(s) SubCutaneous daily  fluticasone propionate 50 MICROgram(s)/spray Nasal Spray 1 Spray(s) Both Nostrils two times a day  influenza   Vaccine 0.5 milliLiter(s) IntraMuscular once  labetalol 100 milliGRAM(s) Oral every 8 hours  lacosamide 200 milliGRAM(s) Oral two times a day  levETIRAcetam  Solution 1500 milliGRAM(s) Oral two times a day  magnesium oxide 800 milliGRAM(s) Oral two times a day with meals  methylPREDNISolone 60 milliGRAM(s) Oral daily  pantoprazole  Injectable 40 milliGRAM(s) IV Push two times a day  potassium chloride   Solution 40 milliEquivalent(s) Enteral Tube daily  senna Syrup 10 milliLiter(s) Oral at bedtime  sertraline 50 milliGRAM(s) Oral daily  sodium chloride 2% . 1000 milliLiter(s) IV Continuous <Continuous>    PRN Inpatient Medications  acetaminophen    Suspension .. 650 milliGRAM(s) Oral every 6 hours PRN      REVIEW OF SYSTEMS  --------------------------------------------------------------------------------  non communicative when seen earlier     VITALS/PHYSICAL EXAM  --------------------------------------------------------------------------------  T(C): 36.4 (12-03-19 @ 20:10), Max: 36.9 (12-03-19 @ 08:23)  HR: 54 (12-03-19 @ 20:10) (54 - 69)  BP: 122/67 (12-03-19 @ 20:10) (102/63 - 158/83)  RR: 18 (12-03-19 @ 20:10) (18 - 21)  SpO2: 97% (12-03-19 @ 20:10) (96% - 98%)  Wt(kg): --        12-02-19 @ 07:01  -  12-03-19 @ 07:00  --------------------------------------------------------  IN: 1350 mL / OUT: 1000 mL / NET: 350 mL    12-03-19 @ 07:01  -  12-03-19 @ 20:41  --------------------------------------------------------  IN: 1200 mL / OUT: 550 mL / NET: 650 mL      Physical Exam:  	Gen: Non toxic comfortable appearing   	no jvd   	Pulm: decrease bs  no rales or ronchi or wheezing  	CV: RRR, S1S2; no rub  	Abd: +BS, soft, nontender/nondistended + peg  	: No suprapubic tenderness  	UE: Warm, no cyanosis  no clubbing,  no edema  	LE: Warm, no cyanosis  no clubbing, no edema  	Neuro: non communicative when seen sleeping     LABS/STUDIES  --------------------------------------------------------------------------------              13.9   8.82  >-----------<  300      [12-03-19 @ 09:13]              40.8     125  |  92  |  29  ----------------------------<  267      [12-03-19 @ 15:42]  4.3   |  20  |  0.58        Ca     9.3     [12-03-19 @ 15:42]      Mg     1.4     [12-02-19 @ 01:59]      Phos  1.9     [12-02-19 @ 01:59]    TPro  7.9  /  Alb  2.9  /  TBili  0.4  /  DBili  x   /  AST  33  /  ALT  55  /  AlkPhos  112  [12-03-19 @ 06:44]          Creatinine Trend:  SCr 0.58 [12-03 @ 15:42]  SCr 0.64 [12-03 @ 13:05]  SCr 0.64 [12-03 @ 10:07]  SCr 0.65 [12-03 @ 06:44]  SCr 0.63 [12-02 @ 06:00]    Urinalysis - [11-16-19 @ 18:21]      Color Yellow / Appearance Slightly Turbid / SG 1.016 / pH 7.0      Gluc Negative / Ketone Negative  / Bili Negative / Urobili Negative       Blood Small / Protein 30 mg/dL / Leuk Est Large / Nitrite Positive      RBC 4 /  / Hyaline 6 / Gran  / Sq Epi  / Non Sq Epi 2 / Bacteria Many      Iron 37, TIBC 208, %sat 18      [11-22-19 @ 09:09]  Ferritin 294      [11-23-19 @ 10:43]  HbA1c 5.7      [09-25-19 @ 05:57]  TSH 1.41      [11-17-19 @ 11:17]    HIV Nonreact      [11-21-19 @ 20:40]    YANDEL: titer Negative, pattern --      [09-28-19 @ 14:46]  dsDNA <12      [09-28-19 @ 14:46]  Rheumatoid Factor <10      [09-28-19 @ 14:42]  Syphilis Screen (Treponema Pallidum Ab) Negative      [09-29-19 @ 04:17]

## 2019-12-03 NOTE — PROVIDER CONTACT NOTE (OTHER) - ACTION/TREATMENT ORDERED:
provider aware
rounding will come to speak to daughter
AS per PA repeat tylenol draw 0800  troponin now .
MD assess and evaluate pt at bedside, EKG, cardiac enzymes
pt is getting sodium tablet, MD renal is following

## 2019-12-03 NOTE — PROGRESS NOTE ADULT - ASSESSMENT
Focal myoclonic seizure in setting of UTI and electrolyte imbalances. 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 Patient now here for focal seizures presumed to be secondary to ?autoimmune encephalitis Completed IVIG and solumedrol x5 days, currently on steroid taper.     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 He tested postive for Toxoplasma in CSF, but low suspicion for toxoplasmosis. Exam with improved alertness.    Labs  [x] repeat LP done on 11/20, studies sent: gluc 98, protein 42, lymph predom 90%, gram stain neg, HSV negative, CSF PCR negative, flow cytometry negative. WNV IgG + in CSF, IgM negative, fungal cx negative, errlichia negative, lyme negative, borrelia negative, ACE negative, beta 2 microglobulin wnl, crypto neg, acid fast negative, CSF PCR negative for [place], MBP negative, + toxo plasma, - EBV, - EMV, - VZV  [] pending CSF studies: VDRL, histoplasma, ENCES,  [x] repeat MRI brain with and without contrast- no enhancement seen, limited by motion  [x] HIV negative, TPO negative, thyroglobulin negative  [x] ESR, CRP elevated 5.62, B12 wnl, TSH wnl, folate wnl    [x] Anti MUSK, Anti achR ab negative  [] [place] encephal[place] sent  [x] thrombocytosis likely reactive, will monitor Hgb, if continues to decr will get FOBT  [] discuss need for toxoplasma treatment with ID  [x] started on salt tabs for hyponatremia.     Consults  [x] heme onc note for thrombocytosis: studies sent, consult appreciated  [x] ID consulted 11/28 for positive toxoplasma in the CSF - Bactrim started on 11/28. DCed, ID sent toxo labs    Meds  [] steroid taper regimen: 60 x1 week (starting 11/28), then 40 x1 week (on 12/5), then 20 x1 week, then 10 x1 week.   [x] Continue Keppra 1.5g BID  [x] Continue Vimpat 200 BID  [x] Completed IVIG and solumedrol     Imaging  [x] MRI brain w, wo pending to assess for lesions (due to + toxo in CSF) - no enhancement seen, limited by motion  [x] Repeat MRI brain w/ and w/o under anesthesia: looks improved  [x] rEEG: potential epileptogenic focus in R temporal, R centroparietal, R temporo-occipital regions, mild-mod dysfunction, no seizure seen.   [x] CT C/A/P stool impaction, mild esophagitis and debris in trachea  [x] 24 hr vEEG negative for seizures     Other  [x] Pt/OT: for MAEVE  [x] S/s failed. continue TF Focal myoclonic seizure in setting of UTI and electrolyte imbalances. 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 Patient now here for focal seizures presumed to be secondary to ?autoimmune encephalitis Completed IVIG and solumedrol x5 days, currently on steroid taper.   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 He tested postive for Toxoplasma in CSF, but low suspicion for toxoplasmosis. Exam with improved alertness.    Labs  [x] repeat LP done on 11/20, studies sent: gluc 98, protein 42, lymph predom 90%, gram stain neg, HSV negative, CSF PCR negative, flow cytometry negative. WNV IgG + in CSF, IgM negative, fungal cx negative, errlichia negative, lyme negative, borrelia negative, ACE negative, beta 2 microglobulin wnl, crypto neg, acid fast negative, CSF PCR negative for [place], MBP negative, + toxo plasma, - EBV, - EMV, - VZV, -ENCES, - histoplasma, - VDRL.  [x] repeat MRI brain with and without contrast- no enhancement seen, limited by motion  [x] HIV negative, TPO negative, thyroglobulin negative  [x] ESR, CRP elevated 5.62, B12 wnl, TSH wnl, folate wnl    [x] Anti MUSK, Anti achR ab negative  [] [place]  [x] thrombocytosis likely reactive, will monitor Hgb, if continues to decr will get FOBT  [x] discuss need for toxoplasma treatment with ID: bactrim DC.  [x] started on 2% for hyponatremia    Consults  [x] heme onc note for thrombocytosis: studies sent, consult appreciated  [x] ID consulted 11/28 for positive toxoplasma in the CSF - Bactrim started on 11/28. DCed, ID sent toxo labs    Meds  [] steroid taper regimen: 60 x1 week (starting 11/28), then 40 x1 week (on 12/5), then 20 x1 week, then 10 x1 week.   [x] Continue Keppra 1.5g BID  [x] Continue Vimpat 200 BID  [x] Completed IVIG and solumedrol     Imaging  [x] MRI brain w, wo pending to assess for lesions (due to + toxo in CSF) - no enhancement seen, limited by motion  [x] Repeat MRI brain w/ and w/o under anesthesia: looks improved  [x] rEEG: potential epileptogenic focus in R temporal, R centroparietal, R temporo-occipital regions, mild-mod dysfunction, no seizure seen.   [x] CT C/A/P stool impaction, mild esophagitis and debris in trachea  [x] 24 hr vEEG negative for seizures     Other  [x] Pt/OT: for MAEVE  [x] S/s failed. continue TF

## 2019-12-04 ENCOUNTER — TRANSCRIPTION ENCOUNTER (OUTPATIENT)
Age: 84
End: 2019-12-04

## 2019-12-04 LAB
ANION GAP SERPL CALC-SCNC: 11 MMOL/L — SIGNIFICANT CHANGE UP (ref 5–17)
ANION GAP SERPL CALC-SCNC: 15 MMOL/L — SIGNIFICANT CHANGE UP (ref 5–17)
BUN SERPL-MCNC: 24 MG/DL — HIGH (ref 7–23)
BUN SERPL-MCNC: 25 MG/DL — HIGH (ref 7–23)
BUN SERPL-MCNC: 26 MG/DL — HIGH (ref 7–23)
BUN SERPL-MCNC: 27 MG/DL — HIGH (ref 7–23)
BUN SERPL-MCNC: 28 MG/DL — HIGH (ref 7–23)
CALCIUM SERPL-MCNC: 9.1 MG/DL — SIGNIFICANT CHANGE UP (ref 8.4–10.5)
CALCIUM SERPL-MCNC: 9.1 MG/DL — SIGNIFICANT CHANGE UP (ref 8.4–10.5)
CALCIUM SERPL-MCNC: 9.3 MG/DL — SIGNIFICANT CHANGE UP (ref 8.4–10.5)
CALCIUM SERPL-MCNC: 9.3 MG/DL — SIGNIFICANT CHANGE UP (ref 8.4–10.5)
CALCIUM SERPL-MCNC: 9.4 MG/DL — SIGNIFICANT CHANGE UP (ref 8.4–10.5)
CHLORIDE SERPL-SCNC: 93 MMOL/L — LOW (ref 96–108)
CHLORIDE SERPL-SCNC: 95 MMOL/L — LOW (ref 96–108)
CHLORIDE SERPL-SCNC: 96 MMOL/L — SIGNIFICANT CHANGE UP (ref 96–108)
CHLORIDE SERPL-SCNC: 98 MMOL/L — SIGNIFICANT CHANGE UP (ref 96–108)
CHLORIDE SERPL-SCNC: 98 MMOL/L — SIGNIFICANT CHANGE UP (ref 96–108)
CO2 SERPL-SCNC: 22 MMOL/L — SIGNIFICANT CHANGE UP (ref 22–31)
CO2 SERPL-SCNC: 23 MMOL/L — SIGNIFICANT CHANGE UP (ref 22–31)
CO2 SERPL-SCNC: 23 MMOL/L — SIGNIFICANT CHANGE UP (ref 22–31)
CO2 SERPL-SCNC: 24 MMOL/L — SIGNIFICANT CHANGE UP (ref 22–31)
CO2 SERPL-SCNC: 24 MMOL/L — SIGNIFICANT CHANGE UP (ref 22–31)
CREAT SERPL-MCNC: 0.55 MG/DL — SIGNIFICANT CHANGE UP (ref 0.5–1.3)
CREAT SERPL-MCNC: 0.58 MG/DL — SIGNIFICANT CHANGE UP (ref 0.5–1.3)
CREAT SERPL-MCNC: 0.58 MG/DL — SIGNIFICANT CHANGE UP (ref 0.5–1.3)
CREAT SERPL-MCNC: 0.62 MG/DL — SIGNIFICANT CHANGE UP (ref 0.5–1.3)
CREAT SERPL-MCNC: 0.62 MG/DL — SIGNIFICANT CHANGE UP (ref 0.5–1.3)
GLUCOSE SERPL-MCNC: 143 MG/DL — HIGH (ref 70–99)
GLUCOSE SERPL-MCNC: 168 MG/DL — HIGH (ref 70–99)
GLUCOSE SERPL-MCNC: 183 MG/DL — HIGH (ref 70–99)
GLUCOSE SERPL-MCNC: 218 MG/DL — HIGH (ref 70–99)
GLUCOSE SERPL-MCNC: 233 MG/DL — HIGH (ref 70–99)
HCT VFR BLD CALC: 43.6 % — SIGNIFICANT CHANGE UP (ref 39–50)
HGB BLD-MCNC: 14.3 G/DL — SIGNIFICANT CHANGE UP (ref 13–17)
MCHC RBC-ENTMCNC: 30.2 PG — SIGNIFICANT CHANGE UP (ref 27–34)
MCHC RBC-ENTMCNC: 32.8 GM/DL — SIGNIFICANT CHANGE UP (ref 32–36)
MCV RBC AUTO: 92.2 FL — SIGNIFICANT CHANGE UP (ref 80–100)
PLATELET # BLD AUTO: 248 K/UL — SIGNIFICANT CHANGE UP (ref 150–400)
POTASSIUM SERPL-MCNC: 3.7 MMOL/L — SIGNIFICANT CHANGE UP (ref 3.5–5.3)
POTASSIUM SERPL-MCNC: 4 MMOL/L — SIGNIFICANT CHANGE UP (ref 3.5–5.3)
POTASSIUM SERPL-MCNC: 4.2 MMOL/L — SIGNIFICANT CHANGE UP (ref 3.5–5.3)
POTASSIUM SERPL-MCNC: 4.2 MMOL/L — SIGNIFICANT CHANGE UP (ref 3.5–5.3)
POTASSIUM SERPL-MCNC: 5.1 MMOL/L — SIGNIFICANT CHANGE UP (ref 3.5–5.3)
POTASSIUM SERPL-SCNC: 3.7 MMOL/L — SIGNIFICANT CHANGE UP (ref 3.5–5.3)
POTASSIUM SERPL-SCNC: 4 MMOL/L — SIGNIFICANT CHANGE UP (ref 3.5–5.3)
POTASSIUM SERPL-SCNC: 4.2 MMOL/L — SIGNIFICANT CHANGE UP (ref 3.5–5.3)
POTASSIUM SERPL-SCNC: 4.2 MMOL/L — SIGNIFICANT CHANGE UP (ref 3.5–5.3)
POTASSIUM SERPL-SCNC: 5.1 MMOL/L — SIGNIFICANT CHANGE UP (ref 3.5–5.3)
RBC # BLD: 4.73 M/UL — SIGNIFICANT CHANGE UP (ref 4.2–5.8)
RBC # FLD: 15.7 % — HIGH (ref 10.3–14.5)
SODIUM SERPL-SCNC: 129 MMOL/L — LOW (ref 135–145)
SODIUM SERPL-SCNC: 130 MMOL/L — LOW (ref 135–145)
SODIUM SERPL-SCNC: 131 MMOL/L — LOW (ref 135–145)
SODIUM SERPL-SCNC: 132 MMOL/L — LOW (ref 135–145)
SODIUM SERPL-SCNC: 133 MMOL/L — LOW (ref 135–145)
WBC # BLD: 8.26 K/UL — SIGNIFICANT CHANGE UP (ref 3.8–10.5)
WBC # FLD AUTO: 8.26 K/UL — SIGNIFICANT CHANGE UP (ref 3.8–10.5)

## 2019-12-04 PROCEDURE — 99233 SBSQ HOSP IP/OBS HIGH 50: CPT | Mod: GC

## 2019-12-04 PROCEDURE — 93010 ELECTROCARDIOGRAM REPORT: CPT

## 2019-12-04 PROCEDURE — 99232 SBSQ HOSP IP/OBS MODERATE 35: CPT

## 2019-12-04 RX ORDER — NYSTATIN CREAM 100000 [USP'U]/G
1 CREAM TOPICAL EVERY 12 HOURS
Refills: 0 | Status: DISCONTINUED | OUTPATIENT
Start: 2019-12-04 | End: 2019-12-05

## 2019-12-04 RX ORDER — SIMETHICONE 80 MG/1
80 TABLET, CHEWABLE ORAL EVERY 6 HOURS
Refills: 0 | Status: DISCONTINUED | OUTPATIENT
Start: 2019-12-04 | End: 2019-12-05

## 2019-12-04 RX ADMIN — LACOSAMIDE 200 MILLIGRAM(S): 50 TABLET ORAL at 05:15

## 2019-12-04 RX ADMIN — PANTOPRAZOLE SODIUM 40 MILLIGRAM(S): 20 TABLET, DELAYED RELEASE ORAL at 05:16

## 2019-12-04 RX ADMIN — MAGNESIUM OXIDE 400 MG ORAL TABLET 800 MILLIGRAM(S): 241.3 TABLET ORAL at 08:34

## 2019-12-04 RX ADMIN — SIMETHICONE 80 MILLIGRAM(S): 80 TABLET, CHEWABLE ORAL at 21:40

## 2019-12-04 RX ADMIN — Medication 650 MILLIGRAM(S): at 00:47

## 2019-12-04 RX ADMIN — PANTOPRAZOLE SODIUM 40 MILLIGRAM(S): 20 TABLET, DELAYED RELEASE ORAL at 18:06

## 2019-12-04 RX ADMIN — Medication 650 MILLIGRAM(S): at 21:40

## 2019-12-04 RX ADMIN — LACOSAMIDE 200 MILLIGRAM(S): 50 TABLET ORAL at 18:07

## 2019-12-04 RX ADMIN — Medication 100 MILLIGRAM(S): at 21:40

## 2019-12-04 RX ADMIN — Medication 1 SPRAY(S): at 05:16

## 2019-12-04 RX ADMIN — Medication 10 MILLIGRAM(S): at 14:09

## 2019-12-04 RX ADMIN — Medication 650 MILLIGRAM(S): at 00:17

## 2019-12-04 RX ADMIN — Medication 100 MILLIGRAM(S): at 05:15

## 2019-12-04 RX ADMIN — Medication 10 MILLIGRAM(S): at 09:04

## 2019-12-04 RX ADMIN — MAGNESIUM OXIDE 400 MG ORAL TABLET 800 MILLIGRAM(S): 241.3 TABLET ORAL at 18:07

## 2019-12-04 RX ADMIN — Medication 40 MILLIEQUIVALENT(S): at 13:00

## 2019-12-04 RX ADMIN — Medication 10 MILLIGRAM(S): at 21:40

## 2019-12-04 RX ADMIN — NYSTATIN CREAM 1 APPLICATION(S): 100000 CREAM TOPICAL at 05:15

## 2019-12-04 RX ADMIN — LEVETIRACETAM 1500 MILLIGRAM(S): 250 TABLET, FILM COATED ORAL at 05:15

## 2019-12-04 RX ADMIN — LEVETIRACETAM 1500 MILLIGRAM(S): 250 TABLET, FILM COATED ORAL at 18:07

## 2019-12-04 RX ADMIN — SIMETHICONE 80 MILLIGRAM(S): 80 TABLET, CHEWABLE ORAL at 05:17

## 2019-12-04 RX ADMIN — Medication 100 MILLIGRAM(S): at 14:09

## 2019-12-04 RX ADMIN — ENOXAPARIN SODIUM 40 MILLIGRAM(S): 100 INJECTION SUBCUTANEOUS at 12:58

## 2019-12-04 RX ADMIN — Medication 60 MILLIGRAM(S): at 05:17

## 2019-12-04 RX ADMIN — SERTRALINE 50 MILLIGRAM(S): 25 TABLET, FILM COATED ORAL at 13:01

## 2019-12-04 RX ADMIN — Medication 1 SPRAY(S): at 18:06

## 2019-12-04 RX ADMIN — NYSTATIN CREAM 1 APPLICATION(S): 100000 CREAM TOPICAL at 18:07

## 2019-12-04 RX ADMIN — Medication 650 MILLIGRAM(S): at 22:10

## 2019-12-04 NOTE — DISCHARGE NOTE PROVIDER - NSDCCPCAREPLAN_GEN_ALL_CORE_FT
PRINCIPAL DISCHARGE DIAGNOSIS  Diagnosis: Seizure disorder  Assessment and Plan of Treatment:       SECONDARY DISCHARGE DIAGNOSES  Diagnosis: UTI (urinary tract infection)  Assessment and Plan of Treatment: PRINCIPAL DISCHARGE DIAGNOSIS  Diagnosis: Seizure disorder  Assessment and Plan of Treatment: Likely due to encephalitis, due to unknown etiology. Continue outpatient steroid taper regimen: 60 x1 week (starting 11/28), then 40mg  for 1 week (on 12/5), then 20mg for 1 week (on 12/12), then 10 mg for 1 week (12/19)

## 2019-12-04 NOTE — PROGRESS NOTE ADULT - ASSESSMENT
Focal myoclonic seizure in setting of UTI and electrolyte imbalances. 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 Patient now here for focal seizures presumed to be secondary to ?autoimmune encephalitis Completed IVIG and solumedrol x5 days, currently on steroid taper.   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 He tested postive for Toxoplasma in CSF, but low suspicion for toxoplasmosis. Exam with improved alertness.    Labs  [x] repeat LP done on 11/20, studies sent: gluc 98, protein 42, lymph predom 90%, gram stain neg, HSV negative, CSF PCR negative, flow cytometry negative. WNV IgG + in CSF, IgM negative, fungal cx negative, errlichia negative, lyme negative, borrelia negative, ACE negative, beta 2 microglobulin wnl, crypto neg, acid fast negative, CSF PCR negative for [place], MBP negative, + toxo plasma, - EBV, - EMV, - VZV, -ENCES, - histoplasma, - VDRL.  [x] repeat MRI brain with and without contrast- no enhancement seen, limited by motion  [x] HIV negative, TPO negative, thyroglobulin negative  [x] ESR, CRP elevated 5.62, B12 wnl, TSH wnl, folate wnl    [x] Anti MUSK, Anti achR ab negative  [] [place]  [x] thrombocytosis likely reactive, will monitor Hgb, if continues to decr will get FOBT  [x] discuss need for toxoplasma treatment with ID: bactrim DC.  [x] started on 2% for hyponatremia    Consults  [x] heme onc note for thrombocytosis: studies sent, consult appreciated  [x] ID consulted 11/28 for positive toxoplasma in the CSF - Bactrim started on 11/28. DCed, ID sent toxo labs    Meds  [] steroid taper regimen: 60 x1 week (starting 11/28), then 40 x1 week (on 12/5), then 20 x1 week, then 10 x1 week.   [x] Continue Keppra 1.5g BID  [x] Continue Vimpat 200 BID  [x] Completed IVIG and solumedrol     Imaging  [x] MRI brain w, wo pending to assess for lesions (due to + toxo in CSF) - no enhancement seen, limited by motion  [x] Repeat MRI brain w/ and w/o under anesthesia: looks improved  [x] rEEG: potential epileptogenic focus in R temporal, R centroparietal, R temporo-occipital regions, mild-mod dysfunction, no seizure seen.   [x] CT C/A/P stool impaction, mild esophagitis and debris in trachea  [x] 24 hr vEEG negative for seizures     Other  [x] Pt/OT: for MAEVE  [x] S/s failed. continue TF Focal myoclonic seizure in setting of UTI and electrolyte imbalances. 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 Patient now here for focal seizures presumed to be secondary to ?autoimmune encephalitis Completed IVIG and solumedrol x5 days, currently on steroid taper.   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 He tested postive for Toxoplasma in CSF, but low suspicion for toxoplasmosis. Exam with improved alertness.    Labs:    [x] repeat LP done on 11/20, studies sent: gluc 98, protein 42, lymph predom 90%, gram stain neg, HSV negative, CSF PCR negative, flow cytometry negative. WNV IgG + in CSF, IgM negative, fungal cx negative, errlichia negative, lyme negative, borrelia negative, ACE negative, beta 2 microglobulin wnl, crypto neg, acid fast negative, CSF PCR negative for [place], MBP negative, + toxo plasma, - EBV, - EMV, - VZV, -ENCES, - histoplasma, - VDRL.  [x] repeat MRI brain with and without contrast- no enhancement seen, limited by motion  [x] HIV negative, TPO negative, thyroglobulin negative  [x] ESR, CRP elevated 5.62, B12 wnl, TSH wnl, folate wnl    [x] Anti MUSK, Anti achR ab negative  [] [place]  [x] thrombocytosis likely reactive, will monitor Hgb, if continues to decr will get FOBT  [x] discuss need for toxoplasma treatment with ID: bactrim DC.  [x] started on 2% for hyponatremia    Consults  [x] heme onc note for thrombocytosis: studies sent, consult appreciated  [x] ID consulted 11/28 for positive toxoplasma in the CSF - Bactrim started on 11/28. DCed, ID sent toxo labs    Meds  [] steroid taper regimen: 60 x1 week (starting 11/28), then 40 x1 week (on 12/5), then 20 x1 week, then 10 x1 week.   [x] Continue Keppra 1.5g BID  [x] Continue Vimpat 200 BID  [x] Completed IVIG and solumedrol     Imaging  [x] MRI brain w, wo pending to assess for lesions (due to + toxo in CSF) - no enhancement seen, limited by motion  [x] Repeat MRI brain w/ and w/o under anesthesia: looks improved  [x] rEEG: potential epileptogenic focus in R temporal, R centroparietal, R temporo-occipital regions, mild-mod dysfunction, no seizure seen.   [x] CT C/A/P stool impaction, mild esophagitis and debris in trachea  [x] 24 hr vEEG negative for seizures     Other  [x] Pt/OT: for MAEVE  [x] S/s failed. continue TF

## 2019-12-04 NOTE — PROGRESS NOTE ADULT - SUBJECTIVE AND OBJECTIVE BOX
[facility]   [phone]  RENAL FOLLOW UP NOTE  --------------------------------------------------------------------------------  Chief Complaint:    24 hour events/subjective:    seen earlier. awake today and more communicative     PAST HISTORY  --------------------------------------------------------------------------------  No significant changes to PMH, PSH, FHx, SHx, unless otherwise noted    ALLERGIES & MEDICATIONS  --------------------------------------------------------------------------------  Allergies    No Known Allergies    Intolerances      Standing Inpatient Medications  acetaminophen   Tablet .. 325 milliGRAM(s) Oral daily  aMILoride 10 milliGRAM(s) Oral <User Schedule>  enoxaparin Injectable 40 milliGRAM(s) SubCutaneous daily  fluticasone propionate 50 MICROgram(s)/spray Nasal Spray 1 Spray(s) Both Nostrils two times a day  influenza   Vaccine 0.5 milliLiter(s) IntraMuscular once  labetalol 100 milliGRAM(s) Oral every 8 hours  lacosamide 200 milliGRAM(s) Oral two times a day  levETIRAcetam  Solution 1500 milliGRAM(s) Oral two times a day  magnesium oxide 800 milliGRAM(s) Oral two times a day with meals  nystatin Powder 1 Application(s) Topical every 12 hours  pantoprazole  Injectable 40 milliGRAM(s) IV Push two times a day  potassium chloride   Solution 40 milliEquivalent(s) Enteral Tube daily  senna Syrup 10 milliLiter(s) Oral at bedtime  sertraline 50 milliGRAM(s) Oral daily    PRN Inpatient Medications  acetaminophen    Suspension .. 650 milliGRAM(s) Oral every 6 hours PRN  simethicone 80 milliGRAM(s) Chew every 6 hours PRN      REVIEW OF SYSTEMS  --------------------------------------------------------------------------------    Gen: denies  fevers/chills,  CVS: denies chest pain/palpitations  Resp: denies SOB/Cough  GI: Denies N/V/Abd pain  : Denies dysuria    All other systems were reviewed and are negative, except as noted.    VITALS/PHYSICAL EXAM  --------------------------------------------------------------------------------  T(C): 36.4 (12-04-19 @ 20:04), Max: 37.1 (12-04-19 @ 07:52)  HR: 54 (12-04-19 @ 20:04) (54 - 60)  BP: 117/65 (12-04-19 @ 20:04) (117/65 - 133/76)  RR: 18 (12-04-19 @ 20:04) (18 - 18)  SpO2: 99% (12-04-19 @ 20:04) (96% - 99%)  Wt(kg): --        12-03-19 @ 07:01  -  12-04-19 @ 07:00  --------------------------------------------------------  IN: 2360 mL / OUT: 550 mL / NET: 1810 mL    12-04-19 @ 07:01  -  12-04-19 @ 20:42  --------------------------------------------------------  IN: 0 mL / OUT: 500 mL / NET: -500 mL      Physical Exam:  	    Physical Exam:  	Gen: oriented x 2   	Pulm: Decreased breath sounds b/l bases. no rales or ronchi or wheezing  	CV: RRR, S1/S2. no rub  	Abd: +BS, soft, nontender/nondistended  	: No suprapubic tenderness.               Extremity: No cyanosis, no edema no clubbing  	      LABS/STUDIES  --------------------------------------------------------------------------------              14.3   8.26  >-----------<  248      [12-04-19 @ 07:54]              43.6     131  |  98  |  25  ----------------------------<  233      [12-04-19 @ 14:26]  5.1   |  22  |  0.55        Ca     9.3     [12-04-19 @ 14:26]    TPro  7.9  /  Alb  2.9  /  TBili  0.4  /  DBili  x   /  AST  33  /  ALT  55  /  AlkPhos  112  [12-03-19 @ 06:44]          Creatinine Trend:  SCr 0.55 [12-04 @ 14:26]  SCr 0.58 [12-04 @ 09:08]  SCr 0.62 [12-04 @ 05:44]  SCr 0.62 [12-04 @ 01:35]  SCr 0.56 [12-03 @ 22:47]            CMV PCR Detection: NotDetec IU/mL (11-27 @ 13:39)    Urinalysis - [11-16-19 @ 18:21]      Color Yellow / Appearance Slightly Turbid / SG 1.016 / pH 7.0      Gluc Negative / Ketone Negative  / Bili Negative / Urobili Negative       Blood Small / Protein 30 mg/dL / Leuk Est Large / Nitrite Positive      RBC 4 /  / Hyaline 6 / Gran  / Sq Epi  / Non Sq Epi 2 / Bacteria Many      Iron 37, TIBC 208, %sat 18      [11-22-19 @ 09:09]  Ferritin 294      [11-23-19 @ 10:43]  HbA1c 5.7      [09-25-19 @ 05:57]  TSH 1.41      [11-17-19 @ 11:17]

## 2019-12-04 NOTE — DISCHARGE NOTE PROVIDER - INSTRUCTIONS
Continue tube feeds, and continue speech swallow therapy as outpatient Continue Nepro for tube feeds, and continue speech swallow therapy as outpatient

## 2019-12-04 NOTE — DISCHARGE NOTE PROVIDER - HOSPITAL COURSE
Focal myoclonic seizure in setting of UTI and electrolyte imbalances. 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 Patient now here for focal seizures presumed to be secondary to ?autoimmune encephalitis Completed IVIG and solumedrol x5 days, currently on steroid taper.     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 He tested postive for Toxoplasma in CSF, but low suspicion for toxoplasmosis. Exam with improved alertness            [x] repeat LP done on 11/20, studies sent: gluc 98, protein 42, lymph predom 90%, gram stain neg, HSV negative, CSF PCR negative, flow cytometry negative. WNV IgG + in CSF, IgM negative, fungal cx negative, errlichia negative, lyme negative, borrelia negative, ACE negative, beta 2 microglobulin wnl, crypto neg, acid fast negative, CSF PCR negative for West Nile, MBP negative, + toxo plasma, - EBV, - EMV, - VZV, -ENCES, - histoplasma, - VDRL.    [x] repeat MRI brain with and without contrast- no enhancement seen, limited by motion    [x] HIV negative, TPO negative, thyroglobulin negative    [x] ESR, CRP elevated 5.62, B12 wnl, TSH wnl, folate wnl      [x] Anti MUSK, Anti achR ab negative    [] [facility] encephalitis panel sent    [x] thrombocytosis likely reactive, will monitor Hgb, if continues to decr will get FOBT    [x] discuss need for toxoplasma treatment with ID: bactrim DC.    [x] started on 2% for hyponatremia        Consults    [x] heme onc note for thrombocytosis: studies sent, consult appreciated    [x] ID consulted 11/28 for positive toxoplasma in the CSF - Bactrim started on 11/28. DCed, ID sent toxo labs        Meds    [] steroid taper regimen: 60 x1 week (starting 11/28), then 40 x1 week (on 12/5), then 20 x1 week, then 10 x1 week.     [x] Continue Keppra 1.5g BID    [x] Continue Vimpat 200 BID    [x] Completed IVIG and solumedrol         Imaging    [x] MRI brain w, wo pending to assess for lesions (due to + toxo in CSF) - no enhancement seen, limited by motion    [x] Repeat MRI brain w/ and w/o under anesthesia: looks improved    [x] rEEG: potential epileptogenic focus in R temporal, R centroparietal, R temporo-occipital regions, mild-mod dysfunction, no seizure seen.     [x] CT C/A/P stool impaction, mild esophagitis and debris in trachea    [x] 24 hr vEEG negative for seizures         Other    [x] Pt/OT: for MAEVE    [x] S/s failed. continue TF 84 year old male with history of hypertension, prostate cancer s/p radiation and seed implant presents from [facility] with confusion in the setting of recent seizures. The patient was admitted during 9/24-10/23 after episode of status epilepticus requiring intubation. 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 He was eventually treated on the medicine floor after extubation. He failed multiple speech and swallow evaluations and is now s/p peg placement. Patient is also with residual slurred speech and weakness of left upper extremity. Patient was discharged to [facility] where he has been for the last three weeks. Per daughter, patient was doing well. However, on Wednesday the patient experienced seizure activity. His keppra was increased from 1000 mg to 1500 mg. He experienced another seizure on Friday, and daughter thinks his vimpat may have been increased, but is uncertain. On the day of admission, daughter did not like the way the patient appeared. His left eye was droopy, left arm was twitching, he sounded congested, and he kept jerking his right arm. Of note, his sodium has been low during the week, so he appeared lethargic earlier in the week. He reports experiencing headache and cough. He denies trauma, fever, chills, chest pain, shortness of breath, sick contacts, or urinary symptoms      Final Impression: Focal myoclonic seizure in setting of UTI and electrolyte imbalances. 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 Patient now here for focal seizures presumed to be secondary to ?autoimmune encephalitis Completed IVIG and solumedrol x5 days, currently on steroid taper.     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 He tested postive for Toxoplasma in CSF, but low suspicion for toxoplasmosis and follow up toxo labs were negative.        Labs:    - repeat LP done on 11/20: gluc 98, protein 42, lymph predom 90%, gram stain neg, HSV negative, CSF PCR negative, flow cytometry negative. WNV IgG + in CSF, IgM negative, fungal cx negative, errlichia negative, lyme negative, borrelia negative, ACE negative, beta 2 microglobulin wnl, crypto neg, acid fast negative, CSF PCR negative for West Nile, MBP negative, + toxo plasma, - EBV, - EMV, - VZV, -ENCES, - histoplasma, - VDRL.    - repeat MRI brain with and without contrast- no enhancement seen, limited by motion    - HIV negative, TPO negative, thyroglobulin negative    - ESR, CRP elevated 5.62, B12 wnl, TSH wnl, folate wnl      - Anti MUSK, Anti achR ab negative        Consults    - He was evaluated by heme onc for thrombocytosis: likely reactive process, resolved    - ID consulted 11/28 for positive toxoplasma in the CSF - Bactrim started on 11/28 but discontinued after negative repeat MRI. Patient became hyponatremic and required 2% NS.         Meds    - He is to continue outpatient steroid taper regimen: 60 x1 week (starting 11/28), then 40 x1 week (on 12/5), then 20 x1 week (12/12), then 10 x1 week (12/19)     - He was continued on Keppra 1.5g BID    - He was continued on Vimpat 200 BID    - He completed IVIG and solumedrol         Imaging    - MRI brain w, wo pending to assess for lesions (due to + toxo in CSF) - no enhancement seen, limited by motion    - Repeat MRI brain w/ and w/o under anesthesia: looks improved    - rEEG: potential epileptogenic focus in R temporal, R centroparietal, R temporo-occipital regions, mild-mod dysfunction, no seizure seen.     - CT C/A/P stool impaction, mild esophagitis and debris in trachea    -24 hr vEEG negative for seizures         Other    Pt/OT: for MAEVE    S/s failed. continue TF - re-evaluated on ***. continue speech and swallow therapy as outpatient. 84 year old male with history of hypertension, prostate cancer s/p radiation and seed implant presents from [facility] with confusion in the setting of recent seizures. The patient was admitted during 9/24-10/23 after episode of status epilepticus requiring intubation. 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 He was eventually treated on the medicine floor after extubation. He failed multiple speech and swallow evaluations and is now s/p peg placement. Patient is also with residual slurred speech and weakness of left upper extremity. Patient was discharged to [facility] where he has been for the last three weeks. Per daughter, patient was doing well. However, on Wednesday the patient experienced seizure activity. His keppra was increased from 1000 mg to 1500 mg. He experienced another seizure on Friday, and daughter thinks his vimpat may have been increased, but is uncertain. On the day of admission, daughter did not like the way the patient appeared. His left eye was droopy, left arm was twitching, he sounded congested, and he kept jerking his right arm. Of note, his sodium has been low during the week, so he appeared lethargic earlier in the week. He reports experiencing headache and cough. He denies trauma, fever, chills, chest pain, shortness of breath, sick contacts, or urinary symptoms      Final Impression: Focal myoclonic seizure in setting of UTI and electrolyte imbalances. 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 Patient now here for focal seizures presumed to be secondary to ?autoimmune encephalitis Completed IVIG and solumedrol x5 days, currently on steroid taper.     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 He tested postive for Toxoplasma in CSF, but low suspicion for toxoplasmosis and follow up toxo labs were negative.        Labs:    - repeat LP done on 11/20: gluc 98, protein 42, lymph predom 90%, gram stain neg, HSV negative, CSF PCR negative, flow cytometry negative. WNV IgG + in CSF, IgM negative, fungal cx negative, errlichia negative, lyme negative, borrelia negative, ACE negative, beta 2 microglobulin wnl, crypto neg, acid fast negative, CSF PCR negative for West Nile, MBP negative, + toxo plasma, - EBV, - EMV, - VZV, -ENCES, - histoplasma, - VDRL.    - repeat MRI brain with and without contrast- no enhancement seen, limited by motion    - HIV negative, TPO negative, thyroglobulin negative    - ESR, CRP elevated 5.62, B12 wnl, TSH wnl, folate wnl      - Anti MUSK, Anti achR ab negative        Consults    - He was evaluated by heme onc for thrombocytosis: likely reactive process, resolved    - ID consulted 11/28 for positive toxoplasma in the CSF - Bactrim started on 11/28 but discontinued after negative repeat MRI. Patient became hyponatremic and required 2% NS.         Meds    - He is to continue outpatient steroid taper regimen: 60 x1 week (starting 11/28), then 40 x1 week (on 12/5), then 20 x1 week (12/12), then 10 x1 week (12/19)     - He was continued on Keppra 1.5g BID    - He was continued on Vimpat 200 BID    - He completed IVIG and solumedrol         Imaging    - MRI brain w, wo pending to assess for lesions (due to + toxo in CSF) - no enhancement seen, limited by motion    - Repeat MRI brain w/ and w/o under anesthesia: looks improved    - rEEG: potential epileptogenic focus in R temporal, R centroparietal, R temporo-occipital regions, mild-mod dysfunction, no seizure seen.     - CT C/A/P stool impaction, mild esophagitis and debris in trachea    -24 hr vEEG negative for seizures         Other    Pt/OT: for MAEVE    S/s failed. continue TF - re-evaluated on 12/5. continue speech and swallow therapy as outpatient.     As outpatient consider tapering AEDs. 84 year old male with history of hypertension, prostate cancer s/p radiation and seed implant presents from [facility] with confusion in the setting of recent seizures. The patient was admitted during 9/24-10/23 after episode of status epilepticus requiring intubation. 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 He was eventually treated on the medicine floor after extubation. He failed multiple speech and swallow evaluations and is now s/p peg placement. Patient is also with residual slurred speech and weakness of left upper extremity. Patient was discharged to [facility] where he has been for the last three weeks. Per daughter, patient was doing well. However, on Wednesday the patient experienced seizure activity. His keppra was increased from 1000 mg to 1500 mg. He experienced another seizure on Friday, and daughter thinks his vimpat may have been increased, but is uncertain. On the day of admission, daughter did not like the way the patient appeared. His left eye was droopy, left arm was twitching, he sounded congested, and he kept jerking his right arm. Of note, his sodium has been low during the week, so he appeared lethargic earlier in the week. He reports experiencing headache and cough. He denies trauma, fever, chills, chest pain, shortness of breath, sick contacts, or urinary symptoms      Final Impression: Focal myoclonic seizure in setting of UTI and electrolyte imbalances. 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 Patient now here for focal seizures presumed to be secondary to ?autoimmune encephalitis Completed IVIG and solumedrol x5 days, currently on steroid taper.     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 He tested postive for Toxoplasma in CSF, but low suspicion for toxoplasmosis and follow up toxo labs were negative.        Labs:    - repeat LP done on 11/20: gluc 98, protein 42, lymph predom 90%, gram stain neg, HSV negative, CSF PCR negative, flow cytometry negative. WNV IgG + in CSF, IgM negative, fungal cx negative, errlichia negative, lyme negative, borrelia negative, ACE negative, beta 2 microglobulin wnl, crypto neg, acid fast negative, CSF PCR negative for West Nile, MBP negative, + toxo plasma, - EBV, - EMV, - VZV, -ENCES, - histoplasma, - VDRL.    - repeat MRI brain with and without contrast- no enhancement seen, limited by motion    - HIV negative, TPO negative, thyroglobulin negative    - ESR, CRP elevated 5.62, B12 wnl, TSH wnl, folate wnl      - Anti MUSK, Anti achR ab negative        Consults    - He was evaluated by heme onc for thrombocytosis: likely reactive process, resolved    - ID consulted 11/28 for positive toxoplasma in the CSF - Bactrim started on 11/28 but discontinued after negative repeat MRI. Patient became hyponatremic and required 2% NS.         Meds    - He is to continue outpatient steroid taper regimen: 60 x1 week (starting 11/28), then 40 x1 week (on 12/5), then 20 x1 week (12/12), then 10 x1 week (12/19)     - He was continued on Keppra 1.5g BID    - He was continued on Vimpat 200 BID    - He completed IVIG and solumedrol         Imaging    - MRI brain w, wo pending to assess for lesions (due to + toxo in CSF) - no enhancement seen, limited by motion    - Repeat MRI brain w/ and w/o under anesthesia: looks improved    - rEEG: potential epileptogenic focus in R temporal, R centroparietal, R temporo-occipital regions, mild-mod dysfunction, no seizure seen.     - CT C/A/P stool impaction, mild esophagitis and debris in trachea    -24 hr vEEG negative for seizures         Other    Pt/OT: for MAEVE    S/s failed. continue TF - re-evaluated on 12/5. continue speech and swallow therapy as outpatient.     As outpatient consider tapering AEDs.             Neurology Attending :        Patient seen and examined with neurology team and above note reviewed and I agree with assessment and plan as outlined. Patient going to subacute rehab today and then will follow up with renal, PCP and neurology at [address] [place] [address] and all contact information given.    Daughter understood plan and is willing to comply.         All questions answered.        Dr. Shola Sinha

## 2019-12-04 NOTE — PROGRESS NOTE ADULT - SUBJECTIVE AND OBJECTIVE BOX
Interval History: hyponatremia improving on 2%, latest 130. due to BMP at 8 am.     MEDICATIONS  (STANDING):  acetaminophen   Tablet .. 325 milliGRAM(s) Oral daily  aMILoride 10 milliGRAM(s) Oral <User Schedule>  enoxaparin Injectable 40 milliGRAM(s) SubCutaneous daily  fluticasone propionate 50 MICROgram(s)/spray Nasal Spray 1 Spray(s) Both Nostrils two times a day  influenza   Vaccine 0.5 milliLiter(s) IntraMuscular once  labetalol 100 milliGRAM(s) Oral every 8 hours  lacosamide 200 milliGRAM(s) Oral two times a day  levETIRAcetam  Solution 1500 milliGRAM(s) Oral two times a day  magnesium oxide 800 milliGRAM(s) Oral two times a day with meals  nystatin Powder 1 Application(s) Topical every 12 hours  pantoprazole  Injectable 40 milliGRAM(s) IV Push two times a day  potassium chloride   Solution 40 milliEquivalent(s) Enteral Tube daily  senna Syrup 10 milliLiter(s) Oral at bedtime  sertraline 50 milliGRAM(s) Oral daily  sodium chloride 2% . 1000 milliLiter(s) (50 mL/Hr) IV Continuous <Continuous>    MEDICATIONS  (PRN):  acetaminophen    Suspension .. 650 milliGRAM(s) Oral every 6 hours PRN Moderate Pain (4 - 6), Severe Pain (7 - 10)  simethicone 80 milliGRAM(s) Chew every 6 hours PRN Gas    Vital Signs Last 24 Hrs  T(C): 36.3 (04 Dec 2019 04:18), Max: 36.9 (03 Dec 2019 08:23)  T(F): 97.3 (04 Dec 2019 04:18), Max: 98.4 (03 Dec 2019 08:23)  HR: 56 (04 Dec 2019 04:18) (54 - 69)  BP: 124/73 (04 Dec 2019 04:18) (122/67 - 158/83)  BP(mean): --  RR: 18 (04 Dec 2019 04:18) (18 - 21)  SpO2: 97% (04 Dec 2019 04:18) (96% - 98%)    NEUROLOGICAL EXAM:  CN: opens both eyes, EOMI, more alert and sitting in chair.   Mental Status: awake and alert, AOx3, no clear neglect, able to follow commands (show 2 fingers).   Motor: able to raise both arms and both legs    Labs:   cbc                      13.9   8.82  )-----------( 300      ( 03 Dec 2019 09:13 )             40.8     [id number]    130<L>  |  96  |  26<H>  ----------------------------<  143<H>  3.7   |  23  |  0.62    Ca    9.1      04 Dec 2019 05:44    TPro  7.9  /  Alb  2.9<L>  /  TBili  0.4  /  DBili  x   /  AST  33  /  ALT  55<H>  /  AlkPhos  112  12-03

## 2019-12-04 NOTE — PROGRESS NOTE ADULT - SUBJECTIVE AND OBJECTIVE BOX
Follow Up:  encephalitis with positive CSF toxo    Interval History: the repeat CSF toxo and serum toxo negative    ROS:      All other systems negative    Constitutional: no fever, no chills  Eyes: no vision changes, no eye pain  ENT:  no sore throat, no rhinorrhea  Cardiovascular:  no chest pain, no palpitation  Respiratory:  no SOB, no cough  GI:  no abd pain, no vomiting, no diarrhea  urinary: no dysuria, no hematuria, no flank pain  musculoskeletal:  no joint pain, no joint swelling  skin:  no rash  neurology:  occasional frontal headache        Allergies  No Known Allergies        ANTIMICROBIALS:      OTHER MEDS:  acetaminophen    Suspension .. 650 milliGRAM(s) Oral every 6 hours PRN  acetaminophen   Tablet .. 325 milliGRAM(s) Oral daily  aMILoride 10 milliGRAM(s) Oral <User Schedule>  enoxaparin Injectable 40 milliGRAM(s) SubCutaneous daily  fluticasone propionate 50 MICROgram(s)/spray Nasal Spray 1 Spray(s) Both Nostrils two times a day  influenza   Vaccine 0.5 milliLiter(s) IntraMuscular once  labetalol 100 milliGRAM(s) Oral every 8 hours  lacosamide 200 milliGRAM(s) Oral two times a day  levETIRAcetam  Solution 1500 milliGRAM(s) Oral two times a day  magnesium oxide 800 milliGRAM(s) Oral two times a day with meals  nystatin Powder 1 Application(s) Topical every 12 hours  pantoprazole  Injectable 40 milliGRAM(s) IV Push two times a day  potassium chloride   Solution 40 milliEquivalent(s) Enteral Tube daily  senna Syrup 10 milliLiter(s) Oral at bedtime  sertraline 50 milliGRAM(s) Oral daily  simethicone 80 milliGRAM(s) Chew every 6 hours PRN      Vital Signs Last 24 Hrs  T(C): 37.1 (04 Dec 2019 07:52), Max: 37.1 (04 Dec 2019 07:52)  T(F): 98.7 (04 Dec 2019 07:52), Max: 98.7 (04 Dec 2019 07:52)  HR: 60 (04 Dec 2019 07:52) (54 - 69)  BP: 121/64 (04 Dec 2019 07:52) (121/64 - 158/83)  BP(mean): --  RR: 18 (04 Dec 2019 07:52) (18 - 21)  SpO2: 97% (04 Dec 2019 07:52) (96% - 98%)    Physical Exam:  General:    NAD,  non toxic  Head: EEG electrodes  Eye: normal sclera and conjunctiva  ENT:    no oropharyngeal lesions,   no LAD,   neck supple  Cardio:     regular S1, S2,  no murmur  Respiratory:    clear b/l,    no wheezing  abd:     soft,   BS +,   no tenderness, PEG with no erythema  :   no CVAT,  no suprapubic tenderness,   no  bradley  Musculoskeletal:   no joint swelling,   no edema  vascular: no phlebitis, normal pulses  Skin:    no rash  Neurologic:   awake and sitting on a chair, A&O x 3, follows commands                          14.3   8.26  )-----------( 248      ( 04 Dec 2019 07:54 )             43.6       12-04    133<L>  |  98  |  24<H>  ----------------------------<  218<H>  4.2   |  24  |  0.58    Ca    9.1      04 Dec 2019 09:08    TPro  7.9  /  Alb  2.9<L>  /  TBili  0.4  /  DBili  x   /  AST  33  /  ALT  55<H>  /  AlkPhos  112  12-03          MICROBIOLOGY:  v  .CSF CSF  11-20-19   No growth  --    No polymorphonuclear cells seen  No organisms seen  by cytocentrifuge      .Urine Clean Catch (Midstream)  11-17-19   >=3 organisms. Probable collection contamination.  --  --        Toxoplasma IgG Screen: 5.6 IU/mL (12-03-19 @ 09:02)  CMV PCR Detection: NotDetec IU/mL (11-27-19 @ 13:39)    CMV PCR Detection: NotDetec IU/mL (11-27 @ 13:39)  EBV PCR: NotDetec IU/mL (11-27 @ 13:39)        RADIOLOGY:  Images below reviewed personally  < from: MR Head w/wo IV Cont (11.29.19 @ 16:41) >  IMPRESSION:    Limited by motion    No gross evidence for abnormal intracranial enhancement.    No acute intracranial hemorrhage, mass effect, vasogenic edema, or   evidence of acute territorial infarct.    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

## 2019-12-04 NOTE — PROGRESS NOTE ADULT - NSHPATTENDINGPLANDISCUSS_GEN_ALL_CORE
neuro
neuro [id number]
neurology
neurology
medicine team, neurology team, family
neuro team
neurology team
neurology team, family
patient and family at bedside and neurology team and all questions answered.
neuro team
patient and family and neurology team.
neuro team
neuro team, patient and family
neuro team
Medicine NP
Medicine NP

## 2019-12-04 NOTE — PROGRESS NOTE ADULT - ASSESSMENT
84 year old male with history of hypertension, prostate cancer s/p radiation and seed implant with hypokalemia, hypomagnesemia and viral encephalitis presented with sz now with hyponatremia as well    1- hyponatremia  2- hypomagnesemia   3- hypokalemia  4- htn      suspect hyponatremia in setting of bactrim use  na improving   dc 2% nacl   change tube feeds to nepro to allow fluid restriction    mag ox 800 mg bid  cont with amiloride  k in range  cont labetalol   d/w Neuro team

## 2019-12-04 NOTE — DISCHARGE NOTE PROVIDER - CARE PROVIDER_API CALL
Shola Sinha (DO)  Neurology  [address] [facility], [address]  [place] [address]  Phone: [phone]  Fax: [phone]  Follow Up Time:

## 2019-12-04 NOTE — DISCHARGE NOTE PROVIDER - NSDCMRMEDTOKEN_GEN_ALL_CORE_FT
acetaminophen 160 mg/5 mL oral liquid: 5 milliliter(s) by gastrostomy tube every 4 hours  aMILoride 5 mg oral tablet: 2 tab(s) orally 0800,1400,2200  labetalol 100 mg oral tablet: 1 tab(s) orally every 8 hours  levETIRAcetam 100 mg/mL oral solution: 10 milliliter(s) orally 2 times a day  magnesium oxide 400 mg (241.3 mg elemental magnesium) oral tablet: 1 tab(s) orally 2 times a day (with meals)  polyethylene glycol 3350 oral powder for reconstitution: 17 gram(s) orally 2 times a day  senna 8.8 mg/5 mL oral syrup: 10 milliliter(s) orally once a day (at bedtime)  Vimpat 150 mg oral tablet: 1 tab(s) orally 2 times a day MDD:1 aMILoride 5 mg oral tablet: 2 tab(s) orally 0800,1400,2200  Aurophen 160 mg/5 mL oral suspension: 20.31 milliliter(s) orally every 6 hours, As needed, Moderate Pain (4 - 6), Severe Pain (7 - 10)  Deltasone 20 mg oral tablet: 2 tab(s) by gastrostomy tube once a day   WEEK 1 (12/5 to 12/11)  Deltasone 20 mg oral tablet: 1 tab(s) orally once a day   (WEEK 12/2 - 12/18)  Keppra 100 mg/mL oral solution: 15 milliliter(s) orally 2 times a day  Mag-Ox 400 oral tablet: 2 tab(s) orally 2 times a day (with meals)  pantoprazole 40 mg oral delayed release tablet: 1 tab(s) orally 2 times a day   polyethylene glycol 3350 oral powder for reconstitution: 17 gram(s) orally 2 times a day  potassium chloride 20 mEq/15 mL oral liquid: 30 milliliter(s) orally once a day  predniSONE 10 mg oral tablet: 1 tab(s) orally once a day   WEEK 3 (12/19 - 12/25)  simethicone 80 mg oral tablet, chewable: 1 tab(s) orally every 6 hours, As needed, Gas  sodium chloride 1 g oral tablet: 1 tab(s) orally 3 times a day  Trandate 100 mg oral tablet: 1 tab(s) orally every 8 hours  Vimpat 200 mg oral tablet: 1 tab(s) orally 2 times a day  Zoloft 50 mg oral tablet: 1 tab(s) orally once a day aMILoride 5 mg oral tablet: 2 tab(s) orally 0800,1400,2200  Aurophen 160 mg/5 mL oral suspension: 20.31 milliliter(s) orally every 6 hours, As needed, Moderate Pain (4 - 6), Severe Pain (7 - 10)  Deltasone 20 mg oral tablet: 1 tab(s) orally once a day   (WEEK 12/2 - 12/18)  Deltasone 20 mg oral tablet: 2 tab(s) by gastrostomy tube once a day   WEEK 1 (12/6 to 12/11, got first dose of 40 while in the hospital))  Keppra 100 mg/mL oral solution: 15 milliliter(s) orally 2 times a day  Mag-Ox 400 oral tablet: 2 tab(s) orally 2 times a day (with meals)  pantoprazole 40 mg oral delayed release tablet: 1 tab(s) orally 2 times a day   polyethylene glycol 3350 oral powder for reconstitution: 17 gram(s) orally 2 times a day  potassium chloride 20 mEq/15 mL oral liquid: 30 milliliter(s) orally once a day  predniSONE 10 mg oral tablet: 1 tab(s) orally once a day   WEEK 3 (12/19 - 12/25)  simethicone 80 mg oral tablet, chewable: 1 tab(s) orally every 6 hours, As needed, Gas  sodium chloride 1 g oral tablet: 1 tab(s) orally 3 times a day  Trandate 100 mg oral tablet: 1 tab(s) orally every 8 hours  Vimpat 200 mg oral tablet: 1 tab(s) orally 2 times a day  Zoloft 50 mg oral tablet: 1 tab(s) orally once a day

## 2019-12-04 NOTE — PROGRESS NOTE ADULT - ASSESSMENT
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

## 2019-12-04 NOTE — PROGRESS NOTE ADULT - ASSESSMENT
1. Thrombocytosis--resolved    -- suspect was reactive secondary to acute neurologic process  -- Low TIBC and Iron. Ferritin nl, not consistent w iron deficiency  -- Not MPN. No need for further w/u for now    2. Seizure   -F/u extensive neurology work up, s/p LP;  suspected autoimmune etiology;  noted WNV and toxo ab's  -mgmt per neurology and ID    3. leukocytosis -- reactive as well, resolved, monitor for now    4.  Hyponatremia  -f/u renal    Will follow, [phone]

## 2019-12-04 NOTE — PROGRESS NOTE ADULT - ATTENDING COMMENTS
Focal myoclonic seizure in setting of UTI and electrolyte imbalances. 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 Patient now here for focal seizures presumed to be secondary to infectious etiology. On exam very encephalopathic.    Plan:   Labs  [x] repeat LP done on 11/20, studies sent: gluc 98, protein 42, lymph predom 90%, gram stain neg, HSV negative, CSF PCR negative, flow cytometry negative. WNV IgG + in CSF, IgM negative, fungal cx negative  [] pending CSF studies: VDRL, VZV, toxo, MBP, lyme, histoplasma, EBV, ENCES, CMV, crypto, borrelia, beta-2 microglobulin, ACE, acid fast cx.   [x] HIV negative, TPO negative, thyroglobulin negative,CJD panel  [x] ESR, CRP elevated 5.62, B12 wnl, TSH wnl, folate wnl    [x] Anti MUSK, Anti achR ab negative  [] [facility] encephalitis panel sent   video eeg.
Pt seen and examined on rounds. Pt reportedly day of admission was having LUE shaking, was given ativan with cessation, however today is moving LUE less. Pt found to have UTI on admission. Pt not very hyponatremic, has been around 135 since prior admission.     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 Ativan 2mg IV ordered  and given after which the twitching stopped in 1 minute and patient fell asleep.     Encephalopathy and focal seizures likely worsened in the setting of infection, and will continue to treat. However, still concerned that there may be other underlying neurologic process, especially if he does not return to baseline after reasonable treatment. Pt was unable to tolerate MRI last night, will repeat under anesthesia. Will also likely pursue repeat LP.
Focal myoclonic seizure in setting of UTI and electrolyte imbalances. 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 Patient now here for focal seizures presumed to be secondary to infectious et[x] Continue Keppra 1.5g BID  [x] Continue Vimpat 200 BID  [x] Continue solumedrol 1g x5 days (last dose 11/27)  [x] Continue IVIG x5 days + tylenol, benadryl . currently improved.
Patient seen and examined with neurology team and above note reviewed and I agree with assessment and plan as outlined. No acute overnight events and patient more awake and attentive today.   His sodium has improved to 133 and will repeat CMP at 2pm again.   No new neurologic deficits or findings on exam today.  Will plan for subacute rehab placement later today or tomorrow morning.  All questions answered with wife and daughter at bedside.  Continue medical management and supportive care.
Pt found to be more encephalopathic today. Discussed with medicine attending, will transfer to neurology service for further workup.
Sending further workup including CXR, RVP to complete infectious workup. LP results were unremarkable. Sending also other labwork such as celiac, TPO, [place] encephalitis panel.     Discussed with neurosurgery, there is no  good area for biopsy, the area of DWI corresponds to motor strip.
Patient seen and examined with neurology team and above note reviewed in detail and I agree with assessment and plan as outlined.. Patient was sitting comfortably in his chair and daughter and wife at bedside.  He was more awake and attentive today and was able to follow commands and track left and right. He has persistent weakness and neglect of left arm that is unchanged.   He was started on 2%hypertonic saline due to hyponatremia and will monitor his CMP every several hours.  His paraneoplastic panel,  VDRL and TOXO labs all came back negative.   Awaiting placement in subacute rehab and continue neurologic and medical management and supportive care and all questions answered with family at bedside.
Pt seen and examined. Stable from previous. Discussed with daughter no further neurological workup. Pending ID input on labwork. Pending dispo to rehab.
Patient with complicated history including status epilepticus. Only positive lab so far has been West Nile IgG, so will treat this as presumptive encephalitis.
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 Will finish 5 day course of IV steroids and IVIG, followed by slow steroid taper. Rehab planning in progress.
Patient seen and examined. Today, sleepier, but responded appropriately with stronger stimuli. Will continue to monitor
70 y/o man with right parietal lesion and focal seizures, from presumed infectious encephalitis 2 months ago. No with continued right parietal syndrome, epileptogenic activity on right side of brain.   Focal seizures- d/w Dr. Salas, will place back on continuous EEG, and plan to adjust medications  Family requesting second opinion, they will contact.   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 The IgM and IgG were negative on prior admission. Unclear significance. This could represent seroconversion  as evidence of possible exposure in the past, with false negative on previous test, or false positive on this admission. Low chance of exposure since last admission.

## 2019-12-04 NOTE — PROGRESS NOTE ADULT - SUBJECTIVE AND OBJECTIVE BOX
Pt seen    MEDICATIONS  (STANDING):  acetaminophen   Tablet .. 325 milliGRAM(s) Oral daily  aMILoride 10 milliGRAM(s) Oral <User Schedule>  enoxaparin Injectable 40 milliGRAM(s) SubCutaneous daily  fluticasone propionate 50 MICROgram(s)/spray Nasal Spray 1 Spray(s) Both Nostrils two times a day  influenza   Vaccine 0.5 milliLiter(s) IntraMuscular once  labetalol 100 milliGRAM(s) Oral every 8 hours  lacosamide 200 milliGRAM(s) Oral two times a day  levETIRAcetam  Solution 1500 milliGRAM(s) Oral two times a day  magnesium oxide 800 milliGRAM(s) Oral two times a day with meals  nystatin Powder 1 Application(s) Topical every 12 hours  pantoprazole  Injectable 40 milliGRAM(s) IV Push two times a day  potassium chloride   Solution 40 milliEquivalent(s) Enteral Tube daily  senna Syrup 10 milliLiter(s) Oral at bedtime  sertraline 50 milliGRAM(s) Oral daily    MEDICATIONS  (PRN):  acetaminophen    Suspension .. 650 milliGRAM(s) Oral every 6 hours PRN Moderate Pain (4 - 6), Severe Pain (7 - 10)  simethicone 80 milliGRAM(s) Chew every 6 hours PRN Gas      ROS  UTO 2/2 lethargy    Vital Signs Last 24 Hrs  T(C): 37.1 (04 Dec 2019 11:40), Max: 37.1 (04 Dec 2019 07:52)  T(F): 98.7 (04 Dec 2019 11:40), Max: 98.7 (04 Dec 2019 07:52)  HR: 60 (04 Dec 2019 11:40) (54 - 60)  BP: 119/74 (04 Dec 2019 11:40) (119/74 - 134/79)  BP(mean): --  RR: 18 (04 Dec 2019 11:40) (18 - 18)  SpO2: 97% (04 Dec 2019 11:40) (96% - 98%)    PE  NAD  asleep in chair, moans to voice, does not interact  RRR  CTAB ant chest  abd benign  no edema  remainder of exam limited 2/2 participation                          14.3   8.26  )-----------( 248      ( 04 Dec 2019 07:54 )             43.6       12-04    133<L>  |  98  |  24<H>  ----------------------------<  218<H>  4.2   |  24  |  0.58    Ca    9.1      04 Dec 2019 09:08    TPro  7.9  /  Alb  2.9<L>  /  TBili  0.4  /  DBili  x   /  AST  33  /  ALT  55<H>  /  AlkPhos  112  12-03

## 2019-12-05 ENCOUNTER — TRANSCRIPTION ENCOUNTER (OUTPATIENT)
Age: 84
End: 2019-12-05

## 2019-12-05 VITALS — DIASTOLIC BLOOD PRESSURE: 62 MMHG | SYSTOLIC BLOOD PRESSURE: 108 MMHG | HEART RATE: 61 BPM

## 2019-12-05 LAB
ANION GAP SERPL CALC-SCNC: 12 MMOL/L — SIGNIFICANT CHANGE UP (ref 5–17)
ANION GAP SERPL CALC-SCNC: 13 MMOL/L — SIGNIFICANT CHANGE UP (ref 5–17)
BUN SERPL-MCNC: 26 MG/DL — HIGH (ref 7–23)
BUN SERPL-MCNC: 29 MG/DL — HIGH (ref 7–23)
CALCIUM SERPL-MCNC: 9.4 MG/DL — SIGNIFICANT CHANGE UP (ref 8.4–10.5)
CALCIUM SERPL-MCNC: 9.7 MG/DL — SIGNIFICANT CHANGE UP (ref 8.4–10.5)
CHLORIDE SERPL-SCNC: 92 MMOL/L — LOW (ref 96–108)
CHLORIDE SERPL-SCNC: 94 MMOL/L — LOW (ref 96–108)
CO2 SERPL-SCNC: 24 MMOL/L — SIGNIFICANT CHANGE UP (ref 22–31)
CO2 SERPL-SCNC: 24 MMOL/L — SIGNIFICANT CHANGE UP (ref 22–31)
CREAT SERPL-MCNC: 0.58 MG/DL — SIGNIFICANT CHANGE UP (ref 0.5–1.3)
CREAT SERPL-MCNC: 0.61 MG/DL — SIGNIFICANT CHANGE UP (ref 0.5–1.3)
GLUCOSE SERPL-MCNC: 148 MG/DL — HIGH (ref 70–99)
GLUCOSE SERPL-MCNC: 157 MG/DL — HIGH (ref 70–99)
HCT VFR BLD CALC: 43.1 % — SIGNIFICANT CHANGE UP (ref 39–50)
HGB BLD-MCNC: 14.3 G/DL — SIGNIFICANT CHANGE UP (ref 13–17)
MCHC RBC-ENTMCNC: 30.4 PG — SIGNIFICANT CHANGE UP (ref 27–34)
MCHC RBC-ENTMCNC: 33.2 GM/DL — SIGNIFICANT CHANGE UP (ref 32–36)
MCV RBC AUTO: 91.5 FL — SIGNIFICANT CHANGE UP (ref 80–100)
PLATELET # BLD AUTO: 285 K/UL — SIGNIFICANT CHANGE UP (ref 150–400)
POTASSIUM SERPL-MCNC: 4.2 MMOL/L — SIGNIFICANT CHANGE UP (ref 3.5–5.3)
POTASSIUM SERPL-MCNC: 4.3 MMOL/L — SIGNIFICANT CHANGE UP (ref 3.5–5.3)
POTASSIUM SERPL-SCNC: 4.2 MMOL/L — SIGNIFICANT CHANGE UP (ref 3.5–5.3)
POTASSIUM SERPL-SCNC: 4.3 MMOL/L — SIGNIFICANT CHANGE UP (ref 3.5–5.3)
RBC # BLD: 4.71 M/UL — SIGNIFICANT CHANGE UP (ref 4.2–5.8)
RBC # FLD: 15.7 % — HIGH (ref 10.3–14.5)
SODIUM SERPL-SCNC: 129 MMOL/L — LOW (ref 135–145)
SODIUM SERPL-SCNC: 130 MMOL/L — LOW (ref 135–145)
WBC # BLD: 6.73 K/UL — SIGNIFICANT CHANGE UP (ref 3.8–10.5)
WBC # FLD AUTO: 6.73 K/UL — SIGNIFICANT CHANGE UP (ref 3.8–10.5)

## 2019-12-05 PROCEDURE — 80053 COMPREHEN METABOLIC PANEL: CPT

## 2019-12-05 PROCEDURE — 89051 BODY FLUID CELL COUNT: CPT

## 2019-12-05 PROCEDURE — 83520 IMMUNOASSAY QUANT NOS NONAB: CPT

## 2019-12-05 PROCEDURE — 84443 ASSAY THYROID STIM HORMONE: CPT

## 2019-12-05 PROCEDURE — 86255 FLUORESCENT ANTIBODY SCREEN: CPT

## 2019-12-05 PROCEDURE — G0515: CPT

## 2019-12-05 PROCEDURE — 87205 SMEAR GRAM STAIN: CPT

## 2019-12-05 PROCEDURE — 83735 ASSAY OF MAGNESIUM: CPT

## 2019-12-05 PROCEDURE — 97112 NEUROMUSCULAR REEDUCATION: CPT

## 2019-12-05 PROCEDURE — 86777 TOXOPLASMA ANTIBODY: CPT

## 2019-12-05 PROCEDURE — 84156 ASSAY OF PROTEIN URINE: CPT

## 2019-12-05 PROCEDURE — 86778 TOXOPLASMA ANTIBODY IGM: CPT

## 2019-12-05 PROCEDURE — 86403 PARTICLE AGGLUT ANTBDY SCRN: CPT

## 2019-12-05 PROCEDURE — 87799 DETECT AGENT NOS DNA QUANT: CPT

## 2019-12-05 PROCEDURE — 70551 MRI BRAIN STEM W/O DYE: CPT

## 2019-12-05 PROCEDURE — 84166 PROTEIN E-PHORESIS/URINE/CSF: CPT

## 2019-12-05 PROCEDURE — 99285 EMERGENCY DEPT VISIT HI MDM: CPT | Mod: 25

## 2019-12-05 PROCEDURE — 82088 ASSAY OF ALDOSTERONE: CPT

## 2019-12-05 PROCEDURE — 86256 FLUORESCENT ANTIBODY TITER: CPT

## 2019-12-05 PROCEDURE — 82728 ASSAY OF FERRITIN: CPT

## 2019-12-05 PROCEDURE — 82803 BLOOD GASES ANY COMBINATION: CPT

## 2019-12-05 PROCEDURE — 88184 FLOWCYTOMETRY/ TC 1 MARKER: CPT

## 2019-12-05 PROCEDURE — 82784 ASSAY IGA/IGD/IGG/IGM EACH: CPT

## 2019-12-05 PROCEDURE — 87086 URINE CULTURE/COLONY COUNT: CPT

## 2019-12-05 PROCEDURE — 86231 EMA EACH IG CLASS: CPT

## 2019-12-05 PROCEDURE — 84295 ASSAY OF SERUM SODIUM: CPT

## 2019-12-05 PROCEDURE — 87486 CHLMYD PNEUM DNA AMP PROBE: CPT

## 2019-12-05 PROCEDURE — 81376 HLA II TYPING 1 LOCUS LR: CPT

## 2019-12-05 PROCEDURE — 86788 WEST NILE VIRUS AB IGM: CPT

## 2019-12-05 PROCEDURE — 86041 ACETYLCHOLN RCPTR BNDNG ANTB: CPT

## 2019-12-05 PROCEDURE — 83550 IRON BINDING TEST: CPT

## 2019-12-05 PROCEDURE — 82607 VITAMIN B-12: CPT

## 2019-12-05 PROCEDURE — 93005 ELECTROCARDIOGRAM TRACING: CPT

## 2019-12-05 PROCEDURE — 80048 BASIC METABOLIC PNL TOTAL CA: CPT

## 2019-12-05 PROCEDURE — 84132 ASSAY OF SERUM POTASSIUM: CPT

## 2019-12-05 PROCEDURE — 92610 EVALUATE SWALLOWING FUNCTION: CPT

## 2019-12-05 PROCEDURE — 83873 ASSAY OF CSF PROTEIN: CPT

## 2019-12-05 PROCEDURE — 84300 ASSAY OF URINE SODIUM: CPT

## 2019-12-05 PROCEDURE — 86376 MICROSOMAL ANTIBODY EACH: CPT

## 2019-12-05 PROCEDURE — 96375 TX/PRO/DX INJ NEW DRUG ADDON: CPT

## 2019-12-05 PROCEDURE — 97535 SELF CARE MNGMENT TRAINING: CPT

## 2019-12-05 PROCEDURE — 82435 ASSAY OF BLOOD CHLORIDE: CPT

## 2019-12-05 PROCEDURE — 87899 AGENT NOS ASSAY W/OPTIC: CPT

## 2019-12-05 PROCEDURE — 97162 PT EVAL MOD COMPLEX 30 MIN: CPT

## 2019-12-05 PROCEDURE — 87070 CULTURE OTHR SPECIMN AEROBIC: CPT

## 2019-12-05 PROCEDURE — 86592 SYPHILIS TEST NON-TREP QUAL: CPT

## 2019-12-05 PROCEDURE — 82533 TOTAL CORTISOL: CPT

## 2019-12-05 PROCEDURE — 87529 HSV DNA AMP PROBE: CPT

## 2019-12-05 PROCEDURE — 88185 FLOWCYTOMETRY/TC ADD-ON: CPT

## 2019-12-05 PROCEDURE — 71260 CT THORAX DX C+: CPT

## 2019-12-05 PROCEDURE — 87483 CNS DNA AMP PROBE TYPE 12-25: CPT

## 2019-12-05 PROCEDURE — 83930 ASSAY OF BLOOD OSMOLALITY: CPT

## 2019-12-05 PROCEDURE — 82570 ASSAY OF URINE CREATININE: CPT

## 2019-12-05 PROCEDURE — 86341 ISLET CELL ANTIBODY: CPT

## 2019-12-05 PROCEDURE — 87476 LYME DIS DNA AMP PROBE: CPT

## 2019-12-05 PROCEDURE — 86618 LYME DISEASE ANTIBODY: CPT

## 2019-12-05 PROCEDURE — 97760 ORTHOTIC MGMT&TRAING 1ST ENC: CPT

## 2019-12-05 PROCEDURE — 94640 AIRWAY INHALATION TREATMENT: CPT

## 2019-12-05 PROCEDURE — 97110 THERAPEUTIC EXERCISES: CPT

## 2019-12-05 PROCEDURE — 87102 FUNGUS ISOLATION CULTURE: CPT

## 2019-12-05 PROCEDURE — 85014 HEMATOCRIT: CPT

## 2019-12-05 PROCEDURE — 86789 WEST NILE VIRUS ANTIBODY: CPT

## 2019-12-05 PROCEDURE — 97530 THERAPEUTIC ACTIVITIES: CPT

## 2019-12-05 PROCEDURE — 81001 URINALYSIS AUTO W/SCOPE: CPT

## 2019-12-05 PROCEDURE — 87389 HIV-1 AG W/HIV-1&-2 AB AG IA: CPT

## 2019-12-05 PROCEDURE — 82164 ANGIOTENSIN I ENZYME TEST: CPT

## 2019-12-05 PROCEDURE — 82945 GLUCOSE OTHER FLUID: CPT

## 2019-12-05 PROCEDURE — 87496 CYTOMEG DNA AMP PROBE: CPT

## 2019-12-05 PROCEDURE — 83605 ASSAY OF LACTIC ACID: CPT

## 2019-12-05 PROCEDURE — 99239 HOSP IP/OBS DSCHRG MGMT >30: CPT

## 2019-12-05 PROCEDURE — 87581 M.PNEUMON DNA AMP PROBE: CPT

## 2019-12-05 PROCEDURE — 82436 ASSAY OF URINE CHLORIDE: CPT

## 2019-12-05 PROCEDURE — 80177 DRUG SCRN QUAN LEVETIRACETAM: CPT

## 2019-12-05 PROCEDURE — 85027 COMPLETE CBC AUTOMATED: CPT

## 2019-12-05 PROCEDURE — 87633 RESP VIRUS 12-25 TARGETS: CPT

## 2019-12-05 PROCEDURE — 86666 EHRLICHIA ANTIBODY: CPT

## 2019-12-05 PROCEDURE — 86364 TISS TRNSGLTMNASE EA IG CLAS: CPT

## 2019-12-05 PROCEDURE — 83615 LACTATE (LD) (LDH) ENZYME: CPT

## 2019-12-05 PROCEDURE — 86140 C-REACTIVE PROTEIN: CPT

## 2019-12-05 PROCEDURE — 82947 ASSAY GLUCOSE BLOOD QUANT: CPT

## 2019-12-05 PROCEDURE — 86366 MUSCLE-SPECIFIC KINASE ANTB: CPT

## 2019-12-05 PROCEDURE — 87385 HISTOPLASMA CAPSUL AG IA: CPT

## 2019-12-05 PROCEDURE — 84133 ASSAY OF URINE POTASSIUM: CPT

## 2019-12-05 PROCEDURE — A9585: CPT

## 2019-12-05 PROCEDURE — 82330 ASSAY OF CALCIUM: CPT

## 2019-12-05 PROCEDURE — 71045 X-RAY EXAM CHEST 1 VIEW: CPT

## 2019-12-05 PROCEDURE — 70553 MRI BRAIN STEM W/O & W/DYE: CPT

## 2019-12-05 PROCEDURE — 95816 EEG AWAKE AND DROWSY: CPT

## 2019-12-05 PROCEDURE — 84157 ASSAY OF PROTEIN OTHER: CPT

## 2019-12-05 PROCEDURE — 86617 LYME DISEASE ANTIBODY: CPT

## 2019-12-05 PROCEDURE — 95951: CPT

## 2019-12-05 PROCEDURE — 87798 DETECT AGENT NOS DNA AMP: CPT

## 2019-12-05 PROCEDURE — 84100 ASSAY OF PHOSPHORUS: CPT

## 2019-12-05 PROCEDURE — 82340 ASSAY OF CALCIUM IN URINE: CPT

## 2019-12-05 PROCEDURE — 84484 ASSAY OF TROPONIN QUANT: CPT

## 2019-12-05 PROCEDURE — 70450 CT HEAD/BRAIN W/O DYE: CPT

## 2019-12-05 PROCEDURE — 82232 ASSAY OF BETA-2 PROTEIN: CPT

## 2019-12-05 PROCEDURE — 82746 ASSAY OF FOLIC ACID SERUM: CPT

## 2019-12-05 PROCEDURE — 97166 OT EVAL MOD COMPLEX 45 MIN: CPT

## 2019-12-05 PROCEDURE — 86753 PROTOZOA ANTIBODY NOS: CPT

## 2019-12-05 PROCEDURE — 83935 ASSAY OF URINE OSMOLALITY: CPT

## 2019-12-05 PROCEDURE — 74177 CT ABD & PELVIS W/CONTRAST: CPT

## 2019-12-05 PROCEDURE — 83540 ASSAY OF IRON: CPT

## 2019-12-05 RX ORDER — SODIUM CHLORIDE 9 MG/ML
1 INJECTION INTRAMUSCULAR; INTRAVENOUS; SUBCUTANEOUS ONCE
Refills: 0 | Status: DISCONTINUED | OUTPATIENT
Start: 2019-12-05 | End: 2019-12-05

## 2019-12-05 RX ORDER — LACOSAMIDE 50 MG/1
1 TABLET ORAL
Qty: 0 | Refills: 0 | DISCHARGE
Start: 2019-12-05

## 2019-12-05 RX ORDER — POTASSIUM CHLORIDE 20 MEQ
30 PACKET (EA) ORAL
Qty: 0 | Refills: 0 | DISCHARGE
Start: 2019-12-05

## 2019-12-05 RX ORDER — ACETAMINOPHEN 500 MG
20.31 TABLET ORAL
Qty: 0 | Refills: 0 | DISCHARGE
Start: 2019-12-05

## 2019-12-05 RX ORDER — SIMETHICONE 80 MG/1
1 TABLET, CHEWABLE ORAL
Qty: 0 | Refills: 0 | DISCHARGE
Start: 2019-12-05

## 2019-12-05 RX ORDER — SODIUM CHLORIDE 9 MG/ML
1000 INJECTION INTRAMUSCULAR; INTRAVENOUS; SUBCUTANEOUS ONCE
Refills: 0 | Status: DISCONTINUED | OUTPATIENT
Start: 2019-12-05 | End: 2019-12-05

## 2019-12-05 RX ORDER — SODIUM CHLORIDE 9 MG/ML
1 INJECTION INTRAMUSCULAR; INTRAVENOUS; SUBCUTANEOUS THREE TIMES A DAY
Refills: 0 | Status: DISCONTINUED | OUTPATIENT
Start: 2019-12-05 | End: 2019-12-05

## 2019-12-05 RX ORDER — SODIUM CHLORIDE 9 MG/ML
1 INJECTION INTRAMUSCULAR; INTRAVENOUS; SUBCUTANEOUS
Qty: 0 | Refills: 0 | DISCHARGE
Start: 2019-12-05

## 2019-12-05 RX ORDER — PANTOPRAZOLE SODIUM 20 MG/1
1 TABLET, DELAYED RELEASE ORAL
Qty: 120 | Refills: 0
Start: 2019-12-05 | End: 2020-02-02

## 2019-12-05 RX ORDER — SERTRALINE 25 MG/1
1 TABLET, FILM COATED ORAL
Qty: 0 | Refills: 0 | DISCHARGE
Start: 2019-12-05

## 2019-12-05 RX ORDER — FAMOTIDINE 10 MG/ML
20 INJECTION INTRAVENOUS ONCE
Refills: 0 | Status: COMPLETED | OUTPATIENT
Start: 2019-12-05 | End: 2019-12-05

## 2019-12-05 RX ORDER — LABETALOL HCL 100 MG
1 TABLET ORAL
Qty: 0 | Refills: 0 | DISCHARGE
Start: 2019-12-05

## 2019-12-05 RX ORDER — LEVETIRACETAM 250 MG/1
15 TABLET, FILM COATED ORAL
Qty: 0 | Refills: 0 | DISCHARGE
Start: 2019-12-05

## 2019-12-05 RX ORDER — ACETAMINOPHEN 500 MG
5 TABLET ORAL
Qty: 0 | Refills: 0 | DISCHARGE

## 2019-12-05 RX ORDER — MAGNESIUM OXIDE 400 MG ORAL TABLET 241.3 MG
2 TABLET ORAL
Qty: 0 | Refills: 0 | DISCHARGE
Start: 2019-12-05

## 2019-12-05 RX ADMIN — Medication 650 MILLIGRAM(S): at 06:03

## 2019-12-05 RX ADMIN — MAGNESIUM OXIDE 400 MG ORAL TABLET 800 MILLIGRAM(S): 241.3 TABLET ORAL at 09:06

## 2019-12-05 RX ADMIN — LEVETIRACETAM 1500 MILLIGRAM(S): 250 TABLET, FILM COATED ORAL at 06:03

## 2019-12-05 RX ADMIN — PANTOPRAZOLE SODIUM 40 MILLIGRAM(S): 20 TABLET, DELAYED RELEASE ORAL at 06:04

## 2019-12-05 RX ADMIN — SIMETHICONE 80 MILLIGRAM(S): 80 TABLET, CHEWABLE ORAL at 06:03

## 2019-12-05 RX ADMIN — LACOSAMIDE 200 MILLIGRAM(S): 50 TABLET ORAL at 06:03

## 2019-12-05 RX ADMIN — SODIUM CHLORIDE 1 GRAM(S): 9 INJECTION INTRAMUSCULAR; INTRAVENOUS; SUBCUTANEOUS at 06:06

## 2019-12-05 RX ADMIN — Medication 100 MILLIGRAM(S): at 06:03

## 2019-12-05 RX ADMIN — FAMOTIDINE 20 MILLIGRAM(S): 10 INJECTION INTRAVENOUS at 05:00

## 2019-12-05 RX ADMIN — Medication 1 SPRAY(S): at 06:04

## 2019-12-05 RX ADMIN — NYSTATIN CREAM 1 APPLICATION(S): 100000 CREAM TOPICAL at 06:04

## 2019-12-05 RX ADMIN — Medication 40 MILLIGRAM(S): at 09:13

## 2019-12-05 RX ADMIN — Medication 650 MILLIGRAM(S): at 06:33

## 2019-12-05 NOTE — DISCHARGE NOTE NURSING/CASE MANAGEMENT/SOCIAL WORK - PATIENT PORTAL LINK FT
You can access the FollowMyHealth Patient Portal offered by [facility] by registering at the following website: [email or web address]. By joining [employer]’s FollowMyHealth portal, you will also be able to view your health information using other applications (apps) compatible with our system.

## 2019-12-05 NOTE — SWALLOW BEDSIDE ASSESSMENT ADULT - ADDITIONAL RECOMMENDATIONS
Maintain adequate oral hygiene.
Maintain adequate oral hygiene.
Maintain adequate oral hygiene.   This service to follow up pending improved functional status.

## 2019-12-05 NOTE — SWALLOW BEDSIDE ASSESSMENT ADULT - SWALLOW EVAL: DIAGNOSIS
Pt admitted s/p seizure disorder, acute cystitis and acute metabolic encephalopathy from [facility]. Pt known to this service from previous admission s/p multiple bedside evaluations with recommendations for NPO, with non-oral nutrition/hydration/medications. Pt currently NPO with PEG. Seen today for re-evaluation of swallow function. Today pt presents with clear baseline vocal quality. He present with 1) evidence of oropharyngeal dsyaphgia with significantly delayed oral tansit time and overt clinical s/s of laryngeal penetration/aspiration with conservative texture of puree 2) Improved attempt to independently expectorate secretions; however, pt with weak cough and unable to expectorate secretion, requiring oral suctioning and increasing aspiration risk. Pt admitted s/p seizure disorder, acute cystitis and acute metabolic encephalopathy from [facility]. Pt known to this service from previous admission s/p multiple bedside evaluations with recommendations for NPO, with non-oral nutrition/hydration/medications. Pt currently NPO with PEG. Seen today for re-evaluation of swallow function. Today pt presents with clear baseline vocal quality. He present with 1) evidence of oropharyngeal dsyaphagia with delayed oral transit time and subtle clinical s/s of laryngeal penetration/aspiration, 2) Improved cognitive-linguistic deficits in comparison to last admission. Would recommend instrumental exam for comprehensive assessment of swallow safety/function to determine candidacy for initiation of PO diet or therapeutic feeding trials. Given Pt has PEG in place as means of nutrition/hydration/meds, and is otherwise clear for discharge, instrumental exam may be performed as outpatient. Pt admitted s/p seizure disorder, acute cystitis and acute metabolic encephalopathy from [facility]. Pt known to this service from previous admission s/p multiple bedside evaluations with recommendations for NPO, with non-oral nutrition/hydration/medications. Pt currently NPO with PEG. Seen today for re-evaluation of swallow function. Today pt presents with slightly wet baseline vocal quality, able to clear with a cued cough. He currently presents with 1) evidence of oropharyngeal dysphagia notable for delayed oral transit time and subtle clinical s/s of laryngeal penetration/aspiration, 2) Improved cognitive-linguistic deficits in comparison to last admission. Would recommend instrumental exam for comprehensive assessment of swallow safety/function to determine candidacy for initiation of PO diet or therapeutic feeding trials. Given Pt has PEG in place as means of nutrition/hydration/meds, and is otherwise clear for discharge, instrumental exam may be performed as outpatient.

## 2019-12-05 NOTE — SWALLOW BEDSIDE ASSESSMENT ADULT - NS ASR SWALLOW FINDINGS DISCUS
Daughter at bedside, JUAN C Garcia, Neuro team #[id number]/Patient
Patient/JUAN C Jimenez, Neuro team #[id number]/Nursing/Physician
Wife & Daughter at bedside, Neuro team #[id number]/Patient

## 2019-12-05 NOTE — SWALLOW BEDSIDE ASSESSMENT ADULT - ASR SWALLOW RECOMMEND DIAG
Defer at this time as unlikely to change clinical presentation
Would recommend instrumental exam to objectively assess for candidacy for initiation of PO diet. Given Pt has PEG for means of nutrition, and is otherwise clear for discharge, instrumental exam may be performed as outpatient.
Defer at this time as unlikely to change clinical presentation

## 2019-12-05 NOTE — SWALLOW BEDSIDE ASSESSMENT ADULT - SLP GENERAL OBSERVATIONS
Pt encountered at bedside, on RA. Pt awake and alert, Ox3. +PEG +Left facial weakness. +ability to follow simple commands, but inconsistently following. + Mild Dysarthria characterized by hoarse vocal quality, decreased vocal volume, and reduced intelligibility at the sentence level. Pt also with limited verbal output and latent in response. Pt attempting to clear secretions independently; however, weak cough and required oral suctioning. +Delayed volitional swallow. Pt encountered at bedside, on RA. Pt awake and alert, Ox3. +PEG +Left facial weakness. +ability to follow simple commands, but inconsistently following oral motor commands for oral peripheral exam. + Mild Dysarthria characterized by hoarse vocal quality, decreased vocal intensity, and reduced intelligibility at the sentence level.

## 2019-12-05 NOTE — CHART NOTE - NSCHARTNOTEFT_GEN_A_CORE
Nutrition Follow Up Note  Pt seen for malnutrition follow up [facility].    Hospital course as per chart: 84 year old male with history of hypertension, prostate cancer s/p radiation and seed implant with hypokalemia, hypomagnesemia, and viral encephalitis. Presented with seizures. Noted with positive toxo plasma but later said to be ?false positive. Noted nephrology following in setting of hyponatremia, hypomagnesemia, and hypokalemia.    Source: Patient [x]    Family [x] Daughter at bedside    other [x] Medical record    Diet: NPO + Tube Feeds    Pt drowsy, daughter at bedside. Daughter states that pt has been tolerating tube feeds via PEG, with no signs of GI distress. Pt denies nausea/vomiting. Last BM as per pt was this morning (12/5), no issues. Noted Vital 1.2 hanging but TF was not running; spoke with RN who states they stopped it since pt is leaving for rehab today.    Enteral /Parenteral Nutrition: Nepro @ 80mL/hr x 24 hr to provide: 3456 calories (45 kcal/kg), 156 gm protein (2.0 g/kg), 1369 mL water.    Weight History:  (11/16) 169 pounds -> (11/20) 180.1 pounds -> (11/27) 176.5 pounds  Weight changes noted, pt noted with previous edema, will continue to monitor.    Edema: no edema noted as per documentation, previously 1+ bilateral ankle edema noted.  Skin: no pressure injuries noted as per documentation.     Pertinent Medications:   Sodium chloride, simethicone, magnesium oxide, sertraline, pantoprazole, amiloride, potassium chloride, labetalol, senna syrup.    Pertinent Labs:    (12/5) Sodium: 130 <L> Chloride: 94 <L> BUN: 26 <H> Glucose: 148 <H> (12/2) Phosphorus: 1.9 <L> (9/25/19) HbA1c: 5.7%    Finger Sticks: none pertinent to address at this time    Estimated Needs:   [ ] no change since previous assessment  [x] recalculated: using dosing weight 76.7 kg  1917 - 2301 calories (25-30 kcal/kg)  77 - 92 gm protein (1.0-1.2 gm/kg)    Previous Nutrition Diagnosis: [x] Malnutrition, severe    Nutrition Diagnosis is [x] ongoing - nutrition care plan in progress with provision of tube feeds and recommendation of TwoCal for fluid restriction    New Nutrition Diagnosis: [x] none at this time    Interventions:     1. Recommend, as medically feasible, TwoCal @ goal rate 40 mL x 24 hrs to provide: 1920 calories (25 kcal/kg), 80 gm protein (1.0 gm/kg), and 672 mL free water.  2. Monitor and replete electrolytes as needed.    Monitoring and Evaluation:   Please continue monitoring PO intake, tolerance to diet prescription, weight trends, skin integrity, and need for nutrition education.    RD remains available upon request and will follow up per protocol.  Cass Morales, Dietetic Intern (#[phone]) Nutrition Follow Up Note  Pt seen for malnutrition follow up [facility].    Hospital course as per chart: 84 year old male with history of hypertension, prostate cancer s/p radiation and seed implant with hypokalemia, hypomagnesemia, and viral encephalitis. Presented with seizures. Noted with positive toxo plasma but later said to be ?false positive. Noted nephrology following in setting of hyponatremia, hypomagnesemia, and hypokalemia.    Source: Patient [x]    Family [x] Daughter at bedside    other [x] Medical record    Diet: NPO + Tube Feeds    Pt drowsy, daughter at bedside. Daughter states that pt has been tolerating tube feeds via PEG, with no signs of GI distress. Pt denies nausea/vomiting. Last BM as per pt was this morning (12/5), no issues. Noted Vital 1.2 hanging but TF was not running; spoke with RN - reports stopping TF since pt is leaving for rehab today.    Enteral /Parenteral Nutrition: Nepro @ 80mL/hr x 24 hr to provide: 3456 calories (45 kcal/kg), 156 gm protein (2.0 g/kg), 1369 mL water.    Weight History:  (11/16) 169 pounds -> (11/20) 180.1 pounds -> (11/27) 176.5 pounds  Weight changes noted, pt noted with previous edema, will continue to monitor.    Edema: no edema noted as per documentation, previously 1+ bilateral ankle edema noted.  Skin: no pressure injuries noted as per documentation.     Pertinent Medications:   Sodium chloride, simethicone, magnesium oxide, sertraline, pantoprazole, amiloride, potassium chloride, labetalol, senna syrup.    Pertinent Labs:    (12/5) Sodium: 130 <L> Chloride: 94 <L> BUN: 26 <H> Glucose: 148 <H> (12/2) Phosphorus: 1.9 <L> (9/25/19) HbA1c: 5.7%    Finger Sticks: none pertinent to address at this time    Estimated Needs:   [ ] no change since previous assessment  [x] recalculated: using dosing weight 76.7 kg  1917 - 2301 calories (25-30 kcal/kg)  77 - 92 gm protein (1.0-1.2 gm/kg)    Previous Nutrition Diagnosis: [x] Malnutrition, severe    Nutrition Diagnosis is [x] ongoing - nutrition care plan in progress with provision of tube feeds and recommendation of TwoCal for fluid restriction    New Nutrition Diagnosis: [x] none at this time    Interventions:     1. Recommend, as medically feasible, TwoCal continuous feeds @ goal rate 40 mL x 24 hrs to provide: 1920 calories (25 kcal/kg), 80 gm protein (1.0 gm/kg), and 672 mL free water. Alternatively, bolus feeds for TwoCal: 4 cans/day. Spoke with team in setting of pt's discharge to rehab today.  2. Monitor and replete electrolytes as needed.    Monitoring and Evaluation:   Please continue monitoring PO intake, tolerance to diet prescription, weight trends, skin integrity, and need for nutrition education.    RD remains available upon request and will follow up per protocol.  Cass Morales, Dietetic Intern (#[phone]) Nutrition Follow Up Note  Pt seen for malnutrition follow up [facility].    Hospital course as per chart: 84 year old male with history of hypertension, prostate cancer s/p radiation and seed implant with hypokalemia, hypomagnesemia, and viral encephalitis. Presented with seizures. Noted with positive toxo plasma but later said to be ?false positive. Noted nephrology following in setting of hyponatremia, hypomagnesemia, and hypokalemia.    Source: Patient [x]    Family [x] Daughter at bedside    other [x] Medical record    Diet: NPO + Tube Feeds    Pt drowsy, daughter at bedside. Daughter states that pt has been tolerating tube feeds via PEG, with no signs of GI distress. Pt denies nausea/vomiting. However daughter reports pt occasionally gets heartburn, unsure as to why. Last BM as per pt was this morning (12/5), no issues. Noted Vital 1.2 hanging but TF was not running; spoke with RN - reports stopping TF since pt is leaving for rehab today.    Enteral /Parenteral Nutrition: Nepro @ 80mL/hr x 24 hr to provide: 3456 calories (45 kcal/kg), 156 gm protein (2.0 g/kg), 1369 mL water.    Weight History:  (11/16) 169 pounds -> (11/20) 180.1 pounds -> (11/27) 176.5 pounds  Weight changes noted, pt noted with previous edema, will continue to monitor.    Edema: no edema noted as per documentation, previously 1+ bilateral ankle edema noted.  Skin: no pressure injuries noted as per documentation.     Pertinent Medications:   Sodium chloride, simethicone, magnesium oxide, sertraline, pantoprazole, amiloride, potassium chloride, labetalol, senna syrup.    Pertinent Labs:    (12/5) Sodium: 130 <L> Chloride: 94 <L> BUN: 26 <H> Glucose: 148 <H> (12/2) Phosphorus: 1.9 <L> (9/25/19) HbA1c: 5.7%    Finger Sticks: none pertinent to address at this time    Estimated Needs:   [ ] no change since previous assessment  [x] recalculated: using dosing weight 76.7 kg  1917 - 2301 calories (25-30 kcal/kg)  77 - 92 gm protein (1.0-1.2 gm/kg)    Previous Nutrition Diagnosis: [x] Malnutrition, severe    Nutrition Diagnosis is [x] ongoing - nutrition care plan in progress with provision of tube feeds and recommendation of TwoCal for fluid restriction    New Nutrition Diagnosis: [x] none at this time    Interventions:     1. Recommend, as medically feasible, TwoCal continuous feeds @ goal rate 40 mL x 24 hrs to provide: 1920 calories (25 kcal/kg), 80 gm protein (1.0 gm/kg), and 672 mL free water. Alternatively, bolus feeds for TwoCal: 4 cans/day. Spoke with team in setting of pt's discharge to rehab today.  2. Monitor and replete electrolytes as needed.    Monitoring and Evaluation:   Please continue monitoring PO intake, tolerance to diet prescription, weight trends, skin integrity, and need for nutrition education.    RD remains available upon request and will follow up per protocol.  Cass Morales, Dietetic Intern (#[phone]) Nutrition Follow Up Note  Pt seen for malnutrition follow up [facility].    Hospital course as per chart: 84 year old male with history of hypertension, prostate cancer s/p radiation and seed implant with hypokalemia, hypomagnesemia, and viral encephalitis. Presented with seizures. Noted with positive toxo plasma but later said to be ?false positive. Noted nephrology following in setting of continued hyponatremia.    Source: Patient [x]    Family [x] Daughter at bedside    other [x] Medical record    Diet: NPO + Tube Feeds    Pt drowsy, daughter at bedside. Daughter states that pt has been tolerating tube feeds via PEG, with no signs of GI distress. Pt denies nausea/vomiting. However daughter reports pt occasionally gets heartburn, unsure as to why. Last BM as per pt was this morning (12/5), no issues. Noted Vital 1.2 hanging but TF was not running; spoke with RN - reports stopping TF since pt is leaving for rehab today.    Enteral /Parenteral Nutrition: Nepro @ 80mL/hr x 24 hr to provide: 3456 calories (45 kcal/kg), 156 gm protein (2.0 g/kg), 1369 mL water.    Weight History:  (11/16) 169 pounds -> (11/20) 180.1 pounds -> (11/27) 176.5 pounds  Weight changes noted, pt noted with previous edema, will continue to monitor.    Edema: no edema noted as per documentation, previously 1+ bilateral ankle edema noted.  Skin: no pressure injuries noted as per documentation.     Pertinent Medications:   Sodium chloride, simethicone, magnesium oxide, sertraline, pantoprazole, amiloride, potassium chloride, labetalol, senna syrup.    Pertinent Labs:    (12/5) Sodium: 130 <L> Chloride: 94 <L> BUN: 26 <H> Glucose: 148 <H> (12/2) Phosphorus: 1.9 <L> (9/25/19) HbA1c: 5.7%    Finger Sticks: none pertinent to address at this time    Estimated Needs:   [ ] no change since previous assessment  [x] recalculated: using dosing weight 76.7 kg  1917 - 2301 calories (25-30 kcal/kg)  77 - 92 gm protein (1.0-1.2 gm/kg)    Previous Nutrition Diagnosis: [x] Malnutrition, severe    Nutrition Diagnosis is [x] ongoing - nutrition care plan in progress with provision of tube feeds and recommendation of TwoCal for fluid restriction    New Nutrition Diagnosis: [x] none at this time    Interventions:     1. Recommend, as medically feasible, TwoCal continuous feeds @ goal rate 40 mL x 24 hrs to provide: 1920 calories (25 kcal/kg), 80 gm protein (1.0 gm/kg), and 672 mL free water. Alternatively, bolus feeds for TwoCal: 4 cans/day to provide same nutritional values as continuous feed. Discussed recommendations with team in setting of pt's discharge to rehab today.  2. Monitor and replete electrolytes as needed.    Monitoring and Evaluation:   Please continue monitoring PO intake, tolerance to diet prescription, weight trends, skin integrity, and need for nutrition education.    RD remains available upon request and will follow up per protocol.  Cass Morales, Dietetic Intern (#[phone])

## 2019-12-05 NOTE — SWALLOW BEDSIDE ASSESSMENT ADULT - SWALLOW EVAL: SECRETION MANAGEMENT
slight wet vocal quality noted on initial encounter, which then was cleared with a cued throat clear. Vocal quality remained clear for remainder of session.

## 2019-12-05 NOTE — SWALLOW BEDSIDE ASSESSMENT ADULT - SWALLOW EVAL: ORAL MUSCULATURE
anomalies present
inconsistent command following for oral-motor exam./anomalies present
anomalies present

## 2019-12-05 NOTE — PROGRESS NOTE ADULT - ASSESSMENT
84 year old male with history of hypertension, prostate cancer s/p radiation and seed implant with hypokalemia, hypomagnesemia and viral encephalitis presented with sz now with hyponatremia as well    1- hyponatremia  2- hypomagnesemia   3- hypokalemia  4- htn      suspect hyponatremia in setting of bactrim use  na decreasing after 2% nacl dc.   start navl 1 g tid   clarification to my earlier conversation with neuro re: feeds   2 meir HN vital should be fine given it is less fluid than jevity. nepro with decrease k and may lower k in this pt    mag ox 800 mg bid  cont with amiloride  k in range  cont labetalol   d/w Neuro team

## 2019-12-05 NOTE — PROGRESS NOTE ADULT - REASON FOR ADMISSION
Confusion, Seizure

## 2019-12-05 NOTE — SWALLOW BEDSIDE ASSESSMENT ADULT - SLP PERTINENT HISTORY OF CURRENT PROBLEM
84 year old male with history of hypertension, prostate cancer s/p radiation and seed implant presents from [facility] with confusion in the setting of recent seizures. The patient was admitted during 9/24-10/23 after episode of status epilepticus requiring intubation. 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 He was eventually treated on the medicine floor after being extubated. He failed multiple speech and swallow evaluations and is now s/p peg placement. Patient is also with residual slurred speech and weakness of left upper extremity. Patient was discharged to [facility] where he has been for the last three weeks. Per daughter, patient was doing well. However, on 11/13, the patient experienced seizure activity. His keppra was increased from 1000 mg to 1500 mg. He experienced another seizure on 11/15, and daughter thinks his vimpat may have been increased, but is uncertain.

## 2019-12-05 NOTE — SWALLOW BEDSIDE ASSESSMENT ADULT - COMMENTS
Continued History: In ED, daughter stated she did not like the way the patient appeared. His left eye was droopy, left arm was twitching, he sounded congested, and he kept jerking his right arm. Of note, his sodium has been low during the week, so he appeared lethargic earlier in the week. He reports experiencing headache and cough. He denies trauma, fever, chills, chest pain, and shortness of breath, sick contacts, or urinary symptoms    The patient was independent in ADLs/IADLs before his recent admission for his seizure. He had a bradley catheter upon discharge, but it was removed at the rehab facility. The patient has been voiding without difficulty. In the ED, vital signs were stable. He received 1g ceftriaxone, 150 mg vimpat, 1500 mg keppra, 1g magnesium, and 15 meq potassium phosphate.  Per H&P dx: 1) Seizure disorder: Patient presents with known seizure disorder with new onset seizures in the setting of keppra and vimpat use. As per Neurology, etiology likely 2/2 right parieto-occipital enhancement which was treated as viral encephalitis previously. 2) Acute cystitis without hematuria. +Urinalysis with , many bacteria, large leukocyte esterase, and positive nitrites, +Ceftriaxone. 3) Acute metabolic encephalopathy. Patient was reportedly confused and lethargic. A&Ox3. Etiology was likely 2/2 seizures vs electrolyte abnormalities vs UTI.

## 2019-12-05 NOTE — PROGRESS NOTE ADULT - SUBJECTIVE AND OBJECTIVE BOX
[facility]   [phone]  RENAL FOLLOW UP NOTE  --------------------------------------------------------------------------------  Chief Complaint:    24 hour events/subjective:    seen earlier. awake responsive     PAST HISTORY  --------------------------------------------------------------------------------  No significant changes to PMH, PSH, FHx, SHx, unless otherwise noted    ALLERGIES & MEDICATIONS  --------------------------------------------------------------------------------  Allergies    No Known Allergies    Intolerances      Standing Inpatient Medications  acetaminophen   Tablet .. 325 milliGRAM(s) Oral daily  aMILoride 10 milliGRAM(s) Oral <User Schedule>  enoxaparin Injectable 40 milliGRAM(s) SubCutaneous daily  fluticasone propionate 50 MICROgram(s)/spray Nasal Spray 1 Spray(s) Both Nostrils two times a day  influenza   Vaccine 0.5 milliLiter(s) IntraMuscular once  labetalol 100 milliGRAM(s) Oral every 8 hours  lacosamide 200 milliGRAM(s) Oral two times a day  levETIRAcetam  Solution 1500 milliGRAM(s) Oral two times a day  magnesium oxide 800 milliGRAM(s) Oral two times a day with meals  nystatin Powder 1 Application(s) Topical every 12 hours  pantoprazole  Injectable 40 milliGRAM(s) IV Push two times a day  potassium chloride   Solution 40 milliEquivalent(s) Enteral Tube daily  senna Syrup 10 milliLiter(s) Oral at bedtime  sertraline 50 milliGRAM(s) Oral daily  sodium chloride 1 Gram(s) Oral three times a day  sodium chloride 0.9% Bolus 1000 milliLiter(s) IV Bolus once    PRN Inpatient Medications  acetaminophen    Suspension .. 650 milliGRAM(s) Oral every 6 hours PRN  simethicone 80 milliGRAM(s) Chew every 6 hours PRN      REVIEW OF SYSTEMS  --------------------------------------------------------------------------------    Gen: denies  fevers/chills,  CVS: denies chest pain/palpitations  Resp: denies SOB/Cough  GI: Denies N/V/Abd pain  : Denies dysuria    All other systems were reviewed and are negative, except as noted.    VITALS/PHYSICAL EXAM  --------------------------------------------------------------------------------  T(C): 36.2 (12-05-19 @ 08:20), Max: 36.5 (12-04-19 @ 23:36)  HR: 61 (12-05-19 @ 10:38) (54 - 65)  BP: 108/62 (12-05-19 @ 10:38) (95/47 - 127/77)  RR: 18 (12-05-19 @ 08:20) (18 - 18)  SpO2: 100% (12-05-19 @ 08:20) (98% - 100%)  Wt(kg): --        12-04-19 @ 07:01  -  12-05-19 @ 07:00  --------------------------------------------------------  IN: 0 mL / OUT: 500 mL / NET: -500 mL      Physical Exam:  	  Gen: oriented x 2   	Pulm: Decreased breath sounds b/l bases. no rales or ronchi or wheezing  	CV: RRR, S1/S2. no rub  	Abd: +BS, soft, nontender/nondistended + peg  	: No suprapubic tenderness.               Extremity: No cyanosis, no edema no clubbing      	  LABS/STUDIES  --------------------------------------------------------------------------------              14.3   6.73  >-----------<  285      [12-05-19 @ 08:47]              43.1     130  |  94  |  26  ----------------------------<  148      [12-05-19 @ 06:44]  4.2   |  24  |  0.61        Ca     9.7     [12-05-19 @ 06:44]            Creatinine Trend:  SCr 0.61 [12-05 @ 06:44]  SCr 0.58 [12-05 @ 01:00]  SCr 0.58 [12-04 @ 23:03]  SCr 0.55 [12-04 @ 14:26]  SCr 0.58 [12-04 @ 09:08]            CMV PCR Detection: NotDetec IU/mL (11-27 @ 13:39)    Urinalysis - [11-16-19 @ 18:21]      Color Yellow / Appearance Slightly Turbid / SG 1.016 / pH 7.0      Gluc Negative / Ketone Negative  / Bili Negative / Urobili Negative       Blood Small / Protein 30 mg/dL / Leuk Est Large / Nitrite Positive      RBC 4 /  / Hyaline 6 / Gran  / Sq Epi  / Non Sq Epi 2 / Bacteria Many      Iron 37, TIBC 208, %sat 18      [11-22-19 @ 09:09]  Ferritin 294      [11-23-19 @ 10:43]  HbA1c 5.7      [09-25-19 @ 05:57]  TSH 1.41      [11-17-19 @ 11:17]

## 2019-12-05 NOTE — SWALLOW BEDSIDE ASSESSMENT ADULT - SWALLOW EVAL: RECOMMENDED DIET
NPO, with non-oral nutrition/hydration/medications.

## 2019-12-21 LAB
CULTURE RESULTS: SIGNIFICANT CHANGE UP
SPECIMEN SOURCE: SIGNIFICANT CHANGE UP

## 2019-12-28 ENCOUNTER — INPATIENT (INPATIENT)
Facility: HOSPITAL | Age: 84
LOS: 16 days | Discharge: NURSING, REHAB OR LONG-TERM CARE | DRG: 871 | End: 2020-01-14
Attending: HOSPITALIST | Admitting: HOSPITALIST
Payer: MEDICARE

## 2019-12-28 VITALS
TEMPERATURE: 98 F | WEIGHT: 156.97 LBS | HEART RATE: 126 BPM | SYSTOLIC BLOOD PRESSURE: 122 MMHG | RESPIRATION RATE: 24 BRPM | OXYGEN SATURATION: 93 % | DIASTOLIC BLOOD PRESSURE: 85 MMHG | HEIGHT: 71 IN

## 2019-12-28 DIAGNOSIS — E86.0 DEHYDRATION: ICD-10-CM

## 2019-12-28 DIAGNOSIS — J96.01 ACUTE RESPIRATORY FAILURE WITH HYPOXIA: ICD-10-CM

## 2019-12-28 DIAGNOSIS — I10 ESSENTIAL (PRIMARY) HYPERTENSION: ICD-10-CM

## 2019-12-28 DIAGNOSIS — E44.0 MODERATE PROTEIN-CALORIE MALNUTRITION: ICD-10-CM

## 2019-12-28 DIAGNOSIS — G40.909 EPILEPSY, UNSPECIFIED, NOT INTRACTABLE, WITHOUT STATUS EPILEPTICUS: ICD-10-CM

## 2019-12-28 DIAGNOSIS — C61 MALIGNANT NEOPLASM OF PROSTATE: Chronic | ICD-10-CM

## 2019-12-28 DIAGNOSIS — A41.9 SEPSIS, UNSPECIFIED ORGANISM: ICD-10-CM

## 2019-12-28 DIAGNOSIS — R33.9 RETENTION OF URINE, UNSPECIFIED: ICD-10-CM

## 2019-12-28 DIAGNOSIS — E83.39 OTHER DISORDERS OF PHOSPHORUS METABOLISM: ICD-10-CM

## 2019-12-28 DIAGNOSIS — E83.42 HYPOMAGNESEMIA: ICD-10-CM

## 2019-12-28 DIAGNOSIS — B97.21 SARS-ASSOCIATED CORONAVIRUS AS THE CAUSE OF DISEASES CLASSIFIED ELSEWHERE: ICD-10-CM

## 2019-12-28 DIAGNOSIS — R65.20 SEVERE SEPSIS WITHOUT SEPTIC SHOCK: ICD-10-CM

## 2019-12-28 DIAGNOSIS — Z66 DO NOT RESUSCITATE: ICD-10-CM

## 2019-12-28 DIAGNOSIS — E87.0 HYPEROSMOLALITY AND HYPERNATREMIA: ICD-10-CM

## 2019-12-28 DIAGNOSIS — Z51.5 ENCOUNTER FOR PALLIATIVE CARE: ICD-10-CM

## 2019-12-28 DIAGNOSIS — E87.1 HYPO-OSMOLALITY AND HYPONATREMIA: ICD-10-CM

## 2019-12-28 DIAGNOSIS — J15.6 PNEUMONIA DUE TO OTHER GRAM-NEGATIVE BACTERIA: ICD-10-CM

## 2019-12-28 DIAGNOSIS — E87.6 HYPOKALEMIA: ICD-10-CM

## 2019-12-28 DIAGNOSIS — J18.9 PNEUMONIA, UNSPECIFIED ORGANISM: ICD-10-CM

## 2019-12-28 DIAGNOSIS — K56.41 FECAL IMPACTION: ICD-10-CM

## 2019-12-28 DIAGNOSIS — Z90.49 ACQUIRED ABSENCE OF OTHER SPECIFIED PARTS OF DIGESTIVE TRACT: Chronic | ICD-10-CM

## 2019-12-28 DIAGNOSIS — N17.9 ACUTE KIDNEY FAILURE, UNSPECIFIED: ICD-10-CM

## 2019-12-28 DIAGNOSIS — E11.65 TYPE 2 DIABETES MELLITUS WITH HYPERGLYCEMIA: ICD-10-CM

## 2019-12-28 DIAGNOSIS — Z93.1 GASTROSTOMY STATUS: ICD-10-CM

## 2019-12-28 DIAGNOSIS — Z93.1 GASTROSTOMY STATUS: Chronic | ICD-10-CM

## 2019-12-28 LAB
ALBUMIN SERPL ELPH-MCNC: 2.5 G/DL — LOW (ref 3.3–5)
ALP SERPL-CCNC: 199 U/L — HIGH (ref 40–120)
ALT FLD-CCNC: 86 U/L — HIGH (ref 10–45)
ANION GAP SERPL CALC-SCNC: 15 MMOL/L — SIGNIFICANT CHANGE UP (ref 5–17)
APPEARANCE UR: ABNORMAL
APTT BLD: 32.9 SEC — SIGNIFICANT CHANGE UP (ref 27.5–36.3)
AST SERPL-CCNC: 79 U/L — HIGH (ref 10–40)
BASOPHILS # BLD AUTO: 0.06 K/UL — SIGNIFICANT CHANGE UP (ref 0–0.2)
BASOPHILS NFR BLD AUTO: 0.4 % — SIGNIFICANT CHANGE UP (ref 0–2)
BILIRUB SERPL-MCNC: 1.1 MG/DL — SIGNIFICANT CHANGE UP (ref 0.2–1.2)
BILIRUB UR-MCNC: NEGATIVE — SIGNIFICANT CHANGE UP
BUN SERPL-MCNC: 51 MG/DL — HIGH (ref 7–23)
CALCIUM SERPL-MCNC: 9.8 MG/DL — SIGNIFICANT CHANGE UP (ref 8.4–10.5)
CHLORIDE SERPL-SCNC: 108 MMOL/L — SIGNIFICANT CHANGE UP (ref 96–108)
CO2 BLDA-SCNC: 22 MMOL/L — SIGNIFICANT CHANGE UP (ref 22–30)
CO2 SERPL-SCNC: 24 MMOL/L — SIGNIFICANT CHANGE UP (ref 22–31)
COLOR SPEC: YELLOW — SIGNIFICANT CHANGE UP
COMMENT - URINE: SIGNIFICANT CHANGE UP
CREAT SERPL-MCNC: 1.52 MG/DL — HIGH (ref 0.5–1.3)
DIFF PNL FLD: NEGATIVE — SIGNIFICANT CHANGE UP
EOSINOPHIL # BLD AUTO: 0.12 K/UL — SIGNIFICANT CHANGE UP (ref 0–0.5)
EOSINOPHIL NFR BLD AUTO: 0.7 % — SIGNIFICANT CHANGE UP (ref 0–6)
FLU A RESULT: SIGNIFICANT CHANGE UP
FLU A RESULT: SIGNIFICANT CHANGE UP
FLUAV AG NPH QL: SIGNIFICANT CHANGE UP
FLUBV AG NPH QL: SIGNIFICANT CHANGE UP
GAS PNL BLDA: SIGNIFICANT CHANGE UP
GLUCOSE BLDC GLUCOMTR-MCNC: 371 MG/DL — HIGH (ref 70–99)
GLUCOSE SERPL-MCNC: 357 MG/DL — HIGH (ref 70–99)
GLUCOSE UR QL: NEGATIVE — SIGNIFICANT CHANGE UP
HCT VFR BLD CALC: 51 % — HIGH (ref 39–50)
HGB BLD-MCNC: 16.2 G/DL — SIGNIFICANT CHANGE UP (ref 13–17)
HOROWITZ INDEX BLDA+IHG-RTO: SIGNIFICANT CHANGE UP
IMM GRANULOCYTES NFR BLD AUTO: 0.6 % — SIGNIFICANT CHANGE UP (ref 0–1.5)
INR BLD: 1.32 RATIO — HIGH (ref 0.88–1.16)
KETONES UR-MCNC: NEGATIVE — SIGNIFICANT CHANGE UP
LACTATE SERPL-SCNC: 3.7 MMOL/L — HIGH (ref 0.7–2)
LEUKOCYTE ESTERASE UR-ACNC: NEGATIVE — SIGNIFICANT CHANGE UP
LYMPHOCYTES # BLD AUTO: 13 % — SIGNIFICANT CHANGE UP (ref 13–44)
LYMPHOCYTES # BLD AUTO: 2.12 K/UL — SIGNIFICANT CHANGE UP (ref 1–3.3)
MCHC RBC-ENTMCNC: 29.9 PG — SIGNIFICANT CHANGE UP (ref 27–34)
MCHC RBC-ENTMCNC: 31.8 GM/DL — LOW (ref 32–36)
MCV RBC AUTO: 94.1 FL — SIGNIFICANT CHANGE UP (ref 80–100)
MONOCYTES # BLD AUTO: 0.73 K/UL — SIGNIFICANT CHANGE UP (ref 0–0.9)
MONOCYTES NFR BLD AUTO: 4.5 % — SIGNIFICANT CHANGE UP (ref 2–14)
NEUTROPHILS # BLD AUTO: 13.19 K/UL — HIGH (ref 1.8–7.4)
NEUTROPHILS NFR BLD AUTO: 80.8 % — HIGH (ref 43–77)
NITRITE UR-MCNC: NEGATIVE — SIGNIFICANT CHANGE UP
NRBC # BLD: 0 /100 WBCS — SIGNIFICANT CHANGE UP (ref 0–0)
PCO2 BLDA: 32 MMHG — SIGNIFICANT CHANGE UP (ref 32–46)
PH BLDA: 7.43 — SIGNIFICANT CHANGE UP (ref 7.35–7.45)
PH UR: 7 — SIGNIFICANT CHANGE UP (ref 5–8)
PLATELET # BLD AUTO: 464 K/UL — HIGH (ref 150–400)
PO2 BLDA: 70 MMHG — LOW (ref 74–108)
POTASSIUM SERPL-MCNC: 4.6 MMOL/L — SIGNIFICANT CHANGE UP (ref 3.5–5.3)
POTASSIUM SERPL-SCNC: 4.6 MMOL/L — SIGNIFICANT CHANGE UP (ref 3.5–5.3)
PROCALCITONIN SERPL-MCNC: 0.36 NG/ML — HIGH
PROT SERPL-MCNC: 8.4 G/DL — HIGH (ref 6–8.3)
PROT UR-MCNC: 15
PROTHROM AB SERPL-ACNC: 14.9 SEC — HIGH (ref 10–12.9)
RBC # BLD: 5.42 M/UL — SIGNIFICANT CHANGE UP (ref 4.2–5.8)
RBC # FLD: 16.4 % — HIGH (ref 10.3–14.5)
RSV RESULT: SIGNIFICANT CHANGE UP
RSV RNA RESP QL NAA+PROBE: SIGNIFICANT CHANGE UP
SAO2 % BLDA: 92 % — SIGNIFICANT CHANGE UP (ref 92–96)
SODIUM SERPL-SCNC: 147 MMOL/L — HIGH (ref 135–145)
SP GR SPEC: 1.01 — SIGNIFICANT CHANGE UP (ref 1.01–1.02)
UROBILINOGEN FLD QL: NEGATIVE — SIGNIFICANT CHANGE UP
WBC # BLD: 16.31 K/UL — HIGH (ref 3.8–10.5)
WBC # FLD AUTO: 16.31 K/UL — HIGH (ref 3.8–10.5)

## 2019-12-28 PROCEDURE — 99285 EMERGENCY DEPT VISIT HI MDM: CPT

## 2019-12-28 PROCEDURE — 99223 1ST HOSP IP/OBS HIGH 75: CPT

## 2019-12-28 PROCEDURE — 71045 X-RAY EXAM CHEST 1 VIEW: CPT | Mod: 26

## 2019-12-28 PROCEDURE — 71250 CT THORAX DX C-: CPT | Mod: 26

## 2019-12-28 PROCEDURE — 74176 CT ABD & PELVIS W/O CONTRAST: CPT | Mod: 26

## 2019-12-28 PROCEDURE — 93010 ELECTROCARDIOGRAM REPORT: CPT

## 2019-12-28 RX ORDER — SODIUM CHLORIDE 9 MG/ML
1000 INJECTION, SOLUTION INTRAVENOUS
Refills: 0 | Status: COMPLETED | OUTPATIENT
Start: 2019-12-28 | End: 2019-12-29

## 2019-12-28 RX ORDER — DEXTROSE 50 % IN WATER 50 %
25 SYRINGE (ML) INTRAVENOUS ONCE
Refills: 0 | Status: DISCONTINUED | OUTPATIENT
Start: 2019-12-28 | End: 2020-01-14

## 2019-12-28 RX ORDER — METRONIDAZOLE 500 MG
500 TABLET ORAL EVERY 8 HOURS
Refills: 0 | Status: DISCONTINUED | OUTPATIENT
Start: 2019-12-28 | End: 2019-12-29

## 2019-12-28 RX ORDER — VANCOMYCIN HCL 1 G
1000 VIAL (EA) INTRAVENOUS EVERY 24 HOURS
Refills: 0 | Status: DISCONTINUED | OUTPATIENT
Start: 2019-12-28 | End: 2020-01-03

## 2019-12-28 RX ORDER — INSULIN LISPRO 100/ML
VIAL (ML) SUBCUTANEOUS EVERY 6 HOURS
Refills: 0 | Status: DISCONTINUED | OUTPATIENT
Start: 2019-12-28 | End: 2019-12-31

## 2019-12-28 RX ORDER — CEFEPIME 1 G/1
2000 INJECTION, POWDER, FOR SOLUTION INTRAMUSCULAR; INTRAVENOUS ONCE
Refills: 0 | Status: COMPLETED | OUTPATIENT
Start: 2019-12-28 | End: 2019-12-28

## 2019-12-28 RX ORDER — INSULIN LISPRO 100/ML
5 VIAL (ML) SUBCUTANEOUS ONCE
Refills: 0 | Status: COMPLETED | OUTPATIENT
Start: 2019-12-28 | End: 2019-12-28

## 2019-12-28 RX ORDER — ACETAMINOPHEN 500 MG
650 TABLET ORAL ONCE
Refills: 0 | Status: DISCONTINUED | OUTPATIENT
Start: 2019-12-28 | End: 2019-12-28

## 2019-12-28 RX ORDER — DEXTROSE 50 % IN WATER 50 %
12.5 SYRINGE (ML) INTRAVENOUS ONCE
Refills: 0 | Status: DISCONTINUED | OUTPATIENT
Start: 2019-12-28 | End: 2020-01-14

## 2019-12-28 RX ORDER — HEPARIN SODIUM 5000 [USP'U]/ML
5000 INJECTION INTRAVENOUS; SUBCUTANEOUS EVERY 8 HOURS
Refills: 0 | Status: DISCONTINUED | OUTPATIENT
Start: 2019-12-28 | End: 2019-12-29

## 2019-12-28 RX ORDER — SODIUM CHLORIDE 9 MG/ML
1000 INJECTION, SOLUTION INTRAVENOUS
Refills: 0 | Status: DISCONTINUED | OUTPATIENT
Start: 2019-12-28 | End: 2020-01-14

## 2019-12-28 RX ORDER — VANCOMYCIN HCL 1 G
1000 VIAL (EA) INTRAVENOUS ONCE
Refills: 0 | Status: COMPLETED | OUTPATIENT
Start: 2019-12-28 | End: 2019-12-28

## 2019-12-28 RX ORDER — ACETAMINOPHEN 500 MG
650 TABLET ORAL ONCE
Refills: 0 | Status: COMPLETED | OUTPATIENT
Start: 2019-12-28 | End: 2019-12-28

## 2019-12-28 RX ORDER — GLUCAGON INJECTION, SOLUTION 0.5 MG/.1ML
1 INJECTION, SOLUTION SUBCUTANEOUS ONCE
Refills: 0 | Status: DISCONTINUED | OUTPATIENT
Start: 2019-12-28 | End: 2020-01-14

## 2019-12-28 RX ORDER — LEVETIRACETAM 250 MG/1
1500 TABLET, FILM COATED ORAL ONCE
Refills: 0 | Status: COMPLETED | OUTPATIENT
Start: 2019-12-28 | End: 2019-12-28

## 2019-12-28 RX ORDER — CEFEPIME 1 G/1
2000 INJECTION, POWDER, FOR SOLUTION INTRAMUSCULAR; INTRAVENOUS EVERY 24 HOURS
Refills: 0 | Status: DISCONTINUED | OUTPATIENT
Start: 2019-12-28 | End: 2019-12-29

## 2019-12-28 RX ORDER — DEXTROSE 50 % IN WATER 50 %
15 SYRINGE (ML) INTRAVENOUS ONCE
Refills: 0 | Status: DISCONTINUED | OUTPATIENT
Start: 2019-12-28 | End: 2020-01-14

## 2019-12-28 RX ORDER — SODIUM CHLORIDE 9 MG/ML
2200 INJECTION INTRAMUSCULAR; INTRAVENOUS; SUBCUTANEOUS ONCE
Refills: 0 | Status: COMPLETED | OUTPATIENT
Start: 2019-12-28 | End: 2019-12-28

## 2019-12-28 RX ORDER — METRONIDAZOLE 500 MG
500 TABLET ORAL ONCE
Refills: 0 | Status: COMPLETED | OUTPATIENT
Start: 2019-12-28 | End: 2019-12-28

## 2019-12-28 RX ADMIN — CEFEPIME 100 MILLIGRAM(S): 1 INJECTION, POWDER, FOR SOLUTION INTRAMUSCULAR; INTRAVENOUS at 19:28

## 2019-12-28 RX ADMIN — Medication 250 MILLIGRAM(S): at 20:17

## 2019-12-28 RX ADMIN — Medication 1000 MILLIGRAM(S): at 21:28

## 2019-12-28 RX ADMIN — LEVETIRACETAM 1500 MILLIGRAM(S): 250 TABLET, FILM COATED ORAL at 20:15

## 2019-12-28 RX ADMIN — Medication 650 MILLIGRAM(S): at 20:15

## 2019-12-28 RX ADMIN — Medication 5 UNIT(S): at 22:46

## 2019-12-28 RX ADMIN — HEPARIN SODIUM 5000 UNIT(S): 5000 INJECTION INTRAVENOUS; SUBCUTANEOUS at 22:25

## 2019-12-28 RX ADMIN — SODIUM CHLORIDE 2200 MILLILITER(S): 9 INJECTION INTRAMUSCULAR; INTRAVENOUS; SUBCUTANEOUS at 23:00

## 2019-12-28 RX ADMIN — SODIUM CHLORIDE 2200 MILLILITER(S): 9 INJECTION INTRAMUSCULAR; INTRAVENOUS; SUBCUTANEOUS at 19:00

## 2019-12-28 RX ADMIN — SODIUM CHLORIDE 125 MILLILITER(S): 9 INJECTION, SOLUTION INTRAVENOUS at 23:39

## 2019-12-28 RX ADMIN — Medication 100 MILLIGRAM(S): at 22:25

## 2019-12-28 RX ADMIN — Medication 650 MILLIGRAM(S): at 21:40

## 2019-12-28 RX ADMIN — CEFEPIME 2000 MILLIGRAM(S): 1 INJECTION, POWDER, FOR SOLUTION INTRAMUSCULAR; INTRAVENOUS at 20:15

## 2019-12-28 NOTE — ED ADULT NURSE REASSESSMENT NOTE - NS ED NURSE REASSESS COMMENT FT1
Comfort care measures performed with MD ALISSA Castillo at the bedside.  Redness noted to sacral area but blanchable.  No open wounds noted. Comfort care measures performed with MD ALISSA Castillo at the bedside.  Redness noted to sacral area but blanchable.  No open wounds noted.  IV fluids still bolusing.

## 2019-12-28 NOTE — H&P ADULT - ASSESSMENT
85M hx of HTN, remote prostate ca (treated s/p seeds/RT), encephalitis with dysphagia and PEG status, seizure d/o presenting with sepsis.     # Sepsis r/o pulm and abd source.   severe sepsis with fever, tachy, +wbc, +SANGITA, +lactate, + inc O2 requirements.   cont IV Vanco and Cefepime - add on Flagyl to cover any abd source.  CXR and UA negative. check RVP.    check CT of chest abd pelvis.     # hypernatremia with dehydration and SANGITA sec to above.   cont IVFs with Lactated Ringers until normotensive and recheck BMP and adjust fluids.     # Hyperglycemia and elevated serum osm 341  Insulin given.   IVFs.     # HTN   hold BP meds sec to hypotension.     # SANGITA likely sec to above.     #DVT/GI proph    # GOC   as above. DNR/DNI status, molst completed. 85M hx of HTN, remote prostate ca (treated s/p seeds/RT), encephalitis with dysphagia and PEG status, seizure d/o presenting with sepsis.     # Sepsis r/o pulm and abd source.   severe sepsis with fever, tachy, +wbc, +SANGITA, +lactate, + inc O2 requirements.   cont IV Vanco and Cefepime - add on Flagyl to cover any abd source.  CXR and UA negative. check RVP.    check CT of chest abd pelvis.     # hypernatremia with dehydration and SANGITA sec to above.   cont IVFs with Lactated Ringers until normotensive and recheck BMP and adjust fluids.     # Hyperglycemia and elevated serum osm 341  Insulin given.   correctional insulin sliding scale.   IVFs.   brief hx of hyperglycemia with IV steroids but no dx of DM.     # HTN   hold BP meds sec to hypotension.     # SANGITA likely sec to above.     #DVT/GI proph    # GOC   as above. DNR/DNI status, molst completed. 85M hx of HTN, remote prostate ca (treated s/p seeds/RT), encephalitis with dysphagia and PEG status, seizure d/o presenting with sepsis.     # Sepsis r/o pulm and abd source.   severe sepsis with fever, tachy, +wbc, +SANGITA, +lactate, + inc O2 requirements.   cont IV Vanco and Cefepime - add on Flagyl to cover any abd source.  CXR and UA negative. check RVP.    check CT of chest abd pelvis.   ID consult    # hypernatremia with dehydration and SANGITA sec to above.   cont IVFs with Lactated Ringers until normotensive and recheck BMP and adjust fluids.     # Hyperglycemia and elevated serum osm 341  Insulin given.   correctional insulin sliding scale.   IVFs.   brief hx of hyperglycemia with IV steroids but no dx of DM.     # HTN   hold BP meds sec to hypotension.     # SANGITA likely sec to above.   IVFs  renal consult.   monitor UO    #DVT/GI proph    # GOC   as above. DNR/DNI status, molst completed. 85M hx of HTN, remote prostate ca (treated s/p seeds/RT), encephalitis with dysphagia and PEG status, seizure d/o presenting with sepsis.     # Sepsis r/o pulm and abd source.   severe sepsis with fever, tachy, +wbc, +SANGITA, +lactate, + inc O2 requirements.   cont IV Vanco and Cefepime - add on Flagyl to cover any abd source.  CXR ?pna and UA negative. check RVP.    check CT of chest abd pelvis.   ID consult    # hypernatremia with dehydration and SANGITA sec to above.   cont IVFs with Lactated Ringers until normotensive and recheck BMP and adjust fluids.     # Hyperglycemia and elevated serum osm 341  Insulin given.   correctional insulin sliding scale.   IVFs.   brief hx of hyperglycemia with IV steroids but no dx of DM.     # HTN   hold BP meds sec to hypotension.     # SANGITA likely sec to above.   IVFs  renal consult.   monitor UO    #DVT/GI proph    # GOC   as above. DNR/DNI status, molst completed. 85M hx of HTN, remote prostate ca (treated s/p seeds/RT), encephalitis with dysphagia and PEG status, seizure d/o presenting with sepsis.     # Sepsis r/o pulm and abd source.   severe sepsis with fever, tachy, +wbc, +SANGITA, +lactate, + inc O2 requirements.   cont IV Vanco and Cefepime - add on Flagyl to cover any abd source.  CXR ?pna and UA negative. check RVP.    check CT of chest abd pelvis.   ID consult    # hypernatremia with dehydration and SANGITA sec to above.   cont IVFs with Lactated Ringers until normotensive and recheck BMP and adjust fluids.     # Hyperglycemia and elevated serum osm 341  Insulin given.   correctional insulin sliding scale.   IVFs.   brief hx of hyperglycemia with IV steroids but no dx of DM.     # HTN   hold BP meds sec to hypotension.     # SANGITA likely sec to above.   IVFs  renal consult.   monitor UO    #PEG status  keep NPO for now sec to tenuous resp status.   restart feed tomorrow if stabilizes    #DVT/GI proph    # GOC   as above. DNR/DNI status, molst completed. 85M hx of HTN, remote prostate ca (treated s/p seeds/RT), encephalitis with dysphagia and PEG status, seizure d/o presenting with sepsis.     # Sepsis r/o pulm and abd source.   severe sepsis with fever, tachy, +wbc, +SANGITA, +lactate, + inc O2 requirements.   cont IV Vanco and Cefepime - add on Flagyl to cover any abd source.  CXR ?pna and UA negative. check RVP.    check CT of chest abd pelvis.   ID consult    # hypernatremia with dehydration and SANGITA sec to above.   cont IVFs with Lactated Ringers until normotensive and recheck BMP and adjust fluids.     # Hyperglycemia and elevated serum osm 341  Insulin given.   correctional insulin sliding scale.   IVFs.   brief hx of hyperglycemia with IV steroids but no dx of DM.     # HTN   hold BP meds sec to hypotension.     # SANGITA likely sec to above.   IVFs  renal consult.   monitor UO    #PEG status  keep NPO for now sec to tenuous resp status.   restart feed tomorrow if stabilizes    #DVT/GI proph    # GOC   as above. DNR/DNI status, molst completed.     ADDENDUM: CT CHEST AND ABD PRELIM REPORT - FU OFFICIAL REPORT   1. Opacity noted throughout the left lower lobe may represent inflammatory   consolidation. Underlying neoplasm cannot be excluded. There may be a left   pleural effusion. The process extends to the left hilum.  2. The right lung demonstrates a pleural-based nodular density in the right   lower lobe measuring approximately 6-7 mm as well as a small nodule anterior   to   this measuring approximately 3-4 mm see axial image 32 of series 2. There is   also associated atelectasis/airspace disease in the right lower lobe.   Sections   through the right lung base demonstrate pleural-based opacities posteriorly   either representing areas of consolidation; cannot exclude additional   nodules.   3. Fracture right humeral head/neck with impaction indeterminate age.   Clinical   correlation   IMPRESSION:   1. Marked fecal impaction is noted throughout the colon particularly the   rectum   and rectosigmoid. 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 No bowel   obstruction or pneumoperitoneum.   2. There is some thickening of the wall of the urinary bladder with some   compression on the surface of the bladder by prominent prostate gland.   Clinical   correlation for possible cystitis/bladder outlet obstruction. Suggest   assessment urine for possible bladder infection; cystoscopy can be obtained   if   clinically needed. There are radon seeds in the prostate gland.     ******PRELIMINARY REPORT******   ******PRELIMINARY REPORT******         CAPRINI SCORE [CLOT]    AGE RELATED RISK FACTORS                                                       MOBILITY RELATED FACTORS  [ ] Age 41-60 years                                            (1 Point)                  [ ] Bed rest                                                        (1 Point)  [ ] Age: 61-74 years                                           (2 Points)                 [ ] Plaster cast                                                   (2 Points)  [ 3] Age= 75 years                                              (3 Points)                 [ ] Bed bound for more than 72 hours                 (2 Points)    DISEASE RELATED RISK FACTORS                                               GENDER SPECIFIC FACTORS  [ ] Edema in the lower extremities                       (1 Point)                  [ ] Pregnancy                                                     (1 Point)  [ ] Varicose veins                                               (1 Point)                  [ ] Post-partum < 6 weeks                                   (1 Point)             [ ] BMI > 25 Kg/m2                                            (1 Point)                  [ ] Hormonal therapy  or oral contraception          (1 Point)                 [1 ] Sepsis (in the previous month)                        (1 Point)                  [ ] History of pregnancy complications                 (1 point)  [1 ] Pneumonia or serious lung disease                                               [ ] Unexplained or recurrent                     (1 Point)           (in the previous month)                               (1 Point)  [ ] Abnormal pulmonary function test                     (1 Point)                 SURGERY RELATED RISK FACTORS  [ ] Acute myocardial infarction                              (1 Point)                 [ ]  Section                                             (1 Point)  [ ] Congestive heart failure (in the previous month)  (1 Point)               [ ] Minor surgery                                                  (1 Point)   [ ] Inflammatory bowel disease                             (1 Point)                 [ ] Arthroscopic surgery                                        (2 Points)  [ ] Central venous access                                      (2 Points)                [ ] General surgery lasting more than 45 minutes   (2 Points)       [ ] Stroke (in the previous month)                          (5 Points)               [ ] Elective arthroplasty                                         (5 Points)                                                                                                                                               HEMATOLOGY RELATED FACTORS                                                 TRAUMA RELATED RISK FACTORS  [ ] Prior episodes of VTE                                     (3 Points)                 [ ] Fracture of the hip, pelvis, or leg                       (5 Points)  [ ] Positive family history for VTE                         (3 Points)                 [ ] Acute spinal cord injury (in the previous month)  (5 Points)  [ ] Prothrombin 36057 A                                     (3 Points)                 [ ] Paralysis  (less than 1 month)                             (5 Points)  [ ] Factor V Leiden                                             (3 Points)                  [ ] Multiple Trauma within 1 month                        (5 Points)  [ ] Lupus anticoagulants                                     (3 Points)                                                           [ ] Anticardiolipin antibodies                               (3 Points)                                                       [ ] High homocysteine in the blood                      (3 Points)                                             [ ] Other congenital or acquired thrombophilia      (3 Points)                                                [ ] Heparin induced thrombocytopenia                  (3 Points)                                          Total Score [    5      ]

## 2019-12-28 NOTE — H&P ADULT - NSICDXPASTMEDICALHX_GEN_ALL_CORE_FT
PAST MEDICAL HISTORY:  Encephalitis     HTN (hypertension)     Hyponatremia     Pneumonia     Seizure disorder

## 2019-12-28 NOTE — ED PROVIDER NOTE - ATTENDING CONTRIBUTION TO CARE
80M with HTN, prostate cancer, encephalitis, aspiration PNA with PEG. pt sent in from [facility] with SOB that was noticed today. pt was being treated for PNA with zosyn the beginning of december. was on o2 at facility.  concern for aspiration PNA. sepsis w/u. will admit  Dr. Ji:  I have reviewed and discussed with the PA/ resident the case specifics, including the history, physical assessment, evaluation, conclusion, laboratory results, and medical plan. I agree with the contents, and conclusions. I have personally examined, and interviewed the patient.

## 2019-12-28 NOTE — ED PROVIDER NOTE - CLINICAL SUMMARY MEDICAL DECISION MAKING FREE TEXT BOX
80M with HTN, prostate cancer, encephalitis, aspiration PNA with PEG. pt sent in from [facility] with SOB that was noticed today. pt was being treated for PNA with zosyn the beginning of december. was on o2 at facility.  concern for aspiration PNA. sepsis w/u. will admit

## 2019-12-28 NOTE — ED PROVIDER NOTE - PHYSICAL EXAMINATION
General:     ill appearing  Eyes: PERRL  Head:     NC/AT, EOMI, dry oral mucosa  Neck:     trachea midline  Lungs:     rhonchi b/l lower lobe r>l  CVS:     tachycardia  Abd:     +BS, s/nt/nd  Ext:   no deformities   Neuro: awake but not answering questions or responding to commands

## 2019-12-28 NOTE — ED PROVIDER NOTE - OBJECTIVE STATEMENT
80M with HTN, prostate cancer, encephalitis, aspiration PNA with PEG. pt sent in from [facility] with SOB that was noticed today. pt was being treated for PNA with zosyn the beginning of december. was on o2 at facility.

## 2019-12-28 NOTE — H&P ADULT - HISTORY OF PRESENT ILLNESS
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 In mid December had aspiration pna and treated with 5 days of Zosyn. Now acute onset of desat to 84-89%, tachy 132 cough at [facility]. In ED, sat 89-96% on 100%NRB febrile 102.7, tachy to 126 and initially normotensive now hypotensive 89/55 sat 89% on NRB and tachy to 123. WBC:16.31, lact: 3.7, Na: 147 corrected 153, gluc: 357new SANGITA 51/1.52. mild elevated LFTs.  Family at bedside, advised of potential need for intubation/pressors and in light of pt's chronic debilitated condition, agreed to medical management with IVFs, antibxs short of intubation and pressors and to keep pt comfortable. Pt is now DNR/DNI. MOLST form completed.

## 2019-12-28 NOTE — H&P ADULT - NSHPLABSRESULTS_GEN_ALL_CORE
16.2   16.31 )-----------( 464      ( 28 Dec 2019 19:19 )             51.0           147<H>  |  108  |  51<H>  ----------------------------<  357<H>  4.6   |  24  |  1.52<H>    Ca    9.8      28 Dec 2019 19:19    TPro  8.4<H>  /  Alb  2.5<L>  /  TBili  1.1  /  DBili  x   /  AST  79<H>  /  ALT  86<H>  /  AlkPhos  199<H>      Lactate, Blood: 3.7 mmol/L ( @ 19:19)       LIVER FUNCTIONS - ( 28 Dec 2019 19:19 )  Alb: 2.5 g/dL / Pro: 8.4 g/dL / ALK PHOS: 199 U/L / ALT: 86 U/L / AST: 79 U/L / GGT: x               PT/INR - ( 28 Dec 2019 19:19 )   PT: 14.9 sec;   INR: 1.32 ratio         PTT - ( 28 Dec 2019 19:19 )  PTT:32.9 sec          Urinalysis Basic - ( 28 Dec 2019 19:19 )    Color: Yellow / Appearance: Slightly Turbid / S.010 / pH: x  Gluc: x / Ketone: Negative  / Bili: Negative / Urobili: Negative   Blood: x / Protein: 15 / Nitrite: Negative   Leuk Esterase: Negative / RBC: x / WBC x   Sq Epi: x / Non Sq Epi: x / Bacteria: x        CAPILLARY BLOOD GLUCOSE      POCT Blood Glucose.: 371 mg/dL (28 Dec 2019 22:19)        EKG: sinus tachy at 124 bpm, q II, III avf, v1-2 poor r wave prog.       CXR: wet read no overt infiltrate. 16.2   16.31 )-----------( 464      ( 28 Dec 2019 19:19 )             51.0           147<H>  |  108  |  51<H>  ----------------------------<  357<H>  4.6   |  24  |  1.52<H>    Ca    9.8      28 Dec 2019 19:19    TPro  8.4<H>  /  Alb  2.5<L>  /  TBili  1.1  /  DBili  x   /  AST  79<H>  /  ALT  86<H>  /  AlkPhos  199<H>      Lactate, Blood: 3.7 mmol/L ( @ 19:19)       LIVER FUNCTIONS - ( 28 Dec 2019 19:19 )  Alb: 2.5 g/dL / Pro: 8.4 g/dL / ALK PHOS: 199 U/L / ALT: 86 U/L / AST: 79 U/L / GGT: x               PT/INR - ( 28 Dec 2019 19:19 )   PT: 14.9 sec;   INR: 1.32 ratio         PTT - ( 28 Dec 2019 19:19 )  PTT:32.9 sec          Urinalysis Basic - ( 28 Dec 2019 19:19 )    Color: Yellow / Appearance: Slightly Turbid / S.010 / pH: x  Gluc: x / Ketone: Negative  / Bili: Negative / Urobili: Negative   Blood: x / Protein: 15 / Nitrite: Negative   Leuk Esterase: Negative / RBC: x / WBC x   Sq Epi: x / Non Sq Epi: x / Bacteria: x        CAPILLARY BLOOD GLUCOSE      POCT Blood Glucose.: 371 mg/dL (28 Dec 2019 22:19)        EKG: sinus tachy at 124 bpm, q II, III avf, v1-2 poor r wave prog.       CXR: wet read ?trace infiltrate on L.

## 2019-12-28 NOTE — ED ADULT NURSE REASSESSMENT NOTE - NS ED NURSE REASSESS COMMENT FT1
IV fluids and IV vancomycin still infusing.  Repeat lactate to be drawn 30 minutes after completion of IV fluids as ordered.  MD Ji at the bedside.  ABG to be drawn.

## 2019-12-28 NOTE — H&P ADULT - NSHPPHYSICALEXAM_GEN_ALL_CORE
Vital Signs Last 24 Hrs  T(C): 37.3 (28 Dec 2019 21:40), Max: 39.3 (28 Dec 2019 18:55)  T(F): 99.1 (28 Dec 2019 21:40), Max: 102.7 (28 Dec 2019 18:55)  HR: 94 (28 Dec 2019 22:45) (94 - 126)  BP: 102/61 (28 Dec 2019 22:45) (89/55 - 122/85)  BP(mean): 69 (28 Dec 2019 22:45) (64 - 70)  RR: 26 (28 Dec 2019 22:45) (24 - 26)  SpO2: 97% (28 Dec 2019 22:45) (89% - 97%)  Daily Height in cm: 180.34 (28 Dec 2019 18:28)    Daily   CAPILLARY BLOOD GLUCOSE      POCT Blood Glucose.: 371 mg/dL (28 Dec 2019 22:19)    I&O's Summary      GENERAL: lethargic, moans on painful stimuli  HEAD:  Normocephalic  EYES: EOMI, PERRLA, conjunctiva and sclera clear  ENMT: No tonsillar erythema, exudates, or enlargement; DRY mucous membranes, No lesions  NECK: Supple, No JVD, no bruit, normal thyroid  NERVOUS SYSTEM: grossly moves all fours.   CHEST/LUNG: diffuse rhonchi, no  wheezing, or rubs  HEART: tachy,  regular rate and rhythm; No murmurs, rubs, or gallops  ABDOMEN: Soft, diffuse abd tenderness on palp, Nondistended, no rebound or rigidity; Bowel sounds present  EXTREMITIES:  2+ Peripheral Pulses, No clubbing, cyanosis, or edema  LYMPH: No lymphadenopathy noted  SKIN: No rashes or lesions

## 2019-12-29 DIAGNOSIS — K56.41 FECAL IMPACTION: ICD-10-CM

## 2019-12-29 LAB
ALBUMIN SERPL ELPH-MCNC: 1.7 G/DL — LOW (ref 3.3–5)
ALP SERPL-CCNC: 130 U/L — HIGH (ref 40–120)
ALT FLD-CCNC: 60 U/L — HIGH (ref 10–45)
ANION GAP SERPL CALC-SCNC: 11 MMOL/L — SIGNIFICANT CHANGE UP (ref 5–17)
ANION GAP SERPL CALC-SCNC: 8 MMOL/L — SIGNIFICANT CHANGE UP (ref 5–17)
AST SERPL-CCNC: 52 U/L — HIGH (ref 10–40)
BASOPHILS # BLD AUTO: 0.05 K/UL — SIGNIFICANT CHANGE UP (ref 0–0.2)
BASOPHILS NFR BLD AUTO: 0.5 % — SIGNIFICANT CHANGE UP (ref 0–2)
BILIRUB SERPL-MCNC: 0.7 MG/DL — SIGNIFICANT CHANGE UP (ref 0.2–1.2)
BUN SERPL-MCNC: 39 MG/DL — HIGH (ref 7–23)
BUN SERPL-MCNC: 47 MG/DL — HIGH (ref 7–23)
CALCIUM SERPL-MCNC: 8.3 MG/DL — LOW (ref 8.4–10.5)
CALCIUM SERPL-MCNC: 8.6 MG/DL — SIGNIFICANT CHANGE UP (ref 8.4–10.5)
CHLORIDE SERPL-SCNC: 113 MMOL/L — HIGH (ref 96–108)
CHLORIDE SERPL-SCNC: 115 MMOL/L — HIGH (ref 96–108)
CO2 SERPL-SCNC: 23 MMOL/L — SIGNIFICANT CHANGE UP (ref 22–31)
CO2 SERPL-SCNC: 28 MMOL/L — SIGNIFICANT CHANGE UP (ref 22–31)
CREAT SERPL-MCNC: 1.01 MG/DL — SIGNIFICANT CHANGE UP (ref 0.5–1.3)
CREAT SERPL-MCNC: 1.41 MG/DL — HIGH (ref 0.5–1.3)
CULTURE RESULTS: SIGNIFICANT CHANGE UP
EOSINOPHIL # BLD AUTO: 0.01 K/UL — SIGNIFICANT CHANGE UP (ref 0–0.5)
EOSINOPHIL NFR BLD AUTO: 0.1 % — SIGNIFICANT CHANGE UP (ref 0–6)
GLUCOSE BLDC GLUCOMTR-MCNC: 142 MG/DL — HIGH (ref 70–99)
GLUCOSE BLDC GLUCOMTR-MCNC: 178 MG/DL — HIGH (ref 70–99)
GLUCOSE BLDC GLUCOMTR-MCNC: 187 MG/DL — HIGH (ref 70–99)
GLUCOSE BLDC GLUCOMTR-MCNC: 234 MG/DL — HIGH (ref 70–99)
GLUCOSE BLDC GLUCOMTR-MCNC: 315 MG/DL — HIGH (ref 70–99)
GLUCOSE SERPL-MCNC: 194 MG/DL — HIGH (ref 70–99)
GLUCOSE SERPL-MCNC: 380 MG/DL — HIGH (ref 70–99)
HBA1C BLD-MCNC: 8.4 % — HIGH (ref 4–5.6)
HCOV PNL SPEC NAA+PROBE: DETECTED
HCT VFR BLD CALC: 40.8 % — SIGNIFICANT CHANGE UP (ref 39–50)
HGB BLD-MCNC: 12.6 G/DL — LOW (ref 13–17)
IMM GRANULOCYTES NFR BLD AUTO: 0.4 % — SIGNIFICANT CHANGE UP (ref 0–1.5)
LACTATE SERPL-SCNC: 2.2 MMOL/L — HIGH (ref 0.7–2)
LACTATE SERPL-SCNC: 3.1 MMOL/L — HIGH (ref 0.7–2)
LYMPHOCYTES # BLD AUTO: 2.45 K/UL — SIGNIFICANT CHANGE UP (ref 1–3.3)
LYMPHOCYTES # BLD AUTO: 22.8 % — SIGNIFICANT CHANGE UP (ref 13–44)
MCHC RBC-ENTMCNC: 29.7 PG — SIGNIFICANT CHANGE UP (ref 27–34)
MCHC RBC-ENTMCNC: 30.9 GM/DL — LOW (ref 32–36)
MCV RBC AUTO: 96.2 FL — SIGNIFICANT CHANGE UP (ref 80–100)
MONOCYTES # BLD AUTO: 0.48 K/UL — SIGNIFICANT CHANGE UP (ref 0–0.9)
MONOCYTES NFR BLD AUTO: 4.5 % — SIGNIFICANT CHANGE UP (ref 2–14)
NEUTROPHILS # BLD AUTO: 7.7 K/UL — HIGH (ref 1.8–7.4)
NEUTROPHILS NFR BLD AUTO: 71.7 % — SIGNIFICANT CHANGE UP (ref 43–77)
NRBC # BLD: 0 /100 WBCS — SIGNIFICANT CHANGE UP (ref 0–0)
PLATELET # BLD AUTO: 308 K/UL — SIGNIFICANT CHANGE UP (ref 150–400)
POTASSIUM SERPL-MCNC: 3.3 MMOL/L — LOW (ref 3.5–5.3)
POTASSIUM SERPL-MCNC: 4 MMOL/L — SIGNIFICANT CHANGE UP (ref 3.5–5.3)
POTASSIUM SERPL-SCNC: 3.3 MMOL/L — LOW (ref 3.5–5.3)
POTASSIUM SERPL-SCNC: 4 MMOL/L — SIGNIFICANT CHANGE UP (ref 3.5–5.3)
PROCALCITONIN SERPL-MCNC: 0.51 NG/ML — HIGH
PROT SERPL-MCNC: 6.5 G/DL — SIGNIFICANT CHANGE UP (ref 6–8.3)
RAPID RVP RESULT: DETECTED
RBC # BLD: 4.24 M/UL — SIGNIFICANT CHANGE UP (ref 4.2–5.8)
RBC # FLD: 16.1 % — HIGH (ref 10.3–14.5)
SODIUM SERPL-SCNC: 147 MMOL/L — HIGH (ref 135–145)
SODIUM SERPL-SCNC: 151 MMOL/L — HIGH (ref 135–145)
SPECIMEN SOURCE: SIGNIFICANT CHANGE UP
WBC # BLD: 10.73 K/UL — HIGH (ref 3.8–10.5)
WBC # FLD AUTO: 10.73 K/UL — HIGH (ref 3.8–10.5)

## 2019-12-29 PROCEDURE — 99233 SBSQ HOSP IP/OBS HIGH 50: CPT

## 2019-12-29 PROCEDURE — 99223 1ST HOSP IP/OBS HIGH 75: CPT

## 2019-12-29 RX ORDER — LEVETIRACETAM 250 MG/1
1500 TABLET, FILM COATED ORAL
Refills: 0 | Status: DISCONTINUED | OUTPATIENT
Start: 2019-12-29 | End: 2020-01-14

## 2019-12-29 RX ORDER — SERTRALINE 25 MG/1
75 TABLET, FILM COATED ORAL DAILY
Refills: 0 | Status: DISCONTINUED | OUTPATIENT
Start: 2019-12-29 | End: 2020-01-14

## 2019-12-29 RX ORDER — SOD SULF/SODIUM/NAHCO3/KCL/PEG
4000 SOLUTION, RECONSTITUTED, ORAL ORAL ONCE
Refills: 0 | Status: COMPLETED | OUTPATIENT
Start: 2019-12-29 | End: 2019-12-29

## 2019-12-29 RX ORDER — LACOSAMIDE 50 MG/1
200 TABLET ORAL
Refills: 0 | Status: DISCONTINUED | OUTPATIENT
Start: 2019-12-29 | End: 2020-01-14

## 2019-12-29 RX ORDER — SODIUM CHLORIDE 9 MG/ML
1000 INJECTION, SOLUTION INTRAVENOUS
Refills: 0 | Status: DISCONTINUED | OUTPATIENT
Start: 2019-12-29 | End: 2019-12-29

## 2019-12-29 RX ORDER — IPRATROPIUM/ALBUTEROL SULFATE 18-103MCG
3 AEROSOL WITH ADAPTER (GRAM) INHALATION EVERY 6 HOURS
Refills: 0 | Status: DISCONTINUED | OUTPATIENT
Start: 2019-12-29 | End: 2020-01-14

## 2019-12-29 RX ORDER — POTASSIUM CHLORIDE 20 MEQ
40 PACKET (EA) ORAL EVERY 4 HOURS
Refills: 0 | Status: COMPLETED | OUTPATIENT
Start: 2019-12-29 | End: 2019-12-29

## 2019-12-29 RX ORDER — PANTOPRAZOLE SODIUM 20 MG/1
40 TABLET, DELAYED RELEASE ORAL
Refills: 0 | Status: DISCONTINUED | OUTPATIENT
Start: 2019-12-29 | End: 2020-01-01

## 2019-12-29 RX ORDER — ACETAMINOPHEN 500 MG
650 TABLET ORAL EVERY 6 HOURS
Refills: 0 | Status: DISCONTINUED | OUTPATIENT
Start: 2019-12-29 | End: 2020-01-14

## 2019-12-29 RX ORDER — ENOXAPARIN SODIUM 100 MG/ML
40 INJECTION SUBCUTANEOUS DAILY
Refills: 0 | Status: DISCONTINUED | OUTPATIENT
Start: 2019-12-29 | End: 2020-01-06

## 2019-12-29 RX ORDER — SODIUM CHLORIDE 9 MG/ML
1000 INJECTION, SOLUTION INTRAVENOUS ONCE
Refills: 0 | Status: COMPLETED | OUTPATIENT
Start: 2019-12-29 | End: 2019-12-29

## 2019-12-29 RX ORDER — MEROPENEM 1 G/30ML
1000 INJECTION INTRAVENOUS EVERY 8 HOURS
Refills: 0 | Status: DISCONTINUED | OUTPATIENT
Start: 2019-12-29 | End: 2020-01-03

## 2019-12-29 RX ORDER — SODIUM CHLORIDE 9 MG/ML
1000 INJECTION, SOLUTION INTRAVENOUS
Refills: 0 | Status: DISCONTINUED | OUTPATIENT
Start: 2019-12-29 | End: 2019-12-31

## 2019-12-29 RX ORDER — POLYETHYLENE GLYCOL 3350 17 G/17G
17 POWDER, FOR SOLUTION ORAL DAILY
Refills: 0 | Status: DISCONTINUED | OUTPATIENT
Start: 2019-12-29 | End: 2019-12-29

## 2019-12-29 RX ADMIN — SODIUM CHLORIDE 1000 MILLILITER(S): 9 INJECTION, SOLUTION INTRAVENOUS at 00:35

## 2019-12-29 RX ADMIN — LACOSAMIDE 200 MILLIGRAM(S): 50 TABLET ORAL at 17:07

## 2019-12-29 RX ADMIN — Medication 100 MILLIGRAM(S): at 05:24

## 2019-12-29 RX ADMIN — SODIUM CHLORIDE 50 MILLILITER(S): 9 INJECTION, SOLUTION INTRAVENOUS at 17:07

## 2019-12-29 RX ADMIN — Medication 250 MILLIGRAM(S): at 21:54

## 2019-12-29 RX ADMIN — SODIUM CHLORIDE 125 MILLILITER(S): 9 INJECTION, SOLUTION INTRAVENOUS at 08:22

## 2019-12-29 RX ADMIN — Medication 40 MILLIEQUIVALENT(S): at 21:54

## 2019-12-29 RX ADMIN — HEPARIN SODIUM 5000 UNIT(S): 5000 INJECTION INTRAVENOUS; SUBCUTANEOUS at 13:19

## 2019-12-29 RX ADMIN — LEVETIRACETAM 1500 MILLIGRAM(S): 250 TABLET, FILM COATED ORAL at 17:07

## 2019-12-29 RX ADMIN — HEPARIN SODIUM 5000 UNIT(S): 5000 INJECTION INTRAVENOUS; SUBCUTANEOUS at 05:25

## 2019-12-29 RX ADMIN — SERTRALINE 75 MILLIGRAM(S): 25 TABLET, FILM COATED ORAL at 13:19

## 2019-12-29 RX ADMIN — LEVETIRACETAM 1500 MILLIGRAM(S): 250 TABLET, FILM COATED ORAL at 05:24

## 2019-12-29 RX ADMIN — MEROPENEM 100 MILLIGRAM(S): 1 INJECTION INTRAVENOUS at 21:54

## 2019-12-29 RX ADMIN — Medication 1: at 08:23

## 2019-12-29 RX ADMIN — MEROPENEM 100 MILLIGRAM(S): 1 INJECTION INTRAVENOUS at 16:23

## 2019-12-29 RX ADMIN — LACOSAMIDE 200 MILLIGRAM(S): 50 TABLET ORAL at 05:24

## 2019-12-29 RX ADMIN — Medication 4000 MILLILITER(S): at 17:09

## 2019-12-29 RX ADMIN — Medication 10 MILLIGRAM(S): at 18:10

## 2019-12-29 RX ADMIN — Medication 1: at 12:17

## 2019-12-29 RX ADMIN — PANTOPRAZOLE SODIUM 40 MILLIGRAM(S): 20 TABLET, DELAYED RELEASE ORAL at 05:24

## 2019-12-29 RX ADMIN — Medication 40 MILLIEQUIVALENT(S): at 17:29

## 2019-12-29 RX ADMIN — Medication 4000 MILLILITER(S): at 15:16

## 2019-12-29 NOTE — CHART NOTE - NSCHARTNOTEFT_GEN_A_CORE
Upon Nutritional Assessment by the Registered Dietitian your patient was determined to meet criteria / has evidence of the following diagnosis/diagnoses:          [ ]  Mild Protein Calorie Malnutrition        [X]  Moderate Protein Calorie Malnutrition        [ ] Severe Protein Calorie Malnutrition        [ ] Unspecified Protein Calorie Malnutrition        [ ] Underweight / BMI <19        [ ] Morbid Obesity / BMI > 40      Findings as based on:  [X] Comprehensive nutrition assessment   [X] Nutrition Focused Physical Exam: moderate muscle depletion (temporal, clavicle), subcutaneous fat loss (orbital, buccal)  [ ] Other:       Nutrition Plan/Recommendations:    1. When medically feasible, initiate tube feeding with TwoCal HN @ 275ml 4x/day via PEG as tolerated  2. Would suggest 350ml free H2O QID via PEG to meet hydration needs         PROVIDER Section:     By signing this assessment you are acknowledging and agree with the diagnosis/diagnoses assigned by the Registered Dietitian    Comments:

## 2019-12-29 NOTE — DIETITIAN INITIAL EVALUATION ADULT. - PHYSICAL APPEARANCE
NFPE: Pt noted with moderate muscle wasting (temporal, clavicle) and subcutaneous fat loss (orbital, buccal)

## 2019-12-29 NOTE — CONSULT NOTE ADULT - SUBJECTIVE AND OBJECTIVE BOX
INTERVAL HPI/OVERNIGHT EVENTS:  HPI:  86 y/o male pmh HTN, Prostate Ca, Viral Encephalitis, Seizure, Dysphagia who is admitted with PNA and fecal impaction. Unable to obtain full history from patient due to baseline and lethargy. GI consulted to see patient due to CT findings of fecal impaction.    MEDICATIONS  (STANDING):  bisacodyl Suppository 10 milliGRAM(s) Rectal once  cefepime   IVPB 2000 milliGRAM(s) IV Intermittent every 24 hours  dextrose 5%. 1000 milliLiter(s) (50 mL/Hr) IV Continuous <Continuous>  dextrose 50% Injectable 12.5 Gram(s) IV Push once  dextrose 50% Injectable 25 Gram(s) IV Push once  dextrose 50% Injectable 25 Gram(s) IV Push once  heparin  Injectable 5000 Unit(s) SubCutaneous every 8 hours  insulin lispro (HumaLOG) corrective regimen sliding scale   SubCutaneous every 6 hours  lacosamide 200 milliGRAM(s) Oral two times a day  levETIRAcetam  Solution 1500 milliGRAM(s) Enteral Tube two times a day  metroNIDAZOLE  IVPB 500 milliGRAM(s) IV Intermittent every 8 hours  pantoprazole    Tablet 40 milliGRAM(s) Oral before breakfast  polyethylene glycol 3350 17 Gram(s) Oral daily  sertraline 75 milliGRAM(s) Oral daily  vancomycin  IVPB 1000 milliGRAM(s) IV Intermittent every 24 hours    MEDICATIONS  (PRN):  acetaminophen    Suspension .. 650 milliGRAM(s) Enteral Tube every 6 hours PRN Temp greater or equal to 38C (100.4F), Mild Pain (1 - 3)  albuterol/ipratropium for Nebulization 3 milliLiter(s) Nebulizer every 6 hours PRN Bronchospasm  dextrose 40% Gel 15 Gram(s) Oral once PRN Blood Glucose LESS THAN 70 milliGRAM(s)/deciliter  glucagon  Injectable 1 milliGRAM(s) IntraMuscular once PRN Glucose LESS THAN 70 milligrams/deciliter      Allergies    No Known Allergies    Intolerances        PHYSICAL EXAM:   Vital Signs:  Vital Signs Last 24 Hrs  T(C): 36.6 (29 Dec 2019 09:35), Max: 39.3 (28 Dec 2019 18:55)  T(F): 97.8 (29 Dec 2019 09:35), Max: 102.7 (28 Dec 2019 18:55)  HR: 62 (29 Dec 2019 09:35) (62 - 126)  BP: 117/56 (29 Dec 2019 09:35) (89/55 - 122/85)  BP(mean): 69 (28 Dec 2019 22:45) (64 - 70)  RR: 18 (29 Dec 2019 09:35) (18 - 26)  SpO2: 99% (29 Dec 2019 09:35) (89% - 99%)  Daily Height in cm: 180 (29 Dec 2019 00:15)    Daily Weight in k.7 (29 Dec 2019 05:24)I&O's Summary    28 Dec 2019 07:01  -  29 Dec 2019 07:00  --------------------------------------------------------  IN: 1500 mL / OUT: 600 mL / NET: 900 mL    29 Dec 2019 07:01  -  29 Dec 2019 13:12  --------------------------------------------------------  IN: 0 mL / OUT: 400 mL / NET: -400 mL        GENERAL:  Appears stated age  HEENT:  NC/AT,  conjunctivae clear and pink,  CHEST:  decreased BS  HEART:  Regular rhythm, S1, S2  ABDOMEN:  Soft, non-tender, non-distended, normoactive bowel sounds,  peg intact  EXTEREMITIES:  no edema  SKIN:  No rash, warm/dry  NEURO:  Lethargic      LABS:                        12.6   10.73 )-----------( 308      ( 29 Dec 2019 06:48 )             40.8         151<H>  |  115<H>  |  39<H>  ----------------------------<  194<H>  3.3<L>   |  28  |  1.01    Ca    8.3<L>      29 Dec 2019 06:48    TPro  6.5  /  Alb  1.7<L>  /  TBili  0.7  /  DBili  x   /  AST  52<H>  /  ALT  60<H>  /  AlkPhos  130<H>  12    PT/INR - ( 28 Dec 2019 19:19 )   PT: 14.9 sec;   INR: 1.32 ratio         PTT - ( 28 Dec 2019 19:19 )  PTT:32.9 sec  Urinalysis Basic - ( 28 Dec 2019 19:19 )    Color: Yellow / Appearance: Slightly Turbid / S.010 / pH: x  Gluc: x / Ketone: Negative  / Bili: Negative / Urobili: Negative   Blood: x / Protein: 15 / Nitrite: Negative   Leuk Esterase: Negative / RBC: x / WBC x   Sq Epi: x / Non Sq Epi: x / Bacteria: x        RADIOLOGY & ADDITIONAL TESTS:

## 2019-12-29 NOTE — CONSULT NOTE ADULT - PROBLEM SELECTOR RECOMMENDATION 9
Golytely via peg x 1 dose at 250cc/hr Golytely via peg x 1 dose at 500cc/hr  Monitor BMs  May resume miralax daily after golytely complete

## 2019-12-29 NOTE — CONSULT NOTE ADULT - ASSESSMENT
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  His CT shows LLL consolidating infiltrate and he may have occluded bronchus.  He had a fairly extensive w.u at [facility] which did not reveal any clear infection.  His last LP had 1 wbc with elevated protein.  He had a positive WNV serology and a positive CSF toxo serology, however both were felt to be non related to acute events.  His acute problem appears pulmonary, he has been taking "pleasure " feeds, ?aspirating.  He is at risk for resistant naina, recent zosyn course  Suggest:  1.Blood cultures x 2 sets  2.UA and culture  3.Vanco/meropenem  4.Bowel regimen  5.RVP, legionella urine antigen  6.Daughters updated at bedside, outlook guarded

## 2019-12-29 NOTE — DIETITIAN INITIAL EVALUATION ADULT. - ETIOLOGY
related to numerous hospitalizations, inability to meet estimated nutritional needs in setting of dysphagia

## 2019-12-29 NOTE — PROGRESS NOTE ADULT - SUBJECTIVE AND OBJECTIVE BOX
Patient is a 85y old  Male who presents with a chief complaint of desaturation (28 Dec 2019 22:47)      Interval HPI/ Overnight events: No events overnight    Patient seen and examined at bedside, lethargic but arousable, not complaining of chest pain, abdominal pain    REVIEW OF SYSTEMS:    CONSTITUTIONAL: No weakness, fevers or chills  EYES/ENT: No visual changes;  No vertigo or throat pain   NECK: No pain or stiffness  RESPIRATORY: No cough, wheezing, hemoptysis; No shortness of breath  CARDIOVASCULAR: No chest pain or palpitations  GASTROINTESTINAL: No abdominal or epigastric pain. No nausea, vomiting, or hematemesis; No diarrhea or constipation. No melena or hematochezia.  GENITOURINARY: No dysuria, frequency or hematuria  NEUROLOGICAL: No numbness or weakness  SKIN: No itching, burning, rashes, or lesions   MSK: no joint pain, no joint swelling  All other review of systems is negative unless indicated above.      ALLERGIES:  No Known Allergies    MEDICATIONS  (STANDING):  bisacodyl Suppository 10 milliGRAM(s) Rectal once  cefepime   IVPB 2000 milliGRAM(s) IV Intermittent every 24 hours  dextrose 5%. 1000 milliLiter(s) (50 mL/Hr) IV Continuous <Continuous>  dextrose 50% Injectable 12.5 Gram(s) IV Push once  dextrose 50% Injectable 25 Gram(s) IV Push once  dextrose 50% Injectable 25 Gram(s) IV Push once  heparin  Injectable 5000 Unit(s) SubCutaneous every 8 hours  insulin lispro (HumaLOG) corrective regimen sliding scale   SubCutaneous every 6 hours  lacosamide 200 milliGRAM(s) Oral two times a day  levETIRAcetam  Solution 1500 milliGRAM(s) Enteral Tube two times a day  metroNIDAZOLE  IVPB 500 milliGRAM(s) IV Intermittent every 8 hours  pantoprazole    Tablet 40 milliGRAM(s) Oral before breakfast  polyethylene glycol 3350 17 Gram(s) Oral daily  sertraline 75 milliGRAM(s) Oral daily  vancomycin  IVPB 1000 milliGRAM(s) IV Intermittent every 24 hours    MEDICATIONS  (PRN):  acetaminophen    Suspension .. 650 milliGRAM(s) Enteral Tube every 6 hours PRN Temp greater or equal to 38C (100.4F), Mild Pain (1 - 3)  albuterol/ipratropium for Nebulization 3 milliLiter(s) Nebulizer every 6 hours PRN Bronchospasm  dextrose 40% Gel 15 Gram(s) Oral once PRN Blood Glucose LESS THAN 70 milliGRAM(s)/deciliter  glucagon  Injectable 1 milliGRAM(s) IntraMuscular once PRN Glucose LESS THAN 70 milligrams/deciliter    Vital Signs Last 24 Hrs  T(F): 97.8 (29 Dec 2019 09:35), Max: 102.7 (28 Dec 2019 18:55)  HR: 62 (29 Dec 2019 09:35) (62 - 126)  BP: 117/56 (29 Dec 2019 09:35) (89/55 - 122/85)  RR: 18 (29 Dec 2019 09:35) (18 - 26)  SpO2: 99% (29 Dec 2019 09:35) (89% - 99%)  I&O's Summary    28 Dec 2019 07:01  -  29 Dec 2019 07:00  --------------------------------------------------------  IN: 1500 mL / OUT: 600 mL / NET: 900 mL    29 Dec 2019 07:01  -  29 Dec 2019 13:09  --------------------------------------------------------  IN: 0 mL / OUT: 400 mL / NET: -400 mL        PHYSICAL EXAM:  General: NAD, well dressed, well nourished  Head: NT/AC  ENT: MMM, no oropharyngeal erythema or exudates  Neck: Supple, No JVD  Lungs: Clear to auscultation bilaterally, no wheezing, rales, or rhonchi, no accessory muscle use  Cardio: RRR, normal S1/S2, No M/R/G  Abdomen: Normoactive BS, Abdomen soft, nontender, nondistended; no organomegaly  Extremities: No calf tenderness, no clubbing or  cyanosis  Vascular: no LE edema  Lymph: no cervical LAD  Skin: warm and dry  Neuro: AAOx3, answers all questions appropriately, moves all extremities    LABS:                        12.6   10.73 )-----------( 308      ( 29 Dec 2019 06:48 )             40.8         151  |  115  |  39  ----------------------------<  194  3.3   |  28  |  1.01    Ca    8.3      29 Dec 2019 06:48    TPro  6.5  /  Alb  1.7  /  TBili  0.7  /  DBili  x   /  AST  52  /  ALT  60  /  AlkPhos  130      eGFR if Non African American: 68 mL/min/1.73M2 (19 @ 06:48)  eGFR if : 78 mL/min/1.73M2 (19 @ 06:48)    PT/INR - ( 28 Dec 2019 19:19 )   PT: 14.9 sec;   INR: 1.32 ratio         PTT - ( 28 Dec 2019 19:19 )  PTT:32.9 sec  Lactate, Blood: 2.2 mmol/L ( @ 05:48)  Lactate, Blood: 3.1 mmol/L ( @ 23:47)  Lactate, Blood: 3.7 mmol/L ( @ 19:19)              ABG - ( 28 Dec 2019 21:30 )  pH, Arterial: 7.43  pH, Blood: x     /  pCO2: 32    /  pO2: 70    / HCO3: x     / Base Excess: x     /  SaO2: 92                      POCT Blood Glucose.: 187 mg/dL (29 Dec 2019 12:04)  POCT Blood Glucose.: 178 mg/dL (29 Dec 2019 08:18)  POCT Blood Glucose.: 234 mg/dL (29 Dec 2019 02:23)  POCT Blood Glucose.: 315 mg/dL (29 Dec 2019 00:43)  POCT Blood Glucose.: 371 mg/dL (28 Dec 2019 22:19)     [id number] 8.4    Urinalysis Basic - ( 28 Dec 2019 19:19 )    Color: Yellow / Appearance: Slightly Turbid / S.010 / pH: x  Gluc: x / Ketone: Negative  / Bili: Negative / Urobili: Negative   Blood: x / Protein: 15 / Nitrite: Negative   Leuk Esterase: Negative / RBC: x / WBC x   Sq Epi: x / Non Sq Epi: x / Bacteria: x          RADIOLOGY & ADDITIONAL TESTS:    Care Discussed with Consultants/Other Providers: Patient is a 85y old  Male who presents with a chief complaint of desaturation (28 Dec 2019 22:47)      Interval HPI/ Overnight events: No events overnight    Patient seen and examined at bedside, lethargic but arousable, not complaining of chest pain, abdominal pain        ALLERGIES:  No Known Allergies    MEDICATIONS  (STANDING):  bisacodyl Suppository 10 milliGRAM(s) Rectal once  cefepime   IVPB 2000 milliGRAM(s) IV Intermittent every 24 hours  dextrose 5%. 1000 milliLiter(s) (50 mL/Hr) IV Continuous <Continuous>  dextrose 50% Injectable 12.5 Gram(s) IV Push once  dextrose 50% Injectable 25 Gram(s) IV Push once  dextrose 50% Injectable 25 Gram(s) IV Push once  heparin  Injectable 5000 Unit(s) SubCutaneous every 8 hours  insulin lispro (HumaLOG) corrective regimen sliding scale   SubCutaneous every 6 hours  lacosamide 200 milliGRAM(s) Oral two times a day  levETIRAcetam  Solution 1500 milliGRAM(s) Enteral Tube two times a day  metroNIDAZOLE  IVPB 500 milliGRAM(s) IV Intermittent every 8 hours  pantoprazole    Tablet 40 milliGRAM(s) Oral before breakfast  polyethylene glycol 3350 17 Gram(s) Oral daily  sertraline 75 milliGRAM(s) Oral daily  vancomycin  IVPB 1000 milliGRAM(s) IV Intermittent every 24 hours    MEDICATIONS  (PRN):  acetaminophen    Suspension .. 650 milliGRAM(s) Enteral Tube every 6 hours PRN Temp greater or equal to 38C (100.4F), Mild Pain (1 - 3)  albuterol/ipratropium for Nebulization 3 milliLiter(s) Nebulizer every 6 hours PRN Bronchospasm  dextrose 40% Gel 15 Gram(s) Oral once PRN Blood Glucose LESS THAN 70 milliGRAM(s)/deciliter  glucagon  Injectable 1 milliGRAM(s) IntraMuscular once PRN Glucose LESS THAN 70 milligrams/deciliter    Vital Signs Last 24 Hrs  T(F): 97.8 (29 Dec 2019 09:35), Max: 102.7 (28 Dec 2019 18:55)  HR: 62 (29 Dec 2019 09:35) (62 - 126)  BP: 117/56 (29 Dec 2019 09:35) (89/55 - 122/85)  RR: 18 (29 Dec 2019 09:35) (18 - 26)  SpO2: 99% (29 Dec 2019 09:35) (89% - 99%)  I&O's Summary    28 Dec 2019 07:01  -  29 Dec 2019 07:00  --------------------------------------------------------  IN: 1500 mL / OUT: 600 mL / NET: 900 mL    29 Dec 2019 07:  -  29 Dec 2019 13:09  --------------------------------------------------------  IN: 0 mL / OUT: 400 mL / NET: -400 mL        PHYSICAL EXAM:  General: elderly male, lying in bed in NAD  Head: NT/AC  ENT: MMM, no oropharyngeal erythema or exudates  Neck: Supple, No JVD  Lungs: decreased breath sounds at bilateral bases, no wheezing or rhonchi  Cardio: RRR, normal S1/S2, No M/R/G  Abdomen: Normoactive BS, Abdomen soft, nontender, nondistended; no organomegaly  Extremities: No calf tenderness, no clubbing or  cyanosis  Vascular: no LE edema  Skin: warm and dry  Neuro: awake and alert, knows he is in the hospital, not oriented to time    LABS:                        12.6   10.73 )-----------( 308      ( 29 Dec 2019 06:48 )             40.8         151  |  115  |  39  ----------------------------<  194  3.3   |  28  |  1.01    Ca    8.3      29 Dec 2019 06:48    TPro  6.5  /  Alb  1.7  /  TBili  0.7  /  DBili  x   /  AST  52  /  ALT  60  /  AlkPhos  130      eGFR if Non African American: 68 mL/min/1.73M2 (19 @ 06:48)  eGFR if : 78 mL/min/1.73M2 (19 @ 06:48)    PT/INR - ( 28 Dec 2019 19:19 )   PT: 14.9 sec;   INR: 1.32 ratio         PTT - ( 28 Dec 2019 19:19 )  PTT:32.9 sec  Lactate, Blood: 2.2 mmol/L ( @ 05:48)  Lactate, Blood: 3.1 mmol/L ( @ 23:47)  Lactate, Blood: 3.7 mmol/L ( @ 19:19)              ABG - ( 28 Dec 2019 21:30 )  pH, Arterial: 7.43  pH, Blood: x     /  pCO2: 32    /  pO2: 70    / HCO3: x     / Base Excess: x     /  SaO2: 92                      POCT Blood Glucose.: 187 mg/dL (29 Dec 2019 12:04)  POCT Blood Glucose.: 178 mg/dL (29 Dec 2019 08:18)  POCT Blood Glucose.: 234 mg/dL (29 Dec 2019 02:23)  POCT Blood Glucose.: 315 mg/dL (29 Dec 2019 00:43)  POCT Blood Glucose.: 371 mg/dL (28 Dec 2019 22:19)     [id number] 8.4    Urinalysis Basic - ( 28 Dec 2019 19:19 )    Color: Yellow / Appearance: Slightly Turbid / S.010 / pH: x  Gluc: x / Ketone: Negative  / Bili: Negative / Urobili: Negative   Blood: x / Protein: 15 / Nitrite: Negative   Leuk Esterase: Negative / RBC: x / WBC x   Sq Epi: x / Non Sq Epi: x / Bacteria: x          RADIOLOGY & ADDITIONAL TESTS:    < from: CT Chest No Cont (19 @ 22:13) >    IMPRESSION:   1. Left lower lobe consolidation with complete or almost complete obstruction of the left lower lobe bronchus.; left hilar mass cannot be excluded. Consider further evaluation with contrast enhanced study and/or bronchoscopy.   2. Right lower lobe infiltrate with several pleural nodules in the right hemithorax.   3. Fracture right humeral head/neck with impaction indeterminate age. Clinical   correlation     < end of copied text >    < from: CT Abdomen and Pelvis No Cont (19 @ 22:13) >  IMPRESSION:   1. Evidence of constipation with marked fecal impaction is noted throughout the colon particularly the rectum   and rectosigmoid. 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 No bowel   obstruction or pneumoperitoneum.   2. Mild thickening of the bladder wall.. 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    < end of copied text >      Care Discussed with Consultants/Other Providers:

## 2019-12-29 NOTE — DIETITIAN INITIAL EVALUATION ADULT. - PERTINENT LABORATORY DATA
(12/29) Na 151, K+ 3.3, Cl 115, BUN 39, glucose 194, POCT 178, (12/29) A1c 8.4%, elevated LFT's noted

## 2019-12-29 NOTE — CONSULT NOTE ADULT - SUBJECTIVE AND OBJECTIVE BOX
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  He has been fed mostly with peg, has recognized family members and was interacting with family prior to past day.He developed fever to 102.7 and dropped BP to 90's and desaturated and was brought to hospital.  REVIEW OF SYSTEMS:  All other review of systems negative (Comprehensive ROS)    PAST MEDICAL & SURGICAL HISTORY:  Hyponatremia  Seizure disorder  Encephalitis  HTN (hypertension)  Pneumonia  PEG (percutaneous endoscopic gastrostomy) status      Allergies    No Known Allergies    Intolerances        Antimicrobials Day #  :  cefepime   IVPB 2000 milliGRAM(s) IV Intermittent every 24 hours  metroNIDAZOLE  IVPB 500 milliGRAM(s) IV Intermittent every 8 hours  vancomycin  IVPB 1000 milliGRAM(s) IV Intermittent every 24 hours    Other Medications:  acetaminophen    Suspension .. 650 milliGRAM(s) Enteral Tube every 6 hours PRN  albuterol/ipratropium for Nebulization 3 milliLiter(s) Nebulizer every 6 hours PRN  bisacodyl Suppository 10 milliGRAM(s) Rectal once  dextrose 40% Gel 15 Gram(s) Oral once PRN  dextrose 5%. 1000 milliLiter(s) IV Continuous <Continuous>  dextrose 50% Injectable 12.5 Gram(s) IV Push once  dextrose 50% Injectable 25 Gram(s) IV Push once  dextrose 50% Injectable 25 Gram(s) IV Push once  glucagon  Injectable 1 milliGRAM(s) IntraMuscular once PRN  heparin  Injectable 5000 Unit(s) SubCutaneous every 8 hours  insulin lispro (HumaLOG) corrective regimen sliding scale   SubCutaneous every 6 hours  lacosamide 200 milliGRAM(s) Oral two times a day  levETIRAcetam  Solution 1500 milliGRAM(s) Enteral Tube two times a day  pantoprazole    Tablet 40 milliGRAM(s) Oral before breakfast  polyethylene glycol 3350 17 Gram(s) Oral daily  polyethylene glycol/electrolyte Solution. 4000 milliLiter(s) Enteral Tube once  sertraline 75 milliGRAM(s) Oral daily      FAMILY HISTORY: NA      SOCIAL HISTORY:  Smoking:  x   ETOH:   x  Drug Use: x        T(F): 97.8 (19 @ 09:35), Max: 102.7 (19 @ 18:55)  HR: 62 (19 @ 09:35)  BP: 117/56 (19 @ 09:35)  RR: 18 (19 @ 09:35)  SpO2: 99% (19 @ 09:35)  Wt(kg): --    PHYSICAL EXAM:  General: lethargic mild respiratory distress distress  Eyes:  anicteric, no conjunctival injection, no discharge  Oropharynx: no lesions or injection 	  Neck: supple, without adenopathy  Lungs: junky BS  Heart: regular rate and rhythm; no murmur, rubs or gallops  Abdomen: soft,distended, nontender, without mass or organomegaly, peg in place  Skin: no lesions  Extremities: no clubbing, cyanosis, trace edema  Neurologic: poorly interactive    LAB RESULTS:                        12.6   10.73 )-----------( 308      ( 29 Dec 2019 06:48 )             40.8         151<H>  |  115<H>  |  39<H>  ----------------------------<  194<H>  3.3<L>   |  28  |  1.01    Ca    8.3<L>      29 Dec 2019 06:48    TPro  6.5  /  Alb  1.7<L>  /  TBili  0.7  /  DBili  x   /  AST  52<H>  /  ALT  60<H>  /  AlkPhos  130<H>      LIVER FUNCTIONS - ( 29 Dec 2019 06:48 )  Alb: 1.7 g/dL / Pro: 6.5 g/dL / ALK PHOS: 130 U/L / ALT: 60 U/L / AST: 52 U/L / GGT: x           Urinalysis Basic - ( 28 Dec 2019 19:19 )    Color: Yellow / Appearance: Slightly Turbid / S.010 / pH: x  Gluc: x / Ketone: Negative  / Bili: Negative / Urobili: Negative   Blood: x / Protein: 15 / Nitrite: Negative   Leuk Esterase: Negative / RBC: x / WBC x   Sq Epi: x / Non Sq Epi: x / Bacteria: x        MICROBIOLOGY:  RECENT CULTURES:    Flu A/B/RSV negative    RADIOLOGY REVIEWED:  < from: CT Chest No Cont (19 @ 22:13) >  IMPRESSION:   1. Left lower lobe consolidation with complete or almost complete obstruction of the left lower lobe bronchus.; left hilar mass cannot be excluded. Consider further evaluation with contrast enhanced study and/or bronchoscopy.   2. Right lower lobe infiltrate with several pleural nodules in the right hemithorax.   3. Fracture right humeral head/neck with impaction indeterminate age. Clinical   correlation     < end of copied text >  < from: CT Abdomen and Pelvis No Cont (19 @ 22:13) >  IMPRESSION:   1. Left lower lobe consolidation with complete or almost complete obstruction of the left lower lobe bronchus.; left hilar mass cannot be excluded. Consider further evaluation with contrast enhanced study and/or bronchoscopy.   2. Right lower lobe infiltrate with several pleural nodules in the right hemithorax.   3. Fracture right humeral head/neck with impaction indeterminate age. 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  He has been fed mostly with peg, has recognized family members and was interacting with family prior to past day.He developed fever to 102.7 and dropped BP to 90's and desaturated and was brought to hospital.Bradley placed in ER.  REVIEW OF SYSTEMS:  All other review of systems negative (Comprehensive ROS)    PAST MEDICAL & SURGICAL HISTORY:  Hyponatremia  Seizure disorder  Encephalitis  HTN (hypertension)  Pneumonia  PEG (percutaneous endoscopic gastrostomy) status      Allergies    No Known Allergies    Intolerances        Antimicrobials Day #  :  cefepime   IVPB 2000 milliGRAM(s) IV Intermittent every 24 hours  metroNIDAZOLE  IVPB 500 milliGRAM(s) IV Intermittent every 8 hours  vancomycin  IVPB 1000 milliGRAM(s) IV Intermittent every 24 hours    Other Medications:  acetaminophen    Suspension .. 650 milliGRAM(s) Enteral Tube every 6 hours PRN  albuterol/ipratropium for Nebulization 3 milliLiter(s) Nebulizer every 6 hours PRN  bisacodyl Suppository 10 milliGRAM(s) Rectal once  dextrose 40% Gel 15 Gram(s) Oral once PRN  dextrose 5%. 1000 milliLiter(s) IV Continuous <Continuous>  dextrose 50% Injectable 12.5 Gram(s) IV Push once  dextrose 50% Injectable 25 Gram(s) IV Push once  dextrose 50% Injectable 25 Gram(s) IV Push once  glucagon  Injectable 1 milliGRAM(s) IntraMuscular once PRN  heparin  Injectable 5000 Unit(s) SubCutaneous every 8 hours  insulin lispro (HumaLOG) corrective regimen sliding scale   SubCutaneous every 6 hours  lacosamide 200 milliGRAM(s) Oral two times a day  levETIRAcetam  Solution 1500 milliGRAM(s) Enteral Tube two times a day  pantoprazole    Tablet 40 milliGRAM(s) Oral before breakfast  polyethylene glycol 3350 17 Gram(s) Oral daily  polyethylene glycol/electrolyte Solution. 4000 milliLiter(s) Enteral Tube once  sertraline 75 milliGRAM(s) Oral daily      FAMILY HISTORY: NA      SOCIAL HISTORY:  Smoking:  x   ETOH:   x  Drug Use: x        T(F): 97.8 (19 @ 09:35), Max: 102.7 (19 @ 18:55)  HR: 62 (19 @ 09:35)  BP: 117/56 (19 @ 09:35)  RR: 18 (19 @ 09:35)  SpO2: 99% (19 @ 09:35)  Wt(kg): --    PHYSICAL EXAM:  General: lethargic mild respiratory distress distress  Eyes:  anicteric, no conjunctival injection, no discharge  Oropharynx: no lesions or injection 	  Neck: supple, without adenopathy  Lungs: junky BS  Heart: regular rate and rhythm; no murmur, rubs or gallops  Abdomen: soft,distended, nontender, without mass or organomegaly, peg in place  Skin: no lesions  Extremities: no clubbing, cyanosis, trace edema  Neurologic: poorly interactive   bradley, urine is clear  LAB RESULTS:                        12.6   10.73 )-----------( 308      ( 29 Dec 2019 06:48 )             40.8         151<H>  |  115<H>  |  39<H>  ----------------------------<  194<H>  3.3<L>   |  28  |  1.01    Ca    8.3<L>      29 Dec 2019 06:48    TPro  6.5  /  Alb  1.7<L>  /  TBili  0.7  /  DBili  x   /  AST  52<H>  /  ALT  60<H>  /  AlkPhos  130<H>      LIVER FUNCTIONS - ( 29 Dec 2019 06:48 )  Alb: 1.7 g/dL / Pro: 6.5 g/dL / ALK PHOS: 130 U/L / ALT: 60 U/L / AST: 52 U/L / GGT: x           Urinalysis Basic - ( 28 Dec 2019 19:19 )    Color: Yellow / Appearance: Slightly Turbid / S.010 / pH: x  Gluc: x / Ketone: Negative  / Bili: Negative / Urobili: Negative   Blood: x / Protein: 15 / Nitrite: Negative   Leuk Esterase: Negative / RBC: x / WBC x   Sq Epi: x / Non Sq Epi: x / Bacteria: x        MICROBIOLOGY:  RECENT CULTURES:    Flu A/B/RSV negative    RADIOLOGY REVIEWED:  < from: CT Chest No Cont (19 @ 22:13) >  IMPRESSION:   1. Left lower lobe consolidation with complete or almost complete obstruction of the left lower lobe bronchus.; left hilar mass cannot be excluded. Consider further evaluation with contrast enhanced study and/or bronchoscopy.   2. Right lower lobe infiltrate with several pleural nodules in the right hemithorax.   3. Fracture right humeral head/neck with impaction indeterminate age. Clinical   correlation     < end of copied text >  < from: CT Abdomen and Pelvis No Cont (19 @ 22:13) >  IMPRESSION:   1. Left lower lobe consolidation with complete or almost complete obstruction of the left lower lobe bronchus.; left hilar mass cannot be excluded. Consider further evaluation with contrast enhanced study and/or bronchoscopy.   2. Right lower lobe infiltrate with several pleural nodules in the right hemithorax.   3. Fracture right humeral head/neck with impaction indeterminate age. Clinical   correlation     < end of copied text >

## 2019-12-29 NOTE — DIETITIAN INITIAL EVALUATION ADULT. - ENTERAL
TwoCal HN @ 275ml 4x/day via PEG to provide 2,200kcals, 91g protein, 770ml H2O. Would suggest 350ml free H2O QID via PEG to meet hydration needs.

## 2019-12-29 NOTE — PROGRESS NOTE ADULT - ASSESSMENT
85M hx of HTN, remote prostate ca (treated s/p seeds/RT), encephalitis with dysphagia and PEG status, seizure d/o presenting with sepsis.     # Sepsis: suspected source at this time is PNA, multifocal LLL and RLL, suspects aspiration  - sepsis focus exam complete  - continue Vanc/ meropenem  - f/u Blood cx, RVP  - ID following    #Presumed gram-negative PNA:   - continue Vanc/ meropenem  - ID following   - keep NPO for now  - continue O2 support    #SANGITA: Cr 1.5 on admission, improved after IVF resuscitation  - continue to trend    #Fecal impaction:   - dulcolax suppository  - Golytely via PEG  - monitor BM  - GI following    #Hypernatremia: 2/2 dehydration  - continue D5  - trend BMP    #Hypokalemia: K 3.3  - repleted    #DM type 2 with hyperglycemia: HbA1C 8.4%  - continue ISS    IVFs.   brief hx of hyperglycemia with IV steroids but no dx of DM.     # HTN   hold BP meds sec to hypotension.     # SANGITA likely sec to above.   IVFs  renal consult.   monitor UO    #PEG status  keep NPO for now sec to tenuous resp status.   restart feed tomorrow if stabilizes    #DVT/GI proph    # GOC   as above. DNR/DNI status, molst completed.     ADDENDUM: CT CHEST AND ABD PRELIM REPORT - FU OFFICIAL REPORT   1. Opacity noted throughout the left lower lobe may represent inflammatory   consolidation. Underlying neoplasm cannot be excluded. There may be a left   pleural effusion. The process extends to the left hilum.  2. The right lung demonstrates a pleural-based nodular density in the right   lower lobe measuring approximately 6-7 mm as well as a small nodule anterior   to   this measuring approximately 3-4 mm see axial image 32 of series 2. There is   also associated atelectasis/airspace disease in the right lower lobe.   Sections   through the right lung base demonstrate pleural-based opacities posteriorly   either representing areas of consolidation; cannot exclude additional   nodules.   3. Fracture right humeral head/neck with impaction indeterminate age.   Clinical   correlation   IMPRESSION:   1. Marked fecal impaction is noted throughout the colon particularly the   rectum   and rectosigmoid. 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 No bowel   obstruction or pneumoperitoneum.   2. There is some thickening of the wall of the urinary bladder with some   compression on the surface of the bladder by prominent prostate gland.   Clinical   correlation for possible cystitis/bladder outlet obstruction. Suggest   assessment urine for possible bladder infection; cystoscopy can be obtained   if   clinically needed. There are radon seeds in the prostate gland.     ******PRELIMINARY REPORT******   ******PRELIMINARY REPORT******         CAPRINI SCORE [CLOT]    AGE RELATED RISK FACTORS                                                       MOBILITY RELATED FACTORS  [ ] Age 41-60 years                                            (1 Point)                  [ ] Bed rest                                                        (1 Point)  [ ] Age: 61-74 years                                           (2 Points)                 [ ] Plaster cast                                                   (2 Points)  [ 3] Age= 75 years                                              (3 Points)                 [ ] Bed bound for more than 72 hours                 (2 Points)    DISEASE RELATED RISK FACTORS                                               GENDER SPECIFIC FACTORS  [ ] Edema in the lower extremities                       (1 Point)                  [ ] Pregnancy                                                     (1 Point)  [ ] Varicose veins                                               (1 Point)                  [ ] Post-partum < 6 weeks                                   (1 Point)             [ ] BMI > 25 Kg/m2                                            (1 Point)                  [ ] Hormonal therapy  or oral contraception          (1 Point)                 [1 ] Sepsis (in the previous month)                        (1 Point)                  [ ] History of pregnancy complications                 (1 point)  [1 ] Pneumonia or serious lung disease                                               [ ] Unexplained or recurrent                     (1 Point)           (in the previous month)                               (1 Point)  [ ] Abnormal pulmonary function test                     (1 Point)                 SURGERY RELATED RISK FACTORS  [ ] Acute myocardial infarction                              (1 Point)                 [ ]  Section                                             (1 Point)  [ ] Congestive heart failure (in the previous month)  (1 Point)               [ ] Minor surgery                                                  (1 Point)   [ ] Inflammatory bowel disease                             (1 Point)                 [ ] Arthroscopic surgery                                        (2 Points)  [ ] Central venous access                                      (2 Points)                [ ] General surgery lasting more than 45 minutes   (2 Points)       [ ] Stroke (in the previous month)                          (5 Points)               [ ] Elective arthroplasty                                         (5 Points)                                                                                                                                               HEMATOLOGY RELATED FACTORS                                                 TRAUMA RELATED RISK FACTORS  [ ] Prior episodes of VTE                                     (3 Points)                 [ ] Fracture of the hip, pelvis, or leg                       (5 Points)  [ ] Positive family history for VTE                         (3 Points)                 [ ] Acute spinal cord injury (in the previous month)  (5 Points)  [ ] Prothrombin 33372 A                                     (3 Points)                 [ ] Paralysis  (less than 1 month)                             (5 Points)  [ ] Factor V Leiden                                             (3 Points)                  [ ] Multiple Trauma within 1 month                        (5 Points)  [ ] Lupus anticoagulants                                     (3 Points)                                                           [ ] Anticardiolipin antibodies                               (3 Points)                                                       [ ] High homocysteine in the blood                      (3 Points)                                             [ ] Other congenital or acquired thrombophilia      (3 Points)                                                [ ] Heparin induced thrombocytopenia                  (3 Points)                                          Total Score [    5      ] 85M hx of HTN, remote prostate ca (treated s/p seeds/RT), encephalitis with dysphagia and PEG status, seizure d/o presenting with sepsis.     # Sepsis: suspected source at this time is PNA, multifocal LLL and RLL, suspects aspiration. WBC downtrended, afebrile today  - sepsis focus exam complete  - continue Vanc/ meropenem  - f/u Blood cx, RVP  - ID following    #Presumed gram-negative PNA:   - continue Vanc/ meropenem  - ID following   - keep NPO for now  - continue O2 support    #SANGITA: Cr 1.5 on admission, improved after IVF resuscitation  - continue to trend    #Fecal impaction:   - dulcolax suppository  - Golytely via PEG  - monitor BM  - GI following    #Hypernatremia: 2/2 dehydration  - continue D5  - trend BMP    #Hypokalemia: K 3.3  - repleted    #DM type 2 with hyperglycemia: HbA1C 8.4%  - continue ISS  - tube feed recs per dietician  - NPO for now, likely resume tomorrow    #HTN: on amiloride 5mg TID and labetalol 100mg TID  - hold meds due to borderline low BP    #History of encephalitis:  - continue Keppra and Vimpat    #DVT/GI proph  - lovenox    # GOC  DNR/DNI status, molst in chart      CAPRINI SCORE [CLOT]    AGE RELATED RISK FACTORS                                                       MOBILITY RELATED FACTORS  [ ] Age 41-60 years                                            (1 Point)                  [ ] Bed rest                                                        (1 Point)  [ ] Age: 61-74 years                                           (2 Points)                 [ ] Plaster cast                                                   (2 Points)  [ 3] Age= 75 years                                              (3 Points)                 [ ] Bed bound for more than 72 hours                 (2 Points)    DISEASE RELATED RISK FACTORS                                               GENDER SPECIFIC FACTORS  [ ] Edema in the lower extremities                       (1 Point)                  [ ] Pregnancy                                                     (1 Point)  [ ] Varicose veins                                               (1 Point)                  [ ] Post-partum < 6 weeks                                   (1 Point)             [ ] BMI > 25 Kg/m2                                            (1 Point)                  [ ] Hormonal therapy  or oral contraception          (1 Point)                 [1 ] Sepsis (in the previous month)                        (1 Point)                  [ ] History of pregnancy complications                 (1 point)  [1 ] Pneumonia or serious lung disease                                               [ ] Unexplained or recurrent                     (1 Point)           (in the previous month)                               (1 Point)  [ ] Abnormal pulmonary function test                     (1 Point)                 SURGERY RELATED RISK FACTORS  [ ] Acute myocardial infarction                              (1 Point)                 [ ]  Section                                             (1 Point)  [ ] Congestive heart failure (in the previous month)  (1 Point)               [ ] Minor surgery                                                  (1 Point)   [ ] Inflammatory bowel disease                             (1 Point)                 [ ] Arthroscopic surgery                                        (2 Points)  [ ] Central venous access                                      (2 Points)                [ ] General surgery lasting more than 45 minutes   (2 Points)       [ ] Stroke (in the previous month)                          (5 Points)               [ ] Elective arthroplasty                                         (5 Points)                                                                                                                                               HEMATOLOGY RELATED FACTORS                                                 TRAUMA RELATED RISK FACTORS  [ ] Prior episodes of VTE                                     (3 Points)                 [ ] Fracture of the hip, pelvis, or leg                       (5 Points)  [ ] Positive family history for VTE                         (3 Points)                 [ ] Acute spinal cord injury (in the previous month)  (5 Points)  [ ] Prothrombin 93874 A                                     (3 Points)                 [ ] Paralysis  (less than 1 month)                             (5 Points)  [ ] Factor V Leiden                                             (3 Points)                  [ ] Multiple Trauma within 1 month                        (5 Points)  [ ] Lupus anticoagulants                                     (3 Points)                                                           [ ] Anticardiolipin antibodies                               (3 Points)                                                       [ ] High homocysteine in the blood                      (3 Points)                                             [ ] Other congenital or acquired thrombophilia      (3 Points)                                                [ ] Heparin induced thrombocytopenia                  (3 Points)                                          Total Score [    5      ]

## 2019-12-29 NOTE — DIETITIAN INITIAL EVALUATION ADULT. - OTHER INFO
85M hx of HTN, remote prostate ca (treated s/p seeds/RT), encephalitis with dysphagia and PEG status, seizure d/o presenting with sepsis.     Pt currently NPO with PEG- no orders for feedings. Spoke with pt's daughter who reports that pt has had numerous hospitalizations over the last 3 months, during which she believes he has lost a significant amount of weight, but unsure of amount or UBW. She believes PEG was placed in October, 2019 2/2 dysphagia. She states that he was receiving continuous tube feedings at hospital and bolus feeds at rehab. She denies noting any evidence of intolerance to feeds; he was receiving Jevity 1.5 formula ~2,100kcals/day. Pt noted with hyperglycemia, (12/29) A1c 8.4, steroid induced ? no hx DMII per chart review. As per discussion with MD, will hold off on initiation of tube feeding until tomorrow due to fear of aspiration; bolus feeds may be better suited for pt in this case. Also noted GI following for fecal impaction.     Height: 5'11", Weight: (12/29) 169lbs, BMI: 23.6  Skin: no pressure ulcers, Edema: +1 bilateral hands  IBW range: 155-189lbs

## 2019-12-30 LAB
ALBUMIN SERPL ELPH-MCNC: 1.5 G/DL — LOW (ref 3.3–5)
ALP SERPL-CCNC: 111 U/L — SIGNIFICANT CHANGE UP (ref 40–120)
ALT FLD-CCNC: 42 U/L — SIGNIFICANT CHANGE UP (ref 10–45)
ANION GAP SERPL CALC-SCNC: 7 MMOL/L — SIGNIFICANT CHANGE UP (ref 5–17)
AST SERPL-CCNC: 66 U/L — HIGH (ref 10–40)
BASOPHILS # BLD AUTO: 0.03 K/UL — SIGNIFICANT CHANGE UP (ref 0–0.2)
BASOPHILS NFR BLD AUTO: 0.3 % — SIGNIFICANT CHANGE UP (ref 0–2)
BILIRUB SERPL-MCNC: 0.7 MG/DL — SIGNIFICANT CHANGE UP (ref 0.2–1.2)
BUN SERPL-MCNC: 23 MG/DL — SIGNIFICANT CHANGE UP (ref 7–23)
CALCIUM SERPL-MCNC: 7.9 MG/DL — LOW (ref 8.4–10.5)
CHLORIDE SERPL-SCNC: 110 MMOL/L — HIGH (ref 96–108)
CO2 SERPL-SCNC: 30 MMOL/L — SIGNIFICANT CHANGE UP (ref 22–31)
CREAT SERPL-MCNC: 0.89 MG/DL — SIGNIFICANT CHANGE UP (ref 0.5–1.3)
EOSINOPHIL # BLD AUTO: 0.03 K/UL — SIGNIFICANT CHANGE UP (ref 0–0.5)
EOSINOPHIL NFR BLD AUTO: 0.3 % — SIGNIFICANT CHANGE UP (ref 0–6)
GLUCOSE BLDC GLUCOMTR-MCNC: 160 MG/DL — HIGH (ref 70–99)
GLUCOSE SERPL-MCNC: 124 MG/DL — HIGH (ref 70–99)
HCT VFR BLD CALC: 34.1 % — LOW (ref 39–50)
HGB BLD-MCNC: 10.8 G/DL — LOW (ref 13–17)
IMM GRANULOCYTES NFR BLD AUTO: 0.5 % — SIGNIFICANT CHANGE UP (ref 0–1.5)
LYMPHOCYTES # BLD AUTO: 2.06 K/UL — SIGNIFICANT CHANGE UP (ref 1–3.3)
LYMPHOCYTES # BLD AUTO: 20.5 % — SIGNIFICANT CHANGE UP (ref 13–44)
MAGNESIUM SERPL-MCNC: 1 MG/DL — CRITICAL LOW (ref 1.6–2.6)
MCHC RBC-ENTMCNC: 30.2 PG — SIGNIFICANT CHANGE UP (ref 27–34)
MCHC RBC-ENTMCNC: 31.7 GM/DL — LOW (ref 32–36)
MCV RBC AUTO: 95.3 FL — SIGNIFICANT CHANGE UP (ref 80–100)
MONOCYTES # BLD AUTO: 0.43 K/UL — SIGNIFICANT CHANGE UP (ref 0–0.9)
MONOCYTES NFR BLD AUTO: 4.3 % — SIGNIFICANT CHANGE UP (ref 2–14)
NEUTROPHILS # BLD AUTO: 7.44 K/UL — HIGH (ref 1.8–7.4)
NEUTROPHILS NFR BLD AUTO: 74.1 % — SIGNIFICANT CHANGE UP (ref 43–77)
NRBC # BLD: 0 /100 WBCS — SIGNIFICANT CHANGE UP (ref 0–0)
PHOSPHATE SERPL-MCNC: 2.4 MG/DL — LOW (ref 2.5–4.5)
PLATELET # BLD AUTO: 292 K/UL — SIGNIFICANT CHANGE UP (ref 150–400)
POTASSIUM SERPL-MCNC: 3.8 MMOL/L — SIGNIFICANT CHANGE UP (ref 3.5–5.3)
POTASSIUM SERPL-SCNC: 3.8 MMOL/L — SIGNIFICANT CHANGE UP (ref 3.5–5.3)
PROT SERPL-MCNC: 6 G/DL — SIGNIFICANT CHANGE UP (ref 6–8.3)
RBC # BLD: 3.58 M/UL — LOW (ref 4.2–5.8)
RBC # FLD: 16 % — HIGH (ref 10.3–14.5)
SODIUM SERPL-SCNC: 147 MMOL/L — HIGH (ref 135–145)
WBC # BLD: 10.04 K/UL — SIGNIFICANT CHANGE UP (ref 3.8–10.5)
WBC # FLD AUTO: 10.04 K/UL — SIGNIFICANT CHANGE UP (ref 3.8–10.5)

## 2019-12-30 PROCEDURE — 99233 SBSQ HOSP IP/OBS HIGH 50: CPT

## 2019-12-30 PROCEDURE — 99232 SBSQ HOSP IP/OBS MODERATE 35: CPT

## 2019-12-30 RX ORDER — POTASSIUM PHOSPHATE, MONOBASIC POTASSIUM PHOSPHATE, DIBASIC 236; 224 MG/ML; MG/ML
15 INJECTION, SOLUTION INTRAVENOUS ONCE
Refills: 0 | Status: COMPLETED | OUTPATIENT
Start: 2019-12-30 | End: 2019-12-30

## 2019-12-30 RX ORDER — MAGNESIUM SULFATE 500 MG/ML
1 VIAL (ML) INJECTION
Refills: 0 | Status: COMPLETED | OUTPATIENT
Start: 2019-12-30 | End: 2019-12-30

## 2019-12-30 RX ORDER — POLYETHYLENE GLYCOL 3350 17 G/17G
17 POWDER, FOR SOLUTION ORAL DAILY
Refills: 0 | Status: DISCONTINUED | OUTPATIENT
Start: 2019-12-30 | End: 2020-01-14

## 2019-12-30 RX ADMIN — LACOSAMIDE 200 MILLIGRAM(S): 50 TABLET ORAL at 06:05

## 2019-12-30 RX ADMIN — Medication 1: at 12:22

## 2019-12-30 RX ADMIN — POTASSIUM PHOSPHATE, MONOBASIC POTASSIUM PHOSPHATE, DIBASIC 62.5 MILLIMOLE(S): 236; 224 INJECTION, SOLUTION INTRAVENOUS at 12:20

## 2019-12-30 RX ADMIN — MEROPENEM 100 MILLIGRAM(S): 1 INJECTION INTRAVENOUS at 23:51

## 2019-12-30 RX ADMIN — LEVETIRACETAM 1500 MILLIGRAM(S): 250 TABLET, FILM COATED ORAL at 06:05

## 2019-12-30 RX ADMIN — Medication 100 GRAM(S): at 11:14

## 2019-12-30 RX ADMIN — Medication 250 MILLIGRAM(S): at 23:50

## 2019-12-30 RX ADMIN — Medication 100 GRAM(S): at 10:14

## 2019-12-30 RX ADMIN — SERTRALINE 75 MILLIGRAM(S): 25 TABLET, FILM COATED ORAL at 12:21

## 2019-12-30 RX ADMIN — ENOXAPARIN SODIUM 40 MILLIGRAM(S): 100 INJECTION SUBCUTANEOUS at 12:21

## 2019-12-30 RX ADMIN — Medication 1: at 17:24

## 2019-12-30 RX ADMIN — MEROPENEM 100 MILLIGRAM(S): 1 INJECTION INTRAVENOUS at 14:56

## 2019-12-30 RX ADMIN — LACOSAMIDE 200 MILLIGRAM(S): 50 TABLET ORAL at 19:42

## 2019-12-30 RX ADMIN — MEROPENEM 100 MILLIGRAM(S): 1 INJECTION INTRAVENOUS at 06:05

## 2019-12-30 RX ADMIN — LEVETIRACETAM 1500 MILLIGRAM(S): 250 TABLET, FILM COATED ORAL at 17:24

## 2019-12-30 NOTE — PROGRESS NOTE ADULT - SUBJECTIVE AND OBJECTIVE BOX
INTERVAL HPI/OVERNIGHT EVENTS:  HPI:    86 y/o male pmh HTN, Prostate Ca, Viral Encephalitis, Seizure, Dysphagia who is admitted with PNA and fecal impaction. Patient seen and examined at bedside. Per RN patient had multiple large bowel movements after administration of golytely.      MEDICATIONS  (STANDING):  dextrose 5%. 1000 milliLiter(s) (50 mL/Hr) IV Continuous <Continuous>  dextrose 5%. 1000 milliLiter(s) (50 mL/Hr) IV Continuous <Continuous>  dextrose 50% Injectable 12.5 Gram(s) IV Push once  dextrose 50% Injectable 25 Gram(s) IV Push once  dextrose 50% Injectable 25 Gram(s) IV Push once  enoxaparin Injectable 40 milliGRAM(s) SubCutaneous daily  insulin lispro (HumaLOG) corrective regimen sliding scale   SubCutaneous every 6 hours  lacosamide 200 milliGRAM(s) Oral two times a day  levETIRAcetam  Solution 1500 milliGRAM(s) Enteral Tube two times a day  meropenem  IVPB 1000 milliGRAM(s) IV Intermittent every 8 hours  pantoprazole    Tablet 40 milliGRAM(s) Oral before breakfast  polyethylene glycol 3350 17 Gram(s) Oral daily  sertraline 75 milliGRAM(s) Oral daily  vancomycin  IVPB 1000 milliGRAM(s) IV Intermittent every 24 hours    MEDICATIONS  (PRN):  acetaminophen    Suspension .. 650 milliGRAM(s) Enteral Tube every 6 hours PRN Temp greater or equal to 38C (100.4F), Mild Pain (1 - 3)  albuterol/ipratropium for Nebulization 3 milliLiter(s) Nebulizer every 6 hours PRN Bronchospasm  dextrose 40% Gel 15 Gram(s) Oral once PRN Blood Glucose LESS THAN 70 milliGRAM(s)/deciliter  glucagon  Injectable 1 milliGRAM(s) IntraMuscular once PRN Glucose LESS THAN 70 milligrams/deciliter      Allergies    No Known Allergies    Intolerances        PHYSICAL EXAM:   Vital Signs:  Vital Signs Last 24 Hrs  T(C): 36.6 (30 Dec 2019 04:37), Max: 36.9 (29 Dec 2019 15:50)  T(F): 97.9 (30 Dec 2019 04:37), Max: 98.4 (29 Dec 2019 15:50)  HR: 62 (30 Dec 2019 10:11) (59 - 71)  BP: 93/42 (30 Dec 2019 09:08) (93/42 - 105/62)  BP(mean): 64 (30 Dec 2019 09:08) (64 - 64)  RR: 16 (30 Dec 2019 09:08) (16 - 20)  SpO2: 93% (30 Dec 2019 10:11) (93% - 98%)  Daily     Daily Weight in k.2 (30 Dec 2019 04:37)I&O's Summary    29 Dec 2019 07:01  -  30 Dec 2019 07:00  --------------------------------------------------------  IN: 1200 mL / OUT: 1304 mL / NET: -104 mL    30 Dec 2019 07:01  -  30 Dec 2019 14:22  --------------------------------------------------------  IN: 100 mL / OUT: 900 mL / NET: -800 mL    GENERAL:  Appears stated age  HEENT:  NC/AT,  conjunctivae clear and pink,  CHEST:  decreased BS  HEART:  Regular rhythm, S1, S2  ABDOMEN:  Soft, non-tender, non-distended, normoactive bowel sounds,  peg intact  EXTEREMITIES:  no edema  SKIN:  No rash, warm/dry  NEURO:  Lethargic      LABS:                        10.8   10.04 )-----------( 292      ( 30 Dec 2019 06:58 )             34.1     12-30    147<H>  |  110<H>  |  23  ----------------------------<  124<H>  3.8   |  30  |  0.89    Ca    7.9<L>      30 Dec 2019 06:58  Phos  2.4     12-30  Mg     1.0     12-30    TPro  6.0  /  Alb  1.5<L>  /  TBili  0.7  /  DBili  x   /  AST  66<H>  /  ALT  42  /  AlkPhos  111  12-30    PT/INR - ( 28 Dec 2019 19:19 )   PT: 14.9 sec;   INR: 1.32 ratio         PTT - ( 28 Dec 2019 19:19 )  PTT:32.9 sec  Urinalysis Basic - ( 28 Dec 2019 19:19 )    Color: Yellow / Appearance: Slightly Turbid / S.010 / pH: x  Gluc: x / Ketone: Negative  / Bili: Negative / Urobili: Negative   Blood: x / Protein: 15 / Nitrite: Negative   Leuk Esterase: Negative / RBC: x / WBC x   Sq Epi: x / Non Sq Epi: x / Bacteria: x      amylase   lipase  RADIOLOGY & ADDITIONAL TESTS:

## 2019-12-30 NOTE — PROGRESS NOTE ADULT - ASSESSMENT
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  His CT shows LLL consolidating infiltrate and he may have occluded bronchus.  He had a fairly extensive w.u at [facility] which did not reveal any clear infection.  He was treated with steroids, IVIG, and ? additional agents.  His last LP had 1 wbc with elevated protein.  He had a positive WNV serology and a positive CSF toxo serology, however both were felt to be non related to acute events.  His acute problem appears pulmonary, he has been taking "pleasure " feeds, ?aspirating.  He is at risk for resistant naina, recent zosyn course.He also was severely constipated, ? leading to aspiration  Blood cultures are negative, RVP and legionella urine antigen pending  He was started on vanco and meropenem, looks improved  Suggest:  1.Continue vanco and meropenem  2.Will request MRSA screen, would like to narrow coverage, he is not productive  3.Enteral feeds per medicine  4.Mental status may be a limiting factor.If he truly had a "viral"encephalitis,  his mental status may not recover.

## 2019-12-30 NOTE — PROGRESS NOTE ADULT - ASSESSMENT
85M hx of HTN, remote prostate ca (treated s/p seeds/RT), encephalitis with dysphagia and PEG status, seizure d/o presenting with sepsis.     # Acute hypoxic respiratory failure: 2/2  #Coronavirus and superimposed multifocal PNA: WBC downtrended, afebrile   - continue Vanc/ meropenem  - f/u Blood cx  - ID following  - continue O2 support  - OK t o resume tube feeds    #SANGITA: Cr 1.5 on admission  - resolved    #Fecal impaction: resolved s/p Golytely  - Miralax daily  - Dulcolax PRN    #Hypernatremia: 2/2 dehydration  - continue D5  - trend BMP    #Hypokalemia/ hypomagnesemia/ hypophosphatemia:  - repleted    #Moderate protein calorie malnutrition/ hypoalbunemia: Albumin 1.5  - continue bolus tube feeds  - dietician following      #DM type 2 with hyperglycemia: HbA1C 8.4%  - continue ISS  - resume tube feeds    #HTN: on amiloride 5mg TID and labetalol 100mg TID  - hold meds due to low BP    #History of encephalitis:  - continue Keppra and Vimpat    #DVT/GI proph  - lovenox    # GOC  DNR/DNI status, molst in chart      CAPRINI SCORE [CLOT]    AGE RELATED RISK FACTORS                                                       MOBILITY RELATED FACTORS  [ ] Age 41-60 years                                            (1 Point)                  [ ] Bed rest                                                        (1 Point)  [ ] Age: 61-74 years                                           (2 Points)                 [ ] Plaster cast                                                   (2 Points)  [ 3] Age= 75 years                                              (3 Points)                 [ ] Bed bound for more than 72 hours                 (2 Points)    DISEASE RELATED RISK FACTORS                                               GENDER SPECIFIC FACTORS  [ ] Edema in the lower extremities                       (1 Point)                  [ ] Pregnancy                                                     (1 Point)  [ ] Varicose veins                                               (1 Point)                  [ ] Post-partum < 6 weeks                                   (1 Point)             [ ] BMI > 25 Kg/m2                                            (1 Point)                  [ ] Hormonal therapy  or oral contraception          (1 Point)                 [1 ] Sepsis (in the previous month)                        (1 Point)                  [ ] History of pregnancy complications                 (1 point)  [1 ] Pneumonia or serious lung disease                                               [ ] Unexplained or recurrent                     (1 Point)           (in the previous month)                               (1 Point)  [ ] Abnormal pulmonary function test                     (1 Point)                 SURGERY RELATED RISK FACTORS  [ ] Acute myocardial infarction                              (1 Point)                 [ ]  Section                                             (1 Point)  [ ] Congestive heart failure (in the previous month)  (1 Point)               [ ] Minor surgery                                                  (1 Point)   [ ] Inflammatory bowel disease                             (1 Point)                 [ ] Arthroscopic surgery                                        (2 Points)  [ ] Central venous access                                      (2 Points)                [ ] General surgery lasting more than 45 minutes   (2 Points)       [ ] Stroke (in the previous month)                          (5 Points)               [ ] Elective arthroplasty                                         (5 Points)                                                                                                                                               HEMATOLOGY RELATED FACTORS                                                 TRAUMA RELATED RISK FACTORS  [ ] Prior episodes of VTE                                     (3 Points)                 [ ] Fracture of the hip, pelvis, or leg                       (5 Points)  [ ] Positive family history for VTE                         (3 Points)                 [ ] Acute spinal cord injury (in the previous month)  (5 Points)  [ ] Prothrombin 95746 A                                     (3 Points)                 [ ] Paralysis  (less than 1 month)                             (5 Points)  [ ] Factor V Leiden                                             (3 Points)                  [ ] Multiple Trauma within 1 month                        (5 Points)  [ ] Lupus anticoagulants                                     (3 Points)                                                           [ ] Anticardiolipin antibodies                               (3 Points)                                                       [ ] High homocysteine in the blood                      (3 Points)                                             [ ] Other congenital or acquired thrombophilia      (3 Points)                                                [ ] Heparin induced thrombocytopenia                  (3 Points)                                          Total Score [    5      ]

## 2019-12-30 NOTE — PROGRESS NOTE ADULT - SUBJECTIVE AND OBJECTIVE BOX
CC: f/u for pneumonia    Patient reports: he is non verbal, opens eyes to stimulation, is in no respiratory distress,has received a bowel regimen for impaction which has been effective    REVIEW OF SYSTEMS:  All other review of systems negative (Comprehensive ROS): limited by condition    Antimicrobials Day #  :day 2  meropenem  IVPB 1000 milliGRAM(s) IV Intermittent every 8 hours  vancomycin  IVPB 1000 milliGRAM(s) IV Intermittent every 24 hours    Other Medications Reviewed    T(F): 97.6 (19 @ 15:54), Max: 97.9 (19 @ 04:37)  HR: 95 (19 @ 15:54)  BP: 92/41 (19 @ 15:54)  RR: 16 (19 @ 15:54)  SpO2: 95% (19 @ 15:54)  Wt(kg): --    PHYSICAL EXAM:  General: lethargic, no acute distress  Eyes:  anicteric, no conjunctival injection, no discharge  Oropharynx: no lesions or injection 	  Neck: supple, without adenopathy  Lungs: clear to auscultation  Heart: regular rate and rhythm; no murmur, rubs or gallops  Abdomen: soft, nondistended, nontender, peg in place  Skin: no lesions  Extremities: no clubbing, cyanosis, or edema  Neurologic: poorly interactive    LAB RESULTS:                        10.8   10.04 )-----------( 292      ( 30 Dec 2019 06:58 )             34.1     12-30    147<H>  |  110<H>  |  23  ----------------------------<  124<H>  3.8   |  30  |  0.89    Ca    7.9<L>      30 Dec 2019 06:58  Phos  2.4     12-30  Mg     1.0     12-30    TPro  6.0  /  Alb  1.5<L>  /  TBili  0.7  /  DBili  x   /  AST  66<H>  /  ALT  42  /  AlkPhos  111  12-30    LIVER FUNCTIONS - ( 30 Dec 2019 06:58 )  Alb: 1.5 g/dL / Pro: 6.0 g/dL / ALK PHOS: 111 U/L / ALT: 42 U/L / AST: 66 U/L / GGT: x           Urinalysis Basic - ( 28 Dec 2019 19:19 )    Color: Yellow / Appearance: Slightly Turbid / S.010 / pH: x  Gluc: x / Ketone: Negative  / Bili: Negative / Urobili: Negative   Blood: x / Protein: 15 / Nitrite: Negative   Leuk Esterase: Negative / RBC: x / WBC x   Sq Epi: x / Non Sq Epi: x / Bacteria: x      MICROBIOLOGY:  RECENT CULTURES:   @ 17:55 .Blood Blood-Peripheral     No growth to date.          RADIOLOGY REVIEWED:    < from: CT Chest No Cont (19 @ 22:13) >  IMPRESSION:   1. Left lower lobe consolidation with complete or almost complete obstruction of the left lower lobe bronchus.; left hilar mass cannot be excluded. Consider further evaluation with contrast enhanced study and/or bronchoscopy.   2. Right lower lobe infiltrate with several pleural nodules in the right hemithorax.   3. Fracture right humeral head/neck with impaction indeterminate age. Clinical   correlation     < end of copied text >

## 2019-12-30 NOTE — PROGRESS NOTE ADULT - SUBJECTIVE AND OBJECTIVE BOX
Patient is a 85y old  Male who presents with a chief complaint of desaturation (30 Dec 2019 14:21)      Interval HPI/ Overnight events: NO events overnight    Patient seen and examined at bedside, lethargic but arousable to voice, does not answer questions, unable to obtain ROS        ALLERGIES:  No Known Allergies    MEDICATIONS  (STANDING):  dextrose 5%. 1000 milliLiter(s) (50 mL/Hr) IV Continuous <Continuous>  dextrose 5%. 1000 milliLiter(s) (50 mL/Hr) IV Continuous <Continuous>  dextrose 50% Injectable 12.5 Gram(s) IV Push once  dextrose 50% Injectable 25 Gram(s) IV Push once  dextrose 50% Injectable 25 Gram(s) IV Push once  enoxaparin Injectable 40 milliGRAM(s) SubCutaneous daily  insulin lispro (HumaLOG) corrective regimen sliding scale   SubCutaneous every 6 hours  lacosamide 200 milliGRAM(s) Oral two times a day  levETIRAcetam  Solution 1500 milliGRAM(s) Enteral Tube two times a day  meropenem  IVPB 1000 milliGRAM(s) IV Intermittent every 8 hours  pantoprazole    Tablet 40 milliGRAM(s) Oral before breakfast  polyethylene glycol 3350 17 Gram(s) Oral daily  sertraline 75 milliGRAM(s) Oral daily  vancomycin  IVPB 1000 milliGRAM(s) IV Intermittent every 24 hours    MEDICATIONS  (PRN):  acetaminophen    Suspension .. 650 milliGRAM(s) Enteral Tube every 6 hours PRN Temp greater or equal to 38C (100.4F), Mild Pain (1 - 3)  albuterol/ipratropium for Nebulization 3 milliLiter(s) Nebulizer every 6 hours PRN Bronchospasm  dextrose 40% Gel 15 Gram(s) Oral once PRN Blood Glucose LESS THAN 70 milliGRAM(s)/deciliter  glucagon  Injectable 1 milliGRAM(s) IntraMuscular once PRN Glucose LESS THAN 70 milligrams/deciliter    Vital Signs Last 24 Hrs  T(F): 97.6 (30 Dec 2019 15:54), Max: 97.9 (30 Dec 2019 04:37)  HR: 95 (30 Dec 2019 15:54) (59 - 95)  BP: 92/41 (30 Dec 2019 15:54) (92/41 - 100/96)  RR: 16 (30 Dec 2019 15:54) (16 - 20)  SpO2: 95% (30 Dec 2019 15:54) (93% - 98%)  I&O's Summary    29 Dec 2019 07:01  -  30 Dec 2019 07:00  --------------------------------------------------------  IN: 1200 mL / OUT: 1304 mL / NET: -104 mL    30 Dec 2019 07:01  -  30 Dec 2019 17:14  --------------------------------------------------------  IN: 725 mL / OUT: 901 mL / NET: -176 mL      PHYSICAL EXAM:  General: elderly male, lying in bed in NAD  Head: NT/AC  ENT: dry oral mucosa, on nasal cannula, no nasal discharge  Neck: Supple, No JVD  Lungs: decreased breath sounds at bilateral bases, no wheezing or rhonchi  Cardio: RRR, normal S1/S2, No M/R/G  Abdomen: Normoactive BS, Abdomen soft, nontender, nondistended; no organomegaly  Extremities: No calf tenderness, no clubbing or  cyanosis  Vascular: no LE edema  Skin: warm and dry  Neuro: sleepy but arousable to voice, not answering questions or following commands    LABS:                        10.8   10.04 )-----------( 292      ( 30 Dec 2019 06:58 )             34.1         147  |  110  |  23  ----------------------------<  124  3.8   |  30  |  0.89    Ca    7.9      30 Dec 2019 06:58  Phos  2.4     12  Mg     1.0         TPro  6.0  /  Alb  1.5  /  TBili  0.7  /  DBili  x   /  AST  66  /  ALT  42  /  AlkPhos  111      eGFR if Non African American: 78 mL/min/1.73M2 (19 @ 06:58)  eGFR if African American: 90 mL/min/1.73M2 (19 @ 06:58)    PT/INR - ( 28 Dec 2019 19:19 )   PT: 14.9 sec;   INR: 1.32 ratio         PTT - ( 28 Dec 2019 19:19 )  PTT:32.9 sec  Lactate, Blood: 2.2 mmol/L ( @ 05:48)  Lactate, Blood: 3.1 mmol/L ( @ 23:47)  Lactate, Blood: 3.7 mmol/L ( @ 19:19)              ABG - ( 28 Dec 2019 21:30 )  pH, Arterial: 7.43  pH, Blood: x     /  pCO2: 32    /  pO2: 70    / HCO3: x     / Base Excess: x     /  SaO2: 92                      POCT Blood Glucose.: 160 mg/dL (30 Dec 2019 11:33)  POCT Blood Glucose.: 142 mg/dL (29 Dec 2019 17:16)     [id number] 8.4    Urinalysis Basic - ( 28 Dec 2019 19:19 )    Color: Yellow / Appearance: Slightly Turbid / S.010 / pH: x  Gluc: x / Ketone: Negative  / Bili: Negative / Urobili: Negative   Blood: x / Protein: 15 / Nitrite: Negative   Leuk Esterase: Negative / RBC: x / WBC x   Sq Epi: x / Non Sq Epi: x / Bacteria: x        Culture - Urine (collected 28 Dec 2019 17:55)  Source: .Urine Clean Catch (Midstream)  Final Report (29 Dec 2019 21:26):    <10,000 CFU/mL Normal Urogenital Domenica    Culture - Blood (collected 28 Dec 2019 17:55)  Source: .Blood Blood-Peripheral  Preliminary Report (30 Dec 2019 03:01):    No growth to date.    Culture - Blood (collected 28 Dec 2019 17:55)  Source: .Blood Blood-Peripheral  Preliminary Report (30 Dec 2019 03:01):    No growth to date.        RADIOLOGY & ADDITIONAL TESTS:    Care Discussed with Consultants/Other Providers:

## 2019-12-31 LAB
ANION GAP SERPL CALC-SCNC: 8 MMOL/L — SIGNIFICANT CHANGE UP (ref 5–17)
BASOPHILS # BLD AUTO: 0.02 K/UL — SIGNIFICANT CHANGE UP (ref 0–0.2)
BASOPHILS NFR BLD AUTO: 0.2 % — SIGNIFICANT CHANGE UP (ref 0–2)
BUN SERPL-MCNC: 15 MG/DL — SIGNIFICANT CHANGE UP (ref 7–23)
CALCIUM SERPL-MCNC: 7.8 MG/DL — LOW (ref 8.4–10.5)
CHLORIDE SERPL-SCNC: 101 MMOL/L — SIGNIFICANT CHANGE UP (ref 96–108)
CO2 SERPL-SCNC: 30 MMOL/L — SIGNIFICANT CHANGE UP (ref 22–31)
CREAT SERPL-MCNC: 0.92 MG/DL — SIGNIFICANT CHANGE UP (ref 0.5–1.3)
EOSINOPHIL # BLD AUTO: 0.03 K/UL — SIGNIFICANT CHANGE UP (ref 0–0.5)
EOSINOPHIL NFR BLD AUTO: 0.3 % — SIGNIFICANT CHANGE UP (ref 0–6)
GLUCOSE BLDC GLUCOMTR-MCNC: 129 MG/DL — HIGH (ref 70–99)
GLUCOSE BLDC GLUCOMTR-MCNC: 141 MG/DL — HIGH (ref 70–99)
GLUCOSE BLDC GLUCOMTR-MCNC: 160 MG/DL — HIGH (ref 70–99)
GLUCOSE BLDC GLUCOMTR-MCNC: 206 MG/DL — HIGH (ref 70–99)
GLUCOSE BLDC GLUCOMTR-MCNC: 221 MG/DL — HIGH (ref 70–99)
GLUCOSE SERPL-MCNC: 162 MG/DL — HIGH (ref 70–99)
HCT VFR BLD CALC: 35.6 % — LOW (ref 39–50)
HGB BLD-MCNC: 11.5 G/DL — LOW (ref 13–17)
IMM GRANULOCYTES NFR BLD AUTO: 0.7 % — SIGNIFICANT CHANGE UP (ref 0–1.5)
LYMPHOCYTES # BLD AUTO: 2.08 K/UL — SIGNIFICANT CHANGE UP (ref 1–3.3)
LYMPHOCYTES # BLD AUTO: 23.5 % — SIGNIFICANT CHANGE UP (ref 13–44)
MAGNESIUM SERPL-MCNC: 1.3 MG/DL — LOW (ref 1.6–2.6)
MCHC RBC-ENTMCNC: 29.6 PG — SIGNIFICANT CHANGE UP (ref 27–34)
MCHC RBC-ENTMCNC: 32.3 GM/DL — SIGNIFICANT CHANGE UP (ref 32–36)
MCV RBC AUTO: 91.5 FL — SIGNIFICANT CHANGE UP (ref 80–100)
MONOCYTES # BLD AUTO: 0.38 K/UL — SIGNIFICANT CHANGE UP (ref 0–0.9)
MONOCYTES NFR BLD AUTO: 4.3 % — SIGNIFICANT CHANGE UP (ref 2–14)
NEUTROPHILS # BLD AUTO: 6.29 K/UL — SIGNIFICANT CHANGE UP (ref 1.8–7.4)
NEUTROPHILS NFR BLD AUTO: 71 % — SIGNIFICANT CHANGE UP (ref 43–77)
NRBC # BLD: 0 /100 WBCS — SIGNIFICANT CHANGE UP (ref 0–0)
PHOSPHATE SERPL-MCNC: 1.9 MG/DL — LOW (ref 2.5–4.5)
PLATELET # BLD AUTO: 217 K/UL — SIGNIFICANT CHANGE UP (ref 150–400)
POTASSIUM SERPL-MCNC: 2.3 MMOL/L — CRITICAL LOW (ref 3.5–5.3)
POTASSIUM SERPL-SCNC: 2.3 MMOL/L — CRITICAL LOW (ref 3.5–5.3)
RBC # BLD: 3.89 M/UL — LOW (ref 4.2–5.8)
RBC # FLD: 15.8 % — HIGH (ref 10.3–14.5)
SODIUM SERPL-SCNC: 139 MMOL/L — SIGNIFICANT CHANGE UP (ref 135–145)
VANCOMYCIN TROUGH SERPL-MCNC: 9.5 UG/ML — LOW (ref 10–20)
WBC # BLD: 8.86 K/UL — SIGNIFICANT CHANGE UP (ref 3.8–10.5)
WBC # FLD AUTO: 8.86 K/UL — SIGNIFICANT CHANGE UP (ref 3.8–10.5)

## 2019-12-31 PROCEDURE — 99233 SBSQ HOSP IP/OBS HIGH 50: CPT

## 2019-12-31 RX ORDER — MAGNESIUM SULFATE 500 MG/ML
1 VIAL (ML) INJECTION
Refills: 0 | Status: COMPLETED | OUTPATIENT
Start: 2019-12-31 | End: 2020-01-01

## 2019-12-31 RX ORDER — POTASSIUM CHLORIDE 20 MEQ
10 PACKET (EA) ORAL
Refills: 0 | Status: DISCONTINUED | OUTPATIENT
Start: 2019-12-31 | End: 2019-12-31

## 2019-12-31 RX ORDER — INSULIN LISPRO 100/ML
VIAL (ML) SUBCUTANEOUS
Refills: 0 | Status: DISCONTINUED | OUTPATIENT
Start: 2019-12-31 | End: 2020-01-14

## 2019-12-31 RX ORDER — POTASSIUM CHLORIDE 20 MEQ
40 PACKET (EA) ORAL EVERY 4 HOURS
Refills: 0 | Status: COMPLETED | OUTPATIENT
Start: 2019-12-31 | End: 2019-12-31

## 2019-12-31 RX ORDER — POTASSIUM PHOSPHATE, MONOBASIC POTASSIUM PHOSPHATE, DIBASIC 236; 224 MG/ML; MG/ML
15 INJECTION, SOLUTION INTRAVENOUS ONCE
Refills: 0 | Status: COMPLETED | OUTPATIENT
Start: 2019-12-31 | End: 2019-12-31

## 2019-12-31 RX ADMIN — SERTRALINE 75 MILLIGRAM(S): 25 TABLET, FILM COATED ORAL at 14:39

## 2019-12-31 RX ADMIN — Medication 40 MILLIEQUIVALENT(S): at 18:35

## 2019-12-31 RX ADMIN — LACOSAMIDE 200 MILLIGRAM(S): 50 TABLET ORAL at 20:15

## 2019-12-31 RX ADMIN — LACOSAMIDE 200 MILLIGRAM(S): 50 TABLET ORAL at 05:41

## 2019-12-31 RX ADMIN — LEVETIRACETAM 1500 MILLIGRAM(S): 250 TABLET, FILM COATED ORAL at 18:34

## 2019-12-31 RX ADMIN — LEVETIRACETAM 1500 MILLIGRAM(S): 250 TABLET, FILM COATED ORAL at 05:42

## 2019-12-31 RX ADMIN — Medication 2: at 17:33

## 2019-12-31 RX ADMIN — POTASSIUM PHOSPHATE, MONOBASIC POTASSIUM PHOSPHATE, DIBASIC 62.5 MILLIMOLE(S): 236; 224 INJECTION, SOLUTION INTRAVENOUS at 10:16

## 2019-12-31 RX ADMIN — MEROPENEM 100 MILLIGRAM(S): 1 INJECTION INTRAVENOUS at 05:42

## 2019-12-31 RX ADMIN — Medication 250 MILLIGRAM(S): at 21:49

## 2019-12-31 RX ADMIN — Medication 1: at 21:54

## 2019-12-31 RX ADMIN — PANTOPRAZOLE SODIUM 40 MILLIGRAM(S): 20 TABLET, DELAYED RELEASE ORAL at 06:03

## 2019-12-31 RX ADMIN — Medication 40 MILLIEQUIVALENT(S): at 14:13

## 2019-12-31 RX ADMIN — MEROPENEM 100 MILLIGRAM(S): 1 INJECTION INTRAVENOUS at 18:34

## 2019-12-31 RX ADMIN — Medication 100 GRAM(S): at 21:49

## 2019-12-31 RX ADMIN — ENOXAPARIN SODIUM 40 MILLIGRAM(S): 100 INJECTION SUBCUTANEOUS at 14:10

## 2019-12-31 NOTE — PROGRESS NOTE ADULT - SUBJECTIVE AND OBJECTIVE BOX
Patient is a 85y old  Male who presents with a chief complaint of desaturation (31 Dec 2019 10:54)      Interval HPI/ Overnight events: No events overnight    Patient seen and examined at bedside, more alert today, denies SOB, chest pain, palpitations, n/v/d/c        ALLERGIES:  No Known Allergies    MEDICATIONS  (STANDING):  dextrose 5%. 1000 milliLiter(s) (50 mL/Hr) IV Continuous <Continuous>  dextrose 50% Injectable 12.5 Gram(s) IV Push once  dextrose 50% Injectable 25 Gram(s) IV Push once  dextrose 50% Injectable 25 Gram(s) IV Push once  enoxaparin Injectable 40 milliGRAM(s) SubCutaneous daily  insulin lispro (HumaLOG) corrective regimen sliding scale   SubCutaneous every 6 hours  lacosamide 200 milliGRAM(s) Oral two times a day  levETIRAcetam  Solution 1500 milliGRAM(s) Enteral Tube two times a day  magnesium sulfate  IVPB 1 Gram(s) IV Intermittent every 1 hour  meropenem  IVPB 1000 milliGRAM(s) IV Intermittent every 8 hours  pantoprazole    Tablet 40 milliGRAM(s) Oral before breakfast  polyethylene glycol 3350 17 Gram(s) Oral daily  potassium chloride   Powder 40 milliEquivalent(s) Oral every 4 hours  sertraline 75 milliGRAM(s) Oral daily  vancomycin  IVPB 1000 milliGRAM(s) IV Intermittent every 24 hours    MEDICATIONS  (PRN):  acetaminophen    Suspension .. 650 milliGRAM(s) Enteral Tube every 6 hours PRN Temp greater or equal to 38C (100.4F), Mild Pain (1 - 3)  albuterol/ipratropium for Nebulization 3 milliLiter(s) Nebulizer every 6 hours PRN Bronchospasm  bisacodyl 5 milliGRAM(s) Oral every 12 hours PRN Constipation  dextrose 40% Gel 15 Gram(s) Oral once PRN Blood Glucose LESS THAN 70 milliGRAM(s)/deciliter  glucagon  Injectable 1 milliGRAM(s) IntraMuscular once PRN Glucose LESS THAN 70 milligrams/deciliter    Vital Signs Last 24 Hrs  T(F): 98 (31 Dec 2019 10:30), Max: 98.4 (31 Dec 2019 05:20)  HR: 85 (31 Dec 2019 10:30) (66 - 91)  BP: 915/- (31 Dec 2019 10:30) (93/45 - 915/-)  RR: 17 (31 Dec 2019 05:20) (17 - 18)  SpO2: 96% (31 Dec 2019 10:30) (95% - 96%)  I&O's Summary    30 Dec 2019 07:01  -  31 Dec 2019 07:00  --------------------------------------------------------  IN: 1000 mL / OUT: 901 mL / NET: 99 mL    31 Dec 2019 07:01  -  31 Dec 2019 16:47  --------------------------------------------------------  IN: 0 mL / OUT: 0 mL / NET: 0 mL        PHYSICAL EXAM:  General: elderly male, lying in bed in NAD  Head: NT/AC  ENT: dry oral mucosa, on nasal cannula, no nasal discharge  Neck: Supple, No JVD  Lungs: decreased breath sounds at bilateral bases, no wheezing or rhonchi  Cardio: RRR, normal S1/S2, No M/R/G  Abdomen: Normoactive BS, Abdomen soft, nontender, nondistended; no organomegaly  Extremities: No calf tenderness, no clubbing or  cyanosis  Vascular: no LE edema  Skin: warm and dry  Neuro: sleepy but arousable to voice, answering questions and follows commands today, oriented x 2 (person and place)    LABS:                        11.5   8.86  )-----------( 217      ( 31 Dec 2019 06:20 )             35.6         139  |  101  |  15  ----------------------------<  162  2.3   |  30  |  0.92    Ca    7.8      31 Dec 2019 06:20  Phos  1.9       Mg     1.3         TPro  6.0  /  Alb  1.5  /  TBili  0.7  /  DBili  x   /  AST  66  /  ALT  42  /  AlkPhos  111      eGFR if Non African American: 75 mL/min/1.73M2 (19 @ 06:20)  eGFR if African American: 87 mL/min/1.73M2 (19 @ 06:20)    PT/INR - ( 28 Dec 2019 19:19 )   PT: 14.9 sec;   INR: 1.32 ratio         PTT - ( 28 Dec 2019 19:19 )  PTT:32.9 sec  Lactate, Blood: 2.2 mmol/L ( @ 05:48)  Lactate, Blood: 3.1 mmol/L ( @ 23:47)  Lactate, Blood: 3.7 mmol/L ( @ 19:19)              ABG - ( 28 Dec 2019 21:30 )  pH, Arterial: 7.43  pH, Blood: x     /  pCO2: 32    /  pO2: 70    / HCO3: x     / Base Excess: x     /  SaO2: 92                      POCT Blood Glucose.: 206 mg/dL (31 Dec 2019 14:23)  POCT Blood Glucose.: 141 mg/dL (31 Dec 2019 07:12)  POCT Blood Glucose.: 129 mg/dL (31 Dec 2019 00:33)     [id number] 8.4    Urinalysis Basic - ( 28 Dec 2019 19:19 )    Color: Yellow / Appearance: Slightly Turbid / S.010 / pH: x  Gluc: x / Ketone: Negative  / Bili: Negative / Urobili: Negative   Blood: x / Protein: 15 / Nitrite: Negative   Leuk Esterase: Negative / RBC: x / WBC x   Sq Epi: x / Non Sq Epi: x / Bacteria: x        Culture - Urine (collected 28 Dec 2019 17:55)  Source: .Urine Clean Catch (Midstream)  Final Report (29 Dec 2019 21:26):    <10,000 CFU/mL Normal Urogenital Domenica    Culture - Blood (collected 28 Dec 2019 17:55)  Source: .Blood Blood-Peripheral  Preliminary Report (30 Dec 2019 03:01):    No growth to date.    Culture - Blood (collected 28 Dec 2019 17:55)  Source: .Blood Blood-Peripheral  Preliminary Report (30 Dec 2019 03:01):    No growth to date.        RADIOLOGY & ADDITIONAL TESTS:    Care Discussed with Consultants/Other Providers:

## 2019-12-31 NOTE — PROGRESS NOTE ADULT - SUBJECTIVE AND OBJECTIVE BOX
CC: f/u for pneumonia    Patient reports: he is non verbal, appears comfortable in bed    REVIEW OF SYSTEMS:  All other review of systems negative (Comprehensive ROS):tolerating enteral feeds    Antimicrobials Day #  :day 3  meropenem  IVPB 1000 milliGRAM(s) IV Intermittent every 8 hours  vancomycin  IVPB 1000 milliGRAM(s) IV Intermittent every 24 hours    Other Medications Reviewed    T(F): 98.4 (12-31-19 @ 05:20), Max: 98.4 (12-31-19 @ 05:20)  HR: 72 (12-31-19 @ 10:19)  BP: 98/57 (12-31-19 @ 05:20)  RR: 17 (12-31-19 @ 05:20)  SpO2: 96% (12-31-19 @ 10:19)  Wt(kg): --    PHYSICAL EXAM:  General: lethargic, no acute distress  Eyes:  anicteric, no conjunctival injection, no discharge  Oropharynx: no lesions or injection 	  Neck: supple, without adenopathy  Lungs: clear to auscultation, poor inspiratory effert  Heart: regular rate and rhythm; no murmur, rubs or gallops  Abdomen: soft, nondistended, nontender, peg in place  Skin: no lesions  Extremities: no clubbing, cyanosis, or edema  Neurologic: withdrawn, poorly interactive    LAB RESULTS:                        11.5   8.86  )-----------( 217      ( 31 Dec 2019 06:20 )             35.6     12-31    139  |  101  |  15  ----------------------------<  162<H>  2.3<LL>   |  30  |  0.92    Ca    7.8<L>      31 Dec 2019 06:20  Phos  1.9     12-31  Mg     1.3     12-31    TPro  6.0  /  Alb  1.5<L>  /  TBili  0.7  /  DBili  x   /  AST  66<H>  /  ALT  42  /  AlkPhos  111  12-30    LIVER FUNCTIONS - ( 30 Dec 2019 06:58 )  Alb: 1.5 g/dL / Pro: 6.0 g/dL / ALK PHOS: 111 U/L / ALT: 42 U/L / AST: 66 U/L / GGT: x           vanco level 9.5  MICROBIOLOGY:  RECENT CULTURES:  12-28 @ 17:55 .Blood Blood-Peripheral     No growth to date.      RVP coronavirus    RADIOLOGY REVIEWED:    < from: CT Chest No Cont (12.28.19 @ 22:13) >  IMPRESSION:   1. Left lower lobe consolidation with complete or almost complete obstruction of the left lower lobe bronchus.; left hilar mass cannot be excluded. Consider further evaluation with contrast enhanced study and/or bronchoscopy.   2. Right lower lobe infiltrate with several pleural nodules in the right hemithorax.   3. Fracture right humeral head/neck with impaction indeterminate age. Clinical   correlation     < end of copied text >

## 2019-12-31 NOTE — PROGRESS NOTE ADULT - ASSESSMENT
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  His CT shows LLL consolidating infiltrate and he may have occluded bronchus.  He had a fairly extensive w.u at [facility] which did not reveal any clear infection.  He was treated with steroids, IVIG, and ? additional agents.  His last LP had 1 wbc with elevated protein.  He had a positive WNV serology and a positive CSF toxo serology, however both were felt to be not related to acute neurologic events.  His acute problem appears pulmonary, he has been taking "pleasure " feeds, ?aspirating.  He is at risk for resistant naina, recent zosyn course.He also was severely constipated, ? leading to aspiration  Blood cultures are negative, RVP with coronavirus  legionella urine antigen pending  He was started on vanco and meropenem, looks improved, coronavirus is unlikely  to have precipitated admission  Vanco level of 9.5 is acceptable.  Suggest:  1.Continue vanco and meropenem, day 3, favor 5 days in total  2.Will request MRSA screen, would like to narrow coverage, he is not productive  3.Enteral feeds per medicine  4.Mental status may be a limiting factor.If he truly had a "viral"encephalitis,  his mental status may not recover at this point.

## 2019-12-31 NOTE — PROGRESS NOTE ADULT - ASSESSMENT
85M hx of HTN, remote prostate ca (treated s/p seeds/RT), encephalitis with dysphagia and PEG status, seizure d/o presenting with sepsis.     # Acute hypoxic respiratory failure: 2/2  #Coronavirus and superimposed multifocal PNA: WBC downtrended, afebrile   - continue Vanc/ meropenem (day 35)  - obtain MRSA screen  - Blood cx  NGTD  - ID following  - continue O2 support  - tube feeds resumed, aspiration precautions    #SANGITA: Cr 1.5 on admission  - resolved    #Fecal impaction: resolved s/p Golytely  - Miralax daily  - Dulcolax PRN    #Hypernatremia: 2/2 dehydration  - resolved    #Hypokalemia/ hypomagnesemia/ hypophosphatemia:  - repleted    #Moderate protein calorie malnutrition/ hypoalbunemia: Albumin 1.5  - continue bolus tube feeds  - dietician following      #DM type 2 with hyperglycemia: HbA1C 8.4%  - continue ISS  - continue tube feeds    #HTN: on amiloride 5mg TID and labetalol 100mg TID  - hold meds due to low BP    #History of encephalitis:  - continue Keppra and Vimpat    #DVT/GI proph  - lovenox    #Dispo:  - pending clinical improvement, PT  eval    # GOC  DNR/DNI status, molst in chart      CAPRINI SCORE [CLOT]    AGE RELATED RISK FACTORS                                                       MOBILITY RELATED FACTORS  [ ] Age 41-60 years                                            (1 Point)                  [ ] Bed rest                                                        (1 Point)  [ ] Age: 61-74 years                                           (2 Points)                 [ ] Plaster cast                                                   (2 Points)  [ 3] Age= 75 years                                              (3 Points)                 [ ] Bed bound for more than 72 hours                 (2 Points)    DISEASE RELATED RISK FACTORS                                               GENDER SPECIFIC FACTORS  [ ] Edema in the lower extremities                       (1 Point)                  [ ] Pregnancy                                                     (1 Point)  [ ] Varicose veins                                               (1 Point)                  [ ] Post-partum < 6 weeks                                   (1 Point)             [ ] BMI > 25 Kg/m2                                            (1 Point)                  [ ] Hormonal therapy  or oral contraception          (1 Point)                 [1 ] Sepsis (in the previous month)                        (1 Point)                  [ ] History of pregnancy complications                 (1 point)  [1 ] Pneumonia or serious lung disease                                               [ ] Unexplained or recurrent                     (1 Point)           (in the previous month)                               (1 Point)  [ ] Abnormal pulmonary function test                     (1 Point)                 SURGERY RELATED RISK FACTORS  [ ] Acute myocardial infarction                              (1 Point)                 [ ]  Section                                             (1 Point)  [ ] Congestive heart failure (in the previous month)  (1 Point)               [ ] Minor surgery                                                  (1 Point)   [ ] Inflammatory bowel disease                             (1 Point)                 [ ] Arthroscopic surgery                                        (2 Points)  [ ] Central venous access                                      (2 Points)                [ ] General surgery lasting more than 45 minutes   (2 Points)       [ ] Stroke (in the previous month)                          (5 Points)               [ ] Elective arthroplasty                                         (5 Points)                                                                                                                                               HEMATOLOGY RELATED FACTORS                                                 TRAUMA RELATED RISK FACTORS  [ ] Prior episodes of VTE                                     (3 Points)                 [ ] Fracture of the hip, pelvis, or leg                       (5 Points)  [ ] Positive family history for VTE                         (3 Points)                 [ ] Acute spinal cord injury (in the previous month)  (5 Points)  [ ] Prothrombin 33990 A                                     (3 Points)                 [ ] Paralysis  (less than 1 month)                             (5 Points)  [ ] Factor V Leiden                                             (3 Points)                  [ ] Multiple Trauma within 1 month                        (5 Points)  [ ] Lupus anticoagulants                                     (3 Points)                                                           [ ] Anticardiolipin antibodies                               (3 Points)                                                       [ ] High homocysteine in the blood                      (3 Points)                                             [ ] Other congenital or acquired thrombophilia      (3 Points)                                                [ ] Heparin induced thrombocytopenia                  (3 Points)                                          Total Score [    5      ]

## 2020-01-01 LAB
ANION GAP SERPL CALC-SCNC: 8 MMOL/L — SIGNIFICANT CHANGE UP (ref 5–17)
BASOPHILS # BLD AUTO: 0.03 K/UL — SIGNIFICANT CHANGE UP (ref 0–0.2)
BASOPHILS NFR BLD AUTO: 0.3 % — SIGNIFICANT CHANGE UP (ref 0–2)
BUN SERPL-MCNC: 12 MG/DL — SIGNIFICANT CHANGE UP (ref 7–23)
CALCIUM SERPL-MCNC: 8.1 MG/DL — LOW (ref 8.4–10.5)
CHLORIDE SERPL-SCNC: 95 MMOL/L — LOW (ref 96–108)
CO2 SERPL-SCNC: 31 MMOL/L — SIGNIFICANT CHANGE UP (ref 22–31)
CREAT SERPL-MCNC: 0.75 MG/DL — SIGNIFICANT CHANGE UP (ref 0.5–1.3)
EOSINOPHIL # BLD AUTO: 0.04 K/UL — SIGNIFICANT CHANGE UP (ref 0–0.5)
EOSINOPHIL NFR BLD AUTO: 0.4 % — SIGNIFICANT CHANGE UP (ref 0–6)
GLUCOSE BLDC GLUCOMTR-MCNC: 158 MG/DL — HIGH (ref 70–99)
GLUCOSE BLDC GLUCOMTR-MCNC: 179 MG/DL — HIGH (ref 70–99)
GLUCOSE BLDC GLUCOMTR-MCNC: 199 MG/DL — HIGH (ref 70–99)
GLUCOSE BLDC GLUCOMTR-MCNC: 218 MG/DL — HIGH (ref 70–99)
GLUCOSE SERPL-MCNC: 172 MG/DL — HIGH (ref 70–99)
HCT VFR BLD CALC: 37.9 % — LOW (ref 39–50)
HGB BLD-MCNC: 12.4 G/DL — LOW (ref 13–17)
IMM GRANULOCYTES NFR BLD AUTO: 0.9 % — SIGNIFICANT CHANGE UP (ref 0–1.5)
LEGIONELLA AG UR QL: NEGATIVE — SIGNIFICANT CHANGE UP
LYMPHOCYTES # BLD AUTO: 2.19 K/UL — SIGNIFICANT CHANGE UP (ref 1–3.3)
LYMPHOCYTES # BLD AUTO: 23.1 % — SIGNIFICANT CHANGE UP (ref 13–44)
MAGNESIUM SERPL-MCNC: 1.9 MG/DL — SIGNIFICANT CHANGE UP (ref 1.6–2.6)
MCHC RBC-ENTMCNC: 29.8 PG — SIGNIFICANT CHANGE UP (ref 27–34)
MCHC RBC-ENTMCNC: 32.7 GM/DL — SIGNIFICANT CHANGE UP (ref 32–36)
MCV RBC AUTO: 91.1 FL — SIGNIFICANT CHANGE UP (ref 80–100)
MONOCYTES # BLD AUTO: 0.41 K/UL — SIGNIFICANT CHANGE UP (ref 0–0.9)
MONOCYTES NFR BLD AUTO: 4.3 % — SIGNIFICANT CHANGE UP (ref 2–14)
MRSA PCR RESULT.: SIGNIFICANT CHANGE UP
NEUTROPHILS # BLD AUTO: 6.72 K/UL — SIGNIFICANT CHANGE UP (ref 1.8–7.4)
NEUTROPHILS NFR BLD AUTO: 71 % — SIGNIFICANT CHANGE UP (ref 43–77)
NRBC # BLD: 0 /100 WBCS — SIGNIFICANT CHANGE UP (ref 0–0)
PHOSPHATE SERPL-MCNC: 2.1 MG/DL — LOW (ref 2.5–4.5)
PLATELET # BLD AUTO: 266 K/UL — SIGNIFICANT CHANGE UP (ref 150–400)
POTASSIUM SERPL-MCNC: 2.8 MMOL/L — CRITICAL LOW (ref 3.5–5.3)
POTASSIUM SERPL-SCNC: 2.8 MMOL/L — CRITICAL LOW (ref 3.5–5.3)
RBC # BLD: 4.16 M/UL — LOW (ref 4.2–5.8)
RBC # FLD: 15.6 % — HIGH (ref 10.3–14.5)
S AUREUS DNA NOSE QL NAA+PROBE: SIGNIFICANT CHANGE UP
SODIUM SERPL-SCNC: 134 MMOL/L — LOW (ref 135–145)
VANCOMYCIN TROUGH SERPL-MCNC: 10.8 UG/ML — SIGNIFICANT CHANGE UP (ref 10–20)
WBC # BLD: 9.48 K/UL — SIGNIFICANT CHANGE UP (ref 3.8–10.5)
WBC # FLD AUTO: 9.48 K/UL — SIGNIFICANT CHANGE UP (ref 3.8–10.5)

## 2020-01-01 PROCEDURE — 99233 SBSQ HOSP IP/OBS HIGH 50: CPT

## 2020-01-01 PROCEDURE — 70450 CT HEAD/BRAIN W/O DYE: CPT | Mod: 26

## 2020-01-01 RX ORDER — TAMSULOSIN HYDROCHLORIDE 0.4 MG/1
0.4 CAPSULE ORAL AT BEDTIME
Refills: 0 | Status: DISCONTINUED | OUTPATIENT
Start: 2020-01-01 | End: 2020-01-10

## 2020-01-01 RX ORDER — POTASSIUM CHLORIDE 20 MEQ
10 PACKET (EA) ORAL
Refills: 0 | Status: COMPLETED | OUTPATIENT
Start: 2020-01-01 | End: 2020-01-01

## 2020-01-01 RX ORDER — POTASSIUM PHOSPHATE, MONOBASIC POTASSIUM PHOSPHATE, DIBASIC 236; 224 MG/ML; MG/ML
30 INJECTION, SOLUTION INTRAVENOUS ONCE
Refills: 0 | Status: COMPLETED | OUTPATIENT
Start: 2020-01-01 | End: 2020-01-01

## 2020-01-01 RX ORDER — PANTOPRAZOLE SODIUM 20 MG/1
40 TABLET, DELAYED RELEASE ORAL
Refills: 0 | Status: DISCONTINUED | OUTPATIENT
Start: 2020-01-01 | End: 2020-01-01

## 2020-01-01 RX ORDER — MAGNESIUM OXIDE 400 MG ORAL TABLET 241.3 MG
400 TABLET ORAL ONCE
Refills: 0 | Status: COMPLETED | OUTPATIENT
Start: 2020-01-01 | End: 2020-01-01

## 2020-01-01 RX ORDER — POTASSIUM CHLORIDE 20 MEQ
40 PACKET (EA) ORAL
Refills: 0 | Status: DISCONTINUED | OUTPATIENT
Start: 2020-01-01 | End: 2020-01-01

## 2020-01-01 RX ORDER — POTASSIUM PHOSPHATE, MONOBASIC POTASSIUM PHOSPHATE, DIBASIC 236; 224 MG/ML; MG/ML
30 INJECTION, SOLUTION INTRAVENOUS ONCE
Refills: 0 | Status: DISCONTINUED | OUTPATIENT
Start: 2020-01-01 | End: 2020-01-01

## 2020-01-01 RX ORDER — POTASSIUM CHLORIDE 20 MEQ
40 PACKET (EA) ORAL ONCE
Refills: 0 | Status: COMPLETED | OUTPATIENT
Start: 2020-01-01 | End: 2020-01-01

## 2020-01-01 RX ADMIN — MEROPENEM 100 MILLIGRAM(S): 1 INJECTION INTRAVENOUS at 20:55

## 2020-01-01 RX ADMIN — MAGNESIUM OXIDE 400 MG ORAL TABLET 400 MILLIGRAM(S): 241.3 TABLET ORAL at 13:26

## 2020-01-01 RX ADMIN — LEVETIRACETAM 1500 MILLIGRAM(S): 250 TABLET, FILM COATED ORAL at 05:43

## 2020-01-01 RX ADMIN — Medication 1: at 09:18

## 2020-01-01 RX ADMIN — LEVETIRACETAM 1500 MILLIGRAM(S): 250 TABLET, FILM COATED ORAL at 17:07

## 2020-01-01 RX ADMIN — Medication 250 MILLIGRAM(S): at 20:54

## 2020-01-01 RX ADMIN — Medication 100 MILLIEQUIVALENT(S): at 09:57

## 2020-01-01 RX ADMIN — POLYETHYLENE GLYCOL 3350 17 GRAM(S): 17 POWDER, FOR SOLUTION ORAL at 11:29

## 2020-01-01 RX ADMIN — Medication 2: at 13:12

## 2020-01-01 RX ADMIN — ENOXAPARIN SODIUM 40 MILLIGRAM(S): 100 INJECTION SUBCUTANEOUS at 11:29

## 2020-01-01 RX ADMIN — Medication 100 GRAM(S): at 01:03

## 2020-01-01 RX ADMIN — TAMSULOSIN HYDROCHLORIDE 0.4 MILLIGRAM(S): 0.4 CAPSULE ORAL at 20:54

## 2020-01-01 RX ADMIN — Medication 100 MILLIEQUIVALENT(S): at 15:36

## 2020-01-01 RX ADMIN — MEROPENEM 100 MILLIGRAM(S): 1 INJECTION INTRAVENOUS at 02:20

## 2020-01-01 RX ADMIN — Medication 1: at 20:54

## 2020-01-01 RX ADMIN — LACOSAMIDE 200 MILLIGRAM(S): 50 TABLET ORAL at 05:42

## 2020-01-01 RX ADMIN — PANTOPRAZOLE SODIUM 40 MILLIGRAM(S): 20 TABLET, DELAYED RELEASE ORAL at 06:27

## 2020-01-01 RX ADMIN — Medication 650 MILLIGRAM(S): at 15:33

## 2020-01-01 RX ADMIN — Medication 100 MILLIEQUIVALENT(S): at 16:56

## 2020-01-01 RX ADMIN — Medication 1: at 17:07

## 2020-01-01 RX ADMIN — POTASSIUM PHOSPHATE, MONOBASIC POTASSIUM PHOSPHATE, DIBASIC 83.33 MILLIMOLE(S): 236; 224 INJECTION, SOLUTION INTRAVENOUS at 18:25

## 2020-01-01 RX ADMIN — LACOSAMIDE 200 MILLIGRAM(S): 50 TABLET ORAL at 17:06

## 2020-01-01 RX ADMIN — MEROPENEM 100 MILLIGRAM(S): 1 INJECTION INTRAVENOUS at 15:07

## 2020-01-01 RX ADMIN — Medication 650 MILLIGRAM(S): at 16:33

## 2020-01-01 RX ADMIN — MEROPENEM 100 MILLIGRAM(S): 1 INJECTION INTRAVENOUS at 05:42

## 2020-01-01 RX ADMIN — Medication 40 MILLIEQUIVALENT(S): at 11:29

## 2020-01-01 NOTE — PROGRESS NOTE ADULT - ASSESSMENT
85M hx of HTN, remote prostate ca (treated s/p seeds/RT), encephalitis with dysphagia and PEG status, seizure d/o presenting with sepsis.     # Acute hypoxic respiratory failure: 2/2 Coronavirus and superimposed multifocal PNA: WBC downtrended, afebrile   - continue Vanco and meropenem per ID  - MRSA screen- pending  - Blood cx 12/28 NGTD  - ID consult appreciated   - continue O2 support  - tube feeds resumed, aspiration precautions    #Fecal impaction  - resolved s/p Golytely  - Miralax daily  - Dulcolax PRN    #Hypokalemia/ hypophosphatemia:  - repleted    #Moderate protein calorie malnutrition/ hypoalbunemia  - Albumin 1.5  - continue bolus tube feeds  - dietician following      #DM type 2 with hyperglycemia  - HbA1C 8.4%  - continue ISS  - monitor fingersticks   - continue tube feeds    #HTN hx  - on amiloride 5mg TID and labetalol 100mg TID at home  - hold meds due to low BP    #History of encephalitis  - Keppra and Vimpat    #DVT prophylaxis  - lovenox 85M hx of HTN, remote prostate ca (treated s/p seeds/RT), encephalitis with dysphagia and PEG status, seizure d/o presenting with sepsis.     # Acute hypoxic respiratory failure: 2/2 Coronavirus and superimposed multifocal PNA: WBC downtrended, afebrile   - continue Vanco and meropenem per ID  - MRSA screen- pending  - Blood cx 12/28 NGTD  - ID consult appreciated   - continue O2 support  - tube feeds resumed, aspiration precautions    #urinary retention   - bradley    #Fecal impaction  - resolved s/p Golytely  - Miralax daily  - Dulcolax PRN    #Hypokalemia/ hypophosphatemia:  - repleted    #Moderate protein calorie malnutrition/ hypoalbunemia  - Albumin 1.5  - continue bolus tube feeds  - dietician following      #DM type 2 with hyperglycemia  - HbA1C 8.4%  - continue ISS  - monitor fingersticks   - continue tube feeds    #HTN hx  - on amiloride 5mg TID and labetalol 100mg TID at home  - hold meds due to low BP    #History of encephalitis  - Keppra and Vimpat    #DVT prophylaxis  - lovenox

## 2020-01-01 NOTE — PROGRESS NOTE ADULT - SUBJECTIVE AND OBJECTIVE BOX
CC: f/u for aspiration pneumonia    Patient reports: he is non verbal.Hernandez placed for retention, 700cc of urine is bladder.    REVIEW OF SYSTEMS:  All other review of systems negative (Comprehensive ROS): limited, he is not following commands but said "good morning" today.He was seen at 9 am.    Antimicrobials Day #  :day 4/5  meropenem  IVPB 1000 milliGRAM(s) IV Intermittent every 8 hours  vancomycin  IVPB 1000 milliGRAM(s) IV Intermittent every 24 hours    Other Medications Reviewed    T(F): 98.2 (01-01-20 @ 09:58), Max: 98.2 (01-01-20 @ 09:58)  HR: 93 (01-01-20 @ 09:58)  BP: 104/60 (01-01-20 @ 09:58)  RR: 18 (01-01-20 @ 09:58)  SpO2: 92% (01-01-20 @ 09:58)  Wt(kg): --    PHYSICAL EXAM:  General: lethargic,, no acute distress  Eyes:  anicteric, no conjunctival injection, no discharge  Oropharynx: no lesions or injection 	  Neck: supple, without adenopathy  Lungs: clear to auscultation, poor inspiratory effert  Heart: regular rate and rhythm; no murmur, rubs or gallops  Abdomen: soft, nondistended, nontender, peg in place  Skin: no lesions  Extremities: no clubbing, cyanosis, or edema  Neurologic: lethargic, poorly interactive    LAB RESULTS:                        12.4   9.48  )-----------( 266      ( 01 Jan 2020 06:13 )             37.9     01-01    134<L>  |  95<L>  |  12  ----------------------------<  172<H>  2.8<LL>   |  31  |  0.75    Ca    8.1<L>      01 Jan 2020 06:13  Phos  2.1     01-01  Mg     1.9     01-01          MICROBIOLOGY:  RECENT CULTURES:  12-28 @ 17:55 .Blood Blood-Peripheral     No growth to date.          RADIOLOGY REVIEWED:  < from: CT Head No Cont (01.01.20 @ 09:54) >  EXAM:  CT BRAIN      PROCEDURE DATE:  01/01/2020        INTERPRETATION:  CLINICAL STATEMENT: Pneumonia, rule out CVA, shortness of breath with history of prostate cancer    TECHNIQUE: CT of the head was performed without IV contrast.    COMPARISON:None.    FINDINGS:    There is diffuse parenchymal volume loss. There are areas of low attenuation in the periventricular white matter likely related to chronic microvascular ischemic changes. There is more focal area of decreased attenuation in the right occipital white matter suggestive of old infarction with ex vacuo dilatation of the posterior horn of the right lateral ventricle    There is no acute parenchymal hemorrhage, parenchymal mass, mass effect or midline shift. There is no extra-axial fluid collection. There is no acute territorial infarct.     The ventricles are prominent likely secondary to central atrophy.    The calvarium is intact. The visualized paranasal sinuses are well aerated. The visualized orbits are unremarkable.    IMPRESSION:    No acute intracranial hemorrhage or acute territorial infarct.     < end of copied text >

## 2020-01-01 NOTE — PROGRESS NOTE ADULT - SUBJECTIVE AND OBJECTIVE BOX
Patient is a 85y old  Male who presents with a chief complaint of desaturation.  Pt difficult to arouse this am.  Not following commands       Patient seen and examined at bedside.    ALLERGIES:  No Known Allergies    MEDICATIONS  (STANDING):  dextrose 5%. 1000 milliLiter(s) (50 mL/Hr) IV Continuous <Continuous>  dextrose 50% Injectable 12.5 Gram(s) IV Push once  dextrose 50% Injectable 25 Gram(s) IV Push once  dextrose 50% Injectable 25 Gram(s) IV Push once  enoxaparin Injectable 40 milliGRAM(s) SubCutaneous daily  insulin lispro (HumaLOG) corrective regimen sliding scale   SubCutaneous four times a day before meals  lacosamide 200 milliGRAM(s) Oral two times a day  levETIRAcetam  Solution 1500 milliGRAM(s) Enteral Tube two times a day  magnesium oxide 400 milliGRAM(s) Oral once  meropenem  IVPB 1000 milliGRAM(s) IV Intermittent every 8 hours  pantoprazole   Suspension 40 milliGRAM(s) Oral before breakfast  polyethylene glycol 3350 17 Gram(s) Oral daily  potassium chloride   Powder 40 milliEquivalent(s) Oral once  potassium chloride  10 mEq/100 mL IVPB 10 milliEquivalent(s) IV Intermittent every 1 hour  potassium phosphate IVPB 30 milliMole(s) IV Intermittent once  sertraline 75 milliGRAM(s) Oral daily  tamsulosin 0.4 milliGRAM(s) Oral at bedtime  vancomycin  IVPB 1000 milliGRAM(s) IV Intermittent every 24 hours    MEDICATIONS  (PRN):  acetaminophen    Suspension .. 650 milliGRAM(s) Enteral Tube every 6 hours PRN Temp greater or equal to 38C (100.4F), Mild Pain (1 - 3)  albuterol/ipratropium for Nebulization 3 milliLiter(s) Nebulizer every 6 hours PRN Bronchospasm  bisacodyl 5 milliGRAM(s) Oral every 12 hours PRN Constipation  dextrose 40% Gel 15 Gram(s) Oral once PRN Blood Glucose LESS THAN 70 milliGRAM(s)/deciliter  glucagon  Injectable 1 milliGRAM(s) IntraMuscular once PRN Glucose LESS THAN 70 milligrams/deciliter    Vital Signs Last 24 Hrs  T(F): 98.1 (01 Jan 2020 06:22), Max: 98.1 (31 Dec 2019 15:54)  HR: 65 (01 Jan 2020 08:39) (65 - 100)  BP: 102/75 (01 Jan 2020 06:22) (101/63 - 915/-)  RR: 18 (01 Jan 2020 06:22) (18 - 18)  SpO2: 95% (01 Jan 2020 08:39) (95% - 96%)  I&O's Summary    31 Dec 2019 07:01  -  01 Jan 2020 07:00  --------------------------------------------------------  IN: 775 mL / OUT: 0 mL / NET: 775 mL      PHYSICAL EXAM:  General: NAD, obtunded   ENT: MMM  Neck: Supple, No JVD  Lungs: course  bs  bilaterally, Non labored breathing   Cardio: RRR, S1/S2, No murmurs  Abdomen: Soft, Nontender, Nondistended; Bowel sounds present  Extremities: No calf tenderness, No pitting edema    LABS:                        12.4   9.48  )-----------( 266      ( 01 Jan 2020 06:13 )             37.9     01-01    134  |  95  |  12  ----------------------------<  172  2.8   |  31  |  0.75    Ca    8.1      01 Jan 2020 06:13  Phos  2.1     01-01  Mg     1.9     01-01    TPro  6.0  /  Alb  1.5  /  TBili  0.7  /  DBili  x   /  AST  66  /  ALT  42  /  AlkPhos  111  12-30    eGFR if Non African American: 83 mL/min/1.73M2 (01-01-20 @ 06:13)  eGFR if African American: 97 mL/min/1.73M2 (01-01-20 @ 06:13)                          POCT Blood Glucose.: 158 mg/dL (01 Jan 2020 08:52)  POCT Blood Glucose.: 160 mg/dL (31 Dec 2019 21:52)  POCT Blood Glucose.: 221 mg/dL (31 Dec 2019 16:50)  POCT Blood Glucose.: 206 mg/dL (31 Dec 2019 14:23)    12-29 [id number] 8.4        Culture - Urine (collected 28 Dec 2019 17:55)  Source: .Urine Clean Catch (Midstream)  Final Report (29 Dec 2019 21:26):    <10,000 CFU/mL Normal Urogenital Domenica    Culture - Blood (collected 28 Dec 2019 17:55)  Source: .Blood Blood-Peripheral  Preliminary Report (30 Dec 2019 03:01):    No growth to date.    Culture - Blood (collected 28 Dec 2019 17:55)  Source: .Blood Blood-Peripheral  Preliminary Report (30 Dec 2019 03:01):    No growth to date.      RADIOLOGY & ADDITIONAL TESTS:    Care Discussed with Consultants/Other Providers:

## 2020-01-01 NOTE — PROGRESS NOTE ADULT - ASSESSMENT
85M hx of HTN, remote prostate ca (treated s/p seeds/RT), encephalitis with dysphagia and PEG status, seizure d/o presenting with sepsis.     # Acute hypoxic respiratory failure: 2/2 Coronavirus and superimposed multifocal PNA: WBC back to baseline , afebrile   - continue Vanco and meropenem per ID  - MRSA screen- pending  - Blood cx 12/28 NGTD  - ID following   - continue O2 support  - tube feeds resumed, aspiration precautions    #hypokalemia/ hypophosphotemia / hyponatemia  supplement k amd phos   follow bmp     #urinary retention   - bradley    #Fecal impaction  - resolved s/p Golytely  - Miralax daily  - Dulcolax PRN        #Moderate protein calorie malnutrition/ hypoalbunemia  - Albumin 1.5  - continue bolus tube feeds  - dietician following      #DM type 2 with hyperglycemia  - HbA1C 8.4%  - continue ISS  - monitor fingersticks   - continue tube feeds    #HTN hx  - on amiloride 5mg TID and labetalol 100mg TID at home  - hold meds due to low BP    #History of encephalitis  - Keppra and Vimpat    #DVT prophylaxis  - lovenox

## 2020-01-01 NOTE — CHART NOTE - NSCHARTNOTEFT_GEN_A_CORE
approached by patient's bedsdie RN as she had order to do scheduled baldder scans.   -patient's recent bladder scan with > 700 cc of urine noted.    -order written by day time team stated if bladder scaan >350 to obtaina  bradley order.    -will place bradley order as indicated by DAY team, and alos start flomax as patient with multiple episodes of urinary retention.

## 2020-01-01 NOTE — PROGRESS NOTE ADULT - SUBJECTIVE AND OBJECTIVE BOX
Patient is a 85y old  Male who presents with a chief complaint of desaturation (31 Dec 2019 16:47)    Patient seen and examined at bedside.     ALLERGIES:  No Known Allergies    MEDICATIONS  (STANDING):  dextrose 5%. 1000 milliLiter(s) (50 mL/Hr) IV Continuous <Continuous>  dextrose 50% Injectable 12.5 Gram(s) IV Push once  dextrose 50% Injectable 25 Gram(s) IV Push once  dextrose 50% Injectable 25 Gram(s) IV Push once  enoxaparin Injectable 40 milliGRAM(s) SubCutaneous daily  insulin lispro (HumaLOG) corrective regimen sliding scale   SubCutaneous four times a day before meals  lacosamide 200 milliGRAM(s) Oral two times a day  levETIRAcetam  Solution 1500 milliGRAM(s) Enteral Tube two times a day  magnesium oxide 400 milliGRAM(s) Oral once  meropenem  IVPB 1000 milliGRAM(s) IV Intermittent every 8 hours  pantoprazole   Suspension 40 milliGRAM(s) Oral before breakfast  polyethylene glycol 3350 17 Gram(s) Oral daily  potassium chloride   Powder 40 milliEquivalent(s) Oral once  potassium chloride  10 mEq/100 mL IVPB 10 milliEquivalent(s) IV Intermittent every 1 hour  potassium phosphate IVPB 30 milliMole(s) IV Intermittent once  sertraline 75 milliGRAM(s) Oral daily  tamsulosin 0.4 milliGRAM(s) Oral at bedtime  vancomycin  IVPB 1000 milliGRAM(s) IV Intermittent every 24 hours    MEDICATIONS  (PRN):  acetaminophen    Suspension .. 650 milliGRAM(s) Enteral Tube every 6 hours PRN Temp greater or equal to 38C (100.4F), Mild Pain (1 - 3)  albuterol/ipratropium for Nebulization 3 milliLiter(s) Nebulizer every 6 hours PRN Bronchospasm  bisacodyl 5 milliGRAM(s) Oral every 12 hours PRN Constipation  dextrose 40% Gel 15 Gram(s) Oral once PRN Blood Glucose LESS THAN 70 milliGRAM(s)/deciliter  glucagon  Injectable 1 milliGRAM(s) IntraMuscular once PRN Glucose LESS THAN 70 milligrams/deciliter    Vital Signs Last 24 Hrs  T(F): 98.1 (01 Jan 2020 06:22), Max: 98.1 (31 Dec 2019 15:54)  HR: 65 (01 Jan 2020 08:39) (65 - 100)  BP: 102/75 (01 Jan 2020 06:22) (101/63 - 915/-)  RR: 18 (01 Jan 2020 06:22) (18 - 18)  SpO2: 95% (01 Jan 2020 08:39) (95% - 96%)  I&O's Summary    31 Dec 2019 07:01  -  01 Jan 2020 07:00  --------------------------------------------------------  IN: 775 mL / OUT: 0 mL / NET: 775 mL      PHYSICAL EXAM:  General: NAD, elderly  ENT: MMM, no thrush  Neck: Supple, No JVD  Lungs: decreased breath sounds b/l bases no wheezing   Cardio: +s1/s2, No pitting edema  Abdomen: Soft, Nontender, Nondistended; Bowel sounds present  Extremities: No calf tenderness    LABS:                        12.4   9.48  )-----------( 266      ( 01 Jan 2020 06:13 )             37.9     01-01    134  |  95  |  12  ----------------------------<  172  2.8   |  31  |  0.75    Ca    8.1      01 Jan 2020 06:13  Phos  2.1     01-01  Mg     1.9     01-01    TPro  6.0  /  Alb  1.5  /  TBili  0.7  /  DBili  x   /  AST  66  /  ALT  42  /  AlkPhos  111  12-30        eGFR if Non African American: 83 mL/min/1.73M2 (01-01-20 @ 06:13)  eGFR if African American: 97 mL/min/1.73M2 (01-01-20 @ 06:13)    Glucose  POCT Blood Glucose.: 160 mg/dL (31 Dec 2019 21:52)  POCT Blood Glucose.: 221 mg/dL (31 Dec 2019 16:50)  POCT Blood Glucose.: 206 mg/dL (31 Dec 2019 14:23)    12-29 [id number] 8.4    Culture  Culture - Urine (collected 28 Dec 2019 17:55)  Source: .Urine Clean Catch (Midstream)  Final Report (29 Dec 2019 21:26):    <10,000 CFU/mL Normal Urogenital Domenica    Culture - Blood (collected 28 Dec 2019 17:55)  Source: .Blood Blood-Peripheral  Preliminary Report (30 Dec 2019 03:01):    No growth to date.    Culture - Blood (collected 28 Dec 2019 17:55)  Source: .Blood Blood-Peripheral  Preliminary Report (30 Dec 2019 03:01):    No growth to date.      RADIOLOGY & ADDITIONAL TESTS:  No new tests

## 2020-01-01 NOTE — PROGRESS NOTE ADULT - ASSESSMENT
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  by default who is admitted febrile with likely respiratory infection.  His CT shows LLL consolidating infiltrate and he may have occluded bronchus.  He had a fairly extensive w.u at [facility] which did not reveal any clear CNS  infection.  He was treated with steroids, IVIG, and ? additional agents as per neurology service.  His last LP had 1 wbc with elevated protein.  He had a positive WNV serology and a positive CSF toxo serology, however both were felt to be not related to acute neurologic events.  His acute problem which precipitated [facility]  appears pulmonary, he has been taking "pleasure " feeds, ?aspirating.  He is at risk for resistant naina, recent zosyn course at his SNF..He also was severely constipated, ? leading to aspiration  Blood cultures are negative, RVP with coronavirus  legionella urine antigen negative  He was started on vanco and meropenem, looks improved, coronavirus isolated  is unlikely  to have precipitated admission  Vanco level of 9.5 is acceptable.  He has a poor mental status, he has not recovered from inciting neuro process.  Suggest:  1.Continue vanco and meropenem, day 4,, favor 5 days in total  2.Enteral feeds per medicine  3.Mental status may be a limiting factor.If he truly had a "viral"encephalitis,  his mental status may not recover at this point.  4.bradley for retention  5.Bowel regimen per medicine, ID issues reviewed with daughter at bedside

## 2020-01-02 LAB
ANION GAP SERPL CALC-SCNC: 10 MMOL/L — SIGNIFICANT CHANGE UP (ref 5–17)
ANION GAP SERPL CALC-SCNC: 7 MMOL/L — SIGNIFICANT CHANGE UP (ref 5–17)
BUN SERPL-MCNC: 14 MG/DL — SIGNIFICANT CHANGE UP (ref 7–23)
BUN SERPL-MCNC: 16 MG/DL — SIGNIFICANT CHANGE UP (ref 7–23)
CALCIUM SERPL-MCNC: 8.6 MG/DL — SIGNIFICANT CHANGE UP (ref 8.4–10.5)
CALCIUM SERPL-MCNC: 8.7 MG/DL — SIGNIFICANT CHANGE UP (ref 8.4–10.5)
CHLORIDE SERPL-SCNC: 97 MMOL/L — SIGNIFICANT CHANGE UP (ref 96–108)
CHLORIDE SERPL-SCNC: 98 MMOL/L — SIGNIFICANT CHANGE UP (ref 96–108)
CO2 SERPL-SCNC: 30 MMOL/L — SIGNIFICANT CHANGE UP (ref 22–31)
CO2 SERPL-SCNC: 30 MMOL/L — SIGNIFICANT CHANGE UP (ref 22–31)
CREAT SERPL-MCNC: 0.67 MG/DL — SIGNIFICANT CHANGE UP (ref 0.5–1.3)
CREAT SERPL-MCNC: 0.68 MG/DL — SIGNIFICANT CHANGE UP (ref 0.5–1.3)
GLUCOSE BLDC GLUCOMTR-MCNC: 132 MG/DL — HIGH (ref 70–99)
GLUCOSE BLDC GLUCOMTR-MCNC: 152 MG/DL — HIGH (ref 70–99)
GLUCOSE BLDC GLUCOMTR-MCNC: 185 MG/DL — HIGH (ref 70–99)
GLUCOSE BLDC GLUCOMTR-MCNC: 199 MG/DL — HIGH (ref 70–99)
GLUCOSE SERPL-MCNC: 134 MG/DL — HIGH (ref 70–99)
GLUCOSE SERPL-MCNC: 143 MG/DL — HIGH (ref 70–99)
HCT VFR BLD CALC: 40.4 % — SIGNIFICANT CHANGE UP (ref 39–50)
HCT VFR BLD CALC: 42.5 % — SIGNIFICANT CHANGE UP (ref 39–50)
HGB BLD-MCNC: 12.7 G/DL — LOW (ref 13–17)
HGB BLD-MCNC: 13.8 G/DL — SIGNIFICANT CHANGE UP (ref 13–17)
LACTATE SERPL-SCNC: 2.3 MMOL/L — HIGH (ref 0.7–2)
MCHC RBC-ENTMCNC: 29.2 PG — SIGNIFICANT CHANGE UP (ref 27–34)
MCHC RBC-ENTMCNC: 30.2 PG — SIGNIFICANT CHANGE UP (ref 27–34)
MCHC RBC-ENTMCNC: 31.4 GM/DL — LOW (ref 32–36)
MCHC RBC-ENTMCNC: 32.5 GM/DL — SIGNIFICANT CHANGE UP (ref 32–36)
MCV RBC AUTO: 92.9 FL — SIGNIFICANT CHANGE UP (ref 80–100)
MCV RBC AUTO: 93 FL — SIGNIFICANT CHANGE UP (ref 80–100)
NRBC # BLD: 0 /100 WBCS — SIGNIFICANT CHANGE UP (ref 0–0)
NRBC # BLD: 0 /100 WBCS — SIGNIFICANT CHANGE UP (ref 0–0)
PLATELET # BLD AUTO: 209 K/UL — SIGNIFICANT CHANGE UP (ref 150–400)
PLATELET # BLD AUTO: 233 K/UL — SIGNIFICANT CHANGE UP (ref 150–400)
POTASSIUM SERPL-MCNC: 3.2 MMOL/L — LOW (ref 3.5–5.3)
POTASSIUM SERPL-MCNC: 3.4 MMOL/L — LOW (ref 3.5–5.3)
POTASSIUM SERPL-SCNC: 3.2 MMOL/L — LOW (ref 3.5–5.3)
POTASSIUM SERPL-SCNC: 3.4 MMOL/L — LOW (ref 3.5–5.3)
RBC # BLD: 4.35 M/UL — SIGNIFICANT CHANGE UP (ref 4.2–5.8)
RBC # BLD: 4.57 M/UL — SIGNIFICANT CHANGE UP (ref 4.2–5.8)
RBC # FLD: 15.5 % — HIGH (ref 10.3–14.5)
RBC # FLD: 15.5 % — HIGH (ref 10.3–14.5)
SODIUM SERPL-SCNC: 135 MMOL/L — SIGNIFICANT CHANGE UP (ref 135–145)
SODIUM SERPL-SCNC: 137 MMOL/L — SIGNIFICANT CHANGE UP (ref 135–145)
WBC # BLD: 10.34 K/UL — SIGNIFICANT CHANGE UP (ref 3.8–10.5)
WBC # BLD: 8.98 K/UL — SIGNIFICANT CHANGE UP (ref 3.8–10.5)
WBC # FLD AUTO: 10.34 K/UL — SIGNIFICANT CHANGE UP (ref 3.8–10.5)
WBC # FLD AUTO: 8.98 K/UL — SIGNIFICANT CHANGE UP (ref 3.8–10.5)

## 2020-01-02 PROCEDURE — 93010 ELECTROCARDIOGRAM REPORT: CPT

## 2020-01-02 PROCEDURE — 71045 X-RAY EXAM CHEST 1 VIEW: CPT | Mod: 26

## 2020-01-02 PROCEDURE — 99233 SBSQ HOSP IP/OBS HIGH 50: CPT

## 2020-01-02 RX ORDER — POTASSIUM CHLORIDE 20 MEQ
40 PACKET (EA) ORAL ONCE
Refills: 0 | Status: COMPLETED | OUTPATIENT
Start: 2020-01-02 | End: 2020-01-02

## 2020-01-02 RX ORDER — SODIUM,POTASSIUM PHOSPHATES 278-250MG
1 POWDER IN PACKET (EA) ORAL
Refills: 0 | Status: COMPLETED | OUTPATIENT
Start: 2020-01-02 | End: 2020-01-03

## 2020-01-02 RX ORDER — SODIUM CHLORIDE 9 MG/ML
1000 INJECTION, SOLUTION INTRAVENOUS
Refills: 0 | Status: COMPLETED | OUTPATIENT
Start: 2020-01-02 | End: 2020-01-03

## 2020-01-02 RX ADMIN — LACOSAMIDE 200 MILLIGRAM(S): 50 TABLET ORAL at 19:09

## 2020-01-02 RX ADMIN — MEROPENEM 100 MILLIGRAM(S): 1 INJECTION INTRAVENOUS at 14:25

## 2020-01-02 RX ADMIN — LEVETIRACETAM 1500 MILLIGRAM(S): 250 TABLET, FILM COATED ORAL at 05:36

## 2020-01-02 RX ADMIN — Medication 1: at 12:15

## 2020-01-02 RX ADMIN — LACOSAMIDE 200 MILLIGRAM(S): 50 TABLET ORAL at 05:36

## 2020-01-02 RX ADMIN — Medication 1: at 18:18

## 2020-01-02 RX ADMIN — Medication 1 PACKET(S): at 19:09

## 2020-01-02 RX ADMIN — POLYETHYLENE GLYCOL 3350 17 GRAM(S): 17 POWDER, FOR SOLUTION ORAL at 12:16

## 2020-01-02 RX ADMIN — ENOXAPARIN SODIUM 40 MILLIGRAM(S): 100 INJECTION SUBCUTANEOUS at 12:15

## 2020-01-02 RX ADMIN — LEVETIRACETAM 1500 MILLIGRAM(S): 250 TABLET, FILM COATED ORAL at 19:08

## 2020-01-02 RX ADMIN — Medication 650 MILLIGRAM(S): at 15:16

## 2020-01-02 RX ADMIN — Medication 250 MILLIGRAM(S): at 21:59

## 2020-01-02 RX ADMIN — Medication 1: at 22:15

## 2020-01-02 RX ADMIN — MEROPENEM 100 MILLIGRAM(S): 1 INJECTION INTRAVENOUS at 21:58

## 2020-01-02 RX ADMIN — Medication 200 MILLIGRAM(S): at 21:59

## 2020-01-02 RX ADMIN — SERTRALINE 75 MILLIGRAM(S): 25 TABLET, FILM COATED ORAL at 12:15

## 2020-01-02 RX ADMIN — Medication 650 MILLIGRAM(S): at 15:30

## 2020-01-02 RX ADMIN — MEROPENEM 100 MILLIGRAM(S): 1 INJECTION INTRAVENOUS at 05:36

## 2020-01-02 RX ADMIN — TAMSULOSIN HYDROCHLORIDE 0.4 MILLIGRAM(S): 0.4 CAPSULE ORAL at 21:59

## 2020-01-02 NOTE — PROGRESS NOTE ADULT - SUBJECTIVE AND OBJECTIVE BOX
CC: f/u for pneumonia    Patient reports: he is more alert, he is answering simple questions.He is tolerating enteral feeds    REVIEW OF SYSTEMS:  All other review of systems negative (Comprehensive ROS)    Antimicrobials Day #  :day 5  meropenem  IVPB 1000 milliGRAM(s) IV Intermittent every 8 hours  vancomycin  IVPB 1000 milliGRAM(s) IV Intermittent every 24 hours    Other Medications Reviewed    T(F): 97.5 (01-02-20 @ 10:00), Max: 97.9 (01-02-20 @ 05:35)  HR: 105 (01-02-20 @ 10:00)  BP: 102/58 (01-02-20 @ 10:00)  RR: 16 (01-02-20 @ 10:00)  SpO2: 95% (01-02-20 @ 10:00)  Wt(kg): --    PHYSICAL EXAM:  General: alert, no acute distress, debilitated   Eyes:  anicteric, no conjunctival injection, no discharge  Oropharynx: no lesions or injection 	  Neck: supple, without adenopathy  Lungs: diminished breath sounds at bases  Heart: regular rate and rhythm; no murmur, rubs or gallops  Abdomen: soft, nondistended, nontender, without mass or organomegaly, peg in place  Skin: no lesions  Extremities: no clubbing, cyanosis, or edema  Neurologic: sleepy but able to answer simple questions  ESVIN bradley in place    LAB RESULTS:                        12.7   10.34 )-----------( 209      ( 02 Jan 2020 05:27 )             40.4     01-02    137  |  97  |  14  ----------------------------<  134<H>  3.4<L>   |  30  |  0.68    Ca    8.6      02 Jan 2020 05:27  Phos  2.1     01-01  Mg     1.9     01-01          MICROBIOLOGY:  RECENT CULTURES:  12-28 @ 17:55 .Blood Blood-Peripheral     No growth to date.          RADIOLOGY REVIEWED:  < from: CT Chest No Cont (12.28.19 @ 22:13) >  IMPRESSION:   1. Left lower lobe consolidation with complete or almost complete obstruction of the left lower lobe bronchus.; left hilar mass cannot be excluded. Consider further evaluation with contrast enhanced study and/or bronchoscopy.   2. Right lower lobe infiltrate with several pleural nodules in the right hemithorax.   3. Fracture right humeral head/neck with impaction indeterminate age. Clinical   correlation     < end of copied text >

## 2020-01-02 NOTE — CHART NOTE - NSCHARTNOTEFT_GEN_A_CORE
Called to evaluate pt found to be soaked in bed   Pt more alert and coherent answering question appropiately   Pt vital   Pt alert and oriented X2   Denies any pain or shortness of breath   Lungs congested bilat   Heart S1 S2   Extremities no edema   Assessment/ Called to evaluate pt found to be soaked in bed   Pt more alert and coherent answering question appropiately   Pt vital   Pt alert and oriented X2   Denies any pain or shortness of breath   Lungs congested bilat   Heart S1 S2   Extremities no edema   Assessment/ PLan   85M hx of HTN, remote prostate ca (treated s/p seeds/RT), encephalitis with dysphagia and PEG status, seizure d/o presenting with sepsis.   pt is completing a 5 day cours of VVanco and meripenium  Pt at present is more alert and awake / responsive than he has been all day.  Pt denies any pain  will send off labs with lactate as well as get CXR for congestion.  Discussed with dr Larkin and in agreement.  Discussed   plan of care with family

## 2020-01-02 NOTE — PROGRESS NOTE ADULT - ASSESSMENT
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  by default, who is admitted febrile with likely respiratory infection.  His CT shows LLL consolidating infiltrate and he may have occluded bronchus.  He had a fairly extensive w.u at [facility] which did not reveal any clear CNS  infection.  He was treated with steroids, IVIG, and ? additional agents as per neurology service.  His last LP had 1 wbc with elevated protein.  He had a positive WNV serology and a positive CSF toxo serology, however both were felt to be not related to acute neurologic events.  His acute problem which precipitated [facility]  appears pulmonary, he has been taking "pleasure " feeds, ?aspirating.  He is at risk for resistant naina, recent zosyn course at his SNF.(12/17-12/22).He also was severely constipated, ? leading to aspiration  Blood cultures are negative, RVP with coronavirus , legionella urine antigen negative  He was started on vanco and meropenem, looks improved, coronavirus isolated  is unlikely  to have precipitated admission  Vanco level of 9.5 is acceptable.His MRSA screen has returned today and is negative  He has a poor mental status, he has not recovered from inciting neuro process, however is able to answer a few simple questions  Bradley for retention  Suggest:  1.Day 5 vanco and meropenem, favor stopping them in AM  2.Enteral feeds per medicine  3.Mental status may be a limiting factor.If he truly had a "viral"encephalitis"  his mental status may not recover further  at this point.  4.bradley for retention  5.Bowel regimen per medicine, ID issues reviewed with daughter at bedside

## 2020-01-02 NOTE — PROGRESS NOTE ADULT - ASSESSMENT
85M hx of HTN, remote prostate ca (treated s/p seeds/RT), encephalitis with dysphagia and PEG status, seizure d/o presenting with sepsis.     # Acute hypoxic respiratory failure: 2/2 Coronavirus and superimposed multifocal PNA: WBC back to baseline , afebrile   - continue Vanco and meropenem per ID  - MRSA screen- pending  - Blood cx 12/28 NGTD  - ID following   - continue O2 support  - tube feeds resumed, aspiration precautions    #hypokalemia/ hypophosphotemia / hyponatemia  supplement k amd phos   follow bmp     #urinary retention   - bradley    #Fecal impaction  - resolved s/p Golytely  - Miralax daily  - Dulcolax PRN        #Moderate protein calorie malnutrition/ hypoalbunemia  - Albumin 1.5  - continue bolus tube feeds  - dietician following      #DM type 2 with hyperglycemia  - HbA1C 8.4%  - continue ISS  - monitor fingersticks   - continue tube feeds    #HTN hx  - on amiloride 5mg TID and labetalol 100mg TID at home  - hold meds due to low BP    #History of encephalitis  - Keppra and Vimpat    #DVT prophylaxis  - lovenox 85M hx of HTN, remote prostate ca (treated s/p seeds/RT), encephalitis with dysphagia and PEG status, seizure d/o presenting with sepsis.     # Acute hypoxic respiratory failure: 2/2 Coronavirus and superimposed multifocal PNA: WBC back to baseline , afebrile   - continue Vanco and meropenem per ID for course of 5/5 days   - MRSA screen- pending  - Blood cx 12/28 NGTD  - continue O2 support  - tube feeds continued , aspiration precautions    #hypokalemia  supplement k  follow bmp     #urinary retention   - continue  bradley    #Fecal impaction  - resolved    -s/p Golytely  - Miralax daily  - Dulcolax PRN        #Moderate protein calorie malnutrition/ hypoalbunemia  - Albumin 1.5  - continue bolus tube feeds  - dietician following      #DM type 2 with hyperglycemia  - HbA1C 8.4%  - continue ISS  - monitor fingersticks   - continue tube feeds    #HTN hx  - on amiloride 5mg TID and labetalol 100mg TID at home  - hold meds due to low BP    #History of encephalitis  - Keppra and Vimpat    #DVT prophylaxis  - lovenox 85M hx of HTN, remote prostate ca (treated s/p seeds/RT), encephalitis with dysphagia and PEG status, seizure d/o presenting with sepsis.     # Acute hypoxic respiratory failure: 2/2 Coronavirus and superimposed multifocal PNA: WBC back to baseline , afebrile   - continue Vanco and meropenem per ID for course of 5/5 days   - MRSA screen- not detected  - Blood cx 12/28 NGTD  - continue O2 support  - tube feeds continued , aspiration precautions    #hypokalemia  supplement k  follow bmp     #urinary retention   - continue  bradley    #Fecal impaction  - resolved s/p Golytely  - Miralax daily  - Dulcolax PRN    #Moderate protein calorie malnutrition/ hypoalbunemia  - Albumin 1.5  - continue bolus tube feeds  - dietician following      #DM type 2 with hyperglycemia  - HbA1C 8.4%  - continue ISS  - monitor fingersticks   - continue tube feeds    #HTN hx  - on amiloride 5mg TID and labetalol 100mg TID at home  - hold meds due to low BP    #History of encephalitis  - Keppra and Vimpat    #DVT prophylaxis  - lovenox

## 2020-01-02 NOTE — PROGRESS NOTE ADULT - SUBJECTIVE AND OBJECTIVE BOX
Patient is a 85y old  Male who presents with a chief complaint of desaturation (01 Jan 2020 12:04)      Patient seen and examined at bedside.    ALLERGIES:  No Known Allergies    MEDICATIONS  (STANDING):  dextrose 5%. 1000 milliLiter(s) (50 mL/Hr) IV Continuous <Continuous>  dextrose 50% Injectable 12.5 Gram(s) IV Push once  dextrose 50% Injectable 25 Gram(s) IV Push once  dextrose 50% Injectable 25 Gram(s) IV Push once  enoxaparin Injectable 40 milliGRAM(s) SubCutaneous daily  insulin lispro (HumaLOG) corrective regimen sliding scale   SubCutaneous four times a day before meals  lacosamide 200 milliGRAM(s) Oral two times a day  levETIRAcetam  Solution 1500 milliGRAM(s) Enteral Tube two times a day  meropenem  IVPB 1000 milliGRAM(s) IV Intermittent every 8 hours  polyethylene glycol 3350 17 Gram(s) Oral daily  sertraline 75 milliGRAM(s) Oral daily  tamsulosin 0.4 milliGRAM(s) Oral at bedtime  vancomycin  IVPB 1000 milliGRAM(s) IV Intermittent every 24 hours    MEDICATIONS  (PRN):  acetaminophen    Suspension .. 650 milliGRAM(s) Enteral Tube every 6 hours PRN Temp greater or equal to 38C (100.4F), Mild Pain (1 - 3)  albuterol/ipratropium for Nebulization 3 milliLiter(s) Nebulizer every 6 hours PRN Bronchospasm  bisacodyl 5 milliGRAM(s) Oral every 12 hours PRN Constipation  dextrose 40% Gel 15 Gram(s) Oral once PRN Blood Glucose LESS THAN 70 milliGRAM(s)/deciliter  glucagon  Injectable 1 milliGRAM(s) IntraMuscular once PRN Glucose LESS THAN 70 milligrams/deciliter    Vital Signs Last 24 Hrs  T(F): 97.9 (02 Jan 2020 05:35), Max: 98.2 (01 Jan 2020 09:58)  HR: 82 (02 Jan 2020 05:35) (65 - 93)  BP: 100/55 (02 Jan 2020 05:35) (97/44 - 105/58)  RR: 17 (02 Jan 2020 05:35) (16 - 19)  SpO2: 95% (02 Jan 2020 05:35) (92% - 100%)  I&O's Summary    01 Jan 2020 07:01 - 02 Jan 2020 07:00  --------------------------------------------------------  IN: 0 mL / OUT: 1500 mL / NET: -1500 mL      PHYSICAL EXAM:  General: NAD, A/O x 3  ENT: MMM  Neck: Supple, No JVD  Lungs: Clear to auscultation bilaterally, Non labored breathing   Cardio: RRR, S1/S2, No murmurs  Abdomen: Soft, Nontender, Nondistended; Bowel sounds present  Extremities: No calf tenderness, No pitting edema    LABS:                        12.7   10.34 )-----------( 209      ( 02 Jan 2020 05:27 )             40.4     01-02    137  |  97  |  14  ----------------------------<  134  3.4   |  30  |  0.68    Ca    8.6      02 Jan 2020 05:27  Phos  2.1     01-01  Mg     1.9     01-01      eGFR if Non African American: 87 mL/min/1.73M2 (01-02-20 @ 05:27)  eGFR if African American: 101 mL/min/1.73M2 (01-02-20 @ 05:27)                          POCT Blood Glucose.: 199 mg/dL (01 Jan 2020 20:36)  POCT Blood Glucose.: 179 mg/dL (01 Jan 2020 16:58)  POCT Blood Glucose.: 218 mg/dL (01 Jan 2020 12:59)  POCT Blood Glucose.: 158 mg/dL (01 Jan 2020 08:52)    12-29 [id number] 8.4        Culture - Urine (collected 28 Dec 2019 17:55)  Source: .Urine Clean Catch (Midstream)  Final Report (29 Dec 2019 21:26):    <10,000 CFU/mL Normal Urogenital Domenica    Culture - Blood (collected 28 Dec 2019 17:55)  Source: .Blood Blood-Peripheral  Preliminary Report (30 Dec 2019 03:01):    No growth to date.    Culture - Blood (collected 28 Dec 2019 17:55)  Source: .Blood Blood-Peripheral  Preliminary Report (30 Dec 2019 03:01):    No growth to date.      RADIOLOGY & ADDITIONAL TESTS:    Care Discussed with Consultants/Other Providers: Patient is a 85y old  Male who presents with a chief complaint of desaturation.        Patient seen and examined at bedside.    ALLERGIES:  No Known Allergies    MEDICATIONS  (STANDING):  dextrose 5%. 1000 milliLiter(s) (50 mL/Hr) IV Continuous <Continuous>  dextrose 50% Injectable 12.5 Gram(s) IV Push once  dextrose 50% Injectable 25 Gram(s) IV Push once  dextrose 50% Injectable 25 Gram(s) IV Push once  enoxaparin Injectable 40 milliGRAM(s) SubCutaneous daily  insulin lispro (HumaLOG) corrective regimen sliding scale   SubCutaneous four times a day before meals  lacosamide 200 milliGRAM(s) Oral two times a day  levETIRAcetam  Solution 1500 milliGRAM(s) Enteral Tube two times a day  meropenem  IVPB 1000 milliGRAM(s) IV Intermittent every 8 hours  polyethylene glycol 3350 17 Gram(s) Oral daily  sertraline 75 milliGRAM(s) Oral daily  tamsulosin 0.4 milliGRAM(s) Oral at bedtime  vancomycin  IVPB 1000 milliGRAM(s) IV Intermittent every 24 hours    MEDICATIONS  (PRN):  acetaminophen    Suspension .. 650 milliGRAM(s) Enteral Tube every 6 hours PRN Temp greater or equal to 38C (100.4F), Mild Pain (1 - 3)  albuterol/ipratropium for Nebulization 3 milliLiter(s) Nebulizer every 6 hours PRN Bronchospasm  bisacodyl 5 milliGRAM(s) Oral every 12 hours PRN Constipation  dextrose 40% Gel 15 Gram(s) Oral once PRN Blood Glucose LESS THAN 70 milliGRAM(s)/deciliter  glucagon  Injectable 1 milliGRAM(s) IntraMuscular once PRN Glucose LESS THAN 70 milligrams/deciliter    Vital Signs Last 24 Hrs  T(F): 97.9 (02 Jan 2020 05:35), Max: 98.2 (01 Jan 2020 09:58)  HR: 82 (02 Jan 2020 05:35) (65 - 93)  BP: 100/55 (02 Jan 2020 05:35) (97/44 - 105/58)  RR: 17 (02 Jan 2020 05:35) (16 - 19)  SpO2: 95% (02 Jan 2020 05:35) (92% - 100%)  I&O's Summary    01 Jan 2020 07:01  -  02 Jan 2020 07:00  --------------------------------------------------------  IN: 0 mL / OUT: 1500 mL / NET: -1500 mL      PHYSICAL EXAM:  General: NAD, A/O x 3  ENT: MMM  Neck: Supple, No JVD  Lungs: Clear to auscultation bilaterally, Non labored breathing   Cardio: RRR, S1/S2, No murmurs  Abdomen: Soft, Nontender, Nondistended; Bowel sounds present  Extremities: No calf tenderness, No pitting edema    LABS:                        12.7   10.34 )-----------( 209      ( 02 Jan 2020 05:27 )             40.4     01-02    137  |  97  |  14  ----------------------------<  134  3.4   |  30  |  0.68    Ca    8.6      02 Jan 2020 05:27  Phos  2.1     01-01  Mg     1.9     01-01      eGFR if Non African American: 87 mL/min/1.73M2 (01-02-20 @ 05:27)  eGFR if African American: 101 mL/min/1.73M2 (01-02-20 @ 05:27)                          POCT Blood Glucose.: 199 mg/dL (01 Jan 2020 20:36)  POCT Blood Glucose.: 179 mg/dL (01 Jan 2020 16:58)  POCT Blood Glucose.: 218 mg/dL (01 Jan 2020 12:59)  POCT Blood Glucose.: 158 mg/dL (01 Jan 2020 08:52)    12-29 [id number] 8.4        Culture - Urine (collected 28 Dec 2019 17:55)  Source: .Urine Clean Catch (Midstream)  Final Report (29 Dec 2019 21:26):    <10,000 CFU/mL Normal Urogenital Domenica    Culture - Blood (collected 28 Dec 2019 17:55)  Source: .Blood Blood-Peripheral  Preliminary Report (30 Dec 2019 03:01):    No growth to date.    Culture - Blood (collected 28 Dec 2019 17:55)  Source: .Blood Blood-Peripheral  Preliminary Report (30 Dec 2019 03:01):    No growth to date.      RADIOLOGY & ADDITIONAL TESTS:    Care Discussed with Consultants/Other Providers: Patient is a 85y old  Male who presents with a chief complaint of desaturation.  pt unchanged from yesterday. Still non responsive  Daughter at bedside       Patient seen and examined at bedside.    ALLERGIES:  No Known Allergies    MEDICATIONS  (STANDING):  dextrose 5%. 1000 milliLiter(s) (50 mL/Hr) IV Continuous <Continuous>  dextrose 50% Injectable 12.5 Gram(s) IV Push once  dextrose 50% Injectable 25 Gram(s) IV Push once  dextrose 50% Injectable 25 Gram(s) IV Push once  enoxaparin Injectable 40 milliGRAM(s) SubCutaneous daily  insulin lispro (HumaLOG) corrective regimen sliding scale   SubCutaneous four times a day before meals  lacosamide 200 milliGRAM(s) Oral two times a day  levETIRAcetam  Solution 1500 milliGRAM(s) Enteral Tube two times a day  meropenem  IVPB 1000 milliGRAM(s) IV Intermittent every 8 hours  polyethylene glycol 3350 17 Gram(s) Oral daily  sertraline 75 milliGRAM(s) Oral daily  tamsulosin 0.4 milliGRAM(s) Oral at bedtime  vancomycin  IVPB 1000 milliGRAM(s) IV Intermittent every 24 hours    MEDICATIONS  (PRN):  acetaminophen    Suspension .. 650 milliGRAM(s) Enteral Tube every 6 hours PRN Temp greater or equal to 38C (100.4F), Mild Pain (1 - 3)  albuterol/ipratropium for Nebulization 3 milliLiter(s) Nebulizer every 6 hours PRN Bronchospasm  bisacodyl 5 milliGRAM(s) Oral every 12 hours PRN Constipation  dextrose 40% Gel 15 Gram(s) Oral once PRN Blood Glucose LESS THAN 70 milliGRAM(s)/deciliter  glucagon  Injectable 1 milliGRAM(s) IntraMuscular once PRN Glucose LESS THAN 70 milligrams/deciliter    Vital Signs Last 24 Hrs  T(F): 97.9 (02 Jan 2020 05:35), Max: 98.2 (01 Jan 2020 09:58)  HR: 82 (02 Jan 2020 05:35) (65 - 93)  BP: 100/55 (02 Jan 2020 05:35) (97/44 - 105/58)  RR: 17 (02 Jan 2020 05:35) (16 - 19)  SpO2: 95% (02 Jan 2020 05:35) (92% - 100%)  I&O's Summary    01 Jan 2020 07:01  -  02 Jan 2020 07:00  --------------------------------------------------------  IN: 0 mL / OUT: 1500 mL / NET: -1500 mL      PHYSICAL EXAM:  General: NAD, obtunded   ENT: MMM  Neck: Supple, No JVD  Lungs: Course bs  bilaterally, Non labored breathing   Cardio: RRR, S1/S2, No murmurs  Abdomen: Soft, Nontender, Nondistended; Bowel sounds present  Extremities: No calf tenderness, No pitting edema    LABS:                        12.7   10.34 )-----------( 209      ( 02 Jan 2020 05:27 )             40.4     01-02    137  |  97  |  14  ----------------------------<  134  3.4   |  30  |  0.68    Ca    8.6      02 Jan 2020 05:27  Phos  2.1     01-01  Mg     1.9     01-01      eGFR if Non African American: 87 mL/min/1.73M2 (01-02-20 @ 05:27)  eGFR if African American: 101 mL/min/1.73M2 (01-02-20 @ 05:27)                          POCT Blood Glucose.: 199 mg/dL (01 Jan 2020 20:36)  POCT Blood Glucose.: 179 mg/dL (01 Jan 2020 16:58)  POCT Blood Glucose.: 218 mg/dL (01 Jan 2020 12:59)  POCT Blood Glucose.: 158 mg/dL (01 Jan 2020 08:52)    12-29 [id number] 8.4        Culture - Urine (collected 28 Dec 2019 17:55)  Source: .Urine Clean Catch (Midstream)  Final Report (29 Dec 2019 21:26):    <10,000 CFU/mL Normal Urogenital Domenica    Culture - Blood (collected 28 Dec 2019 17:55)  Source: .Blood Blood-Peripheral  Preliminary Report (30 Dec 2019 03:01):    No growth to date.    Culture - Blood (collected 28 Dec 2019 17:55)  Source: .Blood Blood-Peripheral  Preliminary Report (30 Dec 2019 03:01):    No growth to date.      RADIOLOGY & ADDITIONAL TESTS:    Care Discussed with Consultants/Other Providers: Patient is a 85y old  Male who presents with a chief complaint of desaturation.  pt unchanged from yesterday. Still non responsive  Daughter at bedside       Patient seen and examined at bedside.    ALLERGIES:  No Known Allergies    MEDICATIONS  (STANDING):  dextrose 5%. 1000 milliLiter(s) (50 mL/Hr) IV Continuous <Continuous>  dextrose 50% Injectable 12.5 Gram(s) IV Push once  dextrose 50% Injectable 25 Gram(s) IV Push once  dextrose 50% Injectable 25 Gram(s) IV Push once  enoxaparin Injectable 40 milliGRAM(s) SubCutaneous daily  insulin lispro (HumaLOG) corrective regimen sliding scale   SubCutaneous four times a day before meals  lacosamide 200 milliGRAM(s) Oral two times a day  levETIRAcetam  Solution 1500 milliGRAM(s) Enteral Tube two times a day  meropenem  IVPB 1000 milliGRAM(s) IV Intermittent every 8 hours  polyethylene glycol 3350 17 Gram(s) Oral daily  sertraline 75 milliGRAM(s) Oral daily  tamsulosin 0.4 milliGRAM(s) Oral at bedtime  vancomycin  IVPB 1000 milliGRAM(s) IV Intermittent every 24 hours    MEDICATIONS  (PRN):  acetaminophen    Suspension .. 650 milliGRAM(s) Enteral Tube every 6 hours PRN Temp greater or equal to 38C (100.4F), Mild Pain (1 - 3)  albuterol/ipratropium for Nebulization 3 milliLiter(s) Nebulizer every 6 hours PRN Bronchospasm  bisacodyl 5 milliGRAM(s) Oral every 12 hours PRN Constipation  dextrose 40% Gel 15 Gram(s) Oral once PRN Blood Glucose LESS THAN 70 milliGRAM(s)/deciliter  glucagon  Injectable 1 milliGRAM(s) IntraMuscular once PRN Glucose LESS THAN 70 milligrams/deciliter    Vital Signs Last 24 Hrs  T(F): 97.9 (02 Jan 2020 05:35), Max: 98.2 (01 Jan 2020 09:58)  HR: 82 (02 Jan 2020 05:35) (65 - 93)  BP: 100/55 (02 Jan 2020 05:35) (97/44 - 105/58)  RR: 17 (02 Jan 2020 05:35) (16 - 19)  SpO2: 95% (02 Jan 2020 05:35) (92% - 100%)  I&O's Summary    01 Jan 2020 07:01  -  02 Jan 2020 07:00  --------------------------------------------------------  IN: 0 mL / OUT: 1500 mL / NET: -1500 mL    PHYSICAL EXAM:  General: NAD, elderly  ENT: MMM, no thrush  Neck: Supple, No JVD  Lungs: decreased breath sounds b/l bases no wheezing   Cardio: +s1/s2, No pitting edema  Abdomen: Soft, Nontender, Nondistended; Bowel sounds present  Extremities: No calf tenderness    LABS:                        12.7   10.34 )-----------( 209      ( 02 Jan 2020 05:27 )             40.4     01-02    137  |  97  |  14  ----------------------------<  134  3.4   |  30  |  0.68    Ca    8.6      02 Jan 2020 05:27  Phos  2.1     01-01  Mg     1.9     01-01      eGFR if Non African American: 87 mL/min/1.73M2 (01-02-20 @ 05:27)  eGFR if African American: 101 mL/min/1.73M2 (01-02-20 @ 05:27)    Glucose  POCT Blood Glucose.: 199 mg/dL (01 Jan 2020 20:36)  POCT Blood Glucose.: 179 mg/dL (01 Jan 2020 16:58)  POCT Blood Glucose.: 218 mg/dL (01 Jan 2020 12:59)  POCT Blood Glucose.: 158 mg/dL (01 Jan 2020 08:52)    12-29 [id number] 8.4    Culture  Culture - Urine (collected 28 Dec 2019 17:55)  Source: .Urine Clean Catch (Midstream)  Final Report (29 Dec 2019 21:26):    <10,000 CFU/mL Normal Urogenital Domenica    Culture - Blood (collected 28 Dec 2019 17:55)  Source: .Blood Blood-Peripheral  Preliminary Report (30 Dec 2019 03:01):    No growth to date.    Culture - Blood (collected 28 Dec 2019 17:55)  Source: .Blood Blood-Peripheral  Preliminary Report (30 Dec 2019 03:01):    No growth to date.      RADIOLOGY & ADDITIONAL TESTS:  < from: CT Head No Cont (01.01.20 @ 09:54) >  IMPRESSION:  No acute intracranial hemorrhage or acute territorial infarct.   < end of copied text >        Care Discussed with Consultants/Other Providers:

## 2020-01-03 LAB
ANION GAP SERPL CALC-SCNC: 7 MMOL/L — SIGNIFICANT CHANGE UP (ref 5–17)
BUN SERPL-MCNC: 16 MG/DL — SIGNIFICANT CHANGE UP (ref 7–23)
CALCIUM SERPL-MCNC: 8.5 MG/DL — SIGNIFICANT CHANGE UP (ref 8.4–10.5)
CHLORIDE SERPL-SCNC: 98 MMOL/L — SIGNIFICANT CHANGE UP (ref 96–108)
CO2 SERPL-SCNC: 31 MMOL/L — SIGNIFICANT CHANGE UP (ref 22–31)
CREAT SERPL-MCNC: 0.69 MG/DL — SIGNIFICANT CHANGE UP (ref 0.5–1.3)
CULTURE RESULTS: SIGNIFICANT CHANGE UP
CULTURE RESULTS: SIGNIFICANT CHANGE UP
GLUCOSE BLDC GLUCOMTR-MCNC: 160 MG/DL — HIGH (ref 70–99)
GLUCOSE BLDC GLUCOMTR-MCNC: 179 MG/DL — HIGH (ref 70–99)
GLUCOSE BLDC GLUCOMTR-MCNC: 182 MG/DL — HIGH (ref 70–99)
GLUCOSE BLDC GLUCOMTR-MCNC: 196 MG/DL — HIGH (ref 70–99)
GLUCOSE SERPL-MCNC: 170 MG/DL — HIGH (ref 70–99)
HCT VFR BLD CALC: 41 % — SIGNIFICANT CHANGE UP (ref 39–50)
HGB BLD-MCNC: 12.9 G/DL — LOW (ref 13–17)
LACTATE SERPL-SCNC: 3.1 MMOL/L — HIGH (ref 0.7–2)
MAGNESIUM SERPL-MCNC: 1.3 MG/DL — LOW (ref 1.6–2.6)
MCHC RBC-ENTMCNC: 29.7 PG — SIGNIFICANT CHANGE UP (ref 27–34)
MCHC RBC-ENTMCNC: 31.5 GM/DL — LOW (ref 32–36)
MCV RBC AUTO: 94.3 FL — SIGNIFICANT CHANGE UP (ref 80–100)
NRBC # BLD: 0 /100 WBCS — SIGNIFICANT CHANGE UP (ref 0–0)
PHOSPHATE SERPL-MCNC: 2.1 MG/DL — LOW (ref 2.5–4.5)
PLATELET # BLD AUTO: 235 K/UL — SIGNIFICANT CHANGE UP (ref 150–400)
POTASSIUM SERPL-MCNC: 3.1 MMOL/L — LOW (ref 3.5–5.3)
POTASSIUM SERPL-SCNC: 3.1 MMOL/L — LOW (ref 3.5–5.3)
RBC # BLD: 4.35 M/UL — SIGNIFICANT CHANGE UP (ref 4.2–5.8)
RBC # FLD: 15.4 % — HIGH (ref 10.3–14.5)
SODIUM SERPL-SCNC: 136 MMOL/L — SIGNIFICANT CHANGE UP (ref 135–145)
SPECIMEN SOURCE: SIGNIFICANT CHANGE UP
SPECIMEN SOURCE: SIGNIFICANT CHANGE UP
WBC # BLD: 8.89 K/UL — SIGNIFICANT CHANGE UP (ref 3.8–10.5)
WBC # FLD AUTO: 8.89 K/UL — SIGNIFICANT CHANGE UP (ref 3.8–10.5)

## 2020-01-03 PROCEDURE — 99233 SBSQ HOSP IP/OBS HIGH 50: CPT

## 2020-01-03 PROCEDURE — 93971 EXTREMITY STUDY: CPT | Mod: 26,LT

## 2020-01-03 RX ORDER — POTASSIUM CHLORIDE 20 MEQ
10 PACKET (EA) ORAL
Refills: 0 | Status: COMPLETED | OUTPATIENT
Start: 2020-01-03 | End: 2020-01-03

## 2020-01-03 RX ORDER — MAGNESIUM SULFATE 500 MG/ML
1 VIAL (ML) INJECTION ONCE
Refills: 0 | Status: COMPLETED | OUTPATIENT
Start: 2020-01-03 | End: 2020-01-03

## 2020-01-03 RX ADMIN — TAMSULOSIN HYDROCHLORIDE 0.4 MILLIGRAM(S): 0.4 CAPSULE ORAL at 21:53

## 2020-01-03 RX ADMIN — Medication 100 MILLIEQUIVALENT(S): at 13:00

## 2020-01-03 RX ADMIN — LACOSAMIDE 200 MILLIGRAM(S): 50 TABLET ORAL at 17:51

## 2020-01-03 RX ADMIN — Medication 40 MILLIEQUIVALENT(S): at 00:16

## 2020-01-03 RX ADMIN — Medication 100 MILLIEQUIVALENT(S): at 14:00

## 2020-01-03 RX ADMIN — Medication 1: at 08:50

## 2020-01-03 RX ADMIN — LEVETIRACETAM 1500 MILLIGRAM(S): 250 TABLET, FILM COATED ORAL at 17:51

## 2020-01-03 RX ADMIN — LACOSAMIDE 200 MILLIGRAM(S): 50 TABLET ORAL at 05:06

## 2020-01-03 RX ADMIN — Medication 1: at 17:51

## 2020-01-03 RX ADMIN — SODIUM CHLORIDE 100 MILLILITER(S): 9 INJECTION, SOLUTION INTRAVENOUS at 00:16

## 2020-01-03 RX ADMIN — POLYETHYLENE GLYCOL 3350 17 GRAM(S): 17 POWDER, FOR SOLUTION ORAL at 12:10

## 2020-01-03 RX ADMIN — LEVETIRACETAM 1500 MILLIGRAM(S): 250 TABLET, FILM COATED ORAL at 05:06

## 2020-01-03 RX ADMIN — Medication 100 MILLIEQUIVALENT(S): at 15:00

## 2020-01-03 RX ADMIN — Medication 5 MILLIGRAM(S): at 12:11

## 2020-01-03 RX ADMIN — Medication 1: at 22:18

## 2020-01-03 RX ADMIN — Medication 1: at 12:09

## 2020-01-03 RX ADMIN — MEROPENEM 100 MILLIGRAM(S): 1 INJECTION INTRAVENOUS at 05:06

## 2020-01-03 RX ADMIN — Medication 100 GRAM(S): at 12:09

## 2020-01-03 RX ADMIN — SERTRALINE 75 MILLIGRAM(S): 25 TABLET, FILM COATED ORAL at 12:11

## 2020-01-03 RX ADMIN — ENOXAPARIN SODIUM 40 MILLIGRAM(S): 100 INJECTION SUBCUTANEOUS at 12:10

## 2020-01-03 RX ADMIN — Medication 1 PACKET(S): at 05:06

## 2020-01-03 NOTE — CHART NOTE - NSCHARTNOTEFT_GEN_A_CORE
Nutrition Follow Up Note  Hospital Course (Per Electronic Medical Record):   Source: Medical Record [X]  Nursing Staff [X]     Diet:  Two meir 275cc bolus 4 x day    Patient tolerating bolus feeds as per RN, No GI issues noted. patient noted at moderate nutrition risk due to bolus feed status and possible intolerance issues. Insulin coverage noted due to POCT , A1c noted 8.5%.    Current Weight: (1/3) 173.2/78.6kg, weight noted with fluctuation (+) edema noted.     Pertinent Medications: MEDICATIONS  (STANDING):  dextrose 5%. 1000 milliLiter(s) (50 mL/Hr) IV Continuous <Continuous>  dextrose 50% Injectable 12.5 Gram(s) IV Push once  dextrose 50% Injectable 25 Gram(s) IV Push once  dextrose 50% Injectable 25 Gram(s) IV Push once  enoxaparin Injectable 40 milliGRAM(s) SubCutaneous daily  insulin lispro (HumaLOG) corrective regimen sliding scale   SubCutaneous four times a day before meals  lacosamide 200 milliGRAM(s) Oral two times a day  levETIRAcetam  Solution 1500 milliGRAM(s) Enteral Tube two times a day  meropenem  IVPB 1000 milliGRAM(s) IV Intermittent every 8 hours  polyethylene glycol 3350 17 Gram(s) Oral daily  sertraline 75 milliGRAM(s) Oral daily  tamsulosin 0.4 milliGRAM(s) Oral at bedtime  vancomycin  IVPB 1000 milliGRAM(s) IV Intermittent every 24 hours    MEDICATIONS  (PRN):  acetaminophen    Suspension .. 650 milliGRAM(s) Enteral Tube every 6 hours PRN Temp greater or equal to 38C (100.4F), Mild Pain (1 - 3)  albuterol/ipratropium for Nebulization 3 milliLiter(s) Nebulizer every 6 hours PRN Bronchospasm  bisacodyl 5 milliGRAM(s) Oral every 12 hours PRN Constipation  dextrose 40% Gel 15 Gram(s) Oral once PRN Blood Glucose LESS THAN 70 milliGRAM(s)/deciliter  glucagon  Injectable 1 milliGRAM(s) IntraMuscular once PRN Glucose LESS THAN 70 milligrams/deciliter  guaiFENesin   Syrup  (Sugar-Free) 200 milliGRAM(s) Oral every 6 hours PRN congestion      Pertinent Labs:  01-03 Na136 mmol/L Glu 170 mg/dL<H> K+ 3.1 mmol/L<L> Cr  0.69 mg/dL BUN 16 mg/dL 01-03 Phos 2.1 mg/dL<L> 12-30 Alb 1.5 g/dL<L> 12-29 OhnjjqoxbjC0H 8.4 %<H>  POCT 179,199,152,185, .      Skin:  intact    Edema: (+2) (L) arm & (L) leg    Last BM: on (1/1)    Estimated Needs:   [X] No Change since Previous Assessment      Previous Nutrition Diagnosis: Moderate Malnutrition    Nutrition Diagnosis is [X] Ongoing       New Nutrition Diagnosis: [X] Not Applicable      Interventions:   1. Recommend provide & document free water due to bolus feeds and hydration  needs, suggest 350 cc QID       Monitoring & Evaluation: will monitor:  [X] Weights   [X] Tolerance to bolus feeds   [X] Follow Up (Per Protocol)        RD to follow as per Nutrition protocol  Zuleyka Stroud RDN

## 2020-01-03 NOTE — PROGRESS NOTE ADULT - SUBJECTIVE AND OBJECTIVE BOX
CC: f/u for pneumonia    Patient remains more alert, answering simple questions, tolerating enteral feeds    REVIEW OF SYSTEMS:  All other review of systems negative (Comprehensive ROS)    Antimicrobials Day # 6  meropenem  IVPB 1000 milliGRAM(s) IV Intermittent every 8 hours  vancomycin  IVPB 1000 milliGRAM(s) IV Intermittent every 24 hours    Other Medications Reviewed    Vital Signs Last 24 Hrs  T(F): 97.7 (03 Jan 2020 13:13), Max: 98 (03 Jan 2020 05:21)  HR: 99 (03 Jan 2020 13:13) (75 - 99)  BP: 102/53 (03 Jan 2020 13:13) (98/54 - 105/63)  BP(mean): --  RR: 16 (03 Jan 2020 13:13) (16 - 18)  SpO2: 95% (03 Jan 2020 13:13) (95% - 100%)    PHYSICAL EXAM:  General: no acute distress, debilitated   Eyes:  anicteric, no conjunctival injection, no discharge  Oropharynx: no lesions or injection 	  Neck: supple, without adenopathy  Lungs: diminished breath sounds at bases  Heart: regular rate and rhythm; no murmur, rubs or gallops  Abdomen: soft, nondistended, nontender, without mass or organomegaly, G tube site clean  Hernandez  Skin: no lesions  Extremities: no edema  Neurologic: alert, follows simple commands    LAB RESULTS:                        12.9   8.89  )-----------( 235      ( 03 Jan 2020 06:15 )             41.0   01-03    136  |  98  |  16  ----------------------------<  170<H>  3.1<L>   |  31  |  0.69    Ca    8.5      03 Jan 2020 06:15  Phos  2.1     01-03  Mg     1.3     01-03    MICROBIOLOGY:  RECENT CULTURES:  12-28 @ 17:55 .Blood Blood-Peripheral     No growth to date.    RADIOLOGY REVIEWED:  CT Chest No Cont (12.28.19 @ 22:13) >  1. Left lower lobe consolidation with complete or almost complete obstruction of the left lower lobe bronchus;   left hilar mass cannot be excluded. Consider further evaluation with contrast enhanced study and/or bronchoscopy.   2. Right lower lobe infiltrate with several pleural nodules in the right hemithorax.   3. Fracture right humeral head/neck with impaction indeterminate age. Clinical   correlation

## 2020-01-03 NOTE — PROGRESS NOTE ADULT - SUBJECTIVE AND OBJECTIVE BOX
Patient is a 85y old  Male who presents with a chief complaint of desaturation (02 Jan 2020 15:16)    Patient seen and examined at bedside. Patient awake, alert, confused, able to state his name     ALLERGIES:  No Known Allergies    MEDICATIONS  (STANDING):  dextrose 5%. 1000 milliLiter(s) (50 mL/Hr) IV Continuous <Continuous>  dextrose 50% Injectable 12.5 Gram(s) IV Push once  dextrose 50% Injectable 25 Gram(s) IV Push once  dextrose 50% Injectable 25 Gram(s) IV Push once  enoxaparin Injectable 40 milliGRAM(s) SubCutaneous daily  insulin lispro (HumaLOG) corrective regimen sliding scale   SubCutaneous four times a day before meals  lacosamide 200 milliGRAM(s) Oral two times a day  levETIRAcetam  Solution 1500 milliGRAM(s) Enteral Tube two times a day  meropenem  IVPB 1000 milliGRAM(s) IV Intermittent every 8 hours  polyethylene glycol 3350 17 Gram(s) Oral daily  sertraline 75 milliGRAM(s) Oral daily  tamsulosin 0.4 milliGRAM(s) Oral at bedtime  vancomycin  IVPB 1000 milliGRAM(s) IV Intermittent every 24 hours    MEDICATIONS  (PRN):  acetaminophen    Suspension .. 650 milliGRAM(s) Enteral Tube every 6 hours PRN Temp greater or equal to 38C (100.4F), Mild Pain (1 - 3)  albuterol/ipratropium for Nebulization 3 milliLiter(s) Nebulizer every 6 hours PRN Bronchospasm  bisacodyl 5 milliGRAM(s) Oral every 12 hours PRN Constipation  dextrose 40% Gel 15 Gram(s) Oral once PRN Blood Glucose LESS THAN 70 milliGRAM(s)/deciliter  glucagon  Injectable 1 milliGRAM(s) IntraMuscular once PRN Glucose LESS THAN 70 milligrams/deciliter  guaiFENesin   Syrup  (Sugar-Free) 200 milliGRAM(s) Oral every 6 hours PRN congestion    Vital Signs Last 24 Hrs  T(F): 98 (03 Jan 2020 05:21), Max: 98 (03 Jan 2020 05:21)  HR: 84 (03 Jan 2020 05:21) (75 - 86)  BP: 105/63 (03 Jan 2020 05:21) (98/54 - 105/63)  RR: 17 (03 Jan 2020 05:21) (17 - 18)  SpO2: 96% (03 Jan 2020 05:21) (96% - 100%)    I&O's Summary  02 Jan 2020 07:01  -  03 Jan 2020 07:00  --------------------------------------------------------  IN: 2200 mL / OUT: 1000 mL / NET: 1200 mL    PHYSICAL EXAM:  GENERAL: NAD, alert  HEAD:  Atraumatic, Normocephalic  EYES: EOMI   ENMT: Moist mucous membranes  NECK: Supple, No JVD  CHEST/LUNG: Decreased at bases, upper lung fields clear to auscultation bilaterally, non-labored breathing  HEART: +S1/S2  ABDOMEN: Soft, Nontender, Nondistended  : Mary  VASCULAR: Normal pulses, Normal capillary refill, no edema   EXTREMITIES: No calf tenderness, No cyanosis  SKIN: Warm, Intact  NERVOUS SYSTEM:  Alert, nonfocal     LABS:                        12.9   8.89  )-----------( 235      ( 03 Jan 2020 06:15 )             41.0     01-03    136  |  98  |  16  ----------------------------<  170  3.1   |  31  |  0.69    Ca    8.5      03 Jan 2020 06:15  Phos  2.1     01-03  Mg     1.3     01-03      eGFR if Non African American: 87 mL/min/1.73M2 (01-03-20 @ 06:15)  eGFR if African American: 100 mL/min/1.73M2 (01-03-20 @ 06:15)      Lactate, Blood: 3.1 mmol/L (01-03 @ 06:15)  Lactate, Blood: 2.3 mmol/L (01-02 @ 19:40)                      POCT Blood Glucose.: 179 mg/dL (03 Jan 2020 08:49)  POCT Blood Glucose.: 199 mg/dL (02 Jan 2020 21:58)  POCT Blood Glucose.: 152 mg/dL (02 Jan 2020 17:58)  POCT Blood Glucose.: 185 mg/dL (02 Jan 2020 12:12)    12-29 [id number] 8.4        Culture - Urine (collected 28 Dec 2019 17:55)  Source: .Urine Clean Catch (Midstream)  Final Report (29 Dec 2019 21:26):    <10,000 CFU/mL Normal Urogenital Domenica    Culture - Blood (collected 28 Dec 2019 17:55)  Source: .Blood Blood-Peripheral  Final Report (03 Jan 2020 03:00):    No growth at 5 days.    Culture - Blood (collected 28 Dec 2019 17:55)  Source: .Blood Blood-Peripheral  Final Report (03 Jan 2020 03:00):    No growth at 5 days.        RADIOLOGY & ADDITIONAL TESTS:    Care Discussed with Consultants/Other Providers: Patient is a 85y old  Male who presents with a chief complaint of desaturation (02 Jan 2020 15:16)    Patient seen and examined at bedside. Patient awake, alert, confused, able to state his name     ALLERGIES:  No Known Allergies    MEDICATIONS  (STANDING):  dextrose 5%. 1000 milliLiter(s) (50 mL/Hr) IV Continuous <Continuous>  dextrose 50% Injectable 12.5 Gram(s) IV Push once  dextrose 50% Injectable 25 Gram(s) IV Push once  dextrose 50% Injectable 25 Gram(s) IV Push once  enoxaparin Injectable 40 milliGRAM(s) SubCutaneous daily  insulin lispro (HumaLOG) corrective regimen sliding scale   SubCutaneous four times a day before meals  lacosamide 200 milliGRAM(s) Oral two times a day  levETIRAcetam  Solution 1500 milliGRAM(s) Enteral Tube two times a day  meropenem  IVPB 1000 milliGRAM(s) IV Intermittent every 8 hours  polyethylene glycol 3350 17 Gram(s) Oral daily  sertraline 75 milliGRAM(s) Oral daily  tamsulosin 0.4 milliGRAM(s) Oral at bedtime  vancomycin  IVPB 1000 milliGRAM(s) IV Intermittent every 24 hours    MEDICATIONS  (PRN):  acetaminophen    Suspension .. 650 milliGRAM(s) Enteral Tube every 6 hours PRN Temp greater or equal to 38C (100.4F), Mild Pain (1 - 3)  albuterol/ipratropium for Nebulization 3 milliLiter(s) Nebulizer every 6 hours PRN Bronchospasm  bisacodyl 5 milliGRAM(s) Oral every 12 hours PRN Constipation  dextrose 40% Gel 15 Gram(s) Oral once PRN Blood Glucose LESS THAN 70 milliGRAM(s)/deciliter  glucagon  Injectable 1 milliGRAM(s) IntraMuscular once PRN Glucose LESS THAN 70 milligrams/deciliter  guaiFENesin   Syrup  (Sugar-Free) 200 milliGRAM(s) Oral every 6 hours PRN congestion    Vital Signs Last 24 Hrs  T(F): 98 (03 Jan 2020 05:21), Max: 98 (03 Jan 2020 05:21)  HR: 84 (03 Jan 2020 05:21) (75 - 86)  BP: 105/63 (03 Jan 2020 05:21) (98/54 - 105/63)  RR: 17 (03 Jan 2020 05:21) (17 - 18)  SpO2: 96% (03 Jan 2020 05:21) (96% - 100%)    I&O's Summary  02 Jan 2020 07:01  -  03 Jan 2020 07:00  --------------------------------------------------------  IN: 2200 mL / OUT: 1000 mL / NET: 1200 mL    PHYSICAL EXAM:  GENERAL: NAD, alert  HEAD:  Atraumatic, Normocephalic  EYES: EOMI   ENMT: Moist mucous membranes  NECK: Supple, No JVD  CHEST/LUNG: Decreased at bases, upper lung fields clear to auscultation bilaterally, non-labored breathing  HEART: +S1/S2  ABDOMEN: Soft, Nontender, Nondistended  : Mary  VASCULAR: Normal pulses, Normal capillary refill, no edema   EXTREMITIES: No calf tenderness, No cyanosis  SKIN: Warm, Intact  NERVOUS SYSTEM:  Alert, no focal deficits     LABS:                        12.9   8.89  )-----------( 235      ( 03 Jan 2020 06:15 )             41.0     01-03    136  |  98  |  16  ----------------------------<  170  3.1   |  31  |  0.69    Ca    8.5      03 Jan 2020 06:15  Phos  2.1     01-03  Mg     1.3     01-03      eGFR if Non African American: 87 mL/min/1.73M2 (01-03-20 @ 06:15)  eGFR if African American: 100 mL/min/1.73M2 (01-03-20 @ 06:15)    Lactate, Blood: 3.1 mmol/L (01-03 @ 06:15)  Lactate, Blood: 2.3 mmol/L (01-02 @ 19:40)    Glucose  POCT Blood Glucose.: 179 mg/dL (03 Jan 2020 08:49)  POCT Blood Glucose.: 199 mg/dL (02 Jan 2020 21:58)  POCT Blood Glucose.: 152 mg/dL (02 Jan 2020 17:58)  POCT Blood Glucose.: 185 mg/dL (02 Jan 2020 12:12)    12-29 [id number] 8.4    Culture - Urine (collected 28 Dec 2019 17:55)  Source: .Urine Clean Catch (Midstream)  Final Report (29 Dec 2019 21:26):    <10,000 CFU/mL Normal Urogenital Domenica    Culture - Blood (collected 28 Dec 2019 17:55)  Source: .Blood Blood-Peripheral  Final Report (03 Jan 2020 03:00):    No growth at 5 days.    Culture - Blood (collected 28 Dec 2019 17:55)  Source: .Blood Blood-Peripheral  Final Report (03 Jan 2020 03:00):    No growth at 5 days.      RADIOLOGY & ADDITIONAL TESTS:    Care Discussed with Consultants/Other Providers: yes Patient is a 85y old  Male who presents with a chief complaint of desaturation (02 Jan 2020 15:16)    Patient seen and examined at bedside. Patient awake, alert, confused, able to state his name     ALLERGIES:  No Known Allergies    MEDICATIONS  (STANDING):  dextrose 5%. 1000 milliLiter(s) (50 mL/Hr) IV Continuous <Continuous>  dextrose 50% Injectable 12.5 Gram(s) IV Push once  dextrose 50% Injectable 25 Gram(s) IV Push once  dextrose 50% Injectable 25 Gram(s) IV Push once  enoxaparin Injectable 40 milliGRAM(s) SubCutaneous daily  insulin lispro (HumaLOG) corrective regimen sliding scale   SubCutaneous four times a day before meals  lacosamide 200 milliGRAM(s) Oral two times a day  levETIRAcetam  Solution 1500 milliGRAM(s) Enteral Tube two times a day  meropenem  IVPB 1000 milliGRAM(s) IV Intermittent every 8 hours  polyethylene glycol 3350 17 Gram(s) Oral daily  sertraline 75 milliGRAM(s) Oral daily  tamsulosin 0.4 milliGRAM(s) Oral at bedtime  vancomycin  IVPB 1000 milliGRAM(s) IV Intermittent every 24 hours    MEDICATIONS  (PRN):  acetaminophen    Suspension .. 650 milliGRAM(s) Enteral Tube every 6 hours PRN Temp greater or equal to 38C (100.4F), Mild Pain (1 - 3)  albuterol/ipratropium for Nebulization 3 milliLiter(s) Nebulizer every 6 hours PRN Bronchospasm  bisacodyl 5 milliGRAM(s) Oral every 12 hours PRN Constipation  dextrose 40% Gel 15 Gram(s) Oral once PRN Blood Glucose LESS THAN 70 milliGRAM(s)/deciliter  glucagon  Injectable 1 milliGRAM(s) IntraMuscular once PRN Glucose LESS THAN 70 milligrams/deciliter  guaiFENesin   Syrup  (Sugar-Free) 200 milliGRAM(s) Oral every 6 hours PRN congestion    Vital Signs Last 24 Hrs  T(F): 98 (03 Jan 2020 05:21), Max: 98 (03 Jan 2020 05:21)  HR: 84 (03 Jan 2020 05:21) (75 - 86)  BP: 105/63 (03 Jan 2020 05:21) (98/54 - 105/63)  RR: 17 (03 Jan 2020 05:21) (17 - 18)  SpO2: 96% (03 Jan 2020 05:21) (96% - 100%)    I&O's Summary  02 Jan 2020 07:01  -  03 Jan 2020 07:00  --------------------------------------------------------  IN: 2200 mL / OUT: 1000 mL / NET: 1200 mL    PHYSICAL EXAM:  GENERAL: NAD, alert  HEAD:  Atraumatic, Normocephalic  EYES: EOMI   ENMT: Moist mucous membranes  NECK: Supple, No JVD  CHEST/LUNG: Decreased at bases, upper lung fields clear to auscultation bilaterally, non-labored breathing  HEART: +S1/S2  ABDOMEN: Soft, Nontender, Nondistended  : Hernandez  VASCULAR: Normal pulses, Normal capillary refill, no edema   EXTREMITIES: No calf tenderness, No cyanosis, LUE swelling   SKIN: Warm, Intact  NERVOUS SYSTEM:  Alert, left arm weakness    LABS:                        12.9   8.89  )-----------( 235      ( 03 Jan 2020 06:15 )             41.0     01-03    136  |  98  |  16  ----------------------------<  170  3.1   |  31  |  0.69    Ca    8.5      03 Jan 2020 06:15  Phos  2.1     01-03  Mg     1.3     01-03      eGFR if Non African American: 87 mL/min/1.73M2 (01-03-20 @ 06:15)  eGFR if African American: 100 mL/min/1.73M2 (01-03-20 @ 06:15)    Lactate, Blood: 3.1 mmol/L (01-03 @ 06:15)  Lactate, Blood: 2.3 mmol/L (01-02 @ 19:40)    Glucose  POCT Blood Glucose.: 179 mg/dL (03 Jan 2020 08:49)  POCT Blood Glucose.: 199 mg/dL (02 Jan 2020 21:58)  POCT Blood Glucose.: 152 mg/dL (02 Jan 2020 17:58)  POCT Blood Glucose.: 185 mg/dL (02 Jan 2020 12:12)    12-29 [id number] 8.4    Culture - Urine (collected 28 Dec 2019 17:55)  Source: .Urine Clean Catch (Midstream)  Final Report (29 Dec 2019 21:26):    <10,000 CFU/mL Normal Urogenital Domenica    Culture - Blood (collected 28 Dec 2019 17:55)  Source: .Blood Blood-Peripheral  Final Report (03 Jan 2020 03:00):    No growth at 5 days.    Culture - Blood (collected 28 Dec 2019 17:55)  Source: .Blood Blood-Peripheral  Final Report (03 Jan 2020 03:00):    No growth at 5 days.      RADIOLOGY & ADDITIONAL TESTS:    Care Discussed with Consultants/Other Providers: yes

## 2020-01-03 NOTE — PHYSICAL THERAPY INITIAL EVALUATION ADULT - ADDITIONAL COMMENTS
pt unable to provide info re prior functional level; from [facility] for [facility] due to multiple medical issues

## 2020-01-03 NOTE — PROGRESS NOTE ADULT - ASSESSMENT
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 additional agents as per neurology service.  LP had 1 wbc with elevated protein.  He had a positive WNV serology and a positive CSF toxo serology, however both were felt not related to acute neurologic events.  CT here LLL consolidating infiltrate, ?occluded bronchus.  ?aspirating.  At risk for resistant naina, recent zosyn course at his SNF.(12/17-12/22)  Blood cultures negative, RVP with coronavirus, legionella urine antigen negative  On Vanco and meropenem, improved, MRSA screen negative  Afebrile, more alert, WBC normal    Plan:  D/c abx as planned  Enteral feeds per medicine  Mental status may remain limiting factor   Hernandez for retention  Follow temps and CBC/diff

## 2020-01-04 LAB
ANION GAP SERPL CALC-SCNC: 9 MMOL/L — SIGNIFICANT CHANGE UP (ref 5–17)
APPEARANCE UR: ABNORMAL
BACTERIA # UR AUTO: ABNORMAL /HPF
BILIRUB UR-MCNC: NEGATIVE — SIGNIFICANT CHANGE UP
BUN SERPL-MCNC: 20 MG/DL — SIGNIFICANT CHANGE UP (ref 7–23)
CALCIUM SERPL-MCNC: 8.8 MG/DL — SIGNIFICANT CHANGE UP (ref 8.4–10.5)
CHLORIDE SERPL-SCNC: 98 MMOL/L — SIGNIFICANT CHANGE UP (ref 96–108)
CO2 SERPL-SCNC: 32 MMOL/L — HIGH (ref 22–31)
COLOR SPEC: YELLOW — SIGNIFICANT CHANGE UP
COMMENT - URINE: SIGNIFICANT CHANGE UP
CREAT SERPL-MCNC: 0.72 MG/DL — SIGNIFICANT CHANGE UP (ref 0.5–1.3)
DIFF PNL FLD: ABNORMAL
EPI CELLS # UR: SIGNIFICANT CHANGE UP
GLUCOSE BLDC GLUCOMTR-MCNC: 178 MG/DL — HIGH (ref 70–99)
GLUCOSE BLDC GLUCOMTR-MCNC: 180 MG/DL — HIGH (ref 70–99)
GLUCOSE BLDC GLUCOMTR-MCNC: 199 MG/DL — HIGH (ref 70–99)
GLUCOSE BLDC GLUCOMTR-MCNC: 237 MG/DL — HIGH (ref 70–99)
GLUCOSE BLDC GLUCOMTR-MCNC: 244 MG/DL — HIGH (ref 70–99)
GLUCOSE SERPL-MCNC: 175 MG/DL — HIGH (ref 70–99)
GLUCOSE UR QL: 100 MG/DL
HCT VFR BLD CALC: 36.5 % — LOW (ref 39–50)
HCT VFR BLD CALC: 38.2 % — LOW (ref 39–50)
HGB BLD-MCNC: 12 G/DL — LOW (ref 13–17)
HGB BLD-MCNC: 12.6 G/DL — LOW (ref 13–17)
KETONES UR-MCNC: NEGATIVE — SIGNIFICANT CHANGE UP
LACTATE SERPL-SCNC: 2.2 MMOL/L — HIGH (ref 0.7–2)
LEUKOCYTE ESTERASE UR-ACNC: NEGATIVE — SIGNIFICANT CHANGE UP
MAGNESIUM SERPL-MCNC: 1.4 MG/DL — LOW (ref 1.6–2.6)
MCHC RBC-ENTMCNC: 30.1 PG — SIGNIFICANT CHANGE UP (ref 27–34)
MCHC RBC-ENTMCNC: 30.3 PG — SIGNIFICANT CHANGE UP (ref 27–34)
MCHC RBC-ENTMCNC: 32.9 GM/DL — SIGNIFICANT CHANGE UP (ref 32–36)
MCHC RBC-ENTMCNC: 33 GM/DL — SIGNIFICANT CHANGE UP (ref 32–36)
MCV RBC AUTO: 91.5 FL — SIGNIFICANT CHANGE UP (ref 80–100)
MCV RBC AUTO: 91.8 FL — SIGNIFICANT CHANGE UP (ref 80–100)
NITRITE UR-MCNC: NEGATIVE — SIGNIFICANT CHANGE UP
NRBC # BLD: 0 /100 WBCS — SIGNIFICANT CHANGE UP (ref 0–0)
NRBC # BLD: 0 /100 WBCS — SIGNIFICANT CHANGE UP (ref 0–0)
PH UR: 8 — SIGNIFICANT CHANGE UP (ref 5–8)
PHOSPHATE SERPL-MCNC: 1.9 MG/DL — LOW (ref 2.5–4.5)
PLATELET # BLD AUTO: 230 K/UL — SIGNIFICANT CHANGE UP (ref 150–400)
PLATELET # BLD AUTO: 243 K/UL — SIGNIFICANT CHANGE UP (ref 150–400)
POTASSIUM SERPL-MCNC: 3.1 MMOL/L — LOW (ref 3.5–5.3)
POTASSIUM SERPL-SCNC: 3.1 MMOL/L — LOW (ref 3.5–5.3)
PROT UR-MCNC: 30 MG/DL
RBC # BLD: 3.99 M/UL — LOW (ref 4.2–5.8)
RBC # BLD: 4.16 M/UL — LOW (ref 4.2–5.8)
RBC # FLD: 15.2 % — HIGH (ref 10.3–14.5)
RBC # FLD: 15.2 % — HIGH (ref 10.3–14.5)
RBC CASTS # UR COMP ASSIST: SIGNIFICANT CHANGE UP /HPF (ref 0–4)
SODIUM SERPL-SCNC: 139 MMOL/L — SIGNIFICANT CHANGE UP (ref 135–145)
SP GR SPEC: 1.01 — SIGNIFICANT CHANGE UP (ref 1.01–1.02)
UROBILINOGEN FLD QL: NEGATIVE — SIGNIFICANT CHANGE UP
WBC # BLD: 11.37 K/UL — HIGH (ref 3.8–10.5)
WBC # BLD: 9.73 K/UL — SIGNIFICANT CHANGE UP (ref 3.8–10.5)
WBC # FLD AUTO: 11.37 K/UL — HIGH (ref 3.8–10.5)
WBC # FLD AUTO: 9.73 K/UL — SIGNIFICANT CHANGE UP (ref 3.8–10.5)
WBC UR QL: NEGATIVE /HPF — SIGNIFICANT CHANGE UP (ref 0–5)

## 2020-01-04 PROCEDURE — 99233 SBSQ HOSP IP/OBS HIGH 50: CPT | Mod: GC

## 2020-01-04 PROCEDURE — 99223 1ST HOSP IP/OBS HIGH 75: CPT

## 2020-01-04 RX ORDER — MAGNESIUM SULFATE 500 MG/ML
1 VIAL (ML) INJECTION
Refills: 0 | Status: COMPLETED | OUTPATIENT
Start: 2020-01-04 | End: 2020-01-04

## 2020-01-04 RX ORDER — POTASSIUM CHLORIDE 20 MEQ
40 PACKET (EA) ORAL ONCE
Refills: 0 | Status: COMPLETED | OUTPATIENT
Start: 2020-01-04 | End: 2020-01-04

## 2020-01-04 RX ORDER — SODIUM CHLORIDE 9 MG/ML
500 INJECTION INTRAMUSCULAR; INTRAVENOUS; SUBCUTANEOUS ONCE
Refills: 0 | Status: COMPLETED | OUTPATIENT
Start: 2020-01-04 | End: 2020-01-04

## 2020-01-04 RX ORDER — MAGNESIUM SULFATE 500 MG/ML
2 VIAL (ML) INJECTION ONCE
Refills: 0 | Status: DISCONTINUED | OUTPATIENT
Start: 2020-01-04 | End: 2020-01-04

## 2020-01-04 RX ORDER — POTASSIUM PHOSPHATE, MONOBASIC POTASSIUM PHOSPHATE, DIBASIC 236; 224 MG/ML; MG/ML
15 INJECTION, SOLUTION INTRAVENOUS ONCE
Refills: 0 | Status: COMPLETED | OUTPATIENT
Start: 2020-01-04 | End: 2020-01-04

## 2020-01-04 RX ORDER — SODIUM CHLORIDE 9 MG/ML
100 INJECTION INTRAMUSCULAR; INTRAVENOUS; SUBCUTANEOUS ONCE
Refills: 0 | Status: COMPLETED | OUTPATIENT
Start: 2020-01-04 | End: 2020-01-04

## 2020-01-04 RX ADMIN — Medication 2: at 22:16

## 2020-01-04 RX ADMIN — Medication 40 MILLIEQUIVALENT(S): at 10:01

## 2020-01-04 RX ADMIN — SODIUM CHLORIDE 500 MILLILITER(S): 9 INJECTION INTRAMUSCULAR; INTRAVENOUS; SUBCUTANEOUS at 10:02

## 2020-01-04 RX ADMIN — Medication 1: at 17:45

## 2020-01-04 RX ADMIN — LACOSAMIDE 200 MILLIGRAM(S): 50 TABLET ORAL at 05:51

## 2020-01-04 RX ADMIN — SODIUM CHLORIDE 10 MILLILITER(S): 9 INJECTION INTRAMUSCULAR; INTRAVENOUS; SUBCUTANEOUS at 19:37

## 2020-01-04 RX ADMIN — LEVETIRACETAM 1500 MILLIGRAM(S): 250 TABLET, FILM COATED ORAL at 17:45

## 2020-01-04 RX ADMIN — SERTRALINE 75 MILLIGRAM(S): 25 TABLET, FILM COATED ORAL at 11:51

## 2020-01-04 RX ADMIN — POTASSIUM PHOSPHATE, MONOBASIC POTASSIUM PHOSPHATE, DIBASIC 62.5 MILLIMOLE(S): 236; 224 INJECTION, SOLUTION INTRAVENOUS at 10:01

## 2020-01-04 RX ADMIN — LACOSAMIDE 200 MILLIGRAM(S): 50 TABLET ORAL at 17:45

## 2020-01-04 RX ADMIN — TAMSULOSIN HYDROCHLORIDE 0.4 MILLIGRAM(S): 0.4 CAPSULE ORAL at 22:15

## 2020-01-04 RX ADMIN — LEVETIRACETAM 1500 MILLIGRAM(S): 250 TABLET, FILM COATED ORAL at 05:51

## 2020-01-04 RX ADMIN — Medication 2: at 11:51

## 2020-01-04 RX ADMIN — POLYETHYLENE GLYCOL 3350 17 GRAM(S): 17 POWDER, FOR SOLUTION ORAL at 11:52

## 2020-01-04 RX ADMIN — ENOXAPARIN SODIUM 40 MILLIGRAM(S): 100 INJECTION SUBCUTANEOUS at 11:51

## 2020-01-04 RX ADMIN — Medication 100 GRAM(S): at 11:50

## 2020-01-04 RX ADMIN — Medication 100 GRAM(S): at 10:01

## 2020-01-04 RX ADMIN — Medication 1: at 08:42

## 2020-01-04 NOTE — CONSULT NOTE ADULT - ASSESSMENT
85M hx of HTN, remote prostate ca (treated s/p seeds/RT), encephalitis with dysphagia and PEG status, seizure d/o presenting with sepsis.   #Palliative:  reviewed hopsice options with daughter.  His goals and wishes are to return home and be comfortable.  Saughter is amenable to hospice evaluation and discussion.  #Acute hypoxic respiratory failure: 2/2 Coronavirus and superimposed multifocal PNA  - WBCs elevated again today to 11.37. Mildly tachycardic to 101. Will repeat CBC, lactate. Give IVF and reassess.   - s/p five day course of Vanco and meropenem. Abx discontinued on 1/3/20  - Blood cultures negative   - continue O2 support  - tube feeds continued , aspiration precautions    #Hypokalemia, hypomagnesemia, hypophosphatemia   - K = 3.1. Mag = 1.4. Phos = 1.9  - supplement k, mag, phos  - follow bmp     #Urinary retention   - continue bradley    #LUE swelling  - ultrasound neg for DVT     #Fecal impaction  - Miralax daily  - Dulcolax PRN    #Moderate protein calorie malnutrition/ hypoalbunemia  - Albumin 1.5  - continue bolus tube feeds  - dietician following      #DM type 2 with hyperglycemia  - HbA1C 8.4%  - continue ISS  - monitor fingersticks -- consistently <200   - continue tube feeds    #HTN hx  - on amiloride 5mg TID and labetalol 100mg TID at home  - Continue to hold meds due to low BP    #History of encephalitis  - Keppra and Vimpat    #DVT prophylaxis  - lovenox

## 2020-01-04 NOTE — PROGRESS NOTE ADULT - SUBJECTIVE AND OBJECTIVE BOX
Patient is a 85y old  Male who presents with a chief complaint of desaturation (03 Jan 2020 13:42)    Patient seen and examined at bedside. Patient today is less alert though still responds to questions and follows commands     ALLERGIES:  No Known Allergies    MEDICATIONS  (STANDING):  dextrose 5%. 1000 milliLiter(s) (50 mL/Hr) IV Continuous <Continuous>  dextrose 50% Injectable 12.5 Gram(s) IV Push once  dextrose 50% Injectable 25 Gram(s) IV Push once  dextrose 50% Injectable 25 Gram(s) IV Push once  enoxaparin Injectable 40 milliGRAM(s) SubCutaneous daily  insulin lispro (HumaLOG) corrective regimen sliding scale   SubCutaneous four times a day before meals  lacosamide 200 milliGRAM(s) Oral two times a day  levETIRAcetam  Solution 1500 milliGRAM(s) Enteral Tube two times a day  magnesium sulfate  IVPB 1 Gram(s) IV Intermittent every 1 hour  polyethylene glycol 3350 17 Gram(s) Oral daily  potassium chloride   Powder 40 milliEquivalent(s) Enteral Tube once  potassium phosphate IVPB 15 milliMole(s) IV Intermittent once  sertraline 75 milliGRAM(s) Oral daily  tamsulosin 0.4 milliGRAM(s) Oral at bedtime    MEDICATIONS  (PRN):  acetaminophen    Suspension .. 650 milliGRAM(s) Enteral Tube every 6 hours PRN Temp greater or equal to 38C (100.4F), Mild Pain (1 - 3)  albuterol/ipratropium for Nebulization 3 milliLiter(s) Nebulizer every 6 hours PRN Bronchospasm  bisacodyl 5 milliGRAM(s) Oral every 12 hours PRN Constipation  dextrose 40% Gel 15 Gram(s) Oral once PRN Blood Glucose LESS THAN 70 milliGRAM(s)/deciliter  glucagon  Injectable 1 milliGRAM(s) IntraMuscular once PRN Glucose LESS THAN 70 milligrams/deciliter  guaiFENesin   Syrup  (Sugar-Free) 200 milliGRAM(s) Oral every 6 hours PRN congestion    Vital Signs Last 24 Hrs  T(F): 98.1 (04 Jan 2020 05:11), Max: 98.2 (03 Jan 2020 16:46)  HR: 101 (04 Jan 2020 05:11) (91 - 101)  BP: 112/57 (04 Jan 2020 05:11) (102/53 - 125/73)  RR: 16 (04 Jan 2020 05:11) (16 - 16)  SpO2: 94% (04 Jan 2020 05:11) (94% - 96%)    I&O's Summary  03 Jan 2020 07:01  -  04 Jan 2020 07:00  --------------------------------------------------------  IN: 1300 mL / OUT: 1400 mL / NET: -100 mL    PHYSICAL EXAM  GENERAL: NAD, lethargic   HEAD:  Atraumatic, Normocephalic  EYES: EOMI   ENMT: Dry mucous membranes  NECK: Supple, No JVD  CHEST/LUNG: Fine crackles at bases, upper lung fields clear to auscultation bilaterally, non-labored breathing  HEART: +S1/S2  ABDOMEN: Soft, Nontender, Nondistended  : Mary  VASCULAR: Normal pulses, Normal capillary refill, no edema in lower ext  EXTREMITIES: No calf tenderness, No cyanosis, LUE swelling   SKIN: Warm, Intact  NERVOUS SYSTEM:  Lethargic, left arm weakness    LABS:                        12.6   11.37 )-----------( 243      ( 04 Jan 2020 06:24 )             38.2     01-04    139  |  98  |  20  ----------------------------<  175  3.1   |  32  |  0.72    Ca    8.8      04 Jan 2020 06:24  Phos  1.9     01-04  Mg     1.4     01-04    eGFR if Non African American: 85 mL/min/1.73M2 (01-04-20 @ 06:24)  eGFR if : 99 mL/min/1.73M2 (01-04-20 @ 06:24)    Lactate, Blood: 3.1 mmol/L (01-03 @ 06:15)  Lactate, Blood: 2.3 mmol/L (01-02 @ 19:40)    GLUCOSE  POCT Blood Glucose.: 180 mg/dL (04 Jan 2020 08:01)  POCT Blood Glucose.: 182 mg/dL (03 Jan 2020 22:16)  POCT Blood Glucose.: 160 mg/dL (03 Jan 2020 17:50)  POCT Blood Glucose.: 196 mg/dL (03 Jan 2020 12:08)    12-29 [id number] 8.4    Culture - Urine (collected 28 Dec 2019 17:55)  Source: .Urine Clean Catch (Midstream)  Final Report (29 Dec 2019 21:26):    <10,000 CFU/mL Normal Urogenital Domenica    Culture - Blood (collected 28 Dec 2019 17:55)  Source: .Blood Blood-Peripheral  Final Report (03 Jan 2020 03:00):    No growth at 5 days.    Culture - Blood (collected 28 Dec 2019 17:55)  Source: .Blood Blood-Peripheral  Final Report (03 Jan 2020 03:00):    No growth at 5 days.        RADIOLOGY & ADDITIONAL TESTS:    Care Discussed with Consultants/Other Providers: yes

## 2020-01-04 NOTE — PROGRESS NOTE ADULT - SUBJECTIVE AND OBJECTIVE BOX
CC: f/u for pneumonia    Patient now somnolent, slow to arouse, tolerating enteral feeds    REVIEW OF SYSTEMS:  not provided    Antimicrobials off  Medications Reviewed    Vital Signs Last 24 Hrs  T(F): 97.8 (04 Jan 2020 15:54), Max: 99 (04 Jan 2020 13:49)  HR: 99 (04 Jan 2020 15:54) (94 - 103)  BP: 91/60 (04 Jan 2020 15:54) (91/60 - 125/73)  BP(mean): --  RR: 16 (04 Jan 2020 15:54) (16 - 18)  SpO2: 100% (04 Jan 2020 15:54) (94% - 100%)    PHYSICAL EXAM:  General: no acute distress, debilitated   Eyes:  anicteric, no conjunctival injection, no discharge  Oropharynx: no lesions or injection 	  Neck: supple, without adenopathy  Lungs: diminished breath sounds at bases  Heart: regular rate and rhythm; no murmur, rubs or gallops  Abdomen: soft, nondistended, nontender, without mass or organomegaly, G tube site clean  Hernandez  Skin: no lesions  Extremities: no edema  Neurologic: somnolent, slow to arouse    LAB RESULTS:                        12.0   9.73  )-----------( 230      ( 04 Jan 2020 11:50 )             36.5   01-04    139  |  98  |  20  ----------------------------<  175<H>  3.1<L>   |  32<H>  |  0.72    Ca    8.8      04 Jan 2020 06:24  Phos  1.9     01-04  Mg     1.4     01-04      MICROBIOLOGY:  RECENT CULTURES:  12-28 @ 17:55 .Blood Blood-Peripheral     No growth to date.    RADIOLOGY REVIEWED:  CT Chest No Cont (12.28.19 @ 22:13) >  1. Left lower lobe consolidation with complete or almost complete obstruction of the left lower lobe bronchus;   left hilar mass cannot be excluded. Consider further evaluation with contrast enhanced study and/or bronchoscopy.   2. Right lower lobe infiltrate with several pleural nodules in the right hemithorax.   3. Fracture right humeral head/neck with impaction indeterminate age. Clinical   correlation

## 2020-01-04 NOTE — CONSULT NOTE ADULT - SUBJECTIVE AND OBJECTIVE BOX
HPI:  85M hx of HTN, remote prostate ca (treated s/p seeds/RT) was well until 9/2019 developed viral encephalitis complicated with seizure d/o and dysphagia and LUE weakness now with PEG, recurrent admission in 11/2019 for szs and dx with autoimmune encephalitis s/p IV steroids and IVIG now in [facility]. In mid December had aspiration pna and treated with 5 days of Zosyn. Now acute onset of desat to 84-89%, tachy 132 cough at [facility]. In ED, sat 89-96% on 100%NRB febrile 102.7, tachy to 126 and initially normotensive now hypotensive 89/55 sat 89% on NRB and tachy to 123. WBC:16.31, lact: 3.7, Na: 147 corrected 153, gluc: 357new SANGITA 51/1.52. mild elevated LFTs.  Family at bedside, advised of potential need for intubation/pressors and in light of pt's chronic debilitated condition, agreed to medical management with IVFs, antibxs short of intubation and pressors and to keep pt comfortable. Pt is now DNR/DNI. MOLST form completed. (28 Dec 2019 22:47).    Daughter feels that patient wants to return home and be comfortable at home.      PAST MEDICAL & SURGICAL HISTORY:  Hyponatremia  Seizure disorder  Encephalitis  HTN (hypertension)  Pneumonia  PEG (percutaneous endoscopic gastrostomy) status      SOCIAL HISTORY:    Admitted from: [facility]   Substance abuse history:        Surrogate/HCP/Guardian:  Darwin DOWmarylin        FAMILY HISTORY:    Baseline ADLs (prior to admission): Has not walked since September    Allergies    No Known Allergies    Intolerances      Present Symptoms:   Dyspnea: resolved  Nausea/Vomiting: no  Anxiety: no  Depressed no  Fatigue: yes  Loss of appetite: peg in place  Pain:    no apparent pain                                      MEDICATIONS  (STANDING):  dextrose 5%. 1000 milliLiter(s) (50 mL/Hr) IV Continuous <Continuous>  dextrose 50% Injectable 12.5 Gram(s) IV Push once  dextrose 50% Injectable 25 Gram(s) IV Push once  dextrose 50% Injectable 25 Gram(s) IV Push once  enoxaparin Injectable 40 milliGRAM(s) SubCutaneous daily  insulin lispro (HumaLOG) corrective regimen sliding scale   SubCutaneous four times a day before meals  lacosamide 200 milliGRAM(s) Oral two times a day  levETIRAcetam  Solution 1500 milliGRAM(s) Enteral Tube two times a day  polyethylene glycol 3350 17 Gram(s) Oral daily  sertraline 75 milliGRAM(s) Oral daily  tamsulosin 0.4 milliGRAM(s) Oral at bedtime    MEDICATIONS  (PRN):  acetaminophen    Suspension .. 650 milliGRAM(s) Enteral Tube every 6 hours PRN Temp greater or equal to 38C (100.4F), Mild Pain (1 - 3)  albuterol/ipratropium for Nebulization 3 milliLiter(s) Nebulizer every 6 hours PRN Bronchospasm  bisacodyl 5 milliGRAM(s) Oral every 12 hours PRN Constipation  dextrose 40% Gel 15 Gram(s) Oral once PRN Blood Glucose LESS THAN 70 milliGRAM(s)/deciliter  glucagon  Injectable 1 milliGRAM(s) IntraMuscular once PRN Glucose LESS THAN 70 milligrams/deciliter  guaiFENesin   Syrup  (Sugar-Free) 200 milliGRAM(s) Oral every 6 hours PRN congestion      PHYSICAL EXAM:    Vital Signs Last 24 Hrs  T(C): 36.3 (04 Jan 2020 10:30), Max: 36.8 (03 Jan 2020 16:46)  T(F): 97.4 (04 Jan 2020 10:30), Max: 98.2 (03 Jan 2020 16:46)  HR: 103 (04 Jan 2020 10:30) (91 - 103)  BP: 96/62 (04 Jan 2020 10:30) (96/62 - 125/73)  BP(mean): --  RR: 16 (04 Jan 2020 05:11) (16 - 16)  SpO2: 99% (04 Jan 2020 10:30) (94% - 99%)    General: alert  oriented x _1___    HEENT: normal    Lungs: comfortable  CV: normal   GI: normal   : normal   Musculoskeletal:  bedbound/wheelchair bound  Skin: normal   Neuro:  cognitive impairment   Oral intake ability: peg in place  Diet: [NPO]    LABS:                        12.0   9.73  )-----------( 230      ( 04 Jan 2020 11:50 )             36.5     01-04    139  |  98  |  20  ----------------------------<  175<H>  3.1<L>   |  32<H>  |  0.72    Ca    8.8      04 Jan 2020 06:24  Phos  1.9     01-04  Mg     1.4     01-04          RADIOLOGY & ADDITIONAL STUDIES:    ADVANCE DIRECTIVES: molst/dnr/i  Advanced Care Planning discussion total time spent:  1 hour

## 2020-01-04 NOTE — PROGRESS NOTE ADULT - ASSESSMENT
84 yo male with "autoimmune encephalitis", admitted febrile with likely respiratory infection  Extensive w/u at [facility] did not reveal any clear CNS infection- treated with steroids, IVIG, and ? additional agents as per neurology service.  LP had 1 wbc with elevated protein.  He had a positive WNV serology and a positive CSF toxo serology, however both were felt not related to acute neurologic events.  CT here [facility] consolidating infiltrate, ?occluded bronchus.  ?aspirating.  At risk for resistant naina, recent zosyn course at his SNF.(12/17-12/22)  Blood cultures negative, RVP with coronavirus, legionella urine antigen negative, MRSA screen negative  Given Vanco and meropenem  Afebrile, WBC normal- abx d/moncho 1/3  More somnolent today    Plan:  Observe off antibiotics.   Enteral feeds per medicine  Mental status may remain limiting factor   Hernandez for retention  Follow temps and CBC/diff   d/w daughter at bedside

## 2020-01-04 NOTE — PROGRESS NOTE ADULT - ASSESSMENT
85M hx of HTN, remote prostate ca (treated s/p seeds/RT), encephalitis with dysphagia and PEG status, seizure d/o presenting with sepsis.     #Acute hypoxic respiratory failure: 2/2 Coronavirus and superimposed multifocal PNA: WBC back to baseline, afebrile   - s/p five day course of Vanco and meropenem. Abx discontinued today 1/3/20  - MRSA screen- not detected  - Blood cx 12/28 NGTD  - continue O2 support  - tube feeds continued , aspiration precautions    #Hypokalemia   - K = 3.1. Mag - 1.3 today 1/3/20  - supplement k and mag  - follow bmp     #Urinary retention   - continue bradley    #LUE swelling  - f/u ultrasound     #Fecal impaction  - Miralax daily  - Dulcolax PRN    #Moderate protein calorie malnutrition/ hypoalbunemia  - Albumin 1.5  - continue bolus tube feeds  - dietician following      #DM type 2 with hyperglycemia  - HbA1C 8.4%  - continue ISS  - monitor fingersticks -- consistently <200   - continue tube feeds    #HTN hx  - on amiloride 5mg TID and labetalol 100mg TID at home  - Continue to hold meds due to low BP    #History of encephalitis  - Keppra and Vimpat    #DVT prophylaxis  - lovenox 85M hx of HTN, remote prostate ca (treated s/p seeds/RT), encephalitis with dysphagia and PEG status, seizure d/o presenting with sepsis.     #Acute hypoxic respiratory failure: 2/2 Coronavirus and superimposed multifocal PNA  - WBCs elevated again today to 11.37. Mildly tachycardic to 101. Will repeat CBC, lactate. Give IVF and reassess.   - s/p five day course of Vanco and meropenem. Abx discontinued on 1/3/20  - Blood cultures negative   - continue O2 support  - tube feeds continued , aspiration precautions    #Hypokalemia, hypomagnesemia, hypophosphatemia   - K = 3.1. Mag = 1.4. Phos = 1.9  - supplement k, mag, phos  - follow bmp     #Urinary retention   - continue bradley    #LUE swelling  - ultrasound neg for DVT     #Fecal impaction  - Miralax daily  - Dulcolax PRN    #Moderate protein calorie malnutrition/ hypoalbunemia  - Albumin 1.5  - continue bolus tube feeds  - dietician following      #DM type 2 with hyperglycemia  - HbA1C 8.4%  - continue ISS  - monitor fingersticks -- consistently <200   - continue tube feeds    #HTN hx  - on amiloride 5mg TID and labetalol 100mg TID at home  - Continue to hold meds due to low BP    #History of encephalitis  - Keppra and Vimpat    #DVT prophylaxis  - lovenox

## 2020-01-05 LAB
ANION GAP SERPL CALC-SCNC: 6 MMOL/L — SIGNIFICANT CHANGE UP (ref 5–17)
BUN SERPL-MCNC: 12 MG/DL — SIGNIFICANT CHANGE UP (ref 7–23)
CALCIUM SERPL-MCNC: 8.4 MG/DL — SIGNIFICANT CHANGE UP (ref 8.4–10.5)
CHLORIDE SERPL-SCNC: 102 MMOL/L — SIGNIFICANT CHANGE UP (ref 96–108)
CO2 SERPL-SCNC: 33 MMOL/L — HIGH (ref 22–31)
CREAT SERPL-MCNC: 0.69 MG/DL — SIGNIFICANT CHANGE UP (ref 0.5–1.3)
GLUCOSE BLDC GLUCOMTR-MCNC: 136 MG/DL — HIGH (ref 70–99)
GLUCOSE BLDC GLUCOMTR-MCNC: 198 MG/DL — HIGH (ref 70–99)
GLUCOSE BLDC GLUCOMTR-MCNC: 206 MG/DL — HIGH (ref 70–99)
GLUCOSE BLDC GLUCOMTR-MCNC: 223 MG/DL — HIGH (ref 70–99)
GLUCOSE BLDC GLUCOMTR-MCNC: 235 MG/DL — HIGH (ref 70–99)
GLUCOSE SERPL-MCNC: 196 MG/DL — HIGH (ref 70–99)
HCT VFR BLD CALC: 35.7 % — LOW (ref 39–50)
HGB BLD-MCNC: 11.4 G/DL — LOW (ref 13–17)
MAGNESIUM SERPL-MCNC: 1.4 MG/DL — LOW (ref 1.6–2.6)
MCHC RBC-ENTMCNC: 29.3 PG — SIGNIFICANT CHANGE UP (ref 27–34)
MCHC RBC-ENTMCNC: 31.9 GM/DL — LOW (ref 32–36)
MCV RBC AUTO: 91.8 FL — SIGNIFICANT CHANGE UP (ref 80–100)
NRBC # BLD: 0 /100 WBCS — SIGNIFICANT CHANGE UP (ref 0–0)
PHOSPHATE SERPL-MCNC: 2.6 MG/DL — SIGNIFICANT CHANGE UP (ref 2.5–4.5)
PLATELET # BLD AUTO: 225 K/UL — SIGNIFICANT CHANGE UP (ref 150–400)
POTASSIUM SERPL-MCNC: 3 MMOL/L — LOW (ref 3.5–5.3)
POTASSIUM SERPL-SCNC: 3 MMOL/L — LOW (ref 3.5–5.3)
RBC # BLD: 3.89 M/UL — LOW (ref 4.2–5.8)
RBC # FLD: 15.5 % — HIGH (ref 10.3–14.5)
SODIUM SERPL-SCNC: 141 MMOL/L — SIGNIFICANT CHANGE UP (ref 135–145)
WBC # BLD: 8.36 K/UL — SIGNIFICANT CHANGE UP (ref 3.8–10.5)
WBC # FLD AUTO: 8.36 K/UL — SIGNIFICANT CHANGE UP (ref 3.8–10.5)

## 2020-01-05 PROCEDURE — 99232 SBSQ HOSP IP/OBS MODERATE 35: CPT | Mod: GC

## 2020-01-05 RX ORDER — MAGNESIUM SULFATE 500 MG/ML
2 VIAL (ML) INJECTION ONCE
Refills: 0 | Status: DISCONTINUED | OUTPATIENT
Start: 2020-01-05 | End: 2020-01-05

## 2020-01-05 RX ORDER — MAGNESIUM SULFATE 500 MG/ML
1 VIAL (ML) INJECTION ONCE
Refills: 0 | Status: COMPLETED | OUTPATIENT
Start: 2020-01-05 | End: 2020-01-05

## 2020-01-05 RX ORDER — POTASSIUM CHLORIDE 20 MEQ
40 PACKET (EA) ORAL DAILY
Refills: 0 | Status: DISCONTINUED | OUTPATIENT
Start: 2020-01-05 | End: 2020-01-14

## 2020-01-05 RX ADMIN — LEVETIRACETAM 1500 MILLIGRAM(S): 250 TABLET, FILM COATED ORAL at 05:00

## 2020-01-05 RX ADMIN — Medication 100 GRAM(S): at 12:59

## 2020-01-05 RX ADMIN — Medication 2: at 17:10

## 2020-01-05 RX ADMIN — SERTRALINE 75 MILLIGRAM(S): 25 TABLET, FILM COATED ORAL at 12:58

## 2020-01-05 RX ADMIN — Medication 1: at 09:25

## 2020-01-05 RX ADMIN — Medication 2: at 22:29

## 2020-01-05 RX ADMIN — Medication 0: at 12:58

## 2020-01-05 RX ADMIN — TAMSULOSIN HYDROCHLORIDE 0.4 MILLIGRAM(S): 0.4 CAPSULE ORAL at 22:04

## 2020-01-05 RX ADMIN — Medication 40 MILLIEQUIVALENT(S): at 12:59

## 2020-01-05 RX ADMIN — LEVETIRACETAM 1500 MILLIGRAM(S): 250 TABLET, FILM COATED ORAL at 18:53

## 2020-01-05 RX ADMIN — POLYETHYLENE GLYCOL 3350 17 GRAM(S): 17 POWDER, FOR SOLUTION ORAL at 12:58

## 2020-01-05 RX ADMIN — LACOSAMIDE 200 MILLIGRAM(S): 50 TABLET ORAL at 18:55

## 2020-01-05 RX ADMIN — LACOSAMIDE 200 MILLIGRAM(S): 50 TABLET ORAL at 05:00

## 2020-01-05 RX ADMIN — ENOXAPARIN SODIUM 40 MILLIGRAM(S): 100 INJECTION SUBCUTANEOUS at 12:58

## 2020-01-05 NOTE — PROGRESS NOTE ADULT - SUBJECTIVE AND OBJECTIVE BOX
Patient is a 85y old  Male who presents with a chief complaint of desaturation (2020 19:10)    Patient seen and examined at bedside. Lethargic, appears comfortable     ALLERGIES:  No Known Allergies    MEDICATIONS  (STANDING):  dextrose 5%. 1000 milliLiter(s) (50 mL/Hr) IV Continuous <Continuous>  dextrose 50% Injectable 12.5 Gram(s) IV Push once  dextrose 50% Injectable 25 Gram(s) IV Push once  dextrose 50% Injectable 25 Gram(s) IV Push once  enoxaparin Injectable 40 milliGRAM(s) SubCutaneous daily  insulin lispro (HumaLOG) corrective regimen sliding scale   SubCutaneous four times a day before meals  lacosamide 200 milliGRAM(s) Oral two times a day  levETIRAcetam  Solution 1500 milliGRAM(s) Enteral Tube two times a day  polyethylene glycol 3350 17 Gram(s) Oral daily  sertraline 75 milliGRAM(s) Oral daily  tamsulosin 0.4 milliGRAM(s) Oral at bedtime    MEDICATIONS  (PRN):  acetaminophen    Suspension .. 650 milliGRAM(s) Enteral Tube every 6 hours PRN Temp greater or equal to 38C (100.4F), Mild Pain (1 - 3)  albuterol/ipratropium for Nebulization 3 milliLiter(s) Nebulizer every 6 hours PRN Bronchospasm  bisacodyl 5 milliGRAM(s) Oral every 12 hours PRN Constipation  dextrose 40% Gel 15 Gram(s) Oral once PRN Blood Glucose LESS THAN 70 milliGRAM(s)/deciliter  glucagon  Injectable 1 milliGRAM(s) IntraMuscular once PRN Glucose LESS THAN 70 milligrams/deciliter  guaiFENesin   Syrup  (Sugar-Free) 200 milliGRAM(s) Oral every 6 hours PRN congestion    Vital Signs Last 24 Hrs  T(F): 99.3 (2020 04:54), Max: 99.3 (2020 04:54)  HR: 96 (2020 04:54) (81 - 103)  BP: 103/55 (2020 04:54) (91/60 - 123/62)  RR: 16 (2020 04:54) (16 - 18)  SpO2: 97% (2020 04:54) (97% - 100%)    I&O's Summary  2020 07:01  -  2020 07:00  --------------------------------------------------------  IN: 2575 mL / OUT: 1700 mL / NET: 875 mL    PHYSICAL EXAM  GENERAL: NAD, lethargic   HEAD:  Atraumatic, Normocephalic  EYES: EOMI   ENMT: Dry mucous membranes  NECK: Supple, No JVD  CHEST/LUNG: Rhonchi bilaterally, non-labored breathing  HEART: +S1/S2  ABDOMEN: Soft, Nontender, Nondistended  : Mary  VASCULAR: Normal pulses, Normal capillary refill, no edema in lower ext  EXTREMITIES: No calf tenderness, No cyanosis, LUE swelling   SKIN: Warm, Intact  NERVOUS SYSTEM:  Lethargic, left arm weakness      LABS:                        11.4   8.36  )-----------( 225      ( 2020 06:00 )             35.7     -    141  |  102  |  12  ----------------------------<  196  3.0   |  33  |  0.69    Ca    8.4      2020 06:00  Phos  1.9     -  Mg     1.4     -04      eGFR if Non African American: 87 mL/min/1.73M2 (20 @ 06:00)  eGFR if African American: 100 mL/min/1.73M2 (20 @ 06:00)    Lactate, Blood: 2.2 mmol/L ( @ 11:50)  Lactate, Blood: 3.1 mmol/L ( @ 06:15)  Lactate, Blood: 2.3 mmol/L ( @ 19:40)    Glucose  POCT Blood Glucose.: 237 mg/dL (2020 22:14)  POCT Blood Glucose.: 199 mg/dL (2020 20:55)  POCT Blood Glucose.: 178 mg/dL (2020 17:43)  POCT Blood Glucose.: 244 mg/dL (2020 11:49)    12-29 IgeiwpyysoT1V 8.4    Urinalysis Basic - ( 2020 14:00 )    Color: Yellow / Appearance: very cloudy / S.010 / pH: x  Gluc: x / Ketone: Negative  / Bili: Negative / Urobili: Negative   Blood: x / Protein: 30 mg/dL / Nitrite: Negative   Leuk Esterase: Negative / RBC: 0-4 /HPF / WBC Negative /HPF   Sq Epi: x / Non Sq Epi: Neg.-Few / Bacteria: Trace /HPF        RADIOLOGY & ADDITIONAL TESTS:    Care Discussed with Consultants/Other Providers: yes

## 2020-01-05 NOTE — PROGRESS NOTE ADULT - ASSESSMENT
85M hx of HTN, remote prostate ca (treated s/p seeds/RT), encephalitis with dysphagia and PEG status, seizure d/o presenting with sepsis.     #Acute hypoxic respiratory failure: 2/2 Coronavirus and superimposed multifocal PNA  - WBCs normal, HR improved.   - s/p five day course of Vanco and meropenem. Abx discontinued on 1/3/20  - Blood cultures negative   - continue O2 support  - tube feeds continued , aspiration precautions    #Hypokalemia, hypomagnesemia, hypophosphatemia   - will start daily potassium supplement  - follow bmp     #Urinary retention   - continue bradley    #LUE swelling  - ultrasound neg for DVT     #Fecal impaction  - Miralax daily  - Dulcolax PRN    #Moderate protein calorie malnutrition/ hypoalbunemia  - Albumin 1.5  - continue bolus tube feeds  - dietician following      #DM type 2 with hyperglycemia  - HbA1C 8.4%  - continue ISS  - monitor fingersticks -- consistently <200   - continue tube feeds    #HTN hx  - on amiloride 5mg TID and labetalol 100mg TID at home  - Continue to hold meds due to low BP    #History of encephalitis  - Keppra and Vimpat    #DVT prophylaxis  - lovenox 85M hx of HTN, remote prostate ca (treated s/p seeds/RT), encephalitis with dysphagia and PEG status, seizure d/o presenting with sepsis.     Dispo: Hospice eval pending-- family requests home with home hospice if accepted     #Acute hypoxic respiratory failure: 2/2 Coronavirus and superimposed multifocal PNA  - WBCs normal, HR improved.   - s/p five day course of Vanco and meropenem. Abx discontinued on 1/3/20  - Blood cultures negative   - continue O2 support  - tube feeds continued , aspiration precautions    #Hypokalemia, hypomagnesemia, hypophosphatemia   - will start daily potassium supplement  - K today 3.0  - f/u add on labs (mag and phos)  - follow bmp     #Urinary retention   - continue bradley    #LUE swelling  - ultrasound neg for DVT     #Fecal impaction  - Miralax daily  - Dulcolax PRN    #Moderate protein calorie malnutrition/ hypoalbunemia  - continue bolus tube feeds  - dietician following      #DM type 2 with hyperglycemia  - HbA1C 8.4%  - continue ISS  - monitor fingersticks   - continue tube feeds    #HTN hx  - on amiloride 5mg TID and labetalol 100mg TID at home  - Continue to hold meds due to low BP    #History of encephalitis  - Keppra and Vimpat    #DVT prophylaxis  - lovenox 85M hx of HTN, remote prostate ca (treated s/p seeds/RT), encephalitis with dysphagia and PEG status, seizure d/o presenting with sepsis.     Dispo: Hospice eval pending-- family requests home with home hospice if accepted     #Acute hypoxic respiratory failure: 2/2 Coronavirus and superimposed multifocal PNA  - WBCs normal, HR improved.   - s/p five day course of Vanco and meropenem. Abx discontinued on 1/3/20  - Blood cultures negative   - continue O2 support  - tube feeds continued , aspiration precautions    #Hypokalemia, hypomagnesemia, hypophosphatemia   - will start daily potassium supplement  - K today 3.0, mag 1.4  - phos 2.6  - will continue to replete PRN  - follow bmp     #Urinary retention   - continue bradley    #LUE swelling  - ultrasound neg for DVT     #Fecal impaction  - Miralax daily  - Dulcolax PRN    #Moderate protein calorie malnutrition/ hypoalbunemia  - continue bolus tube feeds  - dietician following      #DM type 2 with hyperglycemia  - HbA1C 8.4%  - continue ISS  - monitor fingersticks   - continue tube feeds    #HTN hx  - on amiloride 5mg TID and labetalol 100mg TID at home  - Continue to hold meds due to low BP    #History of encephalitis  - Keppra and Vimpat    #DVT prophylaxis  - lovenox

## 2020-01-05 NOTE — PROGRESS NOTE ADULT - ASSESSMENT
ASSESSMENT:    85M HTN, prostate CA, encephalitis, dysphagia S/p PEG, seizures, admitted with Sepsis/PNA/hypoxia    PLAN:  per note review, patient now off anbx and blood cultures with NGTD  -gola SaO2 >90%, supplemental O2 for goal  -AM lab review showed hypokalmeia and hypomagnesemia, was repleted durinhg day shift, repeat labs for AM scheduled   -course was complicated by urinary retention, with multiple high readings on bladder scans, now with bradley in place remains on flomax  -maintain ISS while admitted, for glycemic control  -maintain tube feeds at goal  -per note review, possible D/C home on home hospice if patient accepted  -DNR

## 2020-01-05 NOTE — PROGRESS NOTE ADULT - SUBJECTIVE AND OBJECTIVE BOX
Patient is a 85y old  Male who presents with a chief complaint of desaturation (05 Jan 2020 09:08)      BRIEF HOSPITAL COURSE:     85M HTN, prostate CA, encephalitis, dysphagia S/p PEG, seizures, admitted with Sepsis/PNA/hypoxia      PAST MEDICAL & SURGICAL HISTORY:  Hyponatremia  Seizure disorder  Encephalitis  HTN (hypertension)  Pneumonia  PEG (percutaneous endoscopic gastrostomy) status                        ICU Vital Signs Last 24 Hrs  T(C): 36.7 (05 Jan 2020 19:25), Max: 37.4 (05 Jan 2020 04:54)  T(F): 98 (05 Jan 2020 19:25), Max: 99.3 (05 Jan 2020 04:54)  HR: 90 (05 Jan 2020 19:25) (88 - 96)  BP: 116/76 (05 Jan 2020 19:25) (103/55 - 116/76)  RR: 16 (05 Jan 2020 15:55) (16 - 16)  SpO2: 94% (05 Jan 2020 15:55) (94% - 97%)                        LABS:                        11.4   8.36  )-----------( 225      ( 05 Jan 2020 06:00 )             35.7     01-05    141  |  102  |  12  ----------------------------<  196<H>  3.0<L>   |  33<H>  |  0.69    Ca    8.4      05 Jan 2020 06:00  Phos  2.6     01-05  Mg     1.4     01-05                      Physical Examination:    General: No acute distress.      HEENT: Pupils equal, reactive to light.  Symmetric.    PULM: rales/rhonci bilaterally, no significant sputum production    CVS: Regular rate and rhythm, no murmurs, rubs, or gallops    ABD: Soft, nondistended, nontender, normoactive bowel sounds, no masses    EXT: No edema, nontender    SKIN: Warm and well perfused, no rashes noted.

## 2020-01-06 LAB
ANION GAP SERPL CALC-SCNC: 9 MMOL/L — SIGNIFICANT CHANGE UP (ref 5–17)
BUN SERPL-MCNC: 22 MG/DL — SIGNIFICANT CHANGE UP (ref 7–23)
CALCIUM SERPL-MCNC: 9.1 MG/DL — SIGNIFICANT CHANGE UP (ref 8.4–10.5)
CHLORIDE SERPL-SCNC: 101 MMOL/L — SIGNIFICANT CHANGE UP (ref 96–108)
CO2 SERPL-SCNC: 32 MMOL/L — HIGH (ref 22–31)
CREAT SERPL-MCNC: 0.76 MG/DL — SIGNIFICANT CHANGE UP (ref 0.5–1.3)
GLUCOSE BLDC GLUCOMTR-MCNC: 194 MG/DL — HIGH (ref 70–99)
GLUCOSE BLDC GLUCOMTR-MCNC: 198 MG/DL — HIGH (ref 70–99)
GLUCOSE BLDC GLUCOMTR-MCNC: 201 MG/DL — HIGH (ref 70–99)
GLUCOSE BLDC GLUCOMTR-MCNC: 220 MG/DL — HIGH (ref 70–99)
GLUCOSE SERPL-MCNC: 209 MG/DL — HIGH (ref 70–99)
MAGNESIUM SERPL-MCNC: 1.6 MG/DL — SIGNIFICANT CHANGE UP (ref 1.6–2.6)
POTASSIUM SERPL-MCNC: 3.1 MMOL/L — LOW (ref 3.5–5.3)
POTASSIUM SERPL-SCNC: 3.1 MMOL/L — LOW (ref 3.5–5.3)
SODIUM SERPL-SCNC: 142 MMOL/L — SIGNIFICANT CHANGE UP (ref 135–145)

## 2020-01-06 PROCEDURE — 93010 ELECTROCARDIOGRAM REPORT: CPT

## 2020-01-06 PROCEDURE — 99232 SBSQ HOSP IP/OBS MODERATE 35: CPT | Mod: GC

## 2020-01-06 PROCEDURE — 99233 SBSQ HOSP IP/OBS HIGH 50: CPT

## 2020-01-06 RX ORDER — SODIUM CHLORIDE 9 MG/ML
1000 INJECTION, SOLUTION INTRAVENOUS ONCE
Refills: 0 | Status: COMPLETED | OUTPATIENT
Start: 2020-01-06 | End: 2020-01-06

## 2020-01-06 RX ORDER — ENOXAPARIN SODIUM 100 MG/ML
40 INJECTION SUBCUTANEOUS DAILY
Refills: 0 | Status: DISCONTINUED | OUTPATIENT
Start: 2020-01-07 | End: 2020-01-14

## 2020-01-06 RX ORDER — INSULIN GLARGINE 100 [IU]/ML
8 INJECTION, SOLUTION SUBCUTANEOUS AT BEDTIME
Refills: 0 | Status: DISCONTINUED | OUTPATIENT
Start: 2020-01-06 | End: 2020-01-14

## 2020-01-06 RX ADMIN — Medication 1: at 17:31

## 2020-01-06 RX ADMIN — Medication 2: at 12:32

## 2020-01-06 RX ADMIN — ENOXAPARIN SODIUM 40 MILLIGRAM(S): 100 INJECTION SUBCUTANEOUS at 12:33

## 2020-01-06 RX ADMIN — POLYETHYLENE GLYCOL 3350 17 GRAM(S): 17 POWDER, FOR SOLUTION ORAL at 12:32

## 2020-01-06 RX ADMIN — LEVETIRACETAM 1500 MILLIGRAM(S): 250 TABLET, FILM COATED ORAL at 17:31

## 2020-01-06 RX ADMIN — Medication 40 MILLIEQUIVALENT(S): at 12:32

## 2020-01-06 RX ADMIN — INSULIN GLARGINE 8 UNIT(S): 100 INJECTION, SOLUTION SUBCUTANEOUS at 21:31

## 2020-01-06 RX ADMIN — LACOSAMIDE 200 MILLIGRAM(S): 50 TABLET ORAL at 05:46

## 2020-01-06 RX ADMIN — Medication 2: at 21:30

## 2020-01-06 RX ADMIN — SERTRALINE 75 MILLIGRAM(S): 25 TABLET, FILM COATED ORAL at 12:32

## 2020-01-06 RX ADMIN — LEVETIRACETAM 1500 MILLIGRAM(S): 250 TABLET, FILM COATED ORAL at 05:46

## 2020-01-06 RX ADMIN — Medication 1: at 08:04

## 2020-01-06 RX ADMIN — SODIUM CHLORIDE 100 MILLILITER(S): 9 INJECTION, SOLUTION INTRAVENOUS at 15:26

## 2020-01-06 RX ADMIN — LACOSAMIDE 200 MILLIGRAM(S): 50 TABLET ORAL at 17:37

## 2020-01-06 RX ADMIN — TAMSULOSIN HYDROCHLORIDE 0.4 MILLIGRAM(S): 0.4 CAPSULE ORAL at 22:05

## 2020-01-06 NOTE — PROGRESS NOTE ADULT - ASSESSMENT
A/P : 85M hx of HTN, remote prostate ca (treated s/p seeds/RT), encephalitis with dysphagia and PEG status, seizure d/o presenting with sepsis.    Acute hypoxic respiratory failure: 2/2 Coronavirus and superimposed multifocal PNA  - WBCs elevated /p five day course of Vanco and meropenem.  Abx discontinued on 1/3/20  - Blood cultures negative Urinary retention   - continue bradley LUE swelling  - ultrasound neg for DVT     Fecal impaction  - Miralax daily  - Dulcolax PRN  History of encephalitis on - Keppra and Vimpat  * Palliative: f/u for goc, molst completed, family  meeting with Hospice rep today. Pt in bed, keeps eyes closed, does not follow commands or verbalize. No sob, or s/s pain noted upon exam.  Met with family, wife and daughters at bedside today , they are interested in hospice services, but at this time, they feel they would be unable to take him home as they originally  thought .   Pt will need LTC placement, on hospice services vs with comfort care. Hospice to follow up with family today.  Palliative will follow pts clinical course w/ med team. A/P : 85M hx of HTN, remote prostate ca (treated s/p seeds/RT), encephalitis with dysphagia and PEG status, seizure d/o presenting with sepsis.    Acute hypoxic respiratory failure: 2/2 Coronavirus and superimposed multifocal PNA  - WBCs elevated /p five day course of Vanco and meropenem.  Abx discontinued on 1/3/20  - Blood cultures negative Urinary retention   - continue bradley LUE swelling  - ultrasound neg for DVT     Fecal impaction  - Miralax daily  - Dulcolax PRN  History of encephalitis on - Keppra and Vimpat  * Palliative: f/u for goc, molst completed, family  meeting with Hospice rep today. Pt in bed, keeps eyes closed, does not follow commands or verbalize. No sob, or s/s pain noted upon exam.  Met with family, wife and daughters at bedside today , they are interested in home hospice services, but at this time, they feel they would be unable to take him home as they originally  thought .   Pt will need LTC placement, on hospice services vs with comfort care. Hospice to follow up with family today.  Pt is home hospice appropriate. Palliative will follow pts clinical course w/ med team. A/P : 85M hx of HTN, remote prostate ca (treated s/p seeds/RT), encephalitis with dysphagia and PEG status, seizure d/o presenting with sepsis.    Acute hypoxic respiratory failure: 2/2 Coronavirus and superimposed multifocal PNA  - WBCs elevated /p five day course of Vanco and meropenem.  Abx discontinued on 1/3/20  - Blood cultures negative Urinary retention   - continue bradley LUE swelling  - ultrasound neg for DVT     Fecal impaction  - Miralax daily  - Dulcolax PRN  History of encephalitis on - Keppra and Vimpat  * Palliative: f/u for goc, molst completed, family  meeting with Hospice rep today. Pt in bed, keeps eyes closed, does not follow commands or verbalize. No sob, or s/s pain noted upon exam.  Met with family, wife and daughters at bedside today , they are interested in home hospice services, but at this time, they feel they would be unable to take him home as they originally  thought .   Pt will need LTC placement, on hospice services vs with comfort care. Hospice to follow up with family today.  Pt is home hospice appropriate. Palliative will follow pts clinical course w/ med team.   .

## 2020-01-06 NOTE — PROGRESS NOTE ADULT - ASSESSMENT
85M hx of HTN, remote prostate ca (treated s/p seeds/RT), encephalitis with dysphagia and PEG status, seizure d/o presenting with sepsis.     Dispo: Being evaluated for hospice. Home hospice vs. long term care facility. Palliative and hospice teams following     Acute hypoxic respiratory failure: 2/2 Coronavirus and superimposed multifocal PNA  - s/p five day course of Vanco and meropenem. Abx discontinued on 1/3/20  - continue O2 support  - tube feeds continued , aspiration precautions  - tachycardic today (1/6) - will give IVF and reassess.     DM type 2 with hyperglycemia: HbA1C 8.4%  - continue ISS  - monitor fingersticks   - continue tube feeds    Hypokalemia  - daily supplement    Urinary retention   - continue bradley    Moderate protein calorie malnutrition/ hypoalbunemia  - continue bolus tube feeds  - dietician following     HTN hx  - on amiloride 5mg TID and labetalol 100mg TID at home  - Continue to hold meds due to low BP    History of encephalitis  - Keppra and Vimpat    DVT prophylaxis  - lovenox 85M hx of HTN, remote prostate ca (treated s/p seeds/RT), encephalitis with dysphagia and PEG status, seizure d/o presenting with sepsis.     Dispo: Being evaluated for hospice. Home hospice vs. long term care facility. Palliative and hospice teams following     Acute hypoxic respiratory failure: 2/2 Coronavirus and superimposed multifocal PNA  - s/p five day course of Vanco and meropenem. Abx discontinued on 1/3/20  - continue O2 support  - peg feeds continued , aspiration precautions  - sinus tachycardic today (1/6) - will give IVF and reassess.     DM type 2 with hyperglycemia: HbA1C 8.4%  - continue ISS and lantus 8unit qHS  - monitor fingersticks   - continue tube feeds    Hypokalemia  - daily supplement    Urinary retention   - continue bradley    Moderate protein calorie malnutrition/ hypoalbunemia  - continue bolus tube feeds  - dietician following     HTN hx  - on amiloride 5mg TID and labetalol 100mg TID at home  - Continue to hold meds due to BP low normotensive    History of encephalitis  - Keppra and Vimpat    DVT prophylaxis  - lovenox

## 2020-01-06 NOTE — PROGRESS NOTE ADULT - SUBJECTIVE AND OBJECTIVE BOX
Patient is a 85y old  Male who presents with a chief complaint of desaturation (2020 21:41)    Patient seen and examined at bedside. Patient lethargic, keeping eyes closed, moving right arm spontaneously     ALLERGIES:  No Known Allergies    MEDICATIONS  (STANDING):  dextrose 5%. 1000 milliLiter(s) (50 mL/Hr) IV Continuous <Continuous>  dextrose 50% Injectable 12.5 Gram(s) IV Push once  dextrose 50% Injectable 25 Gram(s) IV Push once  dextrose 50% Injectable 25 Gram(s) IV Push once  enoxaparin Injectable 40 milliGRAM(s) SubCutaneous daily  insulin lispro (HumaLOG) corrective regimen sliding scale   SubCutaneous four times a day before meals  lacosamide 200 milliGRAM(s) Oral two times a day  levETIRAcetam  Solution 1500 milliGRAM(s) Enteral Tube two times a day  polyethylene glycol 3350 17 Gram(s) Oral daily  potassium chloride   Powder 40 milliEquivalent(s) Enteral Tube daily  sertraline 75 milliGRAM(s) Oral daily  tamsulosin 0.4 milliGRAM(s) Oral at bedtime    MEDICATIONS  (PRN):  acetaminophen    Suspension .. 650 milliGRAM(s) Enteral Tube every 6 hours PRN Temp greater or equal to 38C (100.4F), Mild Pain (1 - 3)  albuterol/ipratropium for Nebulization 3 milliLiter(s) Nebulizer every 6 hours PRN Bronchospasm  bisacodyl 5 milliGRAM(s) Oral every 12 hours PRN Constipation  dextrose 40% Gel 15 Gram(s) Oral once PRN Blood Glucose LESS THAN 70 milliGRAM(s)/deciliter  glucagon  Injectable 1 milliGRAM(s) IntraMuscular once PRN Glucose LESS THAN 70 milligrams/deciliter  guaiFENesin   Syrup  (Sugar-Free) 200 milliGRAM(s) Oral every 6 hours PRN congestion    Vital Signs Last 24 Hrs  T(F): 97 (2020 10:11), Max: 99.1 (2020 05:50)  HR: 118 (2020 10:11) (88 - 118)  BP: 103/70 (2020 10:11) (103/70 - 149/79)  RR: 16 (2020 10:11) (16 - 16)  SpO2: 96% (2020 10:11) (94% - 97%)    I&O's Summary  2020 07:01  -  2020 07:00  --------------------------------------------------------  IN: 375 mL / OUT: 1450 mL / NET: -1075 mL      PHYSICAL EXAM  GENERAL: NAD, lethargic   HEAD:  Atraumatic, Normocephalic  EYES: EOMI   ENMT: Moist mucous membranes  NECK: Supple, No JVD  CHEST/LUNG: Rhonchi bilaterally, non-labored breathing  HEART: +S1/S2  ABDOMEN: Soft, Nontender, Nondistended  : Mary  VASCULAR: Normal pulses, Normal capillary refill, no edema in lower ext  EXTREMITIES: No calf tenderness, No cyanosis, LUE swelling   SKIN: Warm, Intact  NERVOUS SYSTEM:  Lethargic, left arm weakness      LABS:                        11.4   8.36  )-----------( 225      ( 2020 06:00 )             35.7     01-06    142  |  101  |  22  ----------------------------<  209  3.1   |  32  |  0.76    Ca    9.1      2020 06:45  Phos  2.6     01-05  Mg     1.6     -06      eGFR if Non African American: 83 mL/min/1.73M2 (20 @ 06:45)  eGFR if : 97 mL/min/1.73M2 (20 @ 06:45)      Lactate, Blood: 2.2 mmol/L ( @ 11:50)      POCT Blood Glucose.: 201 mg/dL (2020 11:52)  POCT Blood Glucose.: 198 mg/dL (2020 07:36)  POCT Blood Glucose.: 206 mg/dL (2020 22:27)  POCT Blood Glucose.: 223 mg/dL (2020 21:01)  POCT Blood Glucose.: 235 mg/dL (2020 16:53)    12-29 [id number] 8.4    Urinalysis Basic - ( 2020 14:00 )    Color: Yellow / Appearance: very cloudy / S.010 / pH: x  Gluc: x / Ketone: Negative  / Bili: Negative / Urobili: Negative   Blood: x / Protein: 30 mg/dL / Nitrite: Negative   Leuk Esterase: Negative / RBC: 0-4 /HPF / WBC Negative /HPF   Sq Epi: x / Non Sq Epi: Neg.-Few / Bacteria: Trace /HPF          RADIOLOGY & ADDITIONAL TESTS:    Care Discussed with Consultants/Other Providers: Patient is a 85y old  Male who presents with a chief complaint of desaturation (2020 21:41)    Patient seen and examined at bedside. Patient lethargic, keeping eyes closed, moving right arm spontaneously     ALLERGIES:  No Known Allergies    MEDICATIONS  (STANDING):  dextrose 5%. 1000 milliLiter(s) (50 mL/Hr) IV Continuous <Continuous>  dextrose 50% Injectable 12.5 Gram(s) IV Push once  dextrose 50% Injectable 25 Gram(s) IV Push once  dextrose 50% Injectable 25 Gram(s) IV Push once  enoxaparin Injectable 40 milliGRAM(s) SubCutaneous daily  insulin lispro (HumaLOG) corrective regimen sliding scale   SubCutaneous four times a day before meals  lacosamide 200 milliGRAM(s) Oral two times a day  levETIRAcetam  Solution 1500 milliGRAM(s) Enteral Tube two times a day  polyethylene glycol 3350 17 Gram(s) Oral daily  potassium chloride   Powder 40 milliEquivalent(s) Enteral Tube daily  sertraline 75 milliGRAM(s) Oral daily  tamsulosin 0.4 milliGRAM(s) Oral at bedtime    MEDICATIONS  (PRN):  acetaminophen    Suspension .. 650 milliGRAM(s) Enteral Tube every 6 hours PRN Temp greater or equal to 38C (100.4F), Mild Pain (1 - 3)  albuterol/ipratropium for Nebulization 3 milliLiter(s) Nebulizer every 6 hours PRN Bronchospasm  bisacodyl 5 milliGRAM(s) Oral every 12 hours PRN Constipation  dextrose 40% Gel 15 Gram(s) Oral once PRN Blood Glucose LESS THAN 70 milliGRAM(s)/deciliter  glucagon  Injectable 1 milliGRAM(s) IntraMuscular once PRN Glucose LESS THAN 70 milligrams/deciliter  guaiFENesin   Syrup  (Sugar-Free) 200 milliGRAM(s) Oral every 6 hours PRN congestion    Vital Signs Last 24 Hrs  T(F): 97 (2020 10:11), Max: 99.1 (2020 05:50)  HR: 118 (2020 10:11) (88 - 118)  BP: 103/70 (2020 10:11) (103/70 - 149/79)  RR: 16 (2020 10:11) (16 - 16)  SpO2: 96% (2020 10:11) (94% - 97%)    I&O's Summary  2020 07:01  -  2020 07:00  --------------------------------------------------------  IN: 375 mL / OUT: 1450 mL / NET: -1075 mL      PHYSICAL EXAM  GENERAL: NAD, lethargic   HEAD:  Atraumatic, Normocephalic  EYES: EOMI   ENMT: Moist mucous membranes  NECK: Supple, No JVD  CHEST/LUNG: Rhonchi bilaterally, non-labored breathing  HEART: +S1/S2  ABDOMEN: Soft, Nontender, Nondistended  : Mary  VASCULAR: Normal pulses, Normal capillary refill, no edema in lower ext  EXTREMITIES: No calf tenderness, No cyanosis, LUE swelling   SKIN: Warm, Intact  NERVOUS SYSTEM:  Lethargic, left arm weakness    LABS:                        11.4   8.36  )-----------( 225      ( 2020 06:00 )             35.7     01-06    142  |  101  |  22  ----------------------------<  209  3.1   |  32  |  0.76    Ca    9.1      2020 06:45  Phos  2.6     01-05  Mg     1.6     -06      eGFR if Non African American: 83 mL/min/1.73M2 (20 @ 06:45)  eGFR if : 97 mL/min/1.73M2 (20 @ 06:45)      Lactate, Blood: 2.2 mmol/L ( @ 11:50)    POCT Blood Glucose.: 201 mg/dL (2020 11:52)  POCT Blood Glucose.: 198 mg/dL (2020 07:36)  POCT Blood Glucose.: 206 mg/dL (2020 22:27)  POCT Blood Glucose.: 223 mg/dL (2020 21:01)  POCT Blood Glucose.: 235 mg/dL (2020 16:53)    12-29 [id number] 8.4    Urinalysis Basic - ( 2020 14:00 )    Color: Yellow / Appearance: very cloudy / S.010 / pH: x  Gluc: x / Ketone: Negative  / Bili: Negative / Urobili: Negative   Blood: x / Protein: 30 mg/dL / Nitrite: Negative   Leuk Esterase: Negative / RBC: 0-4 /HPF / WBC Negative /HPF   Sq Epi: x / Non Sq Epi: Neg.-Few / Bacteria: Trace /HPF          RADIOLOGY & ADDITIONAL TESTS:    Care Discussed with Consultants/Other Providers:

## 2020-01-06 NOTE — PROGRESS NOTE ADULT - SUBJECTIVE AND OBJECTIVE BOX
Progress: no events overnight, pt remains confused, non focal, family talking with [facility]  rep today .    Present Symptoms:   Dyspnea: mild  Nausea/Vomiting: no  Anxiety:    Depressed Mood:   Fatigue: yes  Loss of appetite:   Pain:       no s/s            location:   Review of Systems: Unable to obtain due to poor mentation]    MEDICATIONS  (STANDING):  dextrose 5%. 1000 milliLiter(s) (50 mL/Hr) IV Continuous <Continuous>  dextrose 50% Injectable 12.5 Gram(s) IV Push once  dextrose 50% Injectable 25 Gram(s) IV Push once  dextrose 50% Injectable 25 Gram(s) IV Push once  enoxaparin Injectable 40 milliGRAM(s) SubCutaneous daily  insulin lispro (HumaLOG) corrective regimen sliding scale   SubCutaneous four times a day before meals  lacosamide 200 milliGRAM(s) Oral two times a day  levETIRAcetam  Solution 1500 milliGRAM(s) Enteral Tube two times a day  polyethylene glycol 3350 17 Gram(s) Oral daily  potassium chloride   Powder 40 milliEquivalent(s) Enteral Tube daily  sertraline 75 milliGRAM(s) Oral daily  tamsulosin 0.4 milliGRAM(s) Oral at bedtime    MEDICATIONS  (PRN):  acetaminophen    Suspension .. 650 milliGRAM(s) Enteral Tube every 6 hours PRN Temp greater or equal to 38C (100.4F), Mild Pain (1 - 3)  albuterol/ipratropium for Nebulization 3 milliLiter(s) Nebulizer every 6 hours PRN Bronchospasm  bisacodyl 5 milliGRAM(s) Oral every 12 hours PRN Constipation  dextrose 40% Gel 15 Gram(s) Oral once PRN Blood Glucose LESS THAN 70 milliGRAM(s)/deciliter  glucagon  Injectable 1 milliGRAM(s) IntraMuscular once PRN Glucose LESS THAN 70 milligrams/deciliter  guaiFENesin   Syrup  (Sugar-Free) 200 milliGRAM(s) Oral every 6 hours PRN congestion      PHYSICAL EXAM:  Vital Signs Last 24 Hrs  T(C): 36.1 (2020 10:11), Max: 37.3 (2020 05:50)  T(F): 97 (2020 10:11), Max: 99.1 (2020 05:50)  HR: 118 (2020 10:11) (88 - 118)  BP: 103/70 (2020 10:11) (103/70 - 149/79)  BP(mean): --  RR: 16 (2020 10:11) (16 - 16)  SpO2: 96% (2020 10:11) (94% - 97%)  General: lethargic, non verbal, eyes closed    HEENT: n/c, a/t     Lungs: clear, dim to bases    CV: s1s2 tachy     GI: soft, n/t + bs   , Peg    : normal  , bradley   Musculoskeletal: weak x 4 , no edema    Skin: warm dry     Neuro: eyes closed, non focal    Oral intake ability: limited  Diet: NPO/ as bonifacio     LABS:                          11.4   8.36  )-----------( 225      ( 2020 06:00 )             35.7     01-06    142  |  101  |  22  ----------------------------<  209<H>  3.1<L>   |  32<H>  |  0.76    Ca    9.1      2020 06:45  Phos  2.6     01-05  Mg     1.6     01-06      Urinalysis Basic - ( 2020 14:00 )    Color: Yellow / Appearance: very cloudy / S.010 / pH: x  Gluc: x / Ketone: Negative  / Bili: Negative / Urobili: Negative   Blood: x / Protein: 30 mg/dL / Nitrite: Negative   Leuk Esterase: Negative / RBC: 0-4 /HPF / WBC Negative /HPF   Sq Epi: x / Non Sq Epi: Neg.-Few / Bacteria: Trace /HPF        RADIOLOGY & ADDITIONAL STUDIES:    ADVANCE DIRECTIVES: MOLST  DNR/DNI   Advanced Care Planning discussion total time spent:

## 2020-01-06 NOTE — PROGRESS NOTE ADULT - SUBJECTIVE AND OBJECTIVE BOX
CC: f/u for pneumonia    Patient reports nothing intelligible, just groans    REVIEW OF SYSTEMS:  All other review of systems cannot get (Comprehensive ROS)    Antimicrobials Day #  :    Other Medications Reviewed    T(F): 99.6 (01-06-20 @ 17:02), Max: 99.6 (01-06-20 @ 17:02)  HR: 110 (01-06-20 @ 17:02)  BP: 119/55 (01-06-20 @ 17:02)  RR: 16 (01-06-20 @ 17:02)  SpO2: 96% (01-06-20 @ 17:02)  Wt(kg): --    PHYSICAL EXAM:  General: somnolent, no acute distress  Eyes:  anicteric, no conjunctival injection, no discharge  Oropharynx: no lesions or injection 	  Neck: supple, without adenopathy  Lungs: course  to auscultation  Heart: regular rate and rhythm; no murmur, rubs or gallops  Abdomen: soft, nondistended, nontender, without mass or organomegaly, peg ok  Skin: no lesions  Extremities: no clubbing, cyanosis,, some arm  edema, no cords  Neurologic: just groans to stimuli    LAB RESULTS:                        11.4   8.36  )-----------( 225      ( 05 Jan 2020 06:00 )             35.7     01-06    142  |  101  |  22  ----------------------------<  209<H>  3.1<L>   |  32<H>  |  0.76    Ca    9.1      06 Jan 2020 06:45  Phos  2.6     01-05  Mg     1.6     01-06          MICROBIOLOGY:  RECENT CULTURES:    MRSA/MSSA PCR (01.01.20 @ 04:50)    MRSA PCR Result.: NotDetec: MRSA/MSSA PCR assay is a qualitative in vitro diagnostic test for the  direct detection and differentiation of methicillin-resistant  Staphylococcus aureus (MRSA) from Staphylococcus aureus (SA). The assay  detects DNA from nasal swabs in patients atrisk for nasal colonization.  It is not intended to diagnose MRSA or SA infections nor guide or monitor  treatment for MRSA/SA infections. A negative result does not preclude  nasal colonization. The assay is FDA-approved and its performance has  been established by [place], [place] and the [place], [place].    Staph Aureus PCR Result: [facility]      RADIOLOGY REVIEWED:  < from: US Duplex Venous Upper Ext Ltd, Left (01.03.20 @ 15:20) >  IMPRESSION:     No evidence of left upper extremity deep venous thrombosis.      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 Finished course of antibiotic  No infection is apparent at present.   Plan:  supportive care  aspiration precautions  monitor off antibiotics  poor neurologic status is most limiting issue

## 2020-01-07 LAB
ANION GAP SERPL CALC-SCNC: 9 MMOL/L — SIGNIFICANT CHANGE UP (ref 5–17)
BUN SERPL-MCNC: 22 MG/DL — SIGNIFICANT CHANGE UP (ref 7–23)
CALCIUM SERPL-MCNC: 8.5 MG/DL — SIGNIFICANT CHANGE UP (ref 8.4–10.5)
CHLORIDE SERPL-SCNC: 99 MMOL/L — SIGNIFICANT CHANGE UP (ref 96–108)
CO2 SERPL-SCNC: 29 MMOL/L — SIGNIFICANT CHANGE UP (ref 22–31)
CREAT SERPL-MCNC: 0.65 MG/DL — SIGNIFICANT CHANGE UP (ref 0.5–1.3)
GLUCOSE BLDC GLUCOMTR-MCNC: 144 MG/DL — HIGH (ref 70–99)
GLUCOSE BLDC GLUCOMTR-MCNC: 176 MG/DL — HIGH (ref 70–99)
GLUCOSE BLDC GLUCOMTR-MCNC: 205 MG/DL — HIGH (ref 70–99)
GLUCOSE BLDC GLUCOMTR-MCNC: 207 MG/DL — HIGH (ref 70–99)
GLUCOSE SERPL-MCNC: 206 MG/DL — HIGH (ref 70–99)
POTASSIUM SERPL-MCNC: 4.2 MMOL/L — SIGNIFICANT CHANGE UP (ref 3.5–5.3)
POTASSIUM SERPL-SCNC: 4.2 MMOL/L — SIGNIFICANT CHANGE UP (ref 3.5–5.3)
SODIUM SERPL-SCNC: 137 MMOL/L — SIGNIFICANT CHANGE UP (ref 135–145)

## 2020-01-07 PROCEDURE — 99233 SBSQ HOSP IP/OBS HIGH 50: CPT

## 2020-01-07 PROCEDURE — 99232 SBSQ HOSP IP/OBS MODERATE 35: CPT | Mod: GC

## 2020-01-07 RX ADMIN — Medication 2: at 17:25

## 2020-01-07 RX ADMIN — LEVETIRACETAM 1500 MILLIGRAM(S): 250 TABLET, FILM COATED ORAL at 17:26

## 2020-01-07 RX ADMIN — TAMSULOSIN HYDROCHLORIDE 0.4 MILLIGRAM(S): 0.4 CAPSULE ORAL at 22:04

## 2020-01-07 RX ADMIN — SERTRALINE 75 MILLIGRAM(S): 25 TABLET, FILM COATED ORAL at 12:50

## 2020-01-07 RX ADMIN — LACOSAMIDE 200 MILLIGRAM(S): 50 TABLET ORAL at 17:26

## 2020-01-07 RX ADMIN — Medication 40 MILLIEQUIVALENT(S): at 12:51

## 2020-01-07 RX ADMIN — POLYETHYLENE GLYCOL 3350 17 GRAM(S): 17 POWDER, FOR SOLUTION ORAL at 12:51

## 2020-01-07 RX ADMIN — Medication 200 MILLIGRAM(S): at 07:45

## 2020-01-07 RX ADMIN — LACOSAMIDE 200 MILLIGRAM(S): 50 TABLET ORAL at 06:23

## 2020-01-07 RX ADMIN — LEVETIRACETAM 1500 MILLIGRAM(S): 250 TABLET, FILM COATED ORAL at 06:24

## 2020-01-07 RX ADMIN — ENOXAPARIN SODIUM 40 MILLIGRAM(S): 100 INJECTION SUBCUTANEOUS at 12:50

## 2020-01-07 RX ADMIN — Medication 2: at 11:37

## 2020-01-07 RX ADMIN — INSULIN GLARGINE 8 UNIT(S): 100 INJECTION, SOLUTION SUBCUTANEOUS at 22:04

## 2020-01-07 RX ADMIN — Medication 1: at 22:05

## 2020-01-07 NOTE — PROGRESS NOTE ADULT - SUBJECTIVE AND OBJECTIVE BOX
Follow-up Pall Progress Note    Leonidas Frausto MD  [phone]    No new events overnight.  Denies SOB/CP.     Medications:  Vital Signs Last 24 Hrs  T(C): 37.4 (07 Jan 2020 10:57), Max: 37.6 (06 Jan 2020 17:02)  T(F): 99.3 (07 Jan 2020 10:57), Max: 99.6 (06 Jan 2020 17:02)  HR: 103 (07 Jan 2020 10:57) (101 - 110)  BP: 103/73 (07 Jan 2020 10:57) (103/73 - 119/55)  BP(mean): --  RR: 16 (07 Jan 2020 10:57) (16 - 18)  SpO2: 100% (07 Jan 2020 10:57) (96% - 100%)          01-06 @ 07:01  -  01-07 @ 07:00  --------------------------------------------------------  IN: 1825 mL / OUT: 1500 mL / NET: 325 mL        LABS:    01-07    137  |  99  |  22  ----------------------------<  206<H>  4.2   |  29  |  0.65    Ca    8.5      07 Jan 2020 11:28  Mg     1.6     01-06                CULTURES:        Physical Examination:  PULM: Clear to auscultation bilaterally, no significant sputum production  CVS: Regular rate and rhythm, no murmurs, rubs, or gallops  ABD: Soft, non-tender

## 2020-01-07 NOTE — PROGRESS NOTE ADULT - ASSESSMENT
85M hx of HTN, remote prostate ca (treated s/p seeds/RT), encephalitis with dysphagia and PEG status, seizure d/o presenting with sepsis.     Dispo: Being evaluated for hospice. Home hospice vs. long term care facility. Palliative and hospice teams following     Acute hypoxic respiratory failure: 2/2 Coronavirus and superimposed multifocal PNA  - s/p five day course of Vanco and meropenem. Abx discontinued on 1/3/20  - continue O2 support  - peg feeds continued , aspiration precautions  - sinus tachycardic today (1/6) - will give IVF and reassess.     DM type 2 with hyperglycemia: HbA1C 8.4%  - continue ISS and lantus 8unit qHS  - monitor fingersticks   - continue tube feeds    Hypokalemia  - daily supplement    Urinary retention   - continue bradley    Moderate protein calorie malnutrition/ hypoalbunemia  - continue bolus tube feeds  - dietician following     HTN hx  - on amiloride 5mg TID and labetalol 100mg TID at home  - Continue to hold meds due to BP low normotensive    History of encephalitis  - Keppra and Vimpat    DVT prophylaxis  - lovenox

## 2020-01-07 NOTE — PROGRESS NOTE ADULT - SUBJECTIVE AND OBJECTIVE BOX
CC: f/u for pneumonia    Patient reports  nothing nonverbal  REVIEW OF SYSTEMS:  All other review of systems cannot get (Comprehensive ROS)    Antimicrobials Day #  :    Other Medications Reviewed    T(F): 98.4 (01-07-20 @ 16:15), Max: 99.3 (01-07-20 @ 10:57)  HR: 101 (01-07-20 @ 16:15)  BP: 100/48 (01-07-20 @ 16:15)  RR: 16 (01-07-20 @ 16:15)  SpO2: 96% (01-07-20 @ 16:15)  Wt(kg): --    PHYSICAL EXAM:  General: alert, no acute distress  Eyes:  anicteric, no conjunctival injection, no discharge  Oropharynx: no lesions or injection 	  Neck: supple, without adenopathy  Lungs: clear to auscultation  Heart: regular rate and rhythm; no murmur, rubs or gallops  Abdomen: soft, nondistended, nontender, without mass or organomegaly, peg  Skin: no lesions  Extremities: no clubbing, cyanosis, or edema  Neurologic: somnolent, nonverbal, not responsive    LAB RESULTS:    01-07    137  |  99  |  22  ----------------------------<  206<H>  4.2   |  29  |  0.65    Ca    8.5      07 Jan 2020 11:28  Mg     1.6     01-06        RADIOLOGY REVIEWED:  < from: Xray Chest 1 View-PORTABLE IMMEDIATE (01.02.20 @ 18:36) >    EXAM:  XR CHEST PORTABLE IMMED 1V      PROCEDURE DATE:  01/02/2020        INTERPRETATION:  AP chest on January 2, 2020 at 6:12 PM. Patient has congestion.    Heart magnified by technique.    On December 28, 2019 there were small basilar infiltrates.    On present examination left-sided infiltrate shows near-total resolution.    There is a persistent rather small infiltrate off the lower right hilum.    Calcific peritendinitis around the left greater tuberosity noted.    IMPRESSION: As above.      < end of copied text >  < from: Xray Chest 1 View-PORTABLE IMMEDIATE (01.02.20 @ 18:36) >    EXAM:  XR CHEST PORTABLE IMMED 1V      PROCEDURE DATE:  01/02/2020        INTERPRETATION:  AP chest on January 2, 2020 at 6:12 PM. Patient has congestion.    Heart magnified by technique.    On December 28, 2019 there were small basilar infiltrates.    On present examination left-sided infiltrate shows near-total resolution.    There is a persistent rather small infiltrate off the lower right hilum.    Calcific peritendinitis around the left greater tuberosity noted.    IMPRESSION: As above.      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 No infection apparent at present.   Plan:  monitor off antibiotics  please call if further input is needed. will no longer actively follow

## 2020-01-07 NOTE — PROGRESS NOTE ADULT - SUBJECTIVE AND OBJECTIVE BOX
Patient is a 85y old  Male who presents with a chief complaint of desaturation (2020 17:25)  Patient seen and examined at bedside.  Pt with no events overnight.    ALLERGIES:  No Known Allergies    MEDICATIONS  (STANDING):  dextrose 5%. 1000 milliLiter(s) (50 mL/Hr) IV Continuous <Continuous>  dextrose 50% Injectable 12.5 Gram(s) IV Push once  dextrose 50% Injectable 25 Gram(s) IV Push once  dextrose 50% Injectable 25 Gram(s) IV Push once  enoxaparin Injectable 40 milliGRAM(s) SubCutaneous daily  insulin glargine Injectable (LANTUS) 8 Unit(s) SubCutaneous at bedtime  insulin lispro (HumaLOG) corrective regimen sliding scale   SubCutaneous four times a day before meals  lacosamide 200 milliGRAM(s) Oral two times a day  levETIRAcetam  Solution 1500 milliGRAM(s) Enteral Tube two times a day  polyethylene glycol 3350 17 Gram(s) Oral daily  potassium chloride   Powder 40 milliEquivalent(s) Enteral Tube daily  sertraline 75 milliGRAM(s) Oral daily  tamsulosin 0.4 milliGRAM(s) Oral at bedtime    MEDICATIONS  (PRN):  acetaminophen    Suspension .. 650 milliGRAM(s) Enteral Tube every 6 hours PRN Temp greater or equal to 38C (100.4F), Mild Pain (1 - 3)  albuterol/ipratropium for Nebulization 3 milliLiter(s) Nebulizer every 6 hours PRN Bronchospasm  bisacodyl 5 milliGRAM(s) Oral every 12 hours PRN Constipation  dextrose 40% Gel 15 Gram(s) Oral once PRN Blood Glucose LESS THAN 70 milliGRAM(s)/deciliter  glucagon  Injectable 1 milliGRAM(s) IntraMuscular once PRN Glucose LESS THAN 70 milligrams/deciliter  guaiFENesin   Syrup  (Sugar-Free) 200 milliGRAM(s) Oral every 6 hours PRN congestion    Vital Signs Last 24 Hrs  T(F): 99.3 (2020 10:57), Max: 99.6 (2020 17:02)  HR: 103 (2020 10:57) (101 - 110)  BP: 103/73 (2020 10:57) (103/73 - 119/55)  RR: 16 (2020 10:57) (16 - 18)  SpO2: 100% (2020 10:57) (96% - 100%)  I&O's Summary    2020 07:01  -  2020 07:00  --------------------------------------------------------  IN: 1825 mL / OUT: 1500 mL / NET: 325 mL      PHYSICAL EXAM:  General: NAD, A/O x 3  ENT: MMM  Neck: Supple, No JVD  Lungs: Clear to auscultation bilaterally  Cardio: RRR, S1/S2, No murmurs  Abdomen: Soft, Nontender, Nondistended; Bowel sounds present  Extremities: No calf tenderness, No pitting edema    LABS:                        11.4   8.36  )-----------( 225      ( 2020 06:00 )             35.7     01-06    142  |  101  |  22  ----------------------------<  209  3.1   |  32  |  0.76    Ca    9.1      2020 06:45  Phos  2.6     01-05  Mg     1.6     01-06      eGFR if Non African American: 83 mL/min/1.73M2 (20 @ 06:45)  eGFR if : 97 mL/min/1.73M2 (20 @ 06:45)    POCT Blood Glucose.: 207 mg/dL (2020 11:35)  POCT Blood Glucose.: 144 mg/dL (2020 07:42)  POCT Blood Glucose.: 220 mg/dL (2020 21:07)  POCT Blood Glucose.: 194 mg/dL (2020 16:51)    12-29 [id number] 8.4    Urinalysis Basic - ( 2020 14:00 )    Color: Yellow / Appearance: very cloudy / S.010 / pH: x  Gluc: x / Ketone: Negative  / Bili: Negative / Urobili: Negative   Blood: x / Protein: 30 mg/dL / Nitrite: Negative   Leuk Esterase: Negative / RBC: 0-4 /HPF / WBC Negative /HPF   Sq Epi: x / Non Sq Epi: Neg.-Few / Bacteria: Trace /HPF          RADIOLOGY & ADDITIONAL TESTS:    Care Discussed with Consultants/Other Providers: Patient is a 85y old  Male who presents with a chief complaint of desaturation (2020 17:25)  Patient seen and examined at bedside.  Pt with no events overnight.    ALLERGIES:  No Known Allergies    MEDICATIONS  (STANDING):  dextrose 5%. 1000 milliLiter(s) (50 mL/Hr) IV Continuous <Continuous>  dextrose 50% Injectable 12.5 Gram(s) IV Push once  dextrose 50% Injectable 25 Gram(s) IV Push once  dextrose 50% Injectable 25 Gram(s) IV Push once  enoxaparin Injectable 40 milliGRAM(s) SubCutaneous daily  insulin glargine Injectable (LANTUS) 8 Unit(s) SubCutaneous at bedtime  insulin lispro (HumaLOG) corrective regimen sliding scale   SubCutaneous four times a day before meals  lacosamide 200 milliGRAM(s) Oral two times a day  levETIRAcetam  Solution 1500 milliGRAM(s) Enteral Tube two times a day  polyethylene glycol 3350 17 Gram(s) Oral daily  potassium chloride   Powder 40 milliEquivalent(s) Enteral Tube daily  sertraline 75 milliGRAM(s) Oral daily  tamsulosin 0.4 milliGRAM(s) Oral at bedtime    MEDICATIONS  (PRN):  acetaminophen    Suspension .. 650 milliGRAM(s) Enteral Tube every 6 hours PRN Temp greater or equal to 38C (100.4F), Mild Pain (1 - 3)  albuterol/ipratropium for Nebulization 3 milliLiter(s) Nebulizer every 6 hours PRN Bronchospasm  bisacodyl 5 milliGRAM(s) Oral every 12 hours PRN Constipation  dextrose 40% Gel 15 Gram(s) Oral once PRN Blood Glucose LESS THAN 70 milliGRAM(s)/deciliter  glucagon  Injectable 1 milliGRAM(s) IntraMuscular once PRN Glucose LESS THAN 70 milligrams/deciliter  guaiFENesin   Syrup  (Sugar-Free) 200 milliGRAM(s) Oral every 6 hours PRN congestion    Vital Signs Last 24 Hrs  T(F): 99.3 (2020 10:57), Max: 99.6 (2020 17:02)  HR: 103 (2020 10:57) (101 - 110)  BP: 103/73 (2020 10:57) (103/73 - 119/55)  RR: 16 (2020 10:57) (16 - 18)  SpO2: 100% (2020 10:57) (96% - 100%)  I&O's Summary    2020 07:01  -  2020 07:00  --------------------------------------------------------  IN: 1825 mL / OUT: 1500 mL / NET: 325 mL      PHYSICAL EXAM:  General: NAD, lethargic, but arousable.  ENT: MMM, no thrush  Neck: Supple, No JVD  Lungs: breathing not labored, +scattered rhonchi  Cardio: RRR, S1/S2, No murmurs  Abdomen: Soft, +peg in place, NT to palp, +BS  G/U:  +bradley  Extremities: No calf tenderness, No pitting edema    LABS:                        11.4   8.36  )-----------( 225      ( 2020 06:00 )             35.7     01-06    142  |  101  |  22  ----------------------------<  209  3.1   |  32  |  0.76    Ca    9.1      2020 06:45  Phos  2.6     01-05  Mg     1.6     01-06      eGFR if Non African American: 83 mL/min/1.73M2 (20 @ 06:45)  eGFR if : 97 mL/min/1.73M2 (20 @ 06:45)    POCT Blood Glucose.: 207 mg/dL (2020 11:35)  POCT Blood Glucose.: 144 mg/dL (2020 07:42)  POCT Blood Glucose.: 220 mg/dL (2020 21:07)  POCT Blood Glucose.: 194 mg/dL (2020 16:51)    12-29 [id number] 8.4    Urinalysis Basic - ( 2020 14:00 )    Color: Yellow / Appearance: very cloudy / S.010 / pH: x  Gluc: x / Ketone: Negative  / Bili: Negative / Urobili: Negative   Blood: x / Protein: 30 mg/dL / Nitrite: Negative   Leuk Esterase: Negative / RBC: 0-4 /HPF / WBC Negative /HPF   Sq Epi: x / Non Sq Epi: Neg.-Few / Bacteria: Trace /HPF          RADIOLOGY & ADDITIONAL TESTS:    Care Discussed with Consultants/Other Providers:

## 2020-01-07 NOTE — PROGRESS NOTE ADULT - ASSESSMENT
85M hx of HTN, remote prostate ca (treated s/p seeds/RT), encephalitis with dysphagia and PEG status, seizure d/o presenting with sepsis.     Dispo: Being evaluated for hospice. Home hospice vs. long term care facility. Palliative and hospice teams following     Acute hypoxic respiratory failure: 2/2 Coronavirus and superimposed multifocal PNA  - s/p five day course of Vanco and meropenem. Abx discontinued on 1/3/20  - continue O2 support  - peg feeds continued , aspiration precautions  - sinus tachycardic today (1/6) - will give IVF and reassess.     DM type 2 with hyperglycemia: HbA1C 8.4%  - continue ISS and lantus 8unit qHS  - monitor fingersticks   - continue tube feeds    Hypokalemia  - daily supplement    Urinary retention   - continue bradley    Moderate protein calorie malnutrition/ hypoalbunemia  - continue bolus tube feeds  - dietician following     HTN hx  - on amiloride 5mg TID and labetalol 100mg TID at home  - Continue to hold meds due to BP low normotensive    History of encephalitis  - Keppra and Vimpat    DVT prophylaxis  - lovenox 85M hx of HTN, remote prostate ca (treated s/p seeds/RT), encephalitis with dysphagia and PEG status, seizure d/o presenting with sepsis.     Dispo: Being evaluated for hospice. Home hospice vs. long term care facility. Palliative and hospice teams following.     Acute hypoxic respiratory failure: 2/2 Coronavirus and superimposed multifocal PNA  - s/p five day course of Vanco and meropenem. Abx discontinued on 1/3/20  - continue O2 support  - peg feeds continued , aspiration precautions    DM type 2 with hyperglycemia: HbA1C 8.4%  - continue ISS and lantus 8unit qHS  - monitor fingersticks   - continue tube feeds    Hypokalemia  - continue daily KCL supplementation    Urinary retention   - continue bradley    Moderate protein calorie malnutrition/ hypoalbuminemia  - continue bolus tube feeds  - dietician following     HTN; BP's on lower end, hold home meds  -  History of encephalitis  - Keppra and Vimpat    DVT prophylaxis  - lovenox 85M hx of HTN, remote prostate ca (treated s/p seeds/RT), encephalitis with dysphagia and PEG status, seizure d/o presenting with sepsis.     Dispo: Being evaluated for hospice. Pt will be discharged to subacute rehab for comfort care. Pt's family in the process of choosing a facility. Palliative and hospice teams following.     Acute hypoxic respiratory failure: 2/2 Coronavirus and superimposed multifocal PNA  - s/p five day course of Vanco and meropenem. Abx discontinued on 1/3/20  - continue O2 support  - peg feeds continued , aspiration precautions    DM type 2 with hyperglycemia: HbA1C 8.4%  - continue ISS and lantus 8unit qHS  - monitor fingersticks   - continue tube feeds    Hypokalemia  - continue daily KCL supplementation    Urinary retention   - continue bradley    Moderate protein calorie malnutrition/ hypoalbuminemia  - continue bolus tube feeds  - dietician following     HTN; BP's on lower end, hold home meds  -  History of encephalitis  - Keppra and Vimpat    DVT prophylaxis  - lovenox

## 2020-01-08 LAB
GLUCOSE BLDC GLUCOMTR-MCNC: 145 MG/DL — HIGH (ref 70–99)
GLUCOSE BLDC GLUCOMTR-MCNC: 168 MG/DL — HIGH (ref 70–99)
GLUCOSE BLDC GLUCOMTR-MCNC: 170 MG/DL — HIGH (ref 70–99)
GLUCOSE BLDC GLUCOMTR-MCNC: 197 MG/DL — HIGH (ref 70–99)

## 2020-01-08 PROCEDURE — 99233 SBSQ HOSP IP/OBS HIGH 50: CPT

## 2020-01-08 PROCEDURE — 99232 SBSQ HOSP IP/OBS MODERATE 35: CPT | Mod: GC

## 2020-01-08 RX ADMIN — LEVETIRACETAM 1500 MILLIGRAM(S): 250 TABLET, FILM COATED ORAL at 17:17

## 2020-01-08 RX ADMIN — Medication 40 MILLIEQUIVALENT(S): at 12:05

## 2020-01-08 RX ADMIN — Medication 1: at 17:16

## 2020-01-08 RX ADMIN — ENOXAPARIN SODIUM 40 MILLIGRAM(S): 100 INJECTION SUBCUTANEOUS at 12:05

## 2020-01-08 RX ADMIN — LEVETIRACETAM 1500 MILLIGRAM(S): 250 TABLET, FILM COATED ORAL at 06:17

## 2020-01-08 RX ADMIN — INSULIN GLARGINE 8 UNIT(S): 100 INJECTION, SOLUTION SUBCUTANEOUS at 21:19

## 2020-01-08 RX ADMIN — POLYETHYLENE GLYCOL 3350 17 GRAM(S): 17 POWDER, FOR SOLUTION ORAL at 12:05

## 2020-01-08 RX ADMIN — TAMSULOSIN HYDROCHLORIDE 0.4 MILLIGRAM(S): 0.4 CAPSULE ORAL at 21:32

## 2020-01-08 RX ADMIN — LACOSAMIDE 200 MILLIGRAM(S): 50 TABLET ORAL at 06:17

## 2020-01-08 RX ADMIN — Medication 1: at 12:05

## 2020-01-08 RX ADMIN — LACOSAMIDE 200 MILLIGRAM(S): 50 TABLET ORAL at 17:16

## 2020-01-08 RX ADMIN — SERTRALINE 75 MILLIGRAM(S): 25 TABLET, FILM COATED ORAL at 12:05

## 2020-01-08 NOTE — PROGRESS NOTE ADULT - SUBJECTIVE AND OBJECTIVE BOX
Patient is a 85y old  Male who presents with a chief complaint of desaturation (07 Jan 2020 17:10)  Patient seen and examined at bedside.  No events overnight.    ALLERGIES:  No Known Allergies    MEDICATIONS  (STANDING):  dextrose 5%. 1000 milliLiter(s) (50 mL/Hr) IV Continuous <Continuous>  dextrose 50% Injectable 12.5 Gram(s) IV Push once  dextrose 50% Injectable 25 Gram(s) IV Push once  dextrose 50% Injectable 25 Gram(s) IV Push once  enoxaparin Injectable 40 milliGRAM(s) SubCutaneous daily  insulin glargine Injectable (LANTUS) 8 Unit(s) SubCutaneous at bedtime  insulin lispro (HumaLOG) corrective regimen sliding scale   SubCutaneous four times a day before meals  lacosamide 200 milliGRAM(s) Oral two times a day  levETIRAcetam  Solution 1500 milliGRAM(s) Enteral Tube two times a day  polyethylene glycol 3350 17 Gram(s) Oral daily  potassium chloride   Powder 40 milliEquivalent(s) Enteral Tube daily  sertraline 75 milliGRAM(s) Oral daily  tamsulosin 0.4 milliGRAM(s) Oral at bedtime    MEDICATIONS  (PRN):  acetaminophen    Suspension .. 650 milliGRAM(s) Enteral Tube every 6 hours PRN Temp greater or equal to 38C (100.4F), Mild Pain (1 - 3)  albuterol/ipratropium for Nebulization 3 milliLiter(s) Nebulizer every 6 hours PRN Bronchospasm  bisacodyl 5 milliGRAM(s) Oral every 12 hours PRN Constipation  dextrose 40% Gel 15 Gram(s) Oral once PRN Blood Glucose LESS THAN 70 milliGRAM(s)/deciliter  glucagon  Injectable 1 milliGRAM(s) IntraMuscular once PRN Glucose LESS THAN 70 milligrams/deciliter  guaiFENesin   Syrup  (Sugar-Free) 200 milliGRAM(s) Oral every 6 hours PRN congestion    Vital Signs Last 24 Hrs  T(F): 97.5 (08 Jan 2020 10:25), Max: 99 (07 Jan 2020 21:46)  HR: 90 (08 Jan 2020 10:25) (90 - 101)  BP: 106/60 (08 Jan 2020 10:25) (100/48 - 110/59)  RR: 16 (08 Jan 2020 10:25) (16 - 16)  SpO2: 95% (08 Jan 2020 10:25) (95% - 98%)  I&O's Summary    07 Jan 2020 07:01  -  08 Jan 2020 07:00  --------------------------------------------------------  IN: 1125 mL / OUT: 1350 mL / NET: -225 mL      PHYSICAL EXAM:  General: NAD, A/O x 3  ENT: MMM  Neck: Supple, No JVD  Lungs: Clear to auscultation bilaterally  Cardio: RRR, S1/S2, No murmurs  Abdomen: Soft, Nontender, Nondistended; Bowel sounds present  Extremities: No calf tenderness, No pitting edema    LABS:    01-07    137  |  99  |  22  ----------------------------<  206  4.2   |  29  |  0.65    Ca    8.5      07 Jan 2020 11:28  Mg     1.6     01-06      eGFR if : 103 mL/min/1.73M2 (01-07-20 @ 11:28)  eGFR if Non African American: 89 mL/min/1.73M2 (01-07-20 @ 11:28)      POCT Blood Glucose.: 145 mg/dL (08 Jan 2020 07:36)  POCT Blood Glucose.: 176 mg/dL (07 Jan 2020 22:00)  POCT Blood Glucose.: 205 mg/dL (07 Jan 2020 17:08)  POCT Blood Glucose.: 207 mg/dL (07 Jan 2020 11:35)    12-29 [id number] 8.4          RADIOLOGY & ADDITIONAL TESTS:    Care Discussed with Consultants/Other Providers: Patient is a 85y old  Male who presents with a chief complaint of desaturation (07 Jan 2020 17:10)  Patient seen and examined at bedside.  No events overnight.    ALLERGIES:  No Known Allergies    MEDICATIONS  (STANDING):  dextrose 5%. 1000 milliLiter(s) (50 mL/Hr) IV Continuous <Continuous>  dextrose 50% Injectable 12.5 Gram(s) IV Push once  dextrose 50% Injectable 25 Gram(s) IV Push once  dextrose 50% Injectable 25 Gram(s) IV Push once  enoxaparin Injectable 40 milliGRAM(s) SubCutaneous daily  insulin glargine Injectable (LANTUS) 8 Unit(s) SubCutaneous at bedtime  insulin lispro (HumaLOG) corrective regimen sliding scale   SubCutaneous four times a day before meals  lacosamide 200 milliGRAM(s) Oral two times a day  levETIRAcetam  Solution 1500 milliGRAM(s) Enteral Tube two times a day  polyethylene glycol 3350 17 Gram(s) Oral daily  potassium chloride   Powder 40 milliEquivalent(s) Enteral Tube daily  sertraline 75 milliGRAM(s) Oral daily  tamsulosin 0.4 milliGRAM(s) Oral at bedtime    MEDICATIONS  (PRN):  acetaminophen    Suspension .. 650 milliGRAM(s) Enteral Tube every 6 hours PRN Temp greater or equal to 38C (100.4F), Mild Pain (1 - 3)  albuterol/ipratropium for Nebulization 3 milliLiter(s) Nebulizer every 6 hours PRN Bronchospasm  bisacodyl 5 milliGRAM(s) Oral every 12 hours PRN Constipation  dextrose 40% Gel 15 Gram(s) Oral once PRN Blood Glucose LESS THAN 70 milliGRAM(s)/deciliter  glucagon  Injectable 1 milliGRAM(s) IntraMuscular once PRN Glucose LESS THAN 70 milligrams/deciliter  guaiFENesin   Syrup  (Sugar-Free) 200 milliGRAM(s) Oral every 6 hours PRN congestion    Vital Signs Last 24 Hrs  T(F): 97.5 (08 Jan 2020 10:25), Max: 99 (07 Jan 2020 21:46)  HR: 90 (08 Jan 2020 10:25) (90 - 101)  BP: 106/60 (08 Jan 2020 10:25) (100/48 - 110/59)  RR: 16 (08 Jan 2020 10:25) (16 - 16)  SpO2: 95% (08 Jan 2020 10:25) (95% - 98%)  I&O's Summary    07 Jan 2020 07:01  -  08 Jan 2020 07:00  --------------------------------------------------------  IN: 1125 mL / OUT: 1350 mL / NET: -225 mL      PHYSICAL EXAM:  General: NAD, lethargic.  Neck: Supple, No JVD  Lungs: good air entry, +coarse breath sounds b/l, breathing not labored  Cardio: RRR, S1/S2, No murmurs  Abdomen: Soft, +peg in place, Nontender, Nondistended; Bowel sounds present  G/U:  +bradley  Extremities: No calf tenderness, No pitting edema  Neuro:  lethargic; does not follow commands    LABS:    01-07    137  |  99  |  22  ----------------------------<  206  4.2   |  29  |  0.65    Ca    8.5      07 Jan 2020 11:28  Mg     1.6     01-06      eGFR if : 103 mL/min/1.73M2 (01-07-20 @ 11:28)  eGFR if Non African American: 89 mL/min/1.73M2 (01-07-20 @ 11:28)      POCT Blood Glucose.: 145 mg/dL (08 Jan 2020 07:36)  POCT Blood Glucose.: 176 mg/dL (07 Jan 2020 22:00)  POCT Blood Glucose.: 205 mg/dL (07 Jan 2020 17:08)  POCT Blood Glucose.: 207 mg/dL (07 Jan 2020 11:35)    12-29 [id number] 8.4          RADIOLOGY & ADDITIONAL TESTS:    Care Discussed with Consultants/Other Providers: Patient is a 85y old  Male who presents with a chief complaint of desaturation (07 Jan 2020 17:10)  Patient seen and examined at bedside.  No events overnight.     ALLERGIES:  No Known Allergies    MEDICATIONS  (STANDING):  dextrose 5%. 1000 milliLiter(s) (50 mL/Hr) IV Continuous <Continuous>  dextrose 50% Injectable 12.5 Gram(s) IV Push once  dextrose 50% Injectable 25 Gram(s) IV Push once  dextrose 50% Injectable 25 Gram(s) IV Push once  enoxaparin Injectable 40 milliGRAM(s) SubCutaneous daily  insulin glargine Injectable (LANTUS) 8 Unit(s) SubCutaneous at bedtime  insulin lispro (HumaLOG) corrective regimen sliding scale   SubCutaneous four times a day before meals  lacosamide 200 milliGRAM(s) Oral two times a day  levETIRAcetam  Solution 1500 milliGRAM(s) Enteral Tube two times a day  polyethylene glycol 3350 17 Gram(s) Oral daily  potassium chloride   Powder 40 milliEquivalent(s) Enteral Tube daily  sertraline 75 milliGRAM(s) Oral daily  tamsulosin 0.4 milliGRAM(s) Oral at bedtime    MEDICATIONS  (PRN):  acetaminophen    Suspension .. 650 milliGRAM(s) Enteral Tube every 6 hours PRN Temp greater or equal to 38C (100.4F), Mild Pain (1 - 3)  albuterol/ipratropium for Nebulization 3 milliLiter(s) Nebulizer every 6 hours PRN Bronchospasm  bisacodyl 5 milliGRAM(s) Oral every 12 hours PRN Constipation  dextrose 40% Gel 15 Gram(s) Oral once PRN Blood Glucose LESS THAN 70 milliGRAM(s)/deciliter  glucagon  Injectable 1 milliGRAM(s) IntraMuscular once PRN Glucose LESS THAN 70 milligrams/deciliter  guaiFENesin   Syrup  (Sugar-Free) 200 milliGRAM(s) Oral every 6 hours PRN congestion    Vital Signs Last 24 Hrs  T(F): 97.5 (08 Jan 2020 10:25), Max: 99 (07 Jan 2020 21:46)  HR: 90 (08 Jan 2020 10:25) (90 - 101)  BP: 106/60 (08 Jan 2020 10:25) (100/48 - 110/59)  RR: 16 (08 Jan 2020 10:25) (16 - 16)  SpO2: 95% (08 Jan 2020 10:25) (95% - 98%)  I&O's Summary    07 Jan 2020 07:01  -  08 Jan 2020 07:00  --------------------------------------------------------  IN: 1125 mL / OUT: 1350 mL / NET: -225 mL      PHYSICAL EXAM:  General: NAD, lethargic.  Neck: Supple, No JVD  Lungs: good air entry, +coarse breath sounds b/l, breathing not labored  Cardio: RRR, S1/S2, No murmurs  Abdomen: Soft, +peg in place, Nontender, Nondistended; Bowel sounds present  G/U:  +bradley  Extremities: No calf tenderness, No pitting edema  Neuro:  lethargic; does not follow commands    LABS:    01-07    137  |  99  |  22  ----------------------------<  206  4.2   |  29  |  0.65    Ca    8.5      07 Jan 2020 11:28  Mg     1.6     01-06      eGFR if : 103 mL/min/1.73M2 (01-07-20 @ 11:28)  eGFR if Non African American: 89 mL/min/1.73M2 (01-07-20 @ 11:28)      POCT Blood Glucose.: 145 mg/dL (08 Jan 2020 07:36)  POCT Blood Glucose.: 176 mg/dL (07 Jan 2020 22:00)  POCT Blood Glucose.: 205 mg/dL (07 Jan 2020 17:08)  POCT Blood Glucose.: 207 mg/dL (07 Jan 2020 11:35)    12-29 [id number] 8.4          RADIOLOGY & ADDITIONAL TESTS:    Care Discussed with Consultants/Other Providers:

## 2020-01-08 NOTE — CHART NOTE - NSCHARTNOTEFT_GEN_A_CORE
Nutrition Follow Up Note  Hospital Course (Per Electronic Medical Record):   Source: Medical Record [X] Patient [X] Family [X] Nursing Staff [X]     Diet: Two meir 275cc bolus 4 x day     Patient noted tolerating bolus feeds as per RN , no Bm noted since (1/4) will make RN aware    Current Weight: (1/7) 174.3/79.2kg, patient noted with stable weight since admission    Pertinent Medications: MEDICATIONS  (STANDING):  dextrose 5%. 1000 milliLiter(s) (50 mL/Hr) IV Continuous <Continuous>  dextrose 50% Injectable 12.5 Gram(s) IV Push once  dextrose 50% Injectable 25 Gram(s) IV Push once  dextrose 50% Injectable 25 Gram(s) IV Push once  enoxaparin Injectable 40 milliGRAM(s) SubCutaneous daily  insulin glargine Injectable (LANTUS) 8 Unit(s) SubCutaneous at bedtime  insulin lispro (HumaLOG) corrective regimen sliding scale   SubCutaneous four times a day before meals  lacosamide 200 milliGRAM(s) Oral two times a day  levETIRAcetam  Solution 1500 milliGRAM(s) Enteral Tube two times a day  polyethylene glycol 3350 17 Gram(s) Oral daily  potassium chloride   Powder 40 milliEquivalent(s) Enteral Tube daily  sertraline 75 milliGRAM(s) Oral daily  tamsulosin 0.4 milliGRAM(s) Oral at bedtime    MEDICATIONS  (PRN):  acetaminophen    Suspension .. 650 milliGRAM(s) Enteral Tube every 6 hours PRN Temp greater or equal to 38C (100.4F), Mild Pain (1 - 3)  albuterol/ipratropium for Nebulization 3 milliLiter(s) Nebulizer every 6 hours PRN Bronchospasm  bisacodyl 5 milliGRAM(s) Oral every 12 hours PRN Constipation  dextrose 40% Gel 15 Gram(s) Oral once PRN Blood Glucose LESS THAN 70 milliGRAM(s)/deciliter  glucagon  Injectable 1 milliGRAM(s) IntraMuscular once PRN Glucose LESS THAN 70 milligrams/deciliter  guaiFENesin   Syrup  (Sugar-Free) 200 milliGRAM(s) Oral every 6 hours PRN congestion      Pertinent Labs:  01-07 Na137 mmol/L Glu 206 mg/dL<H> K+ 4.2 mmol/L Cr  0.65 mg/dL BUN 22 mg/dL 01-05 Phos 2.6 mg/dL 12-29 [id number] 8.4 %<H>        Skin: intact    Edema:  noted (1/6) (+1) (L)arm (+2) (L) hand    Last BM: on (1/4)    Estimated Needs:   [X] No Change since Previous Assessment      Previous Nutrition Diagnosis: Moderate malnutrition    Nutrition Diagnosis is [X] addressed , receiving EN bolus feeds     New Nutrition Diagnosis: [X] Not Applicable      Interventions:   1. Recommend continue current bolus feeds    Monitoring & Evaluation: will monitor:  [X] Weights   [X] Tolerance to bolus feeds  [X] Follow Up (Per Protocol)  [X] Patient noted for discharge with Hospice Services       RD to follow as per Nutrition protocol  Zuleyka Stroud RDN

## 2020-01-08 NOTE — PROGRESS NOTE ADULT - SUBJECTIVE AND OBJECTIVE BOX
Follow-up Pall Progress Note    Leonidas Frausto MD  [phone]    No new respiratory events overnight.  Denies SOB/CP. minimally responsive    Medications:  Vital Signs Last 24 Hrs  T(C): 36.4 (08 Jan 2020 10:25), Max: 37.2 (07 Jan 2020 21:46)  T(F): 97.5 (08 Jan 2020 10:25), Max: 99 (07 Jan 2020 21:46)  HR: 90 (08 Jan 2020 10:25) (90 - 101)  BP: 106/60 (08 Jan 2020 10:25) (100/48 - 110/59)  BP(mean): --  RR: 16 (08 Jan 2020 10:25) (16 - 16)  SpO2: 95% (08 Jan 2020 10:25) (95% - 98%)          01-07 @ 07:01  -  01-08 @ 07:00  --------------------------------------------------------  IN: 1125 mL / OUT: 1350 mL / NET: -225 mL        LABS:    01-07    137  |  99  |  22  ----------------------------<  206<H>  4.2   |  29  |  0.65    Ca    8.5      07 Jan 2020 11:28                CULTURES:        Physical Examination:  PULM: Clear to auscultation bilaterally, no significant sputum production  CVS: Regular rate and rhythm, no murmurs, rubs, or gallops  ABD: Soft, non-tender

## 2020-01-08 NOTE — PROGRESS NOTE ADULT - ASSESSMENT
85M hx of HTN, remote prostate ca (treated s/p seeds/RT), encephalitis with dysphagia and PEG status, seizure d/o presenting with sepsis.     Dispo: Being evaluated for hospice. Pt will be discharged to subacute rehab for comfort care. Pt's family in the process of choosing a facility. Palliative and hospice teams following.     Acute hypoxic respiratory failure: 2/2 Coronavirus and superimposed multifocal PNA  - s/p five day course of Vanco and meropenem. Abx discontinued on 1/3/20  - continue O2 support  - peg feeds continued , aspiration precautions    DM type 2 with hyperglycemia: HbA1C 8.4%  - continue ISS and lantus 8unit qHS  - monitor fingersticks   - continue tube feeds    Hypokalemia  - continue daily KCL supplementation    Urinary retention   - continue bradley    Moderate protein calorie malnutrition/ hypoalbuminemia  - continue bolus tube feeds  - dietician following     HTN; BP's on lower end, hold home meds  -  History of encephalitis  - Keppra and Vimpat    DVT prophylaxis  - lovenox 85M hx of HTN, remote prostate ca (treated s/p seeds/RT), encephalitis with dysphagia and PEG status, seizure d/o presenting with sepsis.     Dispo: Being evaluated for hospice. Pt will be discharged to subacute rehab for comfort care. Pt's family in the process of choosing a facility. Palliative and hospice teams following.     Acute hypoxic respiratory failure: 2/2 Coronavirus and superimposed multifocal PNA  - s/p five day course of Vanco and meropenem. Abx discontinued on 1/3/20  - continue O2 support  - peg feeds continued , aspiration precautions    DM type 2 with hyperglycemia: HbA1C 8.4%  - continue ISS and lantus 10unit qHS  - monitor fingersticks   - continue tube feeds    Hypokalemia  - continue daily KCL supplementation    Urinary retention   - continue bradley    Moderate protein calorie malnutrition/ hypoalbuminemia  - continue bolus tube feeds  - dietician following     HTN; BP's on lower end, hold home meds  - monitor BP    History of encephalitis  - Keppra and Vimpat    DVT prophylaxis  - lovenox

## 2020-01-08 NOTE — PROGRESS NOTE ADULT - ASSESSMENT
85M hx of HTN, remote prostate ca (treated s/p seeds/RT), encephalitis with dysphagia and PEG status, seizure d/o presenting with sepsis.     Dispo: Being evaluated for hospice. Pt will be discharged to subacute rehab for comfort care. Pt's family in the process of choosing a facility. hospice team following.     Acute hypoxic respiratory failure: 2/2 Coronavirus and superimposed multifocal PNA  - s/p five day course of Vanco and meropenem. Abx discontinued on 1/3/20  - continue O2 support  - peg feeds continued , aspiration precautions    DM type 2 with hyperglycemia: HbA1C 8.4%  - continue ISS and lantus 8unit qHS  - monitor fingersticks   - continue tube feeds    Hypokalemia  - continue daily KCL supplementation    Urinary retention   - continue bradley    Moderate protein calorie malnutrition/ hypoalbuminemia  - continue bolus tube feeds  - dietician following     HTN; BP's on lower end, hold home meds  -  History of encephalitis  - Keppra and Vimpat    DVT prophylaxis  - lovenox

## 2020-01-09 LAB
GLUCOSE BLDC GLUCOMTR-MCNC: 114 MG/DL — HIGH (ref 70–99)
GLUCOSE BLDC GLUCOMTR-MCNC: 148 MG/DL — HIGH (ref 70–99)
GLUCOSE BLDC GLUCOMTR-MCNC: 171 MG/DL — HIGH (ref 70–99)
GLUCOSE BLDC GLUCOMTR-MCNC: 272 MG/DL — HIGH (ref 70–99)

## 2020-01-09 PROCEDURE — 99233 SBSQ HOSP IP/OBS HIGH 50: CPT

## 2020-01-09 PROCEDURE — 99232 SBSQ HOSP IP/OBS MODERATE 35: CPT

## 2020-01-09 RX ADMIN — POLYETHYLENE GLYCOL 3350 17 GRAM(S): 17 POWDER, FOR SOLUTION ORAL at 12:22

## 2020-01-09 RX ADMIN — SERTRALINE 75 MILLIGRAM(S): 25 TABLET, FILM COATED ORAL at 12:22

## 2020-01-09 RX ADMIN — LACOSAMIDE 200 MILLIGRAM(S): 50 TABLET ORAL at 05:15

## 2020-01-09 RX ADMIN — ENOXAPARIN SODIUM 40 MILLIGRAM(S): 100 INJECTION SUBCUTANEOUS at 12:22

## 2020-01-09 RX ADMIN — LEVETIRACETAM 1500 MILLIGRAM(S): 250 TABLET, FILM COATED ORAL at 17:45

## 2020-01-09 RX ADMIN — TAMSULOSIN HYDROCHLORIDE 0.4 MILLIGRAM(S): 0.4 CAPSULE ORAL at 21:37

## 2020-01-09 RX ADMIN — LEVETIRACETAM 1500 MILLIGRAM(S): 250 TABLET, FILM COATED ORAL at 05:15

## 2020-01-09 RX ADMIN — INSULIN GLARGINE 8 UNIT(S): 100 INJECTION, SOLUTION SUBCUTANEOUS at 21:35

## 2020-01-09 RX ADMIN — Medication 40 MILLIEQUIVALENT(S): at 12:22

## 2020-01-09 RX ADMIN — Medication 3: at 21:34

## 2020-01-09 RX ADMIN — LACOSAMIDE 200 MILLIGRAM(S): 50 TABLET ORAL at 17:45

## 2020-01-09 RX ADMIN — Medication 1: at 07:32

## 2020-01-09 NOTE — PROGRESS NOTE ADULT - SUBJECTIVE AND OBJECTIVE BOX
Patient is a 85y old  Male who presents with a chief complaint of desaturation (08 Jan 2020 12:40)      Patient seen and examined at bedside. No overnight events reported. Pt stable.     ALLERGIES:  No Known Allergies    MEDICATIONS  (STANDING):  dextrose 5%. 1000 milliLiter(s) (50 mL/Hr) IV Continuous <Continuous>  dextrose 50% Injectable 12.5 Gram(s) IV Push once  dextrose 50% Injectable 25 Gram(s) IV Push once  dextrose 50% Injectable 25 Gram(s) IV Push once  enoxaparin Injectable 40 milliGRAM(s) SubCutaneous daily  insulin glargine Injectable (LANTUS) 8 Unit(s) SubCutaneous at bedtime  insulin lispro (HumaLOG) corrective regimen sliding scale   SubCutaneous four times a day before meals  lacosamide 200 milliGRAM(s) Oral two times a day  levETIRAcetam  Solution 1500 milliGRAM(s) Enteral Tube two times a day  polyethylene glycol 3350 17 Gram(s) Oral daily  potassium chloride   Powder 40 milliEquivalent(s) Enteral Tube daily  sertraline 75 milliGRAM(s) Oral daily  tamsulosin 0.4 milliGRAM(s) Oral at bedtime    MEDICATIONS  (PRN):  acetaminophen    Suspension .. 650 milliGRAM(s) Enteral Tube every 6 hours PRN Temp greater or equal to 38C (100.4F), Mild Pain (1 - 3)  albuterol/ipratropium for Nebulization 3 milliLiter(s) Nebulizer every 6 hours PRN Bronchospasm  bisacodyl 5 milliGRAM(s) Oral every 12 hours PRN Constipation  dextrose 40% Gel 15 Gram(s) Oral once PRN Blood Glucose LESS THAN 70 milliGRAM(s)/deciliter  glucagon  Injectable 1 milliGRAM(s) IntraMuscular once PRN Glucose LESS THAN 70 milligrams/deciliter  guaiFENesin   Syrup  (Sugar-Free) 200 milliGRAM(s) Oral every 6 hours PRN congestion    Vital Signs Last 24 Hrs  T(F): 97.5 (09 Jan 2020 10:29), Max: 98.2 (08 Jan 2020 21:45)  HR: 94 (09 Jan 2020 10:29) (92 - 99)  BP: 167/59 (09 Jan 2020 10:29) (102/64 - 167/59)  RR: 16 (09 Jan 2020 10:29) (16 - 17)  SpO2: 100% (09 Jan 2020 10:29) (98% - 100%)  I&O's Summary    08 Jan 2020 07:01  -  09 Jan 2020 07:00  --------------------------------------------------------  IN: 1125 mL / OUT: 1500 mL / NET: -375 mL    09 Jan 2020 07:01  -  09 Jan 2020 16:00  --------------------------------------------------------  IN: 0 mL / OUT: 300 mL / NET: -300 mL          GENERAL: NAD. opens his eyes and nodd/ shake heads to simple questions  HEAD:  Atraumatic, Normocephalic  EYES: EOMI, PERRLA, sclera clear  NECK: Supple  CHEST/LUNG: Clear to auscultation bilaterally; No wheeze  HEART: Regular rate and rhythm; No murmurs  ABDOMEN: Soft, Nontender, Nondistended; Bowel sounds present  EXTREMITIES: pitting edema  NEUROLOGY: non-focal    LABS:    01-07    137  |  99  |  22  ----------------------------<  206  4.2   |  29  |  0.65    Ca    8.5      07 Jan 2020 11:28    eGFR if African American: 103 mL/min/1.73M2 (01-07-20 @ 11:28)  eGFR if Non African American: 89 mL/min/1.73M2 (01-07-20 @ 11:28)      POCT Blood Glucose.: 114 mg/dL (09 Jan 2020 12:21)  POCT Blood Glucose.: 171 mg/dL (09 Jan 2020 07:31)  POCT Blood Glucose.: 197 mg/dL (08 Jan 2020 21:10)  POCT Blood Glucose.: 168 mg/dL (08 Jan 2020 17:08)    12-29 [id number] 8.4        RADIOLOGY & ADDITIONAL TESTS:    Care Discussed with Consultants/Other Providers:

## 2020-01-09 NOTE — PROGRESS NOTE ADULT - ASSESSMENT
85M hx of HTN, remote prostate ca (treated s/p seeds/RT), encephalitis with dysphagia and PEG status, seizure d/o presenting with sepsis.     Dispo: Being evaluated for hospice. Pt will be discharged to subacute rehab for comfort care. Pt's family would like pt to go to [place], pending financial processing.     Acute hypoxic respiratory failure: 2/2 Coronavirus and superimposed multifocal PNA  - s/p five day course of Vanco and meropenem. Abx discontinued on 1/3/20  - continue O2 support  - peg feeds continued , aspiration precautions  Palliative:  Reviewed with pt daughter  DM type 2 with hyperglycemia: HbA1C 8.4%  - continue ISS and lantus 10unit qHS  - monitor fingersticks   - continue tube feeds    Hypokalemia  - continue daily KCL supplementation    Urinary retention   - continue bradley    Moderate protein calorie malnutrition/ hypoalbuminemia  - continue bolus tube feeds  - dietician following     HTN;  - one episode of /59. usually runs in the low 100s  - monitor BP closely and resume home meds if hypertensive    History of encephalitis  - Keppra and Vimpat    DVT prophylaxis  - lovenox

## 2020-01-09 NOTE — PROGRESS NOTE ADULT - SUBJECTIVE AND OBJECTIVE BOX
Follow-up Pall Progress Note    Leonidas Frausto MD  [phone]    No new respiratory events overnight.  Denies SOB/CP. More awake today.    Medications:  Vital Signs Last 24 Hrs  T(C): 36.4 (09 Jan 2020 10:29), Max: 36.8 (08 Jan 2020 21:45)  T(F): 97.5 (09 Jan 2020 10:29), Max: 98.2 (08 Jan 2020 21:45)  HR: 94 (09 Jan 2020 10:29) (92 - 96)  BP: 167/59 (09 Jan 2020 10:29) (102/64 - 167/59)  BP(mean): --  RR: 16 (09 Jan 2020 10:29) (16 - 17)  SpO2: 100% (09 Jan 2020 10:29) (98% - 100%)          01-08 @ 07:01  -  01-09 @ 07:00  --------------------------------------------------------  IN: 1125 mL / OUT: 1500 mL / NET: -375 mL        LABS:                    CULTURES:        Physical Examination:  PULM: Clear to auscultation bilaterally, no significant sputum production  CVS: Regular rate and rhythm, no murmurs, rubs, or gallops  ABD: Soft, non-tender  EXT:  No clubbing, cyanosis, or edema    :

## 2020-01-10 LAB
ANION GAP SERPL CALC-SCNC: 6 MMOL/L — SIGNIFICANT CHANGE UP (ref 5–17)
BUN SERPL-MCNC: 21 MG/DL — SIGNIFICANT CHANGE UP (ref 7–23)
CALCIUM SERPL-MCNC: 8.5 MG/DL — SIGNIFICANT CHANGE UP (ref 8.4–10.5)
CHLORIDE SERPL-SCNC: 101 MMOL/L — SIGNIFICANT CHANGE UP (ref 96–108)
CO2 SERPL-SCNC: 34 MMOL/L — HIGH (ref 22–31)
CREAT SERPL-MCNC: 0.71 MG/DL — SIGNIFICANT CHANGE UP (ref 0.5–1.3)
GLUCOSE BLDC GLUCOMTR-MCNC: 119 MG/DL — HIGH (ref 70–99)
GLUCOSE BLDC GLUCOMTR-MCNC: 140 MG/DL — HIGH (ref 70–99)
GLUCOSE BLDC GLUCOMTR-MCNC: 219 MG/DL — HIGH (ref 70–99)
GLUCOSE BLDC GLUCOMTR-MCNC: 234 MG/DL — HIGH (ref 70–99)
GLUCOSE SERPL-MCNC: 192 MG/DL — HIGH (ref 70–99)
POTASSIUM SERPL-MCNC: 3.3 MMOL/L — LOW (ref 3.5–5.3)
POTASSIUM SERPL-SCNC: 3.3 MMOL/L — LOW (ref 3.5–5.3)
SODIUM SERPL-SCNC: 141 MMOL/L — SIGNIFICANT CHANGE UP (ref 135–145)

## 2020-01-10 PROCEDURE — 99233 SBSQ HOSP IP/OBS HIGH 50: CPT

## 2020-01-10 RX ORDER — BACITRACIN ZINC 500 UNIT/G
1 OINTMENT IN PACKET (EA) TOPICAL DAILY
Refills: 0 | Status: DISCONTINUED | OUTPATIENT
Start: 2020-01-10 | End: 2020-01-14

## 2020-01-10 RX ORDER — DOXAZOSIN MESYLATE 4 MG
2 TABLET ORAL AT BEDTIME
Refills: 0 | Status: DISCONTINUED | OUTPATIENT
Start: 2020-01-10 | End: 2020-01-14

## 2020-01-10 RX ORDER — ROBINUL 0.2 MG/ML
0.2 INJECTION INTRAMUSCULAR; INTRAVENOUS EVERY 8 HOURS
Refills: 0 | Status: DISCONTINUED | OUTPATIENT
Start: 2020-01-10 | End: 2020-01-14

## 2020-01-10 RX ADMIN — ENOXAPARIN SODIUM 40 MILLIGRAM(S): 100 INJECTION SUBCUTANEOUS at 12:12

## 2020-01-10 RX ADMIN — ROBINUL 0.2 MILLIGRAM(S): 0.2 INJECTION INTRAMUSCULAR; INTRAVENOUS at 14:51

## 2020-01-10 RX ADMIN — SERTRALINE 75 MILLIGRAM(S): 25 TABLET, FILM COATED ORAL at 12:12

## 2020-01-10 RX ADMIN — Medication 2: at 12:13

## 2020-01-10 RX ADMIN — POLYETHYLENE GLYCOL 3350 17 GRAM(S): 17 POWDER, FOR SOLUTION ORAL at 12:12

## 2020-01-10 RX ADMIN — Medication 40 MILLIEQUIVALENT(S): at 12:13

## 2020-01-10 RX ADMIN — Medication 2 MILLIGRAM(S): at 21:10

## 2020-01-10 RX ADMIN — LACOSAMIDE 200 MILLIGRAM(S): 50 TABLET ORAL at 18:16

## 2020-01-10 RX ADMIN — LACOSAMIDE 200 MILLIGRAM(S): 50 TABLET ORAL at 05:04

## 2020-01-10 RX ADMIN — LEVETIRACETAM 1500 MILLIGRAM(S): 250 TABLET, FILM COATED ORAL at 05:04

## 2020-01-10 RX ADMIN — INSULIN GLARGINE 8 UNIT(S): 100 INJECTION, SOLUTION SUBCUTANEOUS at 21:11

## 2020-01-10 RX ADMIN — LEVETIRACETAM 1500 MILLIGRAM(S): 250 TABLET, FILM COATED ORAL at 18:13

## 2020-01-10 RX ADMIN — Medication 2: at 21:11

## 2020-01-10 NOTE — PROGRESS NOTE ADULT - SUBJECTIVE AND OBJECTIVE BOX
Follow-up Pall Progress Note    Leonidas Frausto MD  [phone]    No new respiratory events overnight.  Denies SOB/CP.     Medications:  Vital Signs Last 24 Hrs  T(C): 37.1 (10 Anthony 2020 04:52), Max: 37.1 (10 Anthony 2020 04:52)  T(F): 98.7 (10 Anthony 2020 04:52), Max: 98.7 (10 Anthony 2020 04:52)  HR: 89 (10 Anthony 2020 04:52) (88 - 95)  BP: 117/57 (10 Anthony 2020 04:52) (99/57 - 117/57)  BP(mean): --  RR: 15 (10 Anthony 2020 04:52) (15 - 16)  SpO2: 98% (10 Anthony 2020 04:52) (97% - 99%)          01-09 @ 07:01  -  01-10 @ 07:00  --------------------------------------------------------  IN: 0 mL / OUT: 1200 mL / NET: -1200 mL        LABS:    01-10    141  |  101  |  21  ----------------------------<  192<H>  3.3<L>   |  34<H>  |  0.71    Ca    8.5      10 Anthony 2020 10:25                CULTURES:        Physical Examination:  PULM: Clear to auscultation bilaterally, no significant sputum production  CVS: Regular rate and rhythm, no murmurs, rubs, or gallops  ABD: Soft, non-tender      :

## 2020-01-10 NOTE — PROGRESS NOTE ADULT - ASSESSMENT
85M hx of HTN, remote prostate ca (treated s/p seeds/RT), encephalitis with dysphagia and PEG status, seizure d/o presenting with sepsis.     Dispo: Being evaluated for hospice. Pt will be discharged to subacute rehab for comfort care. Pt's family would like pt to go to [place], pending financial processing. Reviewed with hospice today.  Medicaid application in place.     Acute hypoxic respiratory failure: 2/2 Coronavirus and superimposed multifocal PNA  - s/p five day course of Vanco and meropenem. Abx discontinued on 1/3/20  - continue O2 support  - peg feeds continued , aspiration precautions    DM type 2 with hyperglycemia: HbA1C 8.4%  - continue ISS and lantus 8unit qHS  - monitor fingersticks   - continue tube feeds    Hypokalemia  - continue daily KCL supplementation  - Will check BMP     Urinary retention   - continue bradley    Moderate protein calorie malnutrition/ hypoalbuminemia  - continue bolus tube feeds  - dietician following     HTN;  - one episode of /59 yesterday (1/9). usually runs in the low 100s  - monitor BP closely and resume home meds if hypertensive  - Today BP is 117/57    History of encephalitis  - Keppra and Vimpat    DVT prophylaxis  - lovenox

## 2020-01-10 NOTE — CHART NOTE - NSCHARTNOTEFT_GEN_A_CORE
Patient has wound behind left ear, likely pressure injury 2/2 nasal cannula which has been worn continuously for most of this admission. Patient monitored off O2 for several hours, O2 sat >92% throughout, will DC nasal cannula for now and continue to monitor O2 sat. Bacitracin applied to wound

## 2020-01-10 NOTE — PROGRESS NOTE ADULT - ASSESSMENT
85M hx of HTN, remote prostate ca (treated s/p seeds/RT), encephalitis with dysphagia and PEG status, seizure d/o presenting with sepsis.     Dispo: Being evaluated for hospice. Pt will be discharged to subacute rehab for comfort care. Pt's family would like pt to go to [place], pending financial processing. Palliative and hospice teams following.     Acute hypoxic respiratory failure: 2/2 Coronavirus and superimposed multifocal PNA  - s/p five day course of Vanco and meropenem. Abx discontinued on 1/3/20  - continue O2 support  - peg feeds continued , aspiration precautions    DM type 2 with hyperglycemia: HbA1C 8.4%  - continue ISS and lantus 8unit qHS  - monitor fingersticks   - continue tube feeds    Hypokalemia  - continue daily KCL supplementation  - Will check BMP     Urinary retention   - continue bradley    Moderate protein calorie malnutrition/ hypoalbuminemia  - continue bolus tube feeds  - dietician following     HTN;  - one episode of /59 yesterday (1/9). usually runs in the low 100s  - monitor BP closely and resume home meds if hypertensive  - Today BP is 117/57    History of encephalitis  - Keppra and Vimpat    DVT prophylaxis  - lovenox

## 2020-01-10 NOTE — PROGRESS NOTE ADULT - SUBJECTIVE AND OBJECTIVE BOX
Patient is a 85y old  Male who presents with a chief complaint of desaturation (09 Jan 2020 17:00)    Patient seen and examined at bedside. Patient is alert, appears comfortable. No overnight events reported.    ALLERGIES:  No Known Allergies    MEDICATIONS  (STANDING):  dextrose 5%. 1000 milliLiter(s) (50 mL/Hr) IV Continuous <Continuous>  dextrose 50% Injectable 12.5 Gram(s) IV Push once  dextrose 50% Injectable 25 Gram(s) IV Push once  dextrose 50% Injectable 25 Gram(s) IV Push once  enoxaparin Injectable 40 milliGRAM(s) SubCutaneous daily  insulin glargine Injectable (LANTUS) 8 Unit(s) SubCutaneous at bedtime  insulin lispro (HumaLOG) corrective regimen sliding scale   SubCutaneous four times a day before meals  lacosamide 200 milliGRAM(s) Oral two times a day  levETIRAcetam  Solution 1500 milliGRAM(s) Enteral Tube two times a day  polyethylene glycol 3350 17 Gram(s) Oral daily  potassium chloride   Powder 40 milliEquivalent(s) Enteral Tube daily  sertraline 75 milliGRAM(s) Oral daily  tamsulosin 0.4 milliGRAM(s) Oral at bedtime    MEDICATIONS  (PRN):  acetaminophen    Suspension .. 650 milliGRAM(s) Enteral Tube every 6 hours PRN Temp greater or equal to 38C (100.4F), Mild Pain (1 - 3)  albuterol/ipratropium for Nebulization 3 milliLiter(s) Nebulizer every 6 hours PRN Bronchospasm  bisacodyl 5 milliGRAM(s) Oral every 12 hours PRN Constipation  dextrose 40% Gel 15 Gram(s) Oral once PRN Blood Glucose LESS THAN 70 milliGRAM(s)/deciliter  glucagon  Injectable 1 milliGRAM(s) IntraMuscular once PRN Glucose LESS THAN 70 milligrams/deciliter  guaiFENesin   Syrup  (Sugar-Free) 200 milliGRAM(s) Oral every 6 hours PRN congestion    Vital Signs Last 24 Hrs  T(F): 98.7 (10 Anthony 2020 04:52), Max: 98.7 (10 Anthony 2020 04:52)  HR: 89 (10 Anthony 2020 04:52) (88 - 95)  BP: 117/57 (10 Anthony 2020 04:52) (99/57 - 167/59)  RR: 15 (10 Anthony 2020 04:52) (15 - 16)  SpO2: 98% (10 Anthony 2020 04:52) (97% - 100%)    I&O's Summary  09 Jan 2020 07:01  -  10 Anthony 2020 07:00  --------------------------------------------------------  IN: 0 mL / OUT: 1200 mL / NET: -1200 mL    PHYSICAL EXAM:  GENERAL: NAD, alert  HEAD:  Atraumatic, Normocephalic  EYES: EOMI, pale conjunctiva   ENMT: Moist mucous membranes  CHEST/LUNG: Clear to auscultation bilaterally, good air entry, non-labored breathing  HEART: +S1/S2  ABDOMEN: Soft, Nontender, Nondistended; +PEG  VASCULAR: Normal pulses, Normal capillary refill. LUE edema. No lower extremity edema.   EXTREMITIES: No calf tenderness, No cyanosis  SKIN: Warm, Intact  NERVOUS SYSTEM:  Alert, left arm weakness, no new focal deficits     LABS:    01-07    137  |  99  |  22  ----------------------------<  206  4.2   |  29  |  0.65    Ca    8.5      07 Jan 2020 11:28      eGFR if : 103 mL/min/1.73M2 (01-07-20 @ 11:28)  eGFR if Non African American: 89 mL/min/1.73M2 (01-07-20 @ 11:28)    Glucose  POCT Blood Glucose.: 119 mg/dL (10 Anthony 2020 08:16)  POCT Blood Glucose.: 272 mg/dL (09 Jan 2020 21:14)  POCT Blood Glucose.: 148 mg/dL (09 Jan 2020 17:37)  POCT Blood Glucose.: 114 mg/dL (09 Jan 2020 12:21)    12-29 [id number] 8.4      RADIOLOGY & ADDITIONAL TESTS:    Care Discussed with Consultants/Other Providers:

## 2020-01-11 LAB
GLUCOSE BLDC GLUCOMTR-MCNC: 128 MG/DL — HIGH (ref 70–99)
GLUCOSE BLDC GLUCOMTR-MCNC: 146 MG/DL — HIGH (ref 70–99)
GLUCOSE BLDC GLUCOMTR-MCNC: 208 MG/DL — HIGH (ref 70–99)
GLUCOSE BLDC GLUCOMTR-MCNC: 228 MG/DL — HIGH (ref 70–99)

## 2020-01-11 PROCEDURE — 99232 SBSQ HOSP IP/OBS MODERATE 35: CPT

## 2020-01-11 RX ADMIN — POLYETHYLENE GLYCOL 3350 17 GRAM(S): 17 POWDER, FOR SOLUTION ORAL at 12:04

## 2020-01-11 RX ADMIN — ROBINUL 0.2 MILLIGRAM(S): 0.2 INJECTION INTRAMUSCULAR; INTRAVENOUS at 09:05

## 2020-01-11 RX ADMIN — INSULIN GLARGINE 8 UNIT(S): 100 INJECTION, SOLUTION SUBCUTANEOUS at 21:06

## 2020-01-11 RX ADMIN — Medication 2: at 12:04

## 2020-01-11 RX ADMIN — LACOSAMIDE 200 MILLIGRAM(S): 50 TABLET ORAL at 05:44

## 2020-01-11 RX ADMIN — ENOXAPARIN SODIUM 40 MILLIGRAM(S): 100 INJECTION SUBCUTANEOUS at 12:04

## 2020-01-11 RX ADMIN — LACOSAMIDE 200 MILLIGRAM(S): 50 TABLET ORAL at 17:28

## 2020-01-11 RX ADMIN — Medication 2: at 21:06

## 2020-01-11 RX ADMIN — LEVETIRACETAM 1500 MILLIGRAM(S): 250 TABLET, FILM COATED ORAL at 17:29

## 2020-01-11 RX ADMIN — Medication 40 MILLIEQUIVALENT(S): at 12:04

## 2020-01-11 RX ADMIN — Medication 2 MILLIGRAM(S): at 21:06

## 2020-01-11 RX ADMIN — LEVETIRACETAM 1500 MILLIGRAM(S): 250 TABLET, FILM COATED ORAL at 05:43

## 2020-01-11 RX ADMIN — SERTRALINE 75 MILLIGRAM(S): 25 TABLET, FILM COATED ORAL at 12:04

## 2020-01-11 RX ADMIN — Medication 1 APPLICATION(S): at 17:29

## 2020-01-11 NOTE — PROGRESS NOTE ADULT - SUBJECTIVE AND OBJECTIVE BOX
Follow-up Pall Progress Note    Leonidas Frausto MD  [phone]    No new respiratory events overnight.  Denies SOB/CP.     Medications:  Vital Signs Last 24 Hrs  T(C): 37 (11 Jan 2020 05:24), Max: 37.2 (10 Anthony 2020 16:31)  T(F): 98.6 (11 Jan 2020 05:24), Max: 98.9 (10 Anthony 2020 16:31)  HR: 113 (11 Jan 2020 10:06) (103 - 113)  BP: 104/81 (11 Jan 2020 05:24) (101/68 - 113/57)  BP(mean): --  RR: 16 (11 Jan 2020 05:24) (15 - 16)  SpO2: 98% (11 Jan 2020 10:06) (94% - 98%)          01-10 @ 07:01  -  01-11 @ 07:00  --------------------------------------------------------  IN: 825 mL / OUT: 1300 mL / NET: -475 mL        LABS:    01-10    141  |  101  |  21  ----------------------------<  192<H>  3.3<L>   |  34<H>  |  0.71    Ca    8.5      10 Anthony 2020 10:25                CULTURES:        Physical Examination:  PULM: Clear to auscultation bilaterally, no significant sputum production  CVS: Regular rate and rhythm, no murmurs, rubs, or gallops  ABD: Soft, non-tender  Neuro:  Minimally resposive    RADIOLOGY REVIEWED  CXR:    CT chest:    TTE:

## 2020-01-11 NOTE — PROGRESS NOTE ADULT - ASSESSMENT
85M hx of HTN, remote prostate ca (treated s/p seeds/RT), encephalitis with dysphagia and PEG status, seizure d/o presenting with sepsis.     Dispo: Being evaluated for hospice. Pt will be discharged to subacute rehab for comfort care. Pt's family would like pt to go to [place], pending financial processing. Palliative and hospice teams following.     Acute hypoxic respiratory failure: 2/2 Coronavirus and superimposed multifocal PNA  - s/p five day course of Vanco and meropenem. Abx discontinued on 1/3/20  - continue O2 support  - peg feeds continued , aspiration precautions    DM type 2 with hyperglycemia: HbA1C 8.4%  - continue ISS and lantus 8unit qHS  - monitor fingersticks   - continue tube feeds    Urinary retention   - continue bradley    Moderate protein calorie malnutrition/ hypoalbuminemia  - continue bolus tube feeds  - dietician following     HTN hx:  - monitor blood pressure- normotensive   - hold meds     History of encephalitis  - Keppra and Vimpat    DVT prophylaxis  - lovenox

## 2020-01-11 NOTE — PROGRESS NOTE ADULT - SUBJECTIVE AND OBJECTIVE BOX
Patient is a 85y old  Male who presents with a chief complaint of desaturation (10 Anthony 2020 15:02)    Patient seen and examined at bedside.     ALLERGIES:  No Known Allergies    MEDICATIONS  (STANDING):  BACItracin   Ointment 1 Application(s) Topical daily  dextrose 5%. 1000 milliLiter(s) (50 mL/Hr) IV Continuous <Continuous>  dextrose 50% Injectable 12.5 Gram(s) IV Push once  dextrose 50% Injectable 25 Gram(s) IV Push once  dextrose 50% Injectable 25 Gram(s) IV Push once  doxazosin 2 milliGRAM(s) Oral at bedtime  enoxaparin Injectable 40 milliGRAM(s) SubCutaneous daily  insulin glargine Injectable (LANTUS) 8 Unit(s) SubCutaneous at bedtime  insulin lispro (HumaLOG) corrective regimen sliding scale   SubCutaneous four times a day before meals  lacosamide 200 milliGRAM(s) Oral two times a day  levETIRAcetam  Solution 1500 milliGRAM(s) Enteral Tube two times a day  polyethylene glycol 3350 17 Gram(s) Oral daily  potassium chloride   Powder 40 milliEquivalent(s) Enteral Tube daily  sertraline 75 milliGRAM(s) Oral daily    MEDICATIONS  (PRN):  acetaminophen    Suspension .. 650 milliGRAM(s) Enteral Tube every 6 hours PRN Temp greater or equal to 38C (100.4F), Mild Pain (1 - 3)  albuterol/ipratropium for Nebulization 3 milliLiter(s) Nebulizer every 6 hours PRN Bronchospasm  bisacodyl 5 milliGRAM(s) Oral every 12 hours PRN Constipation  dextrose 40% Gel 15 Gram(s) Oral once PRN Blood Glucose LESS THAN 70 milliGRAM(s)/deciliter  glucagon  Injectable 1 milliGRAM(s) IntraMuscular once PRN Glucose LESS THAN 70 milligrams/deciliter  glycopyrrolate Injectable 0.2 milliGRAM(s) IV Push every 8 hours PRN secretions  guaiFENesin   Syrup  (Sugar-Free) 200 milliGRAM(s) Oral every 6 hours PRN congestion    Vital Signs Last 24 Hrs  T(F): 98.6 (11 Jan 2020 05:24), Max: 98.9 (10 Anthony 2020 16:31)  HR: 107 (11 Jan 2020 05:24) (103 - 109)  BP: 104/81 (11 Jan 2020 05:24) (101/68 - 113/57)  RR: 16 (11 Jan 2020 05:24) (15 - 16)  SpO2: 95% (11 Jan 2020 05:24) (94% - 98%)  I&O's Summary    10 Anthony 2020 07:01  -  11 Jan 2020 07:00  --------------------------------------------------------  IN: 825 mL / OUT: 1300 mL / NET: -475 mL      PHYSICAL EXAM:  General: NAD, Alert  ENT: MMM  Neck: Supple, No JVD  Lungs: Clear to auscultation bilaterally, good air entry, non-labored breathing  Cardio: +S1/S2  Abdomen: Soft, Nontender, Nondistended; Bowel sounds present; +peg  Extremities: No calf tenderness    LABS:    01-10    141  |  101  |  21  ----------------------------<  192  3.3   |  34  |  0.71    Ca    8.5      10 Anthony 2020 10:25          eGFR if Non African American: 86 mL/min/1.73M2 (01-10-20 @ 10:25)  eGFR if : 99 mL/min/1.73M2 (01-10-20 @ 10:25)    Glucose  POCT Blood Glucose.: 234 mg/dL (10 Anthony 2020 20:17)  POCT Blood Glucose.: 140 mg/dL (10 Anthony 2020 16:49)  POCT Blood Glucose.: 219 mg/dL (10 Anthony 2020 11:51)  POCT Blood Glucose.: 119 mg/dL (10 Anthony 2020 08:16)    12-29 [id number] 8.4    RADIOLOGY & ADDITIONAL TESTS:  No new tests    Care Discussed with Consultants/Other Providers:

## 2020-01-11 NOTE — PROGRESS NOTE ADULT - ASSESSMENT
85M hx of HTN, remote prostate ca (treated s/p seeds/RT), encephalitis with dysphagia and PEG status, seizure d/o presenting with sepsis.     Dispo: Being evaluated for hospice. Pt will be discharged to subacute rehab for comfort care. Pt's family would like pt to go to [place], pending financial processing. Palliative and hospice teams following.     Acute hypoxic respiratory failure: 2/2 Coronavirus and superimposed multifocal PNA  - s/p five day course of Vanco and meropenem. Abx discontinued on 1/3/20  - continue O2 support  - peg feeds continued , aspiration precautions    DM type 2 with hyperglycemia: HbA1C 8.4%  - continue ISS and lantus 8unit qHS  - monitor fingersticks   - continue tube feeds    Urinary retention   - continue bradley    Moderate protein calorie malnutrition/ hypoalbuminemia  - continue bolus tube feeds  - dietician following     HTN hx:  - monitor blood pressure- normotensive   - hold meds     History of encephalitis  - Keppra and Vimpat    DVT prophylaxis  - lovenox

## 2020-01-12 LAB
ANION GAP SERPL CALC-SCNC: 12 MMOL/L — SIGNIFICANT CHANGE UP (ref 5–17)
ANION GAP SERPL CALC-SCNC: 8 MMOL/L — SIGNIFICANT CHANGE UP (ref 5–17)
BUN SERPL-MCNC: 20 MG/DL — SIGNIFICANT CHANGE UP (ref 7–23)
BUN SERPL-MCNC: 22 MG/DL — SIGNIFICANT CHANGE UP (ref 7–23)
CALCIUM SERPL-MCNC: 8.5 MG/DL — SIGNIFICANT CHANGE UP (ref 8.4–10.5)
CALCIUM SERPL-MCNC: 8.8 MG/DL — SIGNIFICANT CHANGE UP (ref 8.4–10.5)
CHLORIDE SERPL-SCNC: 96 MMOL/L — SIGNIFICANT CHANGE UP (ref 96–108)
CHLORIDE SERPL-SCNC: 96 MMOL/L — SIGNIFICANT CHANGE UP (ref 96–108)
CO2 SERPL-SCNC: 27 MMOL/L — SIGNIFICANT CHANGE UP (ref 22–31)
CO2 SERPL-SCNC: 31 MMOL/L — SIGNIFICANT CHANGE UP (ref 22–31)
CREAT SERPL-MCNC: 0.74 MG/DL — SIGNIFICANT CHANGE UP (ref 0.5–1.3)
CREAT SERPL-MCNC: 0.82 MG/DL — SIGNIFICANT CHANGE UP (ref 0.5–1.3)
GLUCOSE BLDC GLUCOMTR-MCNC: 163 MG/DL — HIGH (ref 70–99)
GLUCOSE BLDC GLUCOMTR-MCNC: 173 MG/DL — HIGH (ref 70–99)
GLUCOSE BLDC GLUCOMTR-MCNC: 211 MG/DL — HIGH (ref 70–99)
GLUCOSE BLDC GLUCOMTR-MCNC: 218 MG/DL — HIGH (ref 70–99)
GLUCOSE SERPL-MCNC: 182 MG/DL — HIGH (ref 70–99)
GLUCOSE SERPL-MCNC: 228 MG/DL — HIGH (ref 70–99)
HCT VFR BLD CALC: 38.6 % — LOW (ref 39–50)
HGB BLD-MCNC: 12.5 G/DL — LOW (ref 13–17)
MAGNESIUM SERPL-MCNC: 1 MG/DL — CRITICAL LOW (ref 1.6–2.6)
MCHC RBC-ENTMCNC: 29.3 PG — SIGNIFICANT CHANGE UP (ref 27–34)
MCHC RBC-ENTMCNC: 32.4 GM/DL — SIGNIFICANT CHANGE UP (ref 32–36)
MCV RBC AUTO: 90.6 FL — SIGNIFICANT CHANGE UP (ref 80–100)
NRBC # BLD: 0 /100 WBCS — SIGNIFICANT CHANGE UP (ref 0–0)
PLATELET # BLD AUTO: 402 K/UL — HIGH (ref 150–400)
POTASSIUM SERPL-MCNC: 2.9 MMOL/L — CRITICAL LOW (ref 3.5–5.3)
POTASSIUM SERPL-MCNC: 3.7 MMOL/L — SIGNIFICANT CHANGE UP (ref 3.5–5.3)
POTASSIUM SERPL-SCNC: 2.9 MMOL/L — CRITICAL LOW (ref 3.5–5.3)
POTASSIUM SERPL-SCNC: 3.7 MMOL/L — SIGNIFICANT CHANGE UP (ref 3.5–5.3)
RBC # BLD: 4.26 M/UL — SIGNIFICANT CHANGE UP (ref 4.2–5.8)
RBC # FLD: 15.2 % — HIGH (ref 10.3–14.5)
SODIUM SERPL-SCNC: 135 MMOL/L — SIGNIFICANT CHANGE UP (ref 135–145)
SODIUM SERPL-SCNC: 135 MMOL/L — SIGNIFICANT CHANGE UP (ref 135–145)
WBC # BLD: 13.45 K/UL — HIGH (ref 3.8–10.5)
WBC # FLD AUTO: 13.45 K/UL — HIGH (ref 3.8–10.5)

## 2020-01-12 PROCEDURE — 99232 SBSQ HOSP IP/OBS MODERATE 35: CPT

## 2020-01-12 RX ORDER — POTASSIUM CHLORIDE 20 MEQ
40 PACKET (EA) ORAL ONCE
Refills: 0 | Status: COMPLETED | OUTPATIENT
Start: 2020-01-12 | End: 2020-01-12

## 2020-01-12 RX ORDER — POTASSIUM CHLORIDE 20 MEQ
10 PACKET (EA) ORAL
Refills: 0 | Status: COMPLETED | OUTPATIENT
Start: 2020-01-12 | End: 2020-01-12

## 2020-01-12 RX ORDER — MAGNESIUM SULFATE 500 MG/ML
1 VIAL (ML) INJECTION
Refills: 0 | Status: COMPLETED | OUTPATIENT
Start: 2020-01-12 | End: 2020-01-12

## 2020-01-12 RX ORDER — MAGNESIUM SULFATE 500 MG/ML
2 VIAL (ML) INJECTION ONCE
Refills: 0 | Status: DISCONTINUED | OUTPATIENT
Start: 2020-01-12 | End: 2020-01-12

## 2020-01-12 RX ADMIN — ROBINUL 0.2 MILLIGRAM(S): 0.2 INJECTION INTRAMUSCULAR; INTRAVENOUS at 08:40

## 2020-01-12 RX ADMIN — LEVETIRACETAM 1500 MILLIGRAM(S): 250 TABLET, FILM COATED ORAL at 05:26

## 2020-01-12 RX ADMIN — Medication 100 MILLIEQUIVALENT(S): at 08:40

## 2020-01-12 RX ADMIN — LEVETIRACETAM 1500 MILLIGRAM(S): 250 TABLET, FILM COATED ORAL at 17:41

## 2020-01-12 RX ADMIN — Medication 2: at 17:40

## 2020-01-12 RX ADMIN — Medication 100 MILLIEQUIVALENT(S): at 09:00

## 2020-01-12 RX ADMIN — Medication 1: at 08:39

## 2020-01-12 RX ADMIN — LACOSAMIDE 200 MILLIGRAM(S): 50 TABLET ORAL at 05:26

## 2020-01-12 RX ADMIN — Medication 100 GRAM(S): at 14:57

## 2020-01-12 RX ADMIN — INSULIN GLARGINE 8 UNIT(S): 100 INJECTION, SOLUTION SUBCUTANEOUS at 22:22

## 2020-01-12 RX ADMIN — ENOXAPARIN SODIUM 40 MILLIGRAM(S): 100 INJECTION SUBCUTANEOUS at 12:36

## 2020-01-12 RX ADMIN — ROBINUL 0.2 MILLIGRAM(S): 0.2 INJECTION INTRAMUSCULAR; INTRAVENOUS at 22:27

## 2020-01-12 RX ADMIN — Medication 40 MILLIEQUIVALENT(S): at 07:07

## 2020-01-12 RX ADMIN — Medication 2: at 12:36

## 2020-01-12 RX ADMIN — Medication 100 GRAM(S): at 16:25

## 2020-01-12 RX ADMIN — Medication 1 APPLICATION(S): at 12:37

## 2020-01-12 RX ADMIN — LACOSAMIDE 200 MILLIGRAM(S): 50 TABLET ORAL at 17:41

## 2020-01-12 RX ADMIN — Medication 100 MILLIEQUIVALENT(S): at 07:06

## 2020-01-12 RX ADMIN — Medication 2 MILLIGRAM(S): at 22:21

## 2020-01-12 RX ADMIN — Medication 1: at 22:23

## 2020-01-12 RX ADMIN — POLYETHYLENE GLYCOL 3350 17 GRAM(S): 17 POWDER, FOR SOLUTION ORAL at 12:36

## 2020-01-12 RX ADMIN — SERTRALINE 75 MILLIGRAM(S): 25 TABLET, FILM COATED ORAL at 12:36

## 2020-01-12 RX ADMIN — Medication 40 MILLIEQUIVALENT(S): at 12:36

## 2020-01-12 NOTE — PROVIDER CONTACT NOTE (CRITICAL VALUE NOTIFICATION) - ACTION/TREATMENT ORDERED:
replace magnesium
cont to monitor
Potassium phosphate, potassium powder and two magnesium IV piggyback.
Will treat.

## 2020-01-12 NOTE — PROGRESS NOTE ADULT - ASSESSMENT
85M hx of HTN, remote prostate ca (treated s/p seeds/RT), encephalitis with dysphagia and PEG status, seizure d/o presenting with sepsis.     Dispo: Being evaluated for hospice. Pt will be discharged to subacute rehab for comfort care. Pt's family would like pt to go to [place], pending financial processing. Palliative and hospice teams following.     Acute hypoxic respiratory failure: 2/2 Coronavirus and superimposed multifocal PNA  - s/p five day course of Vanco and meropenem. Abx discontinued on 1/3/20  - continue O2 support  - peg feeds continued , aspiration precautions    DM type 2 with hyperglycemia: HbA1C 8.4%  - continue ISS and lantus 8unit qHS  - monitor fingersticks   - continue tube feeds    Urinary retention   - continue bradley    Moderate protein calorie malnutrition/ hypoalbuminemia  - continue bolus tube feeds  - dietician following     HTN hx:  - monitor blood pressure- normotensive   - hold meds     Hypokalemia  - repleted   - monitor     History of encephalitis  - Keppra and Vimpat    DVT prophylaxis  - lovenox

## 2020-01-12 NOTE — PROVIDER CONTACT NOTE (CRITICAL VALUE NOTIFICATION) - BACKGROUND
Patient is here for pneumonia and had fecal impaction. Patient had diarrhea on 12/29/2019. RN received in hand off that the diarrhea has slowed down.
85 Y male admitted with PNA with hx of Hyponatremia, PEG tube, Seizure disorder
Pt admitted fro pneumonia

## 2020-01-12 NOTE — PROGRESS NOTE ADULT - ASSESSMENT
85M hx of HTN, remote prostate ca (treated s/p seeds/RT), encephalitis with dysphagia and PEG status, seizure d/o presenting with sepsis.     Dispo: Being evaluated for hospice. Pt will be discharged to subacute rehab for comfort care. Pt's family would like pt to go to [facility], pending financial processing. Palliative and hospice teams following.

## 2020-01-12 NOTE — PROVIDER CONTACT NOTE (CRITICAL VALUE NOTIFICATION) - ASSESSMENT
pt lethargic as baseline
Patient's heart sounds are WNL but PVCs noted on cardiac monitor. Patient denies any cramps and no tremors noted upon assessment.
Pt hypokalemic at 1/11 - 3.3 currently getting 40 meq to PEG tube
VSS, no distress noted

## 2020-01-12 NOTE — PROVIDER CONTACT NOTE (CRITICAL VALUE NOTIFICATION) - SITUATION
Patient's morning labs resulted in a low potassium. Carmela Newell called Camila Diaz RN to report potassium is a critical lab value.
Ma.0
Potassium 2.9

## 2020-01-12 NOTE — CHART NOTE - NSCHARTNOTEFT_GEN_A_CORE
Magnesium, Serum (01.12.20 @ 13:30)    Magnesium, Serum: 1.0 mg/dL    repleted with magnesium 2 gram x 2   monitor

## 2020-01-12 NOTE — PROGRESS NOTE ADULT - SUBJECTIVE AND OBJECTIVE BOX
Follow-up Pall Progress Note    Leonidas Frausto MD  [phone]    No new respiratory events overnight.  Comfortable    Medications:  Vital Signs Last 24 Hrs  T(C): 37.3 (12 Jan 2020 05:25), Max: 37.3 (12 Jan 2020 05:25)  T(F): 99.2 (12 Jan 2020 05:25), Max: 99.2 (12 Jan 2020 05:25)  HR: 116 (12 Jan 2020 10:18) (94 - 116)  BP: 121/69 (12 Jan 2020 05:25) (104/67 - 121/69)  BP(mean): --  RR: 16 (12 Jan 2020 05:25) (16 - 16)  SpO2: 93% (12 Jan 2020 10:18) (93% - 94%)          01-11 @ 07:01  -  01-12 @ 07:00  --------------------------------------------------------  IN: 0 mL / OUT: 1200 mL / NET: -1200 mL        LABS:                        12.5   13.45 )-----------( 402      ( 12 Jan 2020 05:30 )             38.6     01-12    135  |  96  |  20  ----------------------------<  228<H>  3.7   |  27  |  0.82    Ca    8.8      12 Jan 2020 13:30  Mg     1.0     01-12                CULTURES:        Physical Examination:  PULM: Clear to auscultation bilaterally, no significant sputum production  CVS: Regular rate and rhythm, no murmurs, rubs, or gallops  ABD: Soft, non-tender  EXT:  No clubbing, cyanosis, or edema    RADIOLOGY REVIEWED  CXR:    CT chest:    TTE:

## 2020-01-12 NOTE — PROGRESS NOTE ADULT - SUBJECTIVE AND OBJECTIVE BOX
Patient is a 85y old  Male who presents with a chief complaint of desaturation (11 Jan 2020 11:12)      Patient seen and examined at bedside. No overnight events reported.     ALLERGIES:  No Known Allergies    MEDICATIONS  (STANDING):  BACItracin   Ointment 1 Application(s) Topical daily  dextrose 5%. 1000 milliLiter(s) (50 mL/Hr) IV Continuous <Continuous>  dextrose 50% Injectable 12.5 Gram(s) IV Push once  dextrose 50% Injectable 25 Gram(s) IV Push once  dextrose 50% Injectable 25 Gram(s) IV Push once  doxazosin 2 milliGRAM(s) Oral at bedtime  enoxaparin Injectable 40 milliGRAM(s) SubCutaneous daily  insulin glargine Injectable (LANTUS) 8 Unit(s) SubCutaneous at bedtime  insulin lispro (HumaLOG) corrective regimen sliding scale   SubCutaneous four times a day before meals  lacosamide 200 milliGRAM(s) Oral two times a day  levETIRAcetam  Solution 1500 milliGRAM(s) Enteral Tube two times a day  polyethylene glycol 3350 17 Gram(s) Oral daily  potassium chloride   Powder 40 milliEquivalent(s) Enteral Tube daily  potassium chloride  10 mEq/100 mL IVPB 10 milliEquivalent(s) IV Intermittent every 1 hour  sertraline 75 milliGRAM(s) Oral daily    MEDICATIONS  (PRN):  acetaminophen    Suspension .. 650 milliGRAM(s) Enteral Tube every 6 hours PRN Temp greater or equal to 38C (100.4F), Mild Pain (1 - 3)  albuterol/ipratropium for Nebulization 3 milliLiter(s) Nebulizer every 6 hours PRN Bronchospasm  bisacodyl 5 milliGRAM(s) Oral every 12 hours PRN Constipation  dextrose 40% Gel 15 Gram(s) Oral once PRN Blood Glucose LESS THAN 70 milliGRAM(s)/deciliter  glucagon  Injectable 1 milliGRAM(s) IntraMuscular once PRN Glucose LESS THAN 70 milligrams/deciliter  glycopyrrolate Injectable 0.2 milliGRAM(s) IV Push every 8 hours PRN secretions  guaiFENesin   Syrup  (Sugar-Free) 200 milliGRAM(s) Oral every 6 hours PRN congestion    Vital Signs Last 24 Hrs  T(F): 99.2 (12 Jan 2020 05:25), Max: 99.2 (12 Jan 2020 05:25)  HR: 110 (12 Jan 2020 05:25) (94 - 113)  BP: 121/69 (12 Jan 2020 05:25) (104/62 - 121/69)  RR: 16 (12 Jan 2020 05:25) (16 - 16)  SpO2: 94% (12 Jan 2020 05:25) (92% - 98%)  I&O's Summary    11 Jan 2020 07:01  -  12 Jan 2020 07:00  --------------------------------------------------------  IN: 0 mL / OUT: 1200 mL / NET: -1200 mL    PHYSICAL EXAM:  General: NAD, Alert  ENT: MMM  Neck: Supple, No JVD  Lungs: Clear to auscultation bilaterally, good air entry, non-labored breathing  Cardio: +S1/S2  Abdomen: Soft, Nontender, Nondistended; Bowel sounds present; +peg  Extremities: No calf tenderness      LABS:                        12.5   13.45 )-----------( 402      ( 12 Jan 2020 05:30 )             38.6     01-12    135  |  96  |  22  ----------------------------<  182  2.9   |  31  |  0.74    Ca    8.5      12 Jan 2020 05:30    eGFR if Non African American: 84 mL/min/1.73M2 (01-12-20 @ 05:30)  eGFR if African American: 98 mL/min/1.73M2 (01-12-20 @ 05:30)    Glucose  POCT Blood Glucose.: 228 mg/dL (11 Jan 2020 20:53)  POCT Blood Glucose.: 146 mg/dL (11 Jan 2020 17:07)  POCT Blood Glucose.: 208 mg/dL (11 Jan 2020 11:59)  POCT Blood Glucose.: 128 mg/dL (11 Jan 2020 07:56)    12-29 [id number] 8.4    RADIOLOGY & ADDITIONAL TESTS:  no new tests Patient is a 85y old  Male who presents with a chief complaint of desaturation (11 Jan 2020 11:12)      Patient seen and examined at bedside.     ALLERGIES:  No Known Allergies    MEDICATIONS  (STANDING):  BACItracin   Ointment 1 Application(s) Topical daily  dextrose 5%. 1000 milliLiter(s) (50 mL/Hr) IV Continuous <Continuous>  dextrose 50% Injectable 12.5 Gram(s) IV Push once  dextrose 50% Injectable 25 Gram(s) IV Push once  dextrose 50% Injectable 25 Gram(s) IV Push once  doxazosin 2 milliGRAM(s) Oral at bedtime  enoxaparin Injectable 40 milliGRAM(s) SubCutaneous daily  insulin glargine Injectable (LANTUS) 8 Unit(s) SubCutaneous at bedtime  insulin lispro (HumaLOG) corrective regimen sliding scale   SubCutaneous four times a day before meals  lacosamide 200 milliGRAM(s) Oral two times a day  levETIRAcetam  Solution 1500 milliGRAM(s) Enteral Tube two times a day  polyethylene glycol 3350 17 Gram(s) Oral daily  potassium chloride   Powder 40 milliEquivalent(s) Enteral Tube daily  potassium chloride  10 mEq/100 mL IVPB 10 milliEquivalent(s) IV Intermittent every 1 hour  sertraline 75 milliGRAM(s) Oral daily    MEDICATIONS  (PRN):  acetaminophen    Suspension .. 650 milliGRAM(s) Enteral Tube every 6 hours PRN Temp greater or equal to 38C (100.4F), Mild Pain (1 - 3)  albuterol/ipratropium for Nebulization 3 milliLiter(s) Nebulizer every 6 hours PRN Bronchospasm  bisacodyl 5 milliGRAM(s) Oral every 12 hours PRN Constipation  dextrose 40% Gel 15 Gram(s) Oral once PRN Blood Glucose LESS THAN 70 milliGRAM(s)/deciliter  glucagon  Injectable 1 milliGRAM(s) IntraMuscular once PRN Glucose LESS THAN 70 milligrams/deciliter  glycopyrrolate Injectable 0.2 milliGRAM(s) IV Push every 8 hours PRN secretions  guaiFENesin   Syrup  (Sugar-Free) 200 milliGRAM(s) Oral every 6 hours PRN congestion    Vital Signs Last 24 Hrs  T(F): 99.2 (12 Jan 2020 05:25), Max: 99.2 (12 Jan 2020 05:25)  HR: 110 (12 Jan 2020 05:25) (94 - 113)  BP: 121/69 (12 Jan 2020 05:25) (104/62 - 121/69)  RR: 16 (12 Jan 2020 05:25) (16 - 16)  SpO2: 94% (12 Jan 2020 05:25) (92% - 98%)  I&O's Summary    11 Jan 2020 07:01  -  12 Jan 2020 07:00  --------------------------------------------------------  IN: 0 mL / OUT: 1200 mL / NET: -1200 mL    PHYSICAL EXAM:  General: NAD, Alert  ENT: MMM  Neck: Supple, No JVD  Lungs: Clear to auscultation bilaterally, good air entry, non-labored breathing  Cardio: +S1/S2  Abdomen: Soft, Nontender, Nondistended; Bowel sounds present; +peg  Extremities: No calf tenderness      LABS:                        12.5   13.45 )-----------( 402      ( 12 Jan 2020 05:30 )             38.6     01-12    135  |  96  |  22  ----------------------------<  182  2.9   |  31  |  0.74    Ca    8.5      12 Jan 2020 05:30    eGFR if Non African American: 84 mL/min/1.73M2 (01-12-20 @ 05:30)  eGFR if African American: 98 mL/min/1.73M2 (01-12-20 @ 05:30)    Glucose  POCT Blood Glucose.: 228 mg/dL (11 Jan 2020 20:53)  POCT Blood Glucose.: 146 mg/dL (11 Jan 2020 17:07)  POCT Blood Glucose.: 208 mg/dL (11 Jan 2020 11:59)  POCT Blood Glucose.: 128 mg/dL (11 Jan 2020 07:56)    12-29 [id number] 8.4    RADIOLOGY & ADDITIONAL TESTS:  no new tests

## 2020-01-13 LAB
GLUCOSE BLDC GLUCOMTR-MCNC: 153 MG/DL — HIGH (ref 70–99)
GLUCOSE BLDC GLUCOMTR-MCNC: 165 MG/DL — HIGH (ref 70–99)
GLUCOSE BLDC GLUCOMTR-MCNC: 217 MG/DL — HIGH (ref 70–99)
GLUCOSE BLDC GLUCOMTR-MCNC: 228 MG/DL — HIGH (ref 70–99)
MAGNESIUM SERPL-MCNC: 1.4 MG/DL — LOW (ref 1.6–2.6)

## 2020-01-13 PROCEDURE — 99232 SBSQ HOSP IP/OBS MODERATE 35: CPT

## 2020-01-13 PROCEDURE — 99232 SBSQ HOSP IP/OBS MODERATE 35: CPT | Mod: GC

## 2020-01-13 RX ORDER — OMEPRAZOLE 10 MG/1
1 CAPSULE, DELAYED RELEASE ORAL
Qty: 0 | Refills: 0 | DISCHARGE

## 2020-01-13 RX ORDER — MAGNESIUM SULFATE 500 MG/ML
1 VIAL (ML) INJECTION ONCE
Refills: 0 | Status: COMPLETED | OUTPATIENT
Start: 2020-01-13 | End: 2020-01-13

## 2020-01-13 RX ORDER — AMILORIDE HCL 5 MG
0 TABLET ORAL
Qty: 0 | Refills: 0 | DISCHARGE

## 2020-01-13 RX ORDER — SERTRALINE 25 MG/1
0 TABLET, FILM COATED ORAL
Qty: 0 | Refills: 0 | DISCHARGE

## 2020-01-13 RX ORDER — SIMETHICONE 80 MG/1
0 TABLET, CHEWABLE ORAL
Qty: 0 | Refills: 0 | DISCHARGE

## 2020-01-13 RX ORDER — ACETAMINOPHEN 500 MG
0 TABLET ORAL
Qty: 0 | Refills: 0 | DISCHARGE

## 2020-01-13 RX ORDER — MAGNESIUM OXIDE 400 MG ORAL TABLET 241.3 MG
0 TABLET ORAL
Qty: 0 | Refills: 0 | DISCHARGE

## 2020-01-13 RX ADMIN — INSULIN GLARGINE 8 UNIT(S): 100 INJECTION, SOLUTION SUBCUTANEOUS at 21:38

## 2020-01-13 RX ADMIN — Medication 1 APPLICATION(S): at 11:33

## 2020-01-13 RX ADMIN — Medication 1: at 16:55

## 2020-01-13 RX ADMIN — POLYETHYLENE GLYCOL 3350 17 GRAM(S): 17 POWDER, FOR SOLUTION ORAL at 11:32

## 2020-01-13 RX ADMIN — Medication 2 MILLIGRAM(S): at 21:37

## 2020-01-13 RX ADMIN — LACOSAMIDE 200 MILLIGRAM(S): 50 TABLET ORAL at 05:41

## 2020-01-13 RX ADMIN — Medication 1: at 07:58

## 2020-01-13 RX ADMIN — Medication 40 MILLIEQUIVALENT(S): at 11:32

## 2020-01-13 RX ADMIN — Medication 100 GRAM(S): at 12:40

## 2020-01-13 RX ADMIN — Medication 100 GRAM(S): at 11:36

## 2020-01-13 RX ADMIN — Medication 2: at 11:34

## 2020-01-13 RX ADMIN — ENOXAPARIN SODIUM 40 MILLIGRAM(S): 100 INJECTION SUBCUTANEOUS at 11:33

## 2020-01-13 RX ADMIN — LACOSAMIDE 200 MILLIGRAM(S): 50 TABLET ORAL at 19:54

## 2020-01-13 RX ADMIN — SERTRALINE 75 MILLIGRAM(S): 25 TABLET, FILM COATED ORAL at 11:33

## 2020-01-13 RX ADMIN — LEVETIRACETAM 1500 MILLIGRAM(S): 250 TABLET, FILM COATED ORAL at 05:41

## 2020-01-13 RX ADMIN — LEVETIRACETAM 1500 MILLIGRAM(S): 250 TABLET, FILM COATED ORAL at 19:55

## 2020-01-13 RX ADMIN — Medication 2: at 21:39

## 2020-01-13 NOTE — PROGRESS NOTE ADULT - SUBJECTIVE AND OBJECTIVE BOX
Follow-up Pall Progress Note    Leonidas Frausto MD  [phone]    No new respiratory events overnight.  Denies SOB/CP.     Medications:  Vital Signs Last 24 Hrs  T(C): 36.4 (13 Jan 2020 05:36), Max: 37.2 (12 Jan 2020 17:02)  T(F): 97.6 (13 Jan 2020 05:36), Max: 99 (12 Jan 2020 17:02)  HR: 99 (13 Jan 2020 05:36) (99 - 103)  BP: 119/66 (13 Jan 2020 05:36) (107/52 - 121/63)  BP(mean): --  RR: 16 (13 Jan 2020 05:36) (16 - 16)  SpO2: 94% (13 Jan 2020 05:36) (92% - 97%)          01-12 @ 07:01  -  01-13 @ 07:00  --------------------------------------------------------  IN: 1300 mL / OUT: 1251 mL / NET: 49 mL        LABS:                        12.5   13.45 )-----------( 402      ( 12 Jan 2020 05:30 )             38.6     01-12    135  |  96  |  20  ----------------------------<  228<H>  3.7   |  27  |  0.82    Ca    8.8      12 Jan 2020 13:30  Mg     1.4     01-13                CULTURES:        Physical Examination:  PULM: Clear to auscultation bilaterally, no significant sputum production  CVS: Regular rate and rhythm, no murmurs, rubs, or gallops  ABD: Soft, non-tender  EXT:  No clubbing, cyanosis, or edema  Neuro:  Minimally responsive

## 2020-01-13 NOTE — PROGRESS NOTE ADULT - ASSESSMENT
85M hx of HTN, remote prostate ca (treated s/p seeds/RT), encephalitis with dysphagia and PEG status, seizure d/o presenting with sepsis.     Dispo:  Pt will be discharged to subacute rehab for comfort care. Pt's family would like pt to go to [place], pending financial processing. Palliative and hospice teams following.     Acute hypoxic respiratory failure: 2/2 Coronavirus and superimposed multifocal PNA  - s/p five day course of Vanco and meropenem. Abx discontinued on 1/3/20  - continue O2 support PRN -- currently tolerating room air  - peg feeds continued , aspiration precautions    DM type 2 with hyperglycemia: HbA1C 8.4%  - continue ISS and lantus 8unit qHS  - monitor fingersticks   - continue tube feeds    Urinary retention   - continue bradley    Moderate protein calorie malnutrition/ hypoalbuminemia  - continue bolus tube feeds  - dietician following     HTN hx:  - monitor blood pressure- normotensive   - hold meds     Hypokalemia/Hypomagnesemia  - replete PRN  - monitor     History of encephalitis  - Keppra and Vimpat    DVT prophylaxis  - lovenox

## 2020-01-13 NOTE — PROGRESS NOTE ADULT - SUBJECTIVE AND OBJECTIVE BOX
Patient is a 85y old  Male who presents with a chief complaint of desaturation (12 Jan 2020 16:54)    Patient seen and examined at bedside. Patient awake, alert. He denies pain/discomfort.     ALLERGIES:  No Known Allergies    MEDICATIONS  (STANDING):  BACItracin   Ointment 1 Application(s) Topical daily  dextrose 5%. 1000 milliLiter(s) (50 mL/Hr) IV Continuous <Continuous>  dextrose 50% Injectable 12.5 Gram(s) IV Push once  dextrose 50% Injectable 25 Gram(s) IV Push once  dextrose 50% Injectable 25 Gram(s) IV Push once  doxazosin 2 milliGRAM(s) Oral at bedtime  enoxaparin Injectable 40 milliGRAM(s) SubCutaneous daily  insulin glargine Injectable (LANTUS) 8 Unit(s) SubCutaneous at bedtime  insulin lispro (HumaLOG) corrective regimen sliding scale   SubCutaneous four times a day before meals  lacosamide 200 milliGRAM(s) Oral two times a day  levETIRAcetam  Solution 1500 milliGRAM(s) Enteral Tube two times a day  polyethylene glycol 3350 17 Gram(s) Oral daily  potassium chloride   Powder 40 milliEquivalent(s) Enteral Tube daily  sertraline 75 milliGRAM(s) Oral daily    MEDICATIONS  (PRN):  acetaminophen    Suspension .. 650 milliGRAM(s) Enteral Tube every 6 hours PRN Temp greater or equal to 38C (100.4F), Mild Pain (1 - 3)  albuterol/ipratropium for Nebulization 3 milliLiter(s) Nebulizer every 6 hours PRN Bronchospasm  bisacodyl 5 milliGRAM(s) Oral every 12 hours PRN Constipation  dextrose 40% Gel 15 Gram(s) Oral once PRN Blood Glucose LESS THAN 70 milliGRAM(s)/deciliter  glucagon  Injectable 1 milliGRAM(s) IntraMuscular once PRN Glucose LESS THAN 70 milligrams/deciliter  glycopyrrolate Injectable 0.2 milliGRAM(s) IV Push every 8 hours PRN secretions  guaiFENesin   Syrup  (Sugar-Free) 200 milliGRAM(s) Oral every 6 hours PRN congestion    Vital Signs Last 24 Hrs  T(F): 97.6 (13 Jan 2020 05:36), Max: 99 (12 Jan 2020 17:02)  HR: 99 (13 Jan 2020 05:36) (99 - 116)  BP: 119/66 (13 Jan 2020 05:36) (107/52 - 121/63)  RR: 16 (13 Jan 2020 05:36) (16 - 16)  SpO2: 94% (13 Jan 2020 05:36) (92% - 97%)    I&O's Summary  12 Jan 2020 07:01  -  13 Jan 2020 07:00  --------------------------------------------------------  IN: 1300 mL / OUT: 1251 mL / NET: 49 mL    13 Jan 2020 07:01  -  13 Jan 2020 08:40  --------------------------------------------------------  IN: 287 mL / OUT: 0 mL / NET: 287 mL    PHYSICAL EXAM:  GENERAL: NAD, leaning to left  HEAD:  Atraumatic, Normocephalic  EYES: EOMI  ENMT: Moist mucous membranes  CHEST/LUNG: Clear to auscultation bilaterally, good air entry, non-labored breathing  HEART: +S1/S2  ABDOMEN: Soft, Nontender, Nondistended; +PEG  VASCULAR: Normal pulses, Normal capillary refill, no LE edema  EXTREMITIES: No calf tenderness, No cyanosis  SKIN: Warm, Intact  NERVOUS SYSTEM:  Alert, left arm weakness, no new focal deficits    LABS:                        12.5   13.45 )-----------( 402      ( 12 Jan 2020 05:30 )             38.6     01-12    135  |  96  |  20  ----------------------------<  228  3.7   |  27  |  0.82    Ca    8.8      12 Jan 2020 13:30  Mg     1.0     01-12      eGFR if Non African American: 81 mL/min/1.73M2 (01-12-20 @ 13:30)  eGFR if : 94 mL/min/1.73M2 (01-12-20 @ 13:30)      POCT Blood Glucose.: 153 mg/dL (13 Jan 2020 07:42)  POCT Blood Glucose.: 173 mg/dL (12 Jan 2020 22:18)  POCT Blood Glucose.: 218 mg/dL (12 Jan 2020 17:36)  POCT Blood Glucose.: 211 mg/dL (12 Jan 2020 12:33)    12-29 [id number] 8.4    RADIOLOGY & ADDITIONAL TESTS:    Care Discussed with Consultants/Other Providers:

## 2020-01-13 NOTE — PROGRESS NOTE ADULT - ASSESSMENT
85M hx of HTN, remote prostate ca (treated s/p seeds/RT), encephalitis with dysphagia and PEG status, seizure d/o presenting with sepsis.   Palliative:  awaiting placement for areli/hospice  Dispo:  Pt will be discharged to subacute rehab for comfort care. Pt's family would like pt to go to [place], pending financial processing. Palliative and hospice teams following.     Acute hypoxic respiratory failure: 2/2 Coronavirus and superimposed multifocal PNA  - s/p five day course of Vanco and meropenem. Abx discontinued on 1/3/20  - continue O2 support PRN -- currently tolerating room air  - peg feeds continued , aspiration precautions    DM type 2 with hyperglycemia: HbA1C 8.4%  - continue ISS and lantus 8unit qHS  - monitor fingersticks   - continue tube feeds    Urinary retention   - continue bradley    Moderate protein calorie malnutrition/ hypoalbuminemia  - continue bolus tube feeds  - dietician following     HTN hx:  - monitor blood pressure- normotensive   - hold meds     Hypokalemia/Hypomagnesemia  - replete PRN  - monitor     History of encephalitis  - Keppra and Vimpat    DVT prophylaxis  - lovenox

## 2020-01-13 NOTE — CHART NOTE - NSCHARTNOTEFT_GEN_A_CORE
Nutrition Follow Up Note  Hospital Course (Per Electronic Medical Record):   Source: Medical Record [X]  Nursing Staff [X]     Diet: Two Robin 275cc bolus  4xday    Patient noted tolerating current PEG bolus feeding regimen. No GI issues noted. Patient noted to be discharged to [facility] with comfort care , ? hospice eval.     Current Weight: (1/11) 172.8/78.4kg, Stable weight noted , noted slight fluctuation (+) edema noted    Pertinent Medications: MEDICATIONS  (STANDING):  BACItracin   Ointment 1 Application(s) Topical daily  dextrose 5%. 1000 milliLiter(s) (50 mL/Hr) IV Continuous <Continuous>  dextrose 50% Injectable 12.5 Gram(s) IV Push once  dextrose 50% Injectable 25 Gram(s) IV Push once  dextrose 50% Injectable 25 Gram(s) IV Push once  doxazosin 2 milliGRAM(s) Oral at bedtime  enoxaparin Injectable 40 milliGRAM(s) SubCutaneous daily  insulin glargine Injectable (LANTUS) 8 Unit(s) SubCutaneous at bedtime  insulin lispro (HumaLOG) corrective regimen sliding scale   SubCutaneous four times a day before meals  lacosamide 200 milliGRAM(s) Oral two times a day  levETIRAcetam  Solution 1500 milliGRAM(s) Enteral Tube two times a day  polyethylene glycol 3350 17 Gram(s) Oral daily  potassium chloride   Powder 40 milliEquivalent(s) Enteral Tube daily  sertraline 75 milliGRAM(s) Oral daily    MEDICATIONS  (PRN):  acetaminophen    Suspension .. 650 milliGRAM(s) Enteral Tube every 6 hours PRN Temp greater or equal to 38C (100.4F), Mild Pain (1 - 3)  albuterol/ipratropium for Nebulization 3 milliLiter(s) Nebulizer every 6 hours PRN Bronchospasm  bisacodyl 5 milliGRAM(s) Oral every 12 hours PRN Constipation  dextrose 40% Gel 15 Gram(s) Oral once PRN Blood Glucose LESS THAN 70 milliGRAM(s)/deciliter  glucagon  Injectable 1 milliGRAM(s) IntraMuscular once PRN Glucose LESS THAN 70 milligrams/deciliter  glycopyrrolate Injectable 0.2 milliGRAM(s) IV Push every 8 hours PRN secretions  guaiFENesin   Syrup  (Sugar-Free) 200 milliGRAM(s) Oral every 6 hours PRN congestion      Pertinent Labs:  01-12 Na135 mmol/L Glu 228 mg/dL<H> K+ 3.7 mmol/L Cr  0.82 mg/dL BUN 20 mg/dL 12-29 [id number] 8.4 %<H>  POCT 173,218,211,163      Skin: ecchymotic     Edema: (+2) (L) arm & hand noted    Last BM: on (1/12)    Estimated Needs:   [X] No Change since Previous Assessment  [X] Recalculated:     Previous Nutrition Diagnosis: Moderate Malnutrition    Nutrition Diagnosis is [X] Ongoing, receiving bolus feeds          New Nutrition Diagnosis: [X] Not Applicable    Interventions:   1. Recommend continue current nutrition regimen    Monitoring & Evaluation: will monitor:  [X] Weights   [X] Tolerance to bolus feeds   [X] Follow Up (Per Protocol)        RD to follow as per Nutrition protocol  Zuleyka Stroud RDN

## 2020-01-13 NOTE — GOALS OF CARE CONVERSATION - ADVANCED CARE PLANNING - CONVERSATION DETAILS
[facility]    This writer followed up with pts dtr Rebekah. Rebekah stated [facility] is still pending the paperwork from elder care  for medicaid pending. Rebekah stated she will be contacting the Elder care  and will contact this writer once she knows the paperwork was sent to [facility].  [facility] will continue to follow up.    Gay Singh RN  [phone]
Please palliative note:  Pt wishes to return home
[facility]     This writer met with family.  Discussed hospice services at length.  Family undecided on whether they want to send pt back to [facility] on comfort care or  to [facility] ([facility] with [facility] and pay for room and board as  pt has medicare only. [facility] to follow up.    Gay Singh RN  [phone]

## 2020-01-14 ENCOUNTER — TRANSCRIPTION ENCOUNTER (OUTPATIENT)
Age: 85
End: 2020-01-14

## 2020-01-14 VITALS
HEART RATE: 77 BPM | DIASTOLIC BLOOD PRESSURE: 61 MMHG | OXYGEN SATURATION: 96 % | SYSTOLIC BLOOD PRESSURE: 114 MMHG | RESPIRATION RATE: 15 BRPM | WEIGHT: 173.28 LBS | TEMPERATURE: 99 F

## 2020-01-14 LAB
ANION GAP SERPL CALC-SCNC: 9 MMOL/L — SIGNIFICANT CHANGE UP (ref 5–17)
BUN SERPL-MCNC: 20 MG/DL — SIGNIFICANT CHANGE UP (ref 7–23)
CALCIUM SERPL-MCNC: 8.7 MG/DL — SIGNIFICANT CHANGE UP (ref 8.4–10.5)
CHLORIDE SERPL-SCNC: 97 MMOL/L — SIGNIFICANT CHANGE UP (ref 96–108)
CO2 SERPL-SCNC: 32 MMOL/L — HIGH (ref 22–31)
CREAT SERPL-MCNC: 0.68 MG/DL — SIGNIFICANT CHANGE UP (ref 0.5–1.3)
GLUCOSE BLDC GLUCOMTR-MCNC: 157 MG/DL — HIGH (ref 70–99)
GLUCOSE BLDC GLUCOMTR-MCNC: 221 MG/DL — HIGH (ref 70–99)
GLUCOSE SERPL-MCNC: 155 MG/DL — HIGH (ref 70–99)
MAGNESIUM SERPL-MCNC: 1.5 MG/DL — LOW (ref 1.6–2.6)
POTASSIUM SERPL-MCNC: 2.5 MMOL/L — CRITICAL LOW (ref 3.5–5.3)
POTASSIUM SERPL-SCNC: 2.5 MMOL/L — CRITICAL LOW (ref 3.5–5.3)
SODIUM SERPL-SCNC: 138 MMOL/L — SIGNIFICANT CHANGE UP (ref 135–145)

## 2020-01-14 PROCEDURE — 71250 CT THORAX DX C-: CPT

## 2020-01-14 PROCEDURE — 80202 ASSAY OF VANCOMYCIN: CPT

## 2020-01-14 PROCEDURE — 99232 SBSQ HOSP IP/OBS MODERATE 35: CPT

## 2020-01-14 PROCEDURE — 82962 GLUCOSE BLOOD TEST: CPT

## 2020-01-14 PROCEDURE — 85730 THROMBOPLASTIN TIME PARTIAL: CPT

## 2020-01-14 PROCEDURE — 83605 ASSAY OF LACTIC ACID: CPT

## 2020-01-14 PROCEDURE — 85027 COMPLETE CBC AUTOMATED: CPT

## 2020-01-14 PROCEDURE — 87641 MR-STAPH DNA AMP PROBE: CPT

## 2020-01-14 PROCEDURE — 99239 HOSP IP/OBS DSCHRG MGMT >30: CPT

## 2020-01-14 PROCEDURE — 87798 DETECT AGENT NOS DNA AMP: CPT

## 2020-01-14 PROCEDURE — 96367 TX/PROPH/DG ADDL SEQ IV INF: CPT | Mod: XU

## 2020-01-14 PROCEDURE — 83036 HEMOGLOBIN GLYCOSYLATED A1C: CPT

## 2020-01-14 PROCEDURE — 87486 CHLMYD PNEUM DNA AMP PROBE: CPT

## 2020-01-14 PROCEDURE — 51701 INSERT BLADDER CATHETER: CPT

## 2020-01-14 PROCEDURE — 87631 RESP VIRUS 3-5 TARGETS: CPT

## 2020-01-14 PROCEDURE — 97162 PT EVAL MOD COMPLEX 30 MIN: CPT

## 2020-01-14 PROCEDURE — 83735 ASSAY OF MAGNESIUM: CPT

## 2020-01-14 PROCEDURE — 70450 CT HEAD/BRAIN W/O DYE: CPT

## 2020-01-14 PROCEDURE — 87640 STAPH A DNA AMP PROBE: CPT

## 2020-01-14 PROCEDURE — 87633 RESP VIRUS 12-25 TARGETS: CPT

## 2020-01-14 PROCEDURE — 96365 THER/PROPH/DIAG IV INF INIT: CPT | Mod: XU

## 2020-01-14 PROCEDURE — 85610 PROTHROMBIN TIME: CPT

## 2020-01-14 PROCEDURE — 36415 COLL VENOUS BLD VENIPUNCTURE: CPT

## 2020-01-14 PROCEDURE — 87086 URINE CULTURE/COLONY COUNT: CPT

## 2020-01-14 PROCEDURE — 87449 NOS EACH ORGANISM AG IA: CPT

## 2020-01-14 PROCEDURE — 74176 CT ABD & PELVIS W/O CONTRAST: CPT

## 2020-01-14 PROCEDURE — 81001 URINALYSIS AUTO W/SCOPE: CPT

## 2020-01-14 PROCEDURE — 84145 PROCALCITONIN (PCT): CPT

## 2020-01-14 PROCEDURE — 93971 EXTREMITY STUDY: CPT

## 2020-01-14 PROCEDURE — 99285 EMERGENCY DEPT VISIT HI MDM: CPT | Mod: 25

## 2020-01-14 PROCEDURE — 82803 BLOOD GASES ANY COMBINATION: CPT

## 2020-01-14 PROCEDURE — 71045 X-RAY EXAM CHEST 1 VIEW: CPT

## 2020-01-14 PROCEDURE — 87040 BLOOD CULTURE FOR BACTERIA: CPT

## 2020-01-14 PROCEDURE — 93005 ELECTROCARDIOGRAM TRACING: CPT

## 2020-01-14 PROCEDURE — 84100 ASSAY OF PHOSPHORUS: CPT

## 2020-01-14 PROCEDURE — 80053 COMPREHEN METABOLIC PANEL: CPT

## 2020-01-14 PROCEDURE — 36600 WITHDRAWAL OF ARTERIAL BLOOD: CPT

## 2020-01-14 PROCEDURE — 96361 HYDRATE IV INFUSION ADD-ON: CPT | Mod: XU

## 2020-01-14 PROCEDURE — 80048 BASIC METABOLIC PNL TOTAL CA: CPT

## 2020-01-14 PROCEDURE — 87581 M.PNEUMON DNA AMP PROBE: CPT

## 2020-01-14 RX ORDER — SIMETHICONE 80 MG/1
1 TABLET, CHEWABLE ORAL
Qty: 0 | Refills: 0 | DISCHARGE

## 2020-01-14 RX ORDER — SODIUM CHLORIDE 9 MG/ML
1 INJECTION INTRAMUSCULAR; INTRAVENOUS; SUBCUTANEOUS
Qty: 0 | Refills: 0 | DISCHARGE

## 2020-01-14 RX ORDER — POTASSIUM CHLORIDE 20 MEQ
2 PACKET (EA) ORAL
Qty: 0 | Refills: 0 | DISCHARGE

## 2020-01-14 RX ORDER — LACOSAMIDE 50 MG/1
1 TABLET ORAL
Qty: 0 | Refills: 0 | DISCHARGE
Start: 2020-01-14

## 2020-01-14 RX ORDER — MAGNESIUM SULFATE 500 MG/ML
1 VIAL (ML) INJECTION ONCE
Refills: 0 | Status: COMPLETED | OUTPATIENT
Start: 2020-01-14 | End: 2020-01-14

## 2020-01-14 RX ORDER — POTASSIUM CHLORIDE 20 MEQ
10 PACKET (EA) ORAL
Refills: 0 | Status: COMPLETED | OUTPATIENT
Start: 2020-01-14 | End: 2020-01-14

## 2020-01-14 RX ORDER — IPRATROPIUM/ALBUTEROL SULFATE 18-103MCG
3 AEROSOL WITH ADAPTER (GRAM) INHALATION
Qty: 0 | Refills: 0 | DISCHARGE
Start: 2020-01-14

## 2020-01-14 RX ORDER — ACETAMINOPHEN 500 MG
20.31 TABLET ORAL
Qty: 0 | Refills: 0 | DISCHARGE
Start: 2020-01-14

## 2020-01-14 RX ORDER — POTASSIUM CHLORIDE 20 MEQ
40 PACKET (EA) ORAL ONCE
Refills: 0 | Status: COMPLETED | OUTPATIENT
Start: 2020-01-14 | End: 2020-01-14

## 2020-01-14 RX ORDER — DOXAZOSIN MESYLATE 4 MG
1 TABLET ORAL
Qty: 0 | Refills: 0 | DISCHARGE
Start: 2020-01-14

## 2020-01-14 RX ORDER — LABETALOL HCL 100 MG
1 TABLET ORAL
Qty: 0 | Refills: 0 | DISCHARGE

## 2020-01-14 RX ORDER — IPRATROPIUM/ALBUTEROL SULFATE 18-103MCG
0 AEROSOL WITH ADAPTER (GRAM) INHALATION
Qty: 0 | Refills: 0 | DISCHARGE

## 2020-01-14 RX ORDER — INSULIN GLARGINE 100 [IU]/ML
8 INJECTION, SOLUTION SUBCUTANEOUS
Qty: 0 | Refills: 0 | DISCHARGE
Start: 2020-01-14

## 2020-01-14 RX ORDER — POTASSIUM CHLORIDE 20 MEQ
2 PACKET (EA) ORAL
Qty: 0 | Refills: 0 | DISCHARGE
Start: 2020-01-14

## 2020-01-14 RX ORDER — LEVETIRACETAM 250 MG/1
1500 TABLET, FILM COATED ORAL
Qty: 0 | Refills: 0 | DISCHARGE

## 2020-01-14 RX ORDER — AMILORIDE HCL 5 MG
1 TABLET ORAL
Qty: 0 | Refills: 0 | DISCHARGE

## 2020-01-14 RX ORDER — LEVETIRACETAM 250 MG/1
15 TABLET, FILM COATED ORAL
Qty: 0 | Refills: 0 | DISCHARGE
Start: 2020-01-14

## 2020-01-14 RX ORDER — LACOSAMIDE 50 MG/1
1 TABLET ORAL
Qty: 0 | Refills: 0 | DISCHARGE

## 2020-01-14 RX ORDER — SERTRALINE 25 MG/1
75 TABLET, FILM COATED ORAL
Qty: 0 | Refills: 0 | DISCHARGE

## 2020-01-14 RX ORDER — SERTRALINE 25 MG/1
3 TABLET, FILM COATED ORAL
Qty: 0 | Refills: 0 | DISCHARGE
Start: 2020-01-14

## 2020-01-14 RX ORDER — ACETAMINOPHEN 500 MG
20 TABLET ORAL
Qty: 0 | Refills: 0 | DISCHARGE

## 2020-01-14 RX ORDER — OMEPRAZOLE 10 MG/1
40 CAPSULE, DELAYED RELEASE ORAL
Qty: 0 | Refills: 0 | DISCHARGE

## 2020-01-14 RX ADMIN — Medication 100 MILLIEQUIVALENT(S): at 11:43

## 2020-01-14 RX ADMIN — Medication 650 MILLIGRAM(S): at 12:30

## 2020-01-14 RX ADMIN — Medication 100 MILLIEQUIVALENT(S): at 10:34

## 2020-01-14 RX ADMIN — LEVETIRACETAM 1500 MILLIGRAM(S): 250 TABLET, FILM COATED ORAL at 06:47

## 2020-01-14 RX ADMIN — Medication 650 MILLIGRAM(S): at 11:30

## 2020-01-14 RX ADMIN — Medication 40 MILLIEQUIVALENT(S): at 13:22

## 2020-01-14 RX ADMIN — Medication 100 GRAM(S): at 09:10

## 2020-01-14 RX ADMIN — Medication 1 APPLICATION(S): at 13:22

## 2020-01-14 RX ADMIN — ENOXAPARIN SODIUM 40 MILLIGRAM(S): 100 INJECTION SUBCUTANEOUS at 13:21

## 2020-01-14 RX ADMIN — SERTRALINE 75 MILLIGRAM(S): 25 TABLET, FILM COATED ORAL at 13:22

## 2020-01-14 RX ADMIN — Medication 1: at 06:57

## 2020-01-14 RX ADMIN — Medication 2: at 13:45

## 2020-01-14 RX ADMIN — Medication 40 MILLIEQUIVALENT(S): at 09:10

## 2020-01-14 RX ADMIN — Medication 100 MILLIEQUIVALENT(S): at 13:53

## 2020-01-14 RX ADMIN — LACOSAMIDE 200 MILLIGRAM(S): 50 TABLET ORAL at 06:47

## 2020-01-14 RX ADMIN — POLYETHYLENE GLYCOL 3350 17 GRAM(S): 17 POWDER, FOR SOLUTION ORAL at 13:22

## 2020-01-14 NOTE — DISCHARGE NOTE PROVIDER - HOSPITAL COURSE
85M yo male admitted on 12/28/19 for sepsis secondary to coronavirus and multifocal pneumonia. Pt with complex medical history, patient had been well until 9/2019 when he developed viral encephalitis complicated by seizure disorder, dysphagia requiring PEG, and LUE weakness. Had another admission in 11/2019 for szs and dx with autoimmune encephalitis, s/p IV steroids and IVIG. Other PMH includes HTN and remote prostate ca (treated s/p seeds/RT). In mid December 2019 pt had aspiration pna and treated with 5 days of Zosyn. On day of admissio patient sent to ED with acute onset of desaturation to 84-89% on room air and tachycardic to 132 cough at [facility].  On admission family at bedside, advised of potential need for intubation/pressors and in light of pt's chronic debilitated condition, agreed to medical management with IVFs, antibxs short of intubation and pressors and to keep pt comfortable. Pt is now DNR/DNI. MOLST form completed.         During this admission patient treated with five day course of vancomycin and meropenem with improvement. Abx discontinued on 1/3/20. Pt now tolerating room air. Hospital course significant for         Family prefers to pursure comfort care. Will DC back to [facility] 85M yo male admitted on 12/28/19 for sepsis secondary to coronavirus and multifocal pneumonia. Pt with complex medical history, patient had been well until 9/2019 when he developed viral encephalitis complicated by seizure disorder, dysphagia requiring PEG, and LUE weakness. Had another admission in 11/2019 for szs and dx with autoimmune encephalitis, s/p IV steroids and IVIG. Other PMH includes HTN and remote prostate ca (treated s/p seeds/RT). In mid December 2019 pt had aspiration pna and treated with 5 days of Zosyn. On day of admissio patient sent to ED with acute onset of desaturation to 84-89% on room air and tachycardic to 132 cough at [facility].  On admission family at bedside, advised of potential need for intubation/pressors and in light of pt's chronic debilitated condition, agreed to medical management with IVFs, antibxs short of intubation and pressors and to keep pt comfortable. Pt is now DNR/DNI. MOLST form completed.         During this admission patient treated with five day course of vancomycin and meropenem with improvement. Abx discontinued on 1/3/20. Pt now tolerating room air. Hospital course significant for urinary retention, bradley catheter inserted, most recently changed on 1/4/2020. Will continue bradley for comfort. Patient also frequently with low K and Mag, these electrolytes were supplemented as necessary. As goal is comfort care, will DC to rehab where additional blood work can be done if needed.         Family prefers to pursure comfort care. Will DC back to [facility] 85M yo male admitted on 12/28/19 for sepsis secondary to coronavirus and multifocal pneumonia. Pt with complex medical history, patient had been well until 9/2019 when he developed viral encephalitis complicated by seizure disorder, dysphagia requiring PEG, and LUE weakness. Had another admission in 11/2019 for szs and dx with autoimmune encephalitis, s/p IV steroids and IVIG. Other PMH includes HTN and remote prostate ca (treated s/p seeds/RT). In mid December 2019 pt had aspiration pna and treated with 5 days of Zosyn. On day of admission patient sent to ED with acute onset of desaturation to 84-89% on room air and tachycardic to 132 cough at [facility].  On admission family at bedside, advised of potential need for intubation/pressors and in light of pt's chronic debilitated condition, agreed to medical management with IVFs, antibxs short of intubation and pressors and to keep pt comfortable. Pt is now DNR/DNI. MOLST form completed.         During this admission patient treated with five day course of vancomycin and meropenem with improvement. Abx discontinued on 1/3/20. Pt now tolerating room air. Hospital course significant for urinary retention, bradley catheter inserted, most recently changed on 1/4/2020. Will continue bradley for comfort. Patient also frequently with low K and Mag, these electrolytes were supplemented as necessary. As goal is comfort care, will DC to rehab where additional blood work can be done if needed.         Family prefers to pursue comfort care. Will DC back to [facility] 85M yo male admitted on 12/28/19 for sepsis secondary to coronavirus and multifocal pneumonia. Pt with complex medical history, patient had been well until 9/2019 when he developed viral encephalitis complicated by seizure disorder, dysphagia requiring PEG, and LUE weakness. Had another admission in 11/2019 for szs and dx with autoimmune encephalitis, s/p IV steroids and IVIG. Other PMH includes HTN and remote prostate ca (treated s/p seeds/RT). In mid December 2019 pt had aspiration pna and treated with 5 days of Zosyn. On day of admission patient sent to ED with acute onset of desaturation to 84-89% on room air and tachycardic to 132 cough at [facility].  On admission family at bedside, advised of potential need for intubation/pressors and in light of pt's chronic debilitated condition, agreed to medical management with IVFs, antibxs short of intubation and pressors and to keep pt comfortable. Pt is now DNR/DNI. MOLST form completed.         During this admission patient treated with five day course of vancomycin and meropenem with improvement. Abx discontinued on 1/3/20. Pt now tolerating room air. Hospital course significant for urinary retention, bradley catheter inserted, most recently changed on 1/4/2020. Will continue bradley for comfort. Patient also frequently with low K and Mag, these electrolytes were supplemented as necessary. As goal is comfort care, will DC to rehab where additional blood work can be done if needed.         Family prefers to pursue comfort care. Will DC back to [facility]        Discussed with Dr Carlson of [facility]

## 2020-01-14 NOTE — CHART NOTE - NSCHARTNOTEFT_GEN_A_CORE
Spoke with patient's daughter at bedside- she suggested that patient should go back to [facility] instead of [facility] as it has been difficult obtaining medicaid. Notified

## 2020-01-14 NOTE — PROGRESS NOTE ADULT - ASSESSMENT
85M hx of HTN, remote prostate ca (treated s/p seeds/RT), encephalitis with dysphagia and PEG status, seizure d/o presenting with sepsis.     Dispo: Pt will be discharged to subacute rehab for comfort care. Pt's family would like pt to go to [facility], pending financial processing. Palliative and hospice teams following. Daughter obtaining Medicaid Letter of Retention from elder     Acute hypoxic respiratory failure: 2/2 Coronavirus and superimposed multifocal PNA  - s/p five day course of Vanco and meropenem. Abx discontinued on 1/3/20  - continue O2 support PRN -- currently tolerating room air  - peg feeds continued , aspiration precautions    Hypokalemia/Hypomagnesemia  - Today K is 2.5, Mag 1.5  - Will replete and recheck BMP 1500  - monitor     DM type 2 with hyperglycemia: HbA1C 8.4%  - continue ISS and lantus 8unit qHS  - monitor fingersticks   - continue tube feeds    Urinary retention   - continue bradley    Moderate protein calorie malnutrition/ hypoalbuminemia  - continue bolus tube feeds  - dietician following     HTN hx:  - monitor blood pressure- normotensive   - hold meds     History of encephalitis  - Keppra and Vimpat    DVT prophylaxis  - lovenox

## 2020-01-14 NOTE — DISCHARGE NOTE PROVIDER - NSDCCPCAREPLAN_GEN_ALL_CORE_FT
PRINCIPAL DISCHARGE DIAGNOSIS  Diagnosis: Pneumonia  Assessment and Plan of Treatment: - You were brought to the hospital and diagnosed with Coronavirus and multifocal pneumonia. you were treated with IV antibiotics and your symptoms improved.  - Follow up with medical team at rehab center.   - Continue aspiration precautions. PRINCIPAL DISCHARGE DIAGNOSIS  Diagnosis: Pneumonia  Assessment and Plan of Treatment: - You were brought to the hospital and diagnosed with Coronavirus and multifocal pneumonia. you were treated with IV antibiotics and your symptoms improved.  - Follow up with medical team at rehab center.   - Continue aspiration precautions.      SECONDARY DISCHARGE DIAGNOSES  Diagnosis: Hypokalemia  Assessment and Plan of Treatment: - Take potassium and magnesium supplements daily  - Additional blood work to check electrolytes can be done at discretion of medical team at rehab. PRINCIPAL DISCHARGE DIAGNOSIS  Diagnosis: Pneumonia  Assessment and Plan of Treatment: - You were brought to the hospital and diagnosed with Coronavirus and multifocal pneumonia. you were treated with IV antibiotics and your symptoms improved.  - Follow up with medical team at rehab center.   - Continue aspiration precautions.      SECONDARY DISCHARGE DIAGNOSES  Diagnosis: Urinary retention  Assessment and Plan of Treatment: - You were found to be retaining urine during this admission.   - A bradley catheter was placed to drain your urine  - Most recently changed on 1/4/2020.   - You will be discharged with bradley catheter, further treatment at discretion of medical team at rehab.    Diagnosis: Hypokalemia  Assessment and Plan of Treatment: - Take potassium and magnesium supplements daily  - Additional blood work to check electrolytes can be done at discretion of medical team at rehab.

## 2020-01-14 NOTE — DISCHARGE NOTE NURSING/CASE MANAGEMENT/SOCIAL WORK - PATIENT PORTAL LINK FT
You can access the FollowMyHealth Patient Portal offered by [facility] by registering at the following website: [email or web address]. By joining [facility]’s FollowMyHealth portal, you will also be able to view your health information using other applications (apps) compatible with our system.

## 2020-01-14 NOTE — PROGRESS NOTE ADULT - SUBJECTIVE AND OBJECTIVE BOX
Patient is a 85y old  Male who presents with a chief complaint of desaturation (13 Jan 2020 15:05)    Patient seen and examined at bedside. Patient lethargic with wet-sounding respirations, no respiratory distress.      ALLERGIES:  No Known Allergies    MEDICATIONS  (STANDING):  BACItracin   Ointment 1 Application(s) Topical daily  dextrose 5%. 1000 milliLiter(s) (50 mL/Hr) IV Continuous <Continuous>  dextrose 50% Injectable 12.5 Gram(s) IV Push once  dextrose 50% Injectable 25 Gram(s) IV Push once  dextrose 50% Injectable 25 Gram(s) IV Push once  doxazosin 2 milliGRAM(s) Oral at bedtime  enoxaparin Injectable 40 milliGRAM(s) SubCutaneous daily  insulin glargine Injectable (LANTUS) 8 Unit(s) SubCutaneous at bedtime  insulin lispro (HumaLOG) corrective regimen sliding scale   SubCutaneous four times a day before meals  lacosamide 200 milliGRAM(s) Oral two times a day  levETIRAcetam  Solution 1500 milliGRAM(s) Enteral Tube two times a day  magnesium sulfate  IVPB 1 Gram(s) IV Intermittent once  polyethylene glycol 3350 17 Gram(s) Oral daily  potassium chloride   Powder 40 milliEquivalent(s) Oral once  potassium chloride   Powder 40 milliEquivalent(s) Enteral Tube daily  potassium chloride  10 mEq/100 mL IVPB 10 milliEquivalent(s) IV Intermittent every 1 hour  sertraline 75 milliGRAM(s) Oral daily    MEDICATIONS  (PRN):  acetaminophen    Suspension .. 650 milliGRAM(s) Enteral Tube every 6 hours PRN Temp greater or equal to 38C (100.4F), Mild Pain (1 - 3)  albuterol/ipratropium for Nebulization 3 milliLiter(s) Nebulizer every 6 hours PRN Bronchospasm  bisacodyl 5 milliGRAM(s) Oral every 12 hours PRN Constipation  dextrose 40% Gel 15 Gram(s) Oral once PRN Blood Glucose LESS THAN 70 milliGRAM(s)/deciliter  glucagon  Injectable 1 milliGRAM(s) IntraMuscular once PRN Glucose LESS THAN 70 milligrams/deciliter  glycopyrrolate Injectable 0.2 milliGRAM(s) IV Push every 8 hours PRN secretions  guaiFENesin   Syrup  (Sugar-Free) 200 milliGRAM(s) Oral every 6 hours PRN congestion    Vital Signs Last 24 Hrs  T(F): 98.7 (14 Jan 2020 05:00), Max: 98.9 (13 Jan 2020 16:12)  HR: 77 (14 Jan 2020 05:00) (77 - 107)  BP: 114/61 (14 Jan 2020 05:00) (99/61 - 114/61)  RR: 15 (14 Jan 2020 05:00) (15 - 16)  SpO2: 96% (14 Jan 2020 05:00) (94% - 96%)    I&O's Summary  13 Jan 2020 07:01  -  14 Jan 2020 07:00  --------------------------------------------------------  IN: 1586 mL / OUT: 1200 mL / NET: 386 mL    PHYSICAL EXAM:  GENERAL: NAD, leaning to left  HEAD:  Atraumatic, Normocephalic  EYES: EOMI  ENMT: Moist mucous membranes  CHEST/LUNG: Clear to auscultation bilaterally, good air entry, non-labored breathing. +Upper airway secretions  HEART: +S1/S2  ABDOMEN: Soft, Nontender, Nondistended; +PEG  VASCULAR: Normal pulses, Normal capillary refill, no LE edema  EXTREMITIES: No calf tenderness, No cyanosis  SKIN: Warm, Intact  NERVOUS SYSTEM:  Lethargic    LABS:                        12.5   13.45 )-----------( 402      ( 12 Jan 2020 05:30 )             38.6     01-14    138  |  97  |  20  ----------------------------<  155  2.5   |  32  |  0.68    Ca    8.7      14 Jan 2020 06:30  Mg     1.5     01-14    eGFR if Non African American: 87 mL/min/1.73M2 (01-14-20 @ 06:30)  eGFR if African American: 101 mL/min/1.73M2 (01-14-20 @ 06:30)    POCT Blood Glucose.: 157 mg/dL (14 Jan 2020 06:54)  POCT Blood Glucose.: 228 mg/dL (13 Jan 2020 21:36)  POCT Blood Glucose.: 165 mg/dL (13 Jan 2020 16:54)  POCT Blood Glucose.: 217 mg/dL (13 Jan 2020 11:29)    12-29 [id number] 8.4    RADIOLOGY & ADDITIONAL TESTS:    Care Discussed with Consultants/Other Providers:

## 2020-01-14 NOTE — DISCHARGE NOTE PROVIDER - NSDCMRMEDTOKEN_GEN_ALL_CORE_FT
acetaminophen 160 mg/5 mL oral liquid: 20 milliliter(s) orally every 6 hours, As Needed  aMILoride 5 mg oral tablet: 2 tab(s) orally 0800,1400,2200  aMILoride 5 mg oral tablet: 1 tab(s) by gastrostomy tube every 8 hours  Aurophen 160 mg/5 mL oral suspension: 20.31 milliliter(s) orally every 6 hours, As needed, Moderate Pain (4 - 6), Severe Pain (7 - 10)  Deltasone 20 mg oral tablet: 1 tab(s) orally once a day   (WEEK 12/2 - 12/18)  Deltasone 20 mg oral tablet: 2 tab(s) by gastrostomy tube once a day   WEEK 1 (12/6 to 12/11, got first dose of 40 while in the hospital))  DuoNeb 0.5 mg-2.5 mg/3 mL inhalation solution: inhaled every 8 hours  Keppra 100 mg/mL oral solution: 15 milliliter(s) orally 2 times a day  Keppra 100 mg/mL oral solution: 1500 milligram(s) by gastrostomy tube 2 times a day  Klor-Con M20 oral tablet, extended release: 2 tab(s) by gastrostomy tube once a day  Mag-Ox 400 oral tablet: 2 tab(s) orally 2 times a day (with meals)  magnesium oxide 400 mg (241.3 mg elemental magnesium) oral tablet: 1 tab(s) by gastrostomy tube 2 times a day  MiraLax oral powder for reconstitution: by gastrostomy tube once a day  omeprazole 40 mg oral powder for reconstitution: 40 milligram(s) by gastrostomy tube once a day  pantoprazole 40 mg oral delayed release tablet: 1 tab(s) orally 2 times a day   polyethylene glycol 3350 oral powder for reconstitution: 17 gram(s) orally 2 times a day  potassium chloride 20 mEq/15 mL oral liquid: 30 milliliter(s) orally once a day  predniSONE 10 mg oral tablet: 1 tab(s) orally once a day   WEEK 3 (12/19 - 12/25)  simethicone 80 mg oral tablet, chewable: 1 tab(s) orally every 6 hours, As needed, Gas  simethicone 80 mg oral tablet, chewable: 1 tab(s) by gastrostomy tube every 6 hours, As Needed  sodium chloride 1 g oral tablet: 1 tab(s) by gastrostomy tube every 8 hours  sodium chloride 1 g oral tablet: 1 tab(s) orally 3 times a day  Trandate 100 mg oral tablet: 1 tab(s) orally every 8 hours  Trandate 100 mg oral tablet: 1 tab(s) by gastrostomy tube every 8 hours  Vimpat 200 mg oral tablet: 1 tab(s) orally 2 times a day  Vimpat 200 mg oral tablet: 1 tab(s) by gastrostomy tube 2 times a day  Zoloft 50 mg oral tablet: 75 milligram(s) by gastrostomy tube once a day  Zoloft 50 mg oral tablet: 1 tab(s) orally once a day acetaminophen 160 mg/5 mL oral suspension: 20.31 milliliter(s) orally every 6 hours, As needed, Temp greater or equal to 38C (100.4F), Mild Pain (1 - 3)  doxazosin 2 mg oral tablet: 1 tab(s) orally once a day (at bedtime)  insulin glargine: 8 unit(s) subcutaneous once a day (at bedtime)  ipratropium-albuterol 0.5 mg-2.5 mg/3 mLinhalation solution: 3 milliliter(s) inhaled every 6 hours, As needed, Bronchospasm  lacosamide 200 mg oral tablet: 1 tab(s) orally 2 times a day  levETIRAcetam 100 mg/mL oral solution: 15 milliliter(s) orally 2 times a day  magnesium oxide 400 mg (241.3 mg elemental magnesium) oral tablet: 1 tab(s) by gastrostomy tube 2 times a day  MiraLax oral powder for reconstitution: by gastrostomy tube once a day  potassium chloride 20 mEq oral powder for reconstitution: 2 packet(s) orally once a day  sertraline 25 mg oral tablet: 3 tab(s) orally once a day

## 2020-01-14 NOTE — PROGRESS NOTE ADULT - ASSESSMENT
85M hx of HTN, remote prostate ca (treated s/p seeds/RT), encephalitis with dysphagia and PEG status, seizure d/o presenting with sepsis.     Dispo: Pt will be discharged to subacute rehab for comfort care. Pt's family would like pt to go to [facility], pending financial processing. Palliative and hospice teams following. Daughter obtaining Medicaid Letter of Retention from elder   Pall:  It is unlikely that [facility] will be available.  Daughter is aware and is now talking about [facility]    Acute hypoxic respiratory failure: 2/2 Coronavirus and superimposed multifocal PNA  - s/p five day course of Vanco and meropenem. Abx discontinued on 1/3/20  - continue O2 support PRN -- currently tolerating room air  - peg feeds continued , aspiration precautions    Hypokalemia/Hypomagnesemia  - Today K is 2.5, Mag 1.5  - Will replete and recheck BMP 1500  - monitor     DM type 2 with hyperglycemia: HbA1C 8.4%  - continue ISS and lantus 8unit qHS  - monitor fingersticks   - continue tube feeds    Urinary retention   - continue bradley    Moderate protein calorie malnutrition/ hypoalbuminemia  - continue bolus tube feeds  - dietician following     HTN hx:  - monitor blood pressure- normotensive   - hold meds     History of encephalitis  - Keppra and Vimpat    DVT prophylaxis  - lovenox

## 2020-01-14 NOTE — PROGRESS NOTE ADULT - ATTENDING COMMENTS
I have personally seen and examined patient on the above date.  I discussed the case with NITISH Mccabe and I agree with findings and plan as detailed per note above, which I have amended where appropriate.
Pt seen and examined at bedside.  Pt presented with sepsis and ARF with hypoxia secondary to Coronavirus with superimposed multifocal PNA  Now resolved  Dispo pending Hospice eval
Pt seen and examined at bedside.  Pt presented with sepsis and ARF with hypoxia secondary to Coronavirus with superimposed multifocal PNA  Now resolved  Persistently hypokalemic with Low Mag levels. Unclear etiology  Will place standing pottassium and magnesium for repletion  Dispo to subacute rehab for comfort care.    32mins spent discussing discharge instructions
I have personally seen and examined patient on the above date.  I discussed the case with Afia Mccabe and I agree with findings and plan as detailed per note above, which I have amended where appropriate.
I have personally seen and examined patient on the above date.  I discussed the case with NITISH Mccabe and I agree with findings and plan as detailed per note above, which I have amended where appropriate.
I have personally seen and examined patient on the above date.  I discussed the case with Jeanne Merino NP and I agree with findings and plan as detailed per note above, which I have amended where appropriate.
I have personally seen and examined patient on the above date.  I discussed the case with NAN Jauregui and I agree with findings and plan as detailed per note above, which I have amended where appropriate.

## 2020-01-14 NOTE — PROGRESS NOTE ADULT - SUBJECTIVE AND OBJECTIVE BOX
Follow-up Pall Progress Note    Leonidas Frausto MD  [phone]    No new respiratory events overnight.  Denies SOB/CP. Reviewed with daughter and Gay(HCN).    Medications:  Vital Signs Last 24 Hrs  T(C): 37.1 (14 Jan 2020 05:00), Max: 37.2 (13 Jan 2020 16:12)  T(F): 98.7 (14 Jan 2020 05:00), Max: 98.9 (13 Jan 2020 16:12)  HR: 77 (14 Jan 2020 05:00) (77 - 107)  BP: 114/61 (14 Jan 2020 05:00) (99/61 - 114/61)  BP(mean): --  RR: 15 (14 Jan 2020 05:00) (15 - 16)  SpO2: 96% (14 Jan 2020 05:00) (94% - 96%)          01-13 @ 07:01  -  01-14 @ 07:00  --------------------------------------------------------  IN: 1586 mL / OUT: 1200 mL / NET: 386 mL        LABS:    01-14    138  |  97  |  20  ----------------------------<  155<H>  2.5<LL>   |  32<H>  |  0.68    Ca    8.7      14 Jan 2020 06:30  Mg     1.5     01-14                CULTURES:        Physical Examination:  PULM: Clear to auscultation bilaterally, no significant sputum production  CVS: Regular rate and rhythm, no murmurs, rubs, or gallops  ABD: Soft, non-tender  EXT:  No clubbing, cyanosis, or edema  Neuro: remains confused  RADIOLOGY REVIEWED

## 2020-01-14 NOTE — PROGRESS NOTE ADULT - REASON FOR ADMISSION
desaturation

## 2020-01-31 ENCOUNTER — INPATIENT (INPATIENT)
Facility: HOSPITAL | Age: 85
LOS: 11 days | Discharge: HOSPICE | DRG: 871 | End: 2020-02-12
Attending: STUDENT IN AN ORGANIZED HEALTH CARE EDUCATION/TRAINING PROGRAM | Admitting: HOSPITALIST
Payer: MEDICARE

## 2020-01-31 VITALS
SYSTOLIC BLOOD PRESSURE: 126 MMHG | HEIGHT: 67 IN | WEIGHT: 158.07 LBS | DIASTOLIC BLOOD PRESSURE: 71 MMHG | HEART RATE: 111 BPM | RESPIRATION RATE: 24 BRPM | OXYGEN SATURATION: 88 % | TEMPERATURE: 97 F

## 2020-01-31 DIAGNOSIS — C61 MALIGNANT NEOPLASM OF PROSTATE: Chronic | ICD-10-CM

## 2020-01-31 DIAGNOSIS — J69.0 PNEUMONITIS DUE TO INHALATION OF FOOD AND VOMIT: ICD-10-CM

## 2020-01-31 DIAGNOSIS — Z90.49 ACQUIRED ABSENCE OF OTHER SPECIFIED PARTS OF DIGESTIVE TRACT: Chronic | ICD-10-CM

## 2020-01-31 DIAGNOSIS — Z93.1 GASTROSTOMY STATUS: Chronic | ICD-10-CM

## 2020-01-31 PROBLEM — J18.9 PNEUMONIA, UNSPECIFIED ORGANISM: Chronic | Status: ACTIVE | Noted: 2019-12-28

## 2020-01-31 PROBLEM — E87.1 HYPO-OSMOLALITY AND HYPONATREMIA: Chronic | Status: ACTIVE | Noted: 2019-12-28

## 2020-01-31 PROBLEM — G04.90 ENCEPHALITIS AND ENCEPHALOMYELITIS, UNSPECIFIED: Chronic | Status: ACTIVE | Noted: 2019-12-28

## 2020-01-31 PROBLEM — G40.909 EPILEPSY, UNSPECIFIED, NOT INTRACTABLE, WITHOUT STATUS EPILEPTICUS: Chronic | Status: ACTIVE | Noted: 2019-12-28

## 2020-01-31 PROBLEM — I10 ESSENTIAL (PRIMARY) HYPERTENSION: Chronic | Status: ACTIVE | Noted: 2019-12-28

## 2020-01-31 LAB
ALBUMIN SERPL ELPH-MCNC: 2.1 G/DL — LOW (ref 3.3–5)
ALP SERPL-CCNC: 134 U/L — HIGH (ref 40–120)
ALT FLD-CCNC: 32 U/L — SIGNIFICANT CHANGE UP (ref 10–45)
ANION GAP SERPL CALC-SCNC: 10 MMOL/L — SIGNIFICANT CHANGE UP (ref 5–17)
APPEARANCE UR: CLEAR — SIGNIFICANT CHANGE UP
APTT BLD: 33.3 SEC — SIGNIFICANT CHANGE UP (ref 27.5–36.3)
AST SERPL-CCNC: 40 U/L — SIGNIFICANT CHANGE UP (ref 10–40)
BACTERIA # UR AUTO: ABNORMAL /HPF
BASOPHILS # BLD AUTO: 0.06 K/UL — SIGNIFICANT CHANGE UP (ref 0–0.2)
BASOPHILS NFR BLD AUTO: 0.4 % — SIGNIFICANT CHANGE UP (ref 0–2)
BILIRUB SERPL-MCNC: 0.6 MG/DL — SIGNIFICANT CHANGE UP (ref 0.2–1.2)
BILIRUB UR-MCNC: NEGATIVE — SIGNIFICANT CHANGE UP
BUN SERPL-MCNC: 17 MG/DL — SIGNIFICANT CHANGE UP (ref 7–23)
CALCIUM SERPL-MCNC: 9.2 MG/DL — SIGNIFICANT CHANGE UP (ref 8.4–10.5)
CHLORIDE SERPL-SCNC: 93 MMOL/L — LOW (ref 96–108)
CO2 BLDV-SCNC: 26 MMOL/L — SIGNIFICANT CHANGE UP (ref 21–29)
CO2 SERPL-SCNC: 29 MMOL/L — SIGNIFICANT CHANGE UP (ref 22–31)
COLOR SPEC: YELLOW — SIGNIFICANT CHANGE UP
CREAT SERPL-MCNC: 0.9 MG/DL — SIGNIFICANT CHANGE UP (ref 0.5–1.3)
DIFF PNL FLD: ABNORMAL
EOSINOPHIL # BLD AUTO: 0 K/UL — SIGNIFICANT CHANGE UP (ref 0–0.5)
EOSINOPHIL NFR BLD AUTO: 0 % — SIGNIFICANT CHANGE UP (ref 0–6)
EPI CELLS # UR: SIGNIFICANT CHANGE UP
GLUCOSE BLDC GLUCOMTR-MCNC: 195 MG/DL — HIGH (ref 70–99)
GLUCOSE BLDC GLUCOMTR-MCNC: 206 MG/DL — HIGH (ref 70–99)
GLUCOSE SERPL-MCNC: 251 MG/DL — HIGH (ref 70–99)
GLUCOSE UR QL: NEGATIVE — SIGNIFICANT CHANGE UP
HCT VFR BLD CALC: 38 % — LOW (ref 39–50)
HGB BLD-MCNC: 12.5 G/DL — LOW (ref 13–17)
IMM GRANULOCYTES NFR BLD AUTO: 0.7 % — SIGNIFICANT CHANGE UP (ref 0–1.5)
INR BLD: 1.34 RATIO — HIGH (ref 0.88–1.16)
KETONES UR-MCNC: NEGATIVE — SIGNIFICANT CHANGE UP
LACTATE SERPL-SCNC: 2.3 MMOL/L — HIGH (ref 0.7–2)
LACTATE SERPL-SCNC: 2.8 MMOL/L — HIGH (ref 0.7–2)
LEUKOCYTE ESTERASE UR-ACNC: ABNORMAL
LYMPHOCYTES # BLD AUTO: 1.42 K/UL — SIGNIFICANT CHANGE UP (ref 1–3.3)
LYMPHOCYTES # BLD AUTO: 8.5 % — LOW (ref 13–44)
MCHC RBC-ENTMCNC: 29.1 PG — SIGNIFICANT CHANGE UP (ref 27–34)
MCHC RBC-ENTMCNC: 32.9 GM/DL — SIGNIFICANT CHANGE UP (ref 32–36)
MCV RBC AUTO: 88.4 FL — SIGNIFICANT CHANGE UP (ref 80–100)
MONOCYTES # BLD AUTO: 0.99 K/UL — HIGH (ref 0–0.9)
MONOCYTES NFR BLD AUTO: 5.9 % — SIGNIFICANT CHANGE UP (ref 2–14)
NEUTROPHILS # BLD AUTO: 14.2 K/UL — HIGH (ref 1.8–7.4)
NEUTROPHILS NFR BLD AUTO: 84.5 % — HIGH (ref 43–77)
NITRITE UR-MCNC: NEGATIVE — SIGNIFICANT CHANGE UP
NRBC # BLD: 0 /100 WBCS — SIGNIFICANT CHANGE UP (ref 0–0)
PCO2 BLDV: 43 MMHG — SIGNIFICANT CHANGE UP (ref 35–50)
PH BLDV: 7.38 — SIGNIFICANT CHANGE UP (ref 7.35–7.45)
PH UR: 8 — SIGNIFICANT CHANGE UP (ref 5–8)
PLATELET # BLD AUTO: 454 K/UL — HIGH (ref 150–400)
PO2 BLDV: 45 MMHG — SIGNIFICANT CHANGE UP (ref 25–45)
POTASSIUM SERPL-MCNC: 3.2 MMOL/L — LOW (ref 3.5–5.3)
POTASSIUM SERPL-SCNC: 3.2 MMOL/L — LOW (ref 3.5–5.3)
PROT SERPL-MCNC: 8.3 G/DL — SIGNIFICANT CHANGE UP (ref 6–8.3)
PROT UR-MCNC: 30 MG/DL
PROTHROM AB SERPL-ACNC: 15.2 SEC — HIGH (ref 10–12.9)
RBC # BLD: 4.3 M/UL — SIGNIFICANT CHANGE UP (ref 4.2–5.8)
RBC # FLD: 15.2 % — HIGH (ref 10.3–14.5)
RBC CASTS # UR COMP ASSIST: SIGNIFICANT CHANGE UP /HPF (ref 0–4)
SAO2 % BLDV: 73 % — SIGNIFICANT CHANGE UP (ref 67–88)
SODIUM SERPL-SCNC: 132 MMOL/L — LOW (ref 135–145)
SP GR SPEC: 1.01 — SIGNIFICANT CHANGE UP (ref 1.01–1.02)
UROBILINOGEN FLD QL: NEGATIVE — SIGNIFICANT CHANGE UP
WBC # BLD: 16.78 K/UL — HIGH (ref 3.8–10.5)
WBC # FLD AUTO: 16.78 K/UL — HIGH (ref 3.8–10.5)
WBC UR QL: SIGNIFICANT CHANGE UP /HPF (ref 0–5)

## 2020-01-31 PROCEDURE — 71045 X-RAY EXAM CHEST 1 VIEW: CPT | Mod: 26

## 2020-01-31 PROCEDURE — 93010 ELECTROCARDIOGRAM REPORT: CPT

## 2020-01-31 PROCEDURE — 99285 EMERGENCY DEPT VISIT HI MDM: CPT

## 2020-01-31 PROCEDURE — 99223 1ST HOSP IP/OBS HIGH 75: CPT

## 2020-01-31 RX ORDER — SODIUM CHLORIDE 9 MG/ML
1000 INJECTION, SOLUTION INTRAVENOUS
Refills: 0 | Status: DISCONTINUED | OUTPATIENT
Start: 2020-01-31 | End: 2020-02-12

## 2020-01-31 RX ORDER — INSULIN LISPRO 100/ML
VIAL (ML) SUBCUTANEOUS AT BEDTIME
Refills: 0 | Status: DISCONTINUED | OUTPATIENT
Start: 2020-01-31 | End: 2020-02-12

## 2020-01-31 RX ORDER — POTASSIUM CHLORIDE 20 MEQ
40 PACKET (EA) ORAL ONCE
Refills: 0 | Status: COMPLETED | OUTPATIENT
Start: 2020-01-31 | End: 2020-02-01

## 2020-01-31 RX ORDER — VANCOMYCIN HCL 1 G
1000 VIAL (EA) INTRAVENOUS ONCE
Refills: 0 | Status: COMPLETED | OUTPATIENT
Start: 2020-01-31 | End: 2020-01-31

## 2020-01-31 RX ORDER — SODIUM CHLORIDE 9 MG/ML
2200 INJECTION INTRAMUSCULAR; INTRAVENOUS; SUBCUTANEOUS ONCE
Refills: 0 | Status: COMPLETED | OUTPATIENT
Start: 2020-01-31 | End: 2020-01-31

## 2020-01-31 RX ORDER — DEXTROSE 50 % IN WATER 50 %
25 SYRINGE (ML) INTRAVENOUS ONCE
Refills: 0 | Status: DISCONTINUED | OUTPATIENT
Start: 2020-01-31 | End: 2020-02-12

## 2020-01-31 RX ORDER — ENOXAPARIN SODIUM 100 MG/ML
40 INJECTION SUBCUTANEOUS DAILY
Refills: 0 | Status: DISCONTINUED | OUTPATIENT
Start: 2020-01-31 | End: 2020-02-12

## 2020-01-31 RX ORDER — SODIUM CHLORIDE 9 MG/ML
1000 INJECTION INTRAMUSCULAR; INTRAVENOUS; SUBCUTANEOUS
Refills: 0 | Status: COMPLETED | OUTPATIENT
Start: 2020-01-31 | End: 2020-01-31

## 2020-01-31 RX ORDER — CEFEPIME 1 G/1
2000 INJECTION, POWDER, FOR SOLUTION INTRAMUSCULAR; INTRAVENOUS EVERY 8 HOURS
Refills: 0 | Status: DISCONTINUED | OUTPATIENT
Start: 2020-01-31 | End: 2020-02-06

## 2020-01-31 RX ORDER — ACETAMINOPHEN 500 MG
650 TABLET ORAL EVERY 6 HOURS
Refills: 0 | Status: DISCONTINUED | OUTPATIENT
Start: 2020-01-31 | End: 2020-02-12

## 2020-01-31 RX ORDER — CEFEPIME 1 G/1
2000 INJECTION, POWDER, FOR SOLUTION INTRAMUSCULAR; INTRAVENOUS ONCE
Refills: 0 | Status: COMPLETED | OUTPATIENT
Start: 2020-01-31 | End: 2020-01-31

## 2020-01-31 RX ORDER — DEXTROSE 50 % IN WATER 50 %
15 SYRINGE (ML) INTRAVENOUS ONCE
Refills: 0 | Status: DISCONTINUED | OUTPATIENT
Start: 2020-01-31 | End: 2020-02-12

## 2020-01-31 RX ORDER — PIPERACILLIN AND TAZOBACTAM 4; .5 G/20ML; G/20ML
3.38 INJECTION, POWDER, LYOPHILIZED, FOR SOLUTION INTRAVENOUS EVERY 8 HOURS
Refills: 0 | Status: DISCONTINUED | OUTPATIENT
Start: 2020-01-31 | End: 2020-01-31

## 2020-01-31 RX ORDER — LEVETIRACETAM 250 MG/1
1500 TABLET, FILM COATED ORAL
Refills: 0 | Status: DISCONTINUED | OUTPATIENT
Start: 2020-01-31 | End: 2020-02-12

## 2020-01-31 RX ORDER — SERTRALINE 25 MG/1
75 TABLET, FILM COATED ORAL DAILY
Refills: 0 | Status: DISCONTINUED | OUTPATIENT
Start: 2020-01-31 | End: 2020-02-12

## 2020-01-31 RX ORDER — INSULIN GLARGINE 100 [IU]/ML
8 INJECTION, SOLUTION SUBCUTANEOUS AT BEDTIME
Refills: 0 | Status: DISCONTINUED | OUTPATIENT
Start: 2020-01-31 | End: 2020-02-04

## 2020-01-31 RX ORDER — INSULIN LISPRO 100/ML
VIAL (ML) SUBCUTANEOUS
Refills: 0 | Status: DISCONTINUED | OUTPATIENT
Start: 2020-01-31 | End: 2020-02-12

## 2020-01-31 RX ORDER — POLYETHYLENE GLYCOL 3350 17 G/17G
17 POWDER, FOR SOLUTION ORAL DAILY
Refills: 0 | Status: DISCONTINUED | OUTPATIENT
Start: 2020-01-31 | End: 2020-02-12

## 2020-01-31 RX ORDER — LACOSAMIDE 50 MG/1
200 TABLET ORAL
Refills: 0 | Status: DISCONTINUED | OUTPATIENT
Start: 2020-01-31 | End: 2020-02-12

## 2020-01-31 RX ORDER — METRONIDAZOLE 500 MG
500 TABLET ORAL EVERY 8 HOURS
Refills: 0 | Status: DISCONTINUED | OUTPATIENT
Start: 2020-01-31 | End: 2020-02-02

## 2020-01-31 RX ORDER — GLUCAGON INJECTION, SOLUTION 0.5 MG/.1ML
1 INJECTION, SOLUTION SUBCUTANEOUS ONCE
Refills: 0 | Status: DISCONTINUED | OUTPATIENT
Start: 2020-01-31 | End: 2020-02-12

## 2020-01-31 RX ORDER — DEXTROSE 50 % IN WATER 50 %
12.5 SYRINGE (ML) INTRAVENOUS ONCE
Refills: 0 | Status: DISCONTINUED | OUTPATIENT
Start: 2020-01-31 | End: 2020-02-12

## 2020-01-31 RX ORDER — DOXAZOSIN MESYLATE 4 MG
2 TABLET ORAL AT BEDTIME
Refills: 0 | Status: DISCONTINUED | OUTPATIENT
Start: 2020-01-31 | End: 2020-02-12

## 2020-01-31 RX ORDER — IPRATROPIUM/ALBUTEROL SULFATE 18-103MCG
3 AEROSOL WITH ADAPTER (GRAM) INHALATION EVERY 6 HOURS
Refills: 0 | Status: DISCONTINUED | OUTPATIENT
Start: 2020-01-31 | End: 2020-02-12

## 2020-01-31 RX ADMIN — CEFEPIME 100 MILLIGRAM(S): 1 INJECTION, POWDER, FOR SOLUTION INTRAMUSCULAR; INTRAVENOUS at 22:24

## 2020-01-31 RX ADMIN — CEFEPIME 100 MILLIGRAM(S): 1 INJECTION, POWDER, FOR SOLUTION INTRAMUSCULAR; INTRAVENOUS at 15:42

## 2020-01-31 RX ADMIN — SODIUM CHLORIDE 100 MILLILITER(S): 9 INJECTION INTRAMUSCULAR; INTRAVENOUS; SUBCUTANEOUS at 18:13

## 2020-01-31 RX ADMIN — Medication 1000 MILLIGRAM(S): at 15:41

## 2020-01-31 RX ADMIN — CEFEPIME 2000 MILLIGRAM(S): 1 INJECTION, POWDER, FOR SOLUTION INTRAMUSCULAR; INTRAVENOUS at 17:15

## 2020-01-31 RX ADMIN — SODIUM CHLORIDE 2200 MILLILITER(S): 9 INJECTION INTRAMUSCULAR; INTRAVENOUS; SUBCUTANEOUS at 17:15

## 2020-01-31 RX ADMIN — INSULIN GLARGINE 8 UNIT(S): 100 INJECTION, SOLUTION SUBCUTANEOUS at 23:49

## 2020-01-31 RX ADMIN — Medication 250 MILLIGRAM(S): at 14:08

## 2020-01-31 RX ADMIN — Medication 100 MILLIGRAM(S): at 22:25

## 2020-01-31 RX ADMIN — Medication 2 MILLIGRAM(S): at 22:23

## 2020-01-31 RX ADMIN — SODIUM CHLORIDE 2200 MILLILITER(S): 9 INJECTION INTRAMUSCULAR; INTRAVENOUS; SUBCUTANEOUS at 13:58

## 2020-01-31 RX ADMIN — LACOSAMIDE 200 MILLIGRAM(S): 50 TABLET ORAL at 22:12

## 2020-01-31 RX ADMIN — Medication 3 MILLILITER(S): at 21:33

## 2020-01-31 RX ADMIN — LEVETIRACETAM 1500 MILLIGRAM(S): 250 TABLET, FILM COATED ORAL at 22:07

## 2020-01-31 NOTE — PROVIDER CONTACT NOTE (CRITICAL VALUE NOTIFICATION) - ASSESSMENT
Continue fluid resuscitation and repeat lactate in the AM - Np says he will put in the order to draw the lactate with AM labs

## 2020-01-31 NOTE — ED PROVIDER NOTE - PMH
Encephalitis    HTN (hypertension)    Hypertension    Hyponatremia    Pneumonia    Prostate cancer  had seed implant & radiation ( 2001 )  Seizure disorder

## 2020-01-31 NOTE — H&P ADULT - NSICDXPASTSURGICALHX_GEN_ALL_CORE_FT
PAST SURGICAL HISTORY:  PC (prostate cancer)     PEG (percutaneous endoscopic gastrostomy) status     S/P cholecystectomy

## 2020-01-31 NOTE — ED ADULT TRIAGE NOTE - RESPIRATORY RATE (BREATHS/MIN)
Subjective:   Amber Law is a 61 year old female  who presents with her  Lower back  pain which is described as aching and  Muscle spasms .  Patient denies numbness/tingling which does not radiate to legs.   She  reports that the pain has been present for 4 days.  She  reports no other  back symptoms.  Pain was first noted after doing some errands .   Taking no  medications for this pain and symptoms are not  relieved.  The back problem is not work related.  She  denies fever, bladder or bowel incontinence, or weakness to the hips or legs.   denies any chest pain, sob, palpitations or any other cardiovascular symptoms;  Problem no 2: having some clear nasal congestion and congestive cough for one day; no fever,chills, no purulence  Problem no 3: she has h.o htn and is on meds and wants refills on meds and could not see pcp and ran out of meds today;  denies any respiratory distress or symptoms;  denies any gastrointestinal symptoms;  denies any genitourinary symptoms;  denies any headaches, visual changes or any other neurologic symptoms;    Past Medical History:   Diagnosis Date   • Depressive disorder, not elsewhere classified     Sees Dr. Tipton [phone]   • Elevated LFTs     Mild   • Epistaxis     Chronic/recurrent   • Other and unspecified hyperlipidemia    • Overweight(278.02)    • RAD (reactive airway disease)    • Sarcoidosis    • Tobacco abuse    • Unspecified essential hypertension      Social History     Tobacco Use   Smoking Status Current Every Day Smoker   • Packs/day: 0.20   Smokeless Tobacco Never Used   Tobacco Comment    1/4 pack every day         Objective:  Back:  General:  Healthy, alert, in no distress         HEAD: Normocephalic.  Atraumatic. No masses, lesions, tenderness or abnormalities noted  EYES:  PERRLA, EOMI, normal conjunctiva  EARS:  TM's clear bilaterally, NL light reflex, normal external canals.  NOSE:  Nares normal. Septum midline. Mucosa normal.  No erythema. Clear  congestion   THROAT: mmm, no tonsillar hypertrophy, no erythema, exudates or petechiae noted  NECK: supple, no lymphadenopathy  LUNGS:  Chest symmetric with normal A/P diameter.  Lungs are clear to auscultation.  There is good aeration.  There are no wheezes, rales or rhonchi noted.  HEART:  S1,S2, regular rate and rhythm no murmur,S3, or S4 auscultated   NEUROLOGIC: Cranial nerves 2-12 intact.  DTR's 2+ and are bilaterally symmetrical in all extremities.  Motor Strength 5/5 in all extremities.  Cerebellar intact; Mental status normal.  Gait and station normal.  Back:         No CVA  tenderness, normal curvature, no pain to palpation along cervical, thoracic and lumbar spine and paralumbar muscular pain to palpation. Neg meningeal signs         Flexion at waist is 40 degrees.         Toe/heel walk is normal.         DTR's at ankle jerk  Right:2+                              Left: 2+         Straight leg raising Right: negative                              Left:  negative         Assessment:    Essential hypertension  Plan: hydrALAZINE (APRESOLINE) 25 MG tablet, atenolol        (TENORMIN) 100 MG tablet, amLODIPine (NORVASC)         10 MG tablet        Follow up with pcp in 3-4 days    Acute URI  (primary encounter diagnosis)  Plan:   Med as ordered    Lumbar sprain, initial encounter  Plan:   The diagnosis and findings were explained to the patient.  The plan of care is rest, ice, heat, medication as ordered below.  Follow up is to be prn.  Patient instructions - AVS given to patient.  >if symptoms are not improved in 3-4  days or if they worsen should recheck immediately either with pmd/wic  >No driving or using power tools or climbing ladders while on the narcotic medication/ muscle relaxant medication.   Seek medical attention immediately if bowel or increased bladder incontinence, urinary retention, saddle anesthesia, or increased LBP occurs.   >Ice to the affected area four times a day with application for no  longer than 15 minutes, first 24-48 hours. Re-warm the affected area by removal of the ice. Repeat application of the ice after the re-warming. Heat to the affected area qid after 2 days of icing as above, with applications for no longer than 15 minutes.     24

## 2020-01-31 NOTE — ED CLERICAL - NS ED CLERK NOTE PRE-ARRIVAL INFORMATION; ADDITIONAL PRE-ARRIVAL INFORMATION
This patient is enrolled in a readmission reduction program and has active care navigation. This patient can be followed up by the care navigation team within 24 hours. To arrange close follow-up or to obtain additional clinical information about this patient, please call the contact number above.

## 2020-01-31 NOTE — H&P ADULT - HISTORY OF PRESENT ILLNESS
Unable to obtain history from patient due to encephalopathy. All history obtained from children, at bedside.    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 Patient is not more confused than basline (he is usually A/O x2). Patient was recently admitted with Coronavirus and multifocal pneumonia requiring antibiotics. He now returns for above.

## 2020-01-31 NOTE — ED PROVIDER NOTE - PROGRESS NOTE DETAILS
NAN Lopez: spoke with daughter, Rebekah, patient is DNR/DNI, she wants to keep him comfortable and have him on home hospice instead of at the nursing. Patient also has LLL pneumonia, she wants him admitted and treated for the pneumonia before he is placed on home hospice. Spoke with casemanjatin Ferrer, she states since family wants him admitted, they will address that once he is admitted.

## 2020-01-31 NOTE — ED PROVIDER NOTE - OBJECTIVE STATEMENT
84 y/o M with PMH of viral encephalitis, seizure, HTN, prostate cancer, DM was BIB EMS from [facility] for SOB and hypoxia 84-85% on RA after an episode of NBNB emesis after a PEG feeding this morning. Per NH papers 84 y/o M with PMH of viral encephalitis, seizure, HTN, prostate cancer, DM was BIB EMS from [facility] for SOB and hypoxia 84-85% on RA after an episode of NBNB emesis after a PEG feeding this morning. Patients daughter Rebekah is at bedside with him, she confirms his DNI/DNR status

## 2020-01-31 NOTE — ED PROVIDER NOTE - CARE PLAN
Principal Discharge DX:	Aspiration pneumonia, unspecified aspiration pneumonia type, unspecified laterality, unspecified part of lung B/L pleural effusions, left appears unchanged from prior admission, right Pl effusion now moderate as seen on CXR in ED  s/p 2 x IR thoracentesis drainage on last admission  Pt denies SOB, cough, chest pain  Pt to go for HD tomorrow  Pulm consult Dr. Elena B/L pleural effusions, left appears unchanged from prior admission, right Pl effusion now moderate as seen on CXR in ED  s/p 2 x IR thoracentesis drainage on last admission  Pt denies SOB, cough, chest pain  Pt to go for HD tomorrow  Pulm consult Dr. Elena called

## 2020-01-31 NOTE — H&P ADULT - ASSESSMENT
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 s/p PEG and left sided weakness, seizure, HTN, prostate ca, chronic urinary retention with chronic Bradley, DM2, presents from [facility] for shortness of breath and hypoxia (84-85% on RA) after episode of vomiting PEG feeds, found to have severe sepsis secondary to left lower lobe aspiration pneumonia    #Severe sepsis secondary to left lower lobe aspiration pneumonia due to vomited food  #Acute hypoxic respiratory failure (84-85% RA at rehab) secondary to above  -Start Zosyn, would eventually change to PO Augmentin assuming cultures are negative   -Aspiration precautions  -Continue PEG feeds - nutrition consult    #Dysphagia s/p PEG  -c/w PEG feeds, aspiration precautions    #Seizure disorder  -c/w keppra and Vimpat  -will check levels    #Hypokalemia: Replete    #DM2 on insulin  -Check A1C  -c/w home insulin dose while on PEG feeds  -ISS as needed    #Urinary retention with chronic bradley  -Continue for comfort reasons, as well    #Advanced Care Planning  -MOLST reviewed. Patient is DNR/DNI. Family interested in eventually d/c home with home hospice (instead of comfort care back to rehab). Family to discuss if they can hire 24 help, which would be necessary in caring for this patient. Guarded prognosis.     IMPROVE VTE Individual Risk Assessment          RISK                                                          Points  [  ] Previous VTE                                                3  [  ] Thrombophilia                                             2  [x  ] Lower limb paralysis                                   2        (unable to hold up >15 seconds)    [  ] Current Cancer                                             2         (within 6 months)  [ x ] Immobilization > 24 hrs                              1  [  ] ICU/CCU stay > 24 hours                             1  [ x ] Age > 60                                                         1    IMPROVE VTE Score: 4, Lovenox ordered 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 s/p PEG and left sided weakness, seizure, HTN, prostate ca, chronic urinary retention with chronic Bradley, DM2, presents from [facility] for shortness of breath and hypoxia (84-85% on RA) after episode of vomiting PEG feeds, found to have severe sepsis secondary to left lower lobe aspiration pneumonia    #Severe sepsis secondary to left lower lobe aspiration pneumonia due to vomited food  #Acute hypoxic respiratory failure (84-85% RA at rehab) secondary to above  -Start Zosyn, would eventually change to PO Augmentin assuming cultures are negative   -Aspiration precautions  -Continue PEG feeds - nutrition consult  -I have completed the sepsis focused exam. Awaiting repeat lactate	    #Dysphagia s/p PEG  -c/w PEG feeds, aspiration precautions    #Seizure disorder  -c/w keppra and Vimpat  -will check levels    #Hypokalemia: Replete    #DM2 on insulin  -Check A1C  -c/w home insulin dose while on PEG feeds  -ISS as needed    #Urinary retention with chronic bradley  -Continue for comfort reasons, as well    #Advanced Care Planning  -MOLST reviewed. Patient is DNR/DNI. Family interested in eventually d/c home with home hospice (instead of comfort care back to rehab). Family to discuss if they can hire 24 help, which would be necessary in caring for this patient. Guarded prognosis.     IMPROVE VTE Individual Risk Assessment          RISK                                                          Points  [  ] Previous VTE                                                3  [  ] Thrombophilia                                             2  [x  ] Lower limb paralysis                                   2        (unable to hold up >15 seconds)    [  ] Current Cancer                                             2         (within 6 months)  [ x ] Immobilization > 24 hrs                              1  [  ] ICU/CCU stay > 24 hours                             1  [ x ] Age > 60                                                         1    IMPROVE VTE Score: 4, Lovenox ordered

## 2020-01-31 NOTE — PATIENT PROFILE ADULT - NSASFUNCLEVELADLTOILET_GEN_A_NUR
4 = completely dependent
I have personally performed a face to face diagnostic evaluation on this patient. I have reviewed the ACP note and agree with the history, exam and plan of care, except as noted.

## 2020-01-31 NOTE — ED PROVIDER NOTE - ATTENDING CONTRIBUTION TO CARE
86 y/o M with PMH of viral encephalitis, seizure, HTN, prostate cancer, DM was BIB EMS from [facility] for SOB and hypoxia 84-85% on RA after an episode of NBNB emesis after a PEG feeding this morning. Patients daughter Rebekah is at bedside with him, she confirms his DNI/DNR status. PE as noted above. labs reviewed, sepsis fluids started, IV abx started to cover aspiration pneumonia, CXR results reviewed-- LLL pneumonia. See progress note for GOC conversation. patient will be admitted  case d/w dr iverson for admission  informed for possible conversion to hospice care  Dr. Ji:  I have reviewed and discussed with the PA/ resident the case specifics, including the history, physical assessment, evaluation, conclusion, laboratory results, and medical plan. I agree with the contents, and conclusions. I have personally examined, and interviewed the patient.

## 2020-01-31 NOTE — ED ADULT TRIAGE NOTE - CHIEF COMPLAINT QUOTE
Pt BIB EMS from [facility] with c/o difficulty breathing starting at 1115 today. Pt also more lethargic than normal per daughter. Pt currently being treated for UTI. Facility reported episode of vomiting. Finger stick 298 en route.

## 2020-01-31 NOTE — H&P ADULT - NSHPLABSRESULTS_GEN_ALL_CORE
.                            12.5   16.78 )-----------( 454      ( 2020 13:50 )             38.0     Lactate, Blood: 2.3 mmol/L ( @ 13:50)        132  |  93  |  17  ----------------------------<  251  3.2   |  29  |  0.90    Ca    9.2      2020 13:50    TPro  8.3  /  Alb  2.1  /  TBili  0.6  /  DBili  x   /  AST  40  /  ALT  32  /  AlkPhos  134      PT/INR - ( 2020 13:50 )   PT: 15.2 sec;   INR: 1.34 ratio         PTT - ( 2020 13:50 )  PTT:33.3 sec         [id number] 8.4      13:50 - VBG - pH: 7.38  | pCO2: 43    | pO2: 45    | Lactate:            Urinalysis Basic - ( 2020 13:55 )    Color: Yellow / Appearance: Clear / S.010 / pH: x  Gluc: x / Ketone: Negative  / Bili: Negative / Urobili: Negative   Blood: x / Protein: 30 mg/dL / Nitrite: Negative   Leuk Esterase: Trace / RBC: 0-4 /HPF / WBC 3-5 /HPF   Sq Epi: x / Non Sq Epi: Neg.-Few / Bacteria: TNTC /HPF    < from: Xray Chest 1 View AP/PA (20 @ 14:27) >    IMPRESSION: Airspace disease left lung base.    < end of copied text >    EKG: Sinus tachycardia at 115, inferior Q waves unchanged from EKG on 20, reviewed by me personally     Case d/w Dr Ji in the ED

## 2020-01-31 NOTE — H&P ADULT - NSHPPHYSICALEXAM_GEN_ALL_CORE
Vital Signs Last 24 Hrs  T(F): 98.8 (31 Jan 2020 13:47), Max: 98.8 (31 Jan 2020 13:47)  HR: 112 (31 Jan 2020 15:30) (106 - 112)  BP: 158/79 (31 Jan 2020 15:30) (126/71 - 165/90)  RR: 22 (31 Jan 2020 15:30) (22 - 24)  SpO2: 100% (31 Jan 2020 15:30) (88% - 100%)    PHYSICAL EXAM:  GENERAL: Lethargic, responds to simple commands but then falls asleep, well-groomed  HEAD:  Atraumatic, Normocephalic  EYES: EOMI, conjunctiva and sclera clear  ENMT: DRY mucous membranes, no thrush  NECK: Supple, No JVD  CHEST/LUNG: Coarse bilateral breath sounds, tachypneic   HEART: tachycardic; S1/S2, No murmur  ABDOMEN: Soft, Nontender, Nondistended; Bowel sounds present; PEG+  VASCULAR: Normal pulses, Normal capillary refill  EXTREMITIES: No calf tenderness, No cyanosis, No edema, moves right leg, left leg is contracted, left arm paresis, right arm slight movement  LYMPH: Normal; No lymphadenopathy noted  SKIN: Warm, Intact, scattered bruising  PSYCH: lethargic, poor concentration  NERVOUS SYSTEM:  A/O x1, doesn't cooperate with most of neuro exam, left sided weakness

## 2020-01-31 NOTE — ED ADULT NURSE NOTE - OBJECTIVE STATEMENT
85 yr old male BIB EMS from [facility] for evaluation of shortness of breath and hypoxia.  Per Paramedic, pt's O2  was 84-85% at RA after episode of NBNB. Pt's O2 100% on Nonrebreather. Pt's daughter Rebekah at bedside states pt had one episode of vomiting after Peg feeding this morning at the NH. Rectal Temp 98.8. PMHX: HTN, Prostate CA, DM, Viral Encephalopathy.

## 2020-01-31 NOTE — H&P ADULT - NSICDXPASTMEDICALHX_GEN_ALL_CORE_FT
PAST MEDICAL HISTORY:  Encephalitis     HTN (hypertension)     Hypertension     Hyponatremia     Pneumonia     Prostate cancer had seed implant & radiation ( 2001 )    Seizure disorder

## 2020-01-31 NOTE — ED PROVIDER NOTE - CLINICAL SUMMARY MEDICAL DECISION MAKING FREE TEXT BOX
84 y/o M with PMH of viral encephalitis, seizure, HTN, prostate cancer, DM was BIB EMS from [facility] for SOB and hypoxia 84-85% on RA after an episode of NBNB emesis after a PEG feeding this morning. Patients daughter Rebekah is at bedside with him, she confirms his DNI/DNR status. PE as noted above. labs reviewed, sepsis fluids started, IV abx started to cover aspiration pneumonia, CXR pending, reassess 86 y/o M with PMH of viral encephalitis, seizure, HTN, prostate cancer, DM was BIB EMS from [facility] for SOB and hypoxia 84-85% on RA after an episode of NBNB emesis after a PEG feeding this morning. Patients daughter Rebekah is at bedside with him, she confirms his DNI/DNR status. PE as noted above. labs reviewed, sepsis fluids started, IV abx started to cover aspiration pneumonia, CXR results reviewed-- LLL pneumonia. See progress note for GOC conversation. patient will be admitted

## 2020-02-01 LAB
ANION GAP SERPL CALC-SCNC: 10 MMOL/L — SIGNIFICANT CHANGE UP (ref 5–17)
BASOPHILS # BLD AUTO: 0.08 K/UL — SIGNIFICANT CHANGE UP (ref 0–0.2)
BASOPHILS NFR BLD AUTO: 0.3 % — SIGNIFICANT CHANGE UP (ref 0–2)
BUN SERPL-MCNC: 16 MG/DL — SIGNIFICANT CHANGE UP (ref 7–23)
CALCIUM SERPL-MCNC: 8.2 MG/DL — LOW (ref 8.4–10.5)
CHLORIDE SERPL-SCNC: 100 MMOL/L — SIGNIFICANT CHANGE UP (ref 96–108)
CO2 SERPL-SCNC: 26 MMOL/L — SIGNIFICANT CHANGE UP (ref 22–31)
CREAT SERPL-MCNC: 0.73 MG/DL — SIGNIFICANT CHANGE UP (ref 0.5–1.3)
EOSINOPHIL # BLD AUTO: 0.02 K/UL — SIGNIFICANT CHANGE UP (ref 0–0.5)
EOSINOPHIL NFR BLD AUTO: 0.1 % — SIGNIFICANT CHANGE UP (ref 0–6)
GLUCOSE BLDC GLUCOMTR-MCNC: 121 MG/DL — HIGH (ref 70–99)
GLUCOSE BLDC GLUCOMTR-MCNC: 130 MG/DL — HIGH (ref 70–99)
GLUCOSE BLDC GLUCOMTR-MCNC: 133 MG/DL — HIGH (ref 70–99)
GLUCOSE BLDC GLUCOMTR-MCNC: 162 MG/DL — HIGH (ref 70–99)
GLUCOSE SERPL-MCNC: 191 MG/DL — HIGH (ref 70–99)
HBA1C BLD-MCNC: 7.8 % — HIGH (ref 4–5.6)
HCT VFR BLD CALC: 31.7 % — LOW (ref 39–50)
HGB BLD-MCNC: 10.1 G/DL — LOW (ref 13–17)
IMM GRANULOCYTES NFR BLD AUTO: 0.6 % — SIGNIFICANT CHANGE UP (ref 0–1.5)
LACTATE SERPL-SCNC: 1.8 MMOL/L — SIGNIFICANT CHANGE UP (ref 0.7–2)
LYMPHOCYTES # BLD AUTO: 1.86 K/UL — SIGNIFICANT CHANGE UP (ref 1–3.3)
LYMPHOCYTES # BLD AUTO: 7.4 % — LOW (ref 13–44)
MAGNESIUM SERPL-MCNC: 1.2 MG/DL — LOW (ref 1.6–2.6)
MCHC RBC-ENTMCNC: 28.5 PG — SIGNIFICANT CHANGE UP (ref 27–34)
MCHC RBC-ENTMCNC: 31.9 GM/DL — LOW (ref 32–36)
MCV RBC AUTO: 89.3 FL — SIGNIFICANT CHANGE UP (ref 80–100)
MONOCYTES # BLD AUTO: 1.08 K/UL — HIGH (ref 0–0.9)
MONOCYTES NFR BLD AUTO: 4.3 % — SIGNIFICANT CHANGE UP (ref 2–14)
NEUTROPHILS # BLD AUTO: 22.08 K/UL — HIGH (ref 1.8–7.4)
NEUTROPHILS NFR BLD AUTO: 87.3 % — HIGH (ref 43–77)
NRBC # BLD: 0 /100 WBCS — SIGNIFICANT CHANGE UP (ref 0–0)
PHOSPHATE SERPL-MCNC: 3.1 MG/DL — SIGNIFICANT CHANGE UP (ref 2.5–4.5)
PLATELET # BLD AUTO: 349 K/UL — SIGNIFICANT CHANGE UP (ref 150–400)
POTASSIUM SERPL-MCNC: 3 MMOL/L — LOW (ref 3.5–5.3)
POTASSIUM SERPL-SCNC: 3 MMOL/L — LOW (ref 3.5–5.3)
PROCALCITONIN SERPL-MCNC: 1.3 NG/ML — HIGH
RBC # BLD: 3.55 M/UL — LOW (ref 4.2–5.8)
RBC # FLD: 15.3 % — HIGH (ref 10.3–14.5)
SODIUM SERPL-SCNC: 136 MMOL/L — SIGNIFICANT CHANGE UP (ref 135–145)
WBC # BLD: 25.26 K/UL — HIGH (ref 3.8–10.5)
WBC # FLD AUTO: 25.26 K/UL — HIGH (ref 3.8–10.5)

## 2020-02-01 PROCEDURE — 99233 SBSQ HOSP IP/OBS HIGH 50: CPT

## 2020-02-01 RX ORDER — SODIUM CHLORIDE 9 MG/ML
500 INJECTION INTRAMUSCULAR; INTRAVENOUS; SUBCUTANEOUS ONCE
Refills: 0 | Status: COMPLETED | OUTPATIENT
Start: 2020-02-01 | End: 2020-02-01

## 2020-02-01 RX ORDER — MAGNESIUM SULFATE 500 MG/ML
1 VIAL (ML) INJECTION
Refills: 0 | Status: COMPLETED | OUTPATIENT
Start: 2020-02-01 | End: 2020-02-01

## 2020-02-01 RX ORDER — MAGNESIUM SULFATE 500 MG/ML
2 VIAL (ML) INJECTION EVERY 4 HOURS
Refills: 0 | Status: DISCONTINUED | OUTPATIENT
Start: 2020-02-01 | End: 2020-02-01

## 2020-02-01 RX ORDER — POTASSIUM CHLORIDE 20 MEQ
40 PACKET (EA) ORAL EVERY 4 HOURS
Refills: 0 | Status: COMPLETED | OUTPATIENT
Start: 2020-02-01 | End: 2020-02-01

## 2020-02-01 RX ADMIN — SERTRALINE 75 MILLIGRAM(S): 25 TABLET, FILM COATED ORAL at 11:25

## 2020-02-01 RX ADMIN — Medication 1: at 05:41

## 2020-02-01 RX ADMIN — CEFEPIME 100 MILLIGRAM(S): 1 INJECTION, POWDER, FOR SOLUTION INTRAMUSCULAR; INTRAVENOUS at 22:30

## 2020-02-01 RX ADMIN — LACOSAMIDE 200 MILLIGRAM(S): 50 TABLET ORAL at 05:38

## 2020-02-01 RX ADMIN — Medication 75 MILLIGRAM(S): at 11:02

## 2020-02-01 RX ADMIN — CEFEPIME 100 MILLIGRAM(S): 1 INJECTION, POWDER, FOR SOLUTION INTRAMUSCULAR; INTRAVENOUS at 07:47

## 2020-02-01 RX ADMIN — SODIUM CHLORIDE 250 MILLILITER(S): 9 INJECTION INTRAMUSCULAR; INTRAVENOUS; SUBCUTANEOUS at 05:38

## 2020-02-01 RX ADMIN — SODIUM CHLORIDE 100 MILLILITER(S): 9 INJECTION INTRAMUSCULAR; INTRAVENOUS; SUBCUTANEOUS at 05:26

## 2020-02-01 RX ADMIN — Medication 100 GRAM(S): at 11:01

## 2020-02-01 RX ADMIN — Medication 3 MILLILITER(S): at 04:11

## 2020-02-01 RX ADMIN — POLYETHYLENE GLYCOL 3350 17 GRAM(S): 17 POWDER, FOR SOLUTION ORAL at 11:23

## 2020-02-01 RX ADMIN — ENOXAPARIN SODIUM 40 MILLIGRAM(S): 100 INJECTION SUBCUTANEOUS at 11:23

## 2020-02-01 RX ADMIN — CEFEPIME 100 MILLIGRAM(S): 1 INJECTION, POWDER, FOR SOLUTION INTRAMUSCULAR; INTRAVENOUS at 13:52

## 2020-02-01 RX ADMIN — Medication 3 MILLILITER(S): at 10:01

## 2020-02-01 RX ADMIN — Medication 3 MILLILITER(S): at 16:21

## 2020-02-01 RX ADMIN — Medication 40 MILLIEQUIVALENT(S): at 05:24

## 2020-02-01 RX ADMIN — LEVETIRACETAM 1500 MILLIGRAM(S): 250 TABLET, FILM COATED ORAL at 05:26

## 2020-02-01 RX ADMIN — Medication 3 MILLILITER(S): at 21:24

## 2020-02-01 RX ADMIN — Medication 100 MILLIGRAM(S): at 07:48

## 2020-02-01 RX ADMIN — INSULIN GLARGINE 8 UNIT(S): 100 INJECTION, SOLUTION SUBCUTANEOUS at 22:31

## 2020-02-01 RX ADMIN — Medication 100 MILLIGRAM(S): at 22:30

## 2020-02-01 RX ADMIN — Medication 100 GRAM(S): at 11:02

## 2020-02-01 RX ADMIN — LACOSAMIDE 200 MILLIGRAM(S): 50 TABLET ORAL at 17:41

## 2020-02-01 RX ADMIN — LEVETIRACETAM 1500 MILLIGRAM(S): 250 TABLET, FILM COATED ORAL at 17:29

## 2020-02-01 RX ADMIN — Medication 40 MILLIEQUIVALENT(S): at 11:02

## 2020-02-01 RX ADMIN — Medication 2 MILLIGRAM(S): at 22:30

## 2020-02-01 RX ADMIN — Medication 100 GRAM(S): at 13:53

## 2020-02-01 RX ADMIN — Medication 40 MILLIEQUIVALENT(S): at 13:53

## 2020-02-01 RX ADMIN — Medication 100 MILLIGRAM(S): at 13:53

## 2020-02-01 NOTE — DIETITIAN INITIAL EVALUATION ADULT. - PERTINENT LABORATORY DATA
Hgb 10.1 L, Hct 31.7 L, Na 136 wnl, K 3.0 L, Cl 100 wnl, CO2 26 wnl, BUN 16 wnl, Creat 0.73 wnl, Glu 191 H, eGFR 85 wnl, POCT glucose 130-206 H  12-29-19 HbA1c 8.4 H

## 2020-02-01 NOTE — DIETITIAN INITIAL EVALUATION ADULT. - PHYSICAL APPEARANCE
other (specify) NFPE- moderate loss of muscle noted on temporal region, calves, quadriceps; mildly protruding clavicle. Mild loss of buccal & orbital fat.

## 2020-02-01 NOTE — DIETITIAN INITIAL EVALUATION ADULT. - ENTERAL
Recommend changing TF to TwoCal  ml QID which will provide 1920 kcal, 80 g protein, 672 ml H2O) to meet increased demands for wound healing. Recommend NoCarb ProSource TF QD (provides 60 kcal, 15 g protein to meet increased protein needs. Would suggest 350ml free H2O QID via PEG to meet hydration needs.

## 2020-02-01 NOTE — PROGRESS NOTE ADULT - ASSESSMENT
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 Currently on IV abx  Dispo plan is home with home hospice.    #Severe sepsis secondary to left lower lobe aspiration pneumonia due to vomited food  #Acute hypoxic respiratory failure (84-85% RA at rehab) secondary to above  -Cont Cefepime and Flagyl,   -Aspiration precautions  -Continue PEG feeds - nutrition consult appreciated--increased to 2cal  -I have completed the sepsis focused exam. Awaiting repeat lactate	    #Dysphagia s/p PEG  -c/w PEG feeds, aspiration precautions    #Seizure disorder  -c/w keppra and Vimpat  -will check levels    #Hypokalemia: Repleted  Cont to monitor    #Leukocytosis:  suspect due to aspiration PNA  lactate normalized  Afebrile  Cont to monitor  COnt IV abx     #DM2 on insulin  -Hgb A1c 7.8  -c/w home insulin dose while on PEG feeds  -ISS as needed    #Urinary retention with chronic bradley  -Continue for comfort reasons, as well    #Advanced Care Planning  -MOLST reviewed. Patient is DNR/DNI. Family interested in eventually d/c home with home hospice (instead of comfort care back to rehab). Family to discuss if they can hire 24 help, which would be necessary in caring for this patient. Guarded prognosis.

## 2020-02-01 NOTE — PROGRESS NOTE ADULT - SUBJECTIVE AND OBJECTIVE BOX
Patient is a 85y old  Male who presents with a chief complaint of shortness of breath (2020 16:30). No acute issues overnight       Patient seen and examined at bedside.    ALLERGIES:  No Known Allergies    MEDICATIONS  (STANDING):  albuterol/ipratropium for Nebulization 3 milliLiter(s) Nebulizer every 6 hours  cefepime   IVPB 2000 milliGRAM(s) IV Intermittent every 8 hours  dextrose 5%. 1000 milliLiter(s) (50 mL/Hr) IV Continuous <Continuous>  dextrose 50% Injectable 12.5 Gram(s) IV Push once  dextrose 50% Injectable 25 Gram(s) IV Push once  dextrose 50% Injectable 25 Gram(s) IV Push once  doxazosin 2 milliGRAM(s) Oral at bedtime  enoxaparin Injectable 40 milliGRAM(s) SubCutaneous daily  insulin glargine Injectable (LANTUS) 8 Unit(s) SubCutaneous at bedtime  insulin lispro (HumaLOG) corrective regimen sliding scale   SubCutaneous three times a day before meals  insulin lispro (HumaLOG) corrective regimen sliding scale   SubCutaneous at bedtime  lacosamide 200 milliGRAM(s) Oral two times a day  levETIRAcetam  Solution 1500 milliGRAM(s) Oral two times a day  magnesium sulfate  IVPB 1 Gram(s) IV Intermittent every 1 hour  metroNIDAZOLE  IVPB 500 milliGRAM(s) IV Intermittent every 8 hours  polyethylene glycol 3350 17 Gram(s) Oral daily  potassium chloride   Solution 40 milliEquivalent(s) Enteral Tube every 4 hours  sertraline 75 milliGRAM(s) Oral daily    MEDICATIONS  (PRN):  acetaminophen    Suspension .. 650 milliGRAM(s) Oral every 6 hours PRN Temp greater or equal to 38C (100.4F), Mild Pain (1 - 3)  dextrose 40% Gel 15 Gram(s) Oral once PRN Blood Glucose LESS THAN 70 milliGRAM(s)/deciliter  glucagon  Injectable 1 milliGRAM(s) IntraMuscular once PRN Glucose LESS THAN 70 milligrams/deciliter    Vital Signs Last 24 Hrs  T(F): 98.5 (2020 22:13), Max: 98.8 (2020 13:47)  HR: 118 (2020 22:13) (106 - 124)  BP: 113/66 (2020 22:13) (95/75 - 165/90)  RR: 19 (2020 22:13) (19 - 30)  SpO2: 96% (2020 22:13) (88% - 100%)  I&O's Summary    2020 07:01  -  2020 07:00  --------------------------------------------------------  IN: 0 mL / OUT: 300 mL / NET: -300 mL      BMI (kg/m2): 25 (20 @ 19:03)  PHYSICAL EXAM:  General: elderly, weakness  Neuro:  fllows simple commands but falls asleep quickly   ENT: MMM  Neck: Supple, No JVD  Lungs: Coarse breath sounds bilaterally  Cardio: RRR, S1/S2, No murmurs  Abdomen: Soft, Nontender, + PEG Nondistended; Bowel sounds present  : +Hernandez   Extremities: No calf tenderness, L sided weakness    LABS:                        10.1   25.26 )-----------( 349      ( 2020 06:49 )             31.7       -    136  |  100  |  16  ----------------------------<  191  3.0   |  26  |  0.73    Ca    8.2      2020 06:49  Phos  3.1       Mg     1.2         TPro  8.3  /  Alb  2.1  /  TBili  0.6  /  DBili  x   /  AST  40  /  ALT  32  /  AlkPhos  134       eGFR if Non African American: 85 mL/min/1.73M2 (20 @ 06:49)  eGFR if : 98 mL/min/1.73M2 (20 @ 06:49)    PT/INR - ( 2020 13:50 )   PT: 15.2 sec;   INR: 1.34 ratio         PTT - ( 2020 13:50 )  PTT:33.3 sec   Lactate, Blood: 1.8 mmol/L ( @ 06:49)  Lactate, Blood: 2.8 mmol/L ( @ 17:57)  Lactate, Blood: 2.3 mmol/L ( @ 13:50)            13:50 - VBG - pH: 7.38  | pCO2: 43    | pO2: 45    | Lactate:                  POCT Blood Glucose.: 130 mg/dL (2020 11:07)  POCT Blood Glucose.: 162 mg/dL (2020 05:35)  POCT Blood Glucose.: 206 mg/dL (2020 23:44)  POCT Blood Glucose.: 195 mg/dL (2020 18:16)     [id number] 7.8   [id number] 8.4    Urinalysis Basic - ( 2020 13:55 )    Color: Yellow / Appearance: Clear / S.010 / pH: x  Gluc: x / Ketone: Negative  / Bili: Negative / Urobili: Negative   Blood: x / Protein: 30 mg/dL / Nitrite: Negative   Leuk Esterase: Trace / RBC: 0-4 /HPF / WBC 3-5 /HPF   Sq Epi: x / Non Sq Epi: Neg.-Few / Bacteria: TNTC /HPF          RADIOLOGY & ADDITIONAL TESTS:    Care Discussed with Consultants/Other Providers:

## 2020-02-01 NOTE — CHART NOTE - NSCHARTNOTEFT_GEN_A_CORE
Upon Nutritional Assessment by the Registered Dietitian your patient was determined to meet criteria / has evidence of the following diagnosis/diagnoses:          [ ]  Mild Protein Calorie Malnutrition        [X]  Moderate Protein Calorie Malnutrition        [ ] Severe Protein Calorie Malnutrition        [ ] Unspecified Protein Calorie Malnutrition        [ ] Underweight / BMI <19        [ ] Morbid Obesity / BMI > 40      Findings as based on:  [X] Comprehensive nutrition assessment   [X] Nutrition Focused Physical Exam- moderate loss of muscle noted on temporal region, calves, quadriceps; mildly protruding clavicle. Mild loss of buccal & orbital fat  [ ] Other:     Nutrition Plan/Recommendations:    1. Recommend changing TF to TwoCal  ml QID which will provide 1920 kcal, 80 g protein, 672 ml H2O) to meet increased demands for wound healing.   2. Recommend NoCarb ProSource TF QD (provides 60 kcal, 15 g protein to meet increased protein needs)  3. Suggest free water flush of 350 cc QID to meet hydration needs      PROVIDER Section:     By signing this assessment you are acknowledging and agree with the diagnosis/diagnoses assigned by the Registered Dietitian    Comments:

## 2020-02-01 NOTE — DIETITIAN INITIAL EVALUATION ADULT. - PERTINENT MEDS FT
Lovenox, Maxipime IVPB, flagyl IVPB, Lantus 8 units @ HS, Humalog ss 1 units if glu >151 mg/dl, Cardura, Vimpat, Keppra, Miralax, zoloft, magnesium sulfate IVPB, KCl

## 2020-02-01 NOTE — DIETITIAN INITIAL EVALUATION ADULT. - OTHER INFO
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 Patient is not more confused than baseline (he is usually A/O x2). Patient was recently admitted with Coronavirus and multifocal pneumonia requiring antibiotics. He now returns for above. Pt continues on PEG feeds per MD recommendation, aspiration precautions maintained. PT tolerating Jevity 1.5 240 ml QID via PEG (provides 1440 kcal, 61 g protein, 730 ml H2O). Per documentation, pt was receiving TF 6 (?) times a day; however pt's daughter reports this is inaccurate. Per documentation from [facility], pt was receiving additional protein supplement via PEG. Per previous RD assessment, pt's family previously reported pt has unintentional weight loss but unable to quantify amount. Pt's weight has been stable since previous admission in 12/19. Pt previously admitted and was tolerating TwoCal via PEG d/t increased nutrient needs. Pt noted w/ stage II PU R buttock, stage II PU left buttock, and DTI on L heel. Recommend changing TF to TwoCal  ml QID which will provide 1920 kcal, 80 g protein, 672 ml H2O) to meet increased demands for wound healing. Recommend NoCarb ProSource TF QD (provides 60 kcal, 15 g protein to meet increased protein needs. Would suggest 350ml free H2O QID via PEG to meet hydration needs. Pt noted w/ 2+ mild edema L arm. Last BM 1-31. NFPE performed (see below).

## 2020-02-02 LAB
ANION GAP SERPL CALC-SCNC: 7 MMOL/L — SIGNIFICANT CHANGE UP (ref 5–17)
ANION GAP SERPL CALC-SCNC: 8 MMOL/L — SIGNIFICANT CHANGE UP (ref 5–17)
BUN SERPL-MCNC: 18 MG/DL — SIGNIFICANT CHANGE UP (ref 7–23)
BUN SERPL-MCNC: 20 MG/DL — SIGNIFICANT CHANGE UP (ref 7–23)
CALCIUM SERPL-MCNC: 8.1 MG/DL — LOW (ref 8.4–10.5)
CALCIUM SERPL-MCNC: 8.1 MG/DL — LOW (ref 8.4–10.5)
CHLORIDE SERPL-SCNC: 98 MMOL/L — SIGNIFICANT CHANGE UP (ref 96–108)
CHLORIDE SERPL-SCNC: 98 MMOL/L — SIGNIFICANT CHANGE UP (ref 96–108)
CO2 SERPL-SCNC: 29 MMOL/L — SIGNIFICANT CHANGE UP (ref 22–31)
CO2 SERPL-SCNC: 30 MMOL/L — SIGNIFICANT CHANGE UP (ref 22–31)
CREAT SERPL-MCNC: 0.55 MG/DL — SIGNIFICANT CHANGE UP (ref 0.5–1.3)
CREAT SERPL-MCNC: 0.67 MG/DL — SIGNIFICANT CHANGE UP (ref 0.5–1.3)
GLUCOSE BLDC GLUCOMTR-MCNC: 128 MG/DL — HIGH (ref 70–99)
GLUCOSE BLDC GLUCOMTR-MCNC: 166 MG/DL — HIGH (ref 70–99)
GLUCOSE BLDC GLUCOMTR-MCNC: 192 MG/DL — HIGH (ref 70–99)
GLUCOSE BLDC GLUCOMTR-MCNC: 193 MG/DL — HIGH (ref 70–99)
GLUCOSE SERPL-MCNC: 121 MG/DL — HIGH (ref 70–99)
GLUCOSE SERPL-MCNC: 165 MG/DL — HIGH (ref 70–99)
HCT VFR BLD CALC: 26.9 % — LOW (ref 39–50)
HGB BLD-MCNC: 8.9 G/DL — LOW (ref 13–17)
MAGNESIUM SERPL-MCNC: 1.7 MG/DL — SIGNIFICANT CHANGE UP (ref 1.6–2.6)
MCHC RBC-ENTMCNC: 29.1 PG — SIGNIFICANT CHANGE UP (ref 27–34)
MCHC RBC-ENTMCNC: 33.1 GM/DL — SIGNIFICANT CHANGE UP (ref 32–36)
MCV RBC AUTO: 87.9 FL — SIGNIFICANT CHANGE UP (ref 80–100)
NRBC # BLD: 0 /100 WBCS — SIGNIFICANT CHANGE UP (ref 0–0)
PHOSPHATE SERPL-MCNC: 2.8 MG/DL — SIGNIFICANT CHANGE UP (ref 2.5–4.5)
PLATELET # BLD AUTO: 351 K/UL — SIGNIFICANT CHANGE UP (ref 150–400)
POTASSIUM SERPL-MCNC: 2.6 MMOL/L — CRITICAL LOW (ref 3.5–5.3)
POTASSIUM SERPL-MCNC: 2.7 MMOL/L — CRITICAL LOW (ref 3.5–5.3)
POTASSIUM SERPL-MCNC: 3.3 MMOL/L — LOW (ref 3.5–5.3)
POTASSIUM SERPL-SCNC: 2.6 MMOL/L — CRITICAL LOW (ref 3.5–5.3)
POTASSIUM SERPL-SCNC: 2.7 MMOL/L — CRITICAL LOW (ref 3.5–5.3)
POTASSIUM SERPL-SCNC: 3.3 MMOL/L — LOW (ref 3.5–5.3)
RBC # BLD: 3.06 M/UL — LOW (ref 4.2–5.8)
RBC # FLD: 15 % — HIGH (ref 10.3–14.5)
SODIUM SERPL-SCNC: 135 MMOL/L — SIGNIFICANT CHANGE UP (ref 135–145)
SODIUM SERPL-SCNC: 135 MMOL/L — SIGNIFICANT CHANGE UP (ref 135–145)
WBC # BLD: 13.36 K/UL — HIGH (ref 3.8–10.5)
WBC # FLD AUTO: 13.36 K/UL — HIGH (ref 3.8–10.5)

## 2020-02-02 PROCEDURE — 99233 SBSQ HOSP IP/OBS HIGH 50: CPT

## 2020-02-02 RX ORDER — POTASSIUM CHLORIDE 20 MEQ
40 PACKET (EA) ORAL ONCE
Refills: 0 | Status: COMPLETED | OUTPATIENT
Start: 2020-02-02 | End: 2020-02-02

## 2020-02-02 RX ORDER — POTASSIUM PHOSPHATE, MONOBASIC POTASSIUM PHOSPHATE, DIBASIC 236; 224 MG/ML; MG/ML
15 INJECTION, SOLUTION INTRAVENOUS ONCE
Refills: 0 | Status: COMPLETED | OUTPATIENT
Start: 2020-02-02 | End: 2020-02-02

## 2020-02-02 RX ORDER — MAGNESIUM SULFATE 500 MG/ML
1 VIAL (ML) INJECTION ONCE
Refills: 0 | Status: COMPLETED | OUTPATIENT
Start: 2020-02-02 | End: 2020-02-02

## 2020-02-02 RX ORDER — VANCOMYCIN HCL 1 G
1000 VIAL (EA) INTRAVENOUS EVERY 12 HOURS
Refills: 0 | Status: DISCONTINUED | OUTPATIENT
Start: 2020-02-02 | End: 2020-02-06

## 2020-02-02 RX ORDER — POTASSIUM CHLORIDE 20 MEQ
10 PACKET (EA) ORAL
Refills: 0 | Status: COMPLETED | OUTPATIENT
Start: 2020-02-02 | End: 2020-02-02

## 2020-02-02 RX ADMIN — Medication 40 MILLIEQUIVALENT(S): at 22:17

## 2020-02-02 RX ADMIN — Medication 100 MILLIGRAM(S): at 06:21

## 2020-02-02 RX ADMIN — CEFEPIME 100 MILLIGRAM(S): 1 INJECTION, POWDER, FOR SOLUTION INTRAMUSCULAR; INTRAVENOUS at 21:47

## 2020-02-02 RX ADMIN — Medication 250 MILLIGRAM(S): at 22:25

## 2020-02-02 RX ADMIN — LACOSAMIDE 200 MILLIGRAM(S): 50 TABLET ORAL at 07:16

## 2020-02-02 RX ADMIN — LEVETIRACETAM 1500 MILLIGRAM(S): 250 TABLET, FILM COATED ORAL at 18:19

## 2020-02-02 RX ADMIN — POLYETHYLENE GLYCOL 3350 17 GRAM(S): 17 POWDER, FOR SOLUTION ORAL at 12:29

## 2020-02-02 RX ADMIN — POTASSIUM PHOSPHATE, MONOBASIC POTASSIUM PHOSPHATE, DIBASIC 62.5 MILLIMOLE(S): 236; 224 INJECTION, SOLUTION INTRAVENOUS at 17:08

## 2020-02-02 RX ADMIN — Medication 3 MILLILITER(S): at 10:01

## 2020-02-02 RX ADMIN — Medication 100 MILLIEQUIVALENT(S): at 15:49

## 2020-02-02 RX ADMIN — Medication 40 MILLIEQUIVALENT(S): at 12:25

## 2020-02-02 RX ADMIN — ENOXAPARIN SODIUM 40 MILLIGRAM(S): 100 INJECTION SUBCUTANEOUS at 12:28

## 2020-02-02 RX ADMIN — SERTRALINE 75 MILLIGRAM(S): 25 TABLET, FILM COATED ORAL at 12:29

## 2020-02-02 RX ADMIN — LACOSAMIDE 200 MILLIGRAM(S): 50 TABLET ORAL at 18:19

## 2020-02-02 RX ADMIN — CEFEPIME 100 MILLIGRAM(S): 1 INJECTION, POWDER, FOR SOLUTION INTRAMUSCULAR; INTRAVENOUS at 06:21

## 2020-02-02 RX ADMIN — Medication 2 MILLIGRAM(S): at 22:17

## 2020-02-02 RX ADMIN — Medication 250 MILLIGRAM(S): at 12:51

## 2020-02-02 RX ADMIN — Medication 100 MILLIEQUIVALENT(S): at 14:41

## 2020-02-02 RX ADMIN — LEVETIRACETAM 1500 MILLIGRAM(S): 250 TABLET, FILM COATED ORAL at 06:21

## 2020-02-02 RX ADMIN — Medication 3 MILLILITER(S): at 21:57

## 2020-02-02 RX ADMIN — Medication 1: at 17:05

## 2020-02-02 RX ADMIN — CEFEPIME 100 MILLIGRAM(S): 1 INJECTION, POWDER, FOR SOLUTION INTRAMUSCULAR; INTRAVENOUS at 17:12

## 2020-02-02 RX ADMIN — Medication 3 MILLILITER(S): at 04:03

## 2020-02-02 RX ADMIN — Medication 100 MILLIEQUIVALENT(S): at 12:25

## 2020-02-02 RX ADMIN — Medication 3 MILLILITER(S): at 15:59

## 2020-02-02 RX ADMIN — Medication 1: at 12:27

## 2020-02-02 RX ADMIN — Medication 100 GRAM(S): at 12:05

## 2020-02-02 NOTE — PROGRESS NOTE ADULT - SUBJECTIVE AND OBJECTIVE BOX
Patient is a 85y old  Male who presents with a chief complaint of shortness of breath (2020 12:57)      Patient seen and examined at bedside.     ALLERGIES:  No Known Allergies    MEDICATIONS  (STANDING):  albuterol/ipratropium for Nebulization 3 milliLiter(s) Nebulizer every 6 hours  cefepime   IVPB 2000 milliGRAM(s) IV Intermittent every 8 hours  dextrose 5%. 1000 milliLiter(s) (50 mL/Hr) IV Continuous <Continuous>  dextrose 50% Injectable 12.5 Gram(s) IV Push once  dextrose 50% Injectable 25 Gram(s) IV Push once  dextrose 50% Injectable 25 Gram(s) IV Push once  doxazosin 2 milliGRAM(s) Oral at bedtime  enoxaparin Injectable 40 milliGRAM(s) SubCutaneous daily  insulin glargine Injectable (LANTUS) 8 Unit(s) SubCutaneous at bedtime  insulin lispro (HumaLOG) corrective regimen sliding scale   SubCutaneous three times a day before meals  insulin lispro (HumaLOG) corrective regimen sliding scale   SubCutaneous at bedtime  lacosamide 200 milliGRAM(s) Oral two times a day  levETIRAcetam  Solution 1500 milliGRAM(s) Oral two times a day  magnesium sulfate  IVPB 1 Gram(s) IV Intermittent once  polyethylene glycol 3350 17 Gram(s) Oral daily  potassium chloride   Powder 40 milliEquivalent(s) Oral once  potassium chloride  10 mEq/100 mL IVPB 10 milliEquivalent(s) IV Intermittent every 1 hour  potassium phosphate IVPB 15 milliMole(s) IV Intermittent once  sertraline 75 milliGRAM(s) Oral daily  vancomycin  IVPB 1000 milliGRAM(s) IV Intermittent every 12 hours    MEDICATIONS  (PRN):  acetaminophen    Suspension .. 650 milliGRAM(s) Oral every 6 hours PRN Temp greater or equal to 38C (100.4F), Mild Pain (1 - 3)  dextrose 40% Gel 15 Gram(s) Oral once PRN Blood Glucose LESS THAN 70 milliGRAM(s)/deciliter  glucagon  Injectable 1 milliGRAM(s) IntraMuscular once PRN Glucose LESS THAN 70 milligrams/deciliter    Vital Signs Last 24 Hrs  T(F): 98.1 (2020 05:02), Max: 98.1 (2020 05:02)  HR: 69 (2020 10:08) (68 - 105)  BP: 90/47 (2020 05:02) (90/47 - 108/52)  RR: 16 (2020 05:02) (16 - 16)  SpO2: 96% (2020 10:08) (96% - 97%)  I&O's Summary    2020 07:01  -  2020 07:00  --------------------------------------------------------  IN: 780 mL / OUT: 1650 mL / NET: -870 mL      PHYSICAL EXAM:  General: NAD, alert; elderly  ENT: MMM,  Neck: Supple, No JVD  Lungs: coarse breath sounds b/l  Cardio: +S1/S2  Abdomen: Soft, Nontender, Nondistended; Bowel sounds present; +peg; +bradley  Extremities: No calf tenderness    LABS:                        10.1   25.26 )-----------( 349      ( 2020 06:49 )             31.7         135  |  98  |  18  ----------------------------<  121  2.6   |  29  |  0.55    Ca    8.1      2020 05:10  Phos  3.1       Mg     1.2         TPro  8.3  /  Alb  2.1  /  TBili  0.6  /  DBili  x   /  AST  40  /  ALT  32  /  AlkPhos  134      eGFR if Non African American: 95 mL/min/1.73M2 (20 @ 05:10)  eGFR if African American: 110 mL/min/1.73M2 (20 @ 05:10)    PT/INR - ( 2020 13:50 )   PT: 15.2 sec;   INR: 1.34 ratio         PTT - ( 2020 13:50 )  PTT:33.3 sec  Lactate, Blood: 1.8 mmol/L ( @ 06:49)  Lactate, Blood: 2.8 mmol/L ( @ 17:57)  Lactate, Blood: 2.3 mmol/L ( @ 13:50)    Procalcitonin, Serum: 1.30 ng/mL (20 @ 06:49)    13:50 - VBG - pH: 7.38  | pCO2: 43    | pO2: 45    | Lactate:          Glucose  POCT Blood Glucose.: 128 mg/dL (2020 06:17)  POCT Blood Glucose.: 133 mg/dL (2020 22:27)  POCT Blood Glucose.: 121 mg/dL (2020 17:43)     EpnvjrfhjiL4R 7.8   LahagdmjviZ1G 8.4    Urinalysis Basic - ( 2020 13:55 )    Color: Yellow / Appearance: Clear / S.010 / pH: x  Gluc: x / Ketone: Negative  / Bili: Negative / Urobili: Negative   Blood: x / Protein: 30 mg/dL / Nitrite: Negative   Leuk Esterase: Trace / RBC: 0-4 /HPF / WBC 3-5 /HPF   Sq Epi: x / Non Sq Epi: Neg.-Few / Bacteria: TNTC /HPF    Cultures  Culture - Urine (collected 2020 13:55)  Source: .Urine Clean Catch (Midstream)  Preliminary Report (2020 08:48):    >100,000 CFU/ml Gram positive organisms    Culture - Blood (collected 2020 13:50)  Source: .Blood Blood-Peripheral  Preliminary Report (2020 19:02):    No growth to date.    Culture - Blood (collected 2020 13:50)  Source: .Blood Blood-Peripheral  Preliminary Report (2020 19:02):    No growth to date.      RADIOLOGY & ADDITIONAL TESTS:  no new tests    Care Discussed with Consultants/Other Providers:   ID

## 2020-02-02 NOTE — PROGRESS NOTE ADULT - ASSESSMENT
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 Currently on IV abx  Dispo plan is home with home hospice.    #Severe sepsis secondary to left lower lobe aspiration pneumonia due to vomited food  #Acute hypoxic respiratory failure (84-85% RA at rehab) secondary to above  - w/ leukocytosis   - Cefepime and Flagyl changed to cefepime and vanco  - ID consult pending   - Aspiration precautions  - Continue PEG feeds - nutrition consult appreciated--increased to 2cal    #Dysphagia s/p PEG  -c/w PEG feeds, aspiration precautions    #Seizure disorder  -keppra and Vimpat    #Hypokalemia  - Repleted  - check mg and phos    #DM2 on insulin  -Hgb A1c 7.8  -c/w home insulin dose while on PEG feeds  -ISS as needed    #Urinary retention with chronic bradley  -Continue for comfort reasons, as well    #DVT prophylaxis  - lovenox SC

## 2020-02-03 DIAGNOSIS — E11.9 TYPE 2 DIABETES MELLITUS WITHOUT COMPLICATIONS: ICD-10-CM

## 2020-02-03 DIAGNOSIS — J96.01 ACUTE RESPIRATORY FAILURE WITH HYPOXIA: ICD-10-CM

## 2020-02-03 DIAGNOSIS — E87.8 OTHER DISORDERS OF ELECTROLYTE AND FLUID BALANCE, NOT ELSEWHERE CLASSIFIED: ICD-10-CM

## 2020-02-03 DIAGNOSIS — A41.9 SEPSIS, UNSPECIFIED ORGANISM: ICD-10-CM

## 2020-02-03 DIAGNOSIS — G40.909 EPILEPSY, UNSPECIFIED, NOT INTRACTABLE, WITHOUT STATUS EPILEPTICUS: ICD-10-CM

## 2020-02-03 DIAGNOSIS — R33.9 RETENTION OF URINE, UNSPECIFIED: ICD-10-CM

## 2020-02-03 DIAGNOSIS — J69.0 PNEUMONITIS DUE TO INHALATION OF FOOD AND VOMIT: ICD-10-CM

## 2020-02-03 DIAGNOSIS — R13.10 DYSPHAGIA, UNSPECIFIED: ICD-10-CM

## 2020-02-03 LAB
-  AMPICILLIN: SIGNIFICANT CHANGE UP
-  CIPROFLOXACIN: SIGNIFICANT CHANGE UP
-  LEVOFLOXACIN: SIGNIFICANT CHANGE UP
-  NITROFURANTOIN: SIGNIFICANT CHANGE UP
-  TETRACYCLINE: SIGNIFICANT CHANGE UP
-  VANCOMYCIN: SIGNIFICANT CHANGE UP
ANION GAP SERPL CALC-SCNC: 9 MMOL/L — SIGNIFICANT CHANGE UP (ref 5–17)
BUN SERPL-MCNC: 17 MG/DL — SIGNIFICANT CHANGE UP (ref 7–23)
CALCIUM SERPL-MCNC: 8.4 MG/DL — SIGNIFICANT CHANGE UP (ref 8.4–10.5)
CHLORIDE SERPL-SCNC: 97 MMOL/L — SIGNIFICANT CHANGE UP (ref 96–108)
CO2 SERPL-SCNC: 28 MMOL/L — SIGNIFICANT CHANGE UP (ref 22–31)
CREAT SERPL-MCNC: 0.6 MG/DL — SIGNIFICANT CHANGE UP (ref 0.5–1.3)
CULTURE RESULTS: SIGNIFICANT CHANGE UP
GLUCOSE BLDC GLUCOMTR-MCNC: 125 MG/DL — HIGH (ref 70–99)
GLUCOSE BLDC GLUCOMTR-MCNC: 126 MG/DL — HIGH (ref 70–99)
GLUCOSE BLDC GLUCOMTR-MCNC: 172 MG/DL — HIGH (ref 70–99)
GLUCOSE BLDC GLUCOMTR-MCNC: 220 MG/DL — HIGH (ref 70–99)
GLUCOSE SERPL-MCNC: 133 MG/DL — HIGH (ref 70–99)
HCT VFR BLD CALC: 28.9 % — LOW (ref 39–50)
HGB BLD-MCNC: 9.4 G/DL — LOW (ref 13–17)
LEVETIRACETAM SERPL-MCNC: 66.7 MCG/ML — HIGH (ref 12–46)
MAGNESIUM SERPL-MCNC: 1.5 MG/DL — LOW (ref 1.6–2.6)
MCHC RBC-ENTMCNC: 28.7 PG — SIGNIFICANT CHANGE UP (ref 27–34)
MCHC RBC-ENTMCNC: 32.5 GM/DL — SIGNIFICANT CHANGE UP (ref 32–36)
MCV RBC AUTO: 88.1 FL — SIGNIFICANT CHANGE UP (ref 80–100)
METHOD TYPE: SIGNIFICANT CHANGE UP
NRBC # BLD: 0 /100 WBCS — SIGNIFICANT CHANGE UP (ref 0–0)
ORGANISM # SPEC MICROSCOPIC CNT: SIGNIFICANT CHANGE UP
ORGANISM # SPEC MICROSCOPIC CNT: SIGNIFICANT CHANGE UP
PHOSPHATE SERPL-MCNC: 2.7 MG/DL — SIGNIFICANT CHANGE UP (ref 2.5–4.5)
PLATELET # BLD AUTO: 379 K/UL — SIGNIFICANT CHANGE UP (ref 150–400)
POTASSIUM SERPL-MCNC: 3.1 MMOL/L — LOW (ref 3.5–5.3)
POTASSIUM SERPL-SCNC: 3.1 MMOL/L — LOW (ref 3.5–5.3)
RBC # BLD: 3.28 M/UL — LOW (ref 4.2–5.8)
RBC # FLD: 15 % — HIGH (ref 10.3–14.5)
SODIUM SERPL-SCNC: 134 MMOL/L — LOW (ref 135–145)
SPECIMEN SOURCE: SIGNIFICANT CHANGE UP
WBC # BLD: 12.02 K/UL — HIGH (ref 3.8–10.5)
WBC # FLD AUTO: 12.02 K/UL — HIGH (ref 3.8–10.5)

## 2020-02-03 PROCEDURE — 99233 SBSQ HOSP IP/OBS HIGH 50: CPT

## 2020-02-03 RX ORDER — MAGNESIUM SULFATE 500 MG/ML
1 VIAL (ML) INJECTION ONCE
Refills: 0 | Status: COMPLETED | OUTPATIENT
Start: 2020-02-03 | End: 2020-02-03

## 2020-02-03 RX ORDER — POTASSIUM CHLORIDE 20 MEQ
10 PACKET (EA) ORAL ONCE
Refills: 0 | Status: COMPLETED | OUTPATIENT
Start: 2020-02-03 | End: 2020-02-03

## 2020-02-03 RX ADMIN — LACOSAMIDE 200 MILLIGRAM(S): 50 TABLET ORAL at 07:41

## 2020-02-03 RX ADMIN — CEFEPIME 100 MILLIGRAM(S): 1 INJECTION, POWDER, FOR SOLUTION INTRAMUSCULAR; INTRAVENOUS at 06:55

## 2020-02-03 RX ADMIN — Medication 3 MILLILITER(S): at 04:34

## 2020-02-03 RX ADMIN — SERTRALINE 75 MILLIGRAM(S): 25 TABLET, FILM COATED ORAL at 11:35

## 2020-02-03 RX ADMIN — Medication 2 MILLIGRAM(S): at 21:36

## 2020-02-03 RX ADMIN — LEVETIRACETAM 1500 MILLIGRAM(S): 250 TABLET, FILM COATED ORAL at 07:42

## 2020-02-03 RX ADMIN — Medication 250 MILLIGRAM(S): at 10:04

## 2020-02-03 RX ADMIN — CEFEPIME 100 MILLIGRAM(S): 1 INJECTION, POWDER, FOR SOLUTION INTRAMUSCULAR; INTRAVENOUS at 15:52

## 2020-02-03 RX ADMIN — Medication 3 MILLILITER(S): at 16:19

## 2020-02-03 RX ADMIN — CEFEPIME 100 MILLIGRAM(S): 1 INJECTION, POWDER, FOR SOLUTION INTRAMUSCULAR; INTRAVENOUS at 21:36

## 2020-02-03 RX ADMIN — ENOXAPARIN SODIUM 40 MILLIGRAM(S): 100 INJECTION SUBCUTANEOUS at 11:36

## 2020-02-03 RX ADMIN — Medication 1: at 11:35

## 2020-02-03 RX ADMIN — Medication 250 MILLIGRAM(S): at 18:05

## 2020-02-03 RX ADMIN — Medication 100 MILLIEQUIVALENT(S): at 12:48

## 2020-02-03 RX ADMIN — Medication 3 MILLILITER(S): at 21:33

## 2020-02-03 RX ADMIN — INSULIN GLARGINE 8 UNIT(S): 100 INJECTION, SOLUTION SUBCUTANEOUS at 22:06

## 2020-02-03 RX ADMIN — INSULIN GLARGINE 8 UNIT(S): 100 INJECTION, SOLUTION SUBCUTANEOUS at 00:12

## 2020-02-03 RX ADMIN — POLYETHYLENE GLYCOL 3350 17 GRAM(S): 17 POWDER, FOR SOLUTION ORAL at 11:35

## 2020-02-03 RX ADMIN — LACOSAMIDE 200 MILLIGRAM(S): 50 TABLET ORAL at 18:06

## 2020-02-03 RX ADMIN — Medication 3 MILLILITER(S): at 10:32

## 2020-02-03 RX ADMIN — LEVETIRACETAM 1500 MILLIGRAM(S): 250 TABLET, FILM COATED ORAL at 18:06

## 2020-02-03 RX ADMIN — Medication 100 GRAM(S): at 13:52

## 2020-02-03 NOTE — PROGRESS NOTE ADULT - PROBLEM SELECTOR PLAN 1
severe sepsis 2/2 aspiration pneumonia  ID consult pending  leukocytosis is improving, afebrile  continue Cefepime/Vanco day # 4 antimicrobials, monitor fever curve, cbc/diff severe sepsis 2/2 aspiration pneumonia  ID consult pending  leukocytosis is improving, afebrile  continue Cefepime/Vanco day # 3 antimicrobials, monitor fever curve, cbc/diff

## 2020-02-03 NOTE — CONSULT NOTE ADULT - SUBJECTIVE AND OBJECTIVE BOX
HPI:   Patient is a 85y male with a past history of CVA, seizures,prostate cancer, bradley for urinary retention,treated at [facility] for "autoimmune encephalitis" although I am not sure objective criteria met(steroids, IVIG, ? additional agents), s/p peg, now a resident of the [facility], who was admitted 1/31 with hypoxia and episode of vomiting, ? aspiration.  He was at [facility] in late December through mid January 2020 for LLL pneumonia, s/p 5 days of vanco and meropenem.He is not verbal, is weak on left side, is fed via peg and has a chronic indwelling bradley.No fever.He has received cefepime and vanco since admission.    REVIEW OF SYSTEMS:  All other review of systems negative (Comprehensive ROS): limited, poorly interactive    PAST MEDICAL & SURGICAL HISTORY:  Hyponatremia  Seizure disorder  Encephalitis  HTN (hypertension)  Pneumonia  Hypertension  Prostate cancer: had seed implant &amp; radiation ( 2001 )  PEG (percutaneous endoscopic gastrostomy) status  PC (prostate cancer)  S/P cholecystectomy      Allergies    No Known Allergies    Intolerances        Antimicrobials Day #  :day 3  cefepime   IVPB 2000 milliGRAM(s) IV Intermittent every 8 hours  vancomycin  IVPB 1000 milliGRAM(s) IV Intermittent every 12 hours    Other Medications:  acetaminophen    Suspension .. 650 milliGRAM(s) Oral every 6 hours PRN  albuterol/ipratropium for Nebulization 3 milliLiter(s) Nebulizer every 6 hours  dextrose 40% Gel 15 Gram(s) Oral once PRN  dextrose 5%. 1000 milliLiter(s) IV Continuous <Continuous>  dextrose 50% Injectable 12.5 Gram(s) IV Push once  dextrose 50% Injectable 25 Gram(s) IV Push once  dextrose 50% Injectable 25 Gram(s) IV Push once  doxazosin 2 milliGRAM(s) Oral at bedtime  enoxaparin Injectable 40 milliGRAM(s) SubCutaneous daily  glucagon  Injectable 1 milliGRAM(s) IntraMuscular once PRN  insulin glargine Injectable (LANTUS) 8 Unit(s) SubCutaneous at bedtime  insulin lispro (HumaLOG) corrective regimen sliding scale   SubCutaneous three times a day before meals  insulin lispro (HumaLOG) corrective regimen sliding scale   SubCutaneous at bedtime  lacosamide 200 milliGRAM(s) Oral two times a day  levETIRAcetam  Solution 1500 milliGRAM(s) Oral two times a day  polyethylene glycol 3350 17 Gram(s) Oral daily  sertraline 75 milliGRAM(s) Oral daily      FAMILY HISTORY:  No pertinent family history in first degree relatives      SOCIAL HISTORY:  Smoking: x    ETOH:x     Drug Use:x         T(F): 98.5 (02-03-20 @ 06:24), Max: 98.5 (02-03-20 @ 06:24)  HR: 98 (02-03-20 @ 10:32)  BP: 117/63 (02-03-20 @ 06:24)  RR: 15 (02-03-20 @ 06:24)  SpO2: 97% (02-03-20 @ 10:32)  Wt(kg): --    PHYSICAL EXAM:  General: lethargic, no acute distress  Eyes:  anicteric, no conjunctival injection, no discharge  Oropharynx: no lesions or injection 	  Neck: supple, without adenopathy  Lungs: few ronchi  Heart: regular rate and rhythm; no murmur, rubs or gallops  Abdomen: soft, nondistended, nontender, without mass or organomegaly, peg in place  Skin: no lesions  Extremities: no clubbing, cyanosis, + edema  Neurologic: lethargic, poorly interactive, left side paralysis    LAB RESULTS:                        9.4    12.02 )-----------( 379      ( 03 Feb 2020 07:07 )             28.9     02-03    134<L>  |  97  |  17  ----------------------------<  133<H>  3.1<L>   |  28  |  0.60    Ca    8.4      03 Feb 2020 07:07  Phos  2.7     02-03  Mg     1.5     02-03            MICROBIOLOGY:  RECENT CULTURES:  01-31 @ 13:55 .Urine Clean Catch (Midstream)     >100,000 CFU/ml Gram positive organisms      01-31 @ 13:50 .Blood Blood-Peripheral     No growth to date.            RADIOLOGY REVIEWED:  < from: Xray Chest 1 View AP/PA (01.31.20 @ 14:27) >  IMPRESSION: Airspace disease left lung base.

## 2020-02-03 NOTE — PROGRESS NOTE ADULT - ASSESSMENT
85 male with autoimmune encephalitis c/b dysphagia s/p PEG, left sided weakness, seizure d/o, htn, prostate ca, chronic urinary retention with chronic bradley, dm2 presents from [facility] with sob, hypoxia after episode of vomiting PEG feeds admitted with severe sepsis 2/2 aspiration pneumonia.

## 2020-02-03 NOTE — PROGRESS NOTE ADULT - PROBLEM SELECTOR PLAN 2
continue vanco/cefepime day # 4 antimicrobials, elaine  maintain strict aspiration precautions  ID consult pending

## 2020-02-03 NOTE — CONSULT NOTE ADULT - ASSESSMENT
84 yo male with a past history of seizures,   CVA, prostate cancer, BPH with urinary retention,aspiration, labeled as autoimmune encephalitis by the neurology dep't at [facility],with no response to typical therapy, now at [facility] with hypoxia and possible aspiration.  He is poorly interactive but appears clinically stable.He is not febrile, is tolerating enteral feeds, and appears volume overloaded.  He has a chronic bradley, urine isolate likely just colonizing naina  Suggest:  1.vanco/cefepime x 5 days, day 3/5  2.supportive care  3. will defer to neurology service at [facility] on additional w/u

## 2020-02-03 NOTE — PROGRESS NOTE ADULT - SUBJECTIVE AND OBJECTIVE BOX
Patient is a 85y old  Male who presents with a chief complaint of shortness of breath (2020 11:08)  No acute events overnight        Patient seen and examined at bedside.    ALLERGIES:  No Known Allergies        Vital Signs Last 24 Hrs  T(F): 98.5 (2020 06:24), Max: 98.5 (2020 06:24)  HR: 98 (2020 10:32) (81 - 104)  BP: 117/63 (2020 06:24) (117/63 - 133/95)  RR: 15 (2020 06:24) (15 - 18)  SpO2: 97% (2020 10:32) (96% - 98%)  I&O's Summary    2020 07:01  -  2020 07:00  --------------------------------------------------------  IN: 290 mL / OUT: 2550 mL / NET: -2260 mL      MEDICATIONS:  acetaminophen    Suspension .. 650 milliGRAM(s) Oral every 6 hours PRN  albuterol/ipratropium for Nebulization 3 milliLiter(s) Nebulizer every 6 hours  cefepime   IVPB 2000 milliGRAM(s) IV Intermittent every 8 hours  dextrose 40% Gel 15 Gram(s) Oral once PRN  dextrose 5%. 1000 milliLiter(s) IV Continuous <Continuous>  dextrose 50% Injectable 12.5 Gram(s) IV Push once  dextrose 50% Injectable 25 Gram(s) IV Push once  dextrose 50% Injectable 25 Gram(s) IV Push once  doxazosin 2 milliGRAM(s) Oral at bedtime  enoxaparin Injectable 40 milliGRAM(s) SubCutaneous daily  glucagon  Injectable 1 milliGRAM(s) IntraMuscular once PRN  insulin glargine Injectable (LANTUS) 8 Unit(s) SubCutaneous at bedtime  insulin lispro (HumaLOG) corrective regimen sliding scale   SubCutaneous three times a day before meals  insulin lispro (HumaLOG) corrective regimen sliding scale   SubCutaneous at bedtime  lacosamide 200 milliGRAM(s) Oral two times a day  levETIRAcetam  Solution 1500 milliGRAM(s) Oral two times a day  polyethylene glycol 3350 17 Gram(s) Oral daily  sertraline 75 milliGRAM(s) Oral daily  vancomycin  IVPB 1000 milliGRAM(s) IV Intermittent every 12 hours      PHYSICAL EXAM:  General: NAD  ENT: MMM, no oral thrush  Neck: Supple, No JVD  Lungs: Course breath sounds, bilateral air entry  Cardio: S1S2 regular  Abdomen: Soft, Nontender, +PEG  Extremities: No cyanosis, No edema    LABS:                        9.4    12.02 )-----------( 379      ( 2020 07:07 )             28.9         134  |  97  |  17  ----------------------------<  133  3.1   |  28  |  0.60    Ca    8.4      2020 07:07  Phos  2.7       Mg     1.5         TPro  8.3  /  Alb  2.1  /  TBili  0.6  /  DBili  x   /  AST  40  /  ALT  32  /  AlkPhos  134      eGFR if Non African American: 91 mL/min/1.73M2 (20 @ 07:07)  eGFR if : 106 mL/min/1.73M2 (20 @ 07:07)    PT/INR - ( 2020 13:50 )   PT: 15.2 sec;   INR: 1.34 ratio         PTT - ( 2020 13:50 )  PTT:33.3 sec  Lactate, Blood: 1.8 mmol/L ( @ 06:49)  Lactate, Blood: 2.8 mmol/L ( @ 17:57)  Lactate, Blood: 2.3 mmol/L ( @ 13:50)            13:50 - VBG - pH: 7.38  | pCO2: 43    | pO2: 45    | Lactate:                  POCT Blood Glucose.: 126 mg/dL (2020 07:04)  POCT Blood Glucose.: 192 mg/dL (2020 23:41)  POCT Blood Glucose.: 193 mg/dL (2020 17:02)  POCT Blood Glucose.: 166 mg/dL (2020 11:58)     JjvwjjucofX3S 7.8   TaqeoyyohmI3Y 8.4    Urinalysis Basic - ( 2020 13:55 )    Color: Yellow / Appearance: Clear / S.010 / pH: x  Gluc: x / Ketone: Negative  / Bili: Negative / Urobili: Negative   Blood: x / Protein: 30 mg/dL / Nitrite: Negative   Leuk Esterase: Trace / RBC: 0-4 /HPF / WBC 3-5 /HPF   Sq Epi: x / Non Sq Epi: Neg.-Few / Bacteria: TNTC /HPF        Culture - Urine (collected 2020 13:55)  Source: .Urine Clean Catch (Midstream)  Preliminary Report (2020 08:48):    >100,000 CFU/ml Gram positive organisms    Culture - Blood (collected 2020 13:50)  Source: .Blood Blood-Peripheral  Preliminary Report (2020 19:02):    No growth to date.    Culture - Blood (collected 2020 13:50)  Source: .Blood Blood-Peripheral  Preliminary Report (2020 19:02):    No growth to date.        RADIOLOGY & ADDITIONAL TESTS:    Care Discussed with Consultants/Other Providers: Patient is a 85y old  Male who presents with a chief complaint of shortness of breath (2020 11:08)      Interval Hx  No acute events overnight  Patient seen and examined at bedside.    ALLERGIES:  No Known Allergies        Vital Signs Last 24 Hrs  T(F): 98.5 (2020 06:24), Max: 98.5 (2020 06:24)  HR: 98 (2020 10:32) (81 - 104)  BP: 117/63 (2020 06:24) (117/63 - 133/95)  RR: 15 (2020 06:24) (15 - 18)  SpO2: 97% (2020 10:32) (96% - 98%)  I&O's Summary    2020 07:01  -  2020 07:00  --------------------------------------------------------  IN: 290 mL / OUT: 2550 mL / NET: -2260 mL      MEDICATIONS:  acetaminophen    Suspension .. 650 milliGRAM(s) Oral every 6 hours PRN  albuterol/ipratropium for Nebulization 3 milliLiter(s) Nebulizer every 6 hours  cefepime   IVPB 2000 milliGRAM(s) IV Intermittent every 8 hours  dextrose 40% Gel 15 Gram(s) Oral once PRN  dextrose 5%. 1000 milliLiter(s) IV Continuous <Continuous>  dextrose 50% Injectable 12.5 Gram(s) IV Push once  dextrose 50% Injectable 25 Gram(s) IV Push once  dextrose 50% Injectable 25 Gram(s) IV Push once  doxazosin 2 milliGRAM(s) Oral at bedtime  enoxaparin Injectable 40 milliGRAM(s) SubCutaneous daily  glucagon  Injectable 1 milliGRAM(s) IntraMuscular once PRN  insulin glargine Injectable (LANTUS) 8 Unit(s) SubCutaneous at bedtime  insulin lispro (HumaLOG) corrective regimen sliding scale   SubCutaneous three times a day before meals  insulin lispro (HumaLOG) corrective regimen sliding scale   SubCutaneous at bedtime  lacosamide 200 milliGRAM(s) Oral two times a day  levETIRAcetam  Solution 1500 milliGRAM(s) Oral two times a day  polyethylene glycol 3350 17 Gram(s) Oral daily  sertraline 75 milliGRAM(s) Oral daily  vancomycin  IVPB 1000 milliGRAM(s) IV Intermittent every 12 hours      PHYSICAL EXAM:  General: NAD  ENT: MMM, no oral thrush  Neck: Supple, No JVD  Lungs: Course breath sounds, bilateral air entry  Cardio: S1S2 regular  Abdomen: Soft, Nontender, +PEG  Extremities: No cyanosis, No edema    LABS:                        9.4    12.02 )-----------( 379      ( 2020 07:07 )             28.9         134  |  97  |  17  ----------------------------<  133  3.1   |  28  |  0.60    Ca    8.4      2020 07:07  Phos  2.7       Mg     1.5         TPro  8.3  /  Alb  2.1  /  TBili  0.6  /  DBili  x   /  AST  40  /  ALT  32  /  AlkPhos  134      eGFR if Non African American: 91 mL/min/1.73M2 (20 @ 07:07)  eGFR if : 106 mL/min/1.73M2 (20 @ 07:07)    PT/INR - ( 2020 13:50 )   PT: 15.2 sec;   INR: 1.34 ratio         PTT - ( 2020 13:50 )  PTT:33.3 sec  Lactate, Blood: 1.8 mmol/L ( @ 06:49)  Lactate, Blood: 2.8 mmol/L ( @ 17:57)  Lactate, Blood: 2.3 mmol/L ( @ 13:50)            13:50 - VBG - pH: 7.38  | pCO2: 43    | pO2: 45    | Lactate:                  POCT Blood Glucose.: 126 mg/dL (2020 07:04)  POCT Blood Glucose.: 192 mg/dL (2020 23:41)  POCT Blood Glucose.: 193 mg/dL (2020 17:02)  POCT Blood Glucose.: 166 mg/dL (2020 11:58)     SvvkzanxotL7C 7.8   TikemrrtvsZ2R 8.4    Urinalysis Basic - ( 2020 13:55 )    Color: Yellow / Appearance: Clear / S.010 / pH: x  Gluc: x / Ketone: Negative  / Bili: Negative / Urobili: Negative   Blood: x / Protein: 30 mg/dL / Nitrite: Negative   Leuk Esterase: Trace / RBC: 0-4 /HPF / WBC 3-5 /HPF   Sq Epi: x / Non Sq Epi: Neg.-Few / Bacteria: TNTC /HPF        Culture - Urine (collected 2020 13:55)  Source: .Urine Clean Catch (Midstream)  Preliminary Report (2020 08:48):    >100,000 CFU/ml Gram positive organisms    Culture - Blood (collected 2020 13:50)  Source: .Blood Blood-Peripheral  Preliminary Report (2020 19:02):    No growth to date.    Culture - Blood (collected 2020 13:50)  Source: .Blood Blood-Peripheral  Preliminary Report (2020 19:02):    No growth to date.        RADIOLOGY & ADDITIONAL TESTS:    Care Discussed with Consultants/Other Providers:

## 2020-02-04 LAB
ANION GAP SERPL CALC-SCNC: 8 MMOL/L — SIGNIFICANT CHANGE UP (ref 5–17)
BASOPHILS # BLD AUTO: 0.07 K/UL — SIGNIFICANT CHANGE UP (ref 0–0.2)
BASOPHILS NFR BLD AUTO: 0.7 % — SIGNIFICANT CHANGE UP (ref 0–2)
BUN SERPL-MCNC: 17 MG/DL — SIGNIFICANT CHANGE UP (ref 7–23)
CALCIUM SERPL-MCNC: 8.7 MG/DL — SIGNIFICANT CHANGE UP (ref 8.4–10.5)
CHLORIDE SERPL-SCNC: 96 MMOL/L — SIGNIFICANT CHANGE UP (ref 96–108)
CO2 SERPL-SCNC: 28 MMOL/L — SIGNIFICANT CHANGE UP (ref 22–31)
CREAT SERPL-MCNC: 0.68 MG/DL — SIGNIFICANT CHANGE UP (ref 0.5–1.3)
EOSINOPHIL # BLD AUTO: 0.37 K/UL — SIGNIFICANT CHANGE UP (ref 0–0.5)
EOSINOPHIL NFR BLD AUTO: 3.8 % — SIGNIFICANT CHANGE UP (ref 0–6)
GLUCOSE BLDC GLUCOMTR-MCNC: 155 MG/DL — HIGH (ref 70–99)
GLUCOSE BLDC GLUCOMTR-MCNC: 191 MG/DL — HIGH (ref 70–99)
GLUCOSE BLDC GLUCOMTR-MCNC: 213 MG/DL — HIGH (ref 70–99)
GLUCOSE BLDC GLUCOMTR-MCNC: 213 MG/DL — HIGH (ref 70–99)
GLUCOSE SERPL-MCNC: 172 MG/DL — HIGH (ref 70–99)
HCT VFR BLD CALC: 31.9 % — LOW (ref 39–50)
HGB BLD-MCNC: 10 G/DL — LOW (ref 13–17)
IMM GRANULOCYTES NFR BLD AUTO: 0.6 % — SIGNIFICANT CHANGE UP (ref 0–1.5)
LYMPHOCYTES # BLD AUTO: 1.85 K/UL — SIGNIFICANT CHANGE UP (ref 1–3.3)
LYMPHOCYTES # BLD AUTO: 19 % — SIGNIFICANT CHANGE UP (ref 13–44)
MAGNESIUM SERPL-MCNC: 1.4 MG/DL — LOW (ref 1.6–2.6)
MCHC RBC-ENTMCNC: 28 PG — SIGNIFICANT CHANGE UP (ref 27–34)
MCHC RBC-ENTMCNC: 31.3 GM/DL — LOW (ref 32–36)
MCV RBC AUTO: 89.4 FL — SIGNIFICANT CHANGE UP (ref 80–100)
MONOCYTES # BLD AUTO: 0.85 K/UL — SIGNIFICANT CHANGE UP (ref 0–0.9)
MONOCYTES NFR BLD AUTO: 8.7 % — SIGNIFICANT CHANGE UP (ref 2–14)
NEUTROPHILS # BLD AUTO: 6.53 K/UL — SIGNIFICANT CHANGE UP (ref 1.8–7.4)
NEUTROPHILS NFR BLD AUTO: 67.2 % — SIGNIFICANT CHANGE UP (ref 43–77)
NRBC # BLD: 0 /100 WBCS — SIGNIFICANT CHANGE UP (ref 0–0)
PLATELET # BLD AUTO: 141 K/UL — LOW (ref 150–400)
POTASSIUM SERPL-MCNC: 3.3 MMOL/L — LOW (ref 3.5–5.3)
POTASSIUM SERPL-SCNC: 3.3 MMOL/L — LOW (ref 3.5–5.3)
RBC # BLD: 3.57 M/UL — LOW (ref 4.2–5.8)
RBC # FLD: 15.2 % — HIGH (ref 10.3–14.5)
SODIUM SERPL-SCNC: 132 MMOL/L — LOW (ref 135–145)
WBC # BLD: 9.73 K/UL — SIGNIFICANT CHANGE UP (ref 3.8–10.5)
WBC # FLD AUTO: 9.73 K/UL — SIGNIFICANT CHANGE UP (ref 3.8–10.5)

## 2020-02-04 PROCEDURE — 99232 SBSQ HOSP IP/OBS MODERATE 35: CPT

## 2020-02-04 RX ORDER — MAGNESIUM SULFATE 500 MG/ML
2 VIAL (ML) INJECTION ONCE
Refills: 0 | Status: DISCONTINUED | OUTPATIENT
Start: 2020-02-04 | End: 2020-02-04

## 2020-02-04 RX ORDER — MAGNESIUM SULFATE 500 MG/ML
1 VIAL (ML) INJECTION
Refills: 0 | Status: COMPLETED | OUTPATIENT
Start: 2020-02-04 | End: 2020-02-04

## 2020-02-04 RX ORDER — INSULIN GLARGINE 100 [IU]/ML
10 INJECTION, SOLUTION SUBCUTANEOUS AT BEDTIME
Refills: 0 | Status: DISCONTINUED | OUTPATIENT
Start: 2020-02-04 | End: 2020-02-05

## 2020-02-04 RX ORDER — POTASSIUM CHLORIDE 20 MEQ
10 PACKET (EA) ORAL
Refills: 0 | Status: COMPLETED | OUTPATIENT
Start: 2020-02-04 | End: 2020-02-04

## 2020-02-04 RX ADMIN — CEFEPIME 100 MILLIGRAM(S): 1 INJECTION, POWDER, FOR SOLUTION INTRAMUSCULAR; INTRAVENOUS at 06:18

## 2020-02-04 RX ADMIN — LEVETIRACETAM 1500 MILLIGRAM(S): 250 TABLET, FILM COATED ORAL at 17:55

## 2020-02-04 RX ADMIN — INSULIN GLARGINE 10 UNIT(S): 100 INJECTION, SOLUTION SUBCUTANEOUS at 22:08

## 2020-02-04 RX ADMIN — LEVETIRACETAM 1500 MILLIGRAM(S): 250 TABLET, FILM COATED ORAL at 06:18

## 2020-02-04 RX ADMIN — Medication 1: at 07:36

## 2020-02-04 RX ADMIN — POLYETHYLENE GLYCOL 3350 17 GRAM(S): 17 POWDER, FOR SOLUTION ORAL at 12:29

## 2020-02-04 RX ADMIN — ENOXAPARIN SODIUM 40 MILLIGRAM(S): 100 INJECTION SUBCUTANEOUS at 12:28

## 2020-02-04 RX ADMIN — LACOSAMIDE 200 MILLIGRAM(S): 50 TABLET ORAL at 06:18

## 2020-02-04 RX ADMIN — CEFEPIME 100 MILLIGRAM(S): 1 INJECTION, POWDER, FOR SOLUTION INTRAMUSCULAR; INTRAVENOUS at 22:07

## 2020-02-04 RX ADMIN — Medication 3 MILLILITER(S): at 15:58

## 2020-02-04 RX ADMIN — Medication 3 MILLILITER(S): at 04:36

## 2020-02-04 RX ADMIN — Medication 2: at 12:28

## 2020-02-04 RX ADMIN — Medication 250 MILLIGRAM(S): at 06:18

## 2020-02-04 RX ADMIN — SERTRALINE 75 MILLIGRAM(S): 25 TABLET, FILM COATED ORAL at 12:30

## 2020-02-04 RX ADMIN — Medication 2 MILLIGRAM(S): at 22:07

## 2020-02-04 RX ADMIN — Medication 100 MILLIEQUIVALENT(S): at 12:29

## 2020-02-04 RX ADMIN — CEFEPIME 100 MILLIGRAM(S): 1 INJECTION, POWDER, FOR SOLUTION INTRAMUSCULAR; INTRAVENOUS at 14:00

## 2020-02-04 RX ADMIN — Medication 250 MILLIGRAM(S): at 17:55

## 2020-02-04 RX ADMIN — Medication 1: at 18:08

## 2020-02-04 RX ADMIN — Medication 100 MILLIEQUIVALENT(S): at 11:47

## 2020-02-04 RX ADMIN — Medication 3 MILLILITER(S): at 21:18

## 2020-02-04 RX ADMIN — Medication 100 MILLIEQUIVALENT(S): at 10:28

## 2020-02-04 RX ADMIN — Medication 3 MILLILITER(S): at 09:28

## 2020-02-04 RX ADMIN — Medication 100 GRAM(S): at 09:14

## 2020-02-04 RX ADMIN — LACOSAMIDE 200 MILLIGRAM(S): 50 TABLET ORAL at 17:55

## 2020-02-04 RX ADMIN — Medication 100 GRAM(S): at 10:33

## 2020-02-04 NOTE — PROGRESS NOTE ADULT - PROBLEM SELECTOR PLAN 1
-severe sepsis 2/2 aspiration pneumonia  -ID consult noted; continue IV Vanco/Cefepime; today day 4/5  -follow cbc, today wbc 9.7 downtrending;  fever curve

## 2020-02-04 NOTE — PROGRESS NOTE ADULT - ASSESSMENT
86 yo male with a past history of seizures,   CVA, prostate cancer, BPH with urinary retention,aspiration, labeled as autoimmune encephalitis by the neurology dep't at [facility],with no response to typical therapy, now at [facility] with hypoxia and possible aspiration.He has been residing in a SNF.  He is poorly interactive but appears clinically stable.He is not febrile, is tolerating enteral feeds, and appears volume overloaded.  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  He most likely has recently aspirated  Suggest:  1.vanco/cefepime x 5 days, day 4/5  2.supportive care  3. will defer to neurology service at [facility] on additional w/u  4. wife at bedside, concerned that he is end of life, unless he makes a neurologic recovery she may be correct.

## 2020-02-04 NOTE — PROGRESS NOTE ADULT - PROBLEM SELECTOR PLAN 2
continue vanco/cefepime day # 4; as above, continue strict asp precautions, pambs -continue vanco/cefepime day # 4; as above, continue strict asp precautions, on duonebs  -continue tube feeds

## 2020-02-04 NOTE — PROGRESS NOTE ADULT - ASSESSMENT
85 male with autoimmune encephalitis c/b dysphagia s/p PEG, left sided weakness, seizure d/o, htn, prostate ca, chronic urinary retention with chronic bradley, dm2 presents from [facility] with sob, hypoxia after episode of vomiting PEG feeds admitted with severe sepsis 2/2 aspiration pneumonia. 85 male with autoimmune encephalitis c/b dysphagia s/p PEG, left sided weakness, seizure d/o, htn, prostate ca, chronic urinary retention with chronic bradley, dm2 presents from [facility] with sob, hypoxia after episode of vomiting PEG feeds admitted with severe sepsis 2/2 aspiration pneumonia.  Family would like to take patient home on [facility].  Pt. DNR/DNI.   D/C planning in 48 hrs.

## 2020-02-04 NOTE — PROGRESS NOTE ADULT - PROBLEM SELECTOR PLAN 3
resolved - continue oxygen support with nasal canula titrate to maintain sats > 92% resolved - continue oxygen via NC; to maintain sats > 92%

## 2020-02-04 NOTE — PROGRESS NOTE ADULT - SUBJECTIVE AND OBJECTIVE BOX
CC: f/u for aspiration    Patient reports: he is without acute complaints, wife at bedside, he is marginally interactive    REVIEW OF SYSTEMS:  All other review of systems negative (Comprehensive ROS)    Antimicrobials Day #  :day 4  cefepime   IVPB 2000 milliGRAM(s) IV Intermittent every 8 hours  vancomycin  IVPB 1000 milliGRAM(s) IV Intermittent every 12 hours    Other Medications Reviewed    T(F): 98.5 (02-04-20 @ 06:44), Max: 98.5 (02-03-20 @ 16:45)  HR: 87 (02-04-20 @ 09:29)  BP: 104/62 (02-04-20 @ 06:44)  RR: 16 (02-04-20 @ 06:44)  SpO2: 93% (02-04-20 @ 09:29)  Wt(kg): --    PHYSICAL EXAM:  General: awake, no acute distress  Eyes:  anicteric, no conjunctival injection, no discharge  Oropharynx: no lesions or injection 	  Neck: supple, without adenopathy  Lungs: scattered ronchi  Heart: regular rate and rhythm; no murmur, rubs or gallops  Abdomen: soft, nondistended, nontender, peg in place  Skin: no lesions  Extremities: no clubbing, cyanosis,+ edema  Neurologic: awake, marginally interactive    LAB RESULTS:                        10.0   9.73  )-----------( 141      ( 04 Feb 2020 06:45 )             31.9     02-04    132<L>  |  96  |  17  ----------------------------<  172<H>  3.3<L>   |  28  |  0.68    Ca    8.7      04 Feb 2020 06:45  Phos  2.7     02-03  Mg     1.4     02-04          MICROBIOLOGY:  RECENT CULTURES:  01-31 @ 13:55 .Urine Clean Catch (Midstream) Enterococcus durans/hirae    >100,000 CFU/ml Enterococcus durans/hirae      01-31 @ 13:50 .Blood Blood-Peripheral     No growth to date.          RADIOLOGY REVIEWED:    < from: Xray Chest 1 View AP/PA (01.31.20 @ 14:27) >  IMPRESSION: Airspace disease left lung base.    < end of copied text >

## 2020-02-04 NOTE — PROGRESS NOTE ADULT - SUBJECTIVE AND OBJECTIVE BOX
Patient is a 85y old  Male who presents with a chief complaint of shortness of breath (03 Feb 2020 11:51)      Patient seen and examined at bedside.    ALLERGIES:  No Known Allergies    MEDICATIONS  (STANDING):  albuterol/ipratropium for Nebulization 3 milliLiter(s) Nebulizer every 6 hours  cefepime   IVPB 2000 milliGRAM(s) IV Intermittent every 8 hours  dextrose 5%. 1000 milliLiter(s) (50 mL/Hr) IV Continuous <Continuous>  dextrose 50% Injectable 12.5 Gram(s) IV Push once  dextrose 50% Injectable 25 Gram(s) IV Push once  dextrose 50% Injectable 25 Gram(s) IV Push once  doxazosin 2 milliGRAM(s) Oral at bedtime  enoxaparin Injectable 40 milliGRAM(s) SubCutaneous daily  insulin glargine Injectable (LANTUS) 8 Unit(s) SubCutaneous at bedtime  insulin lispro (HumaLOG) corrective regimen sliding scale   SubCutaneous three times a day before meals  insulin lispro (HumaLOG) corrective regimen sliding scale   SubCutaneous at bedtime  lacosamide 200 milliGRAM(s) Oral two times a day  levETIRAcetam  Solution 1500 milliGRAM(s) Oral two times a day  polyethylene glycol 3350 17 Gram(s) Oral daily  potassium chloride  10 mEq/100 mL IVPB 10 milliEquivalent(s) IV Intermittent every 1 hour  sertraline 75 milliGRAM(s) Oral daily  vancomycin  IVPB 1000 milliGRAM(s) IV Intermittent every 12 hours    MEDICATIONS  (PRN):  acetaminophen    Suspension .. 650 milliGRAM(s) Oral every 6 hours PRN Temp greater or equal to 38C (100.4F), Mild Pain (1 - 3)  dextrose 40% Gel 15 Gram(s) Oral once PRN Blood Glucose LESS THAN 70 milliGRAM(s)/deciliter  glucagon  Injectable 1 milliGRAM(s) IntraMuscular once PRN Glucose LESS THAN 70 milligrams/deciliter    Vital Signs Last 24 Hrs  T(F): 98.5 (04 Feb 2020 06:44), Max: 98.5 (03 Feb 2020 16:45)  HR: 87 (04 Feb 2020 09:29) (87 - 122)  BP: 104/62 (04 Feb 2020 06:44) (104/62 - 130/80)  RR: 16 (04 Feb 2020 06:44) (15 - 16)  SpO2: 93% (04 Feb 2020 09:29) (91% - 99%)  I&O's Summary    03 Feb 2020 07:01  -  04 Feb 2020 07:00  --------------------------------------------------------  IN: 880 mL / OUT: 4850 mL / NET: -3970 mL      PHYSICAL EXAM:  General: NAD, A/O x 3  ENT: MMM  Neck: Supple, No JVD  Lungs: Clear to auscultation bilaterally  Cardio: RRR, S1/S2, No murmurs  Abdomen: Soft, Nontender, Nondistended; Bowel sounds present  Extremities: No calf tenderness, No pitting edema    LABS:                        10.0   9.73  )-----------( 141      ( 04 Feb 2020 06:45 )             31.9     02-04    132  |  96  |  17  ----------------------------<  172  3.3   |  28  |  0.68    Ca    8.7      04 Feb 2020 06:45  Phos  2.7     02-03  Mg     1.4     02-04      eGFR if Non African American: 87 mL/min/1.73M2 (02-04-20 @ 06:45)  eGFR if African American: 101 mL/min/1.73M2 (02-04-20 @ 06:45)                          POCT Blood Glucose.: 191 mg/dL (04 Feb 2020 06:58)  POCT Blood Glucose.: 220 mg/dL (03 Feb 2020 21:31)  POCT Blood Glucose.: 125 mg/dL (03 Feb 2020 15:55)    02-01 [id number] 7.8  12-29 [id number] 8.4        Culture - Urine (collected 31 Jan 2020 13:55)  Source: .Urine Clean Catch (Midstream)  Final Report (03 Feb 2020 13:14):    >100,000 CFU/ml Enterococcus durans/hirae  Organism: Enterococcus durans/hirae (03 Feb 2020 13:14)  Organism: Enterococcus durans/hirae (03 Feb 2020 13:14)      -  Ampicillin: R >8 Predicts results to ampicillin/sulbactam, amoxacillin-clavulanate and  piperacillin-tazobactam.      -  Ciprofloxacin: R >2      -  Levofloxacin: R >4      -  Nitrofurantoin: S <=32 Should not be used to treat pyelonephritis.      -  Tetra/Doxy: S <=4      -  Vancomycin: R >16      Method Type: ADAM    Culture - Blood (collected 31 Jan 2020 13:50)  Source: .Blood Blood-Peripheral  Preliminary Report (01 Feb 2020 19:02):    No growth to date.    Culture - Blood (collected 31 Jan 2020 13:50)  Source: .Blood Blood-Peripheral  Preliminary Report (01 Feb 2020 19:02):    No growth to date.        RADIOLOGY & ADDITIONAL TESTS:    Care Discussed with Consultants/Other Providers: Patient is a 85y old  Male who presents with a chief complaint of shortness of breath (03 Feb 2020 11:51)  Patient seen and examined at bedside. Pt. with no events overnight.      ALLERGIES:  No Known Allergies    MEDICATIONS  (STANDING):  albuterol/ipratropium for Nebulization 3 milliLiter(s) Nebulizer every 6 hours  cefepime   IVPB 2000 milliGRAM(s) IV Intermittent every 8 hours  dextrose 5%. 1000 milliLiter(s) (50 mL/Hr) IV Continuous <Continuous>  dextrose 50% Injectable 12.5 Gram(s) IV Push once  dextrose 50% Injectable 25 Gram(s) IV Push once  dextrose 50% Injectable 25 Gram(s) IV Push once  doxazosin 2 milliGRAM(s) Oral at bedtime  enoxaparin Injectable 40 milliGRAM(s) SubCutaneous daily  insulin glargine Injectable (LANTUS) 8 Unit(s) SubCutaneous at bedtime  insulin lispro (HumaLOG) corrective regimen sliding scale   SubCutaneous three times a day before meals  insulin lispro (HumaLOG) corrective regimen sliding scale   SubCutaneous at bedtime  lacosamide 200 milliGRAM(s) Oral two times a day  levETIRAcetam  Solution 1500 milliGRAM(s) Oral two times a day  polyethylene glycol 3350 17 Gram(s) Oral daily  potassium chloride  10 mEq/100 mL IVPB 10 milliEquivalent(s) IV Intermittent every 1 hour  sertraline 75 milliGRAM(s) Oral daily  vancomycin  IVPB 1000 milliGRAM(s) IV Intermittent every 12 hours    MEDICATIONS  (PRN):  acetaminophen    Suspension .. 650 milliGRAM(s) Oral every 6 hours PRN Temp greater or equal to 38C (100.4F), Mild Pain (1 - 3)  dextrose 40% Gel 15 Gram(s) Oral once PRN Blood Glucose LESS THAN 70 milliGRAM(s)/deciliter  glucagon  Injectable 1 milliGRAM(s) IntraMuscular once PRN Glucose LESS THAN 70 milligrams/deciliter    Vital Signs Last 24 Hrs  T(F): 98.5 (04 Feb 2020 06:44), Max: 98.5 (03 Feb 2020 16:45)  HR: 87 (04 Feb 2020 09:29) (87 - 122)  BP: 104/62 (04 Feb 2020 06:44) (104/62 - 130/80)  RR: 16 (04 Feb 2020 06:44) (15 - 16)  SpO2: 93% (04 Feb 2020 09:29) (91% - 99%)  I&O's Summary    03 Feb 2020 07:01  -  04 Feb 2020 07:00  --------------------------------------------------------  IN: 880 mL / OUT: 4850 mL / NET: -3970 mL      PHYSICAL EXAM:  General: NAD, alert.  ENT: MMM, no thrush  Neck: Supple, No JVD  Lungs: breathing not labored, coarse breath sounds, no wheezing  Cardio: RRR, S1/S2, No murmurs  Abdomen: Soft, +peg in place, Nontender, Nondistended; Bowel sounds present  G/U:  +PRESSLEY  Extremities: No calf tenderness, No pitting edema  Neuro:  no focal deficits    LABS:                        10.0   9.73  )-----------( 141      ( 04 Feb 2020 06:45 )             31.9     02-04    132  |  96  |  17  ----------------------------<  172  3.3   |  28  |  0.68    Ca    8.7      04 Feb 2020 06:45  Phos  2.7     02-03  Mg     1.4     02-04      eGFR if Non African American: 87 mL/min/1.73M2 (02-04-20 @ 06:45)  eGFR if African American: 101 mL/min/1.73M2 (02-04-20 @ 06:45)                          POCT Blood Glucose.: 191 mg/dL (04 Feb 2020 06:58)  POCT Blood Glucose.: 220 mg/dL (03 Feb 2020 21:31)  POCT Blood Glucose.: 125 mg/dL (03 Feb 2020 15:55)    02-01 [id number] 7.8  12-29 [id number] 8.4        Culture - Urine (collected 31 Jan 2020 13:55)  Source: .Urine Clean Catch (Midstream)  Final Report (03 Feb 2020 13:14):    >100,000 CFU/ml Enterococcus durans/hirae  Organism: Enterococcus durans/hirae (03 Feb 2020 13:14)  Organism: Enterococcus durans/hirae (03 Feb 2020 13:14)      -  Ampicillin: R >8 Predicts results to ampicillin/sulbactam, amoxacillin-clavulanate and  piperacillin-tazobactam.      -  Ciprofloxacin: R >2      -  Levofloxacin: R >4      -  Nitrofurantoin: S <=32 Should not be used to treat pyelonephritis.      -  Tetra/Doxy: S <=4      -  Vancomycin: R >16      Method Type: ADAM    Culture - Blood (collected 31 Jan 2020 13:50)  Source: .Blood Blood-Peripheral  Preliminary Report (01 Feb 2020 19:02):    No growth to date.    Culture - Blood (collected 31 Jan 2020 13:50)  Source: .Blood Blood-Peripheral  Preliminary Report (01 Feb 2020 19:02):    No growth to date.        RADIOLOGY & ADDITIONAL TESTS:    Care Discussed with Consultants/Other Providers: Patient is a 85y old  Male who presents with a chief complaint of shortness of breath (03 Feb 2020 11:51)  Patient seen and examined at bedside. Pt. with no events overnight.      ALLERGIES:  No Known Allergies    MEDICATIONS  (STANDING):  albuterol/ipratropium for Nebulization 3 milliLiter(s) Nebulizer every 6 hours  cefepime   IVPB 2000 milliGRAM(s) IV Intermittent every 8 hours  dextrose 5%. 1000 milliLiter(s) (50 mL/Hr) IV Continuous <Continuous>  dextrose 50% Injectable 12.5 Gram(s) IV Push once  dextrose 50% Injectable 25 Gram(s) IV Push once  dextrose 50% Injectable 25 Gram(s) IV Push once  doxazosin 2 milliGRAM(s) Oral at bedtime  enoxaparin Injectable 40 milliGRAM(s) SubCutaneous daily  insulin glargine Injectable (LANTUS) 8 Unit(s) SubCutaneous at bedtime  insulin lispro (HumaLOG) corrective regimen sliding scale   SubCutaneous three times a day before meals  insulin lispro (HumaLOG) corrective regimen sliding scale   SubCutaneous at bedtime  lacosamide 200 milliGRAM(s) Oral two times a day  levETIRAcetam  Solution 1500 milliGRAM(s) Oral two times a day  polyethylene glycol 3350 17 Gram(s) Oral daily  potassium chloride  10 mEq/100 mL IVPB 10 milliEquivalent(s) IV Intermittent every 1 hour  sertraline 75 milliGRAM(s) Oral daily  vancomycin  IVPB 1000 milliGRAM(s) IV Intermittent every 12 hours    MEDICATIONS  (PRN):  acetaminophen    Suspension .. 650 milliGRAM(s) Oral every 6 hours PRN Temp greater or equal to 38C (100.4F), Mild Pain (1 - 3)  dextrose 40% Gel 15 Gram(s) Oral once PRN Blood Glucose LESS THAN 70 milliGRAM(s)/deciliter  glucagon  Injectable 1 milliGRAM(s) IntraMuscular once PRN Glucose LESS THAN 70 milligrams/deciliter    Vital Signs Last 24 Hrs  T(F): 98.5 (04 Feb 2020 06:44), Max: 98.5 (03 Feb 2020 16:45)  HR: 87 (04 Feb 2020 09:29) (87 - 122)  BP: 104/62 (04 Feb 2020 06:44) (104/62 - 130/80)  RR: 16 (04 Feb 2020 06:44) (15 - 16)  SpO2: 93% (04 Feb 2020 09:29) (91% - 99%)  I&O's Summary    03 Feb 2020 07:01  -  04 Feb 2020 07:00  --------------------------------------------------------  IN: 880 mL / OUT: 4850 mL / NET: -3970 mL      PHYSICAL EXAM:  General: NAD, alert.  ENT: MMM, no thrush  Neck: Supple, No JVD  Lungs: breathing not labored, coarse breath sounds, no wheezing  Cardio: RRR, S1/S2, No murmurs  Abdomen: Soft, +peg in place, Nontender, Nondistended; Bowel sounds present  G/U:  +PRESSLEY  Extremities: No calf tenderness, No pitting edema  Neuro:  no focal deficits    LABS:                        10.0   9.73  )-----------( 141      ( 04 Feb 2020 06:45 )             31.9     02-04    132  |  96  |  17  ----------------------------<  172  3.3   |  28  |  0.68    Ca    8.7      04 Feb 2020 06:45  Phos  2.7     02-03  Mg     1.4     02-04      eGFR if Non African American: 87 mL/min/1.73M2 (02-04-20 @ 06:45)  eGFR if African American: 101 mL/min/1.73M2 (02-04-20 @ 06:45)      POCT Blood Glucose.: 191 mg/dL (04 Feb 2020 06:58)  POCT Blood Glucose.: 220 mg/dL (03 Feb 2020 21:31)  POCT Blood Glucose.: 125 mg/dL (03 Feb 2020 15:55)    02-01 [id number] 7.8  12-29 [id number] 8.4        Culture - Urine (collected 31 Jan 2020 13:55)  Source: .Urine Clean Catch (Midstream)  Final Report (03 Feb 2020 13:14):    >100,000 CFU/ml Enterococcus durans/hirae  Organism: Enterococcus durans/hirae (03 Feb 2020 13:14)  Organism: Enterococcus durans/hirae (03 Feb 2020 13:14)      -  Ampicillin: R >8 Predicts results to ampicillin/sulbactam, amoxacillin-clavulanate and  piperacillin-tazobactam.      -  Ciprofloxacin: R >2      -  Levofloxacin: R >4      -  Nitrofurantoin: S <=32 Should not be used to treat pyelonephritis.      -  Tetra/Doxy: S <=4      -  Vancomycin: R >16      Method Type: ADAM    Culture - Blood (collected 31 Jan 2020 13:50)  Source: .Blood Blood-Peripheral  Preliminary Report (01 Feb 2020 19:02):    No growth to date.    Culture - Blood (collected 31 Jan 2020 13:50)  Source: .Blood Blood-Peripheral  Preliminary Report (01 Feb 2020 19:02):    No growth to date.        RADIOLOGY & ADDITIONAL TESTS:    Care Discussed with Consultants/Other Providers:

## 2020-02-04 NOTE — PROGRESS NOTE ADULT - PROBLEM SELECTOR PLAN 4
dysphagia s/p peg  continue with tube feeds  maintain aspiration precautions s/p peg --continue with tube feeds  maintain aspiration precautions

## 2020-02-04 NOTE — PROGRESS NOTE ADULT - PROBLEM SELECTOR PLAN 6
blood sugar appears stable - a1c 7.8  continue lantus 8 u qhs and correction insulin blood sugar appears stable - HgbA1c 7.8  continue lantus 8 u qhs and correction insulin

## 2020-02-04 NOTE — PROGRESS NOTE ADULT - PROBLEM SELECTOR PLAN 7
replete mag and potassium -hypokalemia; replete KCl  -hypomagnesemia; replete with IV mag  -follow levels

## 2020-02-05 DIAGNOSIS — E87.1 HYPO-OSMOLALITY AND HYPONATREMIA: ICD-10-CM

## 2020-02-05 DIAGNOSIS — M79.89 OTHER SPECIFIED SOFT TISSUE DISORDERS: ICD-10-CM

## 2020-02-05 LAB
ANION GAP SERPL CALC-SCNC: 9 MMOL/L — SIGNIFICANT CHANGE UP (ref 5–17)
BUN SERPL-MCNC: 20 MG/DL — SIGNIFICANT CHANGE UP (ref 7–23)
CALCIUM SERPL-MCNC: 9 MG/DL — SIGNIFICANT CHANGE UP (ref 8.4–10.5)
CHLORIDE SERPL-SCNC: 92 MMOL/L — LOW (ref 96–108)
CO2 SERPL-SCNC: 30 MMOL/L — SIGNIFICANT CHANGE UP (ref 22–31)
CREAT SERPL-MCNC: 0.78 MG/DL — SIGNIFICANT CHANGE UP (ref 0.5–1.3)
CULTURE RESULTS: SIGNIFICANT CHANGE UP
CULTURE RESULTS: SIGNIFICANT CHANGE UP
GLUCOSE BLDC GLUCOMTR-MCNC: 138 MG/DL — HIGH (ref 70–99)
GLUCOSE BLDC GLUCOMTR-MCNC: 168 MG/DL — HIGH (ref 70–99)
GLUCOSE BLDC GLUCOMTR-MCNC: 211 MG/DL — HIGH (ref 70–99)
GLUCOSE BLDC GLUCOMTR-MCNC: 225 MG/DL — HIGH (ref 70–99)
GLUCOSE SERPL-MCNC: 193 MG/DL — HIGH (ref 70–99)
HCT VFR BLD CALC: 33.9 % — LOW (ref 39–50)
HGB BLD-MCNC: 11 G/DL — LOW (ref 13–17)
MAGNESIUM SERPL-MCNC: 1.4 MG/DL — LOW (ref 1.6–2.6)
MCHC RBC-ENTMCNC: 28.3 PG — SIGNIFICANT CHANGE UP (ref 27–34)
MCHC RBC-ENTMCNC: 32.4 GM/DL — SIGNIFICANT CHANGE UP (ref 32–36)
MCV RBC AUTO: 87.1 FL — SIGNIFICANT CHANGE UP (ref 80–100)
NRBC # BLD: 0 /100 WBCS — SIGNIFICANT CHANGE UP (ref 0–0)
PLATELET # BLD AUTO: 441 K/UL — HIGH (ref 150–400)
POTASSIUM SERPL-MCNC: 2.7 MMOL/L — CRITICAL LOW (ref 3.5–5.3)
POTASSIUM SERPL-SCNC: 2.7 MMOL/L — CRITICAL LOW (ref 3.5–5.3)
RBC # BLD: 3.89 M/UL — LOW (ref 4.2–5.8)
RBC # FLD: 15 % — HIGH (ref 10.3–14.5)
SODIUM SERPL-SCNC: 131 MMOL/L — LOW (ref 135–145)
SPECIMEN SOURCE: SIGNIFICANT CHANGE UP
SPECIMEN SOURCE: SIGNIFICANT CHANGE UP
WBC # BLD: 12.69 K/UL — HIGH (ref 3.8–10.5)
WBC # FLD AUTO: 12.69 K/UL — HIGH (ref 3.8–10.5)

## 2020-02-05 PROCEDURE — 93971 EXTREMITY STUDY: CPT | Mod: 26,LT

## 2020-02-05 PROCEDURE — 99232 SBSQ HOSP IP/OBS MODERATE 35: CPT

## 2020-02-05 RX ORDER — MAGNESIUM SULFATE 500 MG/ML
1 VIAL (ML) INJECTION ONCE
Refills: 0 | Status: DISCONTINUED | OUTPATIENT
Start: 2020-02-05 | End: 2020-02-05

## 2020-02-05 RX ORDER — INSULIN LISPRO 100/ML
2 VIAL (ML) SUBCUTANEOUS
Refills: 0 | Status: DISCONTINUED | OUTPATIENT
Start: 2020-02-05 | End: 2020-02-11

## 2020-02-05 RX ORDER — MAGNESIUM SULFATE 500 MG/ML
1 VIAL (ML) INJECTION
Refills: 0 | Status: COMPLETED | OUTPATIENT
Start: 2020-02-05 | End: 2020-02-05

## 2020-02-05 RX ORDER — SODIUM CHLORIDE 9 MG/ML
1000 INJECTION INTRAMUSCULAR; INTRAVENOUS; SUBCUTANEOUS
Refills: 0 | Status: DISCONTINUED | OUTPATIENT
Start: 2020-02-05 | End: 2020-02-06

## 2020-02-05 RX ORDER — POTASSIUM CHLORIDE 20 MEQ
40 PACKET (EA) ORAL EVERY 4 HOURS
Refills: 0 | Status: COMPLETED | OUTPATIENT
Start: 2020-02-05 | End: 2020-02-05

## 2020-02-05 RX ORDER — INSULIN GLARGINE 100 [IU]/ML
10 INJECTION, SOLUTION SUBCUTANEOUS AT BEDTIME
Refills: 0 | Status: DISCONTINUED | OUTPATIENT
Start: 2020-02-05 | End: 2020-02-11

## 2020-02-05 RX ADMIN — LACOSAMIDE 200 MILLIGRAM(S): 50 TABLET ORAL at 06:29

## 2020-02-05 RX ADMIN — SODIUM CHLORIDE 75 MILLILITER(S): 9 INJECTION INTRAMUSCULAR; INTRAVENOUS; SUBCUTANEOUS at 19:14

## 2020-02-05 RX ADMIN — Medication 100 GRAM(S): at 17:54

## 2020-02-05 RX ADMIN — Medication 250 MILLIGRAM(S): at 06:29

## 2020-02-05 RX ADMIN — CEFEPIME 100 MILLIGRAM(S): 1 INJECTION, POWDER, FOR SOLUTION INTRAMUSCULAR; INTRAVENOUS at 06:29

## 2020-02-05 RX ADMIN — SERTRALINE 75 MILLIGRAM(S): 25 TABLET, FILM COATED ORAL at 11:36

## 2020-02-05 RX ADMIN — Medication 40 MILLIEQUIVALENT(S): at 22:11

## 2020-02-05 RX ADMIN — Medication 2 MILLIGRAM(S): at 22:10

## 2020-02-05 RX ADMIN — ENOXAPARIN SODIUM 40 MILLIGRAM(S): 100 INJECTION SUBCUTANEOUS at 11:35

## 2020-02-05 RX ADMIN — Medication 40 MILLIEQUIVALENT(S): at 17:54

## 2020-02-05 RX ADMIN — POLYETHYLENE GLYCOL 3350 17 GRAM(S): 17 POWDER, FOR SOLUTION ORAL at 11:36

## 2020-02-05 RX ADMIN — Medication 1: at 11:35

## 2020-02-05 RX ADMIN — Medication 3 MILLILITER(S): at 09:41

## 2020-02-05 RX ADMIN — LEVETIRACETAM 1500 MILLIGRAM(S): 250 TABLET, FILM COATED ORAL at 17:55

## 2020-02-05 RX ADMIN — Medication 3 MILLILITER(S): at 04:39

## 2020-02-05 RX ADMIN — INSULIN GLARGINE 10 UNIT(S): 100 INJECTION, SOLUTION SUBCUTANEOUS at 22:12

## 2020-02-05 RX ADMIN — Medication 2 UNIT(S): at 17:49

## 2020-02-05 RX ADMIN — Medication 2: at 08:08

## 2020-02-05 RX ADMIN — Medication 3 MILLILITER(S): at 15:34

## 2020-02-05 RX ADMIN — LACOSAMIDE 200 MILLIGRAM(S): 50 TABLET ORAL at 17:57

## 2020-02-05 RX ADMIN — Medication 650 MILLIGRAM(S): at 18:53

## 2020-02-05 RX ADMIN — Medication 250 MILLIGRAM(S): at 17:53

## 2020-02-05 RX ADMIN — LEVETIRACETAM 1500 MILLIGRAM(S): 250 TABLET, FILM COATED ORAL at 06:30

## 2020-02-05 RX ADMIN — CEFEPIME 100 MILLIGRAM(S): 1 INJECTION, POWDER, FOR SOLUTION INTRAMUSCULAR; INTRAVENOUS at 22:12

## 2020-02-05 RX ADMIN — Medication 650 MILLIGRAM(S): at 17:53

## 2020-02-05 RX ADMIN — CEFEPIME 100 MILLIGRAM(S): 1 INJECTION, POWDER, FOR SOLUTION INTRAMUSCULAR; INTRAVENOUS at 13:53

## 2020-02-05 RX ADMIN — Medication 3 MILLILITER(S): at 21:20

## 2020-02-05 RX ADMIN — Medication 100 GRAM(S): at 19:14

## 2020-02-05 NOTE — PROGRESS NOTE ADULT - PROBLEM SELECTOR PLAN 1
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

## 2020-02-05 NOTE — PROGRESS NOTE ADULT - ASSESSMENT
84 yo male with a past history of seizures,   CVA, prostate cancer, BPH with urinary retention,aspiration, labeled as autoimmune encephalitis by the neurology dep't at [facility],with no response to typical therapy, now at [facility] with hypoxia and possible aspiration.He has been residing in a SNF.  He is poorly interactive but appears clinically stable.He is not febrile, is tolerating enteral feeds, and appears volume overloaded.  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  He most likely has recently aspirated.He appears stable from an ID perspective  Suggest:  1.vanco/cefepime x 5 days, day 5/5  2.supportive care  3. will defer to neurology service at [facility] on additional w/u  4. disposition per primary service

## 2020-02-05 NOTE — PROGRESS NOTE ADULT - SUBJECTIVE AND OBJECTIVE BOX
Patient is a 85y old  Male who presents with a chief complaint of shortness of breath (05 Feb 2020 12:58)  No acute events overnight        Patient seen and examined at bedside.    ALLERGIES:  No Known Allergies        Vital Signs Last 24 Hrs  T(F): 99.4 (05 Feb 2020 04:31), Max: 99.4 (05 Feb 2020 04:31)  HR: 103 (05 Feb 2020 09:42) (85 - 103)  BP: 121/69 (05 Feb 2020 04:31) (119/60 - 121/69)  RR: 16 (05 Feb 2020 04:31) (16 - 16)  SpO2: 96% (05 Feb 2020 09:42) (96% - 99%)  I&O's Summary    04 Feb 2020 07:01  -  05 Feb 2020 07:00  --------------------------------------------------------  IN: 0 mL / OUT: 3100 mL / NET: -3100 mL    05 Feb 2020 07:01  -  05 Feb 2020 15:35  --------------------------------------------------------  IN: 0 mL / OUT: 1100 mL / NET: -1100 mL      MEDICATIONS:  acetaminophen    Suspension .. 650 milliGRAM(s) Oral every 6 hours PRN  albuterol/ipratropium for Nebulization 3 milliLiter(s) Nebulizer every 6 hours  cefepime   IVPB 2000 milliGRAM(s) IV Intermittent every 8 hours  dextrose 40% Gel 15 Gram(s) Oral once PRN  dextrose 5%. 1000 milliLiter(s) IV Continuous <Continuous>  dextrose 50% Injectable 12.5 Gram(s) IV Push once  dextrose 50% Injectable 25 Gram(s) IV Push once  dextrose 50% Injectable 25 Gram(s) IV Push once  doxazosin 2 milliGRAM(s) Oral at bedtime  enoxaparin Injectable 40 milliGRAM(s) SubCutaneous daily  glucagon  Injectable 1 milliGRAM(s) IntraMuscular once PRN  insulin glargine Injectable (LANTUS) 10 Unit(s) SubCutaneous at bedtime  insulin lispro (HumaLOG) corrective regimen sliding scale   SubCutaneous three times a day before meals  insulin lispro (HumaLOG) corrective regimen sliding scale   SubCutaneous at bedtime  lacosamide 200 milliGRAM(s) Oral two times a day  levETIRAcetam  Solution 1500 milliGRAM(s) Oral two times a day  magnesium sulfate  IVPB 1 Gram(s) IV Intermittent once  polyethylene glycol 3350 17 Gram(s) Oral daily  potassium chloride   Solution 40 milliEquivalent(s) Enteral Tube every 4 hours  sertraline 75 milliGRAM(s) Oral daily  sodium chloride 0.9%. 1000 milliLiter(s) IV Continuous <Continuous>  vancomycin  IVPB 1000 milliGRAM(s) IV Intermittent every 12 hours      PHYSICAL EXAM:  General: NAD  ENT: MMM, no oral thrush  Neck: Supple, No JVD  Lungs: Course breath sounds, non labored breathing  Cardio: S1S2 regular  Abdomen: Soft, Nontender, +PEG  Extremities: No cyanosis, No edema    LABS:                        11.0   12.69 )-----------( 441      ( 05 Feb 2020 13:14 )             33.9     02-05    131  |  92  |  20  ----------------------------<  193  2.7   |  30  |  0.78    Ca    9.0      05 Feb 2020 13:14  Phos  2.7     02-03  Mg     1.4     02-05      eGFR if Non African American: 82 mL/min/1.73M2 (02-05-20 @ 13:14)  eGFR if : 96 mL/min/1.73M2 (02-05-20 @ 13:14)                          POCT Blood Glucose.: 168 mg/dL (05 Feb 2020 11:33)  POCT Blood Glucose.: 225 mg/dL (05 Feb 2020 07:56)  POCT Blood Glucose.: 213 mg/dL (04 Feb 2020 22:04)  POCT Blood Glucose.: 155 mg/dL (04 Feb 2020 18:06)    02-01 LhysupxmjbN2W 7.8  12-29 OeaoawbfsoD0O 8.4        Culture - Urine (collected 31 Jan 2020 13:55)  Source: .Urine Clean Catch (Midstream)  Final Report (03 Feb 2020 13:14):    >100,000 CFU/ml Enterococcus durans/hirae  Organism: Enterococcus durans/hirae (03 Feb 2020 13:14)  Organism: Enterococcus durans/hirae (03 Feb 2020 13:14)      -  Ampicillin: R >8 Predicts results to ampicillin/sulbactam, amoxacillin-clavulanate and  piperacillin-tazobactam.      -  Ciprofloxacin: R >2      -  Levofloxacin: R >4      -  Nitrofurantoin: S <=32 Should not be used to treat pyelonephritis.      -  Tetra/Doxy: S <=4      -  Vancomycin: R >16      Method Type: ADAM    Culture - Blood (collected 31 Jan 2020 13:50)  Source: .Blood Blood-Peripheral  Preliminary Report (01 Feb 2020 19:02):    No growth to date.    Culture - Blood (collected 31 Jan 2020 13:50)  Source: .Blood Blood-Peripheral  Preliminary Report (01 Feb 2020 19:02):    No growth to date.        RADIOLOGY & ADDITIONAL TESTS:    Care Discussed with Consultants/Other Providers: Patient is a 85y old  Male who presents with a chief complaint of shortness of breath (05 Feb 2020 12:58)    Interval Hx  No acute events overnight  Patient seen and examined at bedside.    ALLERGIES:  No Known Allergies        Vital Signs Last 24 Hrs  T(F): 99.4 (05 Feb 2020 04:31), Max: 99.4 (05 Feb 2020 04:31)  HR: 103 (05 Feb 2020 09:42) (85 - 103)  BP: 121/69 (05 Feb 2020 04:31) (119/60 - 121/69)  RR: 16 (05 Feb 2020 04:31) (16 - 16)  SpO2: 96% (05 Feb 2020 09:42) (96% - 99%)  I&O's Summary    04 Feb 2020 07:01  -  05 Feb 2020 07:00  --------------------------------------------------------  IN: 0 mL / OUT: 3100 mL / NET: -3100 mL    05 Feb 2020 07:01  -  05 Feb 2020 15:35  --------------------------------------------------------  IN: 0 mL / OUT: 1100 mL / NET: -1100 mL      MEDICATIONS:  acetaminophen    Suspension .. 650 milliGRAM(s) Oral every 6 hours PRN  albuterol/ipratropium for Nebulization 3 milliLiter(s) Nebulizer every 6 hours  cefepime   IVPB 2000 milliGRAM(s) IV Intermittent every 8 hours  dextrose 40% Gel 15 Gram(s) Oral once PRN  dextrose 5%. 1000 milliLiter(s) IV Continuous <Continuous>  dextrose 50% Injectable 12.5 Gram(s) IV Push once  dextrose 50% Injectable 25 Gram(s) IV Push once  dextrose 50% Injectable 25 Gram(s) IV Push once  doxazosin 2 milliGRAM(s) Oral at bedtime  enoxaparin Injectable 40 milliGRAM(s) SubCutaneous daily  glucagon  Injectable 1 milliGRAM(s) IntraMuscular once PRN  insulin glargine Injectable (LANTUS) 10 Unit(s) SubCutaneous at bedtime  insulin lispro (HumaLOG) corrective regimen sliding scale   SubCutaneous three times a day before meals  insulin lispro (HumaLOG) corrective regimen sliding scale   SubCutaneous at bedtime  lacosamide 200 milliGRAM(s) Oral two times a day  levETIRAcetam  Solution 1500 milliGRAM(s) Oral two times a day  magnesium sulfate  IVPB 1 Gram(s) IV Intermittent once  polyethylene glycol 3350 17 Gram(s) Oral daily  potassium chloride   Solution 40 milliEquivalent(s) Enteral Tube every 4 hours  sertraline 75 milliGRAM(s) Oral daily  sodium chloride 0.9%. 1000 milliLiter(s) IV Continuous <Continuous>  vancomycin  IVPB 1000 milliGRAM(s) IV Intermittent every 12 hours      PHYSICAL EXAM:  General: NAD  ENT: MMM, no oral thrush  Neck: Supple, No JVD  Lungs: Course breath sounds, non labored breathing  Cardio: S1S2 regular  Abdomen: Soft, Nontender, +PEG  Extremities: Left upper extremity forearm  swelling    LABS:                        11.0   12.69 )-----------( 441      ( 05 Feb 2020 13:14 )             33.9     02-05    131  |  92  |  20  ----------------------------<  193  2.7   |  30  |  0.78    Ca    9.0      05 Feb 2020 13:14  Phos  2.7     02-03  Mg     1.4     02-05      eGFR if Non African American: 82 mL/min/1.73M2 (02-05-20 @ 13:14)  eGFR if : 96 mL/min/1.73M2 (02-05-20 @ 13:14)        POCT Blood Glucose.: 168 mg/dL (05 Feb 2020 11:33)  POCT Blood Glucose.: 225 mg/dL (05 Feb 2020 07:56)  POCT Blood Glucose.: 213 mg/dL (04 Feb 2020 22:04)  POCT Blood Glucose.: 155 mg/dL (04 Feb 2020 18:06)    02-01 FzdqnoofyiD2M 7.8  12-29 OplujecxubN2D 8.4        Culture - Urine (collected 31 Jan 2020 13:55)  Source: .Urine Clean Catch (Midstream)  Final Report (03 Feb 2020 13:14):    >100,000 CFU/ml Enterococcus durans/hirae  Organism: Enterococcus durans/hirae (03 Feb 2020 13:14)  Organism: Enterococcus durans/hirae (03 Feb 2020 13:14)      -  Ampicillin: R >8 Predicts results to ampicillin/sulbactam, amoxacillin-clavulanate and  piperacillin-tazobactam.      -  Ciprofloxacin: R >2      -  Levofloxacin: R >4      -  Nitrofurantoin: S <=32 Should not be used to treat pyelonephritis.      -  Tetra/Doxy: S <=4      -  Vancomycin: R >16      Method Type: ADAM    Culture - Blood (collected 31 Jan 2020 13:50)  Source: .Blood Blood-Peripheral  Preliminary Report (01 Feb 2020 19:02):    No growth to date.    Culture - Blood (collected 31 Jan 2020 13:50)  Source: .Blood Blood-Peripheral  Preliminary Report (01 Feb 2020 19:02):    No growth to date.        RADIOLOGY & ADDITIONAL TESTS:    Care Discussed with Consultants/Other Providers:

## 2020-02-05 NOTE — CHART NOTE - NSCHARTNOTEFT_GEN_A_CORE
NUTRITION FOLLOW UP    SOURCE: Patient [)   Family [ ]     other [X ]    DIET: PEG FEEDINGS, TWO ASAD HN, 240 CC QID, 1 PKT. PROSOURCE QD    PATIENT REPORT[ ] nausea  [ ] vomiting [ ] diarrhea [ ] constipation  [ ]chewing problems [ ] swallowing issues  [ ] other: no GI distress    PO INTAKE:  < 50% [ ]   50-75%  [ ]   %  [X]  other : PEG FEEDS providing pt. w/ 1920 calories/80gms. protein + add. 60 cals/ 15 gms. protein from prosource. Tolerates PEG feedings well.    SOURCE: for PO intake [] Patient [ ] family [X ] chart [ ] staff [ ] other    ENTERAL/PARENTERAL NUTRITION: n/a    CURRENT WEIGHT: Weight (kg): 76.2  2/5/20    PERTINENT LABS:  Date: 04 Feb 2020 06:45  Hemoglobin 10.0   Hematocrit 31.9     Date: 02-04  Sodium 132<L>  Potassium 3.3<L>  Glucose Serum 172<H>  BUN 17      Creatinine    ACCUCHECK  POCT Blood Glucose.: 225 mg/dL (05 Feb 2020 07:56)  POCT Blood Glucose.: 213 mg/dL (04 Feb 2020 22:04)  POCT Blood Glucose.: 155 mg/dL (04 Feb 2020 18:06)  POCT Blood Glucose.: 213 mg/dL (04 Feb 2020 12:24)      SKIN: stage 2 B/L biuttocks,DTI left heel, edema noted left arm , last BM was 2/5, moderate malnutition noted    ESTIMATED NEEDS:   [X] no change since previous assessment  [ ] recalculated:     PREVIOUS NUTRITION DIAGNOSIS: addressed    NUTRITION DIAGNOSIS is   [X] ongoing    NEW NUTRITION DIAGNOSIS: [X] not applicable    MONITORING AND EVALUATION:   Current diet order is appropriate and is well tolerated, but will monitor for any changes that may be needed    [X] PO intake [X] Tolerance to diet prescription [X] weights [X] follow up per protocol    NUTRITION RECOMMENDATIONS: Maintain on present PEG feeding formula.    RD remains available TIFFANIE Mccormick RD, CDE

## 2020-02-05 NOTE — PROVIDER CONTACT NOTE (CRITICAL VALUE NOTIFICATION) - ACTION/TREATMENT ORDERED:
potassium ordered, magnesium added on to bmp
continue giving ordered potassium IV
Potassium alone with Magnessium will be ordered

## 2020-02-05 NOTE — PROGRESS NOTE ADULT - SUBJECTIVE AND OBJECTIVE BOX
CC: f/u for pneumonia    Patient reports: he is non verbal, no acute events the past 24 hours    REVIEW OF SYSTEMS:  All other review of systems negative (Comprehensive ROS): limited, tolerating peg feeds    Antimicrobials Day #  :day 5/5  cefepime   IVPB 2000 milliGRAM(s) IV Intermittent every 8 hours  vancomycin  IVPB 1000 milliGRAM(s) IV Intermittent every 12 hours    Other Medications Reviewed    T(F): 99.4 (02-05-20 @ 04:31), Max: 99.4 (02-05-20 @ 04:31)  HR: 103 (02-05-20 @ 09:42)  BP: 121/69 (02-05-20 @ 04:31)  RR: 16 (02-05-20 @ 04:31)  SpO2: 96% (02-05-20 @ 09:42)  Wt(kg): --    PHYSICAL EXAM:  General:  lethargic, no acute distress  Eyes:  anicteric, no conjunctival injection, no discharge  Oropharynx: no lesions or injection 	  Neck: supple, without adenopathy  Lungs: clear to auscultation, diminished BS at bases  Heart: regular rate and rhythm; no murmur, rubs or gallops  Abdomen: soft, nondistended, nontender, without mass or organomegaly, peg  Skin: no lesions  Extremities: no clubbing, cyanosis, + edema  Neurologic: marginally interactive   bradley    LAB RESULTS:                        10.0   9.73  )-----------( 141      ( 04 Feb 2020 06:45 )             31.9     02-04    132<L>  |  96  |  17  ----------------------------<  172<H>  3.3<L>   |  28  |  0.68    Ca    8.7      04 Feb 2020 06:45  Mg     1.4     02-04          MICROBIOLOGY:  RECENT CULTURES:  01-31 @ 13:55 .Urine Clean Catch (Midstream) Enterococcus durans/hirae    >100,000 CFU/ml Enterococcus durans/hirae      01-31 @ 13:50 .Blood Blood-Peripheral     No growth to date.          RADIOLOGY REVIEWED:    < from: Xray Chest 1 View AP/PA (01.31.20 @ 14:27) >  EXAM:  XR CHEST AP OR PA 1V      PROCEDURE DATE:  01/31/2020        INTERPRETATION:  Clinical information: Shortness of breath    Portable study, 2:06 PM    Comparison exam dated January 2, 2020    Patient tilted towards the left.    Cardiac monitor leads present.    Airspace disease left lung base , retrocardiac. No additional areas of airspace disease. No effusion or vascular congestion. No pneumothorax. Heart size within normal limits. No acute osseous abnormalities. Aorta shows atherosclerotic changes. Tiny metallic density overlies the right lower lung unchanged since the prior study.    IMPRESSION: Airspace disease left lung base.    < end of copied text >

## 2020-02-05 NOTE — PROGRESS NOTE ADULT - PROBLEM SELECTOR PLAN 2
ID following, day 5/5 vanco/cefepime, continue tube feeds, strict aspiration precautions, duonebs q 6 hours - pt is dnr/i - family to ultimate decide facility vs home with hospice

## 2020-02-05 NOTE — PROGRESS NOTE ADULT - PROBLEM SELECTOR PLAN 6
continue lantus 8 u qhs, correction insulin, monitor blood glucose closely blood sugar elevated, continue lantus 10 u qhs, plan to start humalog 2 u tid before meals, continue to monitor closely

## 2020-02-06 LAB
ANION GAP SERPL CALC-SCNC: 10 MMOL/L — SIGNIFICANT CHANGE UP (ref 5–17)
BASOPHILS # BLD AUTO: 0.07 K/UL — SIGNIFICANT CHANGE UP (ref 0–0.2)
BASOPHILS NFR BLD AUTO: 0.7 % — SIGNIFICANT CHANGE UP (ref 0–2)
BUN SERPL-MCNC: 17 MG/DL — SIGNIFICANT CHANGE UP (ref 7–23)
CALCIUM SERPL-MCNC: 7.1 MG/DL — LOW (ref 8.4–10.5)
CHLORIDE SERPL-SCNC: 101 MMOL/L — SIGNIFICANT CHANGE UP (ref 96–108)
CO2 SERPL-SCNC: 25 MMOL/L — SIGNIFICANT CHANGE UP (ref 22–31)
CREAT SERPL-MCNC: 0.62 MG/DL — SIGNIFICANT CHANGE UP (ref 0.5–1.3)
DESMETHYL LACOSAMIDE: 1.2 UG/ML — SIGNIFICANT CHANGE UP
EOSINOPHIL # BLD AUTO: 0.38 K/UL — SIGNIFICANT CHANGE UP (ref 0–0.5)
EOSINOPHIL NFR BLD AUTO: 3.9 % — SIGNIFICANT CHANGE UP (ref 0–6)
GLUCOSE BLDC GLUCOMTR-MCNC: 126 MG/DL — HIGH (ref 70–99)
GLUCOSE BLDC GLUCOMTR-MCNC: 134 MG/DL — HIGH (ref 70–99)
GLUCOSE BLDC GLUCOMTR-MCNC: 142 MG/DL — HIGH (ref 70–99)
GLUCOSE BLDC GLUCOMTR-MCNC: 144 MG/DL — HIGH (ref 70–99)
GLUCOSE SERPL-MCNC: 313 MG/DL — HIGH (ref 70–99)
HCT VFR BLD CALC: 29.1 % — LOW (ref 39–50)
HGB BLD-MCNC: 9.5 G/DL — LOW (ref 13–17)
IMM GRANULOCYTES NFR BLD AUTO: 0.9 % — SIGNIFICANT CHANGE UP (ref 0–1.5)
LACOSAMIDE (VIMPAT) RESULT: 21.7 UG/ML — HIGH (ref 1–10)
LYMPHOCYTES # BLD AUTO: 1.26 K/UL — SIGNIFICANT CHANGE UP (ref 1–3.3)
LYMPHOCYTES # BLD AUTO: 12.9 % — LOW (ref 13–44)
MCHC RBC-ENTMCNC: 29 PG — SIGNIFICANT CHANGE UP (ref 27–34)
MCHC RBC-ENTMCNC: 32.6 GM/DL — SIGNIFICANT CHANGE UP (ref 32–36)
MCV RBC AUTO: 88.7 FL — SIGNIFICANT CHANGE UP (ref 80–100)
MONOCYTES # BLD AUTO: 0.64 K/UL — SIGNIFICANT CHANGE UP (ref 0–0.9)
MONOCYTES NFR BLD AUTO: 6.5 % — SIGNIFICANT CHANGE UP (ref 2–14)
NEUTROPHILS # BLD AUTO: 7.36 K/UL — SIGNIFICANT CHANGE UP (ref 1.8–7.4)
NEUTROPHILS NFR BLD AUTO: 75.1 % — SIGNIFICANT CHANGE UP (ref 43–77)
NRBC # BLD: 0 /100 WBCS — SIGNIFICANT CHANGE UP (ref 0–0)
PLATELET # BLD AUTO: 368 K/UL — SIGNIFICANT CHANGE UP (ref 150–400)
POTASSIUM SERPL-MCNC: 2.5 MMOL/L — CRITICAL LOW (ref 3.5–5.3)
POTASSIUM SERPL-SCNC: 2.5 MMOL/L — CRITICAL LOW (ref 3.5–5.3)
RBC # BLD: 3.28 M/UL — LOW (ref 4.2–5.8)
RBC # FLD: 14.8 % — HIGH (ref 10.3–14.5)
SODIUM SERPL-SCNC: 136 MMOL/L — SIGNIFICANT CHANGE UP (ref 135–145)
WBC # BLD: 9.8 K/UL — SIGNIFICANT CHANGE UP (ref 3.8–10.5)
WBC # FLD AUTO: 9.8 K/UL — SIGNIFICANT CHANGE UP (ref 3.8–10.5)

## 2020-02-06 PROCEDURE — 99232 SBSQ HOSP IP/OBS MODERATE 35: CPT

## 2020-02-06 RX ORDER — FUROSEMIDE 40 MG
20 TABLET ORAL ONCE
Refills: 0 | Status: COMPLETED | OUTPATIENT
Start: 2020-02-06 | End: 2020-02-06

## 2020-02-06 RX ORDER — POTASSIUM CHLORIDE 20 MEQ
40 PACKET (EA) ORAL
Refills: 0 | Status: DISCONTINUED | OUTPATIENT
Start: 2020-02-06 | End: 2020-02-07

## 2020-02-06 RX ADMIN — INSULIN GLARGINE 10 UNIT(S): 100 INJECTION, SOLUTION SUBCUTANEOUS at 22:53

## 2020-02-06 RX ADMIN — Medication 3 MILLILITER(S): at 04:15

## 2020-02-06 RX ADMIN — LEVETIRACETAM 1500 MILLIGRAM(S): 250 TABLET, FILM COATED ORAL at 18:10

## 2020-02-06 RX ADMIN — Medication 250 MILLIGRAM(S): at 06:25

## 2020-02-06 RX ADMIN — Medication 40 MILLIEQUIVALENT(S): at 18:10

## 2020-02-06 RX ADMIN — CEFEPIME 100 MILLIGRAM(S): 1 INJECTION, POWDER, FOR SOLUTION INTRAMUSCULAR; INTRAVENOUS at 05:20

## 2020-02-06 RX ADMIN — LACOSAMIDE 200 MILLIGRAM(S): 50 TABLET ORAL at 18:10

## 2020-02-06 RX ADMIN — Medication 2 UNIT(S): at 06:32

## 2020-02-06 RX ADMIN — Medication 3 MILLILITER(S): at 16:17

## 2020-02-06 RX ADMIN — POLYETHYLENE GLYCOL 3350 17 GRAM(S): 17 POWDER, FOR SOLUTION ORAL at 12:22

## 2020-02-06 RX ADMIN — LACOSAMIDE 200 MILLIGRAM(S): 50 TABLET ORAL at 05:37

## 2020-02-06 RX ADMIN — Medication 2 UNIT(S): at 18:10

## 2020-02-06 RX ADMIN — Medication 2 UNIT(S): at 12:21

## 2020-02-06 RX ADMIN — ENOXAPARIN SODIUM 40 MILLIGRAM(S): 100 INJECTION SUBCUTANEOUS at 12:22

## 2020-02-06 RX ADMIN — Medication 3 MILLILITER(S): at 21:48

## 2020-02-06 RX ADMIN — Medication 40 MILLIEQUIVALENT(S): at 09:44

## 2020-02-06 RX ADMIN — Medication 20 MILLIGRAM(S): at 09:44

## 2020-02-06 RX ADMIN — Medication 2 MILLIGRAM(S): at 22:53

## 2020-02-06 RX ADMIN — LEVETIRACETAM 1500 MILLIGRAM(S): 250 TABLET, FILM COATED ORAL at 05:38

## 2020-02-06 RX ADMIN — SERTRALINE 75 MILLIGRAM(S): 25 TABLET, FILM COATED ORAL at 12:23

## 2020-02-06 RX ADMIN — Medication 3 MILLILITER(S): at 09:37

## 2020-02-06 NOTE — PROGRESS NOTE ADULT - PROBLEM SELECTOR PLAN 2
ID following, day 5/5 vanco/cefepime, continue tube feeds, strict aspiration precautions, duonebs q 6 hours - pt is dnr/i - family to ultimate decide facility vs home with hospice-  awaiting family decision  pt congested this am will stop IVF and give 20mg lasix via peg to help with congestion resolved--ID following, day 5/5 vanco/cefepime completed yesterday , continue tube feeds, strict aspiration precautions, duonebs q 6 hours - pt is dnr/i - family to ultimate decide facility vs home with hospice-  awaiting family decision  pt congested this am will stop IVF and give 20mg lasix via peg to help with congestion

## 2020-02-06 NOTE — PROVIDER CONTACT NOTE (CRITICAL VALUE NOTIFICATION) - DATE AND TIME:
"Chief Complaint   Patient presents with     Sick       Initial /69  Pulse 104  Temp 100  F (37.8  C) (Oral)  Ht 4' 6\" (1.372 m)  Wt 60 lb 4 oz (27.3 kg)  SpO2 100%  BMI 14.53 kg/m2 Estimated body mass index is 14.53 kg/(m^2) as calculated from the following:    Height as of this encounter: 4' 6\" (1.372 m).    Weight as of this encounter: 60 lb 4 oz (27.3 kg).  Medication Reconciliation: complete   Lien Zuniga MA      " 06-Feb-2020 08:14

## 2020-02-06 NOTE — PROGRESS NOTE ADULT - PROBLEM SELECTOR PLAN 1
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

## 2020-02-06 NOTE — PROGRESS NOTE ADULT - SUBJECTIVE AND OBJECTIVE BOX
Patient is a 85y old  Male who presents with a chief complaint of shortness of breath.  Pt without complaints  Pt with audible congestion mostlikely upper airway.      Patient seen and examined at bedside.    ALLERGIES:  No Known Allergies    MEDICATIONS  (STANDING):  albuterol/ipratropium for Nebulization 3 milliLiter(s) Nebulizer every 6 hours  cefepime   IVPB 2000 milliGRAM(s) IV Intermittent every 8 hours  dextrose 5%. 1000 milliLiter(s) (50 mL/Hr) IV Continuous <Continuous>  dextrose 50% Injectable 12.5 Gram(s) IV Push once  dextrose 50% Injectable 25 Gram(s) IV Push once  dextrose 50% Injectable 25 Gram(s) IV Push once  doxazosin 2 milliGRAM(s) Oral at bedtime  enoxaparin Injectable 40 milliGRAM(s) SubCutaneous daily  insulin glargine Injectable (LANTUS) 10 Unit(s) SubCutaneous at bedtime  insulin lispro (HumaLOG) corrective regimen sliding scale   SubCutaneous three times a day before meals  insulin lispro (HumaLOG) corrective regimen sliding scale   SubCutaneous at bedtime  insulin lispro Injectable (HumaLOG) 2 Unit(s) SubCutaneous three times a day before meals  lacosamide 200 milliGRAM(s) Oral two times a day  levETIRAcetam  Solution 1500 milliGRAM(s) Oral two times a day  polyethylene glycol 3350 17 Gram(s) Oral daily  potassium chloride   Solution 40 milliEquivalent(s) Oral two times a day  sertraline 75 milliGRAM(s) Oral daily  sodium chloride 0.9%. 1000 milliLiter(s) (75 mL/Hr) IV Continuous <Continuous>  vancomycin  IVPB 1000 milliGRAM(s) IV Intermittent every 12 hours    MEDICATIONS  (PRN):  acetaminophen    Suspension .. 650 milliGRAM(s) Oral every 6 hours PRN Temp greater or equal to 38C (100.4F), Mild Pain (1 - 3)  dextrose 40% Gel 15 Gram(s) Oral once PRN Blood Glucose LESS THAN 70 milliGRAM(s)/deciliter  glucagon  Injectable 1 milliGRAM(s) IntraMuscular once PRN Glucose LESS THAN 70 milligrams/deciliter    Vital Signs Last 24 Hrs  T(F): 98.7 (06 Feb 2020 04:58), Max: 98.7 (06 Feb 2020 04:58)  HR: 108 (06 Feb 2020 04:58) (90 - 108)  BP: 109/56 (06 Feb 2020 04:58) (109/56 - 121/67)  RR: 17 (06 Feb 2020 04:58) (16 - 17)  SpO2: 99% (06 Feb 2020 04:58) (95% - 100%)  I&O's Summary    05 Feb 2020 07:01  -  06 Feb 2020 07:00  --------------------------------------------------------  IN: 0 mL / OUT: 4000 mL / NET: -4000 mL      PHYSICAL EXAM:  General: NAD, A/O x 2  ENT: MMM  Neck: Supple, No JVD  Lungs: congestion  auscultation bilaterally, Non labored breathing   Cardio: RRR, S1/S2, No murmurs  Abdomen: Soft, Nontender, Nondistended; Bowel sounds present peg tub in place   Extremities: No calf tenderness, No pitting edema, rt upper extremity swelling     LABS:                        9.5    9.80  )-----------( 368      ( 06 Feb 2020 07:35 )             29.1     02-06    136  |  101  |  17  ----------------------------<  313  2.5   |  25  |  0.62    Ca    7.1      06 Feb 2020 07:35  Mg     1.4     02-05      eGFR if Non African American: 90 mL/min/1.73M2 (02-06-20 @ 07:35)  eGFR if African American: 105 mL/min/1.73M2 (02-06-20 @ 07:35)                          POCT Blood Glucose.: 126 mg/dL (06 Feb 2020 05:25)  POCT Blood Glucose.: 211 mg/dL (05 Feb 2020 22:08)  POCT Blood Glucose.: 138 mg/dL (05 Feb 2020 17:47)  POCT Blood Glucose.: 168 mg/dL (05 Feb 2020 11:33)    02-01 VgpcigydpvT0X 7.8  12-29 HlvkqsolkwU0H 8.4        Culture - Urine (collected 31 Jan 2020 13:55)  Source: .Urine Clean Catch (Midstream)  Final Report (03 Feb 2020 13:14):    >100,000 CFU/ml Enterococcus durans/hirae  Organism: Enterococcus durans/hirae (03 Feb 2020 13:14)  Organism: Enterococcus durans/hirae (03 Feb 2020 13:14)      -  Ampicillin: R >8 Predicts results to ampicillin/sulbactam, amoxacillin-clavulanate and  piperacillin-tazobactam.      -  Ciprofloxacin: R >2      -  Levofloxacin: R >4      -  Nitrofurantoin: S <=32 Should not be used to treat pyelonephritis.      -  Tetra/Doxy: S <=4      -  Vancomycin: R >16      Method Type: ADAM    Culture - Blood (collected 31 Jan 2020 13:50)  Source: .Blood Blood-Peripheral  Final Report (05 Feb 2020 19:00):    No growth at 5 days.    Culture - Blood (collected 31 Jan 2020 13:50)  Source: .Blood Blood-Peripheral  Final Report (05 Feb 2020 19:00):    No growth at 5 days.      RADIOLOGY & ADDITIONAL TESTS:    Care Discussed with Consultants/Other Providers: Patient is a 85y old  Male who presents with a chief complaint of shortness of breath.  Pt without complaints  Pt with audible congestion mostlikely upper airway.    Interval Hx  Patient seen and examined at bedside.    ALLERGIES:  No Known Allergies    MEDICATIONS  (STANDING):  albuterol/ipratropium for Nebulization 3 milliLiter(s) Nebulizer every 6 hours  cefepime   IVPB 2000 milliGRAM(s) IV Intermittent every 8 hours  dextrose 5%. 1000 milliLiter(s) (50 mL/Hr) IV Continuous <Continuous>  dextrose 50% Injectable 12.5 Gram(s) IV Push once  dextrose 50% Injectable 25 Gram(s) IV Push once  dextrose 50% Injectable 25 Gram(s) IV Push once  doxazosin 2 milliGRAM(s) Oral at bedtime  enoxaparin Injectable 40 milliGRAM(s) SubCutaneous daily  insulin glargine Injectable (LANTUS) 10 Unit(s) SubCutaneous at bedtime  insulin lispro (HumaLOG) corrective regimen sliding scale   SubCutaneous three times a day before meals  insulin lispro (HumaLOG) corrective regimen sliding scale   SubCutaneous at bedtime  insulin lispro Injectable (HumaLOG) 2 Unit(s) SubCutaneous three times a day before meals  lacosamide 200 milliGRAM(s) Oral two times a day  levETIRAcetam  Solution 1500 milliGRAM(s) Oral two times a day  polyethylene glycol 3350 17 Gram(s) Oral daily  potassium chloride   Solution 40 milliEquivalent(s) Oral two times a day  sertraline 75 milliGRAM(s) Oral daily  sodium chloride 0.9%. 1000 milliLiter(s) (75 mL/Hr) IV Continuous <Continuous>  vancomycin  IVPB 1000 milliGRAM(s) IV Intermittent every 12 hours    MEDICATIONS  (PRN):  acetaminophen    Suspension .. 650 milliGRAM(s) Oral every 6 hours PRN Temp greater or equal to 38C (100.4F), Mild Pain (1 - 3)  dextrose 40% Gel 15 Gram(s) Oral once PRN Blood Glucose LESS THAN 70 milliGRAM(s)/deciliter  glucagon  Injectable 1 milliGRAM(s) IntraMuscular once PRN Glucose LESS THAN 70 milligrams/deciliter    Vital Signs Last 24 Hrs  T(F): 98.7 (06 Feb 2020 04:58), Max: 98.7 (06 Feb 2020 04:58)  HR: 108 (06 Feb 2020 04:58) (90 - 108)  BP: 109/56 (06 Feb 2020 04:58) (109/56 - 121/67)  RR: 17 (06 Feb 2020 04:58) (16 - 17)  SpO2: 99% (06 Feb 2020 04:58) (95% - 100%)  I&O's Summary    05 Feb 2020 07:01  -  06 Feb 2020 07:00  --------------------------------------------------------  IN: 0 mL / OUT: 4000 mL / NET: -4000 mL      PHYSICAL EXAM:  General: NAD, A/O x 2  ENT: MMM  Neck: Supple, No JVD  Lungs: congestion  auscultation bilaterally, Non labored breathing   Cardio: RRR, S1/S2, No murmurs  Abdomen: Soft, Nontender, Nondistended; Bowel sounds present peg tub in place   Extremities: No calf tenderness, No pitting edema, rt upper extremity swelling     LABS:                        9.5    9.80  )-----------( 368      ( 06 Feb 2020 07:35 )             29.1     02-06    136  |  101  |  17  ----------------------------<  313  2.5   |  25  |  0.62    Ca    7.1      06 Feb 2020 07:35  Mg     1.4     02-05      eGFR if Non African American: 90 mL/min/1.73M2 (02-06-20 @ 07:35)  eGFR if African American: 105 mL/min/1.73M2 (02-06-20 @ 07:35)    POCT Blood Glucose.: 126 mg/dL (06 Feb 2020 05:25)  POCT Blood Glucose.: 211 mg/dL (05 Feb 2020 22:08)  POCT Blood Glucose.: 138 mg/dL (05 Feb 2020 17:47)  POCT Blood Glucose.: 168 mg/dL (05 Feb 2020 11:33)    02-01 VzetzbgmikA1G 7.8  12-29 FqawxiqddfT9H 8.4        Culture - Urine (collected 31 Jan 2020 13:55)  Source: .Urine Clean Catch (Midstream)  Final Report (03 Feb 2020 13:14):    >100,000 CFU/ml Enterococcus durans/hirae  Organism: Enterococcus durans/hirae (03 Feb 2020 13:14)  Organism: Enterococcus durans/hirae (03 Feb 2020 13:14)      -  Ampicillin: R >8 Predicts results to ampicillin/sulbactam, amoxacillin-clavulanate and  piperacillin-tazobactam.      -  Ciprofloxacin: R >2      -  Levofloxacin: R >4      -  Nitrofurantoin: S <=32 Should not be used to treat pyelonephritis.      -  Tetra/Doxy: S <=4      -  Vancomycin: R >16      Method Type: ADAM    Culture - Blood (collected 31 Jan 2020 13:50)  Source: .Blood Blood-Peripheral  Final Report (05 Feb 2020 19:00):    No growth at 5 days.    Culture - Blood (collected 31 Jan 2020 13:50)  Source: .Blood Blood-Peripheral  Final Report (05 Feb 2020 19:00):    No growth at 5 days.      RADIOLOGY & ADDITIONAL TESTS:    Care Discussed with Consultants/Other Providers:

## 2020-02-06 NOTE — PROGRESS NOTE ADULT - PROBLEM SELECTOR PLAN 6
blood sugar elevated, continue lantus 10 u qhs,  monitor  humalog 2 u tid before meals, continue to monitor adjust as needed

## 2020-02-07 LAB
ANION GAP SERPL CALC-SCNC: 6 MMOL/L — SIGNIFICANT CHANGE UP (ref 5–17)
BUN SERPL-MCNC: 23 MG/DL — SIGNIFICANT CHANGE UP (ref 7–23)
CALCIUM SERPL-MCNC: 9.1 MG/DL — SIGNIFICANT CHANGE UP (ref 8.4–10.5)
CHLORIDE SERPL-SCNC: 96 MMOL/L — SIGNIFICANT CHANGE UP (ref 96–108)
CO2 SERPL-SCNC: 34 MMOL/L — HIGH (ref 22–31)
CREAT SERPL-MCNC: 0.73 MG/DL — SIGNIFICANT CHANGE UP (ref 0.5–1.3)
GLUCOSE BLDC GLUCOMTR-MCNC: 134 MG/DL — HIGH (ref 70–99)
GLUCOSE BLDC GLUCOMTR-MCNC: 148 MG/DL — HIGH (ref 70–99)
GLUCOSE BLDC GLUCOMTR-MCNC: 153 MG/DL — HIGH (ref 70–99)
GLUCOSE BLDC GLUCOMTR-MCNC: 171 MG/DL — HIGH (ref 70–99)
GLUCOSE SERPL-MCNC: 127 MG/DL — HIGH (ref 70–99)
HCT VFR BLD CALC: 33.8 % — LOW (ref 39–50)
HGB BLD-MCNC: 10.8 G/DL — LOW (ref 13–17)
MCHC RBC-ENTMCNC: 28.2 PG — SIGNIFICANT CHANGE UP (ref 27–34)
MCHC RBC-ENTMCNC: 32 GM/DL — SIGNIFICANT CHANGE UP (ref 32–36)
MCV RBC AUTO: 88.3 FL — SIGNIFICANT CHANGE UP (ref 80–100)
NRBC # BLD: 0 /100 WBCS — SIGNIFICANT CHANGE UP (ref 0–0)
PLATELET # BLD AUTO: 464 K/UL — HIGH (ref 150–400)
POTASSIUM SERPL-MCNC: 3.2 MMOL/L — LOW (ref 3.5–5.3)
POTASSIUM SERPL-SCNC: 3.2 MMOL/L — LOW (ref 3.5–5.3)
RBC # BLD: 3.83 M/UL — LOW (ref 4.2–5.8)
RBC # FLD: 15.1 % — HIGH (ref 10.3–14.5)
SODIUM SERPL-SCNC: 136 MMOL/L — SIGNIFICANT CHANGE UP (ref 135–145)
WBC # BLD: 11.48 K/UL — HIGH (ref 3.8–10.5)
WBC # FLD AUTO: 11.48 K/UL — HIGH (ref 3.8–10.5)

## 2020-02-07 PROCEDURE — 71045 X-RAY EXAM CHEST 1 VIEW: CPT | Mod: 26

## 2020-02-07 PROCEDURE — 99232 SBSQ HOSP IP/OBS MODERATE 35: CPT

## 2020-02-07 PROCEDURE — 93010 ELECTROCARDIOGRAM REPORT: CPT

## 2020-02-07 RX ORDER — POTASSIUM CHLORIDE 20 MEQ
20 PACKET (EA) ORAL
Refills: 0 | Status: COMPLETED | OUTPATIENT
Start: 2020-02-07 | End: 2020-02-08

## 2020-02-07 RX ADMIN — SERTRALINE 75 MILLIGRAM(S): 25 TABLET, FILM COATED ORAL at 12:37

## 2020-02-07 RX ADMIN — LACOSAMIDE 200 MILLIGRAM(S): 50 TABLET ORAL at 17:11

## 2020-02-07 RX ADMIN — Medication 20 MILLIEQUIVALENT(S): at 17:07

## 2020-02-07 RX ADMIN — Medication 3 MILLILITER(S): at 04:31

## 2020-02-07 RX ADMIN — Medication 2 UNIT(S): at 17:05

## 2020-02-07 RX ADMIN — LACOSAMIDE 200 MILLIGRAM(S): 50 TABLET ORAL at 07:04

## 2020-02-07 RX ADMIN — Medication 2 UNIT(S): at 08:04

## 2020-02-07 RX ADMIN — Medication 650 MILLIGRAM(S): at 17:06

## 2020-02-07 RX ADMIN — ENOXAPARIN SODIUM 40 MILLIGRAM(S): 100 INJECTION SUBCUTANEOUS at 12:37

## 2020-02-07 RX ADMIN — INSULIN GLARGINE 10 UNIT(S): 100 INJECTION, SOLUTION SUBCUTANEOUS at 23:00

## 2020-02-07 RX ADMIN — POLYETHYLENE GLYCOL 3350 17 GRAM(S): 17 POWDER, FOR SOLUTION ORAL at 12:37

## 2020-02-07 RX ADMIN — Medication 2 MILLIGRAM(S): at 23:00

## 2020-02-07 RX ADMIN — Medication 3 MILLILITER(S): at 15:23

## 2020-02-07 RX ADMIN — LEVETIRACETAM 1500 MILLIGRAM(S): 250 TABLET, FILM COATED ORAL at 07:04

## 2020-02-07 RX ADMIN — Medication 1: at 12:38

## 2020-02-07 RX ADMIN — Medication 40 MILLIEQUIVALENT(S): at 07:04

## 2020-02-07 RX ADMIN — Medication 2 UNIT(S): at 12:38

## 2020-02-07 RX ADMIN — Medication 650 MILLIGRAM(S): at 18:06

## 2020-02-07 RX ADMIN — Medication 3 MILLILITER(S): at 09:56

## 2020-02-07 RX ADMIN — Medication 3 MILLILITER(S): at 21:31

## 2020-02-07 RX ADMIN — LEVETIRACETAM 1500 MILLIGRAM(S): 250 TABLET, FILM COATED ORAL at 17:06

## 2020-02-07 NOTE — PROGRESS NOTE ADULT - ASSESSMENT
86 yo male with a past history of seizures,   CVA, prostate cancer, BPH with urinary retention,aspiration, labeled as autoimmune encephalitis by the neurology dep't at [facility],with no response to typical therapy, now at [facility] with hypoxia and possible aspiration.He has been residing in a SNF.  He is poorly interactive but appears clinically stable.He is not febrile, is tolerating enteral feeds, and appears volume overloaded.  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  He most likely has recently aspirated.He appears stable from an ID perspective  S/P 5 days of vanco/cefepime 2/5.  Home hospice being planned for 2/8  Suggest:  1.monitor off antibiotics  2.supportive care  3. will defer to neurology service at [facility] on additional w/u  4. disposition per primary service. I do not see a benefit from extending antibiotic courses

## 2020-02-07 NOTE — PROGRESS NOTE ADULT - PROBLEM SELECTOR PLAN 4
continue npo with tube feeds  pt with increased upper airway secretions  will start mucomyst tx  and continue suction as needed

## 2020-02-07 NOTE — PROGRESS NOTE ADULT - SUBJECTIVE AND OBJECTIVE BOX
CC: f/u for pneumonia    Patient reports: he is grunting, not responsive to verbal commands, receiving enteral feeds    REVIEW OF SYSTEMS:  All other review of systems negative (Comprehensive ROS):limited by underlying condition    Antimicrobials Day #  :off    Other Medications Reviewed    T(F): 97.1 (02-07-20 @ 05:19), Max: 98.8 (02-06-20 @ 20:51)  HR: 104 (02-07-20 @ 09:57)  BP: 92/38 (02-07-20 @ 05:19)  RR: 20 (02-07-20 @ 05:19)  SpO2: 96% (02-07-20 @ 09:57)  Wt(kg): --    PHYSICAL EXAM:  General: lethargic, no acute distress  Eyes:  anicteric, no conjunctival injection, no discharge  Oropharynx: no lesions or injection 	  Neck: supple, without adenopathy  Lungs: few ronchi  Heart: regular rate and rhythm; no murmur, rubs or gallops  Abdomen: soft, nondistended, nontender, without mass or organomegaly  Skin: no lesions  Extremities: no clubbing, cyanosis, trace  edema  Neurologic: lethargic, grunting, not interactive    LAB RESULTS:                        10.8   11.48 )-----------( 464      ( 07 Feb 2020 07:12 )             33.8     02-07    136  |  96  |  23  ----------------------------<  127<H>  3.2<L>   |  34<H>  |  0.73    Ca    9.1      07 Feb 2020 07:12          MICROBIOLOGY:  RECENT CULTURES:      RADIOLOGY REVIEWED:  < from: Xray Chest 1 View-PORTABLE IMMEDIATE (02.07.20 @ 09:40) >  INTERPRETATION:  Clinical information: Pneumonitis, congestion    Portable study, 9:12 AM    Comparison exam dated 1/31/2020    Streaky bibasilar airspace disease. No effusion. No pneumothorax. No vascular congestion. Heart size within normal limits. Hilar regions, mediastinal contours stable. No acute osseous abnormalities. Calcifications present aortic knob.    IMPRESSION: See above report    < end of copied text > 98

## 2020-02-07 NOTE — PROGRESS NOTE ADULT - ASSESSMENT
85 male with autoimmune encephalitis c/b dysphagia s/p PEG, left sided weakness, seizure d/o, htn, prostate ca, chronic urinary retention with chronic bradley, dm2 presents from [facility] with sob, hypoxia after episode of vomiting PEG feeds admitted with severe sepsis 2/2 aspiration pneumonia. Pt with hypotension to 90 with sinus tack to low 100's wbc slightly increased  Pt without fever  Pt off abx X 1 days Id following will await their recommandations  Pt is home hospice

## 2020-02-07 NOTE — PROGRESS NOTE ADULT - PROBLEM SELECTOR PLAN 2
resolved--ID following, day 5/5 vanco/cefepime completed yesterday , continue tube feeds, strict aspiration precautions, duonebs q 6 hours - pt is dnr/i - family to ultimate decide facility vs home with hospice-  awaiting family decision  pt congested this am will stop IVF and give 20mg lasix via peg to help with congestion resolved--ID following, day 5/5 vanco/cefepime completed yesterday , continue tube feeds, strict aspiration precautions, duonebs q 6 hours

## 2020-02-07 NOTE — PROGRESS NOTE ADULT - PROBLEM SELECTOR PLAN 6
Stable-, continue lantus 10 u qhs,  monitor  humalog 2 u tid before meals, continue to monitor adjust as needed

## 2020-02-07 NOTE — PROGRESS NOTE ADULT - PROBLEM SELECTOR PLAN 1
resolved ---severe sepsis 2/2 aspiration pneumonia, ID following, day 5/5 vanco/cefepime completed , continue supportive care, monitor fever curve, trend cbc/diff, followed by neurology service at [facility] (autoimmune encephalitis)

## 2020-02-07 NOTE — PROGRESS NOTE ADULT - SUBJECTIVE AND OBJECTIVE BOX
Patient is a 85y old  Male who presents with a chief complaint of shortness of breath.        Patient seen and examined at bedside.    ALLERGIES:  No Known Allergies    MEDICATIONS  (STANDING):  albuterol/ipratropium for Nebulization 3 milliLiter(s) Nebulizer every 6 hours  dextrose 5%. 1000 milliLiter(s) (50 mL/Hr) IV Continuous <Continuous>  dextrose 50% Injectable 12.5 Gram(s) IV Push once  dextrose 50% Injectable 25 Gram(s) IV Push once  dextrose 50% Injectable 25 Gram(s) IV Push once  doxazosin 2 milliGRAM(s) Oral at bedtime  enoxaparin Injectable 40 milliGRAM(s) SubCutaneous daily  insulin glargine Injectable (LANTUS) 10 Unit(s) SubCutaneous at bedtime  insulin lispro (HumaLOG) corrective regimen sliding scale   SubCutaneous three times a day before meals  insulin lispro (HumaLOG) corrective regimen sliding scale   SubCutaneous at bedtime  insulin lispro Injectable (HumaLOG) 2 Unit(s) SubCutaneous three times a day before meals  lacosamide 200 milliGRAM(s) Oral two times a day  levETIRAcetam  Solution 1500 milliGRAM(s) Oral two times a day  polyethylene glycol 3350 17 Gram(s) Oral daily  potassium chloride   Solution 40 milliEquivalent(s) Oral two times a day  sertraline 75 milliGRAM(s) Oral daily    MEDICATIONS  (PRN):  acetaminophen    Suspension .. 650 milliGRAM(s) Oral every 6 hours PRN Temp greater or equal to 38C (100.4F), Mild Pain (1 - 3)  dextrose 40% Gel 15 Gram(s) Oral once PRN Blood Glucose LESS THAN 70 milliGRAM(s)/deciliter  glucagon  Injectable 1 milliGRAM(s) IntraMuscular once PRN Glucose LESS THAN 70 milligrams/deciliter    Vital Signs Last 24 Hrs  T(F): 97.1 (07 Feb 2020 05:19), Max: 98.8 (06 Feb 2020 20:51)  HR: 101 (07 Feb 2020 05:19) (91 - 113)  BP: 92/38 (07 Feb 2020 05:19) (92/38 - 134/78)  RR: 20 (07 Feb 2020 05:19) (16 - 20)  SpO2: 92% (07 Feb 2020 05:19) (92% - 100%)  I&O's Summary    06 Feb 2020 07:01  -  07 Feb 2020 07:00  --------------------------------------------------------  IN: 340 mL / OUT: 2600 mL / NET: -2260 mL      PHYSICAL EXAM:  General: NAD, A/O x 1 pt very tired   ENT: MMM  Neck: Supple, No JVD  Lungs: rhonchi  bilaterally, Non labored breathing   Cardio: RRR, S1/S2, No murmurs  Abdomen: Soft, Nontender, Nondistended; Bowel sounds present  Extremities: No calf tenderness, No pitting edema left upper ext less edema     LABS:                        10.8   11.48 )-----------( 464      ( 07 Feb 2020 07:12 )             33.8     02-07    136  |  96  |  23  ----------------------------<  127  3.2   |  34  |  0.73    Ca    9.1      07 Feb 2020 07:12  Mg     1.4     02-05      eGFR if Non African American: 85 mL/min/1.73M2 (02-07-20 @ 07:12)  eGFR if African American: 98 mL/min/1.73M2 (02-07-20 @ 07:12)                          POCT Blood Glucose.: 134 mg/dL (07 Feb 2020 08:01)  POCT Blood Glucose.: 134 mg/dL (06 Feb 2020 22:51)  POCT Blood Glucose.: 144 mg/dL (06 Feb 2020 18:03)  POCT Blood Glucose.: 142 mg/dL (06 Feb 2020 12:20)    02-01 [id number] 7.8  12-29 [id number] 8.4        Culture - Urine (collected 31 Jan 2020 13:55)  Source: .Urine Clean Catch (Midstream)  Final Report (03 Feb 2020 13:14):    >100,000 CFU/ml Enterococcus durans/hirae  Organism: Enterococcus durans/hirae (03 Feb 2020 13:14)  Organism: Enterococcus durans/hirae (03 Feb 2020 13:14)      -  Ampicillin: R >8 Predicts results to ampicillin/sulbactam, amoxacillin-clavulanate and  piperacillin-tazobactam.      -  Ciprofloxacin: R >2      -  Levofloxacin: R >4      -  Nitrofurantoin: S <=32 Should not be used to treat pyelonephritis.      -  Tetra/Doxy: S <=4      -  Vancomycin: R >16      Method Type: AADM    Culture - Blood (collected 31 Jan 2020 13:50)  Source: .Blood Blood-Peripheral  Final Report (05 Feb 2020 19:00):    No growth at 5 days.    Culture - Blood (collected 31 Jan 2020 13:50)  Source: .Blood Blood-Peripheral  Final Report (05 Feb 2020 19:00):    No growth at 5 days.      RADIOLOGY & ADDITIONAL TESTS:    Care Discussed with Consultants/Other Providers: Patient is a 85y old  Male who presents with a chief complaint of shortness of breath.  Pt more sleepy today still with upper airway secretion,  pt being suctioned as needed by nursing       Patient seen and examined at bedside.    ALLERGIES:  No Known Allergies    MEDICATIONS  (STANDING):  albuterol/ipratropium for Nebulization 3 milliLiter(s) Nebulizer every 6 hours  dextrose 5%. 1000 milliLiter(s) (50 mL/Hr) IV Continuous <Continuous>  dextrose 50% Injectable 12.5 Gram(s) IV Push once  dextrose 50% Injectable 25 Gram(s) IV Push once  dextrose 50% Injectable 25 Gram(s) IV Push once  doxazosin 2 milliGRAM(s) Oral at bedtime  enoxaparin Injectable 40 milliGRAM(s) SubCutaneous daily  insulin glargine Injectable (LANTUS) 10 Unit(s) SubCutaneous at bedtime  insulin lispro (HumaLOG) corrective regimen sliding scale   SubCutaneous three times a day before meals  insulin lispro (HumaLOG) corrective regimen sliding scale   SubCutaneous at bedtime  insulin lispro Injectable (HumaLOG) 2 Unit(s) SubCutaneous three times a day before meals  lacosamide 200 milliGRAM(s) Oral two times a day  levETIRAcetam  Solution 1500 milliGRAM(s) Oral two times a day  polyethylene glycol 3350 17 Gram(s) Oral daily  potassium chloride   Solution 40 milliEquivalent(s) Oral two times a day  sertraline 75 milliGRAM(s) Oral daily    MEDICATIONS  (PRN):  acetaminophen    Suspension .. 650 milliGRAM(s) Oral every 6 hours PRN Temp greater or equal to 38C (100.4F), Mild Pain (1 - 3)  dextrose 40% Gel 15 Gram(s) Oral once PRN Blood Glucose LESS THAN 70 milliGRAM(s)/deciliter  glucagon  Injectable 1 milliGRAM(s) IntraMuscular once PRN Glucose LESS THAN 70 milligrams/deciliter    Vital Signs Last 24 Hrs  T(F): 97.1 (07 Feb 2020 05:19), Max: 98.8 (06 Feb 2020 20:51)  HR: 101 (07 Feb 2020 05:19) (91 - 113)  BP: 92/38 (07 Feb 2020 05:19) (92/38 - 134/78)  RR: 20 (07 Feb 2020 05:19) (16 - 20)  SpO2: 92% (07 Feb 2020 05:19) (92% - 100%)  I&O's Summary    06 Feb 2020 07:01  -  07 Feb 2020 07:00  --------------------------------------------------------  IN: 340 mL / OUT: 2600 mL / NET: -2260 mL      PHYSICAL EXAM:  General: NAD, A/O x 1 pt very tired   ENT: MMM  Neck: Supple, No JVD  Lungs: rhonchi  bilaterally, Non labored breathing   Cardio: RRR, S1/S2, No murmurs  Abdomen: Soft, Nontender, Nondistended; Bowel sounds present  Extremities: No calf tenderness, No pitting edema left upper ext less edema     LABS:                        10.8   11.48 )-----------( 464      ( 07 Feb 2020 07:12 )             33.8     02-07    136  |  96  |  23  ----------------------------<  127  3.2   |  34  |  0.73    Ca    9.1      07 Feb 2020 07:12  Mg     1.4     02-05      eGFR if Non African American: 85 mL/min/1.73M2 (02-07-20 @ 07:12)  eGFR if African American: 98 mL/min/1.73M2 (02-07-20 @ 07:12)                          POCT Blood Glucose.: 134 mg/dL (07 Feb 2020 08:01)  POCT Blood Glucose.: 134 mg/dL (06 Feb 2020 22:51)  POCT Blood Glucose.: 144 mg/dL (06 Feb 2020 18:03)  POCT Blood Glucose.: 142 mg/dL (06 Feb 2020 12:20)    02-01 [id number] 7.8  12-29 ShjgdkvmvcV3N 8.4        Culture - Urine (collected 31 Jan 2020 13:55)  Source: .Urine Clean Catch (Midstream)  Final Report (03 Feb 2020 13:14):    >100,000 CFU/ml Enterococcus durans/hirae  Organism: Enterococcus durans/hirae (03 Feb 2020 13:14)  Organism: Enterococcus durans/hirae (03 Feb 2020 13:14)      -  Ampicillin: R >8 Predicts results to ampicillin/sulbactam, amoxacillin-clavulanate and  piperacillin-tazobactam.      -  Ciprofloxacin: R >2      -  Levofloxacin: R >4      -  Nitrofurantoin: S <=32 Should not be used to treat pyelonephritis.      -  Tetra/Doxy: S <=4      -  Vancomycin: R >16      Method Type: ADAM    Culture - Blood (collected 31 Jan 2020 13:50)  Source: .Blood Blood-Peripheral  Final Report (05 Feb 2020 19:00):    No growth at 5 days.    Culture - Blood (collected 31 Jan 2020 13:50)  Source: .Blood Blood-Peripheral  Final Report (05 Feb 2020 19:00):    No growth at 5 days.      RADIOLOGY & ADDITIONAL TESTS:    Care Discussed with Consultants/Other Providers:

## 2020-02-07 NOTE — PROGRESS NOTE ADULT - PROBLEM SELECTOR PLAN 10
improved dopplers are negative for dvt,   continu elevation of upper ext on pillow improved dopplers are negative for dvt,   continue elevation of upper ext on pillow

## 2020-02-08 LAB
ANION GAP SERPL CALC-SCNC: 8 MMOL/L — SIGNIFICANT CHANGE UP (ref 5–17)
BUN SERPL-MCNC: 24 MG/DL — HIGH (ref 7–23)
CALCIUM SERPL-MCNC: 9.5 MG/DL — SIGNIFICANT CHANGE UP (ref 8.4–10.5)
CHLORIDE SERPL-SCNC: 98 MMOL/L — SIGNIFICANT CHANGE UP (ref 96–108)
CO2 SERPL-SCNC: 32 MMOL/L — HIGH (ref 22–31)
CREAT SERPL-MCNC: 0.77 MG/DL — SIGNIFICANT CHANGE UP (ref 0.5–1.3)
GLUCOSE BLDC GLUCOMTR-MCNC: 125 MG/DL — HIGH (ref 70–99)
GLUCOSE BLDC GLUCOMTR-MCNC: 133 MG/DL — HIGH (ref 70–99)
GLUCOSE BLDC GLUCOMTR-MCNC: 157 MG/DL — HIGH (ref 70–99)
GLUCOSE BLDC GLUCOMTR-MCNC: 177 MG/DL — HIGH (ref 70–99)
GLUCOSE SERPL-MCNC: 187 MG/DL — HIGH (ref 70–99)
POTASSIUM SERPL-MCNC: 3.2 MMOL/L — LOW (ref 3.5–5.3)
POTASSIUM SERPL-SCNC: 3.2 MMOL/L — LOW (ref 3.5–5.3)
SODIUM SERPL-SCNC: 138 MMOL/L — SIGNIFICANT CHANGE UP (ref 135–145)

## 2020-02-08 PROCEDURE — 99232 SBSQ HOSP IP/OBS MODERATE 35: CPT

## 2020-02-08 RX ORDER — POTASSIUM CHLORIDE 20 MEQ
40 PACKET (EA) ORAL ONCE
Refills: 0 | Status: COMPLETED | OUTPATIENT
Start: 2020-02-08 | End: 2020-02-08

## 2020-02-08 RX ORDER — POTASSIUM CHLORIDE 20 MEQ
20 PACKET (EA) ORAL ONCE
Refills: 0 | Status: COMPLETED | OUTPATIENT
Start: 2020-02-08 | End: 2020-02-08

## 2020-02-08 RX ADMIN — Medication 1: at 17:35

## 2020-02-08 RX ADMIN — ENOXAPARIN SODIUM 40 MILLIGRAM(S): 100 INJECTION SUBCUTANEOUS at 11:28

## 2020-02-08 RX ADMIN — Medication 20 MILLIEQUIVALENT(S): at 06:21

## 2020-02-08 RX ADMIN — Medication 650 MILLIGRAM(S): at 11:30

## 2020-02-08 RX ADMIN — Medication 3 MILLILITER(S): at 15:56

## 2020-02-08 RX ADMIN — Medication 3 MILLILITER(S): at 21:23

## 2020-02-08 RX ADMIN — SERTRALINE 75 MILLIGRAM(S): 25 TABLET, FILM COATED ORAL at 11:29

## 2020-02-08 RX ADMIN — Medication 40 MILLIEQUIVALENT(S): at 17:35

## 2020-02-08 RX ADMIN — Medication 650 MILLIGRAM(S): at 19:00

## 2020-02-08 RX ADMIN — LEVETIRACETAM 1500 MILLIGRAM(S): 250 TABLET, FILM COATED ORAL at 17:35

## 2020-02-08 RX ADMIN — Medication 3 MILLILITER(S): at 05:33

## 2020-02-08 RX ADMIN — Medication 3 MILLILITER(S): at 09:22

## 2020-02-08 RX ADMIN — Medication 2 UNIT(S): at 17:36

## 2020-02-08 RX ADMIN — LACOSAMIDE 200 MILLIGRAM(S): 50 TABLET ORAL at 17:34

## 2020-02-08 RX ADMIN — INSULIN GLARGINE 10 UNIT(S): 100 INJECTION, SOLUTION SUBCUTANEOUS at 23:09

## 2020-02-08 RX ADMIN — LACOSAMIDE 200 MILLIGRAM(S): 50 TABLET ORAL at 06:21

## 2020-02-08 RX ADMIN — Medication 20 MILLIEQUIVALENT(S): at 22:55

## 2020-02-08 RX ADMIN — Medication 2 UNIT(S): at 06:22

## 2020-02-08 RX ADMIN — LEVETIRACETAM 1500 MILLIGRAM(S): 250 TABLET, FILM COATED ORAL at 06:23

## 2020-02-08 RX ADMIN — Medication 650 MILLIGRAM(S): at 12:30

## 2020-02-08 RX ADMIN — Medication 2 MILLIGRAM(S): at 22:55

## 2020-02-08 RX ADMIN — Medication 650 MILLIGRAM(S): at 18:00

## 2020-02-08 RX ADMIN — POLYETHYLENE GLYCOL 3350 17 GRAM(S): 17 POWDER, FOR SOLUTION ORAL at 11:29

## 2020-02-08 RX ADMIN — Medication 2 UNIT(S): at 11:28

## 2020-02-08 NOTE — PROGRESS NOTE ADULT - SUBJECTIVE AND OBJECTIVE BOX
Patient is a 85y old  Male who presents with a chief complaint of shortness of breath (07 Feb 2020 14:26)    Patient seen and examined at bedside.  S: +AMS. Opens eyes with tactile stimuli. Mumbling, however does not answer any questions when asked. No particular distress noted.     ALLERGIES:  No Known Allergies    MEDICATIONS:  acetaminophen    Suspension .. 650 milliGRAM(s) Oral every 6 hours PRN  albuterol/ipratropium for Nebulization 3 milliLiter(s) Nebulizer every 6 hours  dextrose 40% Gel 15 Gram(s) Oral once PRN  dextrose 5%. 1000 milliLiter(s) IV Continuous <Continuous>  dextrose 50% Injectable 12.5 Gram(s) IV Push once  dextrose 50% Injectable 25 Gram(s) IV Push once  dextrose 50% Injectable 25 Gram(s) IV Push once  doxazosin 2 milliGRAM(s) Oral at bedtime  enoxaparin Injectable 40 milliGRAM(s) SubCutaneous daily  glucagon  Injectable 1 milliGRAM(s) IntraMuscular once PRN  insulin glargine Injectable (LANTUS) 10 Unit(s) SubCutaneous at bedtime  insulin lispro (HumaLOG) corrective regimen sliding scale   SubCutaneous three times a day before meals  insulin lispro (HumaLOG) corrective regimen sliding scale   SubCutaneous at bedtime  insulin lispro Injectable (HumaLOG) 2 Unit(s) SubCutaneous three times a day before meals  lacosamide 200 milliGRAM(s) Oral two times a day  levETIRAcetam  Solution 1500 milliGRAM(s) Oral two times a day  polyethylene glycol 3350 17 Gram(s) Oral daily  potassium chloride   Solution 40 milliEquivalent(s) Enteral Tube once  potassium chloride   Solution 20 milliEquivalent(s) Enteral Tube once  sertraline 75 milliGRAM(s) Oral daily    Vital Signs Last 24 Hrs  T(F): 97.6 (08 Feb 2020 05:07), Max: 98.2 (07 Feb 2020 22:44)  HR: 84 (08 Feb 2020 05:07) (84 - 104)  BP: 123/71 (08 Feb 2020 05:07) (104/52 - 123/71)  RR: 18 (08 Feb 2020 05:07) (18 - 18)  SpO2: 98% (08 Feb 2020 05:07) (94% - 98%)  I&O's Summary    07 Feb 2020 07:01  -  08 Feb 2020 07:00  --------------------------------------------------------  IN: 680 mL / OUT: 1650 mL / NET: -970 mL    PHYSICAL EXAM:  General: NAD, Sleeping, aroused to tactile stimuli, opens eye spontaeously  Lungs: Clear to auscultation bilaterally (Anteriorly)   Cardio: RR, S1/S2, No murmurs  Abdomen: Soft, NT/ND, Normal active Bowel Sounds. Peg site c/d/i-capped.  : Hernandez in place draining yellow UO.   Extremities: No cyanosis, No edema    LABS:                        10.8   11.48 )-----------( 464      ( 07 Feb 2020 07:12 )             33.8     02-08    138  |  98  |  24  ----------------------------<  187  3.2   |  32  |  0.77    Ca    9.5      08 Feb 2020 06:03  Mg     1.4     02-05      eGFR if Non African American: 83 mL/min/1.73M2 (02-08-20 @ 06:03)  eGFR if African American: 96 mL/min/1.73M2 (02-08-20 @ 06:03)      POCT Blood Glucose.: 125 mg/dL (08 Feb 2020 11:24)  POCT Blood Glucose.: 133 mg/dL (08 Feb 2020 06:12)  POCT Blood Glucose.: 153 mg/dL (07 Feb 2020 22:48)  POCT Blood Glucose.: 148 mg/dL (07 Feb 2020 17:04)  POCT Blood Glucose.: 171 mg/dL (07 Feb 2020 12:20)    02-01 [id number] 7.8  12-29 [id number] 8.4      RADIOLOGY & ADDITIONAL TESTS:  < from: Xray Chest 1 View-PORTABLE IMMEDIATE (02.07.20 @ 09:40) >  Streaky bibasilar airspace disease. No effusion. No pneumothorax. No vascular congestion.     < from: US Duplex Venous Upper Ext Ltd, Left (02.05.20 @ 17:32) >  No evidence of upper extremity DVT.       Care Discussed with Consultants/Other Providers: Dr. Haddad.

## 2020-02-08 NOTE — PROGRESS NOTE ADULT - ASSESSMENT
A/P: 84yo male w. autoimmune encephalitis c/b dysphagia s/p PEG, CVA w. left sided weakness, seizure d/o, HTN, Prostate ca, Chronic urinary retention (w. Chronic Hernandez), DMII presents from [facility] w. c/o SOB, hypoxia after episode of vomiting PEG feeds admitted with severe sepsis 2/2 aspiration pneumonia. Currently off abx, monitoring.     *Severe Sepsis (Resolved) in s/o Asp. pneumonia  Appreciate ID recs  Vanco/Cefepime x5d course completed   Continue supportive care  Monitor fever curve  Trend WBC   Monitor off Abx    *Aspiration Pneumonia, Hypoxia   Abx course complete  Aspiration Precautions  NPO  Cont Tube feeds via PEG  Cont Nebs  Supplemental 02 as needed to maintain Spo2 >92%    *Dysphagia  NPO  Cont Tube feeds    *Hypokalemia  K 3.2 today, replete  Trend    *Seizure d/o  Cont Keppra and Vimpat    *DMII  Monitor FS  Continue Lantus 10 Units HS  Cont Pre-meal Humalog & ISS    *Urinary retention  Maintain Chronic Hernandez    *LUE Swelling- Improved  UE u/s- No DVT  Elevate    *Autoimmune encephalitis  f/b Neurology service at [facility]    Dispo/GOC: Home Hospice. Dispo planning in process.

## 2020-02-09 LAB
ANION GAP SERPL CALC-SCNC: 6 MMOL/L — SIGNIFICANT CHANGE UP (ref 5–17)
BUN SERPL-MCNC: 26 MG/DL — HIGH (ref 7–23)
CALCIUM SERPL-MCNC: 9.3 MG/DL — SIGNIFICANT CHANGE UP (ref 8.4–10.5)
CHLORIDE SERPL-SCNC: 99 MMOL/L — SIGNIFICANT CHANGE UP (ref 96–108)
CO2 SERPL-SCNC: 34 MMOL/L — HIGH (ref 22–31)
CREAT SERPL-MCNC: 0.76 MG/DL — SIGNIFICANT CHANGE UP (ref 0.5–1.3)
GLUCOSE BLDC GLUCOMTR-MCNC: 130 MG/DL — HIGH (ref 70–99)
GLUCOSE BLDC GLUCOMTR-MCNC: 138 MG/DL — HIGH (ref 70–99)
GLUCOSE BLDC GLUCOMTR-MCNC: 158 MG/DL — HIGH (ref 70–99)
GLUCOSE BLDC GLUCOMTR-MCNC: 160 MG/DL — HIGH (ref 70–99)
GLUCOSE BLDC GLUCOMTR-MCNC: 164 MG/DL — HIGH (ref 70–99)
GLUCOSE SERPL-MCNC: 139 MG/DL — HIGH (ref 70–99)
MAGNESIUM SERPL-MCNC: 1.3 MG/DL — LOW (ref 1.6–2.6)
POTASSIUM SERPL-MCNC: 3.7 MMOL/L — SIGNIFICANT CHANGE UP (ref 3.5–5.3)
POTASSIUM SERPL-SCNC: 3.7 MMOL/L — SIGNIFICANT CHANGE UP (ref 3.5–5.3)
SODIUM SERPL-SCNC: 139 MMOL/L — SIGNIFICANT CHANGE UP (ref 135–145)

## 2020-02-09 PROCEDURE — 99232 SBSQ HOSP IP/OBS MODERATE 35: CPT

## 2020-02-09 RX ORDER — MAGNESIUM SULFATE 500 MG/ML
1 VIAL (ML) INJECTION
Refills: 0 | Status: COMPLETED | OUTPATIENT
Start: 2020-02-09 | End: 2020-02-09

## 2020-02-09 RX ORDER — MAGNESIUM SULFATE 500 MG/ML
2 VIAL (ML) INJECTION ONCE
Refills: 0 | Status: DISCONTINUED | OUTPATIENT
Start: 2020-02-09 | End: 2020-02-09

## 2020-02-09 RX ADMIN — LACOSAMIDE 200 MILLIGRAM(S): 50 TABLET ORAL at 06:39

## 2020-02-09 RX ADMIN — Medication 100 GRAM(S): at 11:24

## 2020-02-09 RX ADMIN — Medication 2 MILLIGRAM(S): at 21:59

## 2020-02-09 RX ADMIN — LEVETIRACETAM 1500 MILLIGRAM(S): 250 TABLET, FILM COATED ORAL at 18:11

## 2020-02-09 RX ADMIN — ENOXAPARIN SODIUM 40 MILLIGRAM(S): 100 INJECTION SUBCUTANEOUS at 11:23

## 2020-02-09 RX ADMIN — Medication 650 MILLIGRAM(S): at 05:00

## 2020-02-09 RX ADMIN — Medication 2 UNIT(S): at 11:23

## 2020-02-09 RX ADMIN — Medication 3 MILLILITER(S): at 03:58

## 2020-02-09 RX ADMIN — Medication 1: at 18:10

## 2020-02-09 RX ADMIN — Medication 650 MILLIGRAM(S): at 04:07

## 2020-02-09 RX ADMIN — Medication 3 MILLILITER(S): at 15:30

## 2020-02-09 RX ADMIN — POLYETHYLENE GLYCOL 3350 17 GRAM(S): 17 POWDER, FOR SOLUTION ORAL at 11:24

## 2020-02-09 RX ADMIN — Medication 2 UNIT(S): at 07:37

## 2020-02-09 RX ADMIN — Medication 2 UNIT(S): at 18:10

## 2020-02-09 RX ADMIN — SERTRALINE 75 MILLIGRAM(S): 25 TABLET, FILM COATED ORAL at 11:23

## 2020-02-09 RX ADMIN — LACOSAMIDE 200 MILLIGRAM(S): 50 TABLET ORAL at 18:13

## 2020-02-09 RX ADMIN — LEVETIRACETAM 1500 MILLIGRAM(S): 250 TABLET, FILM COATED ORAL at 06:39

## 2020-02-09 RX ADMIN — Medication 3 MILLILITER(S): at 22:10

## 2020-02-09 RX ADMIN — Medication 1: at 07:37

## 2020-02-09 RX ADMIN — Medication 3 MILLILITER(S): at 09:42

## 2020-02-09 RX ADMIN — INSULIN GLARGINE 10 UNIT(S): 100 INJECTION, SOLUTION SUBCUTANEOUS at 23:18

## 2020-02-09 NOTE — PROGRESS NOTE ADULT - ASSESSMENT
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 left sided weakness, seizure d/o, HTN, Prostate ca, Chronic urinary retention (w. Chronic Hernandez), DMII presents from [facility] w. c/o SOB, hypoxia after episode of vomiting PEG feeds admitted with severe sepsis 2/2 aspiration pneumonia. Currently off abx, monitoring.     #Aspiration Pneumonia  s/p completed 5 days vanc/cefepime course as recommended by ID  clinically stable, not febrile, watch off antibiotics per ID  Continue supportive care  Aspiration Precautions  Cont Tube feeds via PEG  Cont Nebs/ Supplemental 02 as needed to maintain Spo2 >92%    #Dysphagia  NPO, Cont Tube feeds    #Hypokalemia: Resolved  monitor potassium    #Seizure d/o  Cont Keppra and Vimpat    #DMII  Monitor FS  Continue Lantus 10 Units HS  Cont Pre-meal Humalog & ISS    #Urinary retention  Maintain Chronic Hernandez    #LUE Swelling- Improved  UE u/s- No DVT  Elevate    #Autoimmune encephalitis  f/b Neurology service at [facility]    Dispo/GOC: Home Hospice. Pending DME, pt daughter has to also private hire aide which is in the process. 86yo male w/ autoimmune encephalitis c/b dysphagia s/p PEG, CVA w. left sided weakness, seizure d/o, HTN, Prostate ca, Chronic urinary retention (w. Chronic Hernandez), DMII presents from [facility] w. c/o SOB, hypoxia after episode of vomiting PEG feeds admitted with severe sepsis 2/2 aspiration pneumonia. Currently off abx, monitoring.     #Aspiration Pneumonia  s/p completed 5 days vanc/cefepime course as recommended by ID  clinically stable, not febrile, watch off antibiotics per ID  Continue supportive care  Aspiration Precautions  Cont Tube feeds via PEG  Cont Nebs/ Supplemental 02 as needed to maintain Spo2 >92%    #Dysphagia  NPO, Cont Tube feeds    #Hypokalemia: WNL today  monitor potassium    #Hypomagnesemia: Repleted  monitor mg    #Seizure d/o  Cont Keppra and Vimpat    #DMII  Monitor FS  Continue Lantus 10 Units HS  Cont Pre-meal Humalog & ISS    #Urinary retention  Maintain Chronic Hernandez    #LUE Swelling- Improved  UE u/s- No DVT  Elevate    #Autoimmune encephalitis  f/b Neurology service at [facility]    Dispo/GOC: Home Hospice. Pending DME, pt daughter has to also private hire aide which is in the process.

## 2020-02-09 NOTE — PROGRESS NOTE ADULT - SUBJECTIVE AND OBJECTIVE BOX
Patient is a 85y old  Male who presents with a chief complaint of Shortness of breath (08 Feb 2020 11:54)    Interval Hx  Patient seen and examined at bedside. No overnight events reported.     ALLERGIES:  No Known Allergies    MEDICATIONS  (STANDING):  albuterol/ipratropium for Nebulization 3 milliLiter(s) Nebulizer every 6 hours  dextrose 5%. 1000 milliLiter(s) (50 mL/Hr) IV Continuous <Continuous>  dextrose 50% Injectable 12.5 Gram(s) IV Push once  dextrose 50% Injectable 25 Gram(s) IV Push once  dextrose 50% Injectable 25 Gram(s) IV Push once  doxazosin 2 milliGRAM(s) Oral at bedtime  enoxaparin Injectable 40 milliGRAM(s) SubCutaneous daily  insulin glargine Injectable (LANTUS) 10 Unit(s) SubCutaneous at bedtime  insulin lispro (HumaLOG) corrective regimen sliding scale   SubCutaneous three times a day before meals  insulin lispro (HumaLOG) corrective regimen sliding scale   SubCutaneous at bedtime  insulin lispro Injectable (HumaLOG) 2 Unit(s) SubCutaneous three times a day before meals  lacosamide 200 milliGRAM(s) Oral two times a day  levETIRAcetam  Solution 1500 milliGRAM(s) Oral two times a day  polyethylene glycol 3350 17 Gram(s) Oral daily  sertraline 75 milliGRAM(s) Oral daily    MEDICATIONS  (PRN):  acetaminophen    Suspension .. 650 milliGRAM(s) Oral every 6 hours PRN Temp greater or equal to 38C (100.4F), Mild Pain (1 - 3)  dextrose 40% Gel 15 Gram(s) Oral once PRN Blood Glucose LESS THAN 70 milliGRAM(s)/deciliter  glucagon  Injectable 1 milliGRAM(s) IntraMuscular once PRN Glucose LESS THAN 70 milligrams/deciliter    Vital Signs Last 24 Hrs  T(F): 98.4 (09 Feb 2020 16:02), Max: 98.4 (09 Feb 2020 16:02)  HR: 112 (09 Feb 2020 16:02) (84 - 117)  BP: 147/78 (09 Feb 2020 16:02) (120/73 - 147/78)  RR: 16 (09 Feb 2020 16:02) (16 - 18)  SpO2: 93% (09 Feb 2020 16:02) (93% - 98%)  I&O's Summary    08 Feb 2020 07:01  -  09 Feb 2020 07:00  --------------------------------------------------------  IN: 0 mL / OUT: 1325 mL / NET: -1325 mL    09 Feb 2020 07:01  -  09 Feb 2020 16:27  --------------------------------------------------------  IN: 0 mL / OUT: 550 mL / NET: -550 mL      PHYSICAL EXAM:  General: NAD, A/O x 0,  ENT: MMM, no thrush  Neck: Supple, No JVD  Lungs: B/L scattered rhonchi  Cardio: RRR, S1/S2, No murmur  Abdomen: Soft, Nontender, Nondistended; Bowel sounds present  Extremities: No calf tenderness, left upper ext-- less edema as compared to yesterday      LABS:                        10.8   11.48 )-----------( 464      ( 07 Feb 2020 07:12 )             33.8     02-09    139  |  99  |  26  ----------------------------<  139  3.7   |  34  |  0.76    Ca    9.3      09 Feb 2020 06:09  Mg     1.3     02-09          eGFR if African American: 97 mL/min/1.73M2 (02-09-20 @ 06:09)  eGFR if Non African American: 83 mL/min/1.73M2 (02-09-20 @ 06:09)      POCT Blood Glucose.: 138 mg/dL (09 Feb 2020 11:19)  POCT Blood Glucose.: 158 mg/dL (09 Feb 2020 07:29)  POCT Blood Glucose.: 164 mg/dL (08 Feb 2020 23:53)  POCT Blood Glucose.: 177 mg/dL (08 Feb 2020 23:03)  POCT Blood Glucose.: 157 mg/dL (08 Feb 2020 17:31)    02-01 [id number] 7.8  12-29 [id number] 8.4        RADIOLOGY & ADDITIONAL TESTS:    Care Discussed with Consultants/Other Providers:

## 2020-02-10 DIAGNOSIS — G04.90 ENCEPHALITIS AND ENCEPHALOMYELITIS, UNSPECIFIED: ICD-10-CM

## 2020-02-10 DIAGNOSIS — Z02.9 ENCOUNTER FOR ADMINISTRATIVE EXAMINATIONS, UNSPECIFIED: ICD-10-CM

## 2020-02-10 LAB
ANION GAP SERPL CALC-SCNC: 10 MMOL/L — SIGNIFICANT CHANGE UP (ref 5–17)
BUN SERPL-MCNC: 24 MG/DL — HIGH (ref 7–23)
CALCIUM SERPL-MCNC: 9.2 MG/DL — SIGNIFICANT CHANGE UP (ref 8.4–10.5)
CHLORIDE SERPL-SCNC: 95 MMOL/L — LOW (ref 96–108)
CO2 SERPL-SCNC: 33 MMOL/L — HIGH (ref 22–31)
CREAT SERPL-MCNC: 0.75 MG/DL — SIGNIFICANT CHANGE UP (ref 0.5–1.3)
GLUCOSE BLDC GLUCOMTR-MCNC: 108 MG/DL — HIGH (ref 70–99)
GLUCOSE BLDC GLUCOMTR-MCNC: 117 MG/DL — HIGH (ref 70–99)
GLUCOSE BLDC GLUCOMTR-MCNC: 144 MG/DL — HIGH (ref 70–99)
GLUCOSE BLDC GLUCOMTR-MCNC: 171 MG/DL — HIGH (ref 70–99)
GLUCOSE SERPL-MCNC: 146 MG/DL — HIGH (ref 70–99)
HCT VFR BLD CALC: 36 % — LOW (ref 39–50)
HGB BLD-MCNC: 11.4 G/DL — LOW (ref 13–17)
MAGNESIUM SERPL-MCNC: 1.5 MG/DL — LOW (ref 1.6–2.6)
MCHC RBC-ENTMCNC: 28.1 PG — SIGNIFICANT CHANGE UP (ref 27–34)
MCHC RBC-ENTMCNC: 31.7 GM/DL — LOW (ref 32–36)
MCV RBC AUTO: 88.7 FL — SIGNIFICANT CHANGE UP (ref 80–100)
NRBC # BLD: 0 /100 WBCS — SIGNIFICANT CHANGE UP (ref 0–0)
PLATELET # BLD AUTO: 487 K/UL — HIGH (ref 150–400)
POTASSIUM SERPL-MCNC: 3.1 MMOL/L — LOW (ref 3.5–5.3)
POTASSIUM SERPL-SCNC: 3.1 MMOL/L — LOW (ref 3.5–5.3)
RBC # BLD: 4.06 M/UL — LOW (ref 4.2–5.8)
RBC # FLD: 15.2 % — HIGH (ref 10.3–14.5)
SODIUM SERPL-SCNC: 138 MMOL/L — SIGNIFICANT CHANGE UP (ref 135–145)
WBC # BLD: 11.47 K/UL — HIGH (ref 3.8–10.5)
WBC # FLD AUTO: 11.47 K/UL — HIGH (ref 3.8–10.5)

## 2020-02-10 PROCEDURE — 99232 SBSQ HOSP IP/OBS MODERATE 35: CPT

## 2020-02-10 PROCEDURE — 99233 SBSQ HOSP IP/OBS HIGH 50: CPT

## 2020-02-10 RX ORDER — POTASSIUM CHLORIDE 20 MEQ
40 PACKET (EA) ORAL EVERY 4 HOURS
Refills: 0 | Status: DISCONTINUED | OUTPATIENT
Start: 2020-02-10 | End: 2020-02-10

## 2020-02-10 RX ORDER — MORPHINE SULFATE 50 MG/1
1 CAPSULE, EXTENDED RELEASE ORAL EVERY 4 HOURS
Refills: 0 | Status: DISCONTINUED | OUTPATIENT
Start: 2020-02-10 | End: 2020-02-11

## 2020-02-10 RX ORDER — POTASSIUM CHLORIDE 20 MEQ
40 PACKET (EA) ORAL EVERY 4 HOURS
Refills: 0 | Status: COMPLETED | OUTPATIENT
Start: 2020-02-10 | End: 2020-02-10

## 2020-02-10 RX ORDER — MAGNESIUM SULFATE 500 MG/ML
1 VIAL (ML) INJECTION
Refills: 0 | Status: COMPLETED | OUTPATIENT
Start: 2020-02-10 | End: 2020-02-10

## 2020-02-10 RX ADMIN — LEVETIRACETAM 1500 MILLIGRAM(S): 250 TABLET, FILM COATED ORAL at 17:55

## 2020-02-10 RX ADMIN — Medication 40 MILLIEQUIVALENT(S): at 23:56

## 2020-02-10 RX ADMIN — LACOSAMIDE 200 MILLIGRAM(S): 50 TABLET ORAL at 06:23

## 2020-02-10 RX ADMIN — POLYETHYLENE GLYCOL 3350 17 GRAM(S): 17 POWDER, FOR SOLUTION ORAL at 13:18

## 2020-02-10 RX ADMIN — Medication 2 UNIT(S): at 06:21

## 2020-02-10 RX ADMIN — LACOSAMIDE 200 MILLIGRAM(S): 50 TABLET ORAL at 17:55

## 2020-02-10 RX ADMIN — Medication 3 MILLILITER(S): at 15:43

## 2020-02-10 RX ADMIN — Medication 3 MILLILITER(S): at 08:39

## 2020-02-10 RX ADMIN — Medication 2 UNIT(S): at 17:54

## 2020-02-10 RX ADMIN — SERTRALINE 75 MILLIGRAM(S): 25 TABLET, FILM COATED ORAL at 13:18

## 2020-02-10 RX ADMIN — ENOXAPARIN SODIUM 40 MILLIGRAM(S): 100 INJECTION SUBCUTANEOUS at 13:18

## 2020-02-10 RX ADMIN — Medication 40 MILLIEQUIVALENT(S): at 17:55

## 2020-02-10 RX ADMIN — Medication 3 MILLILITER(S): at 21:07

## 2020-02-10 RX ADMIN — Medication 1: at 17:55

## 2020-02-10 RX ADMIN — Medication 3 MILLILITER(S): at 04:30

## 2020-02-10 RX ADMIN — Medication 100 GRAM(S): at 15:03

## 2020-02-10 RX ADMIN — Medication 100 GRAM(S): at 15:02

## 2020-02-10 RX ADMIN — LEVETIRACETAM 1500 MILLIGRAM(S): 250 TABLET, FILM COATED ORAL at 06:22

## 2020-02-10 RX ADMIN — INSULIN GLARGINE 10 UNIT(S): 100 INJECTION, SOLUTION SUBCUTANEOUS at 23:09

## 2020-02-10 RX ADMIN — Medication 2 MILLIGRAM(S): at 23:09

## 2020-02-10 RX ADMIN — Medication 2 UNIT(S): at 13:18

## 2020-02-10 NOTE — PROGRESS NOTE ADULT - SUBJECTIVE AND OBJECTIVE BOX
CC: f/u for pneumonia    Patient reports: he is poorly interactive    REVIEW OF SYSTEMS:  All other review of systems negative (Comprehensive ROS): limited, not verbal     Antimicrobials Day #  :off    Other Medications Reviewed    T(F): 98.3 (02-10-20 @ 05:15), Max: 98.4 (02-09-20 @ 16:02)  HR: 101 (02-10-20 @ 09:04)  BP: 120/70 (02-10-20 @ 05:15)  RR: 16 (02-10-20 @ 05:15)  SpO2: 99% (02-10-20 @ 09:04)  Wt(kg): --    PHYSICAL EXAM:  General: lethargic, no acute distress  Eyes:  anicteric, no conjunctival injection, no discharge  Oropharynx: no lesions or injection 	  Neck: supple, without adenopathy  Lungs: few ronchi  Heart: regular rate and rhythm; no murmur, rubs or gallops  Abdomen: soft, nondistended, nontender, peg  Skin: no lesions  Extremities: no clubbing, cyanosis, or edema  Neurologic: poorly interactive    LAB RESULTS:                        11.4   11.47 )-----------( 487      ( 10 Feb 2020 07:44 )             36.0     02-10    138  |  95<L>  |  24<H>  ----------------------------<  146<H>  3.1<L>   |  33<H>  |  0.75    Ca    9.2      10 Feb 2020 07:44  Mg     1.5     02-10          MICROBIOLOGY:  RECENT CULTURES:      RADIOLOGY REVIEWED:    < from: Xray Chest 1 View-PORTABLE IMMEDIATE (02.07.20 @ 09:40) >  PROCEDURE DATE:  02/07/2020        INTERPRETATION:  Clinical information: Pneumonitis, congestion    Portable study, 9:12 AM    Comparison exam dated 1/31/2020    Streaky bibasilar airspace disease. No effusion. No pneumothorax. No vascular congestion. Heart size within normal limits. Hilar regions, mediastinal contours stable. No acute osseous abnormalities. Calcifications present aortic knob.    IMPRESSION: See above report    < end of copied text >

## 2020-02-10 NOTE — PROGRESS NOTE ADULT - SUBJECTIVE AND OBJECTIVE BOX
Patient is a 85y old  Male who presents with a chief complaint of shortness of breath (10 Feb 2020 13:46)  Pt seen and examined at bedside, lying comfortably, NAD.        Patient seen and examined at bedside.    ALLERGIES:  No Known Allergies        Vital Signs Last 24 Hrs  T(F): 98.3 (10 Feb 2020 05:15), Max: 98.4 (09 Feb 2020 16:02)  HR: 101 (10 Feb 2020 09:04) (96 - 112)  BP: 120/70 (10 Feb 2020 05:15) (120/70 - 147/78)  RR: 16 (10 Feb 2020 05:15) (16 - 17)  SpO2: 99% (10 Feb 2020 09:04) (93% - 100%)  I&O's Summary    09 Feb 2020 07:01  -  10 Feb 2020 07:00  --------------------------------------------------------  IN: 0 mL / OUT: 1650 mL / NET: -1650 mL    10 Feb 2020 07:01  -  10 Feb 2020 14:27  --------------------------------------------------------  IN: 0 mL / OUT: 350 mL / NET: -350 mL      MEDICATIONS:  acetaminophen    Suspension .. 650 milliGRAM(s) Oral every 6 hours PRN  albuterol/ipratropium for Nebulization 3 milliLiter(s) Nebulizer every 6 hours  dextrose 40% Gel 15 Gram(s) Oral once PRN  dextrose 5%. 1000 milliLiter(s) IV Continuous <Continuous>  dextrose 50% Injectable 12.5 Gram(s) IV Push once  dextrose 50% Injectable 25 Gram(s) IV Push once  dextrose 50% Injectable 25 Gram(s) IV Push once  doxazosin 2 milliGRAM(s) Oral at bedtime  enoxaparin Injectable 40 milliGRAM(s) SubCutaneous daily  glucagon  Injectable 1 milliGRAM(s) IntraMuscular once PRN  insulin glargine Injectable (LANTUS) 10 Unit(s) SubCutaneous at bedtime  insulin lispro (HumaLOG) corrective regimen sliding scale   SubCutaneous three times a day before meals  insulin lispro (HumaLOG) corrective regimen sliding scale   SubCutaneous at bedtime  insulin lispro Injectable (HumaLOG) 2 Unit(s) SubCutaneous three times a day before meals  lacosamide 200 milliGRAM(s) Oral two times a day  levETIRAcetam  Solution 1500 milliGRAM(s) Oral two times a day  magnesium sulfate  IVPB 1 Gram(s) IV Intermittent every 1 hour  polyethylene glycol 3350 17 Gram(s) Oral daily  potassium chloride    Tablet ER 40 milliEquivalent(s) Oral every 4 hours  sertraline 75 milliGRAM(s) Oral daily      PHYSICAL EXAM:  General: NAD, Comfortable  ENT: MMM, no oral thrush  Neck: Supple, No JVD  Lungs: Scattered Rhonchi, bilateral air entry  Cardio: S1S2 regular  Abdomen: Soft, Nontender  Extremities: No cyanosis, Left upper ext swelling    LABS:                        11.4   11.47 )-----------( 487      ( 10 Feb 2020 07:44 )             36.0     02-10    138  |  95  |  24  ----------------------------<  146  3.1   |  33  |  0.75    Ca    9.2      10 Feb 2020 07:44  Mg     1.5     02-10      eGFR if Non African American: 84 mL/min/1.73M2 (02-10-20 @ 07:44)  eGFR if : 97 mL/min/1.73M2 (02-10-20 @ 07:44)                          POCT Blood Glucose.: 117 mg/dL (10 Feb 2020 13:15)  POCT Blood Glucose.: 108 mg/dL (10 Feb 2020 06:17)  POCT Blood Glucose.: 130 mg/dL (09 Feb 2020 23:17)  POCT Blood Glucose.: 160 mg/dL (09 Feb 2020 18:06)    02-01 [id number] 7.8  12-29 [id number] 8.4          RADIOLOGY & ADDITIONAL TESTS:    Care Discussed with Consultants/Other Providers: Patient is a 85y old  Male who presents with a chief complaint of shortness of breath (10 Feb 2020 13:46)  Pt seen and examined at bedside, lying comfortably, NAD.        Patient seen and examined at bedside.    ALLERGIES:  No Known Allergies        Vital Signs Last 24 Hrs  T(F): 98.3 (10 Feb 2020 05:15), Max: 98.4 (09 Feb 2020 16:02)  HR: 101 (10 Feb 2020 09:04) (96 - 112)  BP: 120/70 (10 Feb 2020 05:15) (120/70 - 147/78)  RR: 16 (10 Feb 2020 05:15) (16 - 17)  SpO2: 99% (10 Feb 2020 09:04) (93% - 100%)  I&O's Summary    09 Feb 2020 07:01  -  10 Feb 2020 07:00  --------------------------------------------------------  IN: 0 mL / OUT: 1650 mL / NET: -1650 mL    10 Feb 2020 07:01  -  10 Feb 2020 14:27  --------------------------------------------------------  IN: 0 mL / OUT: 350 mL / NET: -350 mL      MEDICATIONS:  acetaminophen    Suspension .. 650 milliGRAM(s) Oral every 6 hours PRN  albuterol/ipratropium for Nebulization 3 milliLiter(s) Nebulizer every 6 hours  dextrose 40% Gel 15 Gram(s) Oral once PRN  dextrose 5%. 1000 milliLiter(s) IV Continuous <Continuous>  dextrose 50% Injectable 12.5 Gram(s) IV Push once  dextrose 50% Injectable 25 Gram(s) IV Push once  dextrose 50% Injectable 25 Gram(s) IV Push once  doxazosin 2 milliGRAM(s) Oral at bedtime  enoxaparin Injectable 40 milliGRAM(s) SubCutaneous daily  glucagon  Injectable 1 milliGRAM(s) IntraMuscular once PRN  insulin glargine Injectable (LANTUS) 10 Unit(s) SubCutaneous at bedtime  insulin lispro (HumaLOG) corrective regimen sliding scale   SubCutaneous three times a day before meals  insulin lispro (HumaLOG) corrective regimen sliding scale   SubCutaneous at bedtime  insulin lispro Injectable (HumaLOG) 2 Unit(s) SubCutaneous three times a day before meals  lacosamide 200 milliGRAM(s) Oral two times a day  levETIRAcetam  Solution 1500 milliGRAM(s) Oral two times a day  magnesium sulfate  IVPB 1 Gram(s) IV Intermittent every 1 hour  polyethylene glycol 3350 17 Gram(s) Oral daily  potassium chloride    Tablet ER 40 milliEquivalent(s) Oral every 4 hours  sertraline 75 milliGRAM(s) Oral daily      PHYSICAL EXAM:  General: NAD, Comfortable  ENT: MMM, no oral thrush  Neck: Supple, No JVD  Lungs: Scattered Rhonchi, bilateral air entry  Cardio: S1S2 regular, systolic murmur 2/6  Abdomen: Soft, Nontender  Extremities: No cyanosis, Left upper ext swelling    LABS:                        11.4   11.47 )-----------( 487      ( 10 Feb 2020 07:44 )             36.0     02-10    138  |  95  |  24  ----------------------------<  146  3.1   |  33  |  0.75    Ca    9.2      10 Feb 2020 07:44  Mg     1.5     02-10      eGFR if Non African American: 84 mL/min/1.73M2 (02-10-20 @ 07:44)  eGFR if : 97 mL/min/1.73M2 (02-10-20 @ 07:44)                          POCT Blood Glucose.: 117 mg/dL (10 Feb 2020 13:15)  POCT Blood Glucose.: 108 mg/dL (10 Feb 2020 06:17)  POCT Blood Glucose.: 130 mg/dL (09 Feb 2020 23:17)  POCT Blood Glucose.: 160 mg/dL (09 Feb 2020 18:06)    02-01 [id number] 7.8  12-29 [id number] 8.4          RADIOLOGY & ADDITIONAL TESTS:    Care Discussed with Consultants/Other Providers:

## 2020-02-10 NOTE — PROGRESS NOTE ADULT - ASSESSMENT
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 left sided weakness, seizure d/o, HTN, Prostate ca, Chronic urinary retention (w. Chronic Hernandez), DMII presents from [facility] w. c/o SOB, hypoxia after episode of vomiting PEG feeds admitted with severe sepsis 2/2 aspiration pneumonia. Currently off abx, monitoring.   Palliative:  Will review dcing peg feeding with hospice and entire family.  Pt hasmoderate pain.  Would consider iv medication for better pain control.  Pt has had a slow and steady deterioration of his status compared to last admission.

## 2020-02-10 NOTE — PROGRESS NOTE ADULT - PROBLEM SELECTOR PLAN 4
Called patient no answer left message to return my call.    autoimmune encephalitis - stable chronic condition

## 2020-02-10 NOTE — PROGRESS NOTE ADULT - SUBJECTIVE AND OBJECTIVE BOX
Follow-up Pall Progress Note    Leonidas Frausto MD  [phone]    No new events overnight.  Denies SOB/CP.   Pt was rehospitalized..  Family is now asking about dcing peg feeding.  Hospice has accepted pt to home hospice and pt may be near in patient hospice status.  Medications:  Vital Signs Last 24 Hrs  T(C): 36.8 (10 Feb 2020 05:15), Max: 36.9 (09 Feb 2020 16:02)  T(F): 98.3 (10 Feb 2020 05:15), Max: 98.4 (09 Feb 2020 16:02)  HR: 101 (10 Feb 2020 09:04) (96 - 112)  BP: 120/70 (10 Feb 2020 05:15) (120/70 - 147/78)  BP(mean): --  RR: 16 (10 Feb 2020 05:15) (16 - 17)  SpO2: 99% (10 Feb 2020 09:04) (93% - 100%)          02-09 @ 07:01  -  02-10 @ 07:00  --------------------------------------------------------  IN: 0 mL / OUT: 1650 mL / NET: -1650 mL        LABS:                        11.4   11.47 )-----------( 487      ( 10 Feb 2020 07:44 )             36.0     02-10    138  |  95<L>  |  24<H>  ----------------------------<  146<H>  3.1<L>   |  33<H>  |  0.75    Ca    9.2      10 Feb 2020 07:44  Mg     1.5     02-10                CULTURES:        Physical Examination:  PULM: Clear to auscultation bilaterally, no significant sputum production  CVS: Regular rate and rhythm, no murmurs, rubs, or gallops  ABD: Soft, non-tender

## 2020-02-10 NOTE — PROGRESS NOTE ADULT - ASSESSMENT
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 left sided weakness, seizure d/o, HTN, Prostate ca, Chronic urinary retention (w. Chronic Hernandez), DMII presents from [facility] w. c/o SOB, hypoxia after episode of vomiting PEG feeds admitted with severe sepsis 2/2 aspiration pneumonia. Currently off abx, monitoring.

## 2020-02-10 NOTE — PROGRESS NOTE ADULT - PROBLEM SELECTOR PLAN 1
ID and Palliative following -  pt admitted with severe sepsis 2/2 aspiration pneumonia - resolved, completed five days of IV vanco/cefepime  clinically improved, afebrile, plan to monitor off abx  continue supportive care, nebs, supplemental oxygen prn maintain spo2 > 92%

## 2020-02-11 LAB
GLUCOSE BLDC GLUCOMTR-MCNC: 104 MG/DL — HIGH (ref 70–99)
GLUCOSE BLDC GLUCOMTR-MCNC: 115 MG/DL — HIGH (ref 70–99)
GLUCOSE BLDC GLUCOMTR-MCNC: 163 MG/DL — HIGH (ref 70–99)

## 2020-02-11 PROCEDURE — 99233 SBSQ HOSP IP/OBS HIGH 50: CPT

## 2020-02-11 PROCEDURE — 99232 SBSQ HOSP IP/OBS MODERATE 35: CPT

## 2020-02-11 RX ORDER — MORPHINE SULFATE 50 MG/1
1 CAPSULE, EXTENDED RELEASE ORAL
Refills: 0 | Status: DISCONTINUED | OUTPATIENT
Start: 2020-02-11 | End: 2020-02-12

## 2020-02-11 RX ADMIN — LACOSAMIDE 200 MILLIGRAM(S): 50 TABLET ORAL at 18:22

## 2020-02-11 RX ADMIN — LEVETIRACETAM 1500 MILLIGRAM(S): 250 TABLET, FILM COATED ORAL at 07:03

## 2020-02-11 RX ADMIN — Medication 2 UNIT(S): at 07:45

## 2020-02-11 RX ADMIN — Medication 3 MILLILITER(S): at 21:31

## 2020-02-11 RX ADMIN — Medication 2 MILLIGRAM(S): at 21:14

## 2020-02-11 RX ADMIN — Medication 2 UNIT(S): at 11:37

## 2020-02-11 RX ADMIN — Medication 1: at 11:37

## 2020-02-11 RX ADMIN — LACOSAMIDE 200 MILLIGRAM(S): 50 TABLET ORAL at 07:03

## 2020-02-11 RX ADMIN — ENOXAPARIN SODIUM 40 MILLIGRAM(S): 100 INJECTION SUBCUTANEOUS at 11:38

## 2020-02-11 RX ADMIN — MORPHINE SULFATE 1 MILLIGRAM(S): 50 CAPSULE, EXTENDED RELEASE ORAL at 11:35

## 2020-02-11 RX ADMIN — POLYETHYLENE GLYCOL 3350 17 GRAM(S): 17 POWDER, FOR SOLUTION ORAL at 11:36

## 2020-02-11 RX ADMIN — Medication 3 MILLILITER(S): at 08:00

## 2020-02-11 RX ADMIN — MORPHINE SULFATE 1 MILLIGRAM(S): 50 CAPSULE, EXTENDED RELEASE ORAL at 11:50

## 2020-02-11 RX ADMIN — SERTRALINE 75 MILLIGRAM(S): 25 TABLET, FILM COATED ORAL at 11:37

## 2020-02-11 RX ADMIN — LEVETIRACETAM 1500 MILLIGRAM(S): 250 TABLET, FILM COATED ORAL at 18:22

## 2020-02-11 RX ADMIN — Medication 3 MILLILITER(S): at 04:48

## 2020-02-11 RX ADMIN — Medication 3 MILLILITER(S): at 16:49

## 2020-02-11 RX ADMIN — MORPHINE SULFATE 1 MILLIGRAM(S): 50 CAPSULE, EXTENDED RELEASE ORAL at 18:20

## 2020-02-11 NOTE — GOALS OF CARE CONVERSATION - ADVANCED CARE PLANNING - CONVERSATION/DISCUSSION
Hospice Referral
Diagnosis/MOLST Discussed/Treatment Options/Prognosis

## 2020-02-11 NOTE — PROGRESS NOTE ADULT - ASSESSMENT
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 left sided weakness, seizure d/o, HTN, Prostate ca, Chronic urinary retention (w. Chronic Hernandez), DMII presents from [facility] w. c/o SOB, hypoxia after episode of vomiting PEG feeds admitted with severe sepsis 2/2 aspiration pneumonia. Currently off abx, monitoring. 86yo male w/ autoimmune encephalitis c/b dysphagia s/p PEG, CVA w. left sided weakness, seizure d/o, HTN, Prostate ca, Chronic urinary retention (w. Chronic Hernandez), DMII presents from [facility] w. c/o SOB, hypoxia after episode of vomiting PEG feeds admitted with severe sepsis 2/2 aspiration pneumonia. Currently off abx, monitoring. Family conference to consider stopping peg tube feeding and possibility of  inpt hospice 84yo male w/ autoimmune encephalitis c/b dysphagia s/p PEG, CVA w. left sided weakness, seizure d/o, HTN, Prostate ca, Chronic urinary retention (w. Chronic Hernandez), DMII presents from [facility] w. c/o SOB, hypoxia after episode of vomiting PEG feeds admitted with severe sepsis 2/2 aspiration pneumonia. Currently off abx, monitoring. Family conference today in agreement to stop peg tube feedings and continue comfort care only.  Will stop lantus ,blood draws and will now give pt scheduled morphine for pain.  Evaluation for in pt hospice pending

## 2020-02-11 NOTE — GOALS OF CARE CONVERSATION - ADVANCED CARE PLANNING - CONVERSATION DETAILS
spoke with daughter, Rebekah, patient is DNR/DNI, she wants to keep him comfortable and have him on home hospice instead of at the nursing. Patient also has LLL pneumonia, she wants him admitted and treated for the pneumonia before he is placed on home hospice.
[facility]    This writer reassessed pt for inpt hospice as PEG tube feedings have now stopped and pt is now on IV Morphine. Pt approved for inpt hospice at the [facility]. There are no beds at this time. Pt placed on the board. Tentative transfer to the [facility] for tomorrow. [facility] to follow up.      Gay Singh RN
[facility]    This writer spoke with pts dtr Rebekah who decline DME delivery for today and requested Monday 2p-6p. Dtr Rebekah expressed concern for patient to go home on hospice. PEr Rebekah she feels like mom may not be able to cope. Rebekah to speak with her sister Darwin about discharge plan. [facility] to continue follow up.      Gay Singh RN
[facility]    This writer met with pts daughter Rebekah and spouse Justa. Pt know to [facility]. Discussed hospice services. Consents signed.  DME to be sent to pts home tomorrow between 2pm and 6pm. Pt daughter is working on getting a private hire. [facility] to to continue to follow up.    Gay Singh RN
Discussed with Darwin that she and her sister feel the most important thing for their father is comfort.  They have agreed not to prolong his suffering with using the PEG tube for food or hydration but to use the PEG tube for antiseizure medications.  They understand we will begin standing IV morphine injections to promote comfort.  They agree that in patient hospice would best serve their desire for their father to be comfortable.  Darwin stated she is worried for the health of their mother due to depression.  That wanted her to know that we are working to make her  comfortable but not all the details.      Plan to transfer patient to the [facility] for inpatient hospice care when a bed is available.
Pt with auto-immune encephal;itis with no clinical improvement.  Pt had peg placced approx 2 months ago.  This has not helped his clinical situation.  Daughter is considering home or in patient hospice.  She asks if peg feeding can be dced.  We will review with hospice. If peg feeding stopped pt may be in patient appropriate because of the need of pain and siezure medication.

## 2020-02-11 NOTE — PROGRESS NOTE ADULT - PROBLEM SELECTOR PLAN 7
continue lantus 10 units qhs, humalog 2 u tid before meals, accuchecks with correction continue lantus 10 units qhs, humalog 2 u tid before meals, ssi  sugar in range will discontinue and stop accu checkes as we stopped feedings

## 2020-02-11 NOTE — PROGRESS NOTE ADULT - PROBLEM SELECTOR PLAN 3
continue npo with tube feeds continue npo with tube feeds  question possibility of stopping peg feeding will stop peg tube feedings

## 2020-02-11 NOTE — GOALS OF CARE CONVERSATION - ADVANCED CARE PLANNING - NS PRO AD PATIENT TYPE
Do Not Resuscitate (DNR)/Medical Orders for Life-Sustaining Treatment (MOLST)
Medical Orders for Life-Sustaining Treatment (MOLST)/Do Not Resuscitate (DNR)
Medical Orders for Life-Sustaining Treatment (MOLST)/Do Not Resuscitate (DNR)
Do Not Resuscitate (DNR)/Health Care Proxy (HCP)
Do Not Resuscitate (DNR)/Medical Orders for Life-Sustaining Treatment (MOLST)
Medical Orders for Life-Sustaining Treatment (MOLST)/Do Not Resuscitate (DNR)

## 2020-02-11 NOTE — PROGRESS NOTE ADULT - SUBJECTIVE AND OBJECTIVE BOX
Patient is a 85y old  Male who presents with a chief complaint of shortness of breath (10 Feb 2020 15:28)      Patient seen and examined at bedside.    ALLERGIES:  No Known Allergies    MEDICATIONS  (STANDING):  albuterol/ipratropium for Nebulization 3 milliLiter(s) Nebulizer every 6 hours  dextrose 5%. 1000 milliLiter(s) (50 mL/Hr) IV Continuous <Continuous>  dextrose 50% Injectable 12.5 Gram(s) IV Push once  dextrose 50% Injectable 25 Gram(s) IV Push once  dextrose 50% Injectable 25 Gram(s) IV Push once  doxazosin 2 milliGRAM(s) Oral at bedtime  enoxaparin Injectable 40 milliGRAM(s) SubCutaneous daily  insulin glargine Injectable (LANTUS) 10 Unit(s) SubCutaneous at bedtime  insulin lispro (HumaLOG) corrective regimen sliding scale   SubCutaneous three times a day before meals  insulin lispro (HumaLOG) corrective regimen sliding scale   SubCutaneous at bedtime  insulin lispro Injectable (HumaLOG) 2 Unit(s) SubCutaneous three times a day before meals  lacosamide 200 milliGRAM(s) Oral two times a day  levETIRAcetam  Solution 1500 milliGRAM(s) Oral two times a day  polyethylene glycol 3350 17 Gram(s) Oral daily  sertraline 75 milliGRAM(s) Oral daily    MEDICATIONS  (PRN):  acetaminophen    Suspension .. 650 milliGRAM(s) Oral every 6 hours PRN Temp greater or equal to 38C (100.4F), Mild Pain (1 - 3)  dextrose 40% Gel 15 Gram(s) Oral once PRN Blood Glucose LESS THAN 70 milliGRAM(s)/deciliter  glucagon  Injectable 1 milliGRAM(s) IntraMuscular once PRN Glucose LESS THAN 70 milligrams/deciliter  morphine  - Injectable 1 milliGRAM(s) IV Push every 4 hours PRN Moderate Pain (4 - 6)    Vital Signs Last 24 Hrs  T(F): 98.1 (11 Feb 2020 05:52), Max: 98.4 (10 Feb 2020 21:44)  HR: 110 (11 Feb 2020 08:00) (91 - 114)  BP: 118/76 (11 Feb 2020 05:52) (117/60 - 124/67)  RR: 17 (11 Feb 2020 05:52) (16 - 17)  SpO2: 98% (11 Feb 2020 08:00) (98% - 100%)  I&O's Summary    10 Feb 2020 07:01  -  11 Feb 2020 07:00  --------------------------------------------------------  IN: 880 mL / OUT: 1500 mL / NET: -620 mL      PHYSICAL EXAM:  General: NAD, A/O x 3  ENT: MMM  Neck: Supple, No JVD  Lungs: Clear to auscultation bilaterally, Non labored breathing   Cardio: RRR, S1/S2, No murmurs  Abdomen: Soft, Nontender, Nondistended; Bowel sounds present  Extremities: No calf tenderness, No pitting edema    LABS:                        11.4   11.47 )-----------( 487      ( 10 Feb 2020 07:44 )             36.0     02-10    138  |  95  |  24  ----------------------------<  146  3.1   |  33  |  0.75    Ca    9.2      10 Feb 2020 07:44  Mg     1.5     02-10      eGFR if Non African American: 84 mL/min/1.73M2 (02-10-20 @ 07:44)  eGFR if : 97 mL/min/1.73M2 (02-10-20 @ 07:44)                          POCT Blood Glucose.: 104 mg/dL (11 Feb 2020 07:00)  POCT Blood Glucose.: 144 mg/dL (10 Feb 2020 23:05)  POCT Blood Glucose.: 171 mg/dL (10 Feb 2020 17:52)  POCT Blood Glucose.: 117 mg/dL (10 Feb 2020 13:15)    02-01 [id number] 7.8  12-29 WekrqvgotwZ1X 8.4        RADIOLOGY & ADDITIONAL TESTS:    Care Discussed with Consultants/Other Providers: Patient is a 85y old  Male who presents with a chief complaint of shortness of breath.  Pt without complaints today       Patient seen and examined at bedside.    ALLERGIES:  No Known Allergies    MEDICATIONS  (STANDING):  albuterol/ipratropium for Nebulization 3 milliLiter(s) Nebulizer every 6 hours  dextrose 5%. 1000 milliLiter(s) (50 mL/Hr) IV Continuous <Continuous>  dextrose 50% Injectable 12.5 Gram(s) IV Push once  dextrose 50% Injectable 25 Gram(s) IV Push once  dextrose 50% Injectable 25 Gram(s) IV Push once  doxazosin 2 milliGRAM(s) Oral at bedtime  enoxaparin Injectable 40 milliGRAM(s) SubCutaneous daily  insulin glargine Injectable (LANTUS) 10 Unit(s) SubCutaneous at bedtime  insulin lispro (HumaLOG) corrective regimen sliding scale   SubCutaneous three times a day before meals  insulin lispro (HumaLOG) corrective regimen sliding scale   SubCutaneous at bedtime  insulin lispro Injectable (HumaLOG) 2 Unit(s) SubCutaneous three times a day before meals  lacosamide 200 milliGRAM(s) Oral two times a day  levETIRAcetam  Solution 1500 milliGRAM(s) Oral two times a day  polyethylene glycol 3350 17 Gram(s) Oral daily  sertraline 75 milliGRAM(s) Oral daily    MEDICATIONS  (PRN):  acetaminophen    Suspension .. 650 milliGRAM(s) Oral every 6 hours PRN Temp greater or equal to 38C (100.4F), Mild Pain (1 - 3)  dextrose 40% Gel 15 Gram(s) Oral once PRN Blood Glucose LESS THAN 70 milliGRAM(s)/deciliter  glucagon  Injectable 1 milliGRAM(s) IntraMuscular once PRN Glucose LESS THAN 70 milligrams/deciliter  morphine  - Injectable 1 milliGRAM(s) IV Push every 4 hours PRN Moderate Pain (4 - 6)    Vital Signs Last 24 Hrs  T(F): 98.1 (11 Feb 2020 05:52), Max: 98.4 (10 Feb 2020 21:44)  HR: 110 (11 Feb 2020 08:00) (91 - 114)  BP: 118/76 (11 Feb 2020 05:52) (117/60 - 124/67)  RR: 17 (11 Feb 2020 05:52) (16 - 17)  SpO2: 98% (11 Feb 2020 08:00) (98% - 100%)  I&O's Summary    10 Feb 2020 07:01  -  11 Feb 2020 07:00  --------------------------------------------------------  IN: 880 mL / OUT: 1500 mL / NET: -620 mL      PHYSICAL EXAM:  General: NAD, A/O x 1-2  ENT: MMM  Neck: Supple, No JVD  Lungs: Clear to auscultation bilaterally, Non labored breathing   Cardio: RRR, S1/S2, No murmurs  Abdomen: Soft, Nontender, Nondistended; Bowel sounds present  Extremities: No calf tenderness, No pitting edema  Left upper extremity swelling     LABS:                        11.4   11.47 )-----------( 487      ( 10 Feb 2020 07:44 )             36.0     02-10    138  |  95  |  24  ----------------------------<  146  3.1   |  33  |  0.75    Ca    9.2      10 Feb 2020 07:44  Mg     1.5     02-10      eGFR if Non African American: 84 mL/min/1.73M2 (02-10-20 @ 07:44)  eGFR if : 97 mL/min/1.73M2 (02-10-20 @ 07:44)                          POCT Blood Glucose.: 104 mg/dL (11 Feb 2020 07:00)  POCT Blood Glucose.: 144 mg/dL (10 Feb 2020 23:05)  POCT Blood Glucose.: 171 mg/dL (10 Feb 2020 17:52)  POCT Blood Glucose.: 117 mg/dL (10 Feb 2020 13:15)    02-01 AleigasdnsQ7P 7.8  12-29 ZihmimeuswF6Z 8.4        RADIOLOGY & ADDITIONAL TESTS:    Care Discussed with Consultants/Other Providers: Patient is a 85y old  Male who presents with a chief complaint of shortness of breath.  Pt without complaints today       Patient seen and examined at bedside.    ALLERGIES:  No Known Allergies    MEDICATIONS  (STANDING):  albuterol/ipratropium for Nebulization 3 milliLiter(s) Nebulizer every 6 hours  dextrose 5%. 1000 milliLiter(s) (50 mL/Hr) IV Continuous <Continuous>  dextrose 50% Injectable 12.5 Gram(s) IV Push once  dextrose 50% Injectable 25 Gram(s) IV Push once  dextrose 50% Injectable 25 Gram(s) IV Push once  doxazosin 2 milliGRAM(s) Oral at bedtime  enoxaparin Injectable 40 milliGRAM(s) SubCutaneous daily  insulin glargine Injectable (LANTUS) 10 Unit(s) SubCutaneous at bedtime  insulin lispro (HumaLOG) corrective regimen sliding scale   SubCutaneous three times a day before meals  insulin lispro (HumaLOG) corrective regimen sliding scale   SubCutaneous at bedtime  insulin lispro Injectable (HumaLOG) 2 Unit(s) SubCutaneous three times a day before meals  lacosamide 200 milliGRAM(s) Oral two times a day  levETIRAcetam  Solution 1500 milliGRAM(s) Oral two times a day  polyethylene glycol 3350 17 Gram(s) Oral daily  sertraline 75 milliGRAM(s) Oral daily    MEDICATIONS  (PRN):  acetaminophen    Suspension .. 650 milliGRAM(s) Oral every 6 hours PRN Temp greater or equal to 38C (100.4F), Mild Pain (1 - 3)  dextrose 40% Gel 15 Gram(s) Oral once PRN Blood Glucose LESS THAN 70 milliGRAM(s)/deciliter  glucagon  Injectable 1 milliGRAM(s) IntraMuscular once PRN Glucose LESS THAN 70 milligrams/deciliter  morphine  - Injectable 1 milliGRAM(s) IV Push every 4 hours PRN Moderate Pain (4 - 6)    Vital Signs Last 24 Hrs  T(F): 98.1 (11 Feb 2020 05:52), Max: 98.4 (10 Feb 2020 21:44)  HR: 110 (11 Feb 2020 08:00) (91 - 114)  BP: 118/76 (11 Feb 2020 05:52) (117/60 - 124/67)  RR: 17 (11 Feb 2020 05:52) (16 - 17)  SpO2: 98% (11 Feb 2020 08:00) (98% - 100%)  I&O's Summary    10 Feb 2020 07:01  -  11 Feb 2020 07:00  --------------------------------------------------------  IN: 880 mL / OUT: 1500 mL / NET: -620 mL      PHYSICAL EXAM:  General: NAD, A/O x 1-2  ENT: MMM  Neck: Supple, No JVD  Lungs: lower lobe crackles bilaterally, Non labored breathing   Cardio: RRR, S1/S2, 2/6 systolic murmur  Abdomen: Soft, Nontender, Nondistended; Bowel sounds present  Extremities: No calf tenderness, No pitting edema  Left upper extremity swelling     LABS:                        11.4   11.47 )-----------( 487      ( 10 Feb 2020 07:44 )             36.0     02-10    138  |  95  |  24  ----------------------------<  146  3.1   |  33  |  0.75    Ca    9.2      10 Feb 2020 07:44  Mg     1.5     02-10      eGFR if Non African American: 84 mL/min/1.73M2 (02-10-20 @ 07:44)  eGFR if : 97 mL/min/1.73M2 (02-10-20 @ 07:44)                          POCT Blood Glucose.: 104 mg/dL (11 Feb 2020 07:00)  POCT Blood Glucose.: 144 mg/dL (10 Feb 2020 23:05)  POCT Blood Glucose.: 171 mg/dL (10 Feb 2020 17:52)  POCT Blood Glucose.: 117 mg/dL (10 Feb 2020 13:15)    02-01 [id number] 7.8  12-29 PspcqelyytB2E 8.4        RADIOLOGY & ADDITIONAL TESTS:    Care Discussed with Consultants/Other Providers:

## 2020-02-11 NOTE — PROGRESS NOTE ADULT - ASSESSMENT
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 left sided weakness, seizure d/o, HTN, Prostate ca, Chronic urinary retention (w. Chronic Hernandez), DMII presents from [facility] w. c/o SOB, hypoxia after episode of vomiting PEG feeds admitted with severe sepsis 2/2 aspiration pneumonia. Currently off abx, monitoring.   Palliative:  peg feeding reviewed with family to be dced today.  Pt may be developing aspiration pneumonia.   IV pain meds being increased.  Family aware.  Reviewed with hospice.

## 2020-02-11 NOTE — PROGRESS NOTE ADULT - SUBJECTIVE AND OBJECTIVE BOX
Follow-up Pall Progress Note    Leonidas Frausto MD  [phone]    No new respiratory events overnight.  Abdominal pain appears to be worsening.  Some guarding    Medications:  Vital Signs Last 24 Hrs  T(C): 36.7 (11 Feb 2020 05:52), Max: 36.9 (10 Feb 2020 21:44)  T(F): 98.1 (11 Feb 2020 05:52), Max: 98.4 (10 Feb 2020 21:44)  HR: 110 (11 Feb 2020 08:00) (91 - 114)  BP: 118/76 (11 Feb 2020 05:52) (117/60 - 124/67)  BP(mean): --  RR: 17 (11 Feb 2020 05:52) (16 - 17)  SpO2: 98% (11 Feb 2020 08:00) (98% - 100%)          02-10 @ 07:01  -  02-11 @ 07:00  --------------------------------------------------------  IN: 880 mL / OUT: 1500 mL / NET: -620 mL        LABS:                        11.4   11.47 )-----------( 487      ( 10 Feb 2020 07:44 )             36.0     02-10    138  |  95<L>  |  24<H>  ----------------------------<  146<H>  3.1<L>   |  33<H>  |  0.75    Ca    9.2      10 Feb 2020 07:44  Mg     1.5     02-10                CULTURES:        Physical Examination:  PULM: Clear to auscultation bilaterally, no significant sputum production  CVS: Regular rate and rhythm, no murmurs, rubs, or gallops  ABD: distended non tender

## 2020-02-11 NOTE — PROGRESS NOTE ADULT - PROBLEM SELECTOR PLAN 9
home hospice vs inpatient considering inpt hospice   family conference today waiting in pt hospice eval Patient accepted to in patient hospice at the [facility], waiting for free bed

## 2020-02-12 ENCOUNTER — TRANSCRIPTION ENCOUNTER (OUTPATIENT)
Age: 85
End: 2020-02-12

## 2020-02-12 VITALS — OXYGEN SATURATION: 99 %

## 2020-02-12 DIAGNOSIS — Z71.89 OTHER SPECIFIED COUNSELING: ICD-10-CM

## 2020-02-12 LAB — GLUCOSE BLDC GLUCOMTR-MCNC: 123 MG/DL — HIGH (ref 70–99)

## 2020-02-12 PROCEDURE — 99233 SBSQ HOSP IP/OBS HIGH 50: CPT

## 2020-02-12 PROCEDURE — 99239 HOSP IP/OBS DSCHRG MGMT >30: CPT

## 2020-02-12 RX ORDER — MORPHINE SULFATE 50 MG/1
1 CAPSULE, EXTENDED RELEASE ORAL
Qty: 0 | Refills: 0 | DISCHARGE
Start: 2020-02-12

## 2020-02-12 RX ORDER — ROBINUL 0.2 MG/ML
1 INJECTION INTRAMUSCULAR; INTRAVENOUS EVERY 6 HOURS
Refills: 0 | Status: DISCONTINUED | OUTPATIENT
Start: 2020-02-12 | End: 2020-02-12

## 2020-02-12 RX ORDER — LACOSAMIDE 50 MG/1
1 TABLET ORAL
Qty: 0 | Refills: 0 | DISCHARGE
Start: 2020-02-12

## 2020-02-12 RX ORDER — POLYETHYLENE GLYCOL 3350 17 G/17G
0 POWDER, FOR SOLUTION ORAL
Qty: 0 | Refills: 0 | DISCHARGE

## 2020-02-12 RX ORDER — ROBINUL 0.2 MG/ML
1 INJECTION INTRAMUSCULAR; INTRAVENOUS
Qty: 0 | Refills: 0 | DISCHARGE
Start: 2020-02-12

## 2020-02-12 RX ORDER — LEVETIRACETAM 250 MG/1
15 TABLET, FILM COATED ORAL
Qty: 0 | Refills: 0 | DISCHARGE
Start: 2020-02-12

## 2020-02-12 RX ORDER — MAGNESIUM OXIDE 400 MG ORAL TABLET 241.3 MG
1 TABLET ORAL
Qty: 0 | Refills: 0 | DISCHARGE

## 2020-02-12 RX ADMIN — ROBINUL 1 MILLIGRAM(S): 0.2 INJECTION INTRAMUSCULAR; INTRAVENOUS at 14:24

## 2020-02-12 RX ADMIN — MORPHINE SULFATE 1 MILLIGRAM(S): 50 CAPSULE, EXTENDED RELEASE ORAL at 13:09

## 2020-02-12 RX ADMIN — ENOXAPARIN SODIUM 40 MILLIGRAM(S): 100 INJECTION SUBCUTANEOUS at 13:09

## 2020-02-12 RX ADMIN — MORPHINE SULFATE 1 MILLIGRAM(S): 50 CAPSULE, EXTENDED RELEASE ORAL at 05:30

## 2020-02-12 RX ADMIN — Medication 3 MILLILITER(S): at 04:33

## 2020-02-12 RX ADMIN — SERTRALINE 75 MILLIGRAM(S): 25 TABLET, FILM COATED ORAL at 13:10

## 2020-02-12 RX ADMIN — LACOSAMIDE 200 MILLIGRAM(S): 50 TABLET ORAL at 05:14

## 2020-02-12 RX ADMIN — MORPHINE SULFATE 1 MILLIGRAM(S): 50 CAPSULE, EXTENDED RELEASE ORAL at 00:17

## 2020-02-12 RX ADMIN — Medication 3 MILLILITER(S): at 08:29

## 2020-02-12 RX ADMIN — MORPHINE SULFATE 1 MILLIGRAM(S): 50 CAPSULE, EXTENDED RELEASE ORAL at 00:20

## 2020-02-12 RX ADMIN — POLYETHYLENE GLYCOL 3350 17 GRAM(S): 17 POWDER, FOR SOLUTION ORAL at 13:09

## 2020-02-12 RX ADMIN — LEVETIRACETAM 1500 MILLIGRAM(S): 250 TABLET, FILM COATED ORAL at 05:14

## 2020-02-12 RX ADMIN — MORPHINE SULFATE 1 MILLIGRAM(S): 50 CAPSULE, EXTENDED RELEASE ORAL at 05:14

## 2020-02-12 NOTE — DISCHARGE NOTE NURSING/CASE MANAGEMENT/SOCIAL WORK - PATIENT PORTAL LINK FT
You can access the FollowMyHealth Patient Portal offered by [facility] by registering at the following website: [email or web address]. By joining [facility]’s FollowMyHealth portal, you will also be able to view your health information using other applications (apps) compatible with our system.

## 2020-02-12 NOTE — DISCHARGE NOTE PROVIDER - CARE PROVIDER_API CALL
Merline Nur (DO)  Family Medicine  [address], [address]  [place] [address]  Phone: [phone]  Fax: [phone]  Follow Up Time:

## 2020-02-12 NOTE — PROGRESS NOTE ADULT - ASSESSMENT
86yo male w/ autoimmune encephalitis c/b dysphagia s/p PEG, CVA w. left sided weakness, seizure d/o, HTN, Prostate ca, Chronic urinary retention (w. Chronic Hernandez), DMII presents from [facility] w. c/o SOB, hypoxia after episode of vomiting PEG feeds admitted with severe sepsis 2/2 aspiration pneumonia. Currently off abx, monitoring. Family conference today in agreement to stop peg tube feedings and continue comfort care only.  Will stop lantus ,blood draws and will now give pt scheduled morphine for pain.  Evaluation for in pt hospice pending

## 2020-02-12 NOTE — DISCHARGE NOTE PROVIDER - HOSPITAL COURSE
Hospital Course    Unable to obtain history from patient due to encephalopathy. All history obtained from children, at bedside.        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 Patient is not more confused than basline (he is usually A/O x2). Patient was recently admitted with Coronavirus and multifocal pneumonia requiring antibiotics. He now returns for above.         Pt admitted with severe sepsis 2/2 aspiration pneumonia, acute hypoxic respiratory failure - completed five day course of IV vanco and cefepime as per ID recommendations.  Pt also treated with supplemental oxygen therapy to maintain saturations > 92%.  Nutrition and speech and swallow following secondary to dysphagia - prognosis guarded.  Pt with multiple co-morbidities and risk factors for further hospitalizations and deterioration.  Palliative consulted.  Goals of care discussed with family - advanced directives in place, DNR/I, MOLST updated.  Family agreed to comfort care only, no more tube feeds, blood draws, insulin - PEG to be used only for antiseizure medications, IV morphine injections to be utilized for comfort measures only.  Hospice consulted - accepted to inpatient services at Hospice at the [facility].        Source of Infection:    Antibiotic / Last Day: Completed five days of IV Vanco/Cefepime        Palliative Care / Advanced Care Planning    Code Status:    Patient/Family agreeable to Hospice/Palliative (Y/N)? Yes - accepted to Inpatient Hospice at the [facility]    Summary of Goals of Care Conversation: See above        Discharging Provider:  Christian Mott NP    Contact Info: Cell [phone] - Please call with any questions or concerns.        Outpatient Provider: Dr. Kellogg - [place] (has not seen in months as patient has been in and out of Rehab)        Signout given to  SNF Provider: Hospital Course    Unable to obtain history from patient due to encephalopathy. All history obtained from children, at bedside.        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 Patient is not more confused than basline (he is usually A/O x2). Patient was recently admitted with Coronavirus and multifocal pneumonia requiring antibiotics. He now returns for above.         Pt admitted with severe sepsis 2/2 aspiration pneumonia, acute hypoxic respiratory failure - completed five day course of IV vanco and cefepime as per ID recommendations.  Pt also treated with supplemental oxygen therapy to maintain saturations > 92%.  Nutrition and speech and swallow following secondary to dysphagia - prognosis guarded.  Pt with multiple co-morbidities and risk factors for further hospitalizations and deterioration.  Palliative consulted.  Goals of care discussed with family - advanced directives in place, DNR/I, MOLST updated.  Family agreed to comfort care only, no more tube feeds, blood draws, insulin - PEG to be used only for antiseizure medications, IV morphine injections to be utilized for comfort measures only.  Hospice consulted - accepted to inpatient services at Hospice at the [facility].        Source of Infection:    Antibiotic / Last Day: Completed five days of IV Vanco/Cefepime - finished 2/6/20        Palliative Care / Advanced Care Planning    Code Status:    Patient/Family agreeable to Hospice/Palliative (Y/N)? Yes - accepted to Inpatient Hospice at the [facility]    Summary of Goals of Care Conversation: See above        Discharging Provider:  Christian Mott NP    Contact Info: Cell [phone] - Please call with any questions or concerns.        Outpatient Provider: Dr. Kellogg - [place] (has not seen in months as patient has been in and out of Rehab)        Signout given to  SNF Provider: Hospital Course    Unable to obtain history from patient due to encephalopathy. All history obtained from children, at bedside.        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 Patient is not more confused than basline (he is usually A/O x2). Patient was recently admitted with Coronavirus and multifocal pneumonia requiring antibiotics. He now returns for above.         Pt admitted with severe sepsis 2/2 aspiration pneumonia, acute hypoxic respiratory failure - completed five day course of IV vanco and cefepime as per ID recommendations.  Pt also treated with supplemental oxygen therapy to maintain saturations > 92%.  Nutrition and speech and swallow following secondary to dysphagia - prognosis guarded.  Pt with multiple co-morbidities and risk factors for further hospitalizations and deterioration.  Palliative consulted.  Goals of care discussed with family - advanced directives in place, DNR/I, MOLST updated.  Family agreed to comfort care only, no more tube feeds, blood draws, insulin - PEG to be used only for antiseizure medications, IV morphine injections to be utilized for comfort measures only.  Hospice consulted - accepted to inpatient services at Hospice at the [facility].        Source of Infection:    Antibiotic / Last Day: Completed five days of IV Vanco/Cefepime - finished 2/6/20        Palliative Care / Advanced Care Planning    Code Status:    Patient/Family agreeable to Hospice/Palliative (Y/N)? Yes - accepted to Inpatient Hospice at the [facility]    Summary of Goals of Care Conversation: See above        Discharging Provider:  Christian Mott NP    Contact Info: Cell [phone] - Please call with any questions or concerns.        Outpatient Provider: Dr. Kellogg - [place] (has not seen in months as patient has been in and out of Rehab)    Called Dr. Kellogg who was updated to patient's course and discharge plan.

## 2020-02-12 NOTE — PROGRESS NOTE ADULT - PROBLEM SELECTOR PROBLEM 1
Aspiration pneumonia, unspecified aspiration pneumonia type, unspecified laterality, unspecified part of lung
Severe sepsis

## 2020-02-12 NOTE — PROGRESS NOTE ADULT - PROBLEM SELECTOR PROBLEM 4
Dysphagia, unspecified type
Encephalitis
Dysphagia, unspecified type

## 2020-02-12 NOTE — PROGRESS NOTE ADULT - PROBLEM SELECTOR PLAN 1
ID and Palliative consults appreciated  completed five days of IV vanco and cefepime  plan to monitor off abx, continue supportive care, aspiration preautions ID and Palliative consults appreciated  completed five days of IV vanco and cefepime  plan to monitor off abx, continue supportive care, aspiration precautions

## 2020-02-12 NOTE — PROGRESS NOTE ADULT - SUBJECTIVE AND OBJECTIVE BOX
Patient is a 85y old  Male who presents with a chief complaint of shortness of breath (11 Feb 2020 13:06)  No acute events overnight        Patient seen and examined at bedside.    ALLERGIES:  No Known Allergies        Vital Signs Last 24 Hrs  T(F): 97.3 (12 Feb 2020 05:02), Max: 98.5 (11 Feb 2020 20:51)  HR: 100 (12 Feb 2020 08:30) (74 - 104)  BP: 120/60 (12 Feb 2020 05:02) (110/60 - 120/60)  RR: 16 (12 Feb 2020 05:02) (15 - 16)  SpO2: 99% (12 Feb 2020 08:30) (98% - 100%)  I&O's Summary    11 Feb 2020 07:01  -  12 Feb 2020 07:00  --------------------------------------------------------  IN: 0 mL / OUT: 1200 mL / NET: -1200 mL      MEDICATIONS:  acetaminophen    Suspension .. 650 milliGRAM(s) Oral every 6 hours PRN  albuterol/ipratropium for Nebulization 3 milliLiter(s) Nebulizer every 6 hours  dextrose 40% Gel 15 Gram(s) Oral once PRN  dextrose 5%. 1000 milliLiter(s) IV Continuous <Continuous>  dextrose 50% Injectable 12.5 Gram(s) IV Push once  dextrose 50% Injectable 25 Gram(s) IV Push once  dextrose 50% Injectable 25 Gram(s) IV Push once  doxazosin 2 milliGRAM(s) Oral at bedtime  enoxaparin Injectable 40 milliGRAM(s) SubCutaneous daily  glucagon  Injectable 1 milliGRAM(s) IntraMuscular once PRN  insulin lispro (HumaLOG) corrective regimen sliding scale   SubCutaneous three times a day before meals  insulin lispro (HumaLOG) corrective regimen sliding scale   SubCutaneous at bedtime  lacosamide 200 milliGRAM(s) Oral two times a day  levETIRAcetam  Solution 1500 milliGRAM(s) Oral two times a day  morphine  - Injectable 1 milliGRAM(s) IV Push four times a day  polyethylene glycol 3350 17 Gram(s) Oral daily  sertraline 75 milliGRAM(s) Oral daily      PHYSICAL EXAM:  General: NAD  ENT: MMM, no oral thrush  Neck: Supple, No JVD  Lungs: Course breath sounds bilaterally, bilateral air entry  Cardio: S1S2 regular  Abdomen: Soft, Nontender, +PEG  Extremities: No cyanosis, LUE edema    LABS:                        11.4   11.47 )-----------( 487      ( 10 Feb 2020 07:44 )             36.0     02-10    138  |  95  |  24  ----------------------------<  146  3.1   |  33  |  0.75    Ca    9.2      10 Feb 2020 07:44  Mg     1.5     02-10      eGFR if Non African American: 84 mL/min/1.73M2 (02-10-20 @ 07:44)  eGFR if : 97 mL/min/1.73M2 (02-10-20 @ 07:44)                          POCT Blood Glucose.: 123 mg/dL (12 Feb 2020 08:06)  POCT Blood Glucose.: 115 mg/dL (11 Feb 2020 21:24)  POCT Blood Glucose.: 163 mg/dL (11 Feb 2020 11:34)    02-01 LxmrklbrpnT1H 7.8  12-29 BqzfwvrhbyQ0X 8.4          RADIOLOGY & ADDITIONAL TESTS:    Care Discussed with Consultants/Other Providers:

## 2020-02-12 NOTE — PROGRESS NOTE ADULT - PROBLEM SELECTOR PROBLEM 3
Acute respiratory failure with hypoxia
Dysphagia, unspecified type
Acute respiratory failure with hypoxia

## 2020-02-12 NOTE — PROGRESS NOTE ADULT - ASSESSMENT
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 left sided weakness, seizure d/o, HTN, Prostate ca, Chronic urinary retention (w. Chronic Hernandez), DMII presents from [facility] w. c/o SOB, hypoxia after episode of vomiting PEG feeds admitted with severe sepsis 2/2 aspiration pneumonia. Currently off abx, monitoring.   Palliative:  peg feeding dced.  Pt may be developing aspiration pneumonia.   IV pain meds being increased.  Family aware.  Accepted to hospice hospice.

## 2020-02-12 NOTE — PROGRESS NOTE ADULT - SUBJECTIVE AND OBJECTIVE BOX
Follow-up Pall Progress Note    Leonidas Frausto MD  [phone]    No new respiratory events overnight. More comfortable today on rtc ms dosing.    Medications:  Vital Signs Last 24 Hrs  T(C): 36.3 (12 Feb 2020 05:02), Max: 36.9 (11 Feb 2020 20:51)  T(F): 97.3 (12 Feb 2020 05:02), Max: 98.5 (11 Feb 2020 20:51)  HR: 100 (12 Feb 2020 08:30) (74 - 104)  BP: 120/60 (12 Feb 2020 05:02) (110/60 - 120/60)  BP(mean): --  RR: 16 (12 Feb 2020 05:02) (15 - 16)  SpO2: 99% (12 Feb 2020 08:30) (98% - 100%)          02-11 @ 07:01  -  02-12 @ 07:00  --------------------------------------------------------  IN: 0 mL / OUT: 1200 mL / NET: -1200 mL        LABS:                    CULTURES:        Physical Examination:  PULM: Clear to auscultation bilaterally, no significant sputum production  CVS: Regular rate and rhythm, no murmurs, rubs, or gallops  ABD: Soft, non-tender  EXT:  No clubbing, cyanosis, or edema  Neeuro:  less agitated today

## 2020-02-12 NOTE — PROGRESS NOTE ADULT - PROBLEM SELECTOR PROBLEM 9
Discharge planning issues
Hyponatremia

## 2020-02-12 NOTE — DISCHARGE NOTE PROVIDER - NSDCCPCAREPLAN_GEN_ALL_CORE_FT
PRINCIPAL DISCHARGE DIAGNOSIS  Diagnosis: Aspiration pneumonia, unspecified aspiration pneumonia type, unspecified laterality, unspecified part of lung  Assessment and Plan of Treatment:

## 2020-02-12 NOTE — CHART NOTE - NSCHARTNOTEFT_GEN_A_CORE
Nutrition Follow Up Note  Hospital Course (Per Electronic Medical Record):   Source: Medical Record [X] Patient [X] Family [X] Nursing Staff [X]     Diet: No active diet order    Patient was receiving PEG feeds however episodes of vomiting . Family requesting comfort care measures,(no PEG feeds, blood draws)    Current Weight: (2/10) 168.6 -stable weight since admission    Pertinent Medications: MEDICATIONS  (STANDING):  albuterol/ipratropium for Nebulization 3 milliLiter(s) Nebulizer every 6 hours  dextrose 5%. 1000 milliLiter(s) (50 mL/Hr) IV Continuous <Continuous>  dextrose 50% Injectable 12.5 Gram(s) IV Push once  dextrose 50% Injectable 25 Gram(s) IV Push once  dextrose 50% Injectable 25 Gram(s) IV Push once  doxazosin 2 milliGRAM(s) Oral at bedtime  enoxaparin Injectable 40 milliGRAM(s) SubCutaneous daily  insulin lispro (HumaLOG) corrective regimen sliding scale   SubCutaneous three times a day before meals  insulin lispro (HumaLOG) corrective regimen sliding scale   SubCutaneous at bedtime  lacosamide 200 milliGRAM(s) Oral two times a day  levETIRAcetam  Solution 1500 milliGRAM(s) Oral two times a day  morphine  - Injectable 1 milliGRAM(s) IV Push four times a day  polyethylene glycol 3350 17 Gram(s) Oral daily  sertraline 75 milliGRAM(s) Oral daily    MEDICATIONS  (PRN):  acetaminophen    Suspension .. 650 milliGRAM(s) Oral every 6 hours PRN Temp greater or equal to 38C (100.4F), Mild Pain (1 - 3)  dextrose 40% Gel 15 Gram(s) Oral once PRN Blood Glucose LESS THAN 70 milliGRAM(s)/deciliter  glucagon  Injectable 1 milliGRAM(s) IntraMuscular once PRN Glucose LESS THAN 70 milligrams/deciliter      Pertinent Labs:  02-10 Na138 mmol/L Glu 146 mg/dL<H> K+ 3.1 mmol/L<L> Cr  0.75 mg/dL BUN 24 mg/dL<H> 02-01 [id number] 7.8 %<H>        Skin: Stage 2 (R/L) buttock &  (L) DTI heel     Edema: (2/11) (+2) (L) arm    Last BM: on (2/10)    Estimated Needs:   [X] No Change since Previous Assessment      Previous Nutrition Diagnosis: Moderate Malnutrition    Nutrition Diagnosis is [X] Ongoing         New Nutrition Diagnosis: [X] Not Applicable    [X] Mild Moderate Severe Malnutrition      Interventions: n/a, due to comfort measures      Monitoring & Evaluation: will monitor:  [X] will follow up upon request       RD to follow as per Nutrition protocol  Zuleyka Stroud RDN

## 2020-02-12 NOTE — PROGRESS NOTE ADULT - REASON FOR ADMISSION
shortness of breath

## 2020-02-12 NOTE — PROGRESS NOTE ADULT - ATTENDING COMMENTS
I have personally seen and examined patient on the above date.  I discussed the case with NAN France and I agree with findings and plan as detailed per note above, which I have amended where appropriate.      #DVT prophylaxis  - lovenox SC
I have personally seen and examined patient on the above date.  I discussed the case with NITISH Ho and I agree with findings and plan as detailed per note above, which I have amended where appropriate.    Patient from [facility] who presented with sob, hypoxia after episode of vomiting PEG feeds admitted with severe sepsis 2/2 aspiration pneumonia. Completed ABx course. cont Aspiration precautions, nebs , o2 prn.   Replete electrolytes as needed.  Pending dispo to home with hospice service: patient daughter has to hire private aid which is in the process.
I have personally seen and examined patient on the above date.  I discussed the case with NITISH lou and I agree with findings and plan as detailed per note above, which I have amended where appropriate.
I have personally seen and examined patient on the above date.  I discussed the case with Syed Pantera and I agree with findings and plan as detailed per note above, which I have amended where appropriate.
I have personally seen and examined patient on the above date.  I discussed the case with Syed Pantera and I agree with findings and plan as detailed per note above, which I have amended where appropriate.
I have personally seen and examined patient on the above date.  I discussed the case with NITISH Gonzales and I agree with findings and plan as detailed per note above, which I have amended where appropriate.    Patient from [facility] who presented with sob, hypoxia after episode of vomiting PEG feeds admitted with severe sepsis 2/2 aspiration pneumonia. Cont w/ iv antibiotics . family still deciding about  home w/home hospice . Pt has increased swelling left upper extremity, which is getting worse, which could be due to superficial thrombophlebitis but family is concerned and requesting for US duplex to r/o DVT, follow up US results.
I have personally seen and examined patient on the above date.  I discussed the case with NITISH Arreaga and I agree with findings and plan as detailed per note above, which I have amended where appropriate.  Patient from [facility] who presented with sob, hypoxia after episode of vomiting PEG feeds admitted with severe sepsis 2/2 aspiration pneumonia. day 5/5 of antibiotics course today. family still deciding about  home w/home hospice . Pending dispo .
I have personally seen and examined patient on the above date.  I discussed the case with Babs, NP and I agree with findings and plan as detailed per note above, which I have amended where appropriate.    Patient from [facility] who presented with sob, hypoxia after episode of vomiting PEG feeds admitted with severe sepsis 2/2 aspiration pneumonia. Cont w/ iv antibiotics . family to take pt home w/home hospice once medically optimized.  possible discharge within 48 hours.
I have personally seen and examined patient on the above date.  I discussed the case with NITISH Arreaga and I agree with findings and plan as detailed per note above, which I have amended where appropriate.  Patient from [facility] who presented with sob, hypoxia after episode of vomiting PEG feeds admitted with severe sepsis 2/2 aspiration pneumonia. Completed ABx course. cont Aspiration precautions, nebs , o2 prn. Replete electrolytes as needed.  Pending dispo to home with hospice service.
I have personally seen and examined patient on the above date.  I discussed the case with Ms. Brown and I agree with findings and plan as detailed per note above, which I have amended where appropriate.

## 2020-02-12 NOTE — PROGRESS NOTE ADULT - PROBLEM SELECTOR PROBLEM 6
Seizure disorder
Type 2 diabetes mellitus without complication, unspecified whether long term insulin use

## 2020-02-12 NOTE — PROGRESS NOTE ADULT - PROBLEM SELECTOR PLAN 8
pt with advanced directives in place, DNR/I, MOLST updated  Palliative following - family agreed to comfort care only, no more tube feeds, blood draws, insulin, use PEG only for antiseizure meds, IV morphine injections to promote comfort

## 2020-02-12 NOTE — PROGRESS NOTE ADULT - PROBLEM SELECTOR PROBLEM 7
Electrolyte imbalance
Type 2 diabetes mellitus without complication, unspecified whether long term insulin use
Urinary retention
Electrolyte imbalance

## 2020-02-12 NOTE — PROGRESS NOTE ADULT - PROBLEM SELECTOR PROBLEM 2
Acute respiratory failure with hypoxia
Aspiration pneumonia, unspecified aspiration pneumonia type, unspecified laterality, unspecified part of lung

## 2020-02-12 NOTE — PROGRESS NOTE ADULT - PROBLEM SELECTOR PROBLEM 5
Electrolyte imbalance
Seizure disorder

## 2020-02-21 DIAGNOSIS — I10 ESSENTIAL (PRIMARY) HYPERTENSION: ICD-10-CM

## 2020-02-21 DIAGNOSIS — A41.9 SEPSIS, UNSPECIFIED ORGANISM: ICD-10-CM

## 2020-02-21 DIAGNOSIS — N40.1 BENIGN PROSTATIC HYPERPLASIA WITH LOWER URINARY TRACT SYMPTOMS: ICD-10-CM

## 2020-02-21 DIAGNOSIS — E83.42 HYPOMAGNESEMIA: ICD-10-CM

## 2020-02-21 DIAGNOSIS — Z96.0 PRESENCE OF UROGENITAL IMPLANTS: ICD-10-CM

## 2020-02-21 DIAGNOSIS — R13.10 DYSPHAGIA, UNSPECIFIED: ICD-10-CM

## 2020-02-21 DIAGNOSIS — E87.6 HYPOKALEMIA: ICD-10-CM

## 2020-02-21 DIAGNOSIS — Z66 DO NOT RESUSCITATE: ICD-10-CM

## 2020-02-21 DIAGNOSIS — J96.01 ACUTE RESPIRATORY FAILURE WITH HYPOXIA: ICD-10-CM

## 2020-02-21 DIAGNOSIS — Z79.4 LONG TERM (CURRENT) USE OF INSULIN: ICD-10-CM

## 2020-02-21 DIAGNOSIS — J69.0 PNEUMONITIS DUE TO INHALATION OF FOOD AND VOMIT: ICD-10-CM

## 2020-02-21 DIAGNOSIS — R65.20 SEVERE SEPSIS WITHOUT SEPTIC SHOCK: ICD-10-CM

## 2020-02-21 DIAGNOSIS — E87.1 HYPO-OSMOLALITY AND HYPONATREMIA: ICD-10-CM

## 2020-02-21 DIAGNOSIS — E11.9 TYPE 2 DIABETES MELLITUS WITHOUT COMPLICATIONS: ICD-10-CM

## 2020-02-21 DIAGNOSIS — G40.909 EPILEPSY, UNSPECIFIED, NOT INTRACTABLE, WITHOUT STATUS EPILEPTICUS: ICD-10-CM

## 2020-02-21 DIAGNOSIS — E44.0 MODERATE PROTEIN-CALORIE MALNUTRITION: ICD-10-CM

## 2020-02-21 DIAGNOSIS — M79.89 OTHER SPECIFIED SOFT TISSUE DISORDERS: ICD-10-CM

## 2020-02-21 DIAGNOSIS — G04.90 ENCEPHALITIS AND ENCEPHALOMYELITIS, UNSPECIFIED: ICD-10-CM

## 2020-02-21 DIAGNOSIS — R33.8 OTHER RETENTION OF URINE: ICD-10-CM

## 2020-04-30 PROCEDURE — 87040 BLOOD CULTURE FOR BACTERIA: CPT

## 2020-04-30 PROCEDURE — 87086 URINE CULTURE/COLONY COUNT: CPT

## 2020-04-30 PROCEDURE — 85027 COMPLETE CBC AUTOMATED: CPT

## 2020-04-30 PROCEDURE — 85610 PROTHROMBIN TIME: CPT

## 2020-04-30 PROCEDURE — 80048 BASIC METABOLIC PNL TOTAL CA: CPT

## 2020-04-30 PROCEDURE — 84145 PROCALCITONIN (PCT): CPT

## 2020-04-30 PROCEDURE — 80235 DRUG ASSAY LACOSAMIDE: CPT

## 2020-04-30 PROCEDURE — 96365 THER/PROPH/DIAG IV INF INIT: CPT

## 2020-04-30 PROCEDURE — 83735 ASSAY OF MAGNESIUM: CPT

## 2020-04-30 PROCEDURE — 83605 ASSAY OF LACTIC ACID: CPT

## 2020-04-30 PROCEDURE — 83036 HEMOGLOBIN GLYCOSYLATED A1C: CPT

## 2020-04-30 PROCEDURE — 84100 ASSAY OF PHOSPHORUS: CPT

## 2020-04-30 PROCEDURE — 80177 DRUG SCRN QUAN LEVETIRACETAM: CPT

## 2020-04-30 PROCEDURE — 93005 ELECTROCARDIOGRAM TRACING: CPT

## 2020-04-30 PROCEDURE — 87186 SC STD MICRODIL/AGAR DIL: CPT

## 2020-04-30 PROCEDURE — 94640 AIRWAY INHALATION TREATMENT: CPT

## 2020-04-30 PROCEDURE — 85730 THROMBOPLASTIN TIME PARTIAL: CPT

## 2020-04-30 PROCEDURE — 84132 ASSAY OF SERUM POTASSIUM: CPT

## 2020-04-30 PROCEDURE — 93971 EXTREMITY STUDY: CPT

## 2020-04-30 PROCEDURE — 99285 EMERGENCY DEPT VISIT HI MDM: CPT | Mod: 25

## 2020-04-30 PROCEDURE — 82803 BLOOD GASES ANY COMBINATION: CPT

## 2020-04-30 PROCEDURE — 81001 URINALYSIS AUTO W/SCOPE: CPT

## 2020-04-30 PROCEDURE — 36415 COLL VENOUS BLD VENIPUNCTURE: CPT

## 2020-04-30 PROCEDURE — 94760 N-INVAS EAR/PLS OXIMETRY 1: CPT

## 2020-04-30 PROCEDURE — 71045 X-RAY EXAM CHEST 1 VIEW: CPT

## 2020-04-30 PROCEDURE — 80053 COMPREHEN METABOLIC PANEL: CPT

## 2020-04-30 PROCEDURE — 82962 GLUCOSE BLOOD TEST: CPT

## 2020-07-01 NOTE — ED ADULT NURSE NOTE - CCCP TRG CHIEF CMPLNT
Impression: Dry eye syndrome of bilateral lacrimal glands: H04.123. Plan: Dry eyes account for the patient's complaints. There is no evidence of permanent changes to the cornea. Explained condition does not have a cure and will need artificial tears for maintenance. pain, shoulder

## 2020-07-15 NOTE — DISCHARGE NOTE PROVIDER - NSDCMRMEDTOKEN_GEN_ALL_CORE_FT
Anesthesia Volume In Cc (Will Not Render If 0): 0.5 lacosamide 200 mg oral tablet: 1 tab(s) orally 2 times a day  levETIRAcetam 100 mg/mL oral solution: 15 milliliter(s) orally 2 times a day glycopyrrolate 0.2 mg/mL injectable solution: 1 milligram(s) injectable every 6 hours, As Needed  lacosamide 200 mg oral tablet: 1 tab(s) orally 2 times a day  levETIRAcetam 100 mg/mL oral solution: 15 milliliter(s) orally 2 times a day  morphine: 1 milligram(s) intravenous 4 times a day

## 2020-12-15 PROBLEM — N39.0 ACUTE UTI: Status: RESOLVED | Noted: 2017-07-05 | Resolved: 2020-12-15

## 2020-12-16 PROBLEM — J06.9 VIRAL URI WITH COUGH: Status: RESOLVED | Noted: 2018-12-12 | Resolved: 2020-12-16

## 2021-08-17 NOTE — DISCHARGE NOTE PROVIDER - NSDCHC_MEDRECSTATUS_GEN_ALL_CORE
[facility] ambulatory encounter  FAMILY PRACTICE OFFICE VISIT    SUBJECTIVE:   This is a 67 year old female who presents for evaluation of diabetes.  She was last seen for this by PCP on 4/22/2021.      Present Treatment:  Metformin 500 mg Daily.  Patient Compliant:  No    Blood Sugar Monitoring At Home:  Not being done despite previous recommendations.    Symptoms of Hypoglycemia or Hyperglycemia:  No  Associated Symptoms Include:  Paresthesias to bilateral legs at night time only.    Review of systems:   Constitutional: Negative for fever and chills. .  Respiratory: Negative for cough, wheezing or shortness of breath.    Cardiovascular: Negative for chest pain, chest pressure or palpitations.   Gastrointestinal: Negative for nausea, vomiting, diarrhea.   Genitourinary: Negative for dysuria, urgency, frequency or hematuria.  Extremities:  Negative for joint swelling or joint pain.   Endocrine: Negative for heat or cold intolerance.     OBJECTIVE:   PAST HISTORIES:   I have reviewed the past medical history, family history, social history, medications and allergies listed in the medical record as obtained by my nursing staff and support staff and agree with their documentation.    PHYSICAL EXAM:   Vital Signs:    Visit Vitals  BP (!) 166/74 (BP Location: RUE - Right upper extremity, Patient Position: Sitting, Cuff Size: Large Adult)   Pulse 64   Temp 97.6 °F (36.4 °C) (Oral)   Resp 14   Ht 5' 2\" (1.575 m)   Wt 81.8 kg   SpO2 97%   BMI 32.98 kg/m²     Pulse Ox Interpretation:  Within normal limits.  General:   Alert, cooperative, conversive in no acute distress.  Skin:  Warm and dry without rash.     Cardiovascular:  Symmetrical pulses.  Regular rate and rhythm without murmur.  Respiratory:   Normal respiratory effort.  Clear to auscultation.  No wheezes, rales or rhonchi.  Gastrointestinal:  Soft and nontender.  Normal bowel sounds.  No hepatomegaly or splenomegaly.   Musculoskeletal:  No deformity or edema.   Back:   Normal alignment.  No costovertebral angle tenderness.  Neurologic:  Oriented x4.  No focal deficits.  Psychiatric:  Cooperative.  Appropriate mood and affect.    LAB RESULTS:   Hemoglobin A1C (%)   Date Value   09/19/2018 6.8 (H)   7.2 on 4/22/2021    SUGAR MANAGEMENT:    Hemoglobin A1C (%)   Date Value   09/19/2018 6.8 (H)       Overall Course of Diabetes:  Stayed the same.  Goal of Treatment is HbA1c:  <7.0   Treatment Options:   No change to the present treatment plan for diabetes.  Continue with current medication and lifestyle modifications.  Reviewed with the patient things she can do to improve her blood sugar.  It is necessary for the patient to continue monitoring her blood sugar  before breakfast  Reviewed with her lifestyle modifications to reduce her risk for complications from diabetes and arteriosclerotic cardiovascular disease.  Discussed the role of diet, specifically limiting carbohydrate intake. She should maintain a healthy weight and engage in 30 minutes of cardiovascular exercise 5 times a week.  Additionally, she should avoid tobacco, alcohol, caffeine, and stress.    CHOLESTEROL MANAGEMENT:    CALCULATED LDL (no units)   Date Value   04/28/2017 56     The patient's 10-year ASCVD Risk is The ASCVD Risk score (Roseanne DC Jr., et al., 2013) failed to calculate for the following reasons:    Cannot find a previous HDL lab    Cannot find a previous total cholesterol lab. Overall course of cholesterol management is unchanged and controlled.   With a history of diabetes, aged 40-75, and an estimated 10 year ASCVD risk >7.5%, the evidence supports the use of high intensity statin.  The patient is presently on statin therapy and will continue as prescribed.    BLOOD PRESSURE MANAGEMENT:    BP Readings from Last 3 Encounters:   08/17/21 (!) 166/74   07/06/21 130/60   04/22/21 (!) 140/79     Blood pressure goal in this diabetic is <140/90.      TOBACCO STATUS:    Social History     Tobacco Use   Smoking  Status Never Smoker   Smokeless Tobacco Never Used     Encouraged patient to remain tobacco free.    WEIGHT MANAGEMENT:    Body mass index is 32.98 kg/m².    BMI ASSESSMENT/PLAN:  Patient is overweight.    Recommend nutrition support group (i.e. Weight Watchers, etc.)        INTERVAL DIABETIC SCREENINGS:    MICROALBUMIN, UA (TTL) (no units)   Date Value   04/12/2019 50   04/26/2018 20     Urine microalbumin done within the past year and is up to date.  Diabetic foot exam:  completed today  Diabetic eye exam:  overdue; the patient will schedule this ASAP    Diabetic Foot Exam Documentation     Normal Bilateral Foot Exam:  Skin integrity is normal. Dorsalis pedis and posterior tibial pulses are present.  Pressure sensation using the [place] monofilament is present.      HEALTH MAINTENANCE:    Health Maintenance Due   Topic Date Due   • Osteoporosis Screening  Never done   • Breast Cancer Screening  02/10/2019   • Medicare Wellness Visit  11/30/2021     Patient is up to date, no discussion needed..    ASSESSMENT:   1. Type 2 diabetes mellitus without complication, without long-term current use of insulin (CMS/[facility])        PLAN:   - Continue Metformin 500 mg PO daily for Diabetes.  - Return in 3 months for annual wellness exam to include diagnostic testing, ie. Mammogram, osteoporosis, and annual labs.   - Follow up as needed with PCP    Return in about 3 months (around 11/10/2021), or if symptoms worsen or fail to improve, for Annual Medicare Wellness visit with labs.    Instructions provided as documented in the after visit summary.    The patient indicated understanding of the diagnosis and agreed with the plan of care.     MARIO Mehta       Admission Reconciliation is Completed  Discharge Reconciliation is Not Complete Admission Reconciliation is Completed  Discharge Reconciliation is Completed

## 2021-12-14 NOTE — ED ADULT TRIAGE NOTE - LOCATION:
[facility]    HOSPITAL MEDICINE PROGRESS NOTE    Patient: Milton Constantino Today's Date: 12/14/2021   YOB: 1966 Admission Date: 12/13/2021  1:52 PM   MRN: [id number] Inpatient LOS: 0 day(s)   Room:  [facility]/ Hospital Day:  Hospital Day: 2       History and Subjective complaints       Interval history and Overnight events:  None    Patient seen and examined by me in follow up. Patient reports shortness of breath slowly improving. Cough is getting better, no phlegm.  Bilateral leg swelling improving.  Denied having any chest pain or palpitation.  He has no fever or chills.  No urinary complaints.    Hospital Course  Patients interval history reviewed/EHR notes reviewed.   Milton Constantino is a 55 year old male who presented on 12/13/2021 with complaints of Breathing Problem and Edema 13 yrs or more    Patient has a past medical history of heart failure with EF of 20% S/P ICD this time presented with worsening shortness of breath since 2 weeks. Also orthopnea and some leg swelling. He gained 10 lb recently prior to presentation.  Patient reported taking his furosemide prior to admission.  Denied any chest pain.  On presentation, noted to have elevated BNP, chest x-ray with cardiomegaly.  Has potassium of 5.6 and creatinine 1.3.  Admitted and started with IV diuretics, echocardiogram ordered.      Reviewed Pertinent Histories: Medical History, Surgical History, Social History, Family History,     ROS: Pertinent systems negative except as above.    Medications: Reviewed     Scheduled Medications:    apixaBAN, 5 mg, 2 times per day  sodium chloride (PF), 2 mL, 2 times per day  furosemide, 40 mg, TID  insulin lispro, , TID WC  allopurinol, 100 mg, Daily  atorvastatin, 40 mg, Daily  carvedilol, 6.25 mg, BID   clopidogrel, 75 mg, Daily  magnesium oxide, 400 mg, Daily      Continuous Infusions:    PRN Medications:  sodium chloride, sodium chloride, dextrose, dextrose, glucagon, dextrose, dextrose,  ondansetron, acetaminophen, ipratropium-albuterol      Physical Examination       Vital 24 Hour Range Most Recent Value   Temperature Temp  Min: 97.4 °F (36.3 °C)  Max: 97.7 °F (36.5 °C) 97.5 °F (36.4 °C)   Pulse Pulse  Min: 71  Max: 89 72   Respiratory Resp  Min: 16  Max: 20 20   Blood Pressure BP  Min: 102/75  Max: 138/100 132/86   Pulse Oximetry SpO2  Min: 90 %  Max: 100 % 100 %   Arterial BP No data recorded     O2 No data recorded       Intake and Output:      Intake/Output Summary (Last 24 hours) at 12/14/2021 0724  Last data filed at 12/14/2021 0600  Gross per 24 hour   Intake 650 ml   Output 1950 ml   Net -1300 ml       Last Stool Occurrence:       Vital Most Recent Value First Value   Weight 86.9 kg (191 lb 9.3 oz) Weight: 87.5 kg (193 lb)   Height 5' 5\" (165.1 cm) Height: 5' 5\" (165.1 cm)   BMI 31.88 N/A     General: Looks well well developed, well nourished  CV: regular rate and rhythm and no murmurs, rubs, or thrills  Resp: clear to auscultation bilaterally  Abd: soft, nontender, nondistended, and no hepatosplenomegaly  Ext: no clubbing, cyanosis, or ischemia  Skin: no rashes, lesions, or ulcers noted  Neuro: CN 2-12 grossly intact, normal sensation , and no focal deficits noted  Psych: normal judgement and insight    Test Results     Labs: The Laboratory values listed below have been reviewed and pertinent findings discussed in the Assessment and Plan.    Laboratory values:   Recent Labs   Lab 12/14/21  0454 12/13/21  1429   WBC 3.6* 2.6*   HGB 11.7* 11.8*   HCT 35.5* 36.6*    153         ,   Recent Labs   Lab 12/14/21  0454 12/13/21  2338 12/13/21  1429   SODIUM 140 137 136   CHLORIDE 104 103 103   CO2 24 25 22   BUN 32* 35* 36*   CREATININE 1.32* 1.48* 1.28*   CALCIUM 9.8 9.0 9.4   ALBUMIN 4.0  --  3.6   BILIRUBIN 1.9*  --  1.1*   ALKPT 62  --  76   GPT 56  --  47   AST 51*  --  66*   GLUCOSE 130* 123* 230*            Radiology: Imaging studies have been reviewed and pertinent findings  discussed in the Assessment and Plan.    Results for orders placed or performed during the hospital encounter of 12/13/21 (from the past 48 hour(s))   XR Chest AP or PA    Impression    IMPRESSION:    No acute processes. Moderate cardiac enlargement. Improved aeration of both  lungs compared to previous study of 8/31/2021.       Tubes, Devices, Monitoring     Telemetry: On      [place]: No    Assessment and Plan       1.  Acute on chronic systolic and diastolic heart failure  -echo repeated today with EF of 20%  -bnp noted to be >5000  -cxr showed cardiomegaly but no overt fluid overload, clinically however patient appears overloaded   -place patient on chf protocol  -patient was on furosemide however he has minimal urine output, started with and increased the furosemide to 60 mg b.i.d.  -I/o, fluid restriction and low na diet  -monitor on tele  -troponin is negative, will check one more  -check mg  -s/p AICD  -the patient to continue Coreg, started with Jardiance, continue Cozaar. Patient to start Aldactone when JOHAN improves  -Cardiology input appreciated     2.  Hyperkalemia  -k noted to be 5.6  -no t wave abnormality  -started with Lasix and hyperkalemia improved     CHRONIC MEDICAL PROBLEMS     3.  Chronic renal failure, stage 3  -cr appears to be at baseline   -monitor with diureses     4.  leukopenia  -chronic  -wbc noted to be 2.6     5. Elevated bili and ast  -have been elevated chronically  -alt and alk phos normal  - follow LFT     6.  Atrial fib  -appears to be in a fib  with hr controlled  -on eliquis and coreg     7.  CAD  -on plavix statin   -2013 s/p  Stent RCA with MARIO x 3-  -9/16/2019 Cath multivessel CAD, prominently LCx and RCA,      8.  hyperlipidemia  -on a statin      9.  essential htn  -resume home meds     10.  DM type 2  -with diabetic nephropathy  -on oral meds at home  -hold while in the hospital  -start correction factor  -monitor BG closely      Consults:    IP CONSULT TO CARDIAC REHAB  IP  CONSULT TO PHARMACY    Diet:  Sodium 2 Gm (low Sodium), Fluid Restrict 1500ml (900 From Dietary), Consistent Carb Moderate (45-75 Gm/meal) Diet  Therapy Orders:  No orders of the defined types were placed in this encounter.      Smoking status- non smoker    Nutrition status- appropriate  Body mass index is 31.88 kg/m². - Obese (BMI 30-39 without a comorbid condition or 30-34 with a comorbid condition)  DVT Prophylaxis - Lovenox prophylactic dosing (dose adjusted as per renal function)  Limited English proficiency with :  No         Advanced Directives     Code Status: Full Resuscitation           Discharge Plan     The patients treatment plans were discussed with patient and RN.     Recommendations for Discharge   SW     PT     OT     SLP       Anticipated discharge destination: Home         Barriers to Discharge: Patient is not medically ready and needs to remain in the hospital today due to IV diuresis          Pawan Hinton MD  Hospitalist  12/14/2021  7:24 AM    (Contact by secure chat)   Left arm;

## 2021-12-24 NOTE — PATIENT PROFILE ADULT - HAVE YOU EXPERIENCED A TRAUMATIC EVENT?
74 y/o F presents to ED requesting covid testing pt will receive results as requested via text in 24-72 hours dc home in NAD no

## 2022-02-18 NOTE — PROGRESS NOTE ADULT - SUBJECTIVE AND OBJECTIVE BOX
Patient states she recently stop taking her Cymbalta for about a week, prescribed by another provider wants to speak with  Lilliam.  Patient states that she thinks she feel extra emotional and not sure if it's a withdrawal.  Patient states she not sure if she should start back taking the medication but thinks it does not want work.  Please advise thank you John Middleton RN     [facility]   [phone]  RENAL FOLLOW UP NOTE  --------------------------------------------------------------------------------  Chief Complaint:    24 hour events/subjective:    seen earlier. family at bedside. per family pt has been responding earlier.    non communicative when seen     PAST HISTORY  --------------------------------------------------------------------------------  No significant changes to PMH, PSH, FHx, SHx, unless otherwise noted    ALLERGIES & MEDICATIONS  --------------------------------------------------------------------------------  Allergies    No Known Allergies    Intolerances      Standing Inpatient Medications  aMILoride 10 milliGRAM(s) Oral <User Schedule>  chlorhexidine 4% Liquid 1 Application(s) Topical <User Schedule>  heparin  Injectable 5000 Unit(s) SubCutaneous every 8 hours  insulin lispro (HumaLOG) corrective regimen sliding scale   SubCutaneous every 6 hours  labetalol 100 milliGRAM(s) Oral every 8 hours  lacosamide IVPB 150 milliGRAM(s) IV Intermittent every 12 hours  levETIRAcetam  IVPB 1500 milliGRAM(s) IV Intermittent every 12 hours  magnesium oxide 400 milliGRAM(s) Oral two times a day with meals  pantoprazole  Injectable 40 milliGRAM(s) IV Push daily  polyethylene glycol 3350 17 Gram(s) Oral two times a day  senna Syrup 10 milliLiter(s) Oral at bedtime    PRN Inpatient Medications      REVIEW OF SYSTEMS  --------------------------------------------------------------------------------    not giving ros     VITALS/PHYSICAL EXAM  --------------------------------------------------------------------------------  T(C): 36.9 (10-15-19 @ 16:24), Max: 37.1 (10-15-19 @ 10:49)  HR: 85 (10-15-19 @ 16:24) (83 - 110)  BP: 128/76 (10-15-19 @ 16:24) (117/75 - 134/79)  RR: 18 (10-15-19 @ 16:24) (18 - 18)  SpO2: 97% (10-15-19 @ 16:24) (94% - 97%)  Wt(kg): --        10-14-19 @ 07:01  -  10-15-19 @ 07:00  --------------------------------------------------------  IN: 0 mL / OUT: 500 mL / NET: -500 mL    10-15-19 @ 07:01  -  10-15-19 @ 21:21  --------------------------------------------------------  IN: 100 mL / OUT: 0 mL / NET: 100 mL      Physical Exam:  	  Gen: NGT non communicative when seen   	no jvd ,  	Pulm: decrease bs  no rales or ronchi or wheezing  	CV: RRR, S1S2; no rub  	Abd: +BS, soft, nontender/nondistended  	: No suprapubic tenderness  	UE: Warm, no cyanosis  no clubbing,  no edema;  	LE: Warm, no cyanosis  no clubbing, no edema  	Neuro:  remains lethargic    	Skin: Warm, no decrease skin turgor 	  	    LABS/STUDIES  --------------------------------------------------------------------------------              13.9   11.62 >-----------<  275      [10-15-19 @ 09:13]              42.2     137  |  95  |  37  ----------------------------<  263      [10-15-19 @ 07:18]  3.2   |  25  |  0.95        Ca     9.3     [10-15-19 @ 07:18]            Creatinine Trend:  SCr 0.95 [10-15 @ 07:18]  SCr 0.90 [10-14 @ 07:30]  SCr 0.80 [10-13 @ 06:48]  SCr 0.78 [10-12 @ 07:19]  SCr 0.80 [10-11 @ 21:59]              Urinalysis - [10-12-19 @ 10:58]      Color Yellow / Appearance Clear / SG 1.015 / pH 8.0      Gluc Negative / Ketone Negative  / Bili Negative / Urobili <2 mg/dL       Blood Negative / Protein Trace / Leuk Est Negative / Nitrite Negative      RBC  / WBC  / Hyaline  / Gran  / Sq Epi  / Non Sq Epi  / Bacteria     Urine Creatinine 56      [10-11-19 @ 14:21]  Urine Sodium 78      [10-11-19 @ 14:21]  Urine Potassium >100      [10-11-19 @ 14:21]  Urine Osmolality 690      [10-11-19 @ 15:08]    HbA1c 5.7      [09-25-19 @ 05:57]    YANDEL: titer Negative, pattern --      [09-28-19 @ 14:46]  dsDNA <12      [09-28-19 @ 14:46]  Rheumatoid Factor <10      [09-28-19 @ 14:42]  Syphilis Screen (Treponema Pallidum Ab) Negative      [09-29-19 @ 04:17]

## 2022-03-08 NOTE — DIETITIAN INITIAL EVALUATION ADULT. - OBTAIN DAILY WEIGHT
[FreeTextEntry1] : Ms. AVIVA VALENCIA, 62 year old female, former smoker (1/2 PPD x 30 years; Quit 10/2020), w/ hx of COPD, who presented to PCP for low dose CT lung cancer screening and was found to have new lung nodule. Initially presented to office in October, 2020 for surgical evaluation.\par \par Patient s/p Flex bronch, uniportal Lt VATS, LLL anatomic video-assisted thoracoscopic lobectomy, MLND, intercostal nerve block on 11/20/2020. Path of LLLobectomy revealing poorly differentiated squamous cell carcinoma; metastatic carcinoma; 3.7 cm , G3, + 1/7 hilar LN; All margins and remainder of lymph nodes (0/24) negative for carcinoma pT2a,pN1 (Stage IIB), EVG stain highlights the intact elastic visceral pleura. +p40, (-) TTF-1. path of MILLA wedge resection revealing lung parenchyma with emphysematous changes, negative for carcinoma.\par \par Patient was referred to Dr. Catarino Aguirre (Hem/Onc). Completed chemotherapy end of March, 2021. S/p 4  Cycles\par \par I have independently reviewed the medical records and imaging at the time of this office consultation, and discussed the following interpretations and recommendations with the patient:\par - CT scan with stable findings. Recommendation to return to clinic in 3 months with repeat CT Chest, no contrast, to re-evaluate stability. \par \par Recommendations reviewed with patient during this office visit, and all questions answered; Patient instructed on the importance of follow up and verbalizes understanding.\par \par I personally performed the services described in the documentation, reviewed the documentation recorded by the scribe in my presence and it accurately and completely records my words and actions.\par \par I, JIGNA ColonC, am scribing for and the presence of NATHAN Lehman, the following sections HISTORY OF PRESENT ILLNESS, PAST MEDICAL/FAMILY/SOCIAL HISTORY; REVIEW OF SYSTEMS; VITAL SIGNS; PHYSICAL EXAM; DISPOSITION.\par \par \par \par \par  yes

## 2022-06-06 NOTE — PROGRESS NOTE ADULT - PROVIDER SPECIALTY LIST ADULT
Hospitalist [FreeTextEntry1] : Patient is here for  follow up. He was readmitted for post op hematoma and for exploration. Details of his admission and discharge summary as well as details of transplant reviewed and noted below. I spoke to patient's son, Dr. Chayito Castelan who is accompanying him today. Patient had RVP pos at [facility] and was in resp isolation with only dry cough as symptom. He has been otherwise participating in exercise activities.\par \par Reports he has good appetite, no pain. Staples have been removed, stent still in. Has urinary incontinence. Has normal bowel movements. Blood pressure has been fairly controlled and glucose controlled while at [facility].\par \par He is ambulating with walker while at [facility] and came to Clinic with Wheel chair assistance.\par \par \par \par Medications from [facility] rehab reviewed, and gave for scanning to his records.\par \par He is on Tac 3 mg /d, MMF 1000 twice daily and prednisone 5 mg/d.\par \par Wants to switch to neurologist- Dr. 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 \par - AFib: resumed Eliquis; Plavix on HOLD \par \par -Readmitted on 4/28 with weakness, anemia requiring intermittent PRBC/Epogen. \par Did not have signs of shock \par -Imaging revealed perinephric hematoma-->s/p OR evacuation of hematoma and \par repair of bleeding by arterial anastomosis, graft bx on 5/2 with overall \par improvement in symptoms \par -Graft: last HD on 4/29. Improved UO and Cr this admission on Lasix trials (now \par off).  D/C Cr was 2.86.  L PermCath was removed by IR on 5/10 \par -intra-op graft bx negative \par -post-op ALINA was removed. \par -received Zosyn for post-op PPx with negative OR cultures.  Fluc was continued \par for 1 month PPx \par -post-op debility, worked with PT-->OK \par \par Evaluated daily by our multidisciplinary transplant team of surgeons, \par nephrologists, pharmacists, ACPs, SW, RNs, Nutrition and deemed safe for d/c to \par OK with the following plan: \par -ENV 5mg qd, MMF 1/1, Pred 5 \par -Eliquis 2.5BID. Plavix no longer required \par -f/u with Dr. Barber on Monday 5/16 \par \par Med Reconciliation: \par Override IMPROVE-DD recommendations due to:	This is a surgical and/or \par non-medical patient. \par Recommended Post-Discharge VTE Prophylaxis	This is a surgical and/or \par non-medical patient. \par Medication Reconciliation Status	Admission Reconciliation is Completed \par Discharge Reconciliation is Completed \par Discharge Medications	apixaban 2.5 mg oral tablet: 1 tab(s) orally 2 times a \par day \par atorvastatin 40 mg oral tablet: 1 tab(s) orally once a day (at bedtime) \par CellCept 500 mg oral tablet: 2 tab(s) orally 2 times a day \par Coreg 25 mg oral tablet: 1 tab(s) orally every 12 hours \par divalproex sodium 250 mg oral delayed release tablet: 1 tab(s) orally once a \par day \par divalproex sodium 500 mg oral delayed release tablet: 1 tab(s) orally 2 times a \par day \par fluconazole 200 mg oral tablet: 1 tab(s) orally once a day \par HumaLOG 100 units/mL injectable solution: 4 unit(s) injectable 3 times a day \par insulin glargine 100 units/mL subcutaneous solution: 6 unit(s) subcutaneous \par once a day (at bedtime) \par levETIRAcetam 250 mg oral tablet: 1 tab(s) orally 2 times a day \par levothyroxine 50 mcg (0.05 mg) oral tablet: 1 tab(s) orally once a day \par magnesium oxide 400 mg oral tablet: 1 tab(s) orally 3 times a day (with meals) \par pantoprazole 40 mg oral delayed release tablet: 1 tab(s) orally once a day \par (before a meal) \par predniSONE: 5 milligram(s) orally once a day \par senna oral tablet: 2 tab(s) orally once a day (at bedtime) \par sulfamethoxazole-trimethoprim 400 mg-80 mg oral tablet: 1 tab(s) orally once a \par day \par tacrolimus 4 mg oral tablet, extended release: 5 milligram(s) orally once a day \par valGANciclovir 450 mg oral tablet: 1 tab(s) orally 3 times a week \par

## 2022-08-09 NOTE — PROGRESS NOTE ADULT - ASSESSMENT
85M hx of HTN, remote prostate ca (treated s/p seeds/RT), encephalitis with dysphagia and PEG status, seizure d/o presenting with sepsis.     #Acute hypoxic respiratory failure: 2/2 Coronavirus and superimposed multifocal PNA: WBC back to baseline, afebrile   - s/p five day course of Vanco and meropenem. Abx discontinued today 1/3/20  - MRSA screen- not detected  - Blood cx 12/28 NGTD  - continue O2 support  - tube feeds continued , aspiration precautions    #Hypokalemia   - K = 3.1. Mag - 1.3 today 1/3/20  - supplement k and mag  - follow bmp     #Urinary retention   - continue bradley    #Fecal impaction  - Miralax daily  - Dulcolax PRN    #Moderate protein calorie malnutrition/ hypoalbunemia  - Albumin 1.5  - continue bolus tube feeds  - dietician following      #DM type 2 with hyperglycemia  - HbA1C 8.4%  - continue ISS  - monitor fingersticks -- consistently <200   - continue tube feeds    #HTN hx  - on amiloride 5mg TID and labetalol 100mg TID at home  - Continue to hold meds due to low BP    #History of encephalitis  - Keppra and Vimpat    #DVT prophylaxis  - lovenox 85M hx of HTN, remote prostate ca (treated s/p seeds/RT), encephalitis with dysphagia and PEG status, seizure d/o presenting with sepsis.     #Acute hypoxic respiratory failure: 2/2 Coronavirus and superimposed multifocal PNA: WBC back to baseline, afebrile   - s/p five day course of Vanco and meropenem. Abx discontinued today 1/3/20  - MRSA screen- not detected  - Blood cx 12/28 NGTD  - continue O2 support  - tube feeds continued , aspiration precautions    #Hypokalemia   - K = 3.1. Mag - 1.3 today 1/3/20  - supplement k and mag  - follow bmp     #Urinary retention   - continue bradley    #LUE swelling  - f/u ultrasound     #Fecal impaction  - Miralax daily  - Dulcolax PRN    #Moderate protein calorie malnutrition/ hypoalbunemia  - Albumin 1.5  - continue bolus tube feeds  - dietician following      #DM type 2 with hyperglycemia  - HbA1C 8.4%  - continue ISS  - monitor fingersticks -- consistently <200   - continue tube feeds    #HTN hx  - on amiloride 5mg TID and labetalol 100mg TID at home  - Continue to hold meds due to low BP    #History of encephalitis  - Keppra and Vimpat    #DVT prophylaxis  - lovenox as schedule permits

## 2022-08-24 NOTE — CONSULT NOTE ADULT - PROBLEM SELECTOR PROBLEM 3
Respiratory failure 1. Continue with current diet order (monitor need for ONS) 2. Encourage pt to meet nutritional needs as able 3. RD to obtain subjective information/provide diet ed as able 4. Pain and bowel regimen per team 5. Will assess/honor preferences as able

## 2022-11-07 NOTE — PROGRESS NOTE ADULT - PROBLEM SELECTOR PROBLEM 3
-- Went over instructions for drops and home Lid Hygiene in [address] at [facility] today. Hypertension

## 2022-12-20 NOTE — PATIENT PROFILE ADULT - NSPROGENDIFFINTUB_GEN_A_NUR
[place] pt intubated sedated [place] Counseling: I discussed with the patient the risks of tildrakizumab including but not limited to immunosuppression, malignancy, posterior leukoencephalopathy syndrome, and serious infections.  The patient understands that monitoring is required including a PPD at baseline and must alert us or the primary physician if symptoms of infection or other concerning signs are noted.

## 2023-03-20 NOTE — SWALLOW BEDSIDE ASSESSMENT ADULT - PHARYNGEAL PHASE
Left message for call back   Delayed pharyngeal swallow/Delayed cough post oral intake/Multiple swallows

## 2023-07-09 NOTE — PROVIDER CONTACT NOTE (CRITICAL VALUE NOTIFICATION) - NS PROVIDER READ BACK
Dr. Joi Peterson/yes
yes
6
Abormal VS: Temp > 100F or < 96.8F; SBP < 90 mmHG; HR > 120bpm; Resp > 24/min

## 2023-11-03 NOTE — PHYSICAL THERAPY INITIAL EVALUATION ADULT - ASR WT BEARING STATUS EVAL
Oxygen/Event Note Nutrition Services hospitalist/Event Note palliative care/Event Note no weight-bearing restrictions

## 2024-03-12 NOTE — PATIENT PROFILE ADULT - DO YOU FEEL THREATENED BY OTHERS?
-- DO NOT REPLY / DO NOT REPLY ALL --  -- Message is from Engagement Center Operations (ECO) --    General Patient Message: Dr Jhon Dave's Patient is asking for a nurse callback to discuss upcoming procedures.      Caller Information         Type Contact Phone/Fax    03/12/2024 11:06 AM CDT Phone (Incoming) Tabatha Sheth (Self) [phone] (M)          Alternative phone number: [phone]    Can a detailed message be left? Yes    Message Turnaround:     Is it Working Hours? Yes - Working Hours                     
no

## 2024-03-22 NOTE — PROGRESS NOTE ADULT - PROBLEM SELECTOR PROBLEM 8
Goals of care, counseling/discussion
Urinary retention
Statement Selected

## 2024-05-04 NOTE — DISCHARGE NOTE PROVIDER - NSDCCAREPROVSEEN_GEN_ALL_CORE_FT
[facility] Medicine, Advance PracticeTeam  [facility], Team  Abbe Palomo Faisal
Generalized weakness, impaired balance, deconditioning.

## 2024-09-10 NOTE — PATIENT PROFILE ADULT - LOCATION #3
Denies Tobacco, EtOH and illicit drug use , lives with wife former smoker (quit 40yrs ago) Denies Tobacco, EtOH and illicit drug use Left Heel

## 2024-09-26 NOTE — CHART NOTE - NSCHARTNOTEFT_GEN_A_CORE
Nutrition Follow Up Note  Patient seen for: f/u    Chart reviewed, events noted. Adm dx: status epilepticus. Extubated this am.    Source: chart, team, pt lethargic    Diet : has EN order for Jevity 1.2 75cc/hr x 18 hrs, OGT removed, now NPO     Enteral /Parenteral Nutrition: 24 hr EN intake 10/1 1125cc (1350 kcals)      Daily Weight in k.9 (10-01), Weight in k.4 (), Weight in k.8 (), Weight in k.6 (), Weight in k.8 (), Weight in k.3 (), Weight in k.3 ()  dosing wt  86.3 kg     wt range 186-195 lb since adm, has 1+ generalized, 2+ B/L hand edema    Pertinent Medications: MEDICATIONS  (STANDING):  acyclovir IVPB 850 milliGRAM(s) IV Intermittent every 8 hours  chlorhexidine 4% Liquid 1 Application(s) Topical <User Schedule>  dexamethasone  IVPB 10 milliGRAM(s) IV Intermittent daily  heparin  Injectable 5000 Unit(s) SubCutaneous every 8 hours  insulin lispro (HumaLOG) corrective regimen sliding scale   SubCutaneous every 6 hours  insulin NPH human recombinant 10 Unit(s) SubCutaneous every 6 hours  lacosamide IVPB 150 milliGRAM(s) IV Intermittent every 12 hours  levETIRAcetam  IVPB 2000 milliGRAM(s) IV Intermittent every 12 hours  metoprolol tartrate 25 milliGRAM(s) Oral two times a day  pantoprazole  Injectable 40 milliGRAM(s) IV Push daily  polyethylene glycol 3350 17 Gram(s) Oral two times a day  senna Syrup 10 milliLiter(s) Oral at bedtime  sodium chloride 0.9%. 1000 milliLiter(s) (75 mL/Hr) IV Continuous <Continuous>    MEDICATIONS  (PRN):    10-01 @ 01:10: Na 132<L>, BUN 27<H>, Cr 0.82, <H>, K+ 3.3<L>, Phos 2.5, Mg 1.9, Alk Phos 160<H>, ALT/SGPT 92<H>, AST/SGOT 63<H>, HbA1c --    Finger Sticks:  POCT Blood Glucose.: 146 mg/dL (10-01 @ 05:12)  POCT Blood Glucose.: 192 mg/dL ( @ 17:24)  POCT Blood Glucose.: 198 mg/dL ( @ 11:43)      Skin per nursing documentation: no pressure injuries documented     Estimated Needs:  [x ] no change since previous assessment  [ ] recalculated energy needs:     Previous Nutrition Diagnosis: none  Nutrition Diagnosis is:    New Nutrition Diagnosis: none  Related to:    As evidenced by:      Interventions:     Recommend  1) when cleared for po, recommend DASH diet, add Consistent Carbohydrate if BG remain elevated (pt on decadron)    Monitoring and Evaluation:     Continue to monitor Nutritional intake, Tolerance to diet prescription, weights, labs, skin integrity    RD remains available upon request and will follow up per protocol Nutrition Follow Up Note  Patient seen for: f/u    Chart reviewed, events noted. Adm dx: status epilepticus. Extubated this am.  NGT placed. Per RN plan to hold EN today.    Source: chart, team, pt lethargic    Diet : has EN order for Jevity 1.2 75cc/hr x 18 hrs, (now NPO)     Enteral /Parenteral Nutrition: 24 hr EN intake 10/1 1125cc (1350 kcals)      Daily Weight in k.9 (10-01), Weight in k.4 (), Weight in k.8 (), Weight in k.6 (), Weight in k.8 (), Weight in k.3 (), Weight in k.3 ()  dosing wt  86.3 kg     wt range 186-195 lb since adm, has 1+ generalized, 2+ B/L hand edema    Pertinent Medications: MEDICATIONS  (STANDING):  acyclovir IVPB 850 milliGRAM(s) IV Intermittent every 8 hours  chlorhexidine 4% Liquid 1 Application(s) Topical <User Schedule>  dexamethasone  IVPB 10 milliGRAM(s) IV Intermittent daily  heparin  Injectable 5000 Unit(s) SubCutaneous every 8 hours  insulin lispro (HumaLOG) corrective regimen sliding scale   SubCutaneous every 6 hours  insulin NPH human recombinant 10 Unit(s) SubCutaneous every 6 hours  lacosamide IVPB 150 milliGRAM(s) IV Intermittent every 12 hours  levETIRAcetam  IVPB 2000 milliGRAM(s) IV Intermittent every 12 hours  metoprolol tartrate 25 milliGRAM(s) Oral two times a day  pantoprazole  Injectable 40 milliGRAM(s) IV Push daily  polyethylene glycol 3350 17 Gram(s) Oral two times a day  senna Syrup 10 milliLiter(s) Oral at bedtime  sodium chloride 0.9%. 1000 milliLiter(s) (75 mL/Hr) IV Continuous <Continuous>    MEDICATIONS  (PRN):    10-01 @ 01:10: Na 132<L>, BUN 27<H>, Cr 0.82, <H>, K+ 3.3<L>, Phos 2.5, Mg 1.9, Alk Phos 160<H>, ALT/SGPT 92<H>, AST/SGOT 63<H>, HbA1c --    Finger Sticks:  POCT Blood Glucose.: 146 mg/dL (10-01 @ 05:12)  POCT Blood Glucose.: 192 mg/dL ( @ 17:24)  POCT Blood Glucose.: 198 mg/dL ( @ 11:43)      Skin per nursing documentation: no pressure injuries documented     Estimated Needs:  [x ] no change since previous assessment  [ ] recalculated energy needs:     Previous Nutrition Diagnosis: none  Nutrition Diagnosis is:    New Nutrition Diagnosis: none  Related to:    As evidenced by:      Interventions:     Recommend  1) When EN resumed, recommend change EN to Vital 1.2 at 75cc/hr x 18 hrs provides 1620 kcals, 101 gm protein, 1095cc free water  meets 19 Kcal/Kg, 1.2Gm/kg dosing wt 86.3 kg    Monitoring and Evaluation:     Continue to monitor Nutritional intake, Tolerance to diet prescription, weights, labs, skin integrity    RD remains available upon request and will follow up per protocol PAST MEDICAL HISTORY:  2019 novel coronavirus disease (COVID-19) in 2020 March    History of TIAs     HTN (hypertension)     Pulmonary embolism     UTI (urinary tract infection), bacterial

## 2024-11-25 NOTE — PROGRESS NOTE ADULT - PROVIDER SPECIALTY LIST ADULT
Closing encounter. Please see [facility] medical advice 11/21/2024.     Denisse Mas CMA       Heme/Onc

## 2024-12-25 NOTE — DIETITIAN INITIAL EVALUATION ADULT. - ENERGY NEEDS
Patient came in for mechanical fall.
per medical record pt with PMH: HTN, prostate cancer S/P radiation and seed implant (2001), known seizure condition[facility], S/P PEG placement (10/21, per daughter at bedside), found with focal myoclonic seizure in setting of UTI and electrolyte imbalances.

## 2025-01-07 NOTE — PROGRESS NOTE ADULT - PROBLEM SELECTOR PLAN 5
Patient contacted and advised she needs to make an office visit appointment due to bleeding hemorrhoids and constipation.  Appointment made for 02/06/2025 at 9 am with Dana Lan at [facility].  Patient advised to arrive 15 minutes prior to appointment and address given.  Patient voiced understanding.   continue with protonix

## 2025-07-10 NOTE — SWALLOW BEDSIDE ASSESSMENT ADULT - SWALLOW EVAL: ANTICIPATED DISCHARGE DISPOSITION
.
TBD pending HC
rehabilitation facility
TBD pending HC
rehabilitation facility
TBD pending HC
rehabilitation facility